# Patient Record
Sex: FEMALE | Race: WHITE | Employment: UNEMPLOYED | URBAN - METROPOLITAN AREA
[De-identification: names, ages, dates, MRNs, and addresses within clinical notes are randomized per-mention and may not be internally consistent; named-entity substitution may affect disease eponyms.]

---

## 2018-07-12 ENCOUNTER — HOSPITAL ENCOUNTER (OUTPATIENT)
Dept: SURGERY | Age: 48
Discharge: HOME | End: 2018-07-12

## 2018-07-12 ENCOUNTER — ANESTHESIA EVENT (OUTPATIENT)
Dept: SURGERY | Age: 48
End: 2018-07-12
Payer: COMMERCIAL

## 2018-07-12 PROBLEM — E66.01 OBESITY, MORBID (HCC): Status: ACTIVE | Noted: 2018-07-12

## 2018-07-12 NOTE — PERIOP NOTES
Patient verified name, , and surgery as listed in [facility]. Type 1B surgery, phone assessment complete. Orders not received. Labs per surgeon: none  Labs per anesthesia protocol: K+ (DOS)      Patient answered medical/surgical history questions at their best of ability. All prior to admission medications documented in [facility]. Patient instructed to take the following medications the day of surgery according to anesthesia guidelines with a small sip of water: allopurinol,norvasc,lopressor,zoloft, zofran if needed . Hold all vitamins 7 days prior to surgery and NSAIDS 5 days prior to surgery. Medications to be held none    Patient instructed on the following:  Arrive at A Entrance, time of arrival to be called the day before by 1700  NPO after midnight including gum, mints, and ice chips  Responsible adult must drive patient to the hospital, stay during surgery, and patient will  need supervision 24 hours after anesthesia  Use dial in shower the night before surgery and on the morning of surgery  Leave all valuables (money and jewelry) at home but bring insurance card and ID on DOS  Do not wear make-up, nail polish, lotions, cologne, perfumes, powders, or oil on skin. Patient teach back successful and patient demonstrates knowledge of instruction.

## 2018-07-13 ENCOUNTER — HOSPITAL ENCOUNTER (OUTPATIENT)
Age: 48
Setting detail: OBSERVATION
Discharge: HOME | End: 2018-07-13
Attending: OBSTETRICS & GYNECOLOGY | Admitting: OBSTETRICS & GYNECOLOGY
Payer: COMMERCIAL

## 2018-07-13 ENCOUNTER — ANESTHESIA (OUTPATIENT)
Dept: SURGERY | Age: 48
End: 2018-07-13
Payer: COMMERCIAL

## 2018-07-13 VITALS
OXYGEN SATURATION: 96 % | DIASTOLIC BLOOD PRESSURE: 72 MMHG | WEIGHT: 293 LBS | HEART RATE: 87 BPM | TEMPERATURE: 98.6 F | HEIGHT: 68 IN | SYSTOLIC BLOOD PRESSURE: 115 MMHG | BODY MASS INDEX: 44.41 KG/M2 | RESPIRATION RATE: 16 BRPM

## 2018-07-13 DIAGNOSIS — G89.18 POSTOPERATIVE PAIN: Primary | ICD-10-CM

## 2018-07-13 DIAGNOSIS — N92.1 MENORRHAGIA WITH IRREGULAR CYCLE: ICD-10-CM

## 2018-07-13 PROBLEM — D64.9 ANEMIA: Status: ACTIVE | Noted: 2018-07-13

## 2018-07-13 LAB
GLUCOSE BLD STRIP.AUTO-MCNC: 195 MG/DL (ref 65–100)
GLUCOSE BLD STRIP.AUTO-MCNC: 215 MG/DL (ref 65–100)
HCG UR QL: NEGATIVE
HCT VFR BLD AUTO: 23.2 % (ref 35.8–46.3)
HCT VFR BLD AUTO: 24.8 % (ref 35.8–46.3)
HGB BLD-MCNC: 7.8 G/DL (ref 11.7–15.4)
HGB BLD-MCNC: 8.3 G/DL (ref 11.7–15.4)
POTASSIUM BLD-SCNC: 5.1 MMOL/L (ref 3.5–5.1)
POTASSIUM SERPL-SCNC: 4.9 MMOL/L (ref 3.5–5.1)

## 2018-07-13 PROCEDURE — P9016 RBC LEUKOCYTES REDUCED: HCPCS | Performed by: ANESTHESIOLOGY

## 2018-07-13 PROCEDURE — 99218 HC RM OBSERVATION: CPT

## 2018-07-13 PROCEDURE — 77030008703 HC TU ET UNCUF COVD -A: Performed by: ANESTHESIOLOGY

## 2018-07-13 PROCEDURE — 85018 HEMOGLOBIN: CPT | Performed by: OBSTETRICS & GYNECOLOGY

## 2018-07-13 PROCEDURE — 77030020782 HC GWN BAIR PAWS FLX 3M -B: Performed by: ANESTHESIOLOGY

## 2018-07-13 PROCEDURE — 86923 COMPATIBILITY TEST ELECTRIC: CPT | Performed by: ANESTHESIOLOGY

## 2018-07-13 PROCEDURE — 74011250636 HC RX REV CODE- 250/636

## 2018-07-13 PROCEDURE — 77030008477 HC STYL SATN SLP COVD -A: Performed by: ANESTHESIOLOGY

## 2018-07-13 PROCEDURE — 77030012317 HC CATH URET INT COVD -A: Performed by: OBSTETRICS & GYNECOLOGY

## 2018-07-13 PROCEDURE — 86901 BLOOD TYPING SEROLOGIC RH(D): CPT | Performed by: ANESTHESIOLOGY

## 2018-07-13 PROCEDURE — 76060000033 HC ANESTHESIA 1 TO 1.5 HR: Performed by: OBSTETRICS & GYNECOLOGY

## 2018-07-13 PROCEDURE — 74011250636 HC RX REV CODE- 250/636: Performed by: ANESTHESIOLOGY

## 2018-07-13 PROCEDURE — 74011250636 HC RX REV CODE- 250/636: Performed by: OBSTETRICS & GYNECOLOGY

## 2018-07-13 PROCEDURE — 76010000138 HC OR TIME 0.5 TO 1 HR: Performed by: OBSTETRICS & GYNECOLOGY

## 2018-07-13 PROCEDURE — 36430 TRANSFUSION BLD/BLD COMPNT: CPT

## 2018-07-13 PROCEDURE — 84132 ASSAY OF SERUM POTASSIUM: CPT | Performed by: OBSTETRICS & GYNECOLOGY

## 2018-07-13 PROCEDURE — 81025 URINE PREGNANCY TEST: CPT

## 2018-07-13 PROCEDURE — 77030032490 HC SLV COMPR SCD KNE COVD -B: Performed by: OBSTETRICS & GYNECOLOGY

## 2018-07-13 PROCEDURE — 88305 TISSUE EXAM BY PATHOLOGIST: CPT | Performed by: OBSTETRICS & GYNECOLOGY

## 2018-07-13 PROCEDURE — 77030039270 HC TU BLD FLTR CARD -A

## 2018-07-13 PROCEDURE — 77030018836 HC SOL IRR NACL ICUM -A: Performed by: OBSTETRICS & GYNECOLOGY

## 2018-07-13 PROCEDURE — 74011000250 HC RX REV CODE- 250: Performed by: ANESTHESIOLOGY

## 2018-07-13 PROCEDURE — 74011000250 HC RX REV CODE- 250

## 2018-07-13 PROCEDURE — 84132 ASSAY OF SERUM POTASSIUM: CPT

## 2018-07-13 PROCEDURE — 76210000017 HC OR PH I REC 1.5 TO 2 HR: Performed by: OBSTETRICS & GYNECOLOGY

## 2018-07-13 PROCEDURE — 82962 GLUCOSE BLOOD TEST: CPT

## 2018-07-13 PROCEDURE — 77030020407 HC IV BLD WRMR ST 3M -A: Performed by: ANESTHESIOLOGY

## 2018-07-13 RX ORDER — ROCURONIUM BROMIDE 10 MG/ML
INJECTION, SOLUTION INTRAVENOUS AS NEEDED
Status: DISCONTINUED | OUTPATIENT
Start: 2018-07-13 | End: 2018-07-13 | Stop reason: HOSPADM

## 2018-07-13 RX ORDER — HYDROMORPHONE HYDROCHLORIDE 2 MG/ML
0.5 INJECTION, SOLUTION INTRAMUSCULAR; INTRAVENOUS; SUBCUTANEOUS
Status: DISCONTINUED | OUTPATIENT
Start: 2018-07-13 | End: 2018-07-13 | Stop reason: HOSPADM

## 2018-07-13 RX ORDER — OXYCODONE HYDROCHLORIDE 5 MG/1
5 TABLET ORAL
Status: DISCONTINUED | OUTPATIENT
Start: 2018-07-13 | End: 2018-07-13 | Stop reason: HOSPADM

## 2018-07-13 RX ORDER — SODIUM CHLORIDE 0.9 % (FLUSH) 0.9 %
5-10 SYRINGE (ML) INJECTION AS NEEDED
Status: DISCONTINUED | OUTPATIENT
Start: 2018-07-13 | End: 2018-07-14 | Stop reason: HOSPADM

## 2018-07-13 RX ORDER — SUCCINYLCHOLINE CHLORIDE 20 MG/ML
INJECTION INTRAMUSCULAR; INTRAVENOUS AS NEEDED
Status: DISCONTINUED | OUTPATIENT
Start: 2018-07-13 | End: 2018-07-13 | Stop reason: HOSPADM

## 2018-07-13 RX ORDER — DEXAMETHASONE SODIUM PHOSPHATE 4 MG/ML
INJECTION, SOLUTION INTRA-ARTICULAR; INTRALESIONAL; INTRAMUSCULAR; INTRAVENOUS; SOFT TISSUE AS NEEDED
Status: DISCONTINUED | OUTPATIENT
Start: 2018-07-13 | End: 2018-07-13 | Stop reason: HOSPADM

## 2018-07-13 RX ORDER — OXYCODONE AND ACETAMINOPHEN 5; 325 MG/1; MG/1
1 TABLET ORAL
Status: DISCONTINUED | OUTPATIENT
Start: 2018-07-13 | End: 2018-07-14 | Stop reason: HOSPADM

## 2018-07-13 RX ORDER — TRAMADOL HYDROCHLORIDE 50 MG/1
50 TABLET ORAL
Qty: 15 TAB | Refills: 0 | Status: SHIPPED | OUTPATIENT
Start: 2018-07-13 | End: 2018-07-20

## 2018-07-13 RX ORDER — LIDOCAINE HYDROCHLORIDE 20 MG/ML
INJECTION, SOLUTION EPIDURAL; INFILTRATION; INTRACAUDAL; PERINEURAL AS NEEDED
Status: DISCONTINUED | OUTPATIENT
Start: 2018-07-13 | End: 2018-07-13 | Stop reason: HOSPADM

## 2018-07-13 RX ORDER — PROPOFOL 10 MG/ML
INJECTION, EMULSION INTRAVENOUS AS NEEDED
Status: DISCONTINUED | OUTPATIENT
Start: 2018-07-13 | End: 2018-07-13 | Stop reason: HOSPADM

## 2018-07-13 RX ORDER — NALOXONE HYDROCHLORIDE 0.4 MG/ML
0.04 INJECTION, SOLUTION INTRAMUSCULAR; INTRAVENOUS; SUBCUTANEOUS
Status: DISCONTINUED | OUTPATIENT
Start: 2018-07-13 | End: 2018-07-13 | Stop reason: HOSPADM

## 2018-07-13 RX ORDER — KETOROLAC TROMETHAMINE 30 MG/ML
INJECTION, SOLUTION INTRAMUSCULAR; INTRAVENOUS AS NEEDED
Status: DISCONTINUED | OUTPATIENT
Start: 2018-07-13 | End: 2018-07-13 | Stop reason: HOSPADM

## 2018-07-13 RX ORDER — SODIUM CHLORIDE 9 MG/ML
250 INJECTION, SOLUTION INTRAVENOUS AS NEEDED
Status: DISCONTINUED | OUTPATIENT
Start: 2018-07-13 | End: 2018-07-13 | Stop reason: HOSPADM

## 2018-07-13 RX ORDER — LORAZEPAM 1 MG/1
1 TABLET ORAL
Status: DISCONTINUED | OUTPATIENT
Start: 2018-07-13 | End: 2018-07-14 | Stop reason: HOSPADM

## 2018-07-13 RX ORDER — MIDAZOLAM HYDROCHLORIDE 1 MG/ML
2 INJECTION, SOLUTION INTRAMUSCULAR; INTRAVENOUS ONCE
Status: DISCONTINUED | OUTPATIENT
Start: 2018-07-13 | End: 2018-07-13 | Stop reason: HOSPADM

## 2018-07-13 RX ORDER — SODIUM CHLORIDE, SODIUM LACTATE, POTASSIUM CHLORIDE, CALCIUM CHLORIDE 600; 310; 30; 20 MG/100ML; MG/100ML; MG/100ML; MG/100ML
100 INJECTION, SOLUTION INTRAVENOUS CONTINUOUS
Status: DISCONTINUED | OUTPATIENT
Start: 2018-07-13 | End: 2018-07-13 | Stop reason: HOSPADM

## 2018-07-13 RX ORDER — MIDAZOLAM HYDROCHLORIDE 1 MG/ML
2 INJECTION, SOLUTION INTRAMUSCULAR; INTRAVENOUS
Status: DISCONTINUED | OUTPATIENT
Start: 2018-07-13 | End: 2018-07-13 | Stop reason: HOSPADM

## 2018-07-13 RX ORDER — SODIUM CHLORIDE 0.9 % (FLUSH) 0.9 %
5-10 SYRINGE (ML) INJECTION EVERY 8 HOURS
Status: DISCONTINUED | OUTPATIENT
Start: 2018-07-13 | End: 2018-07-14 | Stop reason: HOSPADM

## 2018-07-13 RX ORDER — KETOROLAC TROMETHAMINE 30 MG/ML
30 INJECTION, SOLUTION INTRAMUSCULAR; INTRAVENOUS
Status: DISCONTINUED | OUTPATIENT
Start: 2018-07-13 | End: 2018-07-13 | Stop reason: HOSPADM

## 2018-07-13 RX ORDER — FENTANYL CITRATE 50 UG/ML
100 INJECTION, SOLUTION INTRAMUSCULAR; INTRAVENOUS ONCE
Status: DISCONTINUED | OUTPATIENT
Start: 2018-07-13 | End: 2018-07-13 | Stop reason: HOSPADM

## 2018-07-13 RX ORDER — LIDOCAINE HYDROCHLORIDE 10 MG/ML
0.1 INJECTION INFILTRATION; PERINEURAL AS NEEDED
Status: DISCONTINUED | OUTPATIENT
Start: 2018-07-13 | End: 2018-07-13 | Stop reason: HOSPADM

## 2018-07-13 RX ORDER — DIPHENHYDRAMINE HYDROCHLORIDE 50 MG/ML
12.5 INJECTION, SOLUTION INTRAMUSCULAR; INTRAVENOUS
Status: DISCONTINUED | OUTPATIENT
Start: 2018-07-13 | End: 2018-07-14 | Stop reason: HOSPADM

## 2018-07-13 RX ORDER — FENTANYL CITRATE 50 UG/ML
INJECTION, SOLUTION INTRAMUSCULAR; INTRAVENOUS AS NEEDED
Status: DISCONTINUED | OUTPATIENT
Start: 2018-07-13 | End: 2018-07-13 | Stop reason: HOSPADM

## 2018-07-13 RX ORDER — SODIUM CHLORIDE 9 MG/ML
INJECTION, SOLUTION INTRAVENOUS
Status: DISCONTINUED | OUTPATIENT
Start: 2018-07-13 | End: 2018-07-13 | Stop reason: HOSPADM

## 2018-07-13 RX ORDER — SODIUM CHLORIDE 9 MG/ML
250 INJECTION, SOLUTION INTRAVENOUS AS NEEDED
Status: DISCONTINUED | OUTPATIENT
Start: 2018-07-13 | End: 2018-07-14 | Stop reason: HOSPADM

## 2018-07-13 RX ORDER — ONDANSETRON 2 MG/ML
INJECTION INTRAMUSCULAR; INTRAVENOUS AS NEEDED
Status: DISCONTINUED | OUTPATIENT
Start: 2018-07-13 | End: 2018-07-13 | Stop reason: HOSPADM

## 2018-07-13 RX ADMIN — LIDOCAINE HYDROCHLORIDE 40 MG: 20 INJECTION, SOLUTION EPIDURAL; INFILTRATION; INTRACAUDAL; PERINEURAL at 13:27

## 2018-07-13 RX ADMIN — FENTANYL CITRATE 100 MCG: 50 INJECTION, SOLUTION INTRAMUSCULAR; INTRAVENOUS at 13:27

## 2018-07-13 RX ADMIN — FENTANYL CITRATE 50 MCG: 50 INJECTION, SOLUTION INTRAMUSCULAR; INTRAVENOUS at 13:58

## 2018-07-13 RX ADMIN — DEXAMETHASONE SODIUM PHOSPHATE 8 MG: 4 INJECTION, SOLUTION INTRA-ARTICULAR; INTRALESIONAL; INTRAMUSCULAR; INTRAVENOUS; SOFT TISSUE at 13:31

## 2018-07-13 RX ADMIN — ONDANSETRON 4 MG: 2 INJECTION INTRAMUSCULAR; INTRAVENOUS at 13:31

## 2018-07-13 RX ADMIN — PROPOFOL 200 MG: 10 INJECTION, EMULSION INTRAVENOUS at 13:27

## 2018-07-13 RX ADMIN — SODIUM CHLORIDE, SODIUM LACTATE, POTASSIUM CHLORIDE, AND CALCIUM CHLORIDE: 600; 310; 30; 20 INJECTION, SOLUTION INTRAVENOUS at 13:21

## 2018-07-13 RX ADMIN — SODIUM CHLORIDE, SODIUM LACTATE, POTASSIUM CHLORIDE, AND CALCIUM CHLORIDE 100 ML/HR: 600; 310; 30; 20 INJECTION, SOLUTION INTRAVENOUS at 12:29

## 2018-07-13 RX ADMIN — SODIUM CHLORIDE: 9 INJECTION, SOLUTION INTRAVENOUS at 13:34

## 2018-07-13 RX ADMIN — FENTANYL CITRATE 25 MCG: 50 INJECTION, SOLUTION INTRAMUSCULAR; INTRAVENOUS at 14:24

## 2018-07-13 RX ADMIN — KETOROLAC TROMETHAMINE 30 MG: 30 INJECTION, SOLUTION INTRAMUSCULAR; INTRAVENOUS at 14:09

## 2018-07-13 RX ADMIN — FENTANYL CITRATE 25 MCG: 50 INJECTION, SOLUTION INTRAMUSCULAR; INTRAVENOUS at 14:19

## 2018-07-13 RX ADMIN — LIDOCAINE HYDROCHLORIDE 0.1 ML: 10 INJECTION, SOLUTION INFILTRATION; PERINEURAL at 12:29

## 2018-07-13 RX ADMIN — ROCURONIUM BROMIDE 5 MG: 10 INJECTION, SOLUTION INTRAVENOUS at 13:27

## 2018-07-13 RX ADMIN — MEDROXYPROGESTERONE ACETATE 300 MG: 400 INJECTION, SUSPENSION INTRAMUSCULAR at 15:32

## 2018-07-13 RX ADMIN — SUCCINYLCHOLINE CHLORIDE 120 MG: 20 INJECTION INTRAMUSCULAR; INTRAVENOUS at 13:27

## 2018-07-13 RX ADMIN — Medication 10 ML: at 20:51

## 2018-07-13 NOTE — PROGRESS NOTES
Admitted to the unit via stretcher, no acute distress, ambulate to bathroom, without any complaints of weakness, SOB or dizziness, returned to bed, extremities aligned.

## 2018-07-13 NOTE — IP AVS SNAPSHOT
303 [facility], [place] 
 
 
 300 [facility] [address] 
[phone] Patient: Mundo Linares MRN: [id number] :1970 About your hospitalization You were admitted on:  2018 You last received care in the:  [address] [place] [address] You were discharged on:  2018 Why you were hospitalized Your primary diagnosis was:  Not on File Your diagnoses also included:  Anemia Follow-up Information Follow up With Details Comments Contact Info Duarte Padilla, [address] [facility] [address] 187 [facility] [address] 
[phone] Paul Dickinson MD In 2 weeks  1788 [facility] 1703 [facility] 187 [facility] [address] 
[phone] Your Scheduled Appointments 2018  9:00 AM EDT  
POST OP with Paul Dickinson MD  
[facility] ([address] [place] [address]) [facility] [address]  
[phone] Discharge Orders None A check kena indicates which time of day the medication should be taken. My Medications START taking these medications Instructions Each Dose to Equal  
 Morning Noon Evening Bedtime  
 traMADol 50 mg tablet Commonly known as:  ULTRAM  
   
Your last dose was: Your next dose is: Take 1 Tab by mouth every six (6) hours as needed for Pain. Max Daily Amount: 200 mg.  
 50 mg CONTINUE taking these medications Instructions Each Dose to Equal  
 Morning Noon Evening Bedtime  
 allopurinol 300 mg tablet Commonly known as:  Lázaro Walton Your last dose was: Your next dose is: Take 300 mg by mouth daily. Per anesthesia protocol:instructed to take am of surgery. Indications: prevention of acute gout attack 300 mg  
    
   
   
   
  
 amLODIPine 5 mg tablet Commonly known as:  Marilou Guillaume Your last dose was: Your next dose is: Take 5 mg by mouth. Per anesthesia protocol:instructed to take am of surgery. Indications: hypertension 5 mg  
    
   
   
   
  
 cyanocobalamin 1,000 mcg/mL injection Commonly known as:  VITAMIN B12 Your last dose was: Your next dose is:    
   
   
 1,000 mcg by IntraMUSCular route. 1000 mcg  
    
   
   
   
  
 dicyclomine 20 mg tablet Commonly known as:  BENTYL Your last dose was: Your next dose is: Take 20 mg by mouth every six (6) hours as needed for Other (IBS). Indications: IRRITABLE BOWEL SYNDROME  
 20 mg  
    
   
   
   
  
 gemfibrozil 600 mg tablet Commonly known as:  LOPID Your last dose was: Your next dose is: Take 600 mg by mouth. 600 mg  
    
   
   
   
  
 losartan-hydroCHLOROthiazide 100-25 mg per tablet Commonly known as:  HYZAAR Your last dose was: Your next dose is: Take 1 Tab by mouth. 1 Tab  
    
   
   
   
  
 metFORMIN 500 mg tablet Commonly known as:  GLUCOPHAGE Your last dose was: Your next dose is: Take 1,000 mg by mouth two (2) times daily (with meals). 1000 mg  
    
   
   
   
  
 metoprolol tartrate 100 mg IR tablet Commonly known as:  LOPRESSOR Your last dose was: Your next dose is: Take 100 mg by mouth daily. Per anesthesia protocol:instructed to take am of surgery. 100 mg  
    
   
   
   
  
 ondansetron hcl 4 mg tablet Commonly known as:  Lynch Lose Your last dose was: Your next dose is: Take 4 mg by mouth every eight (8) hours as needed for Nausea. 4 mg  
    
   
   
   
  
 sertraline 100 mg tablet Commonly known as:  ZOLOFT Your last dose was: Your next dose is: Take 100 mg by mouth daily. Per anesthesia protocol:instructed to take am of surgery.   
 100 mg  
    
   
   
   
  
  
  
 Where to Get Your Medications Information on where to get these meds will be given to you by the nurse or doctor. ! Ask your nurse or doctor about these medications  
  traMADol 50 mg tablet Opioid Education Prescription Opioids: What You Need to Know: 
 
 
After general anesthesia or intravenous sedation, for 24 hours or while taking prescription Narcotics: · Limit your activities · Do not drive and operate hazardous machinery · Do not make important personal or business decisions · Do  not drink alcoholic beverages · If you have not urinated within 8 hours after discharge, please contact your surgeon on call. Report the following to your surgeon: 
· Excessive pain, swelling, redness or odor of or around the surgical area · Temperature over 100.5 · Nausea and vomiting lasting longer than 4 hours or if unable to take medications · Any signs of decreased circulation or nerve impairment to extremity: change in color, persistent  numbness, tingling, coldness or increase pain · Any questions What to do at Home: 
Recommended activity: No lifting,or Strenuous exercise until Md approves, No sex until Md approves and No driving while on pain medication. If you experience any of the following symptoms: heavy bleeding, unusual or foul smelling vaginal discharge, fever,or uncontrolled pain, please follow up with Dr. Cari Mccarthy. *  Please give a list of your current medications to your Primary Care Provider. *  Please update this list whenever your medications are discontinued, doses are 
    changed, or new medications (including over-the-counter products) are added.  
 
*  Please carry medication information at all times in case of emergency situations. These are general instructions for a healthy lifestyle: No smoking/ No tobacco products/ Avoid exposure to second hand smoke Surgeon General's Warning:  Quitting smoking now greatly reduces serious risk to your health. Obesity, smoking, and sedentary lifestyle greatly increases your risk for illness A healthy diet, regular physical exercise & weight monitoring are important for maintaining a healthy lifestyle You may be retaining fluid if you have a history of heart failure or if you experience any of the following symptoms:  Weight gain of 3 pounds or more overnight or 5 pounds in a week, increased swelling in our hands or feet or shortness of breath while lying flat in bed. Please call your doctor as soon as you notice any of these symptoms; do not wait until your next office visit. Recognize signs and symptoms of STROKE: 
 
F-face looks uneven A-arms unable to move or move unevenly S-speech slurred or non-existent T-time-call 911 as soon as signs and symptoms begin-DO NOT go Back to bed or wait to see if you get better-TIME IS BRAIN. Warning Signs of HEART ATTACK Call 911 if you have these symptoms: 
? Chest discomfort. Most heart attacks involve discomfort in the center of the chest that lasts more than a few minutes, or that goes away and comes back. It can feel like uncomfortable pressure, squeezing, fullness, or pain. ? Discomfort in other areas of the upper body. Symptoms can include pain or discomfort in one or both arms, the back, neck, jaw, or stomach. ? Shortness of breath with or without chest discomfort. ? Other signs may include breaking out in a cold sweat, nausea, or lightheadedness. Don't wait more than five minutes to call [address]! Fast action can save your life. Calling 911 is almost always the fastest way to get lifesaving treatment.  Emergency Medical Services staff can begin treatment when they arrive  up to an hour sooner than if someone gets to the hospital by car. The discharge information has been reviewed with the patient. The patient verbalized understanding. Discharge medications reviewed with the patient and appropriate educational materials and side effects teaching were provided. ___________________________________________________________________________________________________________________________________ Introducing [facility] & [facility] SERVICES! [employer] introduces Buzzilla patient portal. Now you can access parts of your medical record, email your doctor's office, and request medication refills online. 1. In your internet browser, go to [email or web address] Mission[employer]/[address] [employer] 2. Click on the First Time User? Click Here link in the Sign In box. You will see the New Member Sign Up page. 3. Enter your Buzzilla Access Code exactly as it appears below. You will not need to use this code after youve completed the sign-up process. If you do not sign up before the expiration date, you must request a new code. · Buzzilla Access Code: [email or web address] Expires: 10/9/2018 12:50 PM 
 
4. Enter the last four digits of your Social Security Number (xxxx) and Date of Birth (mm/dd/yyyy) as indicated and click Submit. You will be taken to the next sign-up page. 5. Create a Rollerwallt ID. This will be your Buzzilla login ID and cannot be changed, so think of one that is secure and easy to remember. 6. Create a Rollerwallt password. You can change your password at any time. 7. Enter your Password Reset Question and Answer. This can be used at a later time if you forget your password. 8. Enter your e-mail address. You will receive e-mail notification when new information is available in [address] 9. Click Sign Up. You can now view and download portions of your medical record. 10. Click the Download Summary menu link to download a portable copy of your medical information. If you have questions, please visit the Frequently Asked Questions section of the MyChart website. Remember, "[employer]t is NOT to be used for urgent needs. For medical emergencies, dial 911. Now available from your iPhone and Android! Introducing [facility] As a [address] [place] [address] patient, I wanted to make you aware of our electronic visit tool called [facility]. [address] 24/7 allows you to connect within minutes with a medical provider 24 hours a day, seven days a week via a mobile device or tablet or logging into a secure website from your computer. You can access [facility] from anywhere in the [place]. A virtual visit might be right for you when you have a simple condition and feel like you just dont want to get out of bed, or cant get away from work for an appointment, when your regular [address] [place] [address] provider is not available (evenings, weekends or holidays), or when youre out of town and need minor care. Electronic visits cost only $49 and if the [facility] [address] 24/7 provider determines a prescription is needed to treat your condition, one can be electronically transmitted to a nearby pharmacy*. Please take a moment to enroll today if you have not already done so. The enrollment process is free and takes just a few minutes. To enroll, please download the [facility] 24/7 doug to your tablet or phone, or visit [email or web address] to enroll on your computer. And, as an [address] [place] [address] patient with a Gnammo account, the results of your visits will be scanned into your electronic medical record and your primary care provider will be able to view the scanned results. We urge you to continue to see your regular [address] [place] [address] provider for your ongoing medical care.   And while your primary care provider may not be the one available when you seek a [facility] virtual visit, the peace of mind you get from getting a real diagnosis real time can be priceless. For more information on [facility], view our Frequently Asked Questions (FAQs) at [email or web address]. Sincerely, 
 
Maile Eng MD 
Chief Medical Officer [address] *:  certain medications cannot be prescribed via [facility] Providers Seen During Your Hospitalization Provider Specialty Primary office phone Rasheed Dobbs MD Obstetrics & Gynecology [phone] Your Primary Care Physician (PCP) Primary Care Physician Office Phone Office Fax Kennedy Berry [phone] You are allergic to the following Allergen Reactions Ace Inhibitors Angioedema Arb-Angiotensin Receptor Antagonist Angioedema Keflex (Cephalexin) Nausea and Vomiting Recent Documentation Height Weight BMI Smoking Status 1.715 m (!) 160.3 kg 54.53 kg/m2 Never Smoker Emergency Contacts Name Discharge Info Relation Home Work Mobile Gilberto Collins  Spouse [3] [phone] Patient Belongings The following personal items are in your possession at time of discharge: 
  Dental Appliances: None  Visual Aid: None      Home Medications: None   Jewelry: Ring  Clothing: Footwear, Pants, Shirt, Undergarments    Other Valuables: Eyeglasses Discharge Instructions Attachments/References BLOOD TRANSFUSIONS: GENERAL INFO (ENGLISH) ANEMIA (ENGLISH) D AND C: POST-OP (ENGLISH) Patient Handouts Learning About Blood Transfusions What is a blood transfusion? Blood transfusion is a medical treatment to replace the blood or parts of blood that your body has lost. The blood goes through a tube from a bag to an intravenous (IV) catheter and into your vein.  
You may need a blood transfusion after losing blood from an injury, a major surgery, an illness that causes bleeding, or an illness that destroys blood cells. Transfusions are also used to give you the parts of blood-such as platelets, plasma, or substances that cause clotting-that your body needs to fight an illness or stop bleeding. How is a blood transfusion done? Before you receive a blood transfusion, your blood is tested to find out what your blood type is. Blood or blood parts that are a match with your blood type are ordered by your doctor. Blood is typed as A, B, AB, or O. It is also typed as Rh-positive or Rh-negative. Your blood is also screened to look for antibodies that might react with the blood that is given to you. The blood you are getting is checked and rechecked to make sure that it's the right type for you. A sample of your blood is mixed with a sample of the blood you will receive to check for problems. Before actually giving you the transfusion, a doctor and nurses will look at the label on the package of blood and compare it to your hospital ID bracelet and medical records. The transfusion begins only when all agree that this is the correct blood and that you are the correct person to receive it. To receive the transfusion, you will have an intravenous (IV) catheter inserted into a vein. A tube connects the catheter to the bag containing the blood, which is placed higher than your body. The blood then flows slowly into your vein. A doctor or nurse will check you several times during the transfusion to watch for a reaction or other problems. What are the possible risks? Blood transfusions have many benefits and are often life-saving. But they also have a few risks. Possible risks include: 
· Your body's reaction to receiving new blood. This may include: ¨ Fever. ¨ Allergic reactions. ¨ Breathing problems. · An infection from the blood. This risk is small because of the strict rules placed on handling and storing blood.  Getting a viral infection, such as HIV or hepatitis B or C, through blood transfusions has become very rare. The U.S. Food and Drug Administration (FDA) enforces strict guidelines on the collection, testing, storage, and use of blood. · Getting the wrong blood type by accident. Severe reactions, which can be life-threatening, are very rare. What can you expect after a blood transfusion? Here are some things you can do at home to help prevent infection at the transfusion site: 
· Wash the area daily with warm, soapy water, and pat it dry. Don't use hydrogen peroxide or alcohol, which can slow healing. You may cover the area with a gauze bandage if it weeps or rubs against clothing. Change the bandage every day. · Keep the area clean and dry. When should you call for help? Call 911 anytime you think you may need emergency care. For example, call if: 
· You have severe trouble breathing. Call your doctor now or seek immediate medical care if: 
· You have a fever. · You feel weaker or more tired than usual. 
· You have a yellow tint to your skin or the whites of your eyes. Watch closely for changes in your health, and be sure to contact your doctor if you have any problems. Follow-up care is a key part of your treatment and safety. Be sure to make and go to all appointments, and call your doctor if you are having problems. It's also a good idea to know your test results and keep a list of the medicines you take. Where can you learn more? Go to http://jemima-jacob.info/. Enter V535 in the search box to learn more about \"Learning About Blood Transfusions. \" Current as of: October 9, 2017 Content Version: 11.7 © 0200-2247 Healthwise, Incorporated. Care instructions adapted under license by "[employer] Mobile [employer]" (which disclaims liability or warranty for this information). If you have questions about a medical condition or this instruction, always ask your healthcare professional. [address] any warranty or liability for your use of this information. Anemia: Care Instructions Your Care Instructions Anemia is a low level of red blood cells, which carry oxygen throughout your body. Many things can cause anemia. Lack of iron is one of the most common causes. Your body needs iron to make hemoglobin, a substance in red blood cells that carries oxygen from the lungs to your body's cells. Without enough iron, the body produces fewer and smaller red blood cells. As a result, your body's cells do not get enough oxygen, and you feel tired and weak. And you may have trouble concentrating. Bleeding is the most common cause of a lack of iron. You may have heavy menstrual bleeding or bleeding caused by conditions such as ulcers, hemorrhoids, or cancer. Regular use of aspirin or other anti-inflammatory medicines (such as ibuprofen) also can cause bleeding in some people. A lack of iron in your diet also can cause anemia, especially at times when the body needs more iron, such as during pregnancy, infancy, and the teen years. Your doctor may have prescribed iron pills. It may take several months of treatment for your iron levels to return to normal. Your doctor also may suggest that you eat foods that are rich in iron, such as meat and beans. There are many other causes of anemia. It is not always due to a lack of iron. Finding the specific cause of your anemia will help your doctor find the right treatment for you. Follow-up care is a key part of your treatment and safety. Be sure to make and go to all appointments, and call your doctor if you are having problems. It's also a good idea to know your test results and keep a list of the medicines you take. How can you care for yourself at home? · Take your medicines exactly as prescribed. Call your doctor if you think you are having a problem with your medicine. · If your doctor recommends iron pills, take them as directed: ¨ Try to take the pills on an empty stomach about 1 hour before or 2 hours after meals. But you may need to take iron with food to avoid an upset stomach. ¨ Do not take antacids or drink milk or caffeine drinks (such as coffee, tea, or cola) at the same time or within 2 hours of the time that you take your iron. They can make it hard for your body to absorb the iron. ¨ Vitamin C (from food or supplements) helps your body absorb iron. Try taking iron pills with a glass of orange juice or some other food that is high in vitamin C, such as citrus fruits. ¨ Iron pills may cause stomach problems, such as heartburn, nausea, diarrhea, constipation, and cramps. Be sure to drink plenty of fluids, and include fruits, vegetables, and fiber in your diet each day. Iron pills often make your bowel movements dark or green. ¨ If you forget to take an iron pill, do not take a double dose of iron the next time you take a pill. ¨ Keep iron pills out of the reach of small children. An overdose of iron can be very dangerous. · Follow your doctor's advice about eating iron-rich foods. These include red meat, shellfish, poultry, eggs, beans, raisins, whole-grain bread, and leafy green vegetables. · Steam vegetables to help them keep their iron content. When should you call for help? Call 911 anytime you think you may need emergency care. For example, call if: 
  · You have symptoms of a heart attack. These may include: ¨ Chest pain or pressure, or a strange feeling in the chest. 
¨ Sweating. ¨ Shortness of breath. ¨ Nausea or vomiting. ¨ Pain, pressure, or a strange feeling in the back, neck, jaw, or upper belly or in one or both shoulders or arms. ¨ Lightheadedness or sudden weakness. ¨ A fast or irregular heartbeat. After you call 911, the  may tell you to chew 1 adult-strength or 2 to 4 low-dose aspirin. Wait for an ambulance. Do not try to drive yourself.  
  · You passed out (lost consciousness).  
 Call your doctor now or seek immediate medical care if:   · You have new or increased shortness of breath.  
  · You are dizzy or lightheaded, or you feel like you may faint.  
  · Your fatigue and weakness continue or get worse.  
  · You have any abnormal bleeding, such as: 
¨ Nosebleeds. ¨ Vaginal bleeding that is different (heavier, more frequent, at a different time of the month) than what you are used to. ¨ Bloody or black stools, or rectal bleeding. ¨ Bloody or pink urine.  
 Watch closely for changes in your health, and be sure to contact your doctor if: 
  · You do not get better as expected. Where can you learn more? Go to http://jemima-jacob.info/. Enter R301 in the search box to learn more about \"Anemia: Care Instructions. \" Current as of: October 9, 2017 Content Version: 11.7 © 9580-7083 [employer]. Care instructions adapted under license by [employer] (which disclaims liability or warranty for this information). If you have questions about a medical condition or this instruction, always ask your healthcare professional. [place] [address] any warranty or liability for your use of this information. Dilation and Curettage: What to Expect at [facility] Your Recovery Dilation and curettage (D&C) is a procedure to remove tissue from the inside of the uterus. The doctor used a curved tool, called a curette, to gently scrape tissue from your uterus. You are likely to have a backache, or cramps similar to menstrual cramps, and pass small clots of blood from your vagina for the first few days. You may continue to have light vaginal bleeding for several weeks after the procedure. You will probably be able to go back to most of your normal activities in 1 or 2 days. This care sheet gives you a general idea about how long it will take for you to recover. But each person recovers at a different pace. Follow the steps below to get better as quickly as possible. How can you care for yourself at home? Activity 
  · Rest when you feel tired. Getting enough sleep will help you recover.  
  · Avoid strenuous activities, such as bicycle riding, jogging, weight lifting, or aerobic exercise, until your doctor says it is okay.  
  · Most women are able to return to work the day after the procedure.  
  · You may have some light vaginal bleeding. Wear sanitary pads if needed. Do not douche or use tampons for 2 weeks or until your doctor says it is okay.  
  · Ask your doctor when it is okay for you to have sex.  
  · If you could become pregnant, talk about birth control with your doctor. Do not try to become pregnant until your doctor says it is okay. Diet 
  · You can eat your normal diet. If your stomach is upset, try bland, low-fat foods like plain rice, broiled chicken, toast, and yogurt.  
  · Drink plenty of fluids (unless your doctor tells you not to). Medicines 
  · Your doctor will tell you if and when you can restart your medicines. He or she will also give you instructions about taking any new medicines.  
  · If you take blood thinners, such as warfarin (Coumadin), clopidogrel (Plavix), or aspirin, be sure to talk to your doctor. He or she will tell you if and when to start taking those medicines again. Make sure that you understand exactly what your doctor wants you to do.  
  · Be safe with medicines. Take pain medicines exactly as directed. ¨ If the doctor gave you a prescription medicine for pain, take it as prescribed. ¨ If you are not taking a prescription pain medicine, ask your doctor if you can take an over-the-counter medicine.  
  · If you think your pain medicine is making you sick to your stomach: 
¨ Take your medicine after meals (unless your doctor has told you not to). ¨ Ask your doctor for a different pain medicine.  
  · If your doctor prescribed antibiotics, take them as directed.  Do not stop taking them just because you feel better. You need to take the full course of antibiotics. Follow-up care is a key part of your treatment and safety. Be sure to make and go to all appointments, and call your doctor if you are having problems. It's also a good idea to know your test results and keep a list of the medicines you take. When should you call for help? Call 911 anytime you think you may need emergency care. For example, call if: 
  · You passed out (lost consciousness).  
  · You have chest pain, are short of breath, or cough up blood.  
 Call your doctor now or seek immediate medical care if: 
  · You have bright red vaginal bleeding that soaks one or more pads in an hour, or you have large clots.  
  · You have vaginal discharge that increases in amount or smells bad.  
  · You are sick to your stomach or cannot drink fluids.  
  · You have pain that does not get better after you take pain medicine.  
  · You cannot pass stools or gas.  
  · You have symptoms of a blood clot in your leg (called a deep vein thrombosis), such as: 
¨ Pain in your calf, back of the knee, thigh, or groin. ¨ Redness and swelling in your leg.  
  · You have signs of infection, such as: 
¨ Increased pain, swelling, warmth, or redness. ¨ Red streaks leading from the area. ¨ Pus draining from the area. ¨ A fever.  
 Watch closely for changes in your health, and be sure to contact your doctor if you have any problems. Where can you learn more? Go to http://jemima-jacob.info/. Enter 585-384-1984 in the search box to learn more about \"Dilation and Curettage: What to Expect at Home. \" Current as of: November 21, 2017 Content Version: 11.7 © 1175-0187 [employer]. Care instructions adapted under license by [employer] (which disclaims liability or warranty for this information).  If you have questions about a medical condition or this instruction, always ask your healthcare professional. [address] any warranty or liability for your use of this information. Please provide this summary of care documentation to your next provider. Signatures-by signing, you are acknowledging that this After Visit Summary has been reviewed with you and you have received a copy. Patient Signature:  ____________________________________________________________ Date:  ____________________________________________________________  
  
[facility] Provider Signature:  ____________________________________________________________ Date:  ____________________________________________________________

## 2018-07-13 NOTE — PROGRESS NOTES
Resting quietly, awake with no c/o during report received from MANOHAR LUZ RN. 2nd unit of prbcs infusing without difficulty. No distress.

## 2018-07-13 NOTE — PROGRESS NOTES
TRANSFER - IN REPORT:    Verbal report received from Nataly Choudhary RN on [employer]  being received from PACU for routine progression of care      Report consisted of patients Situation, Background, Assessment and   Recommendations(SBAR). Information from the following report(s) SBAR was reviewed with the receiving nurse. Opportunity for questions and clarification was provided. Assessment completed upon patients arrival to unit and care assumed.

## 2018-07-13 NOTE — ANESTHESIA POSTPROCEDURE EVALUATION
Post-Anesthesia Evaluation and Assessment    Patient: Tana Rocha MRN: [id number]  SSN: [id number]    YOB: 1970  Age: 46 y.o. Sex: female       Cardiovascular Function/Vital Signs  Visit Vitals    /63    Pulse 88    Temp 36.9 °C (98.4 °F)    Resp 16    Ht 5' 7.5\" (1.715 m)    Wt (!) 160.3 kg (353 lb 6.4 oz)    SpO2 99%    BMI 54.53 kg/m2       Patient is status post general anesthesia for Procedure(s):  DILATATION AND CURETTAGE HYSTEROSCOPY. Nausea/Vomiting: None    Postoperative hydration reviewed and adequate. Pain:  Pain Scale 1: Numeric (0 - 10) (07/13/18 1423)  Pain Intensity 1: 0 (07/13/18 1423)   Managed    Neurological Status:   Neuro (WDL): Within Defined Limits (07/13/18 1423)  Neuro  Neurologic State: Alert (07/13/18 1423)  Orientation Level: Oriented X4 (07/13/18 1423)  LUE Motor Response: Purposeful (07/13/18 1423)  LLE Motor Response: Purposeful (07/13/18 1423)  RUE Motor Response: Purposeful (07/13/18 1423)  RLE Motor Response: Purposeful (07/13/18 1423)   At baseline    Mental Status and Level of Consciousness: Arousable    Pulmonary Status:   O2 Device: Nasal cannula (07/13/18 1423)   Adequate oxygenation and airway patent    Complications related to anesthesia: None    Post-anesthesia assessment completed. Plan to admit to floor for observation while receiving blood products per Dr. Krystyna Reza.     Signed By: Wen Tamayo MD     July 13, 2018

## 2018-07-13 NOTE — PERIOP NOTES
TRANSFER - OUT REPORT:    Verbal report given to Kian Le RN on aTna Rocha  being transferred to  for routine post - op       Report consisted of patients Situation, Background, Assessment and   Recommendations(SBAR). Information from the following report(s) OR Summary, Procedure Summary, Intake/Output and MAR was reviewed with the receiving nurse. Opportunity for questions and clarification was provided. Patient transported on room air.

## 2018-07-13 NOTE — ANESTHESIA PREPROCEDURE EVALUATION
Anesthetic History   No history of anesthetic complications            Review of Systems / Medical History  Patient summary reviewed and pertinent labs reviewed    Pulmonary  Within defined limits                 Neuro/Psych   Within defined limits           Cardiovascular    Hypertension: well controlled              Exercise tolerance: >4 METS     GI/Hepatic/Renal     GERD           Endo/Other    Diabetes: well controlled    Morbid obesity and anemia     Other Findings            Physical Exam    Airway  Mallampati: II  TM Distance: 4 - 6 cm  Neck ROM: normal range of motion   Mouth opening: Normal     Cardiovascular    Rhythm: regular  Rate: normal         Dental  No notable dental hx       Pulmonary  Breath sounds clear to auscultation               Abdominal  GI exam deferred       Other Findings            Anesthetic Plan    ASA: 3  Anesthesia type: general          Induction: Intravenous  Anesthetic plan and risks discussed with: Patient      Will check hgb and K

## 2018-07-13 NOTE — H&P
Subjective:     Patient is a 46 y.o.  female presents with abnormal uterine bleeding. gradually worsening course. Patient Active Problem List    Diagnosis Date Noted    Obesity, morbid (RUST 75.) 07/12/2018    Tongue swelling 12/25/2015    Angioedema 12/25/2015    DM2 (diabetes mellitus, type 2) (RUST 75.) 12/25/2015    HTN (hypertension) 12/25/2015    Hypertensive urgency 12/25/2015    Gout 12/25/2015    Allergic reaction caused by a drug 12/25/2015     Past Medical History:   Diagnosis Date    Diabetes (RUST 75.)     type 2. oral meds. Pt does not monitor bs. Last A1C (9/2017)=6.9    GERD (gastroesophageal reflux disease)     OTC prn    Gout     Hypertension     controlled w/med    IBS (irritable bowel syndrome)       History reviewed. No pertinent surgical history. [unfilled]  Allergies   Allergen Reactions    Ace Inhibitors Angioedema    Arb-Angiotensin Receptor Antagonist Angioedema    Keflex [Cephalexin] Nausea and Vomiting      Social History   Substance Use Topics    Smoking status: Never Smoker    Smokeless tobacco: Never Used    Alcohol use Yes      Comment: occ      Family History   Problem Relation Age of Onset    Cancer Father      Lung/Mets to Bone and Brain      Review of Systems  A comprehensive review of systems was negative except for that written in the HPI. Objective:     Patient Vitals for the past 8 hrs:   BP Temp Pulse Resp SpO2 Height Weight   07/13/18 1156 155/83 99 °F (37.2 °C) 85 18 98 % 5' 7.5\" (1.715 m) (!) 353 lb 6.4 oz (160.3 kg)     No intake or output data in the 24 hours ending 07/13/18 1311      General: Alert . Oriented x3   HEENT: Normocephalic PEERL   Heart: RRR   Lungs: Clear   Abdominal: Bowel sounds are normal, liver is not enlarged, spleen is not enlarged   Neurological: Alert and oriented X 3, normal strength and tone. Normal symmetric reflexes.  Normal coordination and gait   Extremities: extremities normal, atraumatic, no cyanosis or edema     Assessment: Menstrual bleeding problem    Plan:       Procedure(s):  DILATATION AND CURETTAGE HYSTEROSCOPY

## 2018-07-13 NOTE — PROGRESS NOTES
Continues to rest quietly, resp even, unlab, skin warm, dry. AP 92, regular, lungs sounds clear. Reports small amount of vaginal bleeding during malika care and voiding without difficulty. Assessment complete. No needs, no c/o, no distress.

## 2018-07-13 NOTE — OP NOTES
HYSTEROSCOPY WITH DILATATION AND CURETTAGE    DATE OF PROCEDURE: 7/13/2018     PREOPERATIVE DIAGNOSIS: Menorrhagia with irregular cycle [N92.1]  Other iron deficiency anemia [D50.8]  Thickened endometrium [R93.8]  Uterine leiomyoma, unspecified location [D25.9]     POSTOPERATWE DIAGNOSIS: Menorrhagia with irregular cycle [N92.1]  Other iron deficiency anemia [D50.8]  Thickened endometrium [R93.8]  Uterine leiomyoma, unspecified location [D25.9]    PROCEDURE: Hysteroscopy with dilatation & curettage. SURGEON: Nick Peres MD    ANESTHESIA: General.    DRAINS: Sterile in and out catheterization of the bladder. FINDINGS: thicke endometrial lining. Difficult procedure due to body habitus    PROCEDURE IN DETAIL: The patient was taken to the Operating Room. After adequate anesthesia was established she was properly positioned, scrubbed, prepped and draped. Time out was done to confirm the operating procedure, surgeon, patient and site. Once confirmed by the team, procedure was started. The weighted speculum was placed in the vault to expose the cervix, was grasped at 12 oclock with the single-tooth tenaculum and sounded to 9 cm. It was sequentially dilated prior to the hysteroscope being inserted with the above noted findings. A very gentle but homogenous curetting was done starting in the midline and working in a clockwise manner. Endometrial tissue was retrieved. The hysteroscope was reinserted again. With this complete and thorough visual review, we terminated the procedure. With the sponge, instrument and needle count correct, the patient was awakened and taken to the Recovery Room in satisfactory condition. She was given 2 units PRBC in recovery room. BLOOD LOSS ESTIMATED: less than 50 ml    SPECIMENS:Endometrial curettings. Pt discharged to home . Precautions given . Appt 2 weeks.  Rx for Tramadol  50 mg (15)

## 2018-07-14 LAB
ABO + RH BLD: NORMAL
BLD PROD TYP BPU: NORMAL
BLD PROD TYP BPU: NORMAL
BLOOD GROUP ANTIBODIES SERPL: NORMAL
BPU ID: NORMAL
BPU ID: NORMAL
CROSSMATCH RESULT,%XM: NORMAL
CROSSMATCH RESULT,%XM: NORMAL
SPECIMEN EXP DATE BLD: NORMAL
STATUS OF UNIT,%ST: NORMAL
STATUS OF UNIT,%ST: NORMAL
UNIT DIVISION, %UDIV: 0
UNIT DIVISION, %UDIV: 0

## 2018-07-14 NOTE — PROGRESS NOTES
Reported to Dr. Wilder Rome, pt's hgb, hct and potassium results. Plan is to proceed with discharge pt home with follow-up appt in 2 wks.

## 2018-07-14 NOTE — DISCHARGE INSTRUCTIONS
DISCHARGE SUMMARY from Nurse    PATIENT INSTRUCTIONS:    After general anesthesia or intravenous sedation, for 24 hours or while taking prescription Narcotics:  · Limit your activities  · Do not drive and operate hazardous machinery  · Do not make important personal or business decisions  · Do  not drink alcoholic beverages  · If you have not urinated within 8 hours after discharge, please contact your surgeon on call. Report the following to your surgeon:  · Excessive pain, swelling, redness or odor of or around the surgical area  · Temperature over 100.5  · Nausea and vomiting lasting longer than 4 hours or if unable to take medications  · Any signs of decreased circulation or nerve impairment to extremity: change in color, persistent  numbness, tingling, coldness or increase pain  · Any questions    What to do at Home:  Recommended activity: No lifting,or Strenuous exercise until Md approves, No sex until Md approves and No driving while on pain medication. If you experience any of the following symptoms: heavy bleeding, unusual or foul smelling vaginal discharge, fever,or uncontrolled pain, please follow up with Dr. Jerry Merino. *  Please give a list of your current medications to your Primary Care Provider. *  Please update this list whenever your medications are discontinued, doses are      changed, or new medications (including over-the-counter products) are added. *  Please carry medication information at all times in case of emergency situations. These are general instructions for a healthy lifestyle:    No smoking/ No tobacco products/ Avoid exposure to second hand smoke  Surgeon General's Warning:  Quitting smoking now greatly reduces serious risk to your health.     Obesity, smoking, and sedentary lifestyle greatly increases your risk for illness    A healthy diet, regular physical exercise & weight monitoring are important for maintaining a healthy lifestyle    You may be retaining fluid if you have a history of heart failure or if you experience any of the following symptoms:  Weight gain of 3 pounds or more overnight or 5 pounds in a week, increased swelling in our hands or feet or shortness of breath while lying flat in bed. Please call your doctor as soon as you notice any of these symptoms; do not wait until your next office visit. Recognize signs and symptoms of STROKE:    F-face looks uneven    A-arms unable to move or move unevenly    S-speech slurred or non-existent    T-time-call 911 as soon as signs and symptoms begin-DO NOT go       Back to bed or wait to see if you get better-TIME IS BRAIN. Warning Signs of HEART ATTACK     Call 911 if you have these symptoms:   Chest discomfort. Most heart attacks involve discomfort in the center of the chest that lasts more than a few minutes, or that goes away and comes back. It can feel like uncomfortable pressure, squeezing, fullness, or pain.  Discomfort in other areas of the upper body. Symptoms can include pain or discomfort in one or both arms, the back, neck, jaw, or stomach.  Shortness of breath with or without chest discomfort.  Other signs may include breaking out in a cold sweat, nausea, or lightheadedness. Don't wait more than five minutes to call 911 - MINUTES MATTER! Fast action can save your life. Calling 911 is almost always the fastest way to get lifesaving treatment. Emergency Medical Services staff can begin treatment when they arrive -- up to an hour sooner than if someone gets to the hospital by car. The discharge information has been reviewed with the patient. The patient verbalized understanding. Discharge medications reviewed with the patient and appropriate educational materials and side effects teaching were provided.   ___________________________________________________________________________________________________________________________________

## 2018-07-14 NOTE — PROGRESS NOTES
07/13/18 1944   Dual Skin Pressure Injury Assessment   Dual Skin Pressure Injury Assessment WDL   Second Care Provider (Based on Facility Policy) Chloe Manrique RN   Skin Integumentary   Skin Integumentary (WDL) WDL   Wound Prevention and Protection Methods   Wound Offloading (Prevention Methods) Bed, pressure reduction mattress

## 2018-07-14 NOTE — PROGRESS NOTES
Discharge instructions discussed with pt, including written discharged instructions given with prescription for pain med and reminder to see Dr. Kristen Palma in 2 wks, follow-up. Reports vaginal bleeding remains scant amount. Pt aware she should not take any tub baths, only showers until Dr. Kristen Palma approves. Pt voiced understanding of discharge instructions with no additional questions. No c/o. No distress. Family at side.

## 2018-07-14 NOTE — PROGRESS NOTES
Blood transfusion completed, tolerating well. Aware that additional blood work will be drawn in approx 1 hr. No c/o. No distress.

## 2018-07-16 NOTE — PHYSICIAN ADVISORY
Letter of Determination:  Outpatient states receiving Observation Services    This case was reviewed, and I concur with Outpatient status receiving Observation services for this patient after a scheduled outpatient dilatation and curettage hysteroscopy due to blood loss requiring transfusion of two units of packed red blood cells. This determination may change depending on further medical information, condition changes of the patient, or treatment requirements.       Faotumata Burton MD, VONNIE,   Physician Narinder Costello.

## 2021-12-15 ENCOUNTER — ANESTHESIA EVENT (OUTPATIENT)
Dept: SURGERY | Age: 51
DRG: 853 | End: 2021-12-15
Payer: COMMERCIAL

## 2021-12-15 ENCOUNTER — APPOINTMENT (OUTPATIENT)
Dept: GENERAL RADIOLOGY | Age: 51
DRG: 853 | End: 2021-12-15
Attending: SURGERY
Payer: COMMERCIAL

## 2021-12-15 ENCOUNTER — APPOINTMENT (OUTPATIENT)
Dept: GENERAL RADIOLOGY | Age: 51
DRG: 853 | End: 2021-12-15
Attending: INTERNAL MEDICINE
Payer: COMMERCIAL

## 2021-12-15 ENCOUNTER — ANESTHESIA (OUTPATIENT)
Dept: SURGERY | Age: 51
DRG: 853 | End: 2021-12-15
Payer: COMMERCIAL

## 2021-12-15 ENCOUNTER — APPOINTMENT (OUTPATIENT)
Dept: CT IMAGING | Age: 51
DRG: 853 | End: 2021-12-15
Attending: EMERGENCY MEDICINE
Payer: COMMERCIAL

## 2021-12-15 ENCOUNTER — HOSPITAL ENCOUNTER (INPATIENT)
Age: 51
LOS: 12 days | Discharge: NURSING, REHAB OR LONG-TERM CARE | DRG: 853 | End: 2021-12-27
Attending: EMERGENCY MEDICINE | Admitting: SURGERY
Payer: COMMERCIAL

## 2021-12-15 DIAGNOSIS — D64.9 ANEMIA, UNSPECIFIED TYPE: ICD-10-CM

## 2021-12-15 DIAGNOSIS — E87.20 METABOLIC ACIDOSIS: ICD-10-CM

## 2021-12-15 DIAGNOSIS — M72.6 NECROTIZING FASCIITIS (HCC): Primary | ICD-10-CM

## 2021-12-15 DIAGNOSIS — I48.91 ATRIAL FIBRILLATION WITH RVR (HCC): ICD-10-CM

## 2021-12-15 DIAGNOSIS — J96.00 ACUTE RESPIRATORY FAILURE, UNSPECIFIED WHETHER WITH HYPOXIA OR HYPERCAPNIA (HCC): ICD-10-CM

## 2021-12-15 DIAGNOSIS — N17.0 ACUTE KIDNEY INJURY (AKI) WITH ACUTE TUBULAR NECROSIS (ATN) (HCC): ICD-10-CM

## 2021-12-15 DIAGNOSIS — E66.01 OBESITY, MORBID (HCC): Chronic | ICD-10-CM

## 2021-12-15 DIAGNOSIS — N17.9 AKI (ACUTE KIDNEY INJURY) (HCC): ICD-10-CM

## 2021-12-15 DIAGNOSIS — J96.01 ACUTE HYPOXEMIC RESPIRATORY FAILURE (HCC): ICD-10-CM

## 2021-12-15 DIAGNOSIS — E87.5 HYPERKALEMIA: ICD-10-CM

## 2021-12-15 LAB
ALBUMIN SERPL-MCNC: 2.2 G/DL (ref 3.5–5)
ALBUMIN/GLOB SERPL: 0.4 {RATIO} (ref 1.2–3.5)
ALP SERPL-CCNC: 151 U/L (ref 50–136)
ALT SERPL-CCNC: 18 U/L (ref 12–65)
AMORPH CRY URNS QL MICRO: ABNORMAL
ANION GAP SERPL CALC-SCNC: 18 MMOL/L (ref 7–16)
ANION GAP SERPL CALC-SCNC: 28 MMOL/L (ref 7–16)
APPEARANCE UR: CLEAR
ARTERIAL PATENCY WRIST A: POSITIVE
AST SERPL-CCNC: 35 U/L (ref 15–37)
ATRIAL RATE: 80 BPM
BACTERIA URNS QL MICRO: ABNORMAL /HPF
BASE DEFICIT BLD-SCNC: 15.6 MMOL/L
BASE DEFICIT BLD-SCNC: 17.8 MMOL/L
BASOPHILS # BLD: 0 K/UL (ref 0–0.2)
BASOPHILS NFR BLD: 0 % (ref 0–2)
BDY SITE: ABNORMAL
BILIRUB SERPL-MCNC: 0.3 MG/DL (ref 0.2–1.1)
BILIRUB UR QL: NEGATIVE
BUN SERPL-MCNC: 117 MG/DL (ref 6–23)
BUN SERPL-MCNC: 126 MG/DL (ref 6–23)
CA-I BLD-MCNC: 0.89 MMOL/L (ref 1.12–1.32)
CALCIUM SERPL-MCNC: 7.3 MG/DL (ref 8.3–10.4)
CALCIUM SERPL-MCNC: 8 MG/DL (ref 8.3–10.4)
CALCULATED P AXIS, ECG09: 67 DEGREES
CALCULATED R AXIS, ECG10: 9 DEGREES
CALCULATED T AXIS, ECG11: 42 DEGREES
CHLORIDE SERPL-SCNC: 86 MMOL/L (ref 98–107)
CHLORIDE SERPL-SCNC: 97 MMOL/L (ref 98–107)
CO2 BLD-SCNC: 11 MMOL/L (ref 13–23)
CO2 SERPL-SCNC: 13 MMOL/L (ref 21–32)
CO2 SERPL-SCNC: 9 MMOL/L (ref 21–32)
COLOR UR: YELLOW
COVID-19 RAPID TEST, COVR: NOT DETECTED
CREAT SERPL-MCNC: 11.2 MG/DL (ref 0.6–1)
CREAT SERPL-MCNC: 9.59 MG/DL (ref 0.6–1)
DIAGNOSIS, 93000: NORMAL
DIFFERENTIAL METHOD BLD: ABNORMAL
EOSINOPHIL # BLD: 0 K/UL (ref 0–0.8)
EOSINOPHIL NFR BLD: 0 % (ref 0.5–7.8)
EPI CELLS #/AREA URNS HPF: ABNORMAL /HPF
ERYTHROCYTE [DISTWIDTH] IN BLOOD BY AUTOMATED COUNT: 14.3 % (ref 11.9–14.6)
ERYTHROCYTE [DISTWIDTH] IN BLOOD BY AUTOMATED COUNT: 16.1 % (ref 11.9–14.6)
EST. AVERAGE GLUCOSE BLD GHB EST-MCNC: 137 MG/DL
GAS FLOW.O2 O2 DELIVERY SYS: ABNORMAL L/MIN
GLOBULIN SER CALC-MCNC: 5.5 G/DL (ref 2.3–3.5)
GLUCOSE BLD STRIP.AUTO-MCNC: 167 MG/DL (ref 65–100)
GLUCOSE BLD STRIP.AUTO-MCNC: 205 MG/DL (ref 65–100)
GLUCOSE BLD STRIP.AUTO-MCNC: 206 MG/DL (ref 65–100)
GLUCOSE SERPL-MCNC: 179 MG/DL (ref 65–100)
GLUCOSE SERPL-MCNC: 198 MG/DL (ref 65–100)
GLUCOSE UR STRIP.AUTO-MCNC: 100 MG/DL
HBA1C MFR BLD: 6.4 % (ref 4.2–6.3)
HCO3 BLD-SCNC: 13.1 MMOL/L (ref 22–26)
HCO3 BLD-SCNC: 9.8 MMOL/L (ref 22–26)
HCT VFR BLD AUTO: 21.5 % (ref 35.8–46.3)
HCT VFR BLD AUTO: 27 % (ref 35.8–46.3)
HGB BLD-MCNC: 7.5 G/DL (ref 11.7–15.4)
HGB BLD-MCNC: 9 G/DL (ref 11.7–15.4)
HGB UR QL STRIP: ABNORMAL
HISTORY CHECKED?,CKHIST: NORMAL
HISTORY CHECKED?,CKHIST: NORMAL
IMM GRANULOCYTES # BLD AUTO: 1.4 K/UL (ref 0–0.5)
IMM GRANULOCYTES NFR BLD AUTO: 4 % (ref 0–5)
KETONES UR QL STRIP.AUTO: NEGATIVE MG/DL
LACTATE SERPL-SCNC: 1.1 MMOL/L (ref 0.4–2)
LACTATE SERPL-SCNC: 1.4 MMOL/L (ref 0.4–2)
LEUKOCYTE ESTERASE UR QL STRIP.AUTO: NEGATIVE
LYMPHOCYTES # BLD: 1.1 K/UL (ref 0.5–4.6)
LYMPHOCYTES NFR BLD: 3 % (ref 13–44)
MCH RBC QN AUTO: 29.3 PG (ref 26.1–32.9)
MCH RBC QN AUTO: 30.3 PG (ref 26.1–32.9)
MCHC RBC AUTO-ENTMCNC: 33.3 G/DL (ref 31.4–35)
MCHC RBC AUTO-ENTMCNC: 34.9 G/DL (ref 31.4–35)
MCV RBC AUTO: 84 FL (ref 79.6–97.8)
MCV RBC AUTO: 90.9 FL (ref 79.6–97.8)
MONOCYTES # BLD: 1.1 K/UL (ref 0.1–1.3)
MONOCYTES NFR BLD: 3 % (ref 4–12)
NEUTS SEG # BLD: 32 K/UL (ref 1.7–8.2)
NEUTS SEG NFR BLD: 90 % (ref 43–78)
NITRITE UR QL STRIP.AUTO: NEGATIVE
NRBC # BLD: 0 K/UL (ref 0–0.2)
NRBC # BLD: 0 K/UL (ref 0–0.2)
O2/TOTAL GAS SETTING VFR VENT: 60 %
OTHER OBSERVATIONS,UCOM: ABNORMAL
P-R INTERVAL, ECG05: 222 MS
PCO2 BLD: 29.7 MMHG (ref 35–45)
PCO2 BLD: 41.7 MMHG (ref 35–45)
PEEP RESPIRATORY: 8 CMH2O
PH BLD: 7.11 [PH] (ref 7.35–7.45)
PH BLD: 7.13 [PH] (ref 7.35–7.45)
PH UR STRIP: 5.5 [PH] (ref 5–9)
PIP ISTAT,IPIP: 24
PLATELET # BLD AUTO: 398 K/UL (ref 150–450)
PLATELET # BLD AUTO: 525 K/UL (ref 150–450)
PLATELET COMMENTS,PCOM: SLIGHT
PMV BLD AUTO: 11.3 FL (ref 9.4–12.3)
PMV BLD AUTO: 9.7 FL (ref 9.4–12.3)
PO2 BLD: 156 MMHG (ref 75–100)
PO2 BLD: 167 MMHG (ref 75–100)
POTASSIUM BLD-SCNC: 4.6 MMOL/L (ref 3.5–5.1)
POTASSIUM SERPL-SCNC: 4.7 MMOL/L (ref 3.5–5.1)
POTASSIUM SERPL-SCNC: 5.5 MMOL/L (ref 3.5–5.1)
PROCALCITONIN SERPL-MCNC: 3.55 NG/ML (ref 0–0.49)
PROT SERPL-MCNC: 7.7 G/DL (ref 6.3–8.2)
PROT UR STRIP-MCNC: 100 MG/DL
Q-T INTERVAL, ECG07: 463 MS
QRS DURATION, ECG06: 124 MS
QTC CALCULATION (BEZET), ECG08: 535 MS
RBC # BLD AUTO: 2.56 M/UL (ref 4.05–5.2)
RBC # BLD AUTO: 2.97 M/UL (ref 4.05–5.2)
RBC #/AREA URNS HPF: ABNORMAL /HPF
RBC MORPH BLD: ABNORMAL
SAO2 % BLD: 98.6 % (ref 95–98)
SAO2 % BLD: 99 %
SERVICE CMNT-IMP: ABNORMAL
SODIUM BLD-SCNC: 120 MMOL/L (ref 136–145)
SODIUM SERPL-SCNC: 123 MMOL/L (ref 136–145)
SODIUM SERPL-SCNC: 128 MMOL/L (ref 136–145)
SOURCE, COVRS: NORMAL
SP GR UR REFRACTOMETRY: 1.01 (ref 1–1.02)
SPECIMEN SITE: ABNORMAL
SPECIMEN TYPE: ABNORMAL
UROBILINOGEN UR QL STRIP.AUTO: 0.2 EU/DL (ref 0.2–1)
VENTILATION MODE VENT: ABNORMAL
VENTRICULAR RATE, ECG03: 80 BPM
VT SETTING VENT: 450 ML
WBC # BLD AUTO: 35.6 K/UL (ref 4.3–11.1)
WBC # BLD AUTO: 41.1 K/UL (ref 4.3–11.1)
WBC MORPH BLD: ABNORMAL
WBC URNS QL MICRO: ABNORMAL /HPF

## 2021-12-15 PROCEDURE — P9016 RBC LEUKOCYTES REDUCED: HCPCS

## 2021-12-15 PROCEDURE — C1751 CATH, INF, PER/CENT/MIDLINE: HCPCS | Performed by: ANESTHESIOLOGY

## 2021-12-15 PROCEDURE — 74011250636 HC RX REV CODE- 250/636: Performed by: SURGERY

## 2021-12-15 PROCEDURE — 77030040922 HC BLNKT HYPOTHRM STRY -A: Performed by: ANESTHESIOLOGY

## 2021-12-15 PROCEDURE — 77030031139 HC SUT VCRL2 J&J -A: Performed by: SURGERY

## 2021-12-15 PROCEDURE — 74011000258 HC RX REV CODE- 258: Performed by: INTERNAL MEDICINE

## 2021-12-15 PROCEDURE — 77030020407 HC IV BLD WRMR ST 3M -A: Performed by: ANESTHESIOLOGY

## 2021-12-15 PROCEDURE — 82962 GLUCOSE BLOOD TEST: CPT

## 2021-12-15 PROCEDURE — 86923 COMPATIBILITY TEST ELECTRIC: CPT

## 2021-12-15 PROCEDURE — 77030020751 HC FLTR TBNG TRNSFUS HAEM -A: Performed by: ANESTHESIOLOGY

## 2021-12-15 PROCEDURE — 11006 DBRDMT SKIN XTRNL GENT PER: CPT | Performed by: SURGERY

## 2021-12-15 PROCEDURE — 84295 ASSAY OF SERUM SODIUM: CPT

## 2021-12-15 PROCEDURE — 96375 TX/PRO/DX INJ NEW DRUG ADDON: CPT

## 2021-12-15 PROCEDURE — P9045 ALBUMIN (HUMAN), 5%, 250 ML: HCPCS | Performed by: ANESTHESIOLOGY

## 2021-12-15 PROCEDURE — 96360 HYDRATION IV INFUSION INIT: CPT

## 2021-12-15 PROCEDURE — 74011250636 HC RX REV CODE- 250/636

## 2021-12-15 PROCEDURE — 81001 URINALYSIS AUTO W/SCOPE: CPT

## 2021-12-15 PROCEDURE — 82803 BLOOD GASES ANY COMBINATION: CPT

## 2021-12-15 PROCEDURE — 87635 SARS-COV-2 COVID-19 AMP PRB: CPT

## 2021-12-15 PROCEDURE — 96365 THER/PROPH/DIAG IV INF INIT: CPT

## 2021-12-15 PROCEDURE — XW033N5 INTRODUCTION OF MEROPENEM-VABORBACTAM ANTI-INFECTIVE INTO PERIPHERAL VEIN, PERCUTANEOUS APPROACH, NEW TECHNOLOGY GROUP 5: ICD-10-PCS | Performed by: INTERNAL MEDICINE

## 2021-12-15 PROCEDURE — 99285 EMERGENCY DEPT VISIT HI MDM: CPT

## 2021-12-15 PROCEDURE — 65610000001 HC ROOM ICU GENERAL

## 2021-12-15 PROCEDURE — 74011250636 HC RX REV CODE- 250/636: Performed by: EMERGENCY MEDICINE

## 2021-12-15 PROCEDURE — 86901 BLOOD TYPING SEROLOGIC RH(D): CPT

## 2021-12-15 PROCEDURE — 77030019908 HC STETH ESOPH SIMS -A: Performed by: ANESTHESIOLOGY

## 2021-12-15 PROCEDURE — 99291 CRITICAL CARE FIRST HOUR: CPT | Performed by: INTERNAL MEDICINE

## 2021-12-15 PROCEDURE — 84145 PROCALCITONIN (PCT): CPT

## 2021-12-15 PROCEDURE — 2709999900 HC NON-CHARGEABLE SUPPLY: Performed by: SURGERY

## 2021-12-15 PROCEDURE — 87077 CULTURE AEROBIC IDENTIFY: CPT

## 2021-12-15 PROCEDURE — 87186 SC STD MICRODIL/AGAR DIL: CPT

## 2021-12-15 PROCEDURE — 77030021678 HC GLIDESCP STAT DISP VERT -B: Performed by: NURSE ANESTHETIST, CERTIFIED REGISTERED

## 2021-12-15 PROCEDURE — 74011250636 HC RX REV CODE- 250/636: Performed by: ANESTHESIOLOGY

## 2021-12-15 PROCEDURE — 77030019908 HC STETH ESOPH SIMS -A: Performed by: NURSE ANESTHETIST, CERTIFIED REGISTERED

## 2021-12-15 PROCEDURE — 83605 ASSAY OF LACTIC ACID: CPT

## 2021-12-15 PROCEDURE — 87070 CULTURE OTHR SPECIMN AEROBIC: CPT

## 2021-12-15 PROCEDURE — 77030018673: Performed by: SURGERY

## 2021-12-15 PROCEDURE — 85027 COMPLETE CBC AUTOMATED: CPT

## 2021-12-15 PROCEDURE — 74011000250 HC RX REV CODE- 250: Performed by: INTERNAL MEDICINE

## 2021-12-15 PROCEDURE — 80053 COMPREHEN METABOLIC PANEL: CPT

## 2021-12-15 PROCEDURE — 76060000036 HC ANESTHESIA 2.5 TO 3 HR: Performed by: SURGERY

## 2021-12-15 PROCEDURE — 76010000172 HC OR TIME 2.5 TO 3 HR INTENSV-TIER 1: Performed by: SURGERY

## 2021-12-15 PROCEDURE — 94002 VENT MGMT INPAT INIT DAY: CPT

## 2021-12-15 PROCEDURE — 77030002996 HC SUT SLK J&J -A: Performed by: SURGERY

## 2021-12-15 PROCEDURE — 74011636637 HC RX REV CODE- 636/637: Performed by: INTERNAL MEDICINE

## 2021-12-15 PROCEDURE — 77030013292 HC BOWL MX PRSM J&J -A: Performed by: ANESTHESIOLOGY

## 2021-12-15 PROCEDURE — 2709999900 HC NON-CHARGEABLE SUPPLY

## 2021-12-15 PROCEDURE — 99223 1ST HOSP IP/OBS HIGH 75: CPT | Performed by: SURGERY

## 2021-12-15 PROCEDURE — 96361 HYDRATE IV INFUSION ADD-ON: CPT

## 2021-12-15 PROCEDURE — 74011000258 HC RX REV CODE- 258

## 2021-12-15 PROCEDURE — 71045 X-RAY EXAM CHEST 1 VIEW: CPT

## 2021-12-15 PROCEDURE — 77030040922 HC BLNKT HYPOTHRM STRY -A: Performed by: NURSE ANESTHETIST, CERTIFIED REGISTERED

## 2021-12-15 PROCEDURE — 85025 COMPLETE CBC W/AUTO DIFF WBC: CPT

## 2021-12-15 PROCEDURE — 77030037088 HC TUBE ENDOTRACH ORAL NSL COVD-A: Performed by: NURSE ANESTHETIST, CERTIFIED REGISTERED

## 2021-12-15 PROCEDURE — 74011000250 HC RX REV CODE- 250: Performed by: ANESTHESIOLOGY

## 2021-12-15 PROCEDURE — 36600 WITHDRAWAL OF ARTERIAL BLOOD: CPT

## 2021-12-15 PROCEDURE — 74011000258 HC RX REV CODE- 258: Performed by: EMERGENCY MEDICINE

## 2021-12-15 PROCEDURE — 93005 ELECTROCARDIOGRAM TRACING: CPT | Performed by: EMERGENCY MEDICINE

## 2021-12-15 PROCEDURE — 74176 CT ABD & PELVIS W/O CONTRAST: CPT

## 2021-12-15 PROCEDURE — 83036 HEMOGLOBIN GLYCOSYLATED A1C: CPT

## 2021-12-15 PROCEDURE — 77030013794 HC KT TRNSDUC BLD EDWD -B: Performed by: NURSE ANESTHETIST, CERTIFIED REGISTERED

## 2021-12-15 PROCEDURE — 87075 CULTR BACTERIA EXCEPT BLOOD: CPT

## 2021-12-15 PROCEDURE — 77030005401 HC CATH RAD ARRO -A: Performed by: ANESTHESIOLOGY

## 2021-12-15 PROCEDURE — 0JBC0ZZ EXCISION OF PELVIC REGION SUBCUTANEOUS TISSUE AND FASCIA, OPEN APPROACH: ICD-10-PCS | Performed by: SURGERY

## 2021-12-15 PROCEDURE — 51702 INSERT TEMP BLADDER CATH: CPT

## 2021-12-15 PROCEDURE — 36415 COLL VENOUS BLD VENIPUNCTURE: CPT

## 2021-12-15 PROCEDURE — 88305 TISSUE EXAM BY PATHOLOGIST: CPT

## 2021-12-15 PROCEDURE — 74011250636 HC RX REV CODE- 250/636: Performed by: INTERNAL MEDICINE

## 2021-12-15 PROCEDURE — 87040 BLOOD CULTURE FOR BACTERIA: CPT

## 2021-12-15 RX ORDER — SODIUM CHLORIDE 9 MG/ML
250 INJECTION, SOLUTION INTRAVENOUS AS NEEDED
Status: DISCONTINUED | OUTPATIENT
Start: 2021-12-15 | End: 2021-12-27 | Stop reason: HOSPADM

## 2021-12-15 RX ORDER — SODIUM CHLORIDE 9 MG/ML
250 INJECTION, SOLUTION INTRAVENOUS AS NEEDED
Status: DISCONTINUED | OUTPATIENT
Start: 2021-12-15 | End: 2021-12-21

## 2021-12-15 RX ORDER — HYDROMORPHONE HYDROCHLORIDE 1 MG/ML
0.5 INJECTION, SOLUTION INTRAMUSCULAR; INTRAVENOUS; SUBCUTANEOUS
Status: DISCONTINUED | OUTPATIENT
Start: 2021-12-15 | End: 2021-12-27 | Stop reason: HOSPADM

## 2021-12-15 RX ORDER — MIDAZOLAM HYDROCHLORIDE 1 MG/ML
INJECTION, SOLUTION INTRAMUSCULAR; INTRAVENOUS AS NEEDED
Status: DISCONTINUED | OUTPATIENT
Start: 2021-12-15 | End: 2021-12-15 | Stop reason: HOSPADM

## 2021-12-15 RX ORDER — SODIUM CHLORIDE 9 MG/ML
INJECTION, SOLUTION INTRAVENOUS
Status: DISCONTINUED | OUTPATIENT
Start: 2021-12-15 | End: 2021-12-15 | Stop reason: HOSPADM

## 2021-12-15 RX ORDER — ONDANSETRON 2 MG/ML
4 INJECTION INTRAMUSCULAR; INTRAVENOUS
Status: DISCONTINUED | OUTPATIENT
Start: 2021-12-15 | End: 2021-12-27 | Stop reason: HOSPADM

## 2021-12-15 RX ORDER — SODIUM BICARBONATE 84 MG/ML
200 INJECTION, SOLUTION INTRAVENOUS ONCE
Status: COMPLETED | OUTPATIENT
Start: 2021-12-15 | End: 2021-12-15

## 2021-12-15 RX ORDER — ALBUMIN HUMAN 50 G/1000ML
SOLUTION INTRAVENOUS AS NEEDED
Status: DISCONTINUED | OUTPATIENT
Start: 2021-12-15 | End: 2021-12-15 | Stop reason: HOSPADM

## 2021-12-15 RX ORDER — CALCIUM CHLORIDE INJECTION 100 MG/ML
INJECTION, SOLUTION INTRAVENOUS AS NEEDED
Status: DISCONTINUED | OUTPATIENT
Start: 2021-12-15 | End: 2021-12-15 | Stop reason: HOSPADM

## 2021-12-15 RX ORDER — POTASSIUM CHLORIDE 7.45 MG/ML
10 INJECTION INTRAVENOUS AS NEEDED
Status: DISCONTINUED | OUTPATIENT
Start: 2021-12-15 | End: 2021-12-27 | Stop reason: HOSPADM

## 2021-12-15 RX ORDER — SODIUM CHLORIDE 0.9 % (FLUSH) 0.9 %
5-40 SYRINGE (ML) INJECTION AS NEEDED
Status: DISCONTINUED | OUTPATIENT
Start: 2021-12-15 | End: 2021-12-27 | Stop reason: HOSPADM

## 2021-12-15 RX ORDER — SODIUM CHLORIDE, SODIUM LACTATE, POTASSIUM CHLORIDE, CALCIUM CHLORIDE 600; 310; 30; 20 MG/100ML; MG/100ML; MG/100ML; MG/100ML
INJECTION, SOLUTION INTRAVENOUS
Status: DISCONTINUED | OUTPATIENT
Start: 2021-12-15 | End: 2021-12-15 | Stop reason: HOSPADM

## 2021-12-15 RX ORDER — ONDANSETRON 8 MG/1
4 TABLET, ORALLY DISINTEGRATING ORAL
Status: DISCONTINUED | OUTPATIENT
Start: 2021-12-15 | End: 2021-12-27 | Stop reason: HOSPADM

## 2021-12-15 RX ORDER — ETOMIDATE 2 MG/ML
INJECTION INTRAVENOUS AS NEEDED
Status: DISCONTINUED | OUTPATIENT
Start: 2021-12-15 | End: 2021-12-15 | Stop reason: HOSPADM

## 2021-12-15 RX ORDER — PROPOFOL 10 MG/ML
0-50 VIAL (ML) INTRAVENOUS
Status: DISCONTINUED | OUTPATIENT
Start: 2021-12-15 | End: 2021-12-17

## 2021-12-15 RX ORDER — SODIUM BICARBONATE 1 MEQ/ML
SYRINGE (ML) INTRAVENOUS AS NEEDED
Status: DISCONTINUED | OUTPATIENT
Start: 2021-12-15 | End: 2021-12-15 | Stop reason: HOSPADM

## 2021-12-15 RX ORDER — FENTANYL CITRATE-0.9 % NACL/PF 25 MCG/ML
0-200 PLASTIC BAG, INJECTION (ML) INJECTION
Status: DISCONTINUED | OUTPATIENT
Start: 2021-12-15 | End: 2021-12-17

## 2021-12-15 RX ORDER — SODIUM CHLORIDE 9 MG/ML
125 INJECTION, SOLUTION INTRAVENOUS CONTINUOUS
Status: DISCONTINUED | OUTPATIENT
Start: 2021-12-15 | End: 2021-12-16

## 2021-12-15 RX ORDER — ROCURONIUM BROMIDE 10 MG/ML
INJECTION, SOLUTION INTRAVENOUS AS NEEDED
Status: DISCONTINUED | OUTPATIENT
Start: 2021-12-15 | End: 2021-12-15 | Stop reason: HOSPADM

## 2021-12-15 RX ORDER — INSULIN LISPRO 100 [IU]/ML
INJECTION, SOLUTION INTRAVENOUS; SUBCUTANEOUS EVERY 6 HOURS
Status: DISCONTINUED | OUTPATIENT
Start: 2021-12-15 | End: 2021-12-17

## 2021-12-15 RX ORDER — LIDOCAINE HYDROCHLORIDE 20 MG/ML
INJECTION, SOLUTION EPIDURAL; INFILTRATION; INTRACAUDAL; PERINEURAL AS NEEDED
Status: DISCONTINUED | OUTPATIENT
Start: 2021-12-15 | End: 2021-12-15 | Stop reason: HOSPADM

## 2021-12-15 RX ORDER — PROPOFOL 10 MG/ML
INJECTION, EMULSION INTRAVENOUS
Status: COMPLETED
Start: 2021-12-15 | End: 2021-12-15

## 2021-12-15 RX ORDER — CLINDAMYCIN PHOSPHATE 900 MG/50ML
900 INJECTION INTRAVENOUS EVERY 8 HOURS
Status: DISCONTINUED | OUTPATIENT
Start: 2021-12-15 | End: 2021-12-18

## 2021-12-15 RX ORDER — FENTANYL CITRATE-0.9 % NACL/PF 25 MCG/ML
PLASTIC BAG, INJECTION (ML) INJECTION
Status: COMPLETED
Start: 2021-12-15 | End: 2021-12-15

## 2021-12-15 RX ORDER — MAGNESIUM SULFATE HEPTAHYDRATE 40 MG/ML
2 INJECTION, SOLUTION INTRAVENOUS AS NEEDED
Status: DISCONTINUED | OUTPATIENT
Start: 2021-12-15 | End: 2021-12-27 | Stop reason: HOSPADM

## 2021-12-15 RX ORDER — SODIUM CHLORIDE 0.9 % (FLUSH) 0.9 %
5-40 SYRINGE (ML) INJECTION EVERY 8 HOURS
Status: DISCONTINUED | OUTPATIENT
Start: 2021-12-15 | End: 2021-12-27 | Stop reason: HOSPADM

## 2021-12-15 RX ORDER — FENTANYL CITRATE 50 UG/ML
INJECTION, SOLUTION INTRAMUSCULAR; INTRAVENOUS AS NEEDED
Status: DISCONTINUED | OUTPATIENT
Start: 2021-12-15 | End: 2021-12-15 | Stop reason: HOSPADM

## 2021-12-15 RX ADMIN — PIPERACILLIN AND TAZOBACTAM 4.5 G: 4; .5 INJECTION, POWDER, LYOPHILIZED, FOR SOLUTION INTRAVENOUS; PARENTERAL at 12:37

## 2021-12-15 RX ADMIN — SODIUM CHLORIDE: 900 INJECTION, SOLUTION INTRAVENOUS at 17:00

## 2021-12-15 RX ADMIN — PROPOFOL 40 MCG/KG/MIN: 10 INJECTION, EMULSION INTRAVENOUS at 19:22

## 2021-12-15 RX ADMIN — SODIUM CHLORIDE, SODIUM LACTATE, POTASSIUM CHLORIDE, AND CALCIUM CHLORIDE: 600; 310; 30; 20 INJECTION, SOLUTION INTRAVENOUS at 15:59

## 2021-12-15 RX ADMIN — INSULIN LISPRO 4 UNITS: 100 INJECTION, SOLUTION INTRAVENOUS; SUBCUTANEOUS at 21:55

## 2021-12-15 RX ADMIN — FENTANYL CITRATE 100 MCG: 50 INJECTION INTRAMUSCULAR; INTRAVENOUS at 16:59

## 2021-12-15 RX ADMIN — ROCURONIUM BROMIDE 70 MG: 10 INJECTION, SOLUTION INTRAVENOUS at 16:06

## 2021-12-15 RX ADMIN — PHENYLEPHRINE HYDROCHLORIDE 25 MCG/MIN: 10 INJECTION INTRAVENOUS at 18:21

## 2021-12-15 RX ADMIN — FENTANYL CITRATE 100 MCG/HR: 50 INJECTION, SOLUTION INTRAMUSCULAR; INTRAVENOUS at 19:23

## 2021-12-15 RX ADMIN — CALCIUM CHLORIDE 0.5 G: 100 INJECTION INTRAVENOUS; INTRAVENTRICULAR at 17:30

## 2021-12-15 RX ADMIN — ETOMIDATE 10 MG: 2 INJECTION, SOLUTION INTRAVENOUS at 16:06

## 2021-12-15 RX ADMIN — ROCURONIUM BROMIDE 20 MG: 10 INJECTION, SOLUTION INTRAVENOUS at 18:23

## 2021-12-15 RX ADMIN — SODIUM CHLORIDE, SODIUM LACTATE, POTASSIUM CHLORIDE, AND CALCIUM CHLORIDE: 600; 310; 30; 20 INJECTION, SOLUTION INTRAVENOUS at 17:16

## 2021-12-15 RX ADMIN — SODIUM CHLORIDE, SODIUM LACTATE, POTASSIUM CHLORIDE, AND CALCIUM CHLORIDE 2000 ML: 600; 310; 30; 20 INJECTION, SOLUTION INTRAVENOUS at 19:45

## 2021-12-15 RX ADMIN — SODIUM BICARBONATE 200 MEQ: 84 INJECTION, SOLUTION INTRAVENOUS at 19:55

## 2021-12-15 RX ADMIN — SODIUM CHLORIDE 1000 ML: 900 INJECTION, SOLUTION INTRAVENOUS at 11:43

## 2021-12-15 RX ADMIN — VANCOMYCIN HYDROCHLORIDE 2500 MG: 10 INJECTION, POWDER, LYOPHILIZED, FOR SOLUTION INTRAVENOUS at 13:25

## 2021-12-15 RX ADMIN — ALBUMIN (HUMAN) 250 ML: 12.5 INJECTION, SOLUTION INTRAVENOUS at 17:47

## 2021-12-15 RX ADMIN — MIDAZOLAM 2 MG: 1 INJECTION INTRAMUSCULAR; INTRAVENOUS at 18:42

## 2021-12-15 RX ADMIN — SODIUM BICARBONATE: 84 INJECTION, SOLUTION INTRAVENOUS at 20:22

## 2021-12-15 RX ADMIN — MEROPENEM 500 MG: 500 INJECTION, POWDER, FOR SOLUTION INTRAVENOUS at 21:31

## 2021-12-15 RX ADMIN — CLINDAMYCIN PHOSPHATE 900 MG: 900 INJECTION, SOLUTION INTRAVENOUS at 21:31

## 2021-12-15 RX ADMIN — SODIUM CHLORIDE, SODIUM LACTATE, POTASSIUM CHLORIDE, AND CALCIUM CHLORIDE: 600; 310; 30; 20 INJECTION, SOLUTION INTRAVENOUS at 18:24

## 2021-12-15 RX ADMIN — Medication 40 ML: at 21:31

## 2021-12-15 RX ADMIN — LIDOCAINE HYDROCHLORIDE 80 MG: 20 INJECTION, SOLUTION EPIDURAL; INFILTRATION; INTRACAUDAL; PERINEURAL at 16:05

## 2021-12-15 RX ADMIN — INSULIN LISPRO 4 UNITS: 100 INJECTION, SOLUTION INTRAVENOUS; SUBCUTANEOUS at 23:35

## 2021-12-15 RX ADMIN — SODIUM CHLORIDE 1000 ML: 900 INJECTION, SOLUTION INTRAVENOUS at 11:42

## 2021-12-15 RX ADMIN — SODIUM CHLORIDE 125 ML/HR: 900 INJECTION, SOLUTION INTRAVENOUS at 21:36

## 2021-12-15 RX ADMIN — PHENYLEPHRINE HYDROCHLORIDE 30 MCG/MIN: 10 INJECTION INTRAVENOUS at 20:31

## 2021-12-15 RX ADMIN — ALBUMIN (HUMAN) 250 ML: 12.5 INJECTION, SOLUTION INTRAVENOUS at 17:33

## 2021-12-15 RX ADMIN — SODIUM CHLORIDE: 900 INJECTION, SOLUTION INTRAVENOUS at 17:30

## 2021-12-15 RX ADMIN — PROPOFOL 20 MCG/KG/MIN: 10 INJECTION, EMULSION INTRAVENOUS at 23:34

## 2021-12-15 RX ADMIN — SODIUM BICARBONATE 50 MEQ: 84 INJECTION, SOLUTION INTRAVENOUS at 17:10

## 2021-12-15 RX ADMIN — CALCIUM CHLORIDE 0.5 G: 100 INJECTION INTRAVENOUS; INTRAVENTRICULAR at 17:12

## 2021-12-15 RX ADMIN — SODIUM BICARBONATE 50 MEQ: 84 INJECTION, SOLUTION INTRAVENOUS at 17:12

## 2021-12-15 NOTE — H&P
[facility]    12/15/2021    Date of Admission:  12/15/2021      Subjective: This is a 12-year-old female morbidly obese complaining of abscess and drainage beginning at the pannus approximately 1 to 2 weeks ago. She was seen in urgent care and it began to drain. They called her and antibiotics that she could not tolerate so she discontinued on her own. She is a history of diabetes hypertension and morbid obesity. She returned to the urgent care and antibiotics again were prescribed but she became sick and discontinue these once again her own. She was supposed to see them once again today for reevaluation but became sick complaining of racing heartbeat palpitations fevers weakness and near syncope episode. She complains of pain at the pannus extending onto the lower abdomen. CT abdomen pelvis in the emergency department reveals abscess beginning at the labia and the pannus extending onto the abdomen and the left pelvic region. There is gas and fat stranding all consistent findings with necrotizing fasciitis. White count 35,000. She is acidotic with a bicarb of 9. She is tachycardic and hypotensive. Emergency department has given her Zosyn and vancomycin 2 L IV fluid bolus and surgery is consulted for decision for surgery for necrotizing fasciitis.   EXAMINATION: CT ABD PELV WO CONT 12/15/2021 2:16 PM      ACCESSION NUMBER:  [id number]     INDICATION:  labial abscess     COMPARISON: None available     TECHNIQUE: Contiguous axial computed tomographic images were obtained of the  abdomen and pelvis without intravenous or oral contrast.  Coronal  reconstructions were performed.     Radiation dose reduction techniques were used for this study:  Our CT scanners  use one or all of the following: Automated exposure control, adjustment of the  mA and/or kVp according to patient's size, iterative reconstruction.     FINDINGS:     LIMITED THORAX:The included portions of the heart and pericardium are  unremarkable. 0.2 cm left lower lobe nodule (axial image 17). 1.5 cm  subcutaneous skin nodule in the anterior left chest wall (axial image 8).     PERITONEUM/MESENTERY: No free fluid or free gas. Mild bilateral inguinal lymph  node enlargement.     GI TRACT: Sigmoid colonic diverticulosis without evidence of diverticulitis. The  appendix is normal. The terminal ileum is unremarkable. There is no evidence of  a small bowel obstruction. The stomach is unremarkable.     SPLEEN: Normal     ADRENALS: Normal     PANCREAS: Normal     LIVER: Normal     GALLBLADDER: Mildly distended. No surrounding free fluid or wall thickening.     KIDNEYS: Bilateral renal cortical thinning, left greater than right. No evidence  of hydroureteronephrosis or nephroureterolithiasis.     BLADDER/: Pardo catheter in place. Uterine calcifications likely represent  small fibroids.     VESSELS: Mild scattered atherosclerosis.     BONES/SOFT TISSUES: Lumbar spine spondylosis without suspicious lytic or blastic  bony lesions.     There is fat stranding and subcutaneous emphysema in the left labia and left  perineal soft tissues. This extends into the midline and left ventral pannus,  along the anterior/left pelvic wall, and of the left flank. There is extensive  gas throughout the soft tissues in this region, without definite evidence of a  well-formed fluid collection within the limits of noncontrast technique.     IMPRESSION     1. There is soft tissue stranding and extensive subcutaneous emphysema,  beginning in the left labia and left perineal soft tissues, extending anterior  and inferiorly within the ventral pannus, and extending superiorly and laterally  throughout the ventral pannus, ventral pelvic wall, and left flank fat. The  cephalad extent is through the level of the anterior superior iliac spine.  The  constellation of features is consistent with necrotizing fasciitis, without  definite evidence of a well-formed fluid collection within the limits of  noncontrast technique.     2. No evidence of intraperitoneal extension.     3. Left greater than right renal cortical thinning.     4. Mildly distended gallbladder. Absent gallbladder wall thickening and  surrounding free fluid, this is most likely a mildly patulous gallbladder.     Discussed with Dr. Paris Reyes by Dr. Carey Arredondo at 2:36 PM 12/15/2021.     VOICE DICTATED BY: Dr. Kamilah Jaime      Patient Active Problem List    Diagnosis Date Noted    Necrotizing fasciitis ([facility] [address] 12/15/2021    Anemia 2018    Obesity, morbid ([address] 2018    Tongue swelling 2015    Angioedema 2015    DM2 (diabetes mellitus, type 2) ([facility] [address] 2015    HTN (hypertension) 2015    Hypertensive urgency 2015    Gout 2015    Allergic reaction caused by a drug 2015     Past Medical History:   Diagnosis Date    Anxiety     Diabetes ([facility] [address]     type 2. oral meds. Pt does not monitor bs. Last A1C (2017)=6.9    GERD (gastroesophageal reflux disease)     OTC prn    Gout     Hypertension     controlled w/med    IBS (irritable bowel syndrome)       Past Surgical History:   Procedure Laterality Date    HX DILATION AND CURETTAGE  2018      Prior to Admission Medications   Prescriptions Last Dose Informant Patient Reported? Taking?   allopurinol (ZYLOPRIM) 300 mg tablet   Yes No   Sig: Take 300 mg by mouth daily. Per anesthesia protocol:instructed to take am of surgery. Indications: prevention of acute gout attack   amLODIPine (NORVASC) 5 mg tablet   Yes No   Sig: Take 5 mg by mouth daily. cyanocobalamin (VITAMIN B12) 1,000 mcg/mL injection   Yes No   Si,000 mcg by IntraMUSCular route. Patient not taking: Reported on 2021   cyanocobalamin (VITAMIN B12) 500 mcg tablet   Yes No   Sig: Take 500 mcg by mouth daily. dicyclomine (BENTYL) 10 mg capsule   Yes No   Sig: Take 10 mg by mouth every six (6) hours as needed for PRN Reason (Other) (IBS).  Indications: irritable colon   doxycycline (MONODOX) 100 mg capsule   No No   Sig: Take 1 Capsule by mouth two (2) times a day for 10 days. gemfibroziL (LOPID) 600 mg tablet   Yes No   Sig: Take 600 mg by mouth two (2) times a day. losartan 50 mg tab 100 mg, hydroCHLOROthiazide 25 mg tab 25 mg   Yes No   Sig: Take 1 Dose by mouth daily. metFORMIN (GLUCOPHAGE) 500 mg tablet   Yes No   Sig: Take 1,000 mg by mouth two (2) times daily (with meals). metoprolol tartrate (LOPRESSOR) 100 mg IR tablet   Yes No   Sig: Take 100 mg by mouth two (2) times a day. Per anesthesia protocol:instructed to take am of surgery. Patient not taking: Reported on 12/14/2021   ondansetron (ZOFRAN ODT) 4 mg disintegrating tablet   No No   Sig: Take 1 Tablet by mouth every eight (8) hours as needed for Nausea or Vomiting.   sertraline (ZOLOFT) 100 mg tablet   Yes No   Sig: Take 100 mg by mouth daily. Per anesthesia protocol:instructed to take am of surgery.       Facility-Administered Medications: None     Allergies   Allergen Reactions    Ace Inhibitors Angioedema    Arb-Angiotensin Receptor Antagonist Angioedema    Cayenne Pepper Hives     Swelling of the mouth    Bactrim [Sulfamethoprim] Other (comments)     Nausea, vomiting, making her feel like she is going to pass out      Keflex [Cephalexin] Nausea and Vomiting      Social History     Tobacco Use    Smoking status: Never Smoker    Smokeless tobacco: Never Used   Substance Use Topics    Alcohol use: Yes     Comment: occ      Social History     Social History Narrative    Denies physical or sexual abuse     Family History   Problem Relation Age of Onset    Heart Failure Mother         congestive    Heart Disease Mother     Hypertension Mother     Cancer Father         Lung/Mets to Bone and Brain and liver    Diabetes Father     Hypertension Father         Current Facility-Administered Medications   Medication Dose Route Frequency    vancomycin (VANCOCIN) 2500 mg in  mL infusion  2,500 mg IntraVENous ONCE     Current Outpatient Medications   Medication Sig    losartan 50 mg tab 100 mg, hydroCHLOROthiazide 25 mg tab 25 mg Take 1 Dose by mouth daily.  amLODIPine (NORVASC) 5 mg tablet Take 5 mg by mouth daily.  gemfibroziL (LOPID) 600 mg tablet Take 600 mg by mouth two (2) times a day.  cyanocobalamin (VITAMIN B12) 500 mcg tablet Take 500 mcg by mouth daily.  ondansetron (ZOFRAN ODT) 4 mg disintegrating tablet Take 1 Tablet by mouth every eight (8) hours as needed for Nausea or Vomiting.  doxycycline (MONODOX) 100 mg capsule Take 1 Capsule by mouth two (2) times a day for 10 days.  cyanocobalamin (VITAMIN B12) 1,000 mcg/mL injection 1,000 mcg by IntraMUSCular route. (Patient not taking: Reported on 12/14/2021)    metoprolol tartrate (LOPRESSOR) 100 mg IR tablet Take 100 mg by mouth two (2) times a day. Per anesthesia protocol:instructed to take am of surgery. (Patient not taking: Reported on 12/14/2021)    metFORMIN (GLUCOPHAGE) 500 mg tablet Take 1,000 mg by mouth two (2) times daily (with meals).  allopurinol (ZYLOPRIM) 300 mg tablet Take 300 mg by mouth daily. Per anesthesia protocol:instructed to take am of surgery. Indications: prevention of acute gout attack    sertraline (ZOLOFT) 100 mg tablet Take 100 mg by mouth daily. Per anesthesia protocol:instructed to take am of surgery.  dicyclomine (BENTYL) 10 mg capsule Take 10 mg by mouth every six (6) hours as needed for PRN Reason (Other) (IBS). Indications: irritable colon       Review of Systems  A comprehensive review of systems was negative except for that written in the HPI.     Objective:     Vitals:    12/15/21 1241 12/15/21 1300 12/15/21 1329 12/15/21 1330   BP: (!) 148/70 (!) 165/66  (!) 147/50   Pulse: 77 84 74 76   Resp: 24 20 21 22   Temp:       SpO2: 100%  98%    Weight:       Height:         PHYSICAL EXAM   Physical Examination: General appearance - alert, well appearing, and in no distress  Mental status - alert, oriented to person, place, and time  Eyes - pupils equal and reactive, extraocular eye movements intact  Nose - normal and patent, no erythema, discharge or polyps  Neck - supple, no significant adenopathy  Chest - clear to auscultation, no wheezes, rales or rhonchi, symmetric air entry  Heart - normal rate, regular rhythm, normal S1, S2, no murmurs, rubs, clicks or gallops  Abdomen - soft, nontender, nondistended, no masses or organomegaly  Neurological - alert, oriented, normal speech, no focal findings or movement disorder noted  Musculoskeletal - no joint tenderness, deformity or swelling  Extremities - peripheral pulses normal, no pedal edema, no clubbing or cyanosis  Skin - normal coloration and turgor, no rashes, no suspicious skin lesions noted      Recent Labs     12/15/21  1012   WBC 35.6*   HGB 7.5*   HCT 21.5*   *     Recent Labs     12/15/21  1012   *   K 5.5*   CL 86*   *   CO2 9*   *   CREA 11.20*     No results for input(s): PH, PCO2, PO2, HCO3 in the last 72 hours.     Assessment:     Hospital Problems  Date Reviewed: 12/25/2015          Codes Class Noted POA    Necrotizing fasciitis [facility]) ICD-10-CM: M72.6  ICD-9-CM: 728.86  12/15/2021 Unknown            Plan:   Emergent surgery for excisional debridement of necrotizing fasciitis  Admission to ICU  Plan for return to surgery tomorrow for second look  Consent for excisional debridement of necrotizing fasciitis  Vancomycin and Zosyn IV  Ancef 3 g IV preoperatively  Pardo catheter  Lithotomy positioning      Sandie Mena, DO

## 2021-12-15 NOTE — ED TRIAGE NOTES
Patient arrives in wheelchair to triage with mask in place. Reports going to urgent care last Friday for abscess to perineal area. Reports she was prescribed bactrim. Reports onset of nausea, vomiting after taking abx. Reports going to pcp yesterday for follow up to skin problem and was put on doxycycline. Reports some improvement in abscess. Reports fatigue and concern for dehydration.

## 2021-12-15 NOTE — PERIOP NOTES
Limited ability to perform assessment. Pt in ED [place] [facility].  at bedside and assisted with questions and consent. Patient stated she did not feel like she was completely \"with it\" in order to answer questions and sign consent.

## 2021-12-15 NOTE — ANESTHESIA PREPROCEDURE EVALUATION
Relevant Problems   CARDIOVASCULAR   (+) HTN (hypertension)   (+) Hypertensive urgency      ENDOCRINE   (+) DM2 (diabetes mellitus, type 2) (HCC)   (+) Obesity, morbid (HCC)      HEMATOLOGY   (+) Anemia       Anesthetic History   No history of anesthetic complications            Review of Systems / Medical History  Patient summary reviewed and pertinent labs reviewed    Pulmonary  Within defined limits                 Neuro/Psych   Within defined limits           Cardiovascular    Hypertension              Exercise tolerance: >4 METS     GI/Hepatic/Renal  Within defined limits              Endo/Other    Diabetes: well controlled, type 2    Morbid obesity and anemia     Other Findings   Comments: Necrotizing fasciitis            Physical Exam    Airway  Mallampati: II  TM Distance: 4 - 6 cm  Neck ROM: normal range of motion   Mouth opening: Normal     Cardiovascular    Rhythm: regular  Rate: normal         Dental         Pulmonary  Breath sounds clear to auscultation               Abdominal         Other Findings            Anesthetic Plan    ASA: 5, emergent  Anesthesia type: general    Monitoring Plan: Arterial line and CVP    Post procedure ventilation   Induction: Intravenous and RSI  Anesthetic plan and risks discussed with: Patient

## 2021-12-15 NOTE — BRIEF OP NOTE
Brief Postoperative Note    Patient: Salina Mayer  YOB: 1970  MRN: [id number]    Date of Procedure: 12/15/2021     Pre-Op Diagnosis: Necrotizing Fasciitis    Post-Op Diagnosis: Same as preoperative diagnosis. Procedure(s):  Sharp excisional debridement of dead devitalized infected necrotic tissue down to pubic bone and left iliac bone using #10 scalpel blade and sharp scissors 90 cm x 60 cm equaling 5400 cm² CPT 63052 AND CPT 11047 x 270    Surgeon(s):  Alfie Mena DO    Surgical Assistant: None    Anesthesia: General     Estimated Blood Loss (mL): 9007 cc    Complications: None    Specimens:   ID Type Source Tests Collected by Time Destination   1 : Necrotizing Fasciitis Fresh Abdomen  Carmen Yu DO 12/15/2021 1814 Pathology   1 : necrotizing facsiitis perineum Wound Perineum CULTURE, ANAEROBIC, CULTURE, WOUND W GRAM STAIN Carmen Yu DO 12/15/2021 1710 Microbiology        Implants: * No implants in log *    Drains: * No LDAs found *    Findings: Morbidly obese patient with necrotizing fasciitis extending from perineum and left labia deep to the pubic bone along the fascia to the abdominal wall above the iliac bone and lateral along the flank. Foul-smelling necrotic necrotizing fasciitis debrided using #10 scalpel blade and sharp scissors down to bone. Wound measured 90 cm x 60 cm for total surface area of 5400 cm². All devitalized necrotic tissue removed back to yellow appearing fat with bleeding. Wound packed with Betadine soaked Kerlix gauze.     Electronically Signed by Brandi Schultz DO on 12/15/2021 at 6:48 PM  [id number]

## 2021-12-15 NOTE — PERIOP NOTES
TRANSFER - OUT REPORT:    Verbal report given to ICU RN(name) on Alvino Patricia  being transferred to ICU(unit) for routine post - op       Report consisted of patients Situation, Background, Assessment and   Recommendations(SBAR). Information from the following report(s) SBAR and OR Summary was reviewed with the receiving nurse. Lines:   Peripheral IV 12/15/21 Right Forearm (Active)   Site Assessment Clean, dry, & intact 12/15/21 1138   Phlebitis Assessment 0 12/15/21 1138   Infiltration Assessment 0 12/15/21 1138   Dressing Status Clean, dry, & intact 12/15/21 1138       Peripheral IV 12/15/21 Right;Upper; Inner Arm (Active)   Site Assessment Clean, dry, & intact 12/15/21 1218   Phlebitis Assessment 0 12/15/21 1218   Infiltration Assessment 0 12/15/21 1218   Dressing Status Clean, dry, & intact 12/15/21 1218       Arterial Line 12/15/21 Right Radial artery (Active)        Opportunity for questions and clarification was provided.       Patient transported with:   Monitor  O2 @ 8 liters  Registered Nurse   CRNA

## 2021-12-15 NOTE — ED PROVIDER NOTES
Presents with complaint of nausea and vomiting and drainage from a wound. Patient states she was seen several days ago at urgent care for an abscess that was draining. She was started on Bactrim. She took 2 days worth and developed nausea and vomiting to stop taking it. She was seen by her primary care provider yesterday and was told she needed labs but got worse today and still vomiting so came here. She states the area is still somewhat painful but much better as it has been draining. She has had abscesses in her pannus/labial region in the past.  The \"boil\" on her labia started a week ago. The history is provided by the patient and the spouse. Abscess   This is a recurrent problem. The current episode started more than 1 week ago. The problem has been gradually worsening. There has been no fever. The rash is present on the groin. The pain is mild. The pain has been constant since onset. Associated symptoms include pain and weeping. She has tried oral antihistamines for the symptoms. The treatment provided no relief. Past Medical History:   Diagnosis Date    Anxiety     Diabetes ([facility] [address]     type 2. oral meds. Pt does not monitor bs. Last A1C (9/2017)=6.9    GERD (gastroesophageal reflux disease)     OTC prn    Gout     Hypertension     controlled w/med    IBS (irritable bowel syndrome)        Past Surgical History:   Procedure Laterality Date    HX DILATION AND CURETTAGE  07/2018         Family History:   Problem Relation Age of Onset    Heart Failure Mother         congestive    Heart Disease Mother     Hypertension Mother     Cancer Father         Lung/Mets to Bone and Brain and liver    Diabetes Father     Hypertension Father        Social History     Socioeconomic History    Marital status:      Spouse name: Not on file    Number of children: Not on file    Years of education: Not on file    Highest education level: Not on file   Occupational History    Not on file Tobacco Use    Smoking status: Never Smoker    Smokeless tobacco: Never Used   Vaping Use    Vaping Use: Never used   Substance and Sexual Activity    Alcohol use: Yes     Comment: occ    Drug use: No    Sexual activity: Yes     Partners: Male     Birth control/protection: None   Other Topics Concern     Service Not Asked    Blood Transfusions Not Asked    Caffeine Concern Not Asked    Occupational Exposure Not Asked    Hobby Hazards Not Asked    Sleep Concern Not Asked    Stress Concern Not Asked    Weight Concern Not Asked    Special Diet Not Asked    Back Care Not Asked    Exercise Not Asked    Bike Helmet Not Asked    Seat Belt Yes    Self-Exams Not Asked   Social History Narrative    Denies physical or sexual abuse     Social Determinants of Health     Financial Resource Strain:     Difficulty of Paying Living Expenses: Not on file   Food Insecurity:     Worried About Running Out of Food in the Last Year: Not on file    Ni of Food in the Last Year: Not on file   Transportation Needs:     Lack of Transportation (Medical): Not on file    Lack of Transportation (Non-Medical):  Not on file   Physical Activity:     Days of Exercise per Week: Not on file    Minutes of Exercise per Session: Not on file   Stress:     Feeling of Stress : Not on file   Social Connections:     Frequency of Communication with Friends and Family: Not on file    Frequency of Social Gatherings with Friends and Family: Not on file    Attends Temple Services: Not on file    Active Member of Clubs or Organizations: Not on file    Attends Club or Organization Meetings: Not on file    Marital Status: Not on file   Intimate Partner Violence:     Fear of Current or Ex-Partner: Not on file    Emotionally Abused: Not on file    Physically Abused: Not on file    Sexually Abused: Not on file   Housing Stability:     Unable to Pay for Housing in the Last Year: Not on file    Number of [facility] in the Last Year: Not on file    Unstable Housing in the Last Year: Not on file         ALLERGIES: Ace inhibitors, Arb-angiotensin receptor antagonist, Cayenne pepper, Bactrim [sulfamethoprim], and Keflex [cephalexin]    Review of Systems   Constitutional: Negative for chills and fever. Respiratory: Positive for shortness of breath. Cardiovascular: Negative for chest pain and leg swelling. Gastrointestinal: Positive for nausea and vomiting. Skin: Positive for wound. All other systems reviewed and are negative. Vitals:    12/15/21 1241 12/15/21 1300 12/15/21 1329 12/15/21 1330   BP: (!) 148/70 (!) 165/66  (!) 147/50   Pulse: 77 84 74 76   Resp: 24 20 21 22   Temp:       SpO2: 100%  98%    Weight:       Height:                Physical Exam  Vitals and nursing note reviewed. Constitutional:       General: She is in acute distress. Appearance: She is well-developed. She is obese. She is ill-appearing and toxic-appearing. She is not diaphoretic. HENT:      Head: Normocephalic and atraumatic. Eyes:      General:         Right eye: No discharge. Left eye: No discharge. Conjunctiva/sclera: Conjunctivae normal.   Cardiovascular:      Rate and Rhythm: Normal rate and regular rhythm. Pulmonary:      Effort: Pulmonary effort is normal. No respiratory distress. Breath sounds: Normal breath sounds. Abdominal:      General: There is no distension. Palpations: Abdomen is soft. Tenderness: There is no abdominal tenderness. Genitourinary:     Comments: Her pannus has a large open wound without any drainage and drainage from her left mons pubis/labial area there is no crepitus there is some redness and swelling but not overwhelming cellulitis. The drainage is foul-smelling. Musculoskeletal:         General: Normal range of motion. Cervical back: Normal range of motion and neck supple. Skin:     General: Skin is warm and dry.       Capillary Refill: Capillary refill takes less than 2 seconds. Neurological:      General: No focal deficit present. Mental Status: She is alert and oriented to person, place, and time. Cranial Nerves: No cranial nerve deficit. Psychiatric:         Mood and Affect: Mood normal.         Behavior: Behavior normal.          MDM  Number of Diagnoses or Management Options  Necrotizing fasciitis ([address]  Diagnosis management comments:   Patient with abnormal lab results called as critical's and patient was brought back to the room. After seeing her sepsis work-up was done and antibiotics and fluids were ordered. She was given 30 mils per KG of ideal body weight due to her abnormal BMI greater than 30. Based on smell of drainage and electrolyte derangements, I am concerned she has rising fasciitis so a CT scan was ordered. I discussed with her and her  the acute renal failure with the need for a Pardo and the need for admission after work-up was completed. 1439-d/w rads and called     Sepsis Reassessment note:     Gilberto Kahn meets criteria for Sepsis -    Patient is receiving IV Fluids and Antibiotics per sepsis protocol. I did the Sepsis Reassessment at 2:57 PM 12/15/21.     Mitchel Stapleton MD            Amount and/or Complexity of Data Reviewed  Clinical lab tests: ordered and reviewed  Tests in the radiology section of CPT®: ordered and reviewed  Decide to obtain previous medical records or to obtain history from someone other than the patient: yes (D/w family  )  Review and summarize past medical records: yes  Discuss the patient with other providers: yes  Independent visualization of images, tracings, or specimens: yes (============================================  ED EKG Interpretation  EKG was interpreted in the absence of a cardiologist.    EKG rhythm: normal sinus rhythm  Rate: 80  ST Segments: Normal ST segments - NO STEMI      Mitchel Stapleton MD; 12/15/2021 @2:57 PM================  )    Risk of Complications, Morbidity, and/or Mortality  Presenting problems: high  Diagnostic procedures: low  Management options: high    Patient Progress  Patient progress: stable         Critical Care  Performed by: Pretty Garcia MD  Authorized by: Pretty Garcia MD     Critical care provider statement:     Critical care time (minutes):  50    Critical care was necessary to treat or prevent imminent or life-threatening deterioration of the following conditions:  Sepsis and shock    Critical care was time spent personally by me on the following activities:  Ordering and performing treatments and interventions, ordering and review of laboratory studies, ordering and review of radiographic studies, re-evaluation of patient's condition, review of old charts, obtaining history from patient or surrogate, discussions with consultants, development of treatment plan with patient or surrogate, examination of patient and evaluation of patient's response to treatment    I assumed direction of critical care for this patient from another provider in my specialty: no

## 2021-12-16 ENCOUNTER — APPOINTMENT (OUTPATIENT)
Dept: GENERAL RADIOLOGY | Age: 51
DRG: 853 | End: 2021-12-16
Attending: INTERNAL MEDICINE
Payer: COMMERCIAL

## 2021-12-16 ENCOUNTER — APPOINTMENT (OUTPATIENT)
Dept: ULTRASOUND IMAGING | Age: 51
DRG: 853 | End: 2021-12-16
Attending: FAMILY MEDICINE
Payer: COMMERCIAL

## 2021-12-16 ENCOUNTER — ANESTHESIA (OUTPATIENT)
Dept: SURGERY | Age: 51
DRG: 853 | End: 2021-12-16
Payer: COMMERCIAL

## 2021-12-16 PROBLEM — E66.01 OBESITY, MORBID (HCC): Chronic | Status: ACTIVE | Noted: 2018-07-12

## 2021-12-16 PROBLEM — N17.9 AKI (ACUTE KIDNEY INJURY) (HCC): Status: ACTIVE | Noted: 2021-12-16

## 2021-12-16 PROBLEM — N17.0 SEPSIS WITH ACUTE RENAL FAILURE AND TUBULAR NECROSIS WITHOUT SEPTIC SHOCK (HCC): Status: ACTIVE | Noted: 2021-12-16

## 2021-12-16 PROBLEM — A41.9 SEPSIS WITH ACUTE RENAL FAILURE AND TUBULAR NECROSIS WITHOUT SEPTIC SHOCK (HCC): Status: ACTIVE | Noted: 2021-12-16

## 2021-12-16 PROBLEM — R65.20 SEPSIS WITH ACUTE RENAL FAILURE AND TUBULAR NECROSIS WITHOUT SEPTIC SHOCK (HCC): Status: ACTIVE | Noted: 2021-12-16

## 2021-12-16 PROBLEM — J96.00 ACUTE RESPIRATORY FAILURE (HCC): Status: ACTIVE | Noted: 2021-12-16

## 2021-12-16 LAB
ALBUMIN SERPL-MCNC: 1.7 G/DL (ref 3.5–5)
ALBUMIN/GLOB SERPL: 0.5 {RATIO} (ref 1.2–3.5)
ALP SERPL-CCNC: 81 U/L (ref 50–136)
ALT SERPL-CCNC: 11 U/L (ref 12–65)
ANION GAP SERPL CALC-SCNC: 17 MMOL/L (ref 7–16)
ARTERIAL PATENCY WRIST A: POSITIVE
AST SERPL-CCNC: 12 U/L (ref 15–37)
BASE DEFICIT BLD-SCNC: 16.1 MMOL/L
BASE DEFICIT BLD-SCNC: 4 MMOL/L
BASE DEFICIT BLD-SCNC: 4.6 MMOL/L
BASE DEFICIT BLD-SCNC: 5 MMOL/L
BASOPHILS # BLD: 0.1 K/UL (ref 0–0.2)
BASOPHILS NFR BLD: 0 % (ref 0–2)
BDY SITE: ABNORMAL
BILIRUB SERPL-MCNC: 0.2 MG/DL (ref 0.2–1.1)
BUN SERPL-MCNC: 106 MG/DL (ref 6–23)
CA-I BLD-MCNC: 0.83 MMOL/L (ref 1.12–1.32)
CALCIUM SERPL-MCNC: 6.8 MG/DL (ref 8.3–10.4)
CHLORIDE SERPL-SCNC: 95 MMOL/L (ref 98–107)
CO2 BLD-SCNC: 10 MMOL/L (ref 13–23)
CO2 BLD-SCNC: 22 MMOL/L (ref 13–23)
CO2 SERPL-SCNC: 20 MMOL/L (ref 21–32)
CREAT SERPL-MCNC: 9.19 MG/DL (ref 0.6–1)
DIFFERENTIAL METHOD BLD: ABNORMAL
EOSINOPHIL # BLD: 0 K/UL (ref 0–0.8)
EOSINOPHIL NFR BLD: 0 % (ref 0.5–7.8)
ERYTHROCYTE [DISTWIDTH] IN BLOOD BY AUTOMATED COUNT: 13.6 % (ref 11.9–14.6)
EST. AVERAGE GLUCOSE BLD GHB EST-MCNC: 143 MG/DL
GAS FLOW.O2 O2 DELIVERY SYS: ABNORMAL L/MIN
GLOBULIN SER CALC-MCNC: 3.1 G/DL (ref 2.3–3.5)
GLUCOSE BLD STRIP.AUTO-MCNC: 154 MG/DL (ref 65–100)
GLUCOSE BLD STRIP.AUTO-MCNC: 174 MG/DL (ref 65–100)
GLUCOSE BLD STRIP.AUTO-MCNC: 231 MG/DL (ref 65–100)
GLUCOSE SERPL-MCNC: 174 MG/DL (ref 65–100)
HBA1C MFR BLD: 6.6 % (ref 4.2–6.3)
HCG UR QL: NEGATIVE
HCO3 BLD-SCNC: 19.9 MMOL/L (ref 22–26)
HCO3 BLD-SCNC: 20.3 MMOL/L (ref 22–26)
HCO3 BLD-SCNC: 21.6 MMOL/L (ref 22–26)
HCO3 BLD-SCNC: 8.4 MMOL/L (ref 22–26)
HCT VFR BLD AUTO: 20.5 % (ref 35.8–46.3)
HCT VFR BLD AUTO: 20.9 % (ref 35.8–46.3)
HGB BLD-MCNC: 7.3 G/DL (ref 11.7–15.4)
HGB BLD-MCNC: 7.4 G/DL (ref 11.7–15.4)
IMM GRANULOCYTES # BLD AUTO: 1.6 K/UL (ref 0–0.5)
IMM GRANULOCYTES NFR BLD AUTO: 5 % (ref 0–5)
LYMPHOCYTES # BLD: 1.5 K/UL (ref 0.5–4.6)
LYMPHOCYTES NFR BLD: 4 % (ref 13–44)
MCH RBC QN AUTO: 30.2 PG (ref 26.1–32.9)
MCHC RBC AUTO-ENTMCNC: 35.6 G/DL (ref 31.4–35)
MCV RBC AUTO: 84.7 FL (ref 79.6–97.8)
MONOCYTES # BLD: 1.3 K/UL (ref 0.1–1.3)
MONOCYTES NFR BLD: 4 % (ref 4–12)
NEUTS SEG # BLD: 30.1 K/UL (ref 1.7–8.2)
NEUTS SEG NFR BLD: 87 % (ref 43–78)
NRBC # BLD: 0 K/UL (ref 0–0.2)
O2/TOTAL GAS SETTING VFR VENT: 21 %
O2/TOTAL GAS SETTING VFR VENT: 35 %
O2/TOTAL GAS SETTING VFR VENT: 40 %
PCO2 BLD: 16.2 MMHG (ref 35–45)
PCO2 BLD: 34.9 MMHG (ref 35–45)
PCO2 BLD: 36.1 MMHG (ref 35–45)
PCO2 BLD: 40.9 MMHG (ref 35–45)
PEEP RESPIRATORY: 8 CMH2O
PEEP RESPIRATORY: 8 CMH2O
PH BLD: 7.32 [PH] (ref 7.35–7.45)
PH BLD: 7.33 [PH] (ref 7.35–7.45)
PH BLD: 7.36 [PH] (ref 7.35–7.45)
PH BLD: 7.36 [PH] (ref 7.35–7.45)
PIP ISTAT,IPIP: 24
PLATELET # BLD AUTO: 332 K/UL (ref 150–450)
PMV BLD AUTO: 9.6 FL (ref 9.4–12.3)
PO2 BLD: 118 MMHG (ref 75–100)
PO2 BLD: 148 MMHG (ref 75–100)
PO2 BLD: 167 MMHG (ref 75–100)
PO2 BLD: 183 MMHG (ref 75–100)
POTASSIUM BLD-SCNC: 3.8 MMOL/L (ref 3.5–5.1)
POTASSIUM SERPL-SCNC: 3.8 MMOL/L (ref 3.5–5.1)
PROT SERPL-MCNC: 4.8 G/DL (ref 6.3–8.2)
RBC # BLD AUTO: 2.42 M/UL (ref 4.05–5.2)
SAO2 % BLD: 100 %
SAO2 % BLD: 98.5 % (ref 95–98)
SAO2 % BLD: 99.2 % (ref 95–98)
SAO2 % BLD: 99.5 % (ref 95–98)
SERVICE CMNT-IMP: ABNORMAL
SODIUM BLD-SCNC: 127 MMOL/L (ref 136–145)
SODIUM SERPL-SCNC: 132 MMOL/L (ref 136–145)
SPECIMEN SITE: ABNORMAL
SPECIMEN TYPE: ABNORMAL
TOTAL RESP. RATE, ITRR: 16
VANCOMYCIN SERPL-MCNC: 23.4 UG/ML
VENTILATION MODE VENT: ABNORMAL
VENTILATION MODE VENT: ABNORMAL
VT SETTING VENT: 450 ML
VT SETTING VENT: 450 ML
WBC # BLD AUTO: 34.6 K/UL (ref 4.3–11.1)

## 2021-12-16 PROCEDURE — 85014 HEMATOCRIT: CPT

## 2021-12-16 PROCEDURE — 76775 US EXAM ABDO BACK WALL LIM: CPT

## 2021-12-16 PROCEDURE — 81025 URINE PREGNANCY TEST: CPT

## 2021-12-16 PROCEDURE — 94003 VENT MGMT INPAT SUBQ DAY: CPT

## 2021-12-16 PROCEDURE — 77030031139 HC SUT VCRL2 J&J -A: Performed by: SURGERY

## 2021-12-16 PROCEDURE — 74011000250 HC RX REV CODE- 250: Performed by: FAMILY MEDICINE

## 2021-12-16 PROCEDURE — 74011250636 HC RX REV CODE- 250/636: Performed by: NURSE ANESTHETIST, CERTIFIED REGISTERED

## 2021-12-16 PROCEDURE — 82962 GLUCOSE BLOOD TEST: CPT

## 2021-12-16 PROCEDURE — 93005 ELECTROCARDIOGRAM TRACING: CPT | Performed by: INTERNAL MEDICINE

## 2021-12-16 PROCEDURE — 77030018836 HC SOL IRR NACL ICUM -A: Performed by: SURGERY

## 2021-12-16 PROCEDURE — 74011000258 HC RX REV CODE- 258: Performed by: INTERNAL MEDICINE

## 2021-12-16 PROCEDURE — 77030013794 HC KT TRNSDUC BLD EDWD -B

## 2021-12-16 PROCEDURE — 74011000250 HC RX REV CODE- 250: Performed by: NURSE ANESTHETIST, CERTIFIED REGISTERED

## 2021-12-16 PROCEDURE — 74011000250 HC RX REV CODE- 250: Performed by: NURSE PRACTITIONER

## 2021-12-16 PROCEDURE — 76010000149 HC OR TIME 1 TO 1.5 HR: Performed by: SURGERY

## 2021-12-16 PROCEDURE — 83036 HEMOGLOBIN GLYCOSYLATED A1C: CPT

## 2021-12-16 PROCEDURE — 74011000250 HC RX REV CODE- 250: Performed by: INTERNAL MEDICINE

## 2021-12-16 PROCEDURE — 0QB30ZZ EXCISION OF LEFT PELVIC BONE, OPEN APPROACH: ICD-10-PCS | Performed by: SURGERY

## 2021-12-16 PROCEDURE — 77030041174 HC STOOL COL SYS DIGNSHLD BARD -C

## 2021-12-16 PROCEDURE — 77030005401 HC CATH RAD ARRO -A: Performed by: ANESTHESIOLOGY

## 2021-12-16 PROCEDURE — 2709999900 HC NON-CHARGEABLE SUPPLY: Performed by: SURGERY

## 2021-12-16 PROCEDURE — 74011250636 HC RX REV CODE- 250/636: Performed by: INTERNAL MEDICINE

## 2021-12-16 PROCEDURE — 76060000033 HC ANESTHESIA 1 TO 1.5 HR: Performed by: SURGERY

## 2021-12-16 PROCEDURE — 80202 ASSAY OF VANCOMYCIN: CPT

## 2021-12-16 PROCEDURE — 85025 COMPLETE CBC W/AUTO DIFF WBC: CPT

## 2021-12-16 PROCEDURE — 77030013708 HC HNDPC SUC IRR PULS STRY –B: Performed by: SURGERY

## 2021-12-16 PROCEDURE — 82803 BLOOD GASES ANY COMBINATION: CPT

## 2021-12-16 PROCEDURE — C1898 LEAD, PMKR, OTHER THAN TRANS: HCPCS | Performed by: SURGERY

## 2021-12-16 PROCEDURE — 84295 ASSAY OF SERUM SODIUM: CPT

## 2021-12-16 PROCEDURE — 74011250636 HC RX REV CODE- 250/636: Performed by: FAMILY MEDICINE

## 2021-12-16 PROCEDURE — 77030018673: Performed by: SURGERY

## 2021-12-16 PROCEDURE — 74011250636 HC RX REV CODE- 250/636: Performed by: NURSE PRACTITIONER

## 2021-12-16 PROCEDURE — 36600 WITHDRAWAL OF ARTERIAL BLOOD: CPT

## 2021-12-16 PROCEDURE — 2709999900 HC NON-CHARGEABLE SUPPLY

## 2021-12-16 PROCEDURE — 74011636637 HC RX REV CODE- 636/637: Performed by: FAMILY MEDICINE

## 2021-12-16 PROCEDURE — 74011250637 HC RX REV CODE- 250/637: Performed by: FAMILY MEDICINE

## 2021-12-16 PROCEDURE — 77030020407 HC IV BLD WRMR ST 3M -A: Performed by: ANESTHESIOLOGY

## 2021-12-16 PROCEDURE — 80053 COMPREHEN METABOLIC PANEL: CPT

## 2021-12-16 PROCEDURE — 99233 SBSQ HOSP IP/OBS HIGH 50: CPT | Performed by: INTERNAL MEDICINE

## 2021-12-16 PROCEDURE — 77030019908 HC STETH ESOPH SIMS -A: Performed by: ANESTHESIOLOGY

## 2021-12-16 PROCEDURE — P9016 RBC LEUKOCYTES REDUCED: HCPCS

## 2021-12-16 PROCEDURE — 74011636637 HC RX REV CODE- 636/637: Performed by: INTERNAL MEDICINE

## 2021-12-16 PROCEDURE — 71045 X-RAY EXAM CHEST 1 VIEW: CPT

## 2021-12-16 PROCEDURE — 65610000001 HC ROOM ICU GENERAL

## 2021-12-16 PROCEDURE — 74011250636 HC RX REV CODE- 250/636: Performed by: SURGERY

## 2021-12-16 PROCEDURE — 74011000258 HC RX REV CODE- 258: Performed by: NURSE ANESTHETIST, CERTIFIED REGISTERED

## 2021-12-16 PROCEDURE — P9047 ALBUMIN (HUMAN), 25%, 50ML: HCPCS | Performed by: FAMILY MEDICINE

## 2021-12-16 PROCEDURE — 11006 DBRDMT SKIN XTRNL GENT PER: CPT | Performed by: SURGERY

## 2021-12-16 RX ORDER — NOREPINEPHRINE BITARTRATE/D5W 4MG/250ML
.5-3 PLASTIC BAG, INJECTION (ML) INTRAVENOUS
Status: DISCONTINUED | OUTPATIENT
Start: 2021-12-16 | End: 2021-12-17

## 2021-12-16 RX ORDER — ROCURONIUM BROMIDE 10 MG/ML
INJECTION, SOLUTION INTRAVENOUS AS NEEDED
Status: DISCONTINUED | OUTPATIENT
Start: 2021-12-16 | End: 2021-12-16 | Stop reason: HOSPADM

## 2021-12-16 RX ORDER — ALBUMIN HUMAN 250 G/1000ML
25 SOLUTION INTRAVENOUS EVERY 6 HOURS
Status: COMPLETED | OUTPATIENT
Start: 2021-12-16 | End: 2021-12-17

## 2021-12-16 RX ORDER — INSULIN GLARGINE 100 [IU]/ML
10 INJECTION, SOLUTION SUBCUTANEOUS DAILY
Status: DISCONTINUED | OUTPATIENT
Start: 2021-12-16 | End: 2021-12-17

## 2021-12-16 RX ORDER — HEPARIN SODIUM 5000 [USP'U]/ML
5000 INJECTION, SOLUTION INTRAVENOUS; SUBCUTANEOUS EVERY 8 HOURS
Status: DISCONTINUED | OUTPATIENT
Start: 2021-12-16 | End: 2021-12-27 | Stop reason: HOSPADM

## 2021-12-16 RX ORDER — CALCIUM CHLORIDE INJECTION 100 MG/ML
INJECTION, SOLUTION INTRAVENOUS AS NEEDED
Status: DISCONTINUED | OUTPATIENT
Start: 2021-12-16 | End: 2021-12-16 | Stop reason: HOSPADM

## 2021-12-16 RX ORDER — SODIUM CHLORIDE, SODIUM LACTATE, POTASSIUM CHLORIDE, CALCIUM CHLORIDE 600; 310; 30; 20 MG/100ML; MG/100ML; MG/100ML; MG/100ML
INJECTION, SOLUTION INTRAVENOUS
Status: DISCONTINUED | OUTPATIENT
Start: 2021-12-16 | End: 2021-12-16 | Stop reason: HOSPADM

## 2021-12-16 RX ORDER — FAMOTIDINE 20 MG/1
10 TABLET, FILM COATED ORAL EVERY 24 HOURS
Status: DISCONTINUED | OUTPATIENT
Start: 2021-12-16 | End: 2021-12-17

## 2021-12-16 RX ORDER — MIDODRINE HYDROCHLORIDE 5 MG/1
10 TABLET ORAL
Status: DISCONTINUED | OUTPATIENT
Start: 2021-12-16 | End: 2021-12-17

## 2021-12-16 RX ADMIN — INSULIN LISPRO 4 UNITS: 100 INJECTION, SOLUTION INTRAVENOUS; SUBCUTANEOUS at 18:05

## 2021-12-16 RX ADMIN — ROCURONIUM BROMIDE 30 MG: 10 INJECTION, SOLUTION INTRAVENOUS at 06:40

## 2021-12-16 RX ADMIN — Medication 10 ML: at 21:01

## 2021-12-16 RX ADMIN — INSULIN LISPRO 2 UNITS: 100 INJECTION, SOLUTION INTRAVENOUS; SUBCUTANEOUS at 05:49

## 2021-12-16 RX ADMIN — ONDANSETRON 4 MG: 2 INJECTION INTRAMUSCULAR; INTRAVENOUS at 21:08

## 2021-12-16 RX ADMIN — ALBUMIN (HUMAN) 25 G: 0.25 INJECTION, SOLUTION INTRAVENOUS at 17:19

## 2021-12-16 RX ADMIN — MEROPENEM 500 MG: 500 INJECTION, POWDER, FOR SOLUTION INTRAVENOUS at 20:52

## 2021-12-16 RX ADMIN — FAMOTIDINE 10 MG: 20 TABLET ORAL at 17:19

## 2021-12-16 RX ADMIN — NOREPINEPHRINE BITARTRATE 0.04 MCG/KG/MIN: 1 SOLUTION INTRAVENOUS at 06:52

## 2021-12-16 RX ADMIN — HEPARIN SODIUM 5000 UNITS: 5000 INJECTION INTRAVENOUS; SUBCUTANEOUS at 20:52

## 2021-12-16 RX ADMIN — PROPOFOL 25 MCG/KG/MIN: 10 INJECTION, EMULSION INTRAVENOUS at 05:57

## 2021-12-16 RX ADMIN — MEROPENEM 500 MG: 500 INJECTION, POWDER, FOR SOLUTION INTRAVENOUS at 09:21

## 2021-12-16 RX ADMIN — Medication 10 ML: at 05:49

## 2021-12-16 RX ADMIN — Medication 10 ML: at 14:04

## 2021-12-16 RX ADMIN — SODIUM BICARBONATE: 84 INJECTION, SOLUTION INTRAVENOUS at 10:15

## 2021-12-16 RX ADMIN — CLINDAMYCIN PHOSPHATE 900 MG: 900 INJECTION, SOLUTION INTRAVENOUS at 20:52

## 2021-12-16 RX ADMIN — ROCURONIUM BROMIDE 20 MG: 10 INJECTION, SOLUTION INTRAVENOUS at 07:10

## 2021-12-16 RX ADMIN — FENTANYL CITRATE 50 MCG/HR: 50 INJECTION, SOLUTION INTRAMUSCULAR; INTRAVENOUS at 08:09

## 2021-12-16 RX ADMIN — ALBUMIN (HUMAN) 25 G: 0.25 INJECTION, SOLUTION INTRAVENOUS at 23:16

## 2021-12-16 RX ADMIN — SODIUM BICARBONATE: 84 INJECTION, SOLUTION INTRAVENOUS at 17:10

## 2021-12-16 RX ADMIN — CLINDAMYCIN PHOSPHATE 900 MG: 900 INJECTION, SOLUTION INTRAVENOUS at 13:39

## 2021-12-16 RX ADMIN — PROPOFOL 30 MCG/KG/MIN: 10 INJECTION, EMULSION INTRAVENOUS at 09:57

## 2021-12-16 RX ADMIN — SODIUM CHLORIDE 5 MG/HR: 900 INJECTION, SOLUTION INTRAVENOUS at 17:44

## 2021-12-16 RX ADMIN — FAMOTIDINE 20 MG: 10 INJECTION, SOLUTION INTRAVENOUS at 13:39

## 2021-12-16 RX ADMIN — HEPARIN SODIUM 5000 UNITS: 5000 INJECTION INTRAVENOUS; SUBCUTANEOUS at 13:39

## 2021-12-16 RX ADMIN — INSULIN LISPRO 4 UNITS: 100 INJECTION, SOLUTION INTRAVENOUS; SUBCUTANEOUS at 11:47

## 2021-12-16 RX ADMIN — CALCIUM CHLORIDE 1 G: 100 INJECTION, SOLUTION INTRAVENOUS; INTRAVENTRICULAR at 07:02

## 2021-12-16 RX ADMIN — CLINDAMYCIN PHOSPHATE 900 MG: 900 INJECTION, SOLUTION INTRAVENOUS at 05:30

## 2021-12-16 RX ADMIN — SODIUM BICARBONATE: 84 INJECTION, SOLUTION INTRAVENOUS at 02:12

## 2021-12-16 RX ADMIN — INSULIN LISPRO 6 UNITS: 100 INJECTION, SOLUTION INTRAVENOUS; SUBCUTANEOUS at 23:22

## 2021-12-16 RX ADMIN — SODIUM CHLORIDE 125 ML/HR: 900 INJECTION, SOLUTION INTRAVENOUS at 05:33

## 2021-12-16 RX ADMIN — PHENYLEPHRINE HYDROCHLORIDE 130 MCG/MIN: 10 INJECTION INTRAVENOUS at 02:33

## 2021-12-16 RX ADMIN — SODIUM CHLORIDE, SODIUM LACTATE, POTASSIUM CHLORIDE, AND CALCIUM CHLORIDE: 600; 310; 30; 20 INJECTION, SOLUTION INTRAVENOUS at 06:37

## 2021-12-16 RX ADMIN — ALBUMIN (HUMAN) 25 G: 0.25 INJECTION, SOLUTION INTRAVENOUS at 13:40

## 2021-12-16 RX ADMIN — NOREPINEPHRINE BITARTRATE 4 MCG/MIN: 1 INJECTION, SOLUTION, CONCENTRATE INTRAVENOUS at 10:16

## 2021-12-16 RX ADMIN — INSULIN GLARGINE 10 UNITS: 100 INJECTION, SOLUTION SUBCUTANEOUS at 14:08

## 2021-12-16 NOTE — PROGRESS NOTES
Dr. Miguelito Kimble notified of patients recent onset A-fib RVR. Orders received for diltiazem gtt now.

## 2021-12-16 NOTE — ADDENDUM NOTE
Addendum  created 12/16/21 0906 by Natalie Ramirez CRNA    Flowsheet accepted, Intraprocedure Flowsheets edited

## 2021-12-16 NOTE — OP NOTES
[facility]  OPERATIVE REPORT    Name:  Yvonne Gan  MR#:  [id number]  :  1970  ACCOUNT #:  [de-identified]  DATE OF SERVICE:  12/15/2021    PREOPERATIVE DIAGNOSIS:  Necrotizing fasciitis. POSTOPERATIVE DIAGNOSIS:  Necrotizing fasciitis. PROCEDURE PERFORMED:  Sharp excisional debridement of dead devitalized infected necrotic tissue down to pubic bone and left iliac bone using a #10 scalpel blade and sharp scissors for a wound measuring 90 cm x 60 cm equaling 5400 sq cm. CPT code 98251 and CPT code 11047 x 270 for the subsequent 20 sq. cm.    SURGEON:  Alfie Rivas DO    ASSISTANT:  None. ANESTHESIA: General endotracheal.    COMPLICATIONS:  None. SPECIMENS REMOVED:  Dead devitalized necrotic necrotizing fasciitis tissue. IMPLANTS:   None. ESTIMATED BLOOD LOSS:  1200 mL. DISPOSITION:  Critical.    URINE OUTPUT:  500 mL. IV FLUIDS:  2 liters lactated Ringer's, 1 liter normal saline, 500 mL albumin, 4 units packed red blood cells, 1000 mL. DESCRIPTION OF PROCEDURE:  A 38-year-old female with necrotizing fasciitis, perineal infection extending up the abdomen to the umbilicus and anterior abdominal wall and left flank. She is prepared for excisional debridement necrotizing fasciitis. Consent was obtained by describing the procedure to the patient including potential complications to include infection, bleeding and massive wound care. Consent was obtained and placed on the final chart. She was administered vancomycin, Zosyn and Ancef IV preoperatively. She was taken to the office and placed in a supine position. General anesthesia was initiated without complications. Triple-lumen catheter was established as well as A line. She was transferred to lithotomy and then prepped and draped in usual sterile fashion. Time-out was taken to confirm the patient and proper procedure.   Following this, the patient had a very large mons pubis with hemorrhagic bulla consistent with necrotizing fasciitis. There was active draining from the left labia with two openings and foul-smelling gray drainage. We began by incising the left labia up onto the mons pubis entering abscess cavity with foul-smelling gray necrotic tissue. This was circumferentially excised and an extensive debridement down to the pubic bone excising all dead devitalized tissue. We extended this along the inguinal canal towards the iliac bone and once again incised the pannus of her abdominal skin resecting a large portion down to healthy tissue. We traced this gray necrotic tissue to above the iliac bone into the left flank excising an abundance of tissue. We continued dissecting with #10 scalpel blade and sharp scissors. The dissection lead us to the external oblique abdominal wall muscle. This was all excised using sharp scissors down to the inguinal canal where additional tissue was removed. We continued this dissection down to the perineum identifying additional abscess and foul-smelling necrotic necrotizing fasciitis. This was all excised and sent to pathology for analysis. After approximately two hour dissection, all dead necrotic tissue had been removed back to healthy bleeding yellow fat and abdominal fascia. At this point we copiously irrigated with saline until clear. We ensured hemostasis using combination of spot cauterization and 2-0 Vicryl stick ties. After inspecting the wound thoroughly we identified no additional areas that needed debridement. A wound was packed with a Betadine-soaked Kerlix sponges. A sterile towel was placed atop the wound and an Ioban dressing. The patient would be transferred to ICU in a critical condition. FINDINGS:  A 78-year-old female with morbid obesity and necrotizing fasciitis extending from the perineum, left labia deep to the pubic bone and along the fascia to the abdominal wall above the iliac bone into the left flank.   Foul-smelling necrotic necrotizing fasciitis debrided using #10 scalpel blade, sharp scissors down to the pubic bone and iliac bone. Wound measured 90 cm x 60 cm for total surface area of 5400 sq. cm. All devitalized necrotic tissue was removed back to the yellow-appearing fat with healthy bleeding. Wound was packed with Betadine-soaked Kerlix gauze. She would be observed overnight in intensive care unit and return to surgery at 06:30 in the morning for a second look.         [address], DO      [id number]  D:  12/15/2021 19:01  T:  12/16/2021 2:38  JOB #:  [id number]

## 2021-12-16 NOTE — ANESTHESIA PROCEDURE NOTES
Central Line Placement    Start time: 12/15/2021 4:19 PM  End time: 12/15/2021 4:37 PM  Performed by: Jayna Soliz MD  Authorized by: Jayna Soliz MD     Indications: vascular access  Preanesthetic Checklist: patient identified, risks and benefits discussed, anesthesia consent, site marked, patient being monitored and timeout performed    Timeout Time: 16:10 EST       Pre-procedure: All elements of maximal sterile barrier technique followed? Yes    2% Chlorhexidine for cutaneous antisepsis, Hand hygiene performed prior to catheter insertion and Ultrasound guidance    Sterile Ultrasound Technique followed?: Yes        Ultrasound Image Stored? Image stored    Procedure:   Prep:  Chlorhexidine    Orientation:  Right  Patient position:  Trendelenburg  Catheter type:  Quad lumen  Catheter size:  9 Fr  Catheter length:  20 cm  Number of attempts:  1  Successful placement: Yes      Assessment:   Post-procedure:  Catheter secured and sterile dressing applied  Assessment:  Blood return through all ports, free fluid flow and guidewire removal verified  Insertion:  Uncomplicated  Patient tolerance:  Patient tolerated the procedure well with no immediate complications  Potential access site(s) were examined with ultrasound and acceptable patent access site selected as noted above. Needle path and vein access visualized in real time using ultrasound, an image of wire in vessel recorded for permanent record.

## 2021-12-16 NOTE — BRIEF OP NOTE
Brief Postoperative Note    Patient: Heavenly Hadley  YOB: 1970  MRN: [id number]    Date of Procedure: 12/16/2021     Pre-Op Diagnosis: Necrotizing Fasciitis    Post-Op Diagnosis: Same as preoperative diagnosis. Procedure(s):  Sharp excisional debridement down to pubic bone and left iliac bone with #10 scalpel blade and sharp scissors 4 cm X 15 cm(flank),  6cm X 2cm (external obique)   [id number] (inguinal skin)   5wyQ4ib (perineum) CPT 77554 and CPT 50596  High pressure water jet debridement cpt 13841    Surgeon(s):  Treasure Mena DO    Surgical Assistant: None    Anesthesia: General     Estimated Blood Loss (mL): Minimal    Complications: None    Specimens: * No specimens in log *     Implants: * No implants in log *    Drains: * No LDAs found *    Findings: New necrosis lateral flank and inguinal region and external oblique muscle. New necrosis in perineum. Sharp excisional debridement with #10 and scisssors.     Electronically Signed by Isi Josue DO on 12/16/2021 at 7:46 AM    [id number]

## 2021-12-16 NOTE — ANESTHESIA PREPROCEDURE EVALUATION
Relevant Problems   CARDIOVASCULAR   (+) HTN (hypertension)   (+) Hypertensive urgency      ENDOCRINE   (+) DM2 (diabetes mellitus, type 2) (HCC)   (+) Obesity, morbid (HCC)      HEMATOLOGY   (+) Anemia       Anesthetic History   No history of anesthetic complications            Review of Systems / Medical History  Patient summary reviewed and pertinent labs reviewed    Pulmonary  Within defined limits                 Neuro/Psych   Within defined limits           Cardiovascular    Hypertension: well controlled              Exercise tolerance: <4 METS     GI/Hepatic/Renal         Renal disease: ARF       Endo/Other    Diabetes: well controlled, type 2    Morbid obesity and anemia     Other Findings   Comments: Necrotizing fasciitis          Physical Exam    Airway          Intubated   Cardiovascular    Rhythm: regular  Rate: normal         Dental         Pulmonary  Breath sounds clear to auscultation               Abdominal  GI exam deferred       Other Findings            Anesthetic Plan    ASA: 5  Anesthesia type: general        Post procedure ventilation   Induction: Inhalational  Anesthetic plan and risks discussed with: Patient and Spouse      Patient s/p extensive debridement for necrotizing fasciitis. Remains intubated/sedated. I spoke with her  and obtained verbal consent for the procedure tomorrow.   Patient will remain intubated s/p procedure

## 2021-12-16 NOTE — INTERVAL H&P NOTE
Update History & Physical    The Patient's History and Physical of December 15,   2021 was reviewed with the patient and I examined the patient. There was necrotizing fasciitis excisional debridement yesterday afternoon. The patient remains septic but stable. Planning return to surgery for second look and extended excisional debridement if needed. The surgical site was confirmed by the patient and me. Plan:  The risk, benefits, expected outcome, and alternative to the recommended procedure have been discussed with the patient. Patient understands and wants to proceed with the procedure.     Electronically signed by Yoanna Burnette DO on 12/16/2021 at 6:24 AM

## 2021-12-16 NOTE — OP NOTES
[facility]  OPERATIVE REPORT    Name:  Franko Ramos  MR#:  [id number]  :  1970  ACCOUNT #:  [de-identified]  DATE OF SERVICE:  2021    PREOPERATIVE DIAGNOSIS:  Necrotizing fasciitis. POSTOPERATIVE DIAGNOSIS:  Necrotizing fasciitis. PROCEDURE PERFORMED:  Sharp excisional debridement down to pubic bone and left iliac bone with #10 scalpel blade and sharp scissors 4 cm X 15 cm(flank),  6cm X 2cm (external obique)   0erU4vf (inguinal skin)   0taR2qb (perineum) CPT 55315 and CPT 21033  High pressure water jet debridement cpt 35527    SURGEON:  Jackelyn Rivas DO    ASSISTANT:  None. ANESTHESIA:  General endotracheal.    COMPLICATIONS:  None. SPECIMENS REMOVED:  Dead necrotic tissue. IMPLANTS:  None. ESTIMATED BLOOD LOSS:  Minimal.    CONDITION:  Stable. COUNTS:  Sponge count, needle count, instrument count all correct x3. DESCRIPTION OF THE PROCEDURE:  This is a 33-year-old female with necrotizing fasciitis, excisional debridement, massive amount of abdominal pannus and perineum yesterday. She was taken back this morning for a second look and further debridement and indicated procedures. Consent was obtained by describing the procedure to the patient and  including potential complications to include infection, bleeding and additional debridement. Consent was obtained and placed on the final chart. She was administered multiple antibiotics,  vancomycin, Zosyn, clindamycin IV. Preoperatively, she was taken to the operative suite in a supine position. General esthesia was initiated without complications. She was transferred to lithotomy and then prepped and draped in usual sterile fashion. Time-out was taken to confirm the patient and proper procedure. Following this, the wound was explored. There was additional necrotic tissue along the left flank and abdominal wall.   This was all excised using sharp dissection with #10 scalpel blade and sharp scissors. There was additional dead necrotic tissue along the inguinal region along the skin and subcutaneous fat down into the inguinal region near the abdominal external oblique muscle. This was all debrided with sharp dissection and controlled with hemostasis with Bovie cauterization and 2-0 Vicryl stick ties. We continued exploring down into the perineum and labia where there was additional necrosis, all excised with sharp scissors. At this point, we irrigated with saline until clear. There were no other areas of necrosis. This debridement was down to the pubic bone and iliac bone laterally and to the external oblique muscle. Pulsavac was then used to irrigate the area circumferentially using 3 liters of saline. The wound was then packed with Betadine-soaked Kerlix gauze and was covered with an [place]. She tolerated the procedure well, will be returned to ICU in critical condition. FINDINGS:  A 47-year-old female with necrotizing fasciitis. Second look procedure today revealed new necrosis along the lateral flank, the inguinal region and the external oblique muscle. There was also new necrosis in the peritoneum. Sharp excisional debridement with #10 scalpel blade and sharp scissors was performed and the wound was packed and will be observed in ICU.       [address], DO      [id number]  D:  12/16/2021 7:55  T:  12/16/2021 11:28  JOB #:  [id number]

## 2021-12-16 NOTE — PROGRESS NOTES
Ventilator check complete; patient has a #7. 0 ET tube secured at the 24 at the lip. Patient is sedated. Patient is not able to follow commands. Breath sounds are clear and diminished. Trachea is midline, Negative for subcutaneous air, and chest excursion is symmetric. Patient is also Negative for cyanosis and is Negative for pitting edema. All alarms are set and audible. Resuscitation bag is at the head of the bed. Ventilator Settings  Mode FIO2 Rate Tidal Volume Pressure PEEP I:E Ratio   VC+  40 %   18 450 ml     8 cm H20         Peak airway pressure: 24 cm H2O   Minute ventilation: 10.6 l/min     ABG: No results for input(s): PH, PCO2, PO2, HCO3 in the last 72 hours.       Maggy Rivers, RT

## 2021-12-16 NOTE — PROGRESS NOTES
Respiratory Mechanics completed and are as follows:  NIF -50  Patient extubated to a 3L NC. Patient is able to communicate and is negative for stridor. Breath sounds are diminished. No complications with extubation.     Denil Yuliya, RT

## 2021-12-16 NOTE — PROGRESS NOTES
Patient out from operating room and placed on the ventilator on documented settings. Patient is orally intubated with a # 7.0 ET Tube secured at the 24 cm kena at the lip. Breath sounds are clear. Trachea is midline. Negative for subcutaneous air, chest excursion is symmetric. Negative for pitting edema. Patient is also Negative for cyanosis. All alarms are set and audible. Resuscitation bag is at the head of the bed. Ventilator Settings  Mode FIO2 Rate Tidal Volume Pressure PEEP I:E Ratio   Assist control,VC+  60 %   18 450 ml     8 cm H20         Peak airway pressure: 24 cm H2O   Minute ventilation: 8.82 l/min     ABG: No results for input(s): PH, PCO2, PO2, HCO3 in the last 72 hours.       Pascual Gee, RT

## 2021-12-16 NOTE — CONSULTS
[facility] Consult Note    Subjective:     Latisha Wu is a 46 y.o.  female who is being seen for DEVENDRA. She has a hx of  Obesity, DM, HTN, gout, who had an abscess  at the pannus approximately 1 to 2 weeks ago. she had a couple of urgent care visits and abx , but stopped secondary to nausea/ vomitting and side effects. She also had diarrhea. Endorsed orthostatic symptoms. She was still tking her BP meds. Also on metformin. She had been taking some NSAID's for pain    Worsening symptoms and so to ED. CT abdomen/pelvis showed aabscess beginning at the labia and the pannus extending onto the abdomen and the left pelvic region, all consistent with necrotizing fasciitis. she was taken urgently to OR for debridement and again today. Has been cultured, abx, IVF and bicarb. She is on a little levophed. BP is ok and she is making urine. Initial creatinne was 11, last night was 9.59, and this AM 9.19.  potassium is 3.8. She reports having some kidney issues in the past thought to be secondary to DM. Baseline creatinine is not known. No fever, chills, sweats, epistaxis, vision changes, headache,, wheezing,  chest pain, palpitations. No nausea/vomitting, diarrhea or constipation. No dysuria, hematuria. No paresthesia, seizure history, no arthritis/ arthralgia or skin rashes. Some abdominal pain/ incisional pain. Past Medical History:   Diagnosis Date    Anxiety     Diabetes ([address]     type 2. oral meds. Pt does not monitor bs. Last A1C (9/2017)=6.9    GERD (gastroesophageal reflux disease)     OTC prn    Gout     Hypertension     controlled w/med    IBS (irritable bowel syndrome)       Past Surgical History:   Procedure Laterality Date    HX DILATION AND CURETTAGE  07/2018     Family History   Problem Relation Age of Onset    Heart Failure Mother         congestive    Heart Disease Mother     Hypertension Mother     Cancer Father         Lung/Mets to Bone and Brain and liver    Diabetes Father     Hypertension Father       Social History     Tobacco Use    Smoking status: Never Smoker    Smokeless tobacco: Never Used   Substance Use Topics    Alcohol use: Yes     Comment: occ       Current Facility-Administered Medications   Medication Dose Route Frequency Provider Last Rate Last Admin    NOREPINephrine (LEVOPHED) 4 mg in 5% dextrose 250 mL infusion  0.5-30 mcg/min IntraVENous TITRATE Jefry Nick MD   Stopped at 12/16/21 1537    insulin glargine (LANTUS) injection 10 Units  10 Units SubCUTAneous DAILY [facility], [facility]   10 Units at 12/16/21 1408    albumin human 25% (BUMINATE) solution 25 g  25 g IntraVENous Q6H Mayford Shone, DO   25 g at 12/16/21 1340    heparin (porcine) injection 5,000 Units  5,000 Units SubCUTAneous Q8H Illona Gum, NP   5,000 Units at 12/16/21 1339    midodrine (PROAMATINE) tablet 10 mg  10 mg Oral TID PRN Mayford Shone, DO        famotidine (PEPCID) tablet 10 mg  10 mg Oral Q24H Janet Lamas, DO        ceFAZolin (ANCEF) 3 g/30 mL in sterile water IV syringe  3 g IntraVENous ON CALL TO OR Yayo Menai Drivers, DO        0.9% sodium chloride infusion 250 mL  250 mL IntraVENous PRN Trina, Angela Drivers, DO        0.9% sodium chloride infusion 250 mL  250 mL IntraVENous PRN Trina, Angela Drivers, DO        sodium chloride (NS) flush 5-40 mL  5-40 mL IntraVENous Q8H Trina, Angela Drivers, DO   10 mL at 12/16/21 1404    sodium chloride (NS) flush 5-40 mL  5-40 mL IntraVENous PRN Trina, Angela Drivers, DO        ondansetron (ZOFRAN ODT) tablet 4 mg  4 mg Oral Q8H PRN Trina, Angela Drivers, DO        Or    ondansetron [facility]) injection 4 mg  4 mg IntraVENous Q6H PRN Trina, Angela Drivers, DO        potassium chloride 10 mEq in 100 ml IVPB  10 mEq IntraVENous PRN Trina, Angela Drivers, DO        magnesium sulfate 2 g/50 ml IVPB (premix or compounded)  2 g IntraVENous PRN Trina, Angela Drivers, DO        HYDROmorphone (DILAUDID) injection 0.5 mg  0.5 mg IntraVENous Q3H PRN Esme Mena DO        fentaNYL in normal saline (pf) 25 mcg/mL infusion  0-200 mcg/hr IntraVENous TITRATE Kenneth Palomo MD   Stopped at 12/16/21 1532    propofol (DIPRIVAN) 10 mg/mL infusion  0-50 mcg/kg/min IntraVENous TITRATE Kenneth Palomo MD   Stopped at 12/16/21 1532    sodium bicarbonate (8.4%) 150 mEq in dextrose 5% 1,000 mL infusion   IntraVENous CONTINUOUS Elester Pin,  mL/hr at 12/16/21 1512 Rate Change at 12/16/21 1512    insulin lispro (HUMALOG) injection   SubCUTAneous Q6H Kenneth Palomo MD   4 Units at 12/16/21 1147    meropenem (MERREM) 500 mg in 0.9% sodium chloride (MBP/ADV) 50 mL MBP  0.5 g IntraVENous Q12H Kenneth Palomo  mL/hr at 12/16/21 0921 500 mg at 12/16/21 2366    clindamycin (CLEOCIN) 900mg D5W 50mL IVPB (premix)  900 mg IntraVENous Q8H Lazaro MCKEON  mL/hr at 12/16/21 1339 900 mg at 12/16/21 1339    PHENYLephrine (CLIVE-SYNEPHRINE) 30 mg in 0.9% sodium chloride 250 mL infusion   mcg/min IntraVENous TITRATE Kenneth Palomo MD   Stopped at 12/16/21 1732    vancomycin intermittent dosing- pharmacy to dose   Other Rx Dosing/Monitoring Esme Mena DO            Allergies   Allergen Reactions    Ace Inhibitors Angioedema    Arb-Angiotensin Receptor Antagonist Angioedema    Cayenne Pepper Hives     Swelling of the mouth    Bactrim [Sulfamethoprim] Other (comments)     Nausea, vomiting, making her feel like she is going to pass out      Keflex [Cephalexin] Nausea and Vomiting        Review of Systems:  A comprehensive review of systems was negative except for that written in the History of Present Illness.      Objective:     Visit Vitals  BP (!) 128/59   Pulse 72   Temp 97.8 °F (36.6 °C)   Resp 13   Ht 5' 7.5\" (1.715 m)   Wt 149.3 kg (329 lb 2.4 oz)   SpO2 100%   BMI 50.79 kg/m²        Physical Exam:   General appearance: no distress, appears stated age  Head: atraumatic  Eyes: conjunctivae/corneas clear. Nose: no discharge  Throat: Lips, mucosa, and tongue normal.   Neck: supple, symmetrical, trachea midline and no JVD  Lungs: clear to auscultation bilaterally  Heart: tachy, S1, S2, no pericardial friction rub  Abdomen: obese, soft. surgical site dressing  Extremities: No edema    Skin:  No rashes or lesions          Data Review:   BMP:   Lab Results   Component Value Date/Time     (L) 12/16/2021 03:10 AM    K 3.8 12/16/2021 03:10 AM    CL 95 (L) 12/16/2021 03:10 AM    CO2 20 (L) 12/16/2021 03:10 AM    AGAP 17 (H) 12/16/2021 03:10 AM     (H) 12/16/2021 03:10 AM     (H) 12/16/2021 03:10 AM    CREA 9.19 (H) 12/16/2021 03:10 AM    GFRAA 6 (L) 12/16/2021 03:10 AM    GFRNA 5 (L) 12/16/2021 03:10 AM       CBC:    Lab Results   Component Value Date/Time    WBC 34.6 (H) 12/16/2021 03:10 AM    HGB 7.4 (L) 12/16/2021 11:23 AM    HCT 20.9 (L) 12/16/2021 11:23 AM     12/16/2021 03:10 AM            Assessment:     Principal Problem:    Necrotizing fasciitis ([address] (12/15/2021)    Active Problems:    DM2 (diabetes mellitus, type 2) ([address] (12/25/2015)      HTN (hypertension) (12/25/2015)      Obesity, morbid ([address] (7/12/2018)      Anemia (7/13/2018)      DEVENDRA (acute kidney injury) ([address] (12/16/2021)      Acute respiratory failure ([address] (12/16/2021)      Sepsis with acute renal failure and tubular necrosis without septic shock ([address] (12/16/2021)        Plan: DEVENDRA with unclear hx of CKD  Necrotizing fasciitis. Hypotension with sepsis    DEVENDRA clearly with some component of hypoperfusion.  - her creatinine is down and she is making urine after resuscitation. - imaging CT showed some cortical thinning associated with CKD. At this point, tx is still ongoing support. There are signs of some improvement  - discussed reasons to intervene with dialysis.   Hopefully will be able to avoid     Signed By: Kevin Teague MD     December 16, 2021

## 2021-12-16 NOTE — PROGRESS NOTES
TRANSFER - IN REPORT:    Verbal report received from [facility] & [facility], [facility] (name) on Osmin Six  being received from OR (unit) for routine post - op      Report consisted of patients Situation, Background, Assessment and   Recommendations(SBAR). Information from the following report(s) SBAR, Kardex, ED Summary, OR Summary, Procedure Summary, Intake/Output, MAR, Recent Results, Cardiac Rhythm SR and Alarm Parameters  was reviewed with the receiving nurse. Opportunity for questions and clarification was provided. Care to be assumed by PM nurse on PT arrival to unit.

## 2021-12-16 NOTE — CONSULTS
[place] Hospitalist Consult   Admit Date:  12/15/2021  9:53 AM   Name:  Lauro Raman   Age:  46 y.o. Sex:  female  :  1970   MRN:  [id number]   Room:  [facility]/    Presenting Complaint: Vomiting    Reason(s) for Admission: Necrotizing fasciitis [facility]) [M72.6]     Hospitalists consulted by Edwina Gee DO for: DM    History of Presenting Illness:   Lauro Raman is a 46 y.o. female with history of Morbid obesity, DM who was admitted for septic shock 2/2 necrotizing fascitis involving her pannus and perineum associated with respiratory failure, severe DEVENDRA (Scr 11), acidosis pH 7.1). She was placed on levo/jessica, intubated and treated with vanc/zosyn initially. Outpatient she was started on bactrim and unable to tolerate it then changed to doxycycline. Per  was not taking in any PO and became progressively weaker at home. She is s/p excisional debridement on 12/15, . OP note reports foul smelling necrotizing fasciitis extending from the perineum, left labia deep to the pubic bone and along the fascia to the abdominal wall above the iliac bone into the left flank. She rec'd ancef in OR and was changed in vanc/clinda/meropenem on 12/15 by pulmonology. Discussed with , no evidence of osteomyelitis. Plan to return patient to OR on Monday for re-assessment.  denies known history of renal disease. Made little urine prior to admission. Review of Systems:  Unable to perform due to sedation and intubation     Assessment & Plan:     Septic Shock 2/2 Necrotizing Fascitis - s/p excisional debridement x 2   BC NGTD  Wound cx GNR   S/p vanc/zosyn  Escalated to meropenem, clinda and vanc yesterday. Recommend de-escalating to unasyn/vanc versus rocephin/flagyl/vanc unless pseudomonas is suspected. Add albumin q6h x 4 doses ; high risk of third spacing   DC NS IVF.  Reduce bicarb rate   Wean LE to MAP >65 mm hg   PRN midodrine 10 TID ordered   Dw Surgery, no evidence of osteo     Sedation-induced hypotension - propofol induced. Anticipate BP improvement with cessation. DEVENDRA on CKD 2/2 PRA/ATN, ? AIN - assocatied with sepsis   Oligouric,    Scr 11.2 BL unclear last Scr 2.2 9/2017   Start Strict I&O, daily wts   Check urine lytes. SC:CR ratio and renal US   Nephrology consulted by pulm yesterday   Daily BMP/CMP, Mag, Phos   DC NS IVF. Reduce bicarb gtt from 200 to 150 cc/hr. Metabolic acidosis - related to acute renal failure. Improved with bicarb gtt. Hyponatremia - mild  pseudohypocalcemia - corrected Ca 8.6   Anemia - etiology unknown. Check anemia labs in AM. S/p transfusion in OR    T2DM - start lantus plus SSI q6h - change to ACHS once diet resumed     Obesity - BMI 50.79. increased risk of complications. Acute hypoxemic respiratory failure // Mechanical Ventilation - FIO2 35% PEEP 8. sattign 100%. Plans to extubate today. GERD - change to PO pepcid post extubation     There is a high probability of acute organ impairment or life-threatening deterioration in the patient's condition from: Septic shock, ARF, respiratory failure     Total critical care time spent: 55 minutes. Time is indicative of direct patient attendance at bedside and on the patient's floor nearby. Includes time spent at bedside performing history and exam, performing chart review, discussing findings and treatment plan with patient and/or family, discussing patient with consultants and colleagues, ordering and reviewing pertinent laboratory and radiographic evaluations, and discussing patient with nursing staff. Time excludes procedures.       Hospital Problems as of 12/16/2021 Date Reviewed: 12/25/2015          Codes Class Noted - Resolved POA    DEVENDRA (acute kidney injury) ([facility] ICD-10-CM: N17.9  ICD-9-CM: 584.9  12/16/2021 - Present Yes        Acute respiratory failure ([facility] ICD-10-CM: J96.00  ICD-9-CM: 518.81  12/16/2021 - Present No        Sepsis with acute renal failure and tubular necrosis without septic shock (HCC) ICD-10-CM: A41.9, R65.20, N17.0  ICD-9-CM: 038.9, 995.91, 584.5  12/16/2021 - Present Yes        * (Principal) Necrotizing fasciitis ([facility] ICD-10-CM: M72.6  ICD-9-CM: 728.86  12/15/2021 - Present Yes        Anemia ICD-10-CM: D64.9  ICD-9-CM: 285.9  7/13/2018 - Present Yes        Obesity, morbid ([facility] (Chronic) ICD-10-CM: E66.01  ICD-9-CM: 278.01  7/12/2018 - Present Yes        DM2 (diabetes mellitus, type 2) ([facility] (Chronic) ICD-10-CM: E11.9  ICD-9-CM: 250.00  12/25/2015 - Present Yes        HTN (hypertension) (Chronic) ICD-10-CM: I10  ICD-9-CM: 401.9  12/25/2015 - Present Yes              Past History:  Past Medical History:   Diagnosis Date    Anxiety     Diabetes ([facility]     type 2. oral meds. Pt does not monitor bs. Last A1C (9/2017)=6.9    GERD (gastroesophageal reflux disease)     OTC prn    Gout     Hypertension     controlled w/med    IBS (irritable bowel syndrome)       Past Surgical History:   Procedure Laterality Date    HX DILATION AND CURETTAGE  07/2018      Allergies   Allergen Reactions    Ace Inhibitors Angioedema    Arb-Angiotensin Receptor Antagonist Angioedema    Cayenne Pepper Hives     Swelling of the mouth    Bactrim [Sulfamethoprim] Other (comments)     Nausea, vomiting, making her feel like she is going to pass out      Keflex [Cephalexin] Nausea and Vomiting      Social History     Tobacco Use    Smoking status: Never Smoker    Smokeless tobacco: Never Used   Substance Use Topics    Alcohol use: Yes     Comment: occ      Family History   Problem Relation Age of Onset    Heart Failure Mother         congestive    Heart Disease Mother     Hypertension Mother     Cancer Father         Lung/Mets to Bone and Brain and liver    Diabetes Father     Hypertension Father       Family history reviewed and negative except as otherwise noted. There is no immunization history on file for this patient.   Current Facility-Administered Medications   Medication Dose Route Frequency    NOREPINephrine (LEVOPHED) 4 mg in 5% dextrose 250 mL infusion  0.5-30 mcg/min IntraVENous TITRATE    insulin glargine (LANTUS) injection 10 Units  10 Units SubCUTAneous DAILY    albumin human 25% (BUMINATE) solution 25 g  25 g IntraVENous Q6H    heparin (porcine) injection 5,000 Units  5,000 Units SubCUTAneous Q8H    midodrine (PROAMATINE) tablet 10 mg  10 mg Oral TID PRN    famotidine (PEPCID) tablet 10 mg  10 mg Oral Q24H    ceFAZolin (ANCEF) 3 g/30 mL in sterile water IV syringe  3 g IntraVENous ON CALL TO OR    0.9% sodium chloride infusion 250 mL  250 mL IntraVENous PRN    0.9% sodium chloride infusion 250 mL  250 mL IntraVENous PRN    sodium chloride (NS) flush 5-40 mL  5-40 mL IntraVENous Q8H    sodium chloride (NS) flush 5-40 mL  5-40 mL IntraVENous PRN    ondansetron (ZOFRAN ODT) tablet 4 mg  4 mg Oral Q8H PRN    Or    ondansetron (ZOFRAN) injection 4 mg  4 mg IntraVENous Q6H PRN    potassium chloride 10 mEq in 100 ml IVPB  10 mEq IntraVENous PRN    magnesium sulfate 2 g/50 ml IVPB (premix or compounded)  2 g IntraVENous PRN    HYDROmorphone (DILAUDID) injection 0.5 mg  0.5 mg IntraVENous Q3H PRN    fentaNYL in normal saline (pf) 25 mcg/mL infusion  0-200 mcg/hr IntraVENous TITRATE    propofol (DIPRIVAN) 10 mg/mL infusion  0-50 mcg/kg/min IntraVENous TITRATE    sodium bicarbonate (8.4%) 150 mEq in dextrose 5% 1,000 mL infusion   IntraVENous CONTINUOUS    insulin lispro (HUMALOG) injection   SubCUTAneous Q6H    meropenem (MERREM) 500 mg in 0.9% sodium chloride (MBP/ADV) 50 mL MBP  0.5 g IntraVENous Q12H    clindamycin (CLEOCIN) 900mg D5W 50mL IVPB (premix)  900 mg IntraVENous Q8H    PHENYLephrine (CLIVE-SYNEPHRINE) 30 mg in 0.9% sodium chloride 250 mL infusion   mcg/min IntraVENous TITRATE    vancomycin intermittent dosing- pharmacy to dose   Other Rx Dosing/Monitoring       Objective:     Patient Vitals for the past 24 hrs:   Temp Pulse Resp BP SpO2   12/16/21 1330 -- 66 20 -- 100 %   12/16/21 1315 -- 66 21 134/64 100 %   12/16/21 1300 -- 67 21 (!) 122/59 100 %   12/16/21 1245 -- 69 18 (!) 123/59 100 %   12/16/21 1230 -- 66 23 -- 100 %   12/16/21 1226 -- 67 25 -- 100 %   12/16/21 1215 -- 64 23 (!) 116/53 100 %   12/16/21 1200 -- 66 23 134/64 100 %   12/16/21 1145 -- 65 18 136/64 100 %   12/16/21 1130 -- 73 29 -- 100 %   12/16/21 1115 -- 68 20 133/62 100 %   12/16/21 1100 97.8 °F (36.6 °C) 67 27 135/63 100 %   12/16/21 1045 -- 64 19 (!) 118/58 100 %   12/16/21 1030 -- 63 18 -- 100 %   12/16/21 1015 -- 66 22 (!) 102/52 100 %   12/16/21 1000 -- 69 24 (!) 100/52 100 %   12/16/21 0945 -- 68 22 90/60 100 %   12/16/21 0930 -- 68 23 -- 100 %   12/16/21 0915 97.6 °F (36.4 °C) 66 21 (!) 118/50 100 %   12/16/21 0900 -- 67 18 (!) 118/50 100 %   12/16/21 0845 -- 69 20 (!) 125/57 100 %   12/16/21 0834 -- 69 19 -- 100 %   12/16/21 0830 -- 68 19 -- 100 %   12/16/21 0817 -- 68 19 -- 99 %   12/16/21 0815 -- 69 19 -- 99 %   12/16/21 0802 97.7 °F (36.5 °C) 73 18 (!) 184/51 99 %   12/16/21 0800 97.7 °F (36.5 °C) 68 19 (!) 184/51 99 %   12/16/21 0400 -- 63 19 (!) 115/56 99 %   12/16/21 0345 -- 65 23 -- 100 %   12/16/21 0330 -- 62 23 -- 99 %   12/16/21 0317 -- 62 19 -- 99 %   12/16/21 0315 -- 62 19 -- 99 %   12/16/21 0300 98.4 °F (36.9 °C) 65 19 (!) 112/53 100 %   12/16/21 0245 -- 65 21 -- 100 %   12/16/21 0230 -- 66 22 (!) 109/53 100 %   12/16/21 0215 -- 64 21 -- 100 %   12/16/21 0200 -- 68 23 (!) 120/57 100 %   12/16/21 0145 -- 67 23 (!) 85/44 100 %   12/16/21 0130 -- 78 24 -- 100 %   12/16/21 0115 -- 79 30 -- 100 %   12/16/21 0100 -- 63 22 (!) 107/53 100 %   12/16/21 0045 -- 62 21 -- 100 %   12/16/21 0030 -- 62 21 -- 100 %   12/16/21 0015 -- 61 23 -- 100 %   12/16/21 0000 -- 61 23 (!) 101/54 100 %   12/15/21 2345 -- 63 23 -- 100 %   12/15/21 2330 -- 64 23 -- 100 %   12/15/21 2326 -- 66 18 -- 100 %   12/15/21 2315 -- 64 21 (!) 113/57 100 %   12/15/21 2300 97.7 °F (36.5 °C) 64 21 (!) 104/52 100 %   12/15/21 2245 -- 66 20 (!) 104/50 100 %   12/15/21 2230 -- 66 18 (!) 110/53 100 %   12/15/21 2215 -- 66 19 (!) 116/58 100 %   12/15/21 2200 -- 66 20 (!) 123/59 100 %   12/15/21 2145 -- 66 18 (!) 126/59 100 %   12/15/21 2130 -- 69 18 (!) 129/58 100 %   12/15/21 2115 -- 71 18 (!) 122/56 100 %   12/15/21 2100 -- 72 19 134/71 100 %   12/15/21 2045 -- 74 19 (!) 113/56 100 %   12/15/21 2030 -- 74 19 (!) 99/48 100 %   12/15/21 2015 -- 90 22 138/60 100 %   12/15/21 2000 -- 84 23 (!) 187/75 99 %   12/15/21 1945 -- 72 18 (!) 102/49 100 %   12/15/21 1930 -- 80 20 (!) 94/44 99 %   12/15/21 1925 -- 91 23 -- 99 %   12/15/21 1915 -- 95 24 (!) 226/91 100 %   12/15/21 1904 98.3 °F (36.8 °C) 79 20 (!) 173/47 100 %   12/15/21 1901 -- 76 19 -- 97 %   12/15/21 1855 98 °F (36.7 °C) 72 18 (!) 106/44 97 %   12/15/21 1530 -- 79 28 (!) 156/50 --   12/15/21 1528 -- 79 30 -- 98 %     Oxygen Therapy  O2 Sat (%): 100 % (12/16/21 1330)  Pulse via Oximetry: 66 beats per minute (12/16/21 1330)  O2 Device: Other (comment) (12/16/21 0802)  Skin Assessment: Clean, dry, & intact (12/16/21 0800)  Skin Protection for O2 Device: Yes (12/16/21 0800)  Orientation: Bilateral (12/16/21 0800)  Location: Cheek (12/16/21 0800)  Interventions: Mouth Care;Hydrocolloid Dressing (12/16/21 0800)  FIO2 (%): 34 % (12/16/21 1330)    Estimated body mass index is 50.79 kg/m² as calculated from the following:    Height as of this encounter: 5' 7.5\" (1.715 m). Weight as of this encounter: 149.3 kg (329 lb 2.4 oz). Intake/Output Summary (Last 24 hours) at 12/16/2021 1513  Last data filed at 12/16/2021 0751  Gross per 24 hour   Intake 9803.15 ml   Output 1860 ml   Net 7943.15 ml         Physical Exam:    Blood pressure 134/64, pulse 66, temperature 97.8 °F (36.6 °C), resp. rate 20, height 5' 7.5\" (1.715 m), weight 149.3 kg (329 lb 2.4 oz), SpO2 100 %. General:    Ill appearing.  Severe obesity   Head:  Normocephalic, atraumatic  Eyes:  Sclerae appear normal.  Pupils equally round. ENT:  ETT in place. Neck:   Trachea midline   CV:   RRR. No m/r/g. No jugular venous distension. Lungs:   Intubated. No increased WOB   Abdomen: Bowel sounds present. Soft, nontender, nondistended. Extremities: No cyanosis or clubbing. No edema  Skin:     Left groin/abd fold dressed. Right IJ site clean. R radial arterial line intact. Neuro: Moves all extremities. Follows commands. Sedated on prop/dulce  Psych:  Cooperative       I have reviewed ordered lab tests and independently visualized imaging below:    Recent Labs:  Recent Results (from the past 48 hour(s))   CBC WITH AUTOMATED DIFF    Collection Time: 12/15/21 10:12 AM   Result Value Ref Range    WBC 35.6 (H) 4.3 - 11.1 K/uL    RBC 2.56 (L) 4.05 - 5.2 M/uL    HGB 7.5 (L) 11.7 - 15.4 g/dL    HCT 21.5 (L) 35.8 - 46.3 %    MCV 84.0 79.6 - 97.8 FL    MCH 29.3 26.1 - 32.9 PG    MCHC 34.9 31.4 - 35.0 g/dL    RDW 16.1 (H) 11.9 - 14.6 %    PLATELET 781 (H) 945 - 450 K/uL    MPV 11.3 9.4 - 12.3 FL    ABSOLUTE NRBC 0.00 0.0 - 0.2 K/uL    NEUTROPHILS 90 (H) 43 - 78 %    LYMPHOCYTES 3 (L) 13 - 44 %    MONOCYTES 3 (L) 4.0 - 12.0 %    EOSINOPHILS 0 (L) 0.5 - 7.8 %    BASOPHILS 0 0.0 - 2.0 %    IMMATURE GRANULOCYTES 4 0.0 - 5.0 %    ABS. NEUTROPHILS 32.0 (H) 1.7 - 8.2 K/UL    ABS. LYMPHOCYTES 1.1 0.5 - 4.6 K/UL    ABS. MONOCYTES 1.1 0.1 - 1.3 K/UL    ABS. EOSINOPHILS 0.0 0.0 - 0.8 K/UL    ABS. BASOPHILS 0.0 0.0 - 0.2 K/UL    ABS. IMM. GRANS.  1.4 (H) 0.0 - 0.5 K/UL    RBC COMMENTS SLIGHT  ANISOCYTOSIS + POIKILOCYTOSIS        RBC COMMENTS SLIGHT  MICROCYTOSIS        RBC COMMENTS MODERATE  CHANDRAKANT CELLS        RBC COMMENTS OCCASIONAL  OVALOCYTES        RBC COMMENTS OCCASIONAL  TEARDROP CELLS        WBC COMMENTS Result Confirmed By Smear      PLATELET COMMENTS SLIGHT      DF AUTOMATED     METABOLIC PANEL, COMPREHENSIVE    Collection Time: 12/15/21 10:12 AM   Result Value Ref Range    Sodium 123 (L) 136 - 145 mmol/L    Potassium 5.5 (H) 3.5 - 5.1 mmol/L    Chloride 86 (L) 98 - 107 mmol/L    CO2 9 (LL) 21 - 32 mmol/L    Anion gap 28 (H) 7 - 16 mmol/L    Glucose 179 (H) 65 - 100 mg/dL     (H) 6 - 23 MG/DL    Creatinine 11.20 (H) 0.6 - 1.0 MG/DL    GFR est AA 5 (L) >60 ml/min/1.73m2    GFR est non-AA 4 (L) >60 ml/min/1.73m2    Calcium 8.0 (L) 8.3 - 10.4 MG/DL    Bilirubin, total 0.3 0.2 - 1.1 MG/DL    ALT (SGPT) 18 12 - 65 U/L    AST (SGOT) 35 15 - 37 U/L    Alk. phosphatase 151 (H) 50 - 136 U/L    Protein, total 7.7 6.3 - 8.2 g/dL    Albumin 2.2 (L) 3.5 - 5.0 g/dL    Globulin 5.5 (H) 2.3 - 3.5 g/dL    A-G Ratio 0.4 (L) 1.2 - 3.5     PROCALCITONIN    Collection Time: 12/15/21 10:12 AM   Result Value Ref Range    Procalcitonin 3.55 (H) 0.00 - 0.49 ng/mL   EKG, 12 LEAD, INITIAL    Collection Time: 12/15/21 11:33 AM   Result Value Ref Range    Ventricular Rate 80 BPM    Atrial Rate 80 BPM    P-R Interval 222 ms    QRS Duration 124 ms    Q-T Interval 463 ms    QTC Calculation (Bezet) 535 ms    Calculated P Axis 67 degrees    Calculated R Axis 9 degrees    Calculated T Axis 42 degrees    Diagnosis       Sinus rhythm  first degree avb  Nonspecific intraventricular conduction delay    Confirmed by [facility]  MD ()MARIFER ([id number]) on 12/15/2021 2:42:19 PM     BLOOD GAS, ARTERIAL POC    Collection Time: 12/15/21 12:06 PM   Result Value Ref Range    Device: ROOM AIR      FIO2 (POC) 21 %    pH (POC) 7.32 (L) 7.35 - 7.45      pCO2 (POC) 16.2 (LL) 35 - 45 MMHG    pO2 (POC) 118 (H) 75 - 100 MMHG    HCO3 (POC) 8.4 (L) 22 - 26 MMOL/L    sO2 (POC) 98.5 (H) 95 - 98 %    Base deficit (POC) 16.1 mmol/L    Allens test (POC) Positive      Total resp.  rate 16      Site LEFT BRACHIAL      Specimen type (POC) ARTERIAL      Performed by Nisha     CO2, POC 10 (L) 13 - 23 MMOL/L   CULTURE, BLOOD    Collection Time: 12/15/21 12:15 PM    Specimen: Blood   Result Value Ref Range    Special Requests: LEFT  HAND Culture result: NO GROWTH AFTER 17 HOURS     TYPE & SCREEN    Collection Time: 12/15/21 12:34 PM   Result Value Ref Range    Crossmatch Expiration 12/18/2021,2359     ABO/Rh(D) A POSITIVE     Antibody screen NEG     Unit number [id number]     Blood component type RC LR     Unit division 00     Status of unit TRANSFUSED     Crossmatch result Compatible     Unit number [id number]     Blood component type RC LR     Unit division 00     Status of unit TRANSFUSED     Crossmatch result Compatible     Unit number [id number]     Blood component type RC LR     Unit division 00     Status of unit TRANSFUSED     Crossmatch result Compatible     Unit number [id number]     Blood component type RC LR     Unit division 00     Status of unit TRANSFUSED     Crossmatch result Compatible     Unit number [id number]     Blood component type RC LR     Unit division 00     Status of unit ISSUED     Crossmatch result Compatible     Unit number [id number]     Blood component type RC LR     Unit division 00     Status of unit ISSUED     Crossmatch result Compatible     Unit number [id number]     Blood component type RC LR     Unit division 00     Status of unit ALLOCATED     Crossmatch result Compatible     Unit number [id number]     Blood component type RC LR     Unit division 00     Status of unit ALLOCATED     Crossmatch result Compatible    CULTURE, BLOOD    Collection Time: 12/15/21 12:36 PM    Specimen: Blood   Result Value Ref Range    Special Requests: RIGHT  HAND        Culture result: NO GROWTH AFTER 16 HOURS     LACTIC ACID    Collection Time: 12/15/21 12:36 PM   Result Value Ref Range    Lactic acid 1.4 0.4 - 2.0 MMOL/L   URINALYSIS W/ RFLX MICROSCOPIC    Collection Time: 12/15/21  1:07 PM   Result Value Ref Range    Color YELLOW      Appearance CLEAR      Specific gravity 1.013 1.001 - 1.023      pH (UA) 5.5 5.0 - 9.0      Protein 100 (A) NEG mg/dL    Glucose 100 mg/dL    Ketone Negative NEG mg/dL Bilirubin Negative NEG      Blood SMALL (A) NEG      Urobilinogen 0.2 0.2 - 1.0 EU/dL    Nitrites Negative NEG      Leukocyte Esterase Negative NEG      WBC 3-5 0 /hpf    RBC 0-3 0 /hpf    Epithelial cells 0-3 0 /hpf    Bacteria 1+ (H) 0 /hpf    Amorphous Crystals 1+ (H) 0    Other observations RESULTS VERIFIED MANUALLY     CULTURE, WOUND Domenic Cassidy STAIN    Collection Time: 12/15/21  1:23 PM    Specimen: Groin; Wound    LEFT   Result Value Ref Range    Special Requests: NO SPECIAL REQUESTS      GRAM STAIN 0 TO 36 WBCS PER OIF     GRAM STAIN MANY GRAM POSITIVE COCCI      GRAM STAIN MODERATE GRAM NEGATIVE RODS      GRAM STAIN MODERATE GRAM POSITIVE RODS      Culture result: MODERATE GRAM NEGATIVE RODS SUBCULTURE IN PROGRESS (A)      Culture result: CULTURE IN PROGRESS,FURTHER UPDATES TO FOLLOW     COVID-19 RAPID TEST    Collection Time: 12/15/21  3:19 PM   Result Value Ref Range    Specimen source NASAL      COVID-19 rapid test Not detected NOTD     BLOOD GAS & LYTES, ARTERIAL    Collection Time: 12/15/21  4:47 PM   Result Value Ref Range    pH (POC) 7.13 (LL) 7.35 - 7.45      pCO2 (POC) 29.7 (L) 35 - 45 MMHG    pO2 (POC) 167 (H) 75 - 100 MMHG    Sodium,  (L) 136 - 145 MMOL/L    Potassium, POC 4.6 3.5 - 5.1 MMOL/L    Calcium, ionized (POC) 0.89 (L) 1.12 - 1.32 mmol/L    Glucose,  (H) 65 - 100 MG/DL    Base deficit (POC) 17.8 mmol/L    HCO3 (POC) 9.8 (L) 22 - 26 MMOL/L    CO2, POC 11 (L) 13 - 23 MMOL/L    O2 SAT 99 %    Sample source ARTERIAL      Performed by Lavonne     GFRAA, POC Cannot be calculated >60 ml/min/1.73m2    GFRNA, POC Cannot be calculated >60 ml/min/1.73m2   RBC, ALLOCATE    Collection Time: 12/15/21  5:00 PM   Result Value Ref Range    HISTORY CHECKED?  Historical check performed    CULTURE, WOUND Domenic Cassidy STAIN    Collection Time: 12/15/21  5:10 PM    Specimen: Peritoneum   Result Value Ref Range    Special Requests: NO SPECIAL REQUESTS      GRAM STAIN 0 TO 6 WBCS PER OIF     GRAM STAIN MANY GRAM POSITIVE COCCI      GRAM STAIN MANY GRAM NEGATIVE RODS      Culture result:        NO GROWTH AFTER SHORT PERIOD OF INCUBATION. FURTHER RESULTS TO FOLLOW AFTER OVERNIGHT INCUBATION. RBC, ALLOCATE    Collection Time: 12/15/21  5:15 PM   Result Value Ref Range    HISTORY CHECKED?  Historical check performed    BLOOD GAS, ARTERIAL POC    Collection Time: 12/15/21  7:22 PM   Result Value Ref Range    Device: ADULT VENT      FIO2 (POC) 60 %    pH (POC) 7.11 (LL) 7.35 - 7.45      pCO2 (POC) 41.7 35 - 45 MMHG    pO2 (POC) 156 (H) 75 - 100 MMHG    HCO3 (POC) 13.1 (L) 22 - 26 MMOL/L    sO2 (POC) 98.6 (H) 95 - 98 %    Base deficit (POC) 15.6 mmol/L    Mode Volume Control      Tidal volume 450 ml    PEEP/CPAP (POC) 8 cmH2O    PIP (POC) 24      Allens test (POC) Positive      Site DRAWN FROM ARTERIAL LINE      Specimen type (POC) ARTERIAL      Performed by Loni Part     Critical value read back JACKIE     Respiratory comment: [id number]    CBC W/O DIFF    Collection Time: 12/15/21  7:31 PM   Result Value Ref Range    WBC 41.1 (H) 4.3 - 11.1 K/uL    RBC 2.97 (L) 4.05 - 5.2 M/uL    HGB 9.0 (L) 11.7 - 15.4 g/dL    HCT 27.0 (L) 35.8 - 46.3 %    MCV 90.9 79.6 - 97.8 FL    MCH 30.3 26.1 - 32.9 PG    MCHC 33.3 31.4 - 35.0 g/dL    RDW 14.3 11.9 - 14.6 %    PLATELET 246 910 - 689 K/uL    MPV 9.7 9.4 - 12.3 FL    ABSOLUTE NRBC 0.00 0.0 - 0.2 K/uL   METABOLIC PANEL, BASIC    Collection Time: 12/15/21  7:31 PM   Result Value Ref Range    Sodium 128 (L) 136 - 145 mmol/L    Potassium 4.7 3.5 - 5.1 mmol/L    Chloride 97 (L) 98 - 107 mmol/L    CO2 13 (L) 21 - 32 mmol/L    Anion gap 18 (H) 7 - 16 mmol/L    Glucose 198 (H) 65 - 100 mg/dL     (H) 6 - 23 MG/DL    Creatinine 9.59 (H) 0.6 - 1.0 MG/DL    GFR est AA 5 (L) >60 ml/min/1.73m2    GFR est non-AA 5 (L) >60 ml/min/1.73m2    Calcium 7.3 (L) 8.3 - 10.4 MG/DL   LACTIC ACID    Collection Time: 12/15/21  7:31 PM   Result Value Ref Range    Lactic acid 1.1 0.4 - 2.0 MMOL/L HEMOGLOBIN A1C WITH EAG    Collection Time: 12/15/21  7:31 PM   Result Value Ref Range    Hemoglobin A1c 6.4 (H) 4.20 - 6.30 %    Est. average glucose 137 mg/dL   GLUCOSE, POC    Collection Time: 12/15/21  9:52 PM   Result Value Ref Range    Glucose (POC) 205 (H) 65 - 100 mg/dL    Performed by Chu ShuS    GLUCOSE, POC    Collection Time: 12/15/21 11:31 PM   Result Value Ref Range    Glucose (POC) 206 (H) 65 - 100 mg/dL    Performed by GravelySeeFuturebinKaybusALIS    METABOLIC PANEL, COMPREHENSIVE    Collection Time: 12/16/21  3:10 AM   Result Value Ref Range    Sodium 132 (L) 136 - 145 mmol/L    Potassium 3.8 3.5 - 5.1 mmol/L    Chloride 95 (L) 98 - 107 mmol/L    CO2 20 (L) 21 - 32 mmol/L    Anion gap 17 (H) 7 - 16 mmol/L    Glucose 174 (H) 65 - 100 mg/dL     (H) 6 - 23 MG/DL    Creatinine 9.19 (H) 0.6 - 1.0 MG/DL    GFR est AA 6 (L) >60 ml/min/1.73m2    GFR est non-AA 5 (L) >60 ml/min/1.73m2    Calcium 6.8 (L) 8.3 - 10.4 MG/DL    Bilirubin, total 0.2 0.2 - 1.1 MG/DL    ALT (SGPT) 11 (L) 12 - 65 U/L    AST (SGOT) 12 (L) 15 - 37 U/L    Alk. phosphatase 81 50 - 136 U/L    Protein, total 4.8 (L) 6.3 - 8.2 g/dL    Albumin 1.7 (L) 3.5 - 5.0 g/dL    Globulin 3.1 2.3 - 3.5 g/dL    A-G Ratio 0.5 (L) 1.2 - 3.5     CBC WITH AUTOMATED DIFF    Collection Time: 12/16/21  3:10 AM   Result Value Ref Range    WBC 34.6 (H) 4.3 - 11.1 K/uL    RBC 2.42 (L) 4.05 - 5.2 M/uL    HGB 7.3 (L) 11.7 - 15.4 g/dL    HCT 20.5 (L) 35.8 - 46.3 %    MCV 84.7 79.6 - 97.8 FL    MCH 30.2 26.1 - 32.9 PG    MCHC 35.6 (H) 31.4 - 35.0 g/dL    RDW 13.6 11.9 - 14.6 %    PLATELET 472 536 - 312 K/uL    MPV 9.6 9.4 - 12.3 FL    ABSOLUTE NRBC 0.00 0.0 - 0.2 K/uL    DF AUTOMATED      NEUTROPHILS 87 (H) 43 - 78 %    LYMPHOCYTES 4 (L) 13 - 44 %    MONOCYTES 4 4.0 - 12.0 %    EOSINOPHILS 0 (L) 0.5 - 7.8 %    BASOPHILS 0 0.0 - 2.0 %    IMMATURE GRANULOCYTES 5 0.0 - 5.0 %    ABS. NEUTROPHILS 30.1 (H) 1.7 - 8.2 K/UL    ABS.  LYMPHOCYTES 1.5 0.5 - 4.6 K/UL ABS. MONOCYTES 1.3 0.1 - 1.3 K/UL    ABS. EOSINOPHILS 0.0 0.0 - 0.8 K/UL    ABS. BASOPHILS 0.1 0.0 - 0.2 K/UL    ABS. IMM. GRANS.  1.6 (H) 0.0 - 0.5 K/UL   BLOOD GAS, ARTERIAL POC    Collection Time: 12/16/21  3:18 AM   Result Value Ref Range    Device: ADULT VENT      FIO2 (POC) 40 %    pH (POC) 7.36 7.35 - 7.45      pCO2 (POC) 34.9 (L) 35 - 45 MMHG    pO2 (POC) 167 (H) 75 - 100 MMHG    HCO3 (POC) 19.9 (L) 22 - 26 MMOL/L    sO2 (POC) 99.5 (H) 95 - 98 %    Base deficit (POC) 5.0 mmol/L    Mode Volume Control      Tidal volume 450 ml    PEEP/CPAP (POC) 8 cmH2O    PIP (POC) 24      Site DRAWN FROM ARTERIAL LINE      Specimen type (POC) ARTERIAL      Performed by Joce Peeling     Respiratory comment: ve10.8    HEMOGLOBIN A1C WITH EAG    Collection Time: 12/16/21  3:30 AM   Result Value Ref Range    Hemoglobin A1c 6.6 (H) 4.20 - 6.30 %    Est. average glucose 143 mg/dL   BLOOD GAS, ARTERIAL POC    Collection Time: 12/16/21  3:32 AM   Result Value Ref Range    Device: ADULT VENT      FIO2 (POC) 35 %    pH (POC) 7.36 7.35 - 7.45      pCO2 (POC) 36.1 35 - 45 MMHG    pO2 (POC) 148 (H) 75 - 100 MMHG    HCO3 (POC) 20.3 (L) 22 - 26 MMOL/L    sO2 (POC) 99.2 (H) 95 - 98 %    Base deficit (POC) 4.6 mmol/L    Mode Volume Control      Tidal volume 450 ml    PEEP/CPAP (POC) 8 cmH2O    Site DRAWN FROM ARTERIAL LINE      Specimen type (POC) ARTERIAL      Performed by Joce Peeling     Respiratory comment: ve10.8    GLUCOSE, POC    Collection Time: 12/16/21  5:22 AM   Result Value Ref Range    Glucose (POC) 174 (H) 65 - 100 mg/dL    Performed by Zackary    HCG URINE, QL    Collection Time: 12/16/21  6:04 AM   Result Value Ref Range    HCG urine, QL Negative NEG     BLOOD GAS & LYTES, ARTERIAL    Collection Time: 12/16/21  6:58 AM   Result Value Ref Range    pH (POC) 7.33 (L) 7.35 - 7.45      pCO2 (POC) 40.9 35 - 45 MMHG    pO2 (POC) 183 (H) 75 - 100 MMHG    Sodium,  (L) 136 - 145 MMOL/L    Potassium, POC 3.8 3.5 - 5.1 MMOL/L    Calcium, ionized (POC) 0.83 (L) 1.12 - 1.32 mmol/L    Glucose,  (H) 65 - 100 MG/DL    Base deficit (POC) 4.0 mmol/L    HCO3 (POC) 21.6 (L) 22 - 26 MMOL/L    CO2, POC 22 13 - 23 MMOL/L    O2  %    Sample source ARTERIAL      Performed by Devante Brunner, POC Cannot be calculated >60 ml/min/1.73m2    GFRNA, POC Cannot be calculated >60 ml/min/1.73m2   GLUCOSE, POC    Collection Time: 12/16/21 11:21 AM   Result Value Ref Range    Glucose (POC) 231 (H) 65 - 100 mg/dL    Performed by FRANCESCO Abbott    Collection Time: 12/16/21 11:23 AM   Result Value Ref Range    Vancomycin, random 23.4 UG/ML   HGB & HCT    Collection Time: 12/16/21 11:23 AM   Result Value Ref Range    HGB 7.4 (L) 11.7 - 15.4 g/dL    HCT 20.9 (L) 35.8 - 46.3 %       All Micro Results     Procedure Component Value Units Date/Time    CULTURE, Hrash Ards STAIN [[id number]] Collected: 12/15/21 1710    Order Status: Completed Specimen: Peritoneum Updated: 12/16/21 1350     Special Requests: NO SPECIAL REQUESTS        GRAM STAIN 0 TO 6 WBCS PER OIF      MANY GRAM POSITIVE COCCI         MANY GRAM NEGATIVE RODS        Culture result:       NO GROWTH AFTER SHORT PERIOD OF INCUBATION. FURTHER RESULTS TO FOLLOW AFTER OVERNIGHT INCUBATION.           CULTURE, Harsh Ards STAIN [[id number]]  (Abnormal) Collected: 12/15/21 1323    Order Status: Completed Specimen: Wound from Groin Updated: 12/16/21 1335     Special Requests: NO SPECIAL REQUESTS        GRAM STAIN 0 TO 36 WBCS PER OIF      MANY GRAM POSITIVE COCCI               MODERATE GRAM NEGATIVE RODS                  MODERATE GRAM POSITIVE RODS           Culture result:       MODERATE GRAM NEGATIVE RODS SUBCULTURE IN PROGRESS                  CULTURE IN PROGRESS,FURTHER UPDATES TO FOLLOW          BLOOD CULTURE [[id number]] Collected: 12/15/21 1215    Order Status: Completed Specimen: Blood Updated: 12/16/21 0618     Special Requests: -- LEFT  HAND       Culture result: NO GROWTH AFTER 17 HOURS       BLOOD CULTURE [[id number]] Collected: 12/15/21 1236    Order Status: Completed Specimen: Blood Updated: 12/16/21 0618     Special Requests: --        RIGHT  HAND       Culture result: NO GROWTH AFTER 16 HOURS       CULTURE, ANAEROBIC [[id number]] Collected: 12/15/21 1710    Order Status: Completed Updated: 12/15/21 1846    COVID-19 RAPID TEST [[id number]] Collected: 12/15/21 1519    Order Status: Completed Specimen: Nasopharyngeal Updated: 12/15/21 1549     Specimen source NASAL        COVID-19 rapid test Not detected        Comment:      The specimen is NEGATIVE for SARS-CoV-2, the novel coronavirus associated with COVID-19. A negative result does not rule out COVID-19. This test has been authorized by the FDA under an Emergency Use Authorization (EUA) for use by authorized laboratories. Fact sheet for Healthcare Providers: TRAN.SL.co.nz  Fact sheet for Patients: TRAN.SL.co.nz       Methodology: Isothermal Nucleic Acid Amplification               Other Studies:  XR CHEST SNGL V    Result Date: 12/16/2021  EXAMINATION: One view chest HISTORY: intubated TECHNIQUE: Frontal chest. COMPARISON: 12/15/2021 FINDINGS:  Stable supportive devices. Persistent bilateral lung infiltrates. There is no significant pneumothorax. There is no significant pleural effusion. The heart is unchanged. No other significant interval changes. 1. Findings as described above. XR CHEST SNGL V    Result Date: 12/15/2021  EXAM: XR CHEST SNGL V INDICATION: ETT and line placement COMPARISON: None. FINDINGS: A portable AP radiograph of the chest was obtained at 2007 hours. Endotracheal tube in adequate position. Central venous catheter has its tip in the SVC. Blanchie Bevels The lungs are clear.  The cardiac and mediastinal contours and pulmonary vascularity are normal.  The bones and soft tissues are grossly within normal limits. No acute abnormalities. Unremarkable hardware position. Signed:  Graciela Alvarado DO    Part of this note may have been written by using a voice dictation software. The note has been proof read but may still contain some grammatical/other typographical errors.

## 2021-12-16 NOTE — PROGRESS NOTES
Report received from Fide Peterson, [facility], [facility]. Care to be assumed upon PT arrival from OR.

## 2021-12-16 NOTE — CONSULTS
Consult    Patient: Josue Ward MRN: [id number]  SSN: [id number]    YOB: 1970  Age: 46 y.o. Sex: female      Subjective:      Josue Ward is a 46 y.o. female who is being seen for ventilator management    64-year-old lady with morbid obesity who presented today to general surgery with complaint of abscess and drainage at the pannus that started 1 to 2 weeks ago as per documentation. She was seen at urgent care and was started antibiotic which she could not tolerate and due to her fever palpitations she presented to the emergency room where CT abdomen showed findings suggestive of necrotizing fasciitis and she was taken by general surgery to the operation room for debridement and wound was kept open. Patient came to the ICU intubated sedated and I was called by the bedside nurse to assess and help with the patient care as per request of anesthesia/general surgery. Patient is severely acidotic with acute kidney injury pH of 7.1 with creatinine of 11 and as per report patient received around 3 L of fluids. Patient was ordered Zosyn and vancomycin and received bicarb and calcium. Her last potassium was 5.5 this morning. Patient is extremely ill on mechanical ventilation and obviously not able to give any history. Past Medical History:   Diagnosis Date    Anxiety     Diabetes ([address]     type 2. oral meds. Pt does not monitor bs. Last A1C (9/2017)=6.9    GERD (gastroesophageal reflux disease)     OTC prn    Gout     Hypertension     controlled w/med    IBS (irritable bowel syndrome)      Past Surgical History:   Procedure Laterality Date    HX DILATION AND CURETTAGE  07/2018      Family History   Problem Relation Age of Onset    Heart Failure Mother         congestive    Heart Disease Mother     Hypertension Mother     Cancer Father         Lung/Mets to Bone and Brain and liver    Diabetes Father     Hypertension Father      Social History     Tobacco Use    Smoking status: Never Smoker    Smokeless tobacco: Never Used   Substance Use Topics    Alcohol use: Yes     Comment: occ      Current Facility-Administered Medications   Medication Dose Route Frequency Provider Last Rate Last Admin    0.9% sodium chloride infusion 250 mL  250 mL IntraVENous PRN Trina, Irine Gale, DO        0.9% sodium chloride infusion 250 mL  250 mL IntraVENous PRN Trina, Irine Gale, DO        sodium chloride (NS) flush 5-40 mL  5-40 mL IntraVENous Q8H Trina, Irine Gale, DO        sodium chloride (NS) flush 5-40 mL  5-40 mL IntraVENous PRN Trina, Irine Gale, DO        ondansetron (ZOFRAN ODT) tablet 4 mg  4 mg Oral Q8H PRN Trina, Senaite Aarone, DO        Or    ondansetron [facility]) injection 4 mg  4 mg IntraVENous Q6H PRN Trina, Irine Gale, DO        0.9% sodium chloride infusion  125 mL/hr IntraVENous CONTINUOUS Trina, Senaite Gale, DO        potassium chloride 10 mEq in 100 ml IVPB  10 mEq IntraVENous PRN Trina, Irine Gale, DO        magnesium sulfate 2 g/50 ml IVPB (premix or compounded)  2 g IntraVENous PRN Trina, Irine Gale, DO        HYDROmorphone (DILAUDID) injection 0.5 mg  0.5 mg IntraVENous Q3H PRN Trina, Senaite Gale, DO        fentaNYL in normal saline (pf) 25 mcg/mL infusion  0-200 mcg/hr IntraVENous TITRATE Fabeinne Hartman MD 4 mL/hr at 12/15/21 1923 100 mcg/hr at 12/15/21 1923    propofol (DIPRIVAN) 10 mg/mL infusion  0-50 mcg/kg/min IntraVENous TITRATE Fabienne aHrtman MD 33 mL/hr at 12/15/21 1922 40 mcg/kg/min at 12/15/21 1922    lactated ringers bolus infusion 2,000 mL  2,000 mL IntraVENous ONCE Fabienne Hartman MD 4,000 mL/hr at 12/15/21 1945 2,000 mL at 12/15/21 1945    sodium bicarbonate (8.4%) 150 mEq in dextrose 5% 1,000 mL infusion   IntraVENous CONTINUOUS Isauro Ames MD        sodium bicarbonate (8.4%) injection 200 mEq  200 mEq IntraVENous ONCE Isauro Ames MD        lactated ringers bolus infusion 1,000 mL  1,000 mL IntraVENous Edmund Tam MD        Followed by   Jeremie Arora lactated ringers bolus infusion 1,000 mL  1,000 mL IntraVENous ONCE Verena Mccarthy MD        insulin lispro (HUMALOG) injection   SubCUTAneous Q6H Aljishi, Yuvonne Fothergill, MD        meropenem (MERREM) 1 g in 0.9% sodium chloride (MBP/ADV) 50 mL MBP  1 g IntraVENous Q12H Aljishi, Yuvonne Fothergill, MD        clindamycin (CLEOCIN) 900mg D5W 50mL IVPB (premix)  900 mg IntraVENous Q8H Aljishi, Yuvonne Fothergill, MD            Allergies   Allergen Reactions    Ace Inhibitors Angioedema    Arb-Angiotensin Receptor Antagonist Angioedema    Cayenne Pepper Hives     Swelling of the mouth    Bactrim [Sulfamethoprim] Other (comments)     Nausea, vomiting, making her feel like she is going to pass out      Keflex [Cephalexin] Nausea and Vomiting       Review of Systems:  Review of systems not obtained due to patient factors.     Objective:     Vitals:    12/15/21 1530 12/15/21 1855 12/15/21 1901 12/15/21 1925   BP: (!) 156/50 (!) 106/44     Pulse: 79 72 76 91   Resp: 28 18 19 23   Temp:  98 °F (36.7 °C)     SpO2:  97% 97% 99%   Weight:       Height:            Physical Exam:  GENERAL: Morbidly obese acutely ill on mechanical ventilation   THROAT & NECK: Orally intubated  LUNG: Clear equal air sounds bilateral no crackles no wheezing  HEART: Normal heart sound no sounds or murmurs  ABDOMEN: Morbidly obese soft no tenderness no guarding patient has covered open wound in her pannus and genital area  EXTREMITIES: No edema pulses palpable warm  NEUROLOGIC: Sedated not following commands    Recent Results (from the past 24 hour(s))   CBC WITH AUTOMATED DIFF    Collection Time: 12/15/21 10:12 AM   Result Value Ref Range    WBC 35.6 (H) 4.3 - 11.1 K/uL    RBC 2.56 (L) 4.05 - 5.2 M/uL    HGB 7.5 (L) 11.7 - 15.4 g/dL    HCT 21.5 (L) 35.8 - 46.3 %    MCV 84.0 79.6 - 97.8 FL    MCH 29.3 26.1 - 32.9 PG    MCHC 34.9 31.4 - 35.0 g/dL    RDW 16.1 (H) 11.9 - 14.6 %    PLATELET 942 (H) 121 - 450 K/uL    MPV 11.3 9.4 - 12.3 FL    ABSOLUTE NRBC 0.00 0.0 - 0.2 K/uL    NEUTROPHILS 90 (H) 43 - 78 %    LYMPHOCYTES 3 (L) 13 - 44 %    MONOCYTES 3 (L) 4.0 - 12.0 %    EOSINOPHILS 0 (L) 0.5 - 7.8 %    BASOPHILS 0 0.0 - 2.0 %    IMMATURE GRANULOCYTES 4 0.0 - 5.0 %    ABS. NEUTROPHILS 32.0 (H) 1.7 - 8.2 K/UL    ABS. LYMPHOCYTES 1.1 0.5 - 4.6 K/UL    ABS. MONOCYTES 1.1 0.1 - 1.3 K/UL    ABS. EOSINOPHILS 0.0 0.0 - 0.8 K/UL    ABS. BASOPHILS 0.0 0.0 - 0.2 K/UL    ABS. IMM. GRANS. 1.4 (H) 0.0 - 0.5 K/UL    RBC COMMENTS SLIGHT  ANISOCYTOSIS + POIKILOCYTOSIS        RBC COMMENTS SLIGHT  MICROCYTOSIS        RBC COMMENTS MODERATE  CHANDRAKANT CELLS        RBC COMMENTS OCCASIONAL  OVALOCYTES        RBC COMMENTS OCCASIONAL  TEARDROP CELLS        WBC COMMENTS Result Confirmed By Smear      PLATELET COMMENTS SLIGHT      DF AUTOMATED     METABOLIC PANEL, COMPREHENSIVE    Collection Time: 12/15/21 10:12 AM   Result Value Ref Range    Sodium 123 (L) 136 - 145 mmol/L    Potassium 5.5 (H) 3.5 - 5.1 mmol/L    Chloride 86 (L) 98 - 107 mmol/L    CO2 9 (LL) 21 - 32 mmol/L    Anion gap 28 (H) 7 - 16 mmol/L    Glucose 179 (H) 65 - 100 mg/dL     (H) 6 - 23 MG/DL    Creatinine 11.20 (H) 0.6 - 1.0 MG/DL    GFR est AA 5 (L) >60 ml/min/1.73m2    GFR est non-AA 4 (L) >60 ml/min/1.73m2    Calcium 8.0 (L) 8.3 - 10.4 MG/DL    Bilirubin, total 0.3 0.2 - 1.1 MG/DL    ALT (SGPT) 18 12 - 65 U/L    AST (SGOT) 35 15 - 37 U/L    Alk.  phosphatase 151 (H) 50 - 136 U/L    Protein, total 7.7 6.3 - 8.2 g/dL    Albumin 2.2 (L) 3.5 - 5.0 g/dL    Globulin 5.5 (H) 2.3 - 3.5 g/dL    A-G Ratio 0.4 (L) 1.2 - 3.5     PROCALCITONIN    Collection Time: 12/15/21 10:12 AM   Result Value Ref Range    Procalcitonin 3.55 (H) 0.00 - 0.49 ng/mL   EKG, 12 LEAD, INITIAL    Collection Time: 12/15/21 11:33 AM   Result Value Ref Range    Ventricular Rate 80 BPM    Atrial Rate 80 BPM    P-R Interval 222 ms    QRS Duration 124 ms    Q-T Interval 463 ms    QTC Calculation (Bezet) 535 ms    Calculated P Axis 67 degrees    Calculated R Axis 9 degrees    Calculated T Axis 42 degrees    Diagnosis       Sinus rhythm  first degree avb  Nonspecific intraventricular conduction delay    Confirmed by Trenton Cristobal MD (), MARIFER LUTZ ([id number]) on 12/15/2021 2:42:19 PM     TYPE & SCREEN    Collection Time: 12/15/21 12:34 PM   Result Value Ref Range    Crossmatch Expiration 12/18/2021,2359     ABO/Rh(D) A POSITIVE     Antibody screen NEG     Unit number [id number]     Blood component type RC LR     Unit division 00     Status of unit ISSUED     Crossmatch result Compatible     Unit number [id number]     Blood component type RC LR     Unit division 00     Status of unit ISSUED     Crossmatch result Compatible     Unit number [id number]     Blood component type RC LR     Unit division 00     Status of unit ISSUED     Crossmatch result Compatible     Unit number [id number]     Blood component type RC LR     Unit division 00     Status of unit ISSUED     Crossmatch result Compatible     Unit number [id number]     Blood component type RC LR     Unit division 00     Status of unit ALLOCATED     Crossmatch result Compatible     Unit number [id number]     Blood component type RC LR     Unit division 00     Status of unit ALLOCATED     Crossmatch result Compatible    LACTIC ACID    Collection Time: 12/15/21 12:36 PM   Result Value Ref Range    Lactic acid 1.4 0.4 - 2.0 MMOL/L   URINALYSIS W/ RFLX MICROSCOPIC    Collection Time: 12/15/21  1:07 PM   Result Value Ref Range    Color YELLOW      Appearance CLEAR      Specific gravity 1.013 1.001 - 1.023      pH (UA) 5.5 5.0 - 9.0      Protein 100 (A) NEG mg/dL    Glucose 100 mg/dL    Ketone Negative NEG mg/dL    Bilirubin Negative NEG      Blood SMALL (A) NEG      Urobilinogen 0.2 0.2 - 1.0 EU/dL    Nitrites Negative NEG      Leukocyte Esterase Negative NEG      WBC 3-5 0 /hpf    RBC 0-3 0 /hpf    Epithelial cells 0-3 0 /hpf    Bacteria 1+ (H) 0 /hpf    Amorphous Crystals 1+ (H) 0 Other observations RESULTS VERIFIED MANUALLY     COVID-19 RAPID TEST    Collection Time: 12/15/21  3:19 PM   Result Value Ref Range    Specimen source NASAL      COVID-19 rapid test Not detected NOTD     BLOOD GAS & LYTES, ARTERIAL    Collection Time: 12/15/21  4:47 PM   Result Value Ref Range    pH (POC) 7.13 (LL) 7.35 - 7.45      pCO2 (POC) 29.7 (L) 35 - 45 MMHG    pO2 (POC) 167 (H) 75 - 100 MMHG    Sodium,  (L) 136 - 145 MMOL/L    Potassium, POC 4.6 3.5 - 5.1 MMOL/L    Calcium, ionized (POC) 0.89 (L) 1.12 - 1.32 mmol/L    Glucose,  (H) 65 - 100 MG/DL    Base deficit (POC) 17.8 mmol/L    HCO3 (POC) 9.8 (L) 22 - 26 MMOL/L    CO2, POC 11 (L) 13 - 23 MMOL/L    O2 SAT 99 %    Sample source ARTERIAL      Performed by Lavonne     GFRAA, POC Cannot be calculated >60 ml/min/1.73m2    GFRNA, POC Cannot be calculated >60 ml/min/1.73m2   RBC, ALLOCATE    Collection Time: 12/15/21  5:00 PM   Result Value Ref Range    HISTORY CHECKED? Historical check performed    RBC, ALLOCATE    Collection Time: 12/15/21  5:15 PM   Result Value Ref Range    HISTORY CHECKED?  Historical check performed    BLOOD GAS, ARTERIAL POC    Collection Time: 12/15/21  7:22 PM   Result Value Ref Range    Device: ADULT VENT      FIO2 (POC) 60 %    pH (POC) 7.11 (LL) 7.35 - 7.45      pCO2 (POC) 41.7 35 - 45 MMHG    pO2 (POC) 156 (H) 75 - 100 MMHG    HCO3 (POC) 13.1 (L) 22 - 26 MMOL/L    sO2 (POC) 98.6 (H) 95 - 98 %    Base deficit (POC) 15.6 mmol/L    Mode Volume Control      Tidal volume 450 ml    PEEP/CPAP (POC) 8 cmH2O    PIP (POC) 24      Allens test (POC) Positive      Site DRAWN FROM ARTERIAL LINE      Specimen type (POC) ARTERIAL      Performed by Janell Jackson     Critical value read back JACKIE     Respiratory comment: [id number]      Assessment:     -Necrotizing fasciitis  -Acute hypoxemic respiratory failure require mechanical ventilation postoperatively  -Severe sepsis   -Severe metabolic acidosis  -Acute kidney injury  -Hyperkalemia  -Morbid obesity    Plan:     -Adjust mechanical ventilation use operative strategy keep pulse ox above 92%  -Sedate with propofol and fentanyl  -I will bolus with 4 ampoules of sodium bicarb pushes  -Start sodium bicarb drip at 200 mL an hour  -Bolused with 2 L LR  -Stat BMP CBC and lactic acid  -Follow repeated potassium level  -Start meropenem vancomycin and clindamycin  -Follow blood and wound culture result  -Flush Pardo catheter to assess urine output  -No emergent need for hemodialysis at the moment but we will follow closely  -Nephrology consult  -Patient is planned to go for another washout tomorrow by surgery  -Sliding scale insulin    I have spent 45 minutes of critical care time this time was spent at bedside or in the unit this time was exclusive of any billable procedures patient is needing intensive care due to complex medical problems requiring complex medical decision    Signed By: Sarah Miller MD     December 15, 2021

## 2021-12-16 NOTE — PROGRESS NOTES
[facility] INSUFFICIENCY NOTE 36[address] [facility] Pharmacokinetic Monitoring Service - Vancomycin    Alvino Patricia is a 46 y.o. female starting on vancomycin therapy for ssti. Pharmacy consulted by Trina for monitoring and adjustment. Target Concentration: Random level ? 15 mg/L  Additional Antimicrobials: merrum, clindamycin    Pertinent Laboratory Values: Wt Readings from Last 1 Encounters:   12/15/21 137.4 kg (303 lb)     Temp Readings from Last 1 Encounters:   12/15/21 98 °F (36.7 °C)     Recent Labs     12/15/21  1931 12/15/21  1236 12/15/21  1012   *  --  126*   CREA 9.59*  --  11.20*   WBC 41.1*  --  35.6*   PCT  --   --  3.55*   LAC 1.1 1.4  --        MRSA Nasal Swab: N/A. Non-respiratory infection. .    Plan:  Concentration-guided dosing due to renal impairment  Start vancomycin 2.5gm x1 given pre-op @ 13:25 12/15/21  Vancomycin concentration ordered for 12/16/21 @ 1400  Pharmacy will continue to monitor patient and adjust therapy as indicated    Thank you for the consult,  Mulugeta DIPAK Landeros

## 2021-12-16 NOTE — PROGRESS NOTES
Ventilator check complete; patient has a #7. 0 ET tube secured at the 24 at the lip. Patient is sedated. Patient is not able to follow commands. Breath sounds are diminished. Trachea is midline, Negative for subcutaneous air, and chest excursion is symmetric. Patient is also Negative for cyanosis and is Negative for pitting edema. All alarms are set and audible. Resuscitation bag is at the head of the bed.       Ventilator Settings  Mode FIO2 Rate Tidal Volume Pressure PEEP I:E Ratio   VC+  35 %    450 ml     8 cm H20  1:2.3      Peak airway pressure: 21 cm H2O   Minute ventilation: 8.63 l/min     ABG: PH 7.36, PCO2 36, PO2 148, HCO3 20.3      Areal Schuyler, RT

## 2021-12-16 NOTE — ANESTHESIA POSTPROCEDURE EVALUATION
Procedure(s):  INCISION AND DRAINAGE Perineum, flank. general    Anesthesia Post Evaluation      Multimodal analgesia: multimodal analgesia not used between 6 hours prior to anesthesia start to PACU discharge  Patient location during evaluation: ICU  Patient participation: complete - patient cannot participate  Level of consciousness: obtunded/minimal responses  Pain management: adequate  Airway patency: patent  Anesthetic complications: no  Cardiovascular status: acceptable  Respiratory status: acceptable  Hydration status: acceptable  Comments: Patient transferred from OR to ICU intubated/sedated. VSS during transport. Post anesthesia nausea and vomiting:  controlled  Final Post Anesthesia Temperature Assessment:  Normothermia (36.0-37.5 degrees C)      INITIAL Post-op Vital signs:   Vitals Value Taken Time   /58 12/15/21 2131   Temp 36.7 °C (98 °F) 12/15/21 1855   Pulse 66 12/15/21 2144   Resp 20 12/15/21 2144   SpO2 100 % 12/15/21 2144   Vitals shown include unvalidated device data.

## 2021-12-16 NOTE — PROGRESS NOTES
TRANSFER - IN REPORT:    Verbal report received from hospitals and Mio Joshi CRNA (name) on Nona Wheatlow  being received from OR (unit) for routine post - op      Report consisted of patients Situation, Background, Assessment and   Recommendations(SBAR). Information from the following report(s) SBAR, Kardex, ED Summary, OR Summary, Procedure Summary, Intake/Output, MAR, Cardiac Rhythm SR and Alarm Parameters  was reviewed with the receiving nurse. Opportunity for questions and clarification was provided. Assessment completed upon patients arrival to unit and care assumed.

## 2021-12-16 NOTE — PROGRESS NOTES
[facility] Critical Care Note[de-identified] 12/16/2021  Tim Robertson  Admission Date: 12/15/2021     Length of Stay: 1 days    Background: 46 y.o. y/o female with necrotizing fasciitis of the pannus. She was taken to the OR 12/15 and 12/16 for debridement (down to pubic and iliac bone). Intubated for surgery. Evidence of acute kidney injury on admission. Notable PMH:  has a past medical history of Anxiety, Diabetes ([address] GERD (gastroesophageal reflux disease), Gout, Hypertension, and IBS (irritable bowel syndrome). She has no past medical history of Aneurysm ([address] Arrhythmia, Arthritis, Asthma, Autoimmune disease ([address] CAD (coronary artery disease), Cancer ([address] Chronic kidney disease, Chronic obstructive pulmonary disease ([address] Chronic pain, Coagulation disorder ([address] Difficult intubation, Endocarditis, Heart failure ([address] Liver disease, Malignant hyperthermia due to anesthesia, Nausea & vomiting, Nicotine vapor product user, Non-nicotine vapor product user, Pseudocholinesterase deficiency, Psychiatric disorder, PUD (peptic ulcer disease), Rheumatic fever, Seizures ([address] Sleep apnea, Stroke ([address] Thromboembolus ([address] or Thyroid disease. 24 Hour events: back to OR this morning for debridement.  at bedside. She is alert enough to nod and answer questions. Low dose Levophed for blood pressure support. ROS: unable to obtain/negative except as listed elsewhere. Lines: (insertion date)   ETT: (12/15)  Pardo: (12/15)  OGT: (NA)  Central line: (12/15)  Arterial Line (12/16)    Drips: current dose (range)  Phenylephrine Dose (mcg/min): 0 mcg/min  Diprivan Dose (mcg/kg/min): 25 mcg/kg/min (Low BP)  Levophed Dose (mcg/kg/min): 0.0268 mcg/kg/min   Fentanyl at 100 mcg/hour  Bicarb at 200 ml/hour    Pertinent Exam:         Blood pressure 133/62, pulse 68, temperature 97.8 °F (36.6 °C), resp. rate 20, height 5' 7.5\" (1.715 m), weight 329 lb 2.4 oz (149.3 kg), SpO2 100 %. Intake/Output Summary (Last 24 hours) at 12/16/2021 1151  Last data filed at 12/16/2021 0751  Gross per 24 hour   Intake 04467.15 ml   Output 1860 ml   Net 41543.15 ml     Constitutional:  intubated and mechanically ventilated. EENMT:  Sclera clear, pupils equal, orally intubated  Respiratory: clear bilaterally. Respirations even and not labored  Cardiovascular:  RRR - sinus per telemetry  Gastrointestinal:  soft with tenderness; positive bowel sounds present. + abdominal wound  Musculoskeletal:  warm with no cyanosis, + lower extremity edema  Skin:  no jaundice or ecchymosis  Neurologic: awake and able to nod head and answer questions  Psychiatric: calm     CXR:       Recent Labs     12/16/21  1123 12/16/21  0310 12/15/21  1931 12/15/21  1236 12/15/21  1012 12/15/21  1012   WBC  --  34.6* 41.1*  --   --  35.6*   HGB 7.4* 7.3* 9.0*  --    < > 7.5*   HCT 20.9* 20.5* 27.0*  --    < > 21.5*   PLT  --  332 398  --   --  525*   PCT  --   --   --   --   --  3.55*   LAC  --   --  1.1 1.4  --   --     < > = values in this interval not displayed. Recent Labs     12/16/21  0310 12/15/21  1931 12/15/21  1012   * 128* 123*   K 3.8 4.7 5.5*   CL 95* 97* 86*   CO2 20* 13* 9*   * 198* 179*   * 117* 126*   CREA 9.19* 9.59* 11.20*   CA 6.8* 7.3* 8.0*   ALB 1.7*  --  2.2*   AST 12*  --  35   ALT 11*  --  18   AP 81  --  151*     Recent Labs     12/15/21  1931 12/15/21  1236   LAC 1.1 1.4     Recent Labs     12/16/21  1121 12/16/21  0658 12/16/21  0522   GLUCPOC 231* 154* 174*     ECHO: No results found for this or any previous visit. Results     Procedure Component Value Units Date/Time    Astrid GUPTA Abrahan STAIN [[id number]] Collected: 12/15/21 1710    Order Status: Completed Specimen: Peritoneum Updated: 12/16/21 0944     Special Requests: NO SPECIAL REQUESTS        GRAM STAIN PENDING     Culture result:       NO GROWTH AFTER SHORT PERIOD OF INCUBATION.  FURTHER RESULTS TO FOLLOW AFTER OVERNIGHT INCUBATION. Jameel Brunson [[id number]] Collected: 12/15/21 1710    Order Status: Completed Updated: 12/15/21 1846    COVID-19 RAPID TEST [[id number]] Collected: 12/15/21 1519    Order Status: Completed Specimen: Nasopharyngeal Updated: 12/15/21 1549     Specimen source NASAL        COVID-19 rapid test Not detected        Comment:      The specimen is NEGATIVE for SARS-CoV-2, the novel coronavirus associated with COVID-19. A negative result does not rule out COVID-19. This test has been authorized by the FDA under an Emergency Use Authorization (EUA) for use by authorized laboratories.         Fact sheet for Healthcare Providers: ConventionBrandBeaudate.co.nz  Fact sheet for Patients: X-IOdate.co.nz       Methodology: Isothermal Nucleic Acid Amplification         CULTURE, Moris Jones STAIN [[id number]]  (Abnormal) Collected: 12/15/21 1323    Order Status: Completed Specimen: Wound from Groin Updated: 12/16/21 0923     Special Requests: NO SPECIAL REQUESTS        GRAM STAIN PENDING     Culture result:       MODERATE GRAM NEGATIVE RODS SUBCULTURE IN PROGRESS                  CULTURE IN PROGRESS,FURTHER UPDATES TO FOLLOW          BLOOD CULTURE [[id number]] Collected: 12/15/21 1236    Order Status: Completed Specimen: Blood Updated: 12/16/21 0618     Special Requests: --        RIGHT  HAND       Culture result: NO GROWTH AFTER 16 HOURS       BLOOD CULTURE [[id number]] Collected: 12/15/21 1215    Order Status: Completed Specimen: Blood Updated: 12/16/21 0618     Special Requests: --        LEFT  HAND       Culture result: NO GROWTH AFTER 17 HOURS           Inpat Anti-Infectives (From admission, onward)     Start     Ordered Stop    12/16/21 0600  ceFAZolin (ANCEF) 3 g/30 mL in sterile water IV syringe  3 g,   IntraVENous,   ON CALL TO OR            12/15/21 2110 12/17/21 0600    12/15/21 2213  vancomycin intermittent dosing- pharmacy to dose  Other,   RX DOSING/MONITORING         12/15/21 2213 --    12/15/21 2100  meropenem (MERREM) 500 mg in 0.9% sodium chloride (MBP/ADV) 50 mL MBP  0.5 g,   IntraVENous,   EVERY 12 HOURS         12/15/21 1946 --    12/15/21 2100  clindamycin (CLEOCIN) 900mg D5W 50mL IVPB (premix)  900 mg,   IntraVENous,   EVERY 8 HOURS         12/15/21 1946 12/22/21 2059              Ventilator Settings:  Ideal body weight: 62.7 kg (138 lb 5.4 oz)   Mode FIO2 Rate Tidal Volume Pressure PEEP   VC+  34 %    450 ml     8 cm H20    Peak airway pressure: 21 cm H2O       Minute ventilation: 8.63 l/min  ABG:  Recent Labs     12/16/21  0658 12/16/21  0332 12/16/21  0318   PHI 7.33* 7.36 7.36   PCO2I 40.9 36.1 34.9*   PO2I 183* 148* 167*   HCO3I 21.6* 20.3* 19.9*     Assessment and Plan:  (Medical Decision Making)   Impression: 46 y.o. female with necrotizing fasciitis of the pannus. She has been debrided twice and was intubated for surgery (12/15). Wound culture pending (+ GNRs). NEURO:   Sedation: Propofok  Analgesia: Fentanyl  Paralytics: NA  CV:   Volume Status: + 9 liters. + DEVENDRA on admission - improving with IV fluids. Still on low dose levophed for hypotension. Urine output only 660 mls over past 24 hours. CVP 11  Septic shock: as above. Still on pressor, + acidosis. NSTEMI: NA  PULM:   Acute hypoxemic/hypercapneic respiratory failure:  Intubated for surgery and remains intubated. ABG as above.  reports gasping for air with sleep at home suggesting ROBERT but no outpatient tx. Now alert enough to nod and communicate. No more surgery planned until at least next week per  (spoke with Dr. Penny Matos after surgery today). Will change to PS and reduce sedation and proceed to extubation if able. RENAL:  DEVENDRA: improving. Creat 11 ->9. Urine output still low. IV fluids for hydration - acidosis better. Pressor for hypotension.  Daily labs/monitor output   Lactic acidosis: still acidotic - continue bicarb infusion for now  Electrolytes: Na and K+ improved with hydration  GI:   Nutrition: none at present. If she is not extubated soon then will place FT for TF.   HEME:   Anemia: monitoring. hgb currently 7.4  Thrombocytopenia: NA  Anticoagulation: add heparin - low dose  ID:   Wound cx with GNRs - currently on Vanc, Cleocin, Merrem while awaiting cx result - WBC down today  ENDO:   DM: oral agent at home. Using SSI and lantus for now  Skin: no decub, turns, preventive care  Prophy: add heparin and Pepcid     updated at bedside. Full Code    Ryder Taylor NP     The patient has been seen and examined by me personally, the chart,labs, and radiographic studies have been reviewed. Chest:CTA  Extremities:1+ edema    I agree with the above assessment and plan.   Continue supportive care with possible wound closure vs further debridement planned for Monday    Pablo Bernabe MD.

## 2021-12-16 NOTE — ANESTHESIA POSTPROCEDURE EVALUATION
Procedure(s):  INCISION AND DRAINAGE PERINEUM AND TRUNK.    general    Anesthesia Post Evaluation      Multimodal analgesia: multimodal analgesia not used between 6 hours prior to anesthesia start to PACU discharge  Patient location during evaluation: ICU  Patient participation: complete - patient participated  Level of consciousness: obtunded/minimal responses  Pain management: adequate  Airway patency: patent  Anesthetic complications: no  Cardiovascular status: acceptable  Respiratory status: acceptable and ETT  Hydration status: acceptable  Comments: Critically ill in ICU  Post anesthesia nausea and vomiting:  none  Final Post Anesthesia Temperature Assessment:  Normothermia (36.0-37.5 degrees C)      INITIAL Post-op Vital signs:   Vitals Value Taken Time   /51 12/16/21 0802   Temp 36.5 °C (97.7 °F) 12/16/21 0802   Pulse 71 12/16/21 0821   Resp 20 12/16/21 0821   SpO2 100 % 12/16/21 0821   Vitals shown include unvalidated device data. Pt saw MD on Wednesday, she is currently on tamoxifen.  Had discussion with MD about switching to exemestane d/t risk of uterine cancer on tamoxifen.  Pt was instructed to call office if she made a decision to switch.    Pt is worried about side effects with exemestane, but would like to switch and give it a try.  Script sent. Pt will call office if any issues.     1048 pt called back, she states she did some reading and wants to stay on tamoxifen.  Would like script for this resent and exemestane dc'd.  She is sorry for the change in mind, but is very worried about joint pain.

## 2021-12-16 NOTE — PERIOP NOTES
TRANSFER - OUT REPORT:    Verbal report given to [address] on Luzmaria Ames  being transferred to ICU for ordered procedure       Report consisted of patients Situation, Background, Assessment and   Recommendations(SBAR). Information from the following report(s) OR Summary was reviewed with the receiving nurse. Lines:   Quad Lumen 12/15/21 Anterior;Right Internal jugular (Active)   Central Line Being Utilized Yes 12/15/21 1901   Criteria for Appropriate Use Hemodynamically unstable, requiring monitoring lines, vasopressors, or volume resuscitation 12/15/21 1901   Site Assessment Clean, dry, & intact 12/16/21 0300   Infiltration Assessment 0 12/16/21 0300   Affected Extremity/Extremities Color distal to insertion site pink (or appropriate for race) 12/16/21 0300   Date of Last Dressing Change 12/16/21 12/16/21 0300   Dressing Status Clean, dry, & intact 12/16/21 0300   Dressing Type Disk with Chlorhexadine gluconate (CHG); Transparent 12/16/21 0300   Action Taken Dressing changed 12/16/21 0300   Proximal Hub Color/Line Status White;Patent 12/15/21 1901   Positive Blood Return (Medial Site) Yes 12/15/21 1901   Medial 1 Hub Color/Line Status Blue;Patent 12/15/21 1901   Positive Blood Return (Lateral Site) Yes 12/15/21 1901   Medial 2 Hub Color/Line Status Gray;Patent 12/15/21 1901   Positive Blood Return (Site #3) Yes 12/15/21 1901   Distal Hub Color/Line Status Brown;Patent 12/15/21 1901   Positive Blood Return (Site #4) Yes 12/15/21 1901       Peripheral IV 12/15/21 Right;Upper; Inner Arm (Active)   Site Assessment Clean, dry, & intact 12/16/21 0300   Phlebitis Assessment 0 12/16/21 0300   Infiltration Assessment 0 12/16/21 0300   Dressing Status Clean, dry, & intact 12/16/21 0300       Arterial Line 12/15/21 Right Radial artery (Active)   Site Assessment Clean, dry, & intact 12/16/21 0300   Dressing Status Clean, dry, & intact 12/16/21 0300   Dressing Type Transparent 12/15/21 1901   Line Status Intact and in Your vitamin B12 level is normal. place 12/15/21 1901   Treatment Zeroed or re-zeroed 12/15/21 1901   Affected Extremity/Extremities Color distal to insertion site pink (or appropriate for race); Pulses palpable 12/15/21 1901        Opportunity for questions and clarification was provided.       Patient transported with:   Monitor  O2 @ 15 liters  Registered Nurse

## 2021-12-16 NOTE — ANESTHESIA PROCEDURE NOTES
Arterial Line Placement    Start time: 12/16/2021 7:45 AM  End time: 12/16/2021 7:47 AM  Performed by: Frances Wong CRNA  Authorized by: Bijal Schaefer MD     Pre-Procedure  Indications:  Arterial pressure monitoring  Preanesthetic Checklist: patient identified, risks and benefits discussed, anesthesia consent, site marked, patient being monitored, timeout performed and patient being monitored    Timeout Time: 07:00 EST        Procedure:   Prep:  ChloraPrep  Orientation:  Left  Location:  Radial artery  Catheter size:  20 G  Number of attempts:  1    Assessment:   Post-procedure:  Sterile dressing applied  Patient Tolerance:  Patient tolerated the procedure well with no immediate complications  Comment:   Lucy slipped out during move over, replaced on left

## 2021-12-16 NOTE — PROGRESS NOTES
VANCO DAILY FOLLOW UP RENAL INSUFFICIENCY PATIENT   [address] [facility] Pharmacokinetic Monitoring Service - Vancomycin    Consulting Provider: Clement Gonzalez MD   Indication: SSTI  Target Concentration: Random level ? 15 mg/L  Day of Therapy: 2  Additional Antimicrobials: meropenem, clindamycin    Pertinent Laboratory Values: Wt Readings from Last 1 Encounters:   12/16/21 149.3 kg (329 lb 2.4 oz)     Temp Readings from Last 1 Encounters:   12/16/21 97.8 °F (36.6 °C)     Recent Labs     12/16/21  0310 12/15/21  1931 12/15/21  1236 12/15/21  1012   * 117*  --  126*   CREA 9.19* 9.59*  --  11.20*   WBC 34.6* 41.1*  --  35.6*   PCT  --   --   --  3.55*   LAC  --  1.1 1.4  --        Lab Results   Component Value Date/Time    Vancomycin, random 23.4 12/16/2021 11:23 AM       MRSA Nasal Swab: N/A. Non-respiratory infection. .    Assessment:  Date:  Dose/Freq Admin Times Level/Time:   12/15 2500 mg x 1 1315    12/16   Rd @ 8411 = 23.4   12/17   Rd @ (04)                 Plan:  Concentration-guided dosing due to renal impairment  Random level is supra-therapeutic   No dose needed at this time  Vancomycin concentration ordered for 12/17 @ 0400  Pharmacy will continue to monitor patient and adjust therapy as indicated    Thank you for the consult,  Tnaa Kong, PatriziaD

## 2021-12-16 NOTE — PROGRESS NOTES
12/15/21 1901   Dual Skin Pressure Injury Assessment   Dual Skin Pressure Injury Assessment WDL   Second Care Provider (Based on [address] [place] [address]) Cristin Parmar RN   Skin Integumentary   Skin Integumentary (WDL) X   Skin Color Appropriate for ethnicity   Skin Condition/Temp Warm;Dry   Skin Integrity Incision (comment)  (Large I&D incison to pannus and periarea)   Turgor Non-tenting   Hair Growth Sparce   Nails WDL   Varicosities Absent    Pressure  Injury Documentation No Pressure Injury Noted-Pressure Ulcer Prevention Initiated   Wound Prevention and Protection Methods   Orientation of Wound Prevention Posterior   Location of Wound Prevention Sacrum/Coccyx   Dressing Present  Yes   Dressing Status Intact   Wound Offloading (Prevention Methods) Bed, pressure redistribution/air   Patient arrived to floor from surgery. Patient with open surgical wound to the lower abdomen and periarea that is packed with kirlex, surgical towels, and sealed with ioban. Skin is other wise intact.  Allyven applied to sacrum

## 2021-12-17 ENCOUNTER — APPOINTMENT (OUTPATIENT)
Dept: GENERAL RADIOLOGY | Age: 51
DRG: 853 | End: 2021-12-17
Attending: NURSE PRACTITIONER
Payer: COMMERCIAL

## 2021-12-17 LAB
ALBUMIN SERPL-MCNC: 2.3 G/DL (ref 3.5–5)
ALBUMIN/GLOB SERPL: 0.9 {RATIO} (ref 1.2–3.5)
ALP SERPL-CCNC: 66 U/L (ref 50–136)
ALT SERPL-CCNC: 7 U/L (ref 12–65)
ANION GAP SERPL CALC-SCNC: 13 MMOL/L (ref 7–16)
AST SERPL-CCNC: 9 U/L (ref 15–37)
ATRIAL RATE: 138 BPM
BILIRUB SERPL-MCNC: 0.3 MG/DL (ref 0.2–1.1)
BUN SERPL-MCNC: 101 MG/DL (ref 6–23)
CALCIUM SERPL-MCNC: 6.7 MG/DL (ref 8.3–10.4)
CALCULATED R AXIS, ECG10: 6 DEGREES
CALCULATED T AXIS, ECG11: 72 DEGREES
CHLORIDE SERPL-SCNC: 91 MMOL/L (ref 98–107)
CO2 SERPL-SCNC: 27 MMOL/L (ref 21–32)
CREAT SERPL-MCNC: 9.03 MG/DL (ref 0.6–1)
DIAGNOSIS, 93000: NORMAL
ERYTHROCYTE [DISTWIDTH] IN BLOOD BY AUTOMATED COUNT: 14.5 % (ref 11.9–14.6)
FERRITIN SERPL-MCNC: 213 NG/ML (ref 8–388)
GLOBULIN SER CALC-MCNC: 2.5 G/DL (ref 2.3–3.5)
GLUCOSE BLD STRIP.AUTO-MCNC: 176 MG/DL (ref 65–100)
GLUCOSE BLD STRIP.AUTO-MCNC: 198 MG/DL (ref 65–100)
GLUCOSE BLD STRIP.AUTO-MCNC: 199 MG/DL (ref 65–100)
GLUCOSE BLD STRIP.AUTO-MCNC: 212 MG/DL (ref 65–100)
GLUCOSE BLD STRIP.AUTO-MCNC: 228 MG/DL (ref 65–100)
GLUCOSE BLD STRIP.AUTO-MCNC: 251 MG/DL (ref 65–100)
GLUCOSE SERPL-MCNC: 153 MG/DL (ref 65–100)
HCT VFR BLD AUTO: 16.5 % (ref 35.8–46.3)
HGB BLD-MCNC: 5.8 G/DL (ref 11.7–15.4)
HGB BLD-MCNC: 8.3 G/DL (ref 11.7–15.4)
HISTORY CHECKED?,CKHIST: NORMAL
IRON SATN MFR SERPL: 71 %
IRON SERPL-MCNC: 64 UG/DL (ref 35–150)
MAGNESIUM SERPL-MCNC: 1.8 MG/DL (ref 1.8–2.4)
MCH RBC QN AUTO: 29 PG (ref 26.1–32.9)
MCHC RBC AUTO-ENTMCNC: 35.2 G/DL (ref 31.4–35)
MCV RBC AUTO: 82.5 FL (ref 79.6–97.8)
NRBC # BLD: 0 K/UL (ref 0–0.2)
PHOSPHATE SERPL-MCNC: 9.4 MG/DL (ref 2.5–4.5)
PLATELET # BLD AUTO: 199 K/UL (ref 150–450)
PMV BLD AUTO: 9.8 FL (ref 9.4–12.3)
POTASSIUM SERPL-SCNC: 3.4 MMOL/L (ref 3.5–5.1)
PROT SERPL-MCNC: 4.8 G/DL (ref 6.3–8.2)
Q-T INTERVAL, ECG07: 398 MS
QRS DURATION, ECG06: 100 MS
QTC CALCULATION (BEZET), ECG08: 578 MS
RBC # BLD AUTO: 2 M/UL (ref 4.05–5.2)
SERVICE CMNT-IMP: ABNORMAL
SODIUM SERPL-SCNC: 131 MMOL/L (ref 136–145)
TIBC SERPL-MCNC: 90 UG/DL (ref 250–450)
VANCOMYCIN SERPL-MCNC: 23 UG/ML
VENTRICULAR RATE, ECG03: 127 BPM
VIT B12 SERPL-MCNC: 1119 PG/ML (ref 193–986)
WBC # BLD AUTO: 15.2 K/UL (ref 4.3–11.1)

## 2021-12-17 PROCEDURE — 82607 VITAMIN B-12: CPT

## 2021-12-17 PROCEDURE — 99232 SBSQ HOSP IP/OBS MODERATE 35: CPT | Performed by: INTERNAL MEDICINE

## 2021-12-17 PROCEDURE — 77010033678 HC OXYGEN DAILY

## 2021-12-17 PROCEDURE — 74011250636 HC RX REV CODE- 250/636: Performed by: NURSE PRACTITIONER

## 2021-12-17 PROCEDURE — 74011250636 HC RX REV CODE- 250/636: Performed by: SURGERY

## 2021-12-17 PROCEDURE — 94760 N-INVAS EAR/PLS OXIMETRY 1: CPT

## 2021-12-17 PROCEDURE — 65660000000 HC RM CCU STEPDOWN

## 2021-12-17 PROCEDURE — 82962 GLUCOSE BLOOD TEST: CPT

## 2021-12-17 PROCEDURE — 74011000258 HC RX REV CODE- 258: Performed by: INTERNAL MEDICINE

## 2021-12-17 PROCEDURE — 80053 COMPREHEN METABOLIC PANEL: CPT

## 2021-12-17 PROCEDURE — 36415 COLL VENOUS BLD VENIPUNCTURE: CPT

## 2021-12-17 PROCEDURE — 74011250636 HC RX REV CODE- 250/636: Performed by: FAMILY MEDICINE

## 2021-12-17 PROCEDURE — 74011250637 HC RX REV CODE- 250/637: Performed by: FAMILY MEDICINE

## 2021-12-17 PROCEDURE — 85018 HEMOGLOBIN: CPT

## 2021-12-17 PROCEDURE — 84100 ASSAY OF PHOSPHORUS: CPT

## 2021-12-17 PROCEDURE — 74011250636 HC RX REV CODE- 250/636: Performed by: INTERNAL MEDICINE

## 2021-12-17 PROCEDURE — 85027 COMPLETE CBC AUTOMATED: CPT

## 2021-12-17 PROCEDURE — 83540 ASSAY OF IRON: CPT

## 2021-12-17 PROCEDURE — 82728 ASSAY OF FERRITIN: CPT

## 2021-12-17 PROCEDURE — P9047 ALBUMIN (HUMAN), 25%, 50ML: HCPCS | Performed by: FAMILY MEDICINE

## 2021-12-17 PROCEDURE — 74011250637 HC RX REV CODE- 250/637: Performed by: NURSE PRACTITIONER

## 2021-12-17 PROCEDURE — 74011636637 HC RX REV CODE- 636/637: Performed by: FAMILY MEDICINE

## 2021-12-17 PROCEDURE — 36430 TRANSFUSION BLD/BLD COMPNT: CPT

## 2021-12-17 PROCEDURE — 2709999900 HC NON-CHARGEABLE SUPPLY

## 2021-12-17 PROCEDURE — 77030027138 HC INCENT SPIROMETER -A

## 2021-12-17 PROCEDURE — P9016 RBC LEUKOCYTES REDUCED: HCPCS

## 2021-12-17 PROCEDURE — 74011636637 HC RX REV CODE- 636/637: Performed by: INTERNAL MEDICINE

## 2021-12-17 PROCEDURE — 80202 ASSAY OF VANCOMYCIN: CPT

## 2021-12-17 PROCEDURE — 77030040718 HC DRSG HYDRGEL SKINTEGRITY MDII -A

## 2021-12-17 PROCEDURE — 83735 ASSAY OF MAGNESIUM: CPT

## 2021-12-17 PROCEDURE — 74011000258 HC RX REV CODE- 258: Performed by: SURGERY

## 2021-12-17 PROCEDURE — 74011000250 HC RX REV CODE- 250: Performed by: FAMILY MEDICINE

## 2021-12-17 PROCEDURE — 74011636637 HC RX REV CODE- 636/637: Performed by: NURSE PRACTITIONER

## 2021-12-17 RX ORDER — LANOLIN ALCOHOL/MO/W.PET/CERES
800 CREAM (GRAM) TOPICAL 2 TIMES DAILY
Status: DISCONTINUED | OUTPATIENT
Start: 2021-12-17 | End: 2021-12-22

## 2021-12-17 RX ORDER — INSULIN LISPRO 100 [IU]/ML
INJECTION, SOLUTION INTRAVENOUS; SUBCUTANEOUS
Status: DISCONTINUED | OUTPATIENT
Start: 2021-12-17 | End: 2021-12-27 | Stop reason: HOSPADM

## 2021-12-17 RX ORDER — FAMOTIDINE 20 MG/1
20 TABLET, FILM COATED ORAL 2 TIMES DAILY
Status: DISCONTINUED | OUTPATIENT
Start: 2021-12-17 | End: 2021-12-18

## 2021-12-17 RX ORDER — SODIUM CHLORIDE 9 MG/ML
250 INJECTION, SOLUTION INTRAVENOUS AS NEEDED
Status: DISCONTINUED | OUTPATIENT
Start: 2021-12-17 | End: 2021-12-17

## 2021-12-17 RX ORDER — INSULIN GLARGINE 100 [IU]/ML
14 INJECTION, SOLUTION SUBCUTANEOUS DAILY
Status: DISCONTINUED | OUTPATIENT
Start: 2021-12-18 | End: 2021-12-21

## 2021-12-17 RX ORDER — POTASSIUM CHLORIDE 750 MG/1
40 TABLET, EXTENDED RELEASE ORAL
Status: COMPLETED | OUTPATIENT
Start: 2021-12-17 | End: 2021-12-17

## 2021-12-17 RX ORDER — CALCIUM ACETATE 667 MG/1
1 TABLET ORAL
Status: DISCONTINUED | OUTPATIENT
Start: 2021-12-17 | End: 2021-12-27 | Stop reason: HOSPADM

## 2021-12-17 RX ADMIN — Medication 2 UNITS: at 21:16

## 2021-12-17 RX ADMIN — Medication 10 ML: at 06:21

## 2021-12-17 RX ADMIN — Medication 10 ML: at 21:17

## 2021-12-17 RX ADMIN — CLINDAMYCIN PHOSPHATE 900 MG: 900 INJECTION, SOLUTION INTRAVENOUS at 21:16

## 2021-12-17 RX ADMIN — CALCIUM ACETATE 667 MG: 667 TABLET ORAL at 11:44

## 2021-12-17 RX ADMIN — CALCIUM ACETATE 667 MG: 667 TABLET ORAL at 08:19

## 2021-12-17 RX ADMIN — MEROPENEM 500 MG: 500 INJECTION, POWDER, FOR SOLUTION INTRAVENOUS at 21:16

## 2021-12-17 RX ADMIN — Medication 800 MG: at 09:28

## 2021-12-17 RX ADMIN — Medication 800 MG: at 17:19

## 2021-12-17 RX ADMIN — ALBUMIN (HUMAN) 25 G: 0.25 INJECTION, SOLUTION INTRAVENOUS at 05:20

## 2021-12-17 RX ADMIN — SODIUM BICARBONATE: 84 INJECTION, SOLUTION INTRAVENOUS at 01:18

## 2021-12-17 RX ADMIN — INSULIN LISPRO 2 UNITS: 100 INJECTION, SOLUTION INTRAVENOUS; SUBCUTANEOUS at 05:20

## 2021-12-17 RX ADMIN — HYDROMORPHONE HYDROCHLORIDE 0.5 MG: 1 INJECTION, SOLUTION INTRAMUSCULAR; INTRAVENOUS; SUBCUTANEOUS at 12:25

## 2021-12-17 RX ADMIN — POTASSIUM CHLORIDE 40 MEQ: 750 TABLET, EXTENDED RELEASE ORAL at 08:19

## 2021-12-17 RX ADMIN — CLINDAMYCIN PHOSPHATE 900 MG: 900 INJECTION, SOLUTION INTRAVENOUS at 14:16

## 2021-12-17 RX ADMIN — HEPARIN SODIUM 5000 UNITS: 5000 INJECTION INTRAVENOUS; SUBCUTANEOUS at 14:15

## 2021-12-17 RX ADMIN — CALCIUM ACETATE 667 MG: 667 TABLET ORAL at 17:18

## 2021-12-17 RX ADMIN — HEPARIN SODIUM 5000 UNITS: 5000 INJECTION INTRAVENOUS; SUBCUTANEOUS at 21:16

## 2021-12-17 RX ADMIN — HYDROMORPHONE HYDROCHLORIDE 0.5 MG: 1 INJECTION, SOLUTION INTRAMUSCULAR; INTRAVENOUS; SUBCUTANEOUS at 15:00

## 2021-12-17 RX ADMIN — INSULIN GLARGINE 10 UNITS: 100 INJECTION, SOLUTION SUBCUTANEOUS at 08:20

## 2021-12-17 RX ADMIN — MEROPENEM 500 MG: 500 INJECTION, POWDER, FOR SOLUTION INTRAVENOUS at 08:19

## 2021-12-17 RX ADMIN — FAMOTIDINE 20 MG: 20 TABLET ORAL at 17:18

## 2021-12-17 RX ADMIN — HEPARIN SODIUM 5000 UNITS: 5000 INJECTION INTRAVENOUS; SUBCUTANEOUS at 04:35

## 2021-12-17 RX ADMIN — Medication 10 ML: at 14:17

## 2021-12-17 RX ADMIN — CLINDAMYCIN PHOSPHATE 900 MG: 900 INJECTION, SOLUTION INTRAVENOUS at 04:35

## 2021-12-17 RX ADMIN — ONDANSETRON 4 MG: 2 INJECTION INTRAMUSCULAR; INTRAVENOUS at 06:29

## 2021-12-17 RX ADMIN — Medication 4 UNITS: at 11:44

## 2021-12-17 NOTE — WOUND CARE
Lower abdomen and groin wound dressing change completed large portion of pannus and mons has been removed, areas of gray adipose, some edges with purple margins, may need further debridement at some point. Noted plastic surgery is consulted for assisting with closure. Wet to dry dressings ordered, will need to be done daily, nurse updated. Will assist nursing with changes when wound team is available. Wound is 17x36x 6cm. Will need 4-5 NS moist kerlex, 1-2 dry kerlex and 3-6 ABD's (depending on size). Will monitor.

## 2021-12-17 NOTE — PROGRESS NOTES
Hospitalist Progress Note   Admit Date:  12/15/2021  9:53 AM   Name:  Yesenia Mclaughlin   Age:  46 y.o. Sex:  female  :  1970   MRN:  [id number]   Room:      Presenting Complaint: Vomiting    Reason(s) for Admission: Necrotizing fasciitis [facility]) [M72.6]     Hospital Course & Interval History:   Yesenia Mclaughlin is a 46 y.o. female with history of Morbid obesity, DM who was admitted for septic shock 2/2 necrotizing fascitis involving her pannus and perineum associated with respiratory failure, severe DEVENDRA (Scr 11), acidosis pH 7.1). She was placed on levo/jessica, intubated and treated with vanc/zosyn initially. Outpatient she was started on bactrim and unable to tolerate it then changed to doxycycline. Per  was not taking in any PO and became progressively weaker at home.  denies known history of renal disease. Made little urine prior to admission. She is s/p excisional debridement on 12/15, . OP note reports foul smelling necrotizing fasciitis extending from the perineum, left labia deep to the pubic bone and along the fascia to the abdominal wall above the iliac bone into the left flank. She rec'd ancef in OR and was changed in vanc/clinda/meropenem on 12/15 by pulmonology. Discussed with , no evidence of osteomyelitis. Plan to return patient to OR on Monday for re-assessment. : extubated. Renal consult. New AFIB RVR. dilt gtt started   Subjective (21):  Seen this AM. Off pressors. Extubated. maintaing sats on NC.  at bedside. Patient is A&Ox4. Expresses some left sided abd numbess near surgical site and cough but otherwise denies SOB, fever, chills, abdominal pain. ON: Hemoglobin down 5.8 and 2 UpRBC ordered.    Assessment & Plan:     Septic Shock 2/2 Necrotizing Fascitis - s/p excisional debridement x 2   BC12/15  NGTD  Wound cx GNR, GPC    S/p vanc/zosyn  Escalated to meropenem, clinda and vanc 12/15   Recommend de-escalating to unasyn/vanc versus rocephin/flagyl/vanc unless pseudomonas is suspected. S/p levophed. Now HDS   Return to OR on Monday     ABLA - anemia of renal/chronic dz and post op blood loss. Iron stores and b12 wnl. Hgb 5.8 rec'ing 2 U pRBC. Repeat hemoglobin this afternoon. Lab Results   Component Value Date/Time    Hemoglobin (POC) 8.8 07/12/2018 11:39 AM    HGB 5.8 (LL) 12/17/2021 03:45 AM     DEVENDRA on CKD 2/2 PRA/ATN, ? AIN - assocatied with sepsis   Improving. UOP 800s   Scr 11.2 >> 9BL unclear last Scr 2.2 9/2017   Strict I&O, daily wts   Check urine lytes. WY:CR ratio   Daily BMP/CMP, Mag, Phos   nephro consult     Hyperphosphatemia - start phosphate binder   Hypokalemia - PO x 1   Metabolic acidosis - related to acute renal failure. S/p bicarb gtt   Hyponatremia - mild. CTM   pseudohypocalcemia - corrected Ca 8.6      T2DM -increase lantus 14s plus SSI      Obesity - increased risk of complications.      Acute hypoxemic respiratory failure // Mechanical Ventilation - extubated on 12/16. Wean supplemental o2 as tolerated.       GERD - pepcid           Dispo/Discharge Planning:      Transfer to floor     Diet:  ADULT DIET Regular; 3 carb choices (45 gm/meal);  Low Phosphorus (Less than 1000 mg)  DVT PPx: heparin   Code status: Full Code    Hospital Problems as of 12/17/2021 Date Reviewed: 12/25/2015          Codes Class Noted - Resolved POA    DEVENDRA (acute kidney injury) ([facility] ICD-10-CM: N17.9  ICD-9-CM: 584.9  12/16/2021 - Present Yes        Acute respiratory failure ([facility] ICD-10-CM: J96.00  ICD-9-CM: 518.81  12/16/2021 - Present No        Sepsis with acute renal failure and tubular necrosis without septic shock (HCC) ICD-10-CM: A41.9, R65.20, N17.0  ICD-9-CM: 038.9, 995.91, 584.5  12/16/2021 - Present Yes        * (Principal) Necrotizing fasciitis ([address] ICD-10-CM: M72.6  ICD-9-CM: 728.86  12/15/2021 - Present Yes        Anemia ICD-10-CM: D64.9  ICD-9-CM: 285.9  7/13/2018 - Present Yes        Obesity, morbid ([facility] (Chronic) ICD-10-CM: E66.01  ICD-9-CM: 278.01  7/12/2018 - Present Yes        DM2 (diabetes mellitus, type 2) ([facility] (Chronic) ICD-10-CM: E11.9  ICD-9-CM: 250.00  12/25/2015 - Present Yes        HTN (hypertension) (Chronic) ICD-10-CM: I10  ICD-9-CM: 401.9  12/25/2015 - Present Yes              Objective:     Patient Vitals for the past 24 hrs:   Temp Pulse Resp BP SpO2   12/17/21 1620 98.1 °F (36.7 °C) 68 20 128/64 99 %   12/17/21 1601 97.9 °F (36.6 °C) 70 20 136/73 94 %   12/17/21 1300 98.3 °F (36.8 °C) 74 20 (!) 151/62 97 %   12/17/21 1147 -- 70 23 (!) 149/58 98 %   12/17/21 1132 -- 67 25 139/65 98 %   12/17/21 1117 -- 67 15 135/64 99 %   12/17/21 1102 -- 68 (!) 33 (!) 126/59 100 %   12/17/21 1047 -- 70 24 (!) 128/58 100 %   12/17/21 1032 -- 69 24 (!) 103/54 98 %   12/17/21 1017 -- 71 18 (!) 125/59 97 %   12/17/21 1002 -- 71 17 (!) 116/54 97 %   12/17/21 0947 -- 69 15 (!) 122/58 95 %   12/17/21 0932 -- 70 28 121/60 97 %   12/17/21 0917 -- 70 18 (!) 123/58 100 %   12/17/21 0914 98.6 °F (37 °C) 70 13 (!) 123/58 98 %   12/17/21 0902 -- 74 (!) 34 (!) 123/58 100 %   12/17/21 0847 98.6 °F (37 °C) 71 22 (!) 120/59 99 %   12/17/21 0832 -- 75 18 128/60 100 %   12/17/21 0818 -- 71 19 (!) 119/57 95 %   12/17/21 0802 -- 70 16 (!) 109/55 98 %   12/17/21 0746 -- 72 19 (!) 111/56 97 %   12/17/21 0732 -- 69 17 (!) 115/54 96 %   12/17/21 0717 -- 70 17 (!) 114/55 95 %   12/17/21 0702 98.7 °F (37.1 °C) 73 19 (!) 114/54 99 %   12/17/21 0632 -- 72 29 (!) 111/56 100 %   12/17/21 0617 -- 71 14 (!) 109/52 100 %   12/17/21 0602 -- 70 20 (!) 111/53 98 %   12/17/21 0547 -- 71 16 (!) 104/53 98 %   12/17/21 0300 97.8 °F (36.6 °C) 72 15 (!) 99/55 98 %   12/16/21 2347 -- 76 20 (!) 110/53 99 %   12/16/21 2332 -- 77 16 (!) 111/59 99 %   12/16/21 2317 -- 81 17 (!) 111/59 99 %   12/16/21 2302 97.9 °F (36.6 °C) 77 19 (!) 103/55 99 %   12/16/21 2247 -- 78 19 (!) 100/56 99 %   12/16/21 2232 -- 80 28 (!) 104/56 100 %   12/16/21 2217 -- 77 25 (!) 102/57 98 % 12/16/21 2202 -- 79 18 (!) 100/54 98 %   12/16/21 2147 -- 79 15 (!) 102/55 98 %   12/16/21 2132 -- 79 26 (!) 107/52 98 %   12/16/21 2117 -- 80 13 (!) 112/55 98 %   12/16/21 2102 -- (!) 122 29 (!) 103/57 98 %   12/16/21 2047 -- (!) 121 22 (!) 94/50 98 %   12/16/21 2032 -- (!) 113 21 (!) 94/53 98 %   12/16/21 2017 -- (!) 123 21 (!) 73/49 97 %   12/16/21 2002 -- (!) 119 18 (!) 91/54 97 %   12/16/21 1947 -- (!) 129 27 (!) 86/52 96 %   12/16/21 1935 -- (!) 111 20 -- 96 %   12/16/21 1931 -- (!) 128 21 (!) 87/52 96 %   12/16/21 1917 -- (!) 128 13 (!) 92/55 98 %   12/16/21 1905 98.8 °F (37.1 °C) (!) 121 29 (!) 89/53 99 %   12/16/21 1847 -- (!) 119 14 (!) 94/54 98 %   12/16/21 1830 -- (!) 125 27 (!) 88/51 98 %   12/16/21 1815 -- (!) 125 18 (!) 104/52 98 %   12/16/21 1800 -- (!) 127 29 (!) 104/50 100 %   12/16/21 1745 -- (!) 132 (!) 40 (!) 98/54 99 %   12/16/21 1730 -- (!) 126 15 (!) 110/52 98 %   12/16/21 1715 -- (!) 124 21 -- 96 %     Oxygen Therapy  O2 Sat (%): 99 % (12/17/21 1620)  Pulse via Oximetry: 71 beats per minute (12/17/21 1147)  O2 Device: Nasal cannula (12/17/21 0702)  Skin Assessment: Clean, dry, & intact (12/17/21 6907)  Skin Protection for O2 Device: No (12/17/21 0300)  Orientation: Bilateral (12/16/21 0800)  Location: Cheek (12/16/21 0800)  Interventions: Mouth Care;Hydrocolloid Dressing (12/16/21 0800)  O2 Flow Rate (L/min): 2 l/min (12/17/21 1013)  FIO2 (%): 28 % (12/17/21 1013)    Estimated body mass index is 51.98 kg/m² as calculated from the following:    Height as of this encounter: 5' 7.5\" (1.715 m). Weight as of this encounter: 152.8 kg (336 lb 13.8 oz). Intake/Output Summary (Last 24 hours) at 12/17/2021 1705  Last data filed at 12/17/2021 1604  Gross per 24 hour   Intake 5876.59 ml   Output 1225 ml   Net 4651.59 ml         Physical Exam:   Physical Exam  Vitals and nursing note reviewed. Constitutional:       Appearance: She is obese. HENT:      Head: Normocephalic and atraumatic. Cardiovascular:      Rate and Rhythm: Normal rate. Pulmonary:      Effort: Pulmonary effort is normal. No respiratory distress. Abdominal:      General: Abdomen is flat. Musculoskeletal:      Cervical back: Neck supple. Right lower leg: Edema present. Left lower leg: Edema present. Skin:     Coloration: Skin is not pale. Neurological:      Mental Status: She is alert and oriented to person, place, and time. Mental status is at baseline. Psychiatric:         Mood and Affect: Mood normal.         Blood pressure 128/64, pulse 68, temperature 98.1 °F (36.7 °C), resp. rate 20, height 5' 7.5\" (1.715 m), weight 152.8 kg (336 lb 13.8 oz), SpO2 99 %. I have reviewed ordered lab tests and independently visualized imaging below:    Recent Labs:  Recent Results (from the past 50 hour(s))   CULTURE, WOUND W GRAM STAIN    Collection Time: 12/15/21  5:10 PM    Specimen: Peritoneum   Result Value Ref Range    Special Requests: NO SPECIAL REQUESTS      GRAM STAIN 0 TO 6 WBCS PER OIF     GRAM STAIN MANY GRAM POSITIVE COCCI      GRAM STAIN MANY GRAM NEGATIVE RODS      Culture result: MODERATE GRAM NEGATIVE RODS SUBCULTURE IN PROGRESS (A)     CULTURE, ANAEROBIC    Collection Time: 12/15/21  5:10 PM    Specimen: Peritoneum   Result Value Ref Range    Special Requests: NO SPECIAL REQUESTS      Culture result:        SUBCULTURE IS NECESSARY TO DETERMINE PRESENCE OR ABSENCE OF ANAEROBIC BACTERIA IN THIS CULTURE. FURTHER REPORT TO FOLLOW AFTER INCUBATION OF SUBCULTURE.   RBC, ALLOCATE    Collection Time: 12/15/21  5:15 PM   Result Value Ref Range    HISTORY CHECKED?  Historical check performed    BLOOD GAS, ARTERIAL POC    Collection Time: 12/15/21  7:22 PM   Result Value Ref Range    Device: ADULT VENT      FIO2 (POC) 60 %    pH (POC) 7.11 (LL) 7.35 - 7.45      pCO2 (POC) 41.7 35 - 45 MMHG    pO2 (POC) 156 (H) 75 - 100 MMHG    HCO3 (POC) 13.1 (L) 22 - 26 MMOL/L    sO2 (POC) 98.6 (H) 95 - 98 %    Base deficit (POC) 15.6 mmol/L    Mode Volume Control      Tidal volume 450 ml    PEEP/CPAP (POC) 8 cmH2O    PIP (POC) 24      Allens test (POC) Positive      Site DRAWN FROM ARTERIAL LINE      Specimen type (POC) ARTERIAL      Performed by Tobias Vann     Critical value read back JACKIE     Respiratory comment: ve9    CBC W/O DIFF    Collection Time: 12/15/21  7:31 PM   Result Value Ref Range    WBC 41.1 (H) 4.3 - 11.1 K/uL    RBC 2.97 (L) 4.05 - 5.2 M/uL    HGB 9.0 (L) 11.7 - 15.4 g/dL    HCT 27.0 (L) 35.8 - 46.3 %    MCV 90.9 79.6 - 97.8 FL    MCH 30.3 26.1 - 32.9 PG    MCHC 33.3 31.4 - 35.0 g/dL    RDW 14.3 11.9 - 14.6 %    PLATELET 302 342 - 793 K/uL    MPV 9.7 9.4 - 12.3 FL    ABSOLUTE NRBC 0.00 0.0 - 0.2 K/uL   METABOLIC PANEL, BASIC    Collection Time: 12/15/21  7:31 PM   Result Value Ref Range    Sodium 128 (L) 136 - 145 mmol/L    Potassium 4.7 3.5 - 5.1 mmol/L    Chloride 97 (L) 98 - 107 mmol/L    CO2 13 (L) 21 - 32 mmol/L    Anion gap 18 (H) 7 - 16 mmol/L    Glucose 198 (H) 65 - 100 mg/dL     (H) 6 - 23 MG/DL    Creatinine 9.59 (H) 0.6 - 1.0 MG/DL    GFR est AA 5 (L) >60 ml/min/1.73m2    GFR est non-AA 5 (L) >60 ml/min/1.73m2    Calcium 7.3 (L) 8.3 - 10.4 MG/DL   LACTIC ACID    Collection Time: 12/15/21  7:31 PM   Result Value Ref Range    Lactic acid 1.1 0.4 - 2.0 MMOL/L   HEMOGLOBIN A1C WITH EAG    Collection Time: 12/15/21  7:31 PM   Result Value Ref Range    Hemoglobin A1c 6.4 (H) 4.20 - 6.30 %    Est. average glucose 137 mg/dL   GLUCOSE, POC    Collection Time: 12/15/21  9:52 PM   Result Value Ref Range    Glucose (POC) 205 (H) 65 - 100 mg/dL    Performed by [employer]    GLUCOSE, POC    Collection Time: 12/15/21 11:31 PM   Result Value Ref Range    Glucose (POC) 206 (H) 65 - 100 mg/dL    Performed by [employer]    METABOLIC PANEL, COMPREHENSIVE    Collection Time: 12/16/21  3:10 AM   Result Value Ref Range    Sodium 132 (L) 136 - 145 mmol/L    Potassium 3.8 3.5 - 5.1 mmol/L    Chloride 95 (L) 98 - 107 mmol/L    CO2 20 (L) 21 - 32 mmol/L    Anion gap 17 (H) 7 - 16 mmol/L    Glucose 174 (H) 65 - 100 mg/dL     (H) 6 - 23 MG/DL    Creatinine 9.19 (H) 0.6 - 1.0 MG/DL    GFR est AA 6 (L) >60 ml/min/1.73m2    GFR est non-AA 5 (L) >60 ml/min/1.73m2    Calcium 6.8 (L) 8.3 - 10.4 MG/DL    Bilirubin, total 0.2 0.2 - 1.1 MG/DL    ALT (SGPT) 11 (L) 12 - 65 U/L    AST (SGOT) 12 (L) 15 - 37 U/L    Alk. phosphatase 81 50 - 136 U/L    Protein, total 4.8 (L) 6.3 - 8.2 g/dL    Albumin 1.7 (L) 3.5 - 5.0 g/dL    Globulin 3.1 2.3 - 3.5 g/dL    A-G Ratio 0.5 (L) 1.2 - 3.5     CBC WITH AUTOMATED DIFF    Collection Time: 12/16/21  3:10 AM   Result Value Ref Range    WBC 34.6 (H) 4.3 - 11.1 K/uL    RBC 2.42 (L) 4.05 - 5.2 M/uL    HGB 7.3 (L) 11.7 - 15.4 g/dL    HCT 20.5 (L) 35.8 - 46.3 %    MCV 84.7 79.6 - 97.8 FL    MCH 30.2 26.1 - 32.9 PG    MCHC 35.6 (H) 31.4 - 35.0 g/dL    RDW 13.6 11.9 - 14.6 %    PLATELET 979 190 - 194 K/uL    MPV 9.6 9.4 - 12.3 FL    ABSOLUTE NRBC 0.00 0.0 - 0.2 K/uL    DF AUTOMATED      NEUTROPHILS 87 (H) 43 - 78 %    LYMPHOCYTES 4 (L) 13 - 44 %    MONOCYTES 4 4.0 - 12.0 %    EOSINOPHILS 0 (L) 0.5 - 7.8 %    BASOPHILS 0 0.0 - 2.0 %    IMMATURE GRANULOCYTES 5 0.0 - 5.0 %    ABS. NEUTROPHILS 30.1 (H) 1.7 - 8.2 K/UL    ABS. LYMPHOCYTES 1.5 0.5 - 4.6 K/UL    ABS. MONOCYTES 1.3 0.1 - 1.3 K/UL    ABS. EOSINOPHILS 0.0 0.0 - 0.8 K/UL    ABS. BASOPHILS 0.1 0.0 - 0.2 K/UL    ABS. IMM. GRANS.  1.6 (H) 0.0 - 0.5 K/UL   BLOOD GAS, ARTERIAL POC    Collection Time: 12/16/21  3:18 AM   Result Value Ref Range    Device: ADULT VENT      FIO2 (POC) 40 %    pH (POC) 7.36 7.35 - 7.45      pCO2 (POC) 34.9 (L) 35 - 45 MMHG    pO2 (POC) 167 (H) 75 - 100 MMHG    HCO3 (POC) 19.9 (L) 22 - 26 MMOL/L    sO2 (POC) 99.5 (H) 95 - 98 %    Base deficit (POC) 5.0 mmol/L    Mode Volume Control      Tidal volume 450 ml    PEEP/CPAP (POC) 8 cmH2O    PIP (POC) 24      Site DRAWN FROM ARTERIAL LINE      Specimen type (POC) ARTERIAL Performed by [employer]     Respiratory comment: ve10.8    HEMOGLOBIN A1C WITH EAG    Collection Time: 12/16/21  3:30 AM   Result Value Ref Range    Hemoglobin A1c 6.6 (H) 4.20 - 6.30 %    Est. average glucose 143 mg/dL   BLOOD GAS, ARTERIAL POC    Collection Time: 12/16/21  3:32 AM   Result Value Ref Range    Device: ADULT VENT      FIO2 (POC) 35 %    pH (POC) 7.36 7.35 - 7.45      pCO2 (POC) 36.1 35 - 45 MMHG    pO2 (POC) 148 (H) 75 - 100 MMHG    HCO3 (POC) 20.3 (L) 22 - 26 MMOL/L    sO2 (POC) 99.2 (H) 95 - 98 %    Base deficit (POC) 4.6 mmol/L    Mode Volume Control      Tidal volume 450 ml    PEEP/CPAP (POC) 8 cmH2O    Site DRAWN FROM ARTERIAL LINE      Specimen type (POC) ARTERIAL      Performed by [employer]     Respiratory comment: ve10.8    GLUCOSE, POC    Collection Time: 12/16/21  5:22 AM   Result Value Ref Range    Glucose (POC) 174 (H) 65 - 100 mg/dL    Performed by Zackary    HCG URINE, QL    Collection Time: 12/16/21  6:04 AM   Result Value Ref Range    HCG urine, QL Negative NEG     BLOOD GAS & LYTES, ARTERIAL    Collection Time: 12/16/21  6:58 AM   Result Value Ref Range    pH (POC) 7.33 (L) 7.35 - 7.45      pCO2 (POC) 40.9 35 - 45 MMHG    pO2 (POC) 183 (H) 75 - 100 MMHG    Sodium,  (L) 136 - 145 MMOL/L    Potassium, POC 3.8 3.5 - 5.1 MMOL/L    Calcium, ionized (POC) 0.83 (L) 1.12 - 1.32 mmol/L    Glucose,  (H) 65 - 100 MG/DL    Base deficit (POC) 4.0 mmol/L    HCO3 (POC) 21.6 (L) 22 - 26 MMOL/L    CO2, POC 22 13 - 23 MMOL/L    O2  %    Sample source ARTERIAL      Performed by Gabriele     GFRAA, POC Cannot be calculated >60 ml/min/1.73m2    GFRNA, POC Cannot be calculated >60 ml/min/1.73m2   GLUCOSE, POC    Collection Time: 12/16/21 11:21 AM   Result Value Ref Range    Glucose (POC) 231 (H) 65 - 100 mg/dL    Performed by FRANCESCO Cruz    Collection Time: 12/16/21 11:23 AM   Result Value Ref Range    Vancomycin, random 23.4 UG/ML   HGB & HCT    Collection Time: 12/16/21 11:23 AM   Result Value Ref Range    HGB 7.4 (L) 11.7 - 15.4 g/dL    HCT 20.9 (L) 35.8 - 46.3 %   EKG, 12 LEAD, INITIAL    Collection Time: 12/16/21  4:40 PM   Result Value Ref Range    Ventricular Rate 127 BPM    Atrial Rate 138 BPM    QRS Duration 100 ms    Q-T Interval 398 ms    QTC Calculation (Bezet) 578 ms    Calculated R Axis 6 degrees    Calculated T Axis 72 degrees    Diagnosis       Atrial fibrillation with rapid ventricular response  Low voltage QRS  Incomplete right bundle branch block  Abnormal ECG  When compared with ECG of 16-DEC-2021 16:39,  Previous ECG has undetermined rhythm, needs review  Confirmed by VICK LAM (), Galina Carrillo ([id number]) on 12/17/2021 7:46:04 AM     GLUCOSE, POC    Collection Time: 12/16/21  5:56 PM   Result Value Ref Range    Glucose (POC) 228 (H) 65 - 100 mg/dL    Performed by Karlie    GLUCOSE, POC    Collection Time: 12/16/21 11:20 PM   Result Value Ref Range    Glucose (POC) 251 (H) 65 - 100 mg/dL    Performed by Filippo    CBC W/O DIFF    Collection Time: 12/17/21  3:45 AM   Result Value Ref Range    WBC 15.2 (H) 4.3 - 11.1 K/uL    RBC 2.00 (L) 4.05 - 5.2 M/uL    HGB 5.8 (LL) 11.7 - 15.4 g/dL    HCT 16.5 (L) 35.8 - 46.3 %    MCV 82.5 79.6 - 97.8 FL    MCH 29.0 26.1 - 32.9 PG    MCHC 35.2 (H) 31.4 - 35.0 g/dL    RDW 14.5 11.9 - 14.6 %    PLATELET 120 098 - 228 K/uL    MPV 9.8 9.4 - 12.3 FL    ABSOLUTE NRBC 0.00 0.0 - 0.2 K/uL   METABOLIC PANEL, COMPREHENSIVE    Collection Time: 12/17/21  3:45 AM   Result Value Ref Range    Sodium 131 (L) 136 - 145 mmol/L    Potassium 3.4 (L) 3.5 - 5.1 mmol/L    Chloride 91 (L) 98 - 107 mmol/L    CO2 27 21 - 32 mmol/L    Anion gap 13 7 - 16 mmol/L    Glucose 153 (H) 65 - 100 mg/dL     (H) 6 - 23 MG/DL    Creatinine 9.03 (H) 0.6 - 1.0 MG/DL    GFR est AA 6 (L) >60 ml/min/1.73m2    GFR est non-AA 5 (L) >60 ml/min/1.73m2    Calcium 6.7 (L) 8.3 - 10.4 MG/DL    Bilirubin, total 0.3 0.2 - 1.1 MG/DL    ALT (SGPT) 7 (L) 12 - 65 U/L    AST (SGOT) 9 (L) 15 - 37 U/L    Alk. phosphatase 66 50 - 136 U/L    Protein, total 4.8 (L) 6.3 - 8.2 g/dL    Albumin 2.3 (L) 3.5 - 5.0 g/dL    Globulin 2.5 2.3 - 3.5 g/dL    A-G Ratio 0.9 (L) 1.2 - 3.5     PHOSPHORUS    Collection Time: 12/17/21  3:45 AM   Result Value Ref Range    Phosphorus 9.4 (H) 2.5 - 4.5 MG/DL   MAGNESIUM    Collection Time: 12/17/21  3:45 AM   Result Value Ref Range    Magnesium 1.8 1.8 - 2.4 mg/dL   FERRITIN    Collection Time: 12/17/21  3:45 AM   Result Value Ref Range    Ferritin 213 8 - 388 NG/ML   TRANSFERRIN SATURATION    Collection Time: 12/17/21  3:45 AM   Result Value Ref Range    Iron 64 35 - 150 ug/dL    TIBC 90 (L) 250 - 450 ug/dL    Transferrin Saturation 71 >20 %   VITAMIN B12    Collection Time: 12/17/21  3:45 AM   Result Value Ref Range    Vitamin B12 1,119 (H) 193 - 986 pg/mL   VANCOMYCIN, RANDOM    Collection Time: 12/17/21  3:45 AM   Result Value Ref Range    Vancomycin, random 23.0 UG/ML   GLUCOSE, POC    Collection Time: 12/17/21  5:19 AM   Result Value Ref Range    Glucose (POC) 199 (H) 65 - 100 mg/dL    Performed by Emerson    RBC, ALLOCATE    Collection Time: 12/17/21  7:45 AM   Result Value Ref Range    HISTORY CHECKED?  Historical check performed    GLUCOSE, POC    Collection Time: 12/17/21 11:21 AM   Result Value Ref Range    Glucose (POC) 212 (H) 65 - 100 mg/dL    Performed by [facility]        All Micro Results     Procedure Component Value Units Date/Time    CULTURE, Marielle Dixon STAIN [[id number]]  (Abnormal) Collected: 12/15/21 1710    Order Status: Completed Specimen: Peritoneum Updated: 12/17/21 0904     Special Requests: NO SPECIAL REQUESTS        GRAM STAIN 0 TO 6 WBCS PER OIF      MANY GRAM POSITIVE COCCI         MANY GRAM NEGATIVE RODS        Culture result:       MODERATE GRAM NEGATIVE RODS SUBCULTURE IN PROGRESS          CULTURE, Marielle Dixon STAIN [[id number]]  (Abnormal) Collected: 12/15/21 1323    Order Status: Completed Specimen: Wound from Groin Updated: 12/17/21 0845     Special Requests: NO SPECIAL REQUESTS        GRAM STAIN 0 TO 36 WBCS PER OIF      MANY GRAM POSITIVE COCCI               MODERATE GRAM NEGATIVE RODS                  MODERATE GRAM POSITIVE RODS           Culture result:       MODERATE GRAM NEGATIVE RODS IDENTIFICATION AND SUSCEPTIBILITY TO FOLLOW                  CULTURE IN [address] UPDATES TO FOLLOW          CULTURE, ANAEROBIC [[id number]] Collected: 12/15/21 1710    Order Status: Completed Specimen: Peritoneum Updated: 12/17/21 0834     Special Requests: NO SPECIAL REQUESTS        Culture result:       SUBCULTURE IS NECESSARY TO DETERMINE PRESENCE OR ABSENCE OF ANAEROBIC BACTERIA IN THIS CULTURE. FURTHER REPORT TO FOLLOW AFTER INCUBATION OF SUBCULTURE.          BLOOD CULTURE [[id number]] Collected: 12/15/21 1215    Order Status: Completed Specimen: Blood Updated: 12/17/21 0732     Special Requests: --        LEFT  HAND       Culture result: NO GROWTH 2 DAYS       BLOOD CULTURE [[id number]] Collected: 12/15/21 1236    Order Status: Completed Specimen: Blood Updated: 12/17/21 0732     Special Requests: --        RIGHT  HAND       Culture result: NO GROWTH 2 DAYS       COVID-19 RAPID TEST [[id number]] Collected: 12/15/21 1519    Order Status: Completed Specimen: Nasopharyngeal Updated: 12/15/21 1549     Specimen source NASAL        COVID-19 rapid test Not detected        Comment:      The specimen is NEGATIVE for SARS-CoV-2, the novel coronavirus associated with COVID-19. A negative result does not rule out COVID-19. This test has been authorized by the FDA under an Emergency Use Authorization (EUA) for use by authorized laboratories.         Fact sheet for Healthcare Providers: ConventionUpdate.co.nz  Fact sheet for Patients: ConventionUpdate.co.nz       Methodology: Isothermal Nucleic Acid Amplification               Other Studies:  No results found. Current Meds:  Current Facility-Administered Medications   Medication Dose Route Frequency    calcium acetate (phosphate binder) (PHOSLO) tablet 667 mg  1 Tablet Oral TID WITH MEALS    magnesium oxide (MAG-OX) tablet 800 mg  800 mg Oral BID    insulin lispro (HUMALOG) injection   SubCUTAneous AC&HS    famotidine (PEPCID) tablet 20 mg  20 mg Oral BID    [START ON 12/18/2021] insulin glargine (LANTUS) injection 14 Units  14 Units SubCUTAneous DAILY    heparin (porcine) injection 5,000 Units  5,000 Units SubCUTAneous Q8H    0.9% sodium chloride infusion 250 mL  250 mL IntraVENous PRN    0.9% sodium chloride infusion 250 mL  250 mL IntraVENous PRN    sodium chloride (NS) flush 5-40 mL  5-40 mL IntraVENous Q8H    sodium chloride (NS) flush 5-40 mL  5-40 mL IntraVENous PRN    ondansetron (ZOFRAN ODT) tablet 4 mg  4 mg Oral Q8H PRN    Or    ondansetron (ZOFRAN) injection 4 mg  4 mg IntraVENous Q6H PRN    potassium chloride 10 mEq in 100 ml IVPB  10 mEq IntraVENous PRN    magnesium sulfate 2 g/50 ml IVPB (premix or compounded)  2 g IntraVENous PRN    HYDROmorphone (DILAUDID) injection 0.5 mg  0.5 mg IntraVENous Q3H PRN    meropenem (MERREM) 500 mg in 0.9% sodium chloride (MBP/ADV) 50 mL MBP  0.5 g IntraVENous Q12H    clindamycin (CLEOCIN) 900mg D5W 50mL IVPB (premix)  900 mg IntraVENous Q8H    vancomycin intermittent dosing- pharmacy to dose   Other Rx Dosing/Monitoring       Signed:  Christel Morrison DO    Part of this note may have been written by using a voice dictation software. The note has been proof read but may still contain some grammatical/other typographical errors.

## 2021-12-17 NOTE — PROGRESS NOTES
A follow up visit was made to the patient. Emotional support, spiritual presence and   prayer were provided for the patient and her , Dinora Hooper. She was possibly going to be transferred to one of the floors.     Carlos Weber, [address] [facility], [facility]

## 2021-12-17 NOTE — PROGRESS NOTES
Epic experiencing downtown from 36 to 0. Please see paper charting for vital signs and progress notes.

## 2021-12-17 NOTE — PROGRESS NOTES
[facility] Critical Care Note[de-identified] 12/17/2021  Nilda Pineda  Admission Date: 12/15/2021     Length of Stay: 2 days    Background: 46 y.o. y/o female with necrotizing fasciitis of the pannus. She was taken to the OR 12/15 and 12/16 for debridement (down to pubic and iliac bone). Intubated for surgery. Evidence of acute kidney injury on admission. Extubated 12/16. Surgery to reassess wound next week. Notable PMH:  has a past medical history of Anxiety, Diabetes ([address] GERD (gastroesophageal reflux disease), Gout, Hypertension, and IBS (irritable bowel syndrome). She has no past medical history of Aneurysm ([address] Arrhythmia, Arthritis, Asthma, Autoimmune disease ([address] CAD (coronary artery disease), Cancer ([address] Chronic kidney disease, Chronic obstructive pulmonary disease ([address] Chronic pain, Coagulation disorder ([address] Difficult intubation, Endocarditis, Heart failure ([address] Liver disease, Malignant hyperthermia due to anesthesia, Nausea & vomiting, Nicotine vapor product user, Non-nicotine vapor product user, Pseudocholinesterase deficiency, Psychiatric disorder, PUD (peptic ulcer disease), Rheumatic fever, Seizures ([address] Sleep apnea, Stroke ([address] Thromboembolus ([address] or Thyroid disease. 24 Hour events: Tolerated extubation yesterday. Drinking some water. Denies pain at present. Off levophed and urine output better. Had brief episode of a fib last night - on cardizem for short period of time. Back in SR.     ROS:   Constitutional: negative for fever, chills, sweats  Cardiovascular: negative for chest pain, palpitations, syncope, edema.    Gastrointestinal:  negative for dysphagia, reflux, vomiting, diarrhea, abdominal pain, or melena  Neurologic:  negative for focal weakness, numbness, headache    Lines: (insertion date)   ETT: (12/15, removed 12/16)  Pardo: (12/15)  OGT: (NA)  Central line: (12/15)    Drips: current dose (range)  Bicarb at 200 ml/hour    Pertinent Exam: Blood pressure (!) 120/59, pulse 71, temperature 98.6 °F (37 °C), resp. rate 22, height 5' 7.5\" (1.715 m), weight 336 lb 13.8 oz (152.8 kg), SpO2 99 %. Intake/Output Summary (Last 24 hours) at 12/17/2021 0906  Last data filed at 12/17/2021 6868  Gross per 24 hour   Intake 5190.59 ml   Output 850 ml   Net 4340.59 ml     Constitutional:  awake and alert. Interactive, appropriate. Denies pain  EENMT:  Sclera clear, pupils equal, oral mucosa moist  Respiratory: clear bilaterally. Respirations even and not labored. Breath sounds overall decreased. Wearing 2 liter cannula  Cardiovascular:  RRR - sinus per telemetry  Gastrointestinal:  soft with tenderness; positive bowel sounds present. + abdominal wound  Musculoskeletal:  warm with no cyanosis, + edema  Skin:  no jaundice or ecchymosis  Neurologic: awake and alert  Psychiatric: calm     CXR:  None today    Recent Labs     12/17/21 0345 12/16/21  1123 12/16/21  0310 12/15/21  1931 12/15/21  1931 12/15/21  1236 12/15/21  1012 12/15/21  1012   WBC 15.2*  --  34.6*  --  41.1*  --    < > 35.6*   HGB 5.8* 7.4* 7.3*   < > 9.0*  --    < > 7.5*   HCT 16.5* 20.9* 20.5*   < > 27.0*  --    < > 21.5*     --  332  --  398  --    < > 525*   PCT  --   --   --   --   --   --   --  3.55*   LAC  --   --   --   --  1.1 1.4  --   --     < > = values in this interval not displayed. Recent Labs     12/17/21  0345 12/16/21  0310 12/15/21  1931 12/15/21  1012 12/15/21  1012   * 132* 128*   < > 123*   K 3.4* 3.8 4.7   < > 5.5*   CL 91* 95* 97*   < > 86*   CO2 27 20* 13*   < > 9*   * 174* 198*   < > 179*   * 106* 117*   < > 126*   CREA 9.03* 9.19* 9.59*   < > 11.20*   MG 1.8  --   --   --   --    CA 6.7* 6.8* 7.3*   < > 8.0*   PHOS 9.4*  --   --   --   --    ALB 2.3* 1.7*  --   --  2.2*   AST 9* 12*  --   --  35   ALT 7* 11*  --   --  18   AP 66 81  --   --  151*    < > = values in this interval not displayed.      Recent Labs     12/15/21  1931 12/15/21  1234 LAC 1.1 1.4     Recent Labs     12/16/21  1121 12/16/21  0658 12/16/21  0522   GLUCPOC 231* 154* 174*     ECHO: No results found for this or any previous visit. Results     Procedure Component Value Units Date/Time    CULTURE, Ivar Blue Eye STAIN [[id number]]  (Abnormal) Collected: 12/15/21 1710    Order Status: Completed Specimen: Peritoneum Updated: 12/17/21 0904     Special Requests: NO SPECIAL REQUESTS        GRAM STAIN 0 TO 6 WBCS PER OIF      MANY GRAM POSITIVE COCCI         MANY GRAM NEGATIVE RODS        Culture result:       MODERATE GRAM NEGATIVE RODS SUBCULTURE IN PROGRESS          CULTURE, ANAEROBIC [[id number]] Collected: 12/15/21 1710    Order Status: Completed Specimen: Peritoneum Updated: 12/17/21 0834     Special Requests: NO SPECIAL REQUESTS        Culture result:       SUBCULTURE IS NECESSARY TO DETERMINE PRESENCE OR ABSENCE OF ANAEROBIC BACTERIA IN THIS CULTURE. FURTHER REPORT TO FOLLOW AFTER INCUBATION OF SUBCULTURE. COVID-19 RAPID TEST [[id number]] Collected: 12/15/21 1519    Order Status: Completed Specimen: Nasopharyngeal Updated: 12/15/21 1549     Specimen source NASAL        COVID-19 rapid test Not detected        Comment:      The specimen is NEGATIVE for SARS-CoV-2, the novel coronavirus associated with COVID-19. A negative result does not rule out COVID-19. This test has been authorized by the FDA under an Emergency Use Authorization (EUA) for use by authorized laboratories.         Fact sheet for Healthcare Providers: ConventionUpdate.co.nz  Fact sheet for Patients: ConventionUpdate.co.nz       Methodology: Isothermal Nucleic Acid Amplification         CULTURE, Ivar Blue Eye STAIN [[id number]]  (Abnormal) Collected: 12/15/21 1323    Order Status: Completed Specimen: Wound from Groin Updated: 12/17/21 0845     Special Requests: NO SPECIAL REQUESTS        GRAM STAIN 0 TO 36 WBCS PER OIF      MANY GRAM POSITIVE COCCI MODERATE GRAM NEGATIVE RODS                  MODERATE GRAM POSITIVE RODS           Culture result:       MODERATE GRAM NEGATIVE RODS IDENTIFICATION AND SUSCEPTIBILITY TO FOLLOW                  CULTURE IN [address] UPDATES TO FOLLOW          BLOOD CULTURE [[id number]] Collected: 12/15/21 1236    Order Status: Completed Specimen: Blood Updated: 12/17/21 0732     Special Requests: --        RIGHT  HAND       Culture result: NO GROWTH 2 DAYS       BLOOD CULTURE [[id number]] Collected: 12/15/21 1215    Order Status: Completed Specimen: Blood Updated: 12/17/21 0732     Special Requests: --        LEFT  HAND       Culture result: NO GROWTH 2 DAYS           Inpat Anti-Infectives (From admission, onward)     Start     Ordered Stop    12/16/21 0600  ceFAZolin (ANCEF) 3 g/30 mL in sterile water IV syringe  3 g,   IntraVENous,   ON CALL TO OR            12/15/21 2110 12/17/21 0600    12/15/21 2213  vancomycin intermittent dosing- pharmacy to dose  Other,   RX DOSING/MONITORING         12/15/21 2213 --    12/15/21 2100  meropenem (MERREM) 500 mg in 0.9% sodium chloride (MBP/ADV) 50 mL MBP  0.5 g,   IntraVENous,   EVERY 12 HOURS         12/15/21 1946 --    12/15/21 2100  clindamycin (CLEOCIN) 900mg D5W 50mL IVPB (premix)  900 mg,   IntraVENous,   EVERY 8 HOURS         12/15/21 1946 12/22/21 2059              ABG:  Recent Labs     12/16/21  0658 12/16/21  0332 12/16/21  0318   PHI 7.33* 7.36 7.36   PCO2I 40.9 36.1 34.9*   PO2I 183* 148* 167*   HCO3I 21.6* 20.3* 19.9*     Assessment and Plan:  (Medical Decision Making)   Impression: 46 y.o. female with necrotizing fasciitis of the pannus. She has been debrided twice and was intubated for surgery (12/15). Wound culture pending (+ GNRs). Extubated 12/16. Renal function slightly improved. NEURO:   Sedation: NA  Analgesia: has not required any pain medication.    Paralytics: NA  CV:   Volume Status: + 5.1 liters over past 24 hours and + 14 liters overall. + DEVENDRA on admission - some improvement in labs and urine output. Nephrology following. Septic shock: as above. Off pressor. Wound culture with GNRs. WBC down. NSTEMI: NA  PULM:   Acute hypoxemic/hypercapneic respiratory failure:  Resolved. Extubated yesterday. Will add IS to help with deep breathing/limited activity. Suspected ROBERT: -  reports gasping with sleep. No previous workup for ROBERT but can recommend as outpatient   RENAL:  DEVENDRA: improving. Creat 11 ->9. Urine output better (850 mls over past 24 hours). Will stop bicarb infusion today and advance po diet. Off pressor. Leave hammond for now and monitor output and labs. Lactic acidosis: as above  Electrolytes: Na still low but improved. Supplementing potassium today  GI:   Nutrition: taking water by mouth. Will advance diet  HEME:   Anemia: hgb down to 5.8 today - post op anemia. Will transfuse 2 units and recheck hgb in am. On low dose heparin but no active bleeding noted  Thrombocytopenia: NA  Anticoagulation: low dose heparin  ID:   Wound cx with GNRs - currently on Vanc, Cleocin, Merrem while awaiting cx result - WBC down today. Continue all abx as are for now. Leave central line in place for IV access. Adjust abx after wound culture results reviewed. ENDO:   DM: oral agent at home. Using SSI and lantus for now. BS low to upper 100s  Skin: no decub, turns, preventive care  Prophy:  heparin and Pepcid    Will transfer to the floor. Surgery recommended bedrest for now. Wound care to perform dressing change. Leave on telemetry for now with recent a fib. Full Code    Yu Johnson, NP   I have spoken with and examined the patient. I agree with the above assessment and plan as documented. Gen: pleasant  Lungs:  CTA anteriorly  Heart:  irreg  Abd:NT  Ext:  Trace edema    Will consult hospitalist to assist with care now that on floor for a fib, anemia, abx. We will arrange outpatient follow up for ROBERT and help with ongoing hypoxemia.     Brian Rivera, MD

## 2021-12-17 NOTE — PROGRESS NOTES
Bedside and verbal shift change report received from [address] (offgoing nurse). Report included the following information SBAR, Kardex, Intake/Output, MAR, Recent Results, Cardiac Rhythm a fib, Alarm Parameters  and Quality Measures.      Dual skin assessment completed at bedside: wound to abd - see LDA (list pertinent skin assessment findings)    Dual verification of gtts completed (name of gtts verified): Cardizem @7.5, bicard @150ml/hr

## 2021-12-17 NOTE — PROGRESS NOTES
Initial visit was made, prayer, emotional support and a spiritual presence were provided.  card was left with the patients , Patricia Wallace. She prefers Kasey and her husbqand prefers Christen Mensah.        Rosaline Walker, [address], [facility]

## 2021-12-17 NOTE — PROGRESS NOTES
TRANSFER - OUT REPORT:    Verbal report given to Andrew Burks  on [employer]  being transferred to [facility] [phone]  for routine progression of care       Report consisted of patients Situation, Background, Assessment and   Recommendations(SBAR). Information from the following report(s) SBAR, ED Summary, OR Summary, Intake/Output, Recent Results, Cardiac Rhythm Normal Sinus , Alarm Parameters  and Quality Measures was reviewed with the receiving nurse. Lines:   Quad Lumen 12/15/21 Anterior;Right Internal jugular (Active)   Central Line Being Utilized Yes 12/17/21 0702   Criteria for Appropriate Use Hemodynamically unstable, requiring monitoring lines, vasopressors, or volume resuscitation 12/17/21 0702   Site Assessment Clean, dry, & intact 12/17/21 0702   Infiltration Assessment 0 12/17/21 0702   Affected Extremity/Extremities Color distal to insertion site pink (or appropriate for race); Range of motion performed;Pulses palpable 12/17/21 0702   Date of Last Dressing Change 12/16/21 12/17/21 0702   Dressing Status Clean, dry, & intact 12/17/21 0702   Dressing Type Disk with Chlorhexadine gluconate (CHG); Transparent 12/17/21 1556   Action Taken Zeroed/Rezeroed 12/16/21 0800   Proximal Hub Color/Line Status White;Patent; Flushed; Infusing 12/17/21 0702   Positive Blood Return (Medial Site) Yes 12/17/21 0702   Medial 1 Hub Color/Line Status Gray;Patent; Flushed; Infusing 12/17/21 0702   Positive Blood Return (Lateral Site) Yes 12/17/21 0702   Medial 2 Hub Color/Line Status Blue;Patent; Flushed; Infusing 12/17/21 0702   Positive Blood Return (Site #3) Yes 12/17/21 0702   Distal Hub Color/Line Status Brown;Patent; Flushed 12/17/21 0702   Positive Blood Return (Site #4) Yes 12/17/21 0702   Alcohol Cap Used No 12/17/21 0300        Opportunity for questions and clarification was provided.       Patient transported with:   Monitor  O2 @ 2 liters  Registered Nurse  [employer] [facility]

## 2021-12-17 NOTE — PROGRESS NOTES
VANCO DAILY FOLLOW UP RENAL INSUFFICIENCY PATIENT   [facility] Pharmacokinetic Monitoring Service - Vancomycin    Consulting Provider: Kit Mane MD   Indication: SSTI  Target Concentration: Random level ? 15 mg/L  Day of Therapy: 3  Additional Antimicrobials: meropenem, clindamycin    Pertinent Laboratory Values: Wt Readings from Last 1 Encounters:   12/17/21 152.8 kg (336 lb 13.8 oz)     Temp Readings from Last 1 Encounters:   12/17/21 98.7 °F (37.1 °C)     Recent Labs     12/17/21  0345 12/16/21  0310 12/15/21  1931 12/15/21  1236 12/15/21  1012 12/15/21  1012   * 106* 117*  --    < > 126*   CREA 9.03* 9.19* 9.59*  --    < > 11.20*   WBC 15.2* 34.6* 41.1*  --    < > 35.6*   PCT  --   --   --   --   --  3.55*   LAC  --   --  1.1 1.4  --   --     < > = values in this interval not displayed. Lab Results   Component Value Date/Time    Vancomycin, random 23.0 12/17/2021 03:45 AM       MRSA Nasal Swab: N/A. Non-respiratory infection. .    Assessment:  Date:  Dose/Freq Admin Times Level/Time:   12/15 2500 mg x 1 1315    12/16   Rd @ 9558 = 23.4   12/17   Rd @ 0345 = 23.0                 Plan:  Concentration-guided dosing due to renal impairment  Random level is supra-therapeutic   No dose needed at this time  Vancomycin concentration will be ordered when clinically indicated  Pharmacy will continue to monitor patient and adjust therapy as indicated    Thank you for the consult,  Deandre August, PatriziaD

## 2021-12-17 NOTE — PROGRESS NOTES
[facility] Progress Note      Admission Date: 12/15/2021   Subjective:      Seen in icu.  Taking meds  Making urine  Off vasopressor    Objective:     Physical Exam:      Visit Vitals  BP (!) 114/55   Pulse 70   Temp 98.7 °F (37.1 °C)   Resp 17   Ht 5' 7.5\" (1.715 m)   Wt 152.8 kg (336 lb 13.8 oz)   SpO2 95%   BMI 51.98 kg/m²      Gen: comfortable , NAD  HEENT: moist membranes  CV: S1, S2  Lungs: Clear bilaterally  Extem: +1 edema      Current Facility-Administered Medications   Medication Dose Route Frequency    0.9% sodium chloride infusion 250 mL  250 mL IntraVENous PRN    calcium acetate (phosphate binder) (PHOSLO) tablet 667 mg  1 Tablet Oral TID WITH MEALS    magnesium oxide (MAG-OX) tablet 800 mg  800 mg Oral BID    NOREPINephrine (LEVOPHED) 4 mg in 5% dextrose 250 mL infusion  0.5-30 mcg/min IntraVENous TITRATE    insulin glargine (LANTUS) injection 10 Units  10 Units SubCUTAneous DAILY    heparin (porcine) injection 5,000 Units  5,000 Units SubCUTAneous Q8H    midodrine (PROAMATINE) tablet 10 mg  10 mg Oral TID PRN    famotidine (PEPCID) tablet 10 mg  10 mg Oral Q24H    dilTIAZem (CARDIZEM) 100 mg in 0.9% sodium chloride (MBP/ADV) 100 mL infusion  0-15 mg/hr IntraVENous TITRATE    0.9% sodium chloride infusion 250 mL  250 mL IntraVENous PRN    0.9% sodium chloride infusion 250 mL  250 mL IntraVENous PRN    sodium chloride (NS) flush 5-40 mL  5-40 mL IntraVENous Q8H    sodium chloride (NS) flush 5-40 mL  5-40 mL IntraVENous PRN    ondansetron (ZOFRAN ODT) tablet 4 mg  4 mg Oral Q8H PRN    Or    ondansetron (ZOFRAN) injection 4 mg  4 mg IntraVENous Q6H PRN    potassium chloride 10 mEq in 100 ml IVPB  10 mEq IntraVENous PRN    magnesium sulfate 2 g/50 ml IVPB (premix or compounded)  2 g IntraVENous PRN    HYDROmorphone (DILAUDID) injection 0.5 mg  0.5 mg IntraVENous Q3H PRN    fentaNYL in normal saline (pf) 25 mcg/mL infusion  0-200 mcg/hr IntraVENous TITRATE    propofol (DIPRIVAN) 10 mg/mL infusion  0-50 mcg/kg/min IntraVENous TITRATE    insulin lispro (HUMALOG) injection   SubCUTAneous Q6H    meropenem (MERREM) 500 mg in 0.9% sodium chloride (MBP/ADV) 50 mL MBP  0.5 g IntraVENous Q12H    clindamycin (CLEOCIN) 900mg D5W 50mL IVPB (premix)  900 mg IntraVENous Q8H    PHENYLephrine (CLIVE-SYNEPHRINE) 30 mg in 0.9% sodium chloride 250 mL infusion   mcg/min IntraVENous TITRATE    vancomycin intermittent dosing- pharmacy to dose   Other Rx Dosing/Monitoring            Data Review:     LABS:   Recent Results (from the past 12 hour(s))   CBC W/O DIFF    Collection Time: 12/17/21  3:45 AM   Result Value Ref Range    WBC 15.2 (H) 4.3 - 11.1 K/uL    RBC 2.00 (L) 4.05 - 5.2 M/uL    HGB 5.8 (LL) 11.7 - 15.4 g/dL    HCT 16.5 (L) 35.8 - 46.3 %    MCV 82.5 79.6 - 97.8 FL    MCH 29.0 26.1 - 32.9 PG    MCHC 35.2 (H) 31.4 - 35.0 g/dL    RDW 14.5 11.9 - 14.6 %    PLATELET 528 989 - 485 K/uL    MPV 9.8 9.4 - 12.3 FL    ABSOLUTE NRBC 0.00 0.0 - 0.2 K/uL   METABOLIC PANEL, COMPREHENSIVE    Collection Time: 12/17/21  3:45 AM   Result Value Ref Range    Sodium 131 (L) 136 - 145 mmol/L    Potassium 3.4 (L) 3.5 - 5.1 mmol/L    Chloride 91 (L) 98 - 107 mmol/L    CO2 27 21 - 32 mmol/L    Anion gap 13 7 - 16 mmol/L    Glucose 153 (H) 65 - 100 mg/dL     (H) 6 - 23 MG/DL    Creatinine 9.03 (H) 0.6 - 1.0 MG/DL    GFR est AA 6 (L) >60 ml/min/1.73m2    GFR est non-AA 5 (L) >60 ml/min/1.73m2    Calcium 6.7 (L) 8.3 - 10.4 MG/DL    Bilirubin, total 0.3 0.2 - 1.1 MG/DL    ALT (SGPT) 7 (L) 12 - 65 U/L    AST (SGOT) 9 (L) 15 - 37 U/L    Alk.  phosphatase 66 50 - 136 U/L    Protein, total 4.8 (L) 6.3 - 8.2 g/dL    Albumin 2.3 (L) 3.5 - 5.0 g/dL    Globulin 2.5 2.3 - 3.5 g/dL    A-G Ratio 0.9 (L) 1.2 - 3.5     PHOSPHORUS    Collection Time: 12/17/21  3:45 AM   Result Value Ref Range    Phosphorus 9.4 (H) 2.5 - 4.5 MG/DL   MAGNESIUM    Collection Time: 12/17/21  3:45 AM   Result Value Ref Range    Magnesium 1.8 1.8 - 2.4 mg/dL   FERRITIN    Collection Time: 12/17/21  3:45 AM   Result Value Ref Range    Ferritin 213 8 - 388 NG/ML   TRANSFERRIN SATURATION    Collection Time: 12/17/21  3:45 AM   Result Value Ref Range    Iron 64 35 - 150 ug/dL    TIBC 90 (L) 250 - 450 ug/dL    Transferrin Saturation 71 >20 %   VITAMIN B12    Collection Time: 12/17/21  3:45 AM   Result Value Ref Range    Vitamin B12 1,119 (H) 193 - 986 pg/mL   VANCOMYCIN, RANDOM    Collection Time: 12/17/21  3:45 AM   Result Value Ref Range    Vancomycin, random 23.0 UG/ML   RBC, ALLOCATE    Collection Time: 12/17/21  7:45 AM   Result Value Ref Range    HISTORY CHECKED? Historical check performed          Plan:     Principal Problem:    Necrotizing fasciitis ([facility] (12/15/2021)    Active Problems:    DM2 (diabetes mellitus, type 2) ([facility] (12/25/2015)      HTN (hypertension) (12/25/2015)      Obesity, morbid ([facility] (7/12/2018)      Anemia (7/13/2018)      DEVENDRA (acute kidney injury) ([facility] (12/16/2021)      Acute respiratory failure ([facility] (12/16/2021)      Sepsis with acute renal failure and tubular necrosis without septic shock ([facility] (12/16/2021)       DEVENDRA with unclear hx of CKD, secondary to DM  Necrotizing fasciitis. Hypotension with sepsis- resolved.     DEVENDRA clearly with some component of hypoperfusion. And possible ATN. - imaging CT showed some cortical thinning associated with CKD.     Overall slow improvement in metabolic parameters  Stopping bicarb infusion.

## 2021-12-18 LAB
ALBUMIN SERPL-MCNC: 2.4 G/DL (ref 3.5–5)
ALBUMIN/GLOB SERPL: 0.7 {RATIO} (ref 1.2–3.5)
ALP SERPL-CCNC: 71 U/L (ref 50–136)
ALT SERPL-CCNC: 11 U/L (ref 12–65)
ANION GAP SERPL CALC-SCNC: 15 MMOL/L (ref 7–16)
AST SERPL-CCNC: 8 U/L (ref 15–37)
BACTERIA SPEC CULT: ABNORMAL
BACTERIA SPEC CULT: ABNORMAL
BILIRUB SERPL-MCNC: 0.2 MG/DL (ref 0.2–1.1)
BUN SERPL-MCNC: 101 MG/DL (ref 6–23)
CALCIUM SERPL-MCNC: 7 MG/DL (ref 8.3–10.4)
CHLORIDE SERPL-SCNC: 90 MMOL/L (ref 98–107)
CO2 SERPL-SCNC: 27 MMOL/L (ref 21–32)
CREAT SERPL-MCNC: 9.12 MG/DL (ref 0.6–1)
ERYTHROCYTE [DISTWIDTH] IN BLOOD BY AUTOMATED COUNT: 14.2 % (ref 11.9–14.6)
GLOBULIN SER CALC-MCNC: 3.4 G/DL (ref 2.3–3.5)
GLUCOSE BLD STRIP.AUTO-MCNC: 170 MG/DL (ref 65–100)
GLUCOSE BLD STRIP.AUTO-MCNC: 176 MG/DL (ref 65–100)
GLUCOSE SERPL-MCNC: 146 MG/DL (ref 65–100)
GRAM STN SPEC: ABNORMAL
HCT VFR BLD AUTO: 24.4 % (ref 35.8–46.3)
HGB BLD-MCNC: 8.5 G/DL (ref 11.7–15.4)
MAGNESIUM SERPL-MCNC: 2.1 MG/DL (ref 1.8–2.4)
MCH RBC QN AUTO: 29.3 PG (ref 26.1–32.9)
MCHC RBC AUTO-ENTMCNC: 34.8 G/DL (ref 31.4–35)
MCV RBC AUTO: 84.1 FL (ref 79.6–97.8)
NRBC # BLD: 0 K/UL (ref 0–0.2)
PHOSPHATE SERPL-MCNC: 9.8 MG/DL (ref 2.5–4.5)
PLATELET # BLD AUTO: 192 K/UL (ref 150–450)
PMV BLD AUTO: 9.5 FL (ref 9.4–12.3)
POTASSIUM SERPL-SCNC: 3.9 MMOL/L (ref 3.5–5.1)
PROT SERPL-MCNC: 5.8 G/DL (ref 6.3–8.2)
RBC # BLD AUTO: 2.9 M/UL (ref 4.05–5.2)
SERVICE CMNT-IMP: ABNORMAL
SODIUM SERPL-SCNC: 132 MMOL/L (ref 136–145)
WBC # BLD AUTO: 14.7 K/UL (ref 4.3–11.1)

## 2021-12-18 PROCEDURE — 74011636637 HC RX REV CODE- 636/637: Performed by: NURSE PRACTITIONER

## 2021-12-18 PROCEDURE — 99232 SBSQ HOSP IP/OBS MODERATE 35: CPT | Performed by: INTERNAL MEDICINE

## 2021-12-18 PROCEDURE — 84100 ASSAY OF PHOSPHORUS: CPT

## 2021-12-18 PROCEDURE — 74011250637 HC RX REV CODE- 250/637: Performed by: NURSE PRACTITIONER

## 2021-12-18 PROCEDURE — 74011000250 HC RX REV CODE- 250: Performed by: FAMILY MEDICINE

## 2021-12-18 PROCEDURE — 74011636637 HC RX REV CODE- 636/637: Performed by: FAMILY MEDICINE

## 2021-12-18 PROCEDURE — 74011000258 HC RX REV CODE- 258: Performed by: SURGERY

## 2021-12-18 PROCEDURE — 74011000258 HC RX REV CODE- 258: Performed by: INTERNAL MEDICINE

## 2021-12-18 PROCEDURE — 74011250636 HC RX REV CODE- 250/636: Performed by: INTERNAL MEDICINE

## 2021-12-18 PROCEDURE — 80053 COMPREHEN METABOLIC PANEL: CPT

## 2021-12-18 PROCEDURE — 85027 COMPLETE CBC AUTOMATED: CPT

## 2021-12-18 PROCEDURE — 74011250636 HC RX REV CODE- 250/636: Performed by: NURSE PRACTITIONER

## 2021-12-18 PROCEDURE — 83735 ASSAY OF MAGNESIUM: CPT

## 2021-12-18 PROCEDURE — 2709999900 HC NON-CHARGEABLE SUPPLY

## 2021-12-18 PROCEDURE — 74011250636 HC RX REV CODE- 250/636: Performed by: SURGERY

## 2021-12-18 PROCEDURE — 65660000000 HC RM CCU STEPDOWN

## 2021-12-18 PROCEDURE — 82962 GLUCOSE BLOOD TEST: CPT

## 2021-12-18 RX ORDER — METRONIDAZOLE 500 MG/1
500 TABLET ORAL EVERY 12 HOURS
Status: DISCONTINUED | OUTPATIENT
Start: 2021-12-18 | End: 2021-12-20

## 2021-12-18 RX ORDER — OXYCODONE AND ACETAMINOPHEN 5; 325 MG/1; MG/1
1 TABLET ORAL
Status: DISCONTINUED | OUTPATIENT
Start: 2021-12-18 | End: 2021-12-27 | Stop reason: HOSPADM

## 2021-12-18 RX ORDER — METOPROLOL TARTRATE 5 MG/5ML
5 INJECTION INTRAVENOUS ONCE
Status: COMPLETED | OUTPATIENT
Start: 2021-12-18 | End: 2021-12-18

## 2021-12-18 RX ORDER — FAMOTIDINE 20 MG/1
20 TABLET, FILM COATED ORAL DAILY
Status: DISCONTINUED | OUTPATIENT
Start: 2021-12-19 | End: 2021-12-27 | Stop reason: HOSPADM

## 2021-12-18 RX ADMIN — Medication 800 MG: at 16:53

## 2021-12-18 RX ADMIN — FAMOTIDINE 20 MG: 20 TABLET ORAL at 09:50

## 2021-12-18 RX ADMIN — CALCIUM ACETATE 667 MG: 667 TABLET ORAL at 09:50

## 2021-12-18 RX ADMIN — SODIUM CHLORIDE 1000 ML: 900 INJECTION, SOLUTION INTRAVENOUS at 14:14

## 2021-12-18 RX ADMIN — Medication 2 UNITS: at 08:28

## 2021-12-18 RX ADMIN — Medication 10 ML: at 14:26

## 2021-12-18 RX ADMIN — MEROPENEM 500 MG: 500 INJECTION, POWDER, FOR SOLUTION INTRAVENOUS at 09:50

## 2021-12-18 RX ADMIN — HYDROMORPHONE HYDROCHLORIDE 0.5 MG: 1 INJECTION, SOLUTION INTRAMUSCULAR; INTRAVENOUS; SUBCUTANEOUS at 12:50

## 2021-12-18 RX ADMIN — HEPARIN SODIUM 5000 UNITS: 5000 INJECTION INTRAVENOUS; SUBCUTANEOUS at 12:48

## 2021-12-18 RX ADMIN — HYDROMORPHONE HYDROCHLORIDE 0.5 MG: 1 INJECTION, SOLUTION INTRAMUSCULAR; INTRAVENOUS; SUBCUTANEOUS at 10:00

## 2021-12-18 RX ADMIN — HEPARIN SODIUM 5000 UNITS: 5000 INJECTION INTRAVENOUS; SUBCUTANEOUS at 21:04

## 2021-12-18 RX ADMIN — CALCIUM ACETATE 667 MG: 667 TABLET ORAL at 16:53

## 2021-12-18 RX ADMIN — METRONIDAZOLE 500 MG: 500 TABLET ORAL at 15:27

## 2021-12-18 RX ADMIN — Medication 2 UNITS: at 21:04

## 2021-12-18 RX ADMIN — Medication 800 MG: at 09:50

## 2021-12-18 RX ADMIN — INSULIN GLARGINE 14 UNITS: 100 INJECTION, SOLUTION SUBCUTANEOUS at 10:16

## 2021-12-18 RX ADMIN — HYDROMORPHONE HYDROCHLORIDE 0.5 MG: 1 INJECTION, SOLUTION INTRAMUSCULAR; INTRAVENOUS; SUBCUTANEOUS at 15:46

## 2021-12-18 RX ADMIN — CLINDAMYCIN PHOSPHATE 900 MG: 900 INJECTION, SOLUTION INTRAVENOUS at 04:37

## 2021-12-18 RX ADMIN — OXYCODONE AND ACETAMINOPHEN 1 TABLET: 5; 325 TABLET ORAL at 13:54

## 2021-12-18 RX ADMIN — Medication 2 UNITS: at 16:53

## 2021-12-18 RX ADMIN — HEPARIN SODIUM 5000 UNITS: 5000 INJECTION INTRAVENOUS; SUBCUTANEOUS at 04:37

## 2021-12-18 RX ADMIN — METOPROLOL TARTRATE 5 MG: 1 INJECTION, SOLUTION INTRAVENOUS at 05:39

## 2021-12-18 RX ADMIN — Medication 10 ML: at 04:38

## 2021-12-18 RX ADMIN — Medication 10 ML: at 21:05

## 2021-12-18 RX ADMIN — CEFTRIAXONE 2 G: 2 INJECTION, POWDER, FOR SOLUTION INTRAMUSCULAR; INTRAVENOUS at 15:27

## 2021-12-18 RX ADMIN — CALCIUM ACETATE 667 MG: 667 TABLET ORAL at 12:57

## 2021-12-18 RX ADMIN — Medication 2 UNITS: at 12:48

## 2021-12-18 NOTE — PROGRESS NOTES
VANCO DAILY FOLLOW UP RENAL INSUFFICIENCY PATIENT   [address] [facility] Pharmacokinetic Monitoring Service - Vancomycin    Consulting Provider: Kenneth Palomo MD   Indication: SSTI  Target Concentration: Random level ? 15 mg/L  Day of Therapy: 4  Additional Antimicrobials: ceftriaxone    Pertinent Laboratory Values: Wt Readings from Last 1 Encounters:   12/18/21 146.6 kg (323 lb 3.1 oz)     Temp Readings from Last 1 Encounters:   12/18/21 97.7 °F (36.5 °C)     Recent Labs     12/18/21  0504 12/17/21  0345 12/16/21  0310 12/15/21  1931 12/15/21  1931   * 101* 106*   < > 117*   CREA 9.12* 9.03* 9.19*   < > 9.59*   WBC 14.7* 15.2* 34.6*   < > 41.1*   LAC  --   --   --   --  1.1    < > = values in this interval not displayed. Lab Results   Component Value Date/Time    Vancomycin, random 23.0 12/17/2021 03:45 AM     MRSA Nasal Swab: N/A. Non-respiratory infection. .    Assessment:  Date:  Dose/Freq Admin Times Level/Time:   12/15 2500 mg x 1 1315    12/16   Rd @ 6025 = 23.4   12/17   Rd @ 7993 = 23.0   12/18 12/19   Rd @ (04)     Plan:  Concentration-guided dosing due to renal impairment  Random level is supra-therapeutic   No dose needed at this time  Vancomycin concentration will be ordered when clinically indicated  Pharmacy will continue to monitor patient and adjust therapy as indicated    Thank you for the consult,  DIPAK Valles, PatriziaD

## 2021-12-18 NOTE — PROGRESS NOTES
Hospitalist Progress Note   Admit Date:  12/15/2021  9:53 AM   Name:  Audra Hawkins   Age:  46 y.o. Sex:  female  :  1970   MRN:  [id number]   Room:      Presenting Complaint: Vomiting    Reason(s) for Admission: Necrotizing fasciitis [facility]) [M72.6]     Hospital Course & Interval History:   Audra Hawkins is a 46 y.o. female with history of Morbid obesity, DM who was admitted for septic shock 2/2 necrotizing fascitis involving her pannus and perineum associated with respiratory failure, severe DEVENDRA (Scr 11), acidosis pH 7.1). She was placed on levo/jessica, intubated and treated with vanc/zosyn initially. Outpatient she was started on bactrim and unable to tolerate it then changed to doxycycline. Per  was not taking in any PO and became progressively weaker at home.  denies known history of renal disease. Made little urine prior to admission. She is s/p excisional debridement on 12/15, . OP note reports foul smelling necrotizing fasciitis extending from the perineum, left labia deep to the pubic bone and along the fascia to the abdominal wall above the iliac bone into the left flank. She rec'd ancef in OR and was changed in vanc/clinda/meropenem on 12/15 by pulmonology. Discussed with , no evidence of osteomyelitis. Plan to return patient to OR on Monday for re-assessment. : extubated. Renal consult. New AFIB RVR. dilt gtt started   Subjective (21):  Seen this AM. Off pressors. Extubated. maintaing sats on NC.  at bedside. Patient is A&Ox4. Expresses some left sided abd numbess near surgical site and cough but otherwise denies SOB, fever, chills, abdominal pain. ON: Hemoglobin down 5.8 and 2 UpRBC ordered.    Assessment & Plan:     Septic Shock 2/2 Necrotizing Fascitis - s/p excisional debridement x 2   BC12/15  NGTD  Wound cx GNR, GPC    S/p vanc/zosyn  Escalated to meropenem, clinda and vanc 12/15   Recommend de-escalating to unasyn/vanc versus rocephin/flagyl/vanc unless pseudomonas is suspected. S/p levophed. Now HDS   Return to OR on Monday 12/18-resolving. Increasingly tachycardic. We will bolus the patient 1 L of normal saline and monitor. Given severity of DEVENDRA will monitor volume status closely. Continue supportive care. ABLA - anemia of renal/chronic dz and post op blood loss. Iron stores and b12 wnl. Hgb 5.8 rec'ing 2 U pRBC. Repeat hemoglobin this afternoon. 12/18-likely secondary to intraoperative blood losses. Patient's hemoglobin hematocrit remained stable at this time. DEVENDRA on CKD 2/2 PRA/ATN, ? AIN - assocatied with sepsis   Improving. UOP 800s   Scr 11.2 >> 9BL unclear last Scr 2.2 9/2017   Strict I&O, daily wts   Check urine lytes. AR:CR ratio   Daily BMP/CMP, Mag, Phos   nephro consult   12/18-nephrology consulted. Creatinine now 9.12 essentially unchanged. May require dialysis if patient does not start to recover renal function        Hyperphosphatemia - start phosphate binder   Hypokalemia - PO x 1   Metabolic acidosis - related to acute renal failure. S/p bicarb gtt   Hyponatremia - mild. CTM   pseudohypocalcemia - corrected Ca 8.6   12/18-continue phosphate binder and monitor    T2DM -increase lantus 14s plus SSI   12/18-to goal    Obesity - increased risk of complications.      Acute hypoxemic respiratory failure // Mechanical Ventilation - extubated on 12/16. Wean supplemental o2 as tolerated.               GERD - pepcid           Dispo/Discharge Planning:      Transfer to floor     Diet:  ADULT DIET Regular; 3 carb choices (45 gm/meal);  Low Phosphorus (Less than 1000 mg)  DVT PPx: heparin   Code status: Full Code    Hospital Problems as of 12/18/2021 Date Reviewed: 12/25/2015          Codes Class Noted - Resolved POA    DEVENDRA (acute kidney injury) ([facility] [address] ICD-10-CM: N17.9  ICD-9-CM: 584.9  12/16/2021 - Present Yes        Acute respiratory failure ([facility] [address] ICD-10-CM: J96.00  ICD-9-CM: 518.81  12/16/2021 - Present No Sepsis with acute renal failure and tubular necrosis without septic shock (HCC) ICD-10-CM: A41.9, R65.20, N17.0  ICD-9-CM: 038.9, 995.91, 584.5  12/16/2021 - Present Yes        * (Principal) Necrotizing fasciitis ([facility] ICD-10-CM: M72.6  ICD-9-CM: 728.86  12/15/2021 - Present Yes        Anemia ICD-10-CM: D64.9  ICD-9-CM: 285.9  7/13/2018 - Present Yes        Obesity, morbid (HCC) (Chronic) ICD-10-CM: E66.01  ICD-9-CM: 278.01  7/12/2018 - Present Yes        DM2 (diabetes mellitus, type 2) ([facility] (Chronic) ICD-10-CM: E11.9  ICD-9-CM: 250.00  12/25/2015 - Present Yes        HTN (hypertension) (Chronic) ICD-10-CM: I10  ICD-9-CM: 401.9  12/25/2015 - Present Yes              Objective:     Patient Vitals for the past 24 hrs:   Temp Pulse Resp BP SpO2   12/18/21 1246 97.7 °F (36.5 °C) (!) 117 20 (!) 149/84 100 %   12/18/21 0802 97.7 °F (36.5 °C) (!) 115 24 133/82 98 %   12/18/21 0800 -- (!) 117 -- -- --   12/18/21 0429 97.9 °F (36.6 °C) (!) 113 18 (!) 151/76 98 %   12/18/21 0400 -- 71 -- -- --   12/18/21 0000 -- 72 -- -- --   12/17/21 2307 98.1 °F (36.7 °C) 71 18 131/63 100 %   12/17/21 2028 -- -- -- -- 97 %   12/17/21 2000 -- 71 -- -- --   12/17/21 1949 97.9 °F (36.6 °C) 71 18 121/60 97 %   12/17/21 1832 98 °F (36.7 °C) 64 18 114/73 96 %   12/17/21 1800 98.1 °F (36.7 °C) 72 18 119/76 98 %   12/17/21 1706 98.2 °F (36.8 °C) 69 16 115/72 96 %   12/17/21 1620 98.1 °F (36.7 °C) 68 20 128/64 99 %   12/17/21 1601 97.9 °F (36.6 °C) 70 20 136/73 94 %     Oxygen Therapy  O2 Sat (%): 100 % (12/18/21 1246)  Pulse via Oximetry: 68 beats per minute (12/17/21 2028)  O2 Device: Nasal cannula (12/17/21 2028)  Skin Assessment: Clean, dry, & intact (12/17/21 6257)  Skin Protection for O2 Device: No (12/17/21 0300)  Orientation: Bilateral (12/16/21 0800)  Location: Cheek (12/16/21 0800)  Interventions: Mouth Care;Hydrocolloid Dressing (12/16/21 0800)  O2 Flow Rate (L/min): 1 l/min (12/17/21 2028)  FIO2 (%): 28 % (12/17/21 1013)    Estimated body mass index is 49.87 kg/m² as calculated from the following:    Height as of this encounter: 5' 7.5\" (1.715 m). Weight as of this encounter: 146.6 kg (323 lb 3.1 oz). Intake/Output Summary (Last 24 hours) at 12/18/2021 1416  Last data filed at 12/18/2021 0513  Gross per 24 hour   Intake 620.4 ml   Output 2000 ml   Net -1379.6 ml         Physical Exam:   Physical Exam  Vitals and nursing note reviewed. Constitutional:       Appearance: She is obese. HENT:      Head: Normocephalic and atraumatic. Cardiovascular:      Rate and Rhythm: Normal rate. Pulmonary:      Effort: Pulmonary effort is normal. No respiratory distress. Abdominal:      General: Abdomen is flat. Musculoskeletal:      Cervical back: Neck supple. Right lower leg: Edema present. Left lower leg: Edema present. Skin:     Coloration: Skin is not pale. Neurological:      Mental Status: She is alert and oriented to person, place, and time. Mental status is at baseline. Psychiatric:         Mood and Affect: Mood normal.         Blood pressure (!) 149/84, pulse (!) 117, temperature 97.7 °F (36.5 °C), resp. rate 20, height 5' 7.5\" (1.715 m), weight 146.6 kg (323 lb 3.1 oz), SpO2 100 %.     I have reviewed ordered lab tests and independently visualized imaging below:    Recent Labs:  Recent Results (from the past 48 hour(s))   EKG, 12 LEAD, INITIAL    Collection Time: 12/16/21  4:40 PM   Result Value Ref Range    Ventricular Rate 127 BPM    Atrial Rate 138 BPM    QRS Duration 100 ms    Q-T Interval 398 ms    QTC Calculation (Bezet) 578 ms    Calculated R Axis 6 degrees    Calculated T Axis 72 degrees    Diagnosis       Atrial fibrillation with rapid ventricular response  Low voltage QRS  Incomplete right bundle branch block  Abnormal ECG  When compared with ECG of 16-DEC-2021 16:39,  Previous ECG has undetermined rhythm, needs review  Confirmed by Regine Arvizu MD (), Kendra Guzmán ([id number]) on 12/17/2021 7:46:04 AM GLUCOSE, POC    Collection Time: 12/16/21  5:56 PM   Result Value Ref Range    Glucose (POC) 228 (H) 65 - 100 mg/dL    Performed by Karlie    GLUCOSE, POC    Collection Time: 12/16/21 11:20 PM   Result Value Ref Range    Glucose (POC) 251 (H) 65 - 100 mg/dL    Performed by Filippo    CBC W/O DIFF    Collection Time: 12/17/21  3:45 AM   Result Value Ref Range    WBC 15.2 (H) 4.3 - 11.1 K/uL    RBC 2.00 (L) 4.05 - 5.2 M/uL    HGB 5.8 (LL) 11.7 - 15.4 g/dL    HCT 16.5 (L) 35.8 - 46.3 %    MCV 82.5 79.6 - 97.8 FL    MCH 29.0 26.1 - 32.9 PG    MCHC 35.2 (H) 31.4 - 35.0 g/dL    RDW 14.5 11.9 - 14.6 %    PLATELET 959 461 - 911 K/uL    MPV 9.8 9.4 - 12.3 FL    ABSOLUTE NRBC 0.00 0.0 - 0.2 K/uL   METABOLIC PANEL, COMPREHENSIVE    Collection Time: 12/17/21  3:45 AM   Result Value Ref Range    Sodium 131 (L) 136 - 145 mmol/L    Potassium 3.4 (L) 3.5 - 5.1 mmol/L    Chloride 91 (L) 98 - 107 mmol/L    CO2 27 21 - 32 mmol/L    Anion gap 13 7 - 16 mmol/L    Glucose 153 (H) 65 - 100 mg/dL     (H) 6 - 23 MG/DL    Creatinine 9.03 (H) 0.6 - 1.0 MG/DL    GFR est AA 6 (L) >60 ml/min/1.73m2    GFR est non-AA 5 (L) >60 ml/min/1.73m2    Calcium 6.7 (L) 8.3 - 10.4 MG/DL    Bilirubin, total 0.3 0.2 - 1.1 MG/DL    ALT (SGPT) 7 (L) 12 - 65 U/L    AST (SGOT) 9 (L) 15 - 37 U/L    Alk.  phosphatase 66 50 - 136 U/L    Protein, total 4.8 (L) 6.3 - 8.2 g/dL    Albumin 2.3 (L) 3.5 - 5.0 g/dL    Globulin 2.5 2.3 - 3.5 g/dL    A-G Ratio 0.9 (L) 1.2 - 3.5     PHOSPHORUS    Collection Time: 12/17/21  3:45 AM   Result Value Ref Range    Phosphorus 9.4 (H) 2.5 - 4.5 MG/DL   MAGNESIUM    Collection Time: 12/17/21  3:45 AM   Result Value Ref Range    Magnesium 1.8 1.8 - 2.4 mg/dL   FERRITIN    Collection Time: 12/17/21  3:45 AM   Result Value Ref Range    Ferritin 213 8 - 388 NG/ML   TRANSFERRIN SATURATION    Collection Time: 12/17/21  3:45 AM   Result Value Ref Range    Iron 64 35 - 150 ug/dL    TIBC 90 (L) 250 - 450 ug/dL Transferrin Saturation 71 >20 %   VITAMIN B12    Collection Time: 12/17/21  3:45 AM   Result Value Ref Range    Vitamin B12 1,119 (H) 193 - 986 pg/mL   VANCOMYCIN, RANDOM    Collection Time: 12/17/21  3:45 AM   Result Value Ref Range    Vancomycin, random 23.0 UG/ML   GLUCOSE, POC    Collection Time: 12/17/21  5:19 AM   Result Value Ref Range    Glucose (POC) 199 (H) 65 - 100 mg/dL    Performed by Emerson    RBC, ALLOCATE    Collection Time: 12/17/21  7:45 AM   Result Value Ref Range    HISTORY CHECKED?  Historical check performed    GLUCOSE, POC    Collection Time: 12/17/21 11:21 AM   Result Value Ref Range    Glucose (POC) 212 (H) 65 - 100 mg/dL    Performed by Fawn Laboy, POC    Collection Time: 12/17/21  5:06 PM   Result Value Ref Range    Glucose (POC) 176 (H) 65 - 100 mg/dL    Performed by [facility]    HEMOGLOBIN    Collection Time: 12/17/21  7:39 PM   Result Value Ref Range    HGB 8.3 (L) 11.7 - 15.4 g/dL   GLUCOSE, POC    Collection Time: 12/17/21  9:11 PM   Result Value Ref Range    Glucose (POC) 198 (H) 65 - 100 mg/dL    Performed by Layla    CBC W/O DIFF    Collection Time: 12/18/21  5:04 AM   Result Value Ref Range    WBC 14.7 (H) 4.3 - 11.1 K/uL    RBC 2.90 (L) 4.05 - 5.2 M/uL    HGB 8.5 (L) 11.7 - 15.4 g/dL    HCT 24.4 (L) 35.8 - 46.3 %    MCV 84.1 79.6 - 97.8 FL    MCH 29.3 26.1 - 32.9 PG    MCHC 34.8 31.4 - 35.0 g/dL    RDW 14.2 11.9 - 14.6 %    PLATELET 836 032 - 920 K/uL    MPV 9.5 9.4 - 12.3 FL    ABSOLUTE NRBC 0.00 0.0 - 0.2 K/uL   METABOLIC PANEL, COMPREHENSIVE    Collection Time: 12/18/21  5:04 AM   Result Value Ref Range    Sodium 132 (L) 136 - 145 mmol/L    Potassium 3.9 3.5 - 5.1 mmol/L    Chloride 90 (L) 98 - 107 mmol/L    CO2 27 21 - 32 mmol/L    Anion gap 15 7 - 16 mmol/L    Glucose 146 (H) 65 - 100 mg/dL     (H) 6 - 23 MG/DL    Creatinine 9.12 (H) 0.6 - 1.0 MG/DL    GFR est AA 6 (L) >60 ml/min/1.73m2    GFR est non-AA 5 (L) >60 ml/min/1.73m2    Calcium 7.0 (L) 8.3 - 10.4 MG/DL    Bilirubin, total 0.2 0.2 - 1.1 MG/DL    ALT (SGPT) 11 (L) 12 - 65 U/L    AST (SGOT) 8 (L) 15 - 37 U/L    Alk. phosphatase 71 50 - 136 U/L    Protein, total 5.8 (L) 6.3 - 8.2 g/dL    Albumin 2.4 (L) 3.5 - 5.0 g/dL    Globulin 3.4 2.3 - 3.5 g/dL    A-G Ratio 0.7 (L) 1.2 - 3.5     PHOSPHORUS    Collection Time: 12/18/21  5:04 AM   Result Value Ref Range    Phosphorus 9.8 (H) 2.5 - 4.5 MG/DL   MAGNESIUM    Collection Time: 12/18/21  5:04 AM   Result Value Ref Range    Magnesium 2.1 1.8 - 2.4 mg/dL   GLUCOSE, POC    Collection Time: 12/18/21  7:58 AM   Result Value Ref Range    Glucose (POC) 176 (H) 65 - 100 mg/dL    Performed by Otilio    GLUCOSE, POC    Collection Time: 12/18/21 10:56 AM   Result Value Ref Range    Glucose (POC) 170 (H) 65 - 100 mg/dL    Performed by Rebecca Long        All Micro Results     Procedure Component Value Units Date/Time    CULTURE, Anges Ally STAIN [[id number]]  (Abnormal)  (Susceptibility) Collected: 12/15/21 1710    Order Status: Completed Specimen: Peritoneum Updated: 12/18/21 0936     Special Requests: NO SPECIAL REQUESTS        GRAM STAIN 0 TO 6 WBCS PER OIF      MANY GRAM POSITIVE COCCI         MANY GRAM NEGATIVE RODS        Culture result:       MODERATE PROTEUS MIRABILIS                  MODERATE STAPHYLOCOCCUS SPECIES, COAGULASE NEGATIVE THIS ORGANISM WILL BE HELD FOR 7 DAYS.  IF FURTHER TESTING IS REQUIRED PLEASE NOTIFY MICROBIOLOGY          CULTURE, Agnes Ally STAIN [[id number]]  (Abnormal)  (Susceptibility) Collected: 12/15/21 1323    Order Status: Completed Specimen: Wound from Groin Updated: 12/18/21 0929     Special Requests: NO SPECIAL REQUESTS        GRAM STAIN 0 TO 36 WBCS PER OIF      MANY GRAM POSITIVE COCCI               MODERATE GRAM NEGATIVE RODS                  MODERATE GRAM POSITIVE RODS           Culture result:       MODERATE PROTEUS MIRABILIS                  MODERATE PRESUMPTIVE ENTEROCOCCUS SPECIES IDENTIFICATION AND SUSCEPTIBILITY TO FOLLOW                  LIGHT NORMAL SKIN MASSIMO ISOLATED          BLOOD CULTURE [[id number]] Collected: 12/15/21 1236    Order Status: Completed Specimen: Blood Updated: 12/18/21 0645     Special Requests: --        RIGHT  HAND       Culture result: NO GROWTH 3 DAYS       BLOOD CULTURE [[id number]] Collected: 12/15/21 1215    Order Status: Completed Specimen: Blood Updated: 12/18/21 0645     Special Requests: --        LEFT  HAND       Culture result: NO GROWTH 3 DAYS       CULTURE, ANAEROBIC [[id number]] Collected: 12/15/21 1710    Order Status: Completed Specimen: Peritoneum Updated: 12/17/21 0834     Special Requests: NO SPECIAL REQUESTS        Culture result:       SUBCULTURE IS NECESSARY TO DETERMINE PRESENCE OR ABSENCE OF ANAEROBIC BACTERIA IN THIS CULTURE. FURTHER REPORT TO FOLLOW AFTER INCUBATION OF SUBCULTURE. COVID-19 RAPID TEST [[id number]] Collected: 12/15/21 1519    Order Status: Completed Specimen: Nasopharyngeal Updated: 12/15/21 1549     Specimen source NASAL        COVID-19 rapid test Not detected        Comment:      The specimen is NEGATIVE for SARS-CoV-2, the novel coronavirus associated with COVID-19. A negative result does not rule out COVID-19. This test has been authorized by the FDA under an Emergency Use Authorization (EUA) for use by authorized laboratories. Fact sheet for Healthcare Providers: ConventionUpdate.co.nz  Fact sheet for Patients: ConventionUpdate.co.nz       Methodology: Isothermal Nucleic Acid Amplification               Other Studies:  No results found.     Current Meds:  Current Facility-Administered Medications   Medication Dose Route Frequency    oxyCODONE-acetaminophen (PERCOCET) 5-325 mg per tablet 1 Tablet  1 Tablet Oral Q4H PRN    cefTRIAXone (ROCEPHIN) 2 g in 0.9% sodium chloride (MBP/ADV) 50 mL MBP  2 g IntraVENous Q24H    calcium acetate (phosphate binder) (PHOSLO) tablet 667 mg  1 Tablet Oral TID WITH MEALS    magnesium oxide (MAG-OX) tablet 800 mg  800 mg Oral BID    insulin lispro (HUMALOG) injection   SubCUTAneous AC&HS    famotidine (PEPCID) tablet 20 mg  20 mg Oral BID    insulin glargine (LANTUS) injection 14 Units  14 Units SubCUTAneous DAILY    heparin (porcine) injection 5,000 Units  5,000 Units SubCUTAneous Q8H    0.9% sodium chloride infusion 250 mL  250 mL IntraVENous PRN    0.9% sodium chloride infusion 250 mL  250 mL IntraVENous PRN    sodium chloride (NS) flush 5-40 mL  5-40 mL IntraVENous Q8H    sodium chloride (NS) flush 5-40 mL  5-40 mL IntraVENous PRN    ondansetron (ZOFRAN ODT) tablet 4 mg  4 mg Oral Q8H PRN    Or    ondansetron (ZOFRAN) injection 4 mg  4 mg IntraVENous Q6H PRN    potassium chloride 10 mEq in 100 ml IVPB  10 mEq IntraVENous PRN    magnesium sulfate 2 g/50 ml IVPB (premix or compounded)  2 g IntraVENous PRN    HYDROmorphone (DILAUDID) injection 0.5 mg  0.5 mg IntraVENous Q3H PRN       Signed:  Julieta Garza DO    Part of this note may have been written by using a voice dictation software. The note has been proof read but may still contain some grammatical/other typographical errors.

## 2021-12-18 NOTE — PROGRESS NOTES
Problem: Pressure Injury - Risk of  Goal: *Prevention of pressure injury  Description: Document Juventino Scale and appropriate interventions in the flowsheet. Outcome: Progressing Towards Goal  Note: Pressure Injury Interventions:  Sensory Interventions: Assess changes in LOC,Keep linens dry and wrinkle-free,Maintain/enhance activity level,Minimize linen layers,Monitor skin under medical devices,Turn and reposition approx. every two hours (pillows and wedges if needed)    Moisture Interventions: Absorbent underpads    Activity Interventions: Increase time out of bed,Pressure redistribution bed/mattress(bed type),PT/OT evaluation    Mobility Interventions: HOB 30 degrees or less,Pressure redistribution bed/mattress (bed type),PT/OT evaluation    Nutrition Interventions: Document food/fluid/supplement intake    Friction and Shear Interventions: Apply protective barrier, creams and emollients,Minimize layers                Problem: Patient Education: Go to Patient Education Activity  Goal: Patient/Family Education  Outcome: Progressing Towards Goal     Problem: Patient Education: Go to Patient Education Activity  Goal: Patient/Family Education  Outcome: Progressing Towards Goal     Problem: Falls - Risk of  Goal: *Absence of Falls  Description: Document Maya Fall Risk and appropriate interventions in the flowsheet.   Outcome: Progressing Towards Goal  Note: Fall Risk Interventions:       Mentation Interventions: Adequate sleep, hydration, pain control,Bed/chair exit alarm    Medication Interventions: Patient to call before getting OOB,Teach patient to arise slowly,Bed/chair exit alarm    Elimination Interventions: Call light in reach,Patient to call for help with toileting needs,Bed/chair exit alarm    History of Falls Interventions: Bed/chair exit alarm,Door open when patient unattended,Room close to nurse's station         Problem: Patient Education: Go to Patient Education Activity  Goal: Patient/Family Education  Outcome: Progressing Towards Goal     Problem: Diabetes Self-Management  Goal: *Disease process and treatment process  Description: Define diabetes and identify own type of diabetes; list 3 options for treating diabetes. Outcome: Progressing Towards Goal  Goal: *Incorporating nutritional management into lifestyle  Description: Describe effect of type, amount and timing of food on blood glucose; list 3 methods for planning meals. Outcome: Progressing Towards Goal  Goal: *Incorporating physical activity into lifestyle  Description: State effect of exercise on blood glucose levels. Outcome: Progressing Towards Goal  Goal: *Developing strategies to promote health/change behavior  Description: Define the ABC's of diabetes; identify appropriate screenings, schedule and personal plan for screenings. Outcome: Progressing Towards Goal  Goal: *Using medications safely  Description: State effect of diabetes medications on diabetes; name diabetes medication taking, action and side effects. Outcome: Progressing Towards Goal  Goal: *Monitoring blood glucose, interpreting and using results  Description: Identify recommended blood glucose targets  and personal targets. Outcome: Progressing Towards Goal  Goal: *Prevention, detection, treatment of acute complications  Description: List symptoms of hyper- and hypoglycemia; describe how to treat low blood sugar and actions for lowering  high blood glucose level. Outcome: Progressing Towards Goal  Goal: *Prevention, detection and treatment of chronic complications  Description: Define the natural course of diabetes and describe the relationship of blood glucose levels to long term complications of diabetes.   Outcome: Progressing Towards Goal  Goal: *Developing strategies to address psychosocial issues  Description: Describe feelings about living with diabetes; identify support needed and support network  Outcome: Progressing Towards Goal  Goal: *Insulin pump training  Outcome: Progressing Towards Goal  Goal: *Sick day guidelines  Outcome: Progressing Towards Goal  Goal: *Patient Specific Goal (EDIT GOAL, INSERT TEXT)  Outcome: Progressing Towards Goal     Problem: Patient Education: Go to Patient Education Activity  Goal: Patient/Family Education  Outcome: Progressing Towards Goal

## 2021-12-18 NOTE — PROGRESS NOTES
[address]  1454 [facility] [address]50, 1632 [facility], [address]      PLAN: Transferred to floor 12/17/21  Consult Dr. Otila Cheadle plastic surgery - for wound management and closure. Diabetic diet   Wound care nurse for dressing changes - pack daily with betadine soaked kerlix and cover with abd pad  Medical management per hospitalist  Continue meropenem, vancomycin and clindamycin  Out of bed to chair if dressings will remain in place. ASSESSMENT:  Admit Date: 12/15/2021   2 Day Post-Op  Procedure(s):  INCISION AND DRAINAGE PERINEUM AND TRUNK    Principal Problem:    Necrotizing fasciitis ([address] (12/15/2021)    Active Problems:    DM2 (diabetes mellitus, type 2) ([address] (12/25/2015)      HTN (hypertension) (12/25/2015)      Obesity, morbid ([address] (7/12/2018)      Anemia (7/13/2018)      DEVENDRA (acute kidney injury) ([address] (12/16/2021)      Acute respiratory failure ([address] (12/16/2021)      Sepsis with acute renal failure and tubular necrosis without septic shock ([address] (12/16/2021)         SUBJECTIVE: Alert and awake in bed. No complaints. Vital signs stable afebrile. Denies pain. Tolerating diet. Dressing change yesterday by Wound care nurse. Wound nurse not available on weekend. Dressing change at bedside with help of RN. Wound bed clean with no necrotic tissue      OBJECTIVE:  Constitutional: Alert oriented cooperative patient in no acute distress; appears stated age   Visit Vitals  /82 (BP 1 Location: Left upper arm, BP Patient Position: At rest)   Pulse (!) 115   Temp 97.7 °F (36.5 °C)   Resp 24   Ht 5' 7.5\" (1.715 m)   Wt 323 lb 3.1 oz (146.6 kg)   SpO2 98%   BMI 49.87 kg/m²     Eyes: Sclera are clear. ENMT: no external lesions gross hearing normal; no obvious neck masses, no ear or lip lesions  CV: RRR. Normal perfusion  Resp: No JVD. Breathing is  non-labored; no audible wheezing. GI: Soft no tenderness to palpation.   No additional skin changes to suggest ongoing fasciitis. Musculoskeletal: unremarkable with normal function. No embolic signs or cyanosis.    Neuro:  Oriented; moves all 4; no focal deficits  Psychiatric: normal affect and mood, no memory impairment      Patient Vitals for the past 24 hrs:   BP Temp Pulse Resp SpO2 Weight   12/18/21 0802 133/82 97.7 °F (36.5 °C) (!) 115 24 98 % --   12/18/21 0800 -- -- (!) 117 -- -- --   12/18/21 0512 -- -- -- -- -- 323 lb 3.1 oz (146.6 kg)   12/18/21 0429 (!) 151/76 97.9 °F (36.6 °C) (!) 113 18 98 % --   12/18/21 0400 -- -- 71 -- -- --   12/18/21 0000 -- -- 72 -- -- --   12/17/21 2307 131/63 98.1 °F (36.7 °C) 71 18 100 % --   12/17/21 2028 -- -- -- -- 97 % --   12/17/21 2000 -- -- 71 -- -- --   12/17/21 1949 121/60 97.9 °F (36.6 °C) 71 18 97 % --   12/17/21 1832 114/73 98 °F (36.7 °C) 64 18 96 % --   12/17/21 1800 119/76 98.1 °F (36.7 °C) 72 18 98 % --   12/17/21 1706 115/72 98.2 °F (36.8 °C) 69 16 96 % --   12/17/21 1620 128/64 98.1 °F (36.7 °C) 68 20 99 % --   12/17/21 1601 136/73 97.9 °F (36.6 °C) 70 20 94 % --   12/17/21 1300 (!) 151/62 98.3 °F (36.8 °C) 74 20 97 % --   12/17/21 1147 (!) 149/58 -- 70 23 98 % --   12/17/21 1132 139/65 -- 67 25 98 % --   12/17/21 1117 135/64 -- 67 15 99 % --     Labs:    Recent Labs     12/18/21  0504   WBC 14.7*   HGB 8.5*      *   K 3.9   CL 90*   CO2 27   *   CREA 9.12*   *   TBILI 0.2   ALT 11*   AP 71         Fidencio Lennox, NP

## 2021-12-18 NOTE — CONSULTS
Infectious Disease Consult    Today's Date: 12/18/2021   Admit Date: 12/15/2021    Impression:   · Necrotizing fasciitis, s/p debridement 12/15, 12/16, cultures with proteus, CoNS, pre-op cx with presumed enterococcus  · Morbid obesity with BMI >49  · DM not on medications, A1c 6.6  · DEVENDRA, Nephrology is following    Plan:   · Recommend vancomycin with pharm dosing intermittently, ceftriaxone and flagyl. · Surgery plans to re-evaluate, possible closure Monday. Anti-infectives:   · Clindamycin (12/15 -  · Meropenem (12/15 -  · Vancomycin (12/15, 12/18 -  · Ceftriaxone (12/18 -  · Flagyl (12/18 -  · Zosyn (12/15)    Subjective:   Date of Consultation:  December 18, 2021  Referring Physician: Ioana Cutler, Hospitalist    Patient is a 46 y.o. female with an underlying medical history that includes DM II with A1c 6.6, stopped taking metformin due to diarrhea over a year ago, hypertension, gout and morbid obesity. She was seen by a new PCP 12/14/21 to establish care, and at that visit reported that she had had an abscess in her groin that ruptured about a week prior, treated with Bactrim at Urgent Care. Her PCP noted two abscesses, with concern for tunneling between the two areas. She was referred to Surgery, but presented to ED with palpitations, fevers, and tachycardia. She had DEVENDRA with creatinine 11.2 on admission, Nephrology has been consulted. She had CT in the ED that showed abscess from the labia and pannus extending into the L pelvis, with gas and stranding concerning for necrotizing fasciitis. She had leukocytosis to 35k, and was taken to the OR 12/15 and 12/16 for I&D with findings of necrotic necrotizing fasciitis, debridement of dead tissue down to the pubic bone and L iliac bone, and operative cultures positive with proteus and CoNS. Pre-op cultures also positive with presumptive enterococcus. She has been on antibiotics as above and ID has been asked for further recommendations. She has been afebrile.  Her WBCs has been trending down, now 14.7k. Patient Active Problem List   Diagnosis Code    DM2 (diabetes mellitus, type 2) ([facility] E11.9    HTN (hypertension) I10    Gout M10.9    Obesity, morbid ([facility] E66.01    Anemia D64.9    [facility] fasc[facility] ([facility] M72.6    DEVENDRA (acute kidney injury) ([facility] N17.9    Acute respiratory failure ([facility] J96.00    Sepsis with acute renal failure and tubular necrosis without septic shock (HCC) A41.9, R65.20, N17.0     Past Medical History:   Diagnosis Date    Anxiety     Diabetes ([facility]     type 2. oral meds. Pt does not monitor bs. Last A1C (9/2017)=6.9    GERD (gastroesophageal reflux disease)     OTC prn    Gout     Hypertension     controlled w/med    IBS (irritable bowel syndrome)       Family History   Problem Relation Age of Onset    Heart Failure Mother         congestive    Heart Disease Mother     Hypertension Mother     Cancer Father         Lung/Mets to Bone and Brain and liver    Diabetes Father     Hypertension Father       Social History     Tobacco Use    Smoking status: Never Smoker    Smokeless tobacco: Never Used   Substance Use Topics    Alcohol use: Yes     Comment: occ     Past Surgical History:   Procedure Laterality Date    HX DILATION AND CURETTAGE  07/2018      Prior to Admission medications    Medication Sig Start Date End Date Taking? Authorizing Provider   losartan 50 mg tab 100 mg, hydroCHLOROthiazide 25 mg tab 25 mg Take 1 Dose by mouth daily. Yes Provider, Historical   amLODIPine (NORVASC) 5 mg tablet Take 5 mg by mouth daily. Yes Provider, Historical   gemfibroziL (LOPID) 600 mg tablet Take 600 mg by mouth two (2) times a day. Yes Provider, Historical   cyanocobalamin (VITAMIN B12) 500 mcg tablet Take 500 mcg by mouth daily.    Yes Provider, Historical   ondansetron (ZOFRAN ODT) 4 mg disintegrating tablet Take 1 Tablet by mouth every eight (8) hours as needed for Nausea or Vomiting. 12/14/21  Yes Jerica Sandoval NP doxycycline (MONODOX) 100 mg capsule Take 1 Capsule by mouth two (2) times a day for 10 days. 21 Yes Violeta Del Valle NP   metoprolol tartrate (LOPRESSOR) 100 mg IR tablet Take 100 mg by mouth two (2) times a day. Per anesthesia protocol:instructed to take am of surgery. 17  Yes Provider, Historical   allopurinol (ZYLOPRIM) 300 mg tablet Take 300 mg by mouth daily. Per anesthesia protocol:instructed to take am of surgery. Indications: prevention of acute gout attack   Yes Other, MD Wilfredo   sertraline (ZOLOFT) 100 mg tablet Take 100 mg by mouth daily. Per anesthesia protocol:instructed to take am of surgery. Yes Other, MD Wilfredo   dicyclomine (BENTYL) 10 mg capsule Take 10 mg by mouth every six (6) hours as needed for PRN Reason (Other) (IBS). Indications: irritable colon   Yes Provider, Historical   cyanocobalamin (VITAMIN B12) 1,000 mcg/mL injection 1,000 mcg by IntraMUSCular route. Patient not taking: Reported on 2021 10/4/16   Provider, Historical   metFORMIN (GLUCOPHAGE) 500 mg tablet Take 1,000 mg by mouth two (2) times daily (with meals). Patient not taking: Reported on 2021    Other, MD Wilfredo       Allergies   Allergen Reactions    Ace Inhibitors Angioedema    Arb-Angiotensin Receptor Antagonist Angioedema    Cayenne Pepper Hives     Swelling of the mouth    Bactrim [Sulfamethoprim] Other (comments)     Nausea, vomiting, making her feel like she is going to pass out      Keflex [Cephalexin] Nausea and Vomiting        Review of Systems:  Pertinent items are noted in the History of Present Illness.     Objective:     Visit Vitals  BP (!) 149/84 (BP 1 Location: Left upper arm, BP Patient Position: At rest)   Pulse (!) 117   Temp 97.7 °F (36.5 °C)   Resp 20   Ht 5' 7.5\" (1.715 m)   Wt 146.6 kg (323 lb 3.1 oz)   SpO2 100%   BMI 49.87 kg/m²     Temp (24hrs), Av °F (36.7 °C), Min:97.7 °F (36.5 °C), Max:98.2 °F (36.8 °C)       Lines:  Peripheral IV:       Physical Exam:    General:  Alert, cooperative, morbid obesity, appears stated age, pleasant   Eyes:  Sclera anicteric, EOMI   Mouth/Throat: Mucous membranes normal, oral pharynx clear   Neck: Supple   Lungs:   Clear to auscultation bilaterally, no increased work of breathing on room air   CV:  Regular rate and rhythm, no audible murmur, click, rub or gallop   Abdomen:   Soft, non-tender, hypoactive bowel sounds   Extremities: No cyanosis or edema   Skin: No erythema beyond edges of dressing - new dressing and painful; no rash   Musculoskeletal: No swelling or deformity   Lines/Devices:  Intact, no erythema, drainage or tenderness   Psych: Alert and oriented, appropriate        Data Review:     CBC:  Recent Labs     12/18/21  0504 12/17/21  1939 12/17/21 0345 12/16/21  1123 12/16/21  1123 12/16/21  0310 12/16/21  0310   WBC 14.7*  --  15.2*  --   --   --  34.6*   GRANS  --   --   --   --   --   --  87*   MONOS  --   --   --   --   --   --  4   EOS  --   --   --   --   --   --  0*   ANEU  --   --   --   --   --   --  30.1*   ABL  --   --   --   --   --   --  1.5   HGB 8.5* 8.3* 5.8*   < > 7.4*   < > 7.3*   HCT 24.4*  --  16.5*  --  20.9*   < > 20.5*     --  199  --   --   --  332    < > = values in this interval not displayed.        BMP:  Recent Labs     12/18/21  0504 12/17/21 0345 12/16/21 0310   CREA 9.12* 9.03* 9.19*   * 101* 106*   * 131* 132*   K 3.9 3.4* 3.8   CL 90* 91* 95*   CO2 27 27 20*   AGAP 15 13 17*   * 153* 174*       LFTS:  Recent Labs     12/18/21  0504 12/17/21 0345 12/16/21 0310   TBILI 0.2 0.3 0.2   ALT 11* 7* 11*   AP 71 66 81   TP 5.8* 4.8* 4.8*   ALB 2.4* 2.3* 1.7*       Microbiology:     All Micro Results     Procedure Component Value Units Date/Time    CULTURE, Alexandra Downer STAIN [[id number]]  (Abnormal)  (Susceptibility) Collected: 12/15/21 4190    Order Status: Completed Specimen: Peritoneum Updated: 12/18/21 0936     Special Requests: NO SPECIAL REQUESTS        Bianka Smith STAIN 0 TO 6 WBCS PER OIF      MANY GRAM POSITIVE COCCI         MANY GRAM NEGATIVE RODS        Culture result:       MODERATE PROTEUS MIRABILIS                  MODERATE STAPHYLOCOCCUS SPECIES, COAGULASE NEGATIVE THIS ORGANISM WILL BE HELD FOR 7 DAYS. IF FURTHER TESTING IS REQUIRED PLEASE NOTIFY MICROBIOLOGY          CULTURE, Saintclair Milling STAIN [[id number]]  (Abnormal)  (Susceptibility) Collected: 12/15/21 1323    Order Status: Completed Specimen: Wound from Groin Updated: 12/18/21 0929     Special Requests: NO SPECIAL REQUESTS        GRAM STAIN 0 TO 36 WBCS PER OIF      MANY GRAM POSITIVE COCCI               MODERATE GRAM NEGATIVE RODS                  MODERATE GRAM POSITIVE RODS           Culture result:       MODERATE PROTEUS MIRABILIS                  MODERATE PRESUMPTIVE ENTEROCOCCUS SPECIES IDENTIFICATION AND SUSCEPTIBILITY TO FOLLOW                  LIGHT NORMAL SKIN MASSIMO ISOLATED          BLOOD CULTURE [[id number]] Collected: 12/15/21 1236    Order Status: Completed Specimen: Blood Updated: 12/18/21 0645     Special Requests: --        RIGHT  HAND       Culture result: NO GROWTH 3 DAYS       BLOOD CULTURE [[id number]] Collected: 12/15/21 1215    Order Status: Completed Specimen: Blood Updated: 12/18/21 0645     Special Requests: --        LEFT  HAND       Culture result: NO GROWTH 3 DAYS       CULTURE, ANAEROBIC [[id number]] Collected: 12/15/21 1710    Order Status: Completed Specimen: Peritoneum Updated: 12/17/21 0834     Special Requests: NO SPECIAL REQUESTS        Culture result:       SUBCULTURE IS NECESSARY TO DETERMINE PRESENCE OR ABSENCE OF ANAEROBIC BACTERIA IN THIS CULTURE. FURTHER REPORT TO FOLLOW AFTER INCUBATION OF SUBCULTURE.           COVID-19 RAPID TEST [[id number]] Collected: 12/15/21 1519    Order Status: Completed Specimen: Nasopharyngeal Updated: 12/15/21 1549     Specimen source NASAL        COVID-19 rapid test Not detected        Comment:      The specimen is NEGATIVE for SARS-CoV-2, the novel coronavirus associated with COVID-19. A negative result does not rule out COVID-19. This test has been authorized by the FDA under an Emergency Use Authorization (EUA) for use by authorized laboratories. Fact sheet for Healthcare Providers: ConventionUpdate.co.nz  Fact sheet for Patients: ConventionUpdate.co.nz       Methodology: Isothermal Nucleic Acid Amplification               Imagin21 CXR persistent bilateral lung infiltrates  12/15/21 CT abd pelv wo cont  IMPRESSION  1. There is soft tissue stranding and extensive subcutaneous emphysema, beginning in the left labia and left perineal soft tissues, extending anterior and inferiorly within the ventral pannus, and extending superiorly and laterally throughout the ventral pannus, ventral pelvic wall, and left flank fat. The cephalad extent is through the level of the anterior superior iliac spine. The constellation of features is consistent with necrotizing fasciitis, without definite evidence of a well-formed fluid collection within the limits of noncontrast technique. 2. No evidence of intraperitoneal extension.     3. Left greater than right renal cortical thinning.     4. Mildly distended gallbladder. Absent gallbladder wall thickening and  surrounding free fluid, this is most likely a mildly patulous gallbladder.       Signed By: Elba Fischer NP     2021

## 2021-12-18 NOTE — PROGRESS NOTES
[facility] Progress Note      Admission Date: 12/15/2021   Subjective: In room,  present.  Feels ok     Objective:     Physical Exam:      Visit Vitals  /82 (BP 1 Location: Left upper arm, BP Patient Position: At rest)   Pulse (!) 115   Temp 97.7 °F (36.5 °C)   Resp 24   Ht 5' 7.5\" (1.715 m)   Wt 146.6 kg (323 lb 3.1 oz)   SpO2 98%   BMI 49.87 kg/m²      Gen: comfortable , NAD  HEENT: moist membranes  CV: S1, S2  Lungs: Clear bilaterally  Extem: +1 edema      Current Facility-Administered Medications   Medication Dose Route Frequency    calcium acetate (phosphate binder) (PHOSLO) tablet 667 mg  1 Tablet Oral TID WITH MEALS    magnesium oxide (MAG-OX) tablet 800 mg  800 mg Oral BID    insulin lispro (HUMALOG) injection   SubCUTAneous AC&HS    famotidine (PEPCID) tablet 20 mg  20 mg Oral BID    insulin glargine (LANTUS) injection 14 Units  14 Units SubCUTAneous DAILY    heparin (porcine) injection 5,000 Units  5,000 Units SubCUTAneous Q8H    0.9% sodium chloride infusion 250 mL  250 mL IntraVENous PRN    0.9% sodium chloride infusion 250 mL  250 mL IntraVENous PRN    sodium chloride (NS) flush 5-40 mL  5-40 mL IntraVENous Q8H    sodium chloride (NS) flush 5-40 mL  5-40 mL IntraVENous PRN    ondansetron (ZOFRAN ODT) tablet 4 mg  4 mg Oral Q8H PRN    Or    ondansetron (ZOFRAN) injection 4 mg  4 mg IntraVENous Q6H PRN    potassium chloride 10 mEq in 100 ml IVPB  10 mEq IntraVENous PRN    magnesium sulfate 2 g/50 ml IVPB (premix or compounded)  2 g IntraVENous PRN    HYDROmorphone (DILAUDID) injection 0.5 mg  0.5 mg IntraVENous Q3H PRN    meropenem (MERREM) 500 mg in 0.9% sodium chloride (MBP/ADV) 50 mL MBP  0.5 g IntraVENous Q12H    clindamycin (CLEOCIN) 900mg D5W 50mL IVPB (premix)  900 mg IntraVENous Q8H            Data Review:     LABS:   Recent Results (from the past 12 hour(s))   CBC W/O DIFF    Collection Time: 12/18/21  5:04 AM   Result Value Ref Range    WBC 14.7 (H) 4.3 - 11.1 K/uL    RBC 2.90 (L) 4.05 - 5.2 M/uL    HGB 8.5 (L) 11.7 - 15.4 g/dL    HCT 24.4 (L) 35.8 - 46.3 %    MCV 84.1 79.6 - 97.8 FL    MCH 29.3 26.1 - 32.9 PG    MCHC 34.8 31.4 - 35.0 g/dL    RDW 14.2 11.9 - 14.6 %    PLATELET 077 799 - 417 K/uL    MPV 9.5 9.4 - 12.3 FL    ABSOLUTE NRBC 0.00 0.0 - 0.2 K/uL   METABOLIC PANEL, COMPREHENSIVE    Collection Time: 12/18/21  5:04 AM   Result Value Ref Range    Sodium 132 (L) 136 - 145 mmol/L    Potassium 3.9 3.5 - 5.1 mmol/L    Chloride 90 (L) 98 - 107 mmol/L    CO2 27 21 - 32 mmol/L    Anion gap 15 7 - 16 mmol/L    Glucose 146 (H) 65 - 100 mg/dL     (H) 6 - 23 MG/DL    Creatinine 9.12 (H) 0.6 - 1.0 MG/DL    GFR est AA 6 (L) >60 ml/min/1.73m2    GFR est non-AA 5 (L) >60 ml/min/1.73m2    Calcium 7.0 (L) 8.3 - 10.4 MG/DL    Bilirubin, total 0.2 0.2 - 1.1 MG/DL    ALT (SGPT) 11 (L) 12 - 65 U/L    AST (SGOT) 8 (L) 15 - 37 U/L    Alk.  phosphatase 71 50 - 136 U/L    Protein, total 5.8 (L) 6.3 - 8.2 g/dL    Albumin 2.4 (L) 3.5 - 5.0 g/dL    Globulin 3.4 2.3 - 3.5 g/dL    A-G Ratio 0.7 (L) 1.2 - 3.5     PHOSPHORUS    Collection Time: 12/18/21  5:04 AM   Result Value Ref Range    Phosphorus 9.8 (H) 2.5 - 4.5 MG/DL   MAGNESIUM    Collection Time: 12/18/21  5:04 AM   Result Value Ref Range    Magnesium 2.1 1.8 - 2.4 mg/dL   GLUCOSE, POC    Collection Time: 12/18/21  7:58 AM   Result Value Ref Range    Glucose (POC) 176 (H) 65 - 100 mg/dL    Performed by Otilio          Plan:     Principal Problem:    Necrotizing fasciitis ([address] (12/15/2021)    Active Problems:    DM2 (diabetes mellitus, type 2) ([address] (12/25/2015)      HTN (hypertension) (12/25/2015)      Obesity, morbid ([address] (7/12/2018)      Anemia (7/13/2018)      DEVENDRA (acute kidney injury) ([address] (12/16/2021)      Acute respiratory failure ([address] (12/16/2021)      Sepsis with acute renal failure and tubular necrosis without septic shock ([address] (12/16/2021)       DEVENDRA with unclear hx of CKD, secondary to DM  Necrotizing fasciitis. Hypotension with sepsis- resolved.     DEVENDRA clearly with some component of hypoperfusion. And possible ATN. - imaging CT showed some cortical thinning associated with CKD. Initially was improving but creatinine has plateaued. Nonoliguric and feels ok. Volumes status is stable. Hopefully a pause in recovery of ATN.  And will improve more soon, to avoid dialysis

## 2021-12-18 NOTE — PROGRESS NOTES
Formerly [facility], later [facility] Critical Care Note[de-identified] 12/18/2021  Bella Juarez  Admission Date: 12/15/2021     Length of Stay: 3 days    Background: 46 y.o. y/o female with necrotizing fasciitis of the pannus. She was taken to the OR 12/15 and 12/16 for debridement (down to pubic and iliac bone). Intubated for surgery. Evidence of acute kidney injury on admission. Extubated 12/16. Surgery to reassess wound next week. Notable PMH:  has a past medical history of Anxiety, Diabetes ([address] GERD (gastroesophageal reflux disease), Gout, Hypertension, and IBS (irritable bowel syndrome). She has no past medical history of Aneurysm ([address] Arrhythmia, Arthritis, Asthma, Autoimmune disease ([address] CAD (coronary artery disease), Cancer ([address] Chronic kidney disease, Chronic obstructive pulmonary disease ([address] Chronic pain, Coagulation disorder ([address] Difficult intubation, Endocarditis, Heart failure ([address] Liver disease, Malignant hyperthermia due to anesthesia, Nausea & vomiting, Nicotine vapor product user, Non-nicotine vapor product user, Pseudocholinesterase deficiency, Psychiatric disorder, PUD (peptic ulcer disease), Rheumatic fever, Seizures ([address] Sleep apnea, Stroke ([address] Thromboembolus ([address] or Thyroid disease. 24 Hour events:  Transferred out of ICU no major overnight events     ROS:   Constitutional: negative for fever, chills, sweats  Cardiovascular: negative for chest pain, palpitations, syncope, edema. Gastrointestinal:  negative for dysphagia, reflux, vomiting, diarrhea, abdominal pain, or melena  Neurologic:  negative for focal weakness, numbness, headache    Lines: (insertion date)   ETT: (12/15, removed 12/16)  Pardo: (12/15)  OGT: (NA)  Central line: (12/15)    Drips: current dose (range)  Bicarb at 200 ml/hour    Pertinent Exam:         Blood pressure 133/82, pulse (!) 115, temperature 97.7 °F (36.5 °C), resp. rate 24, height 5' 7.5\" (1.715 m), weight 323 lb 3.1 oz (146.6 kg), SpO2 98 %. Intake/Output Summary (Last 24 hours) at 12/18/2021 1132  Last data filed at 12/18/2021 0513  Gross per 24 hour   Intake 996.4 ml   Output 2000 ml   Net -1003.6 ml     Constitutional:  awake and alert. Interactive, appropriate. Denies pain  EENMT:  Sclera clear, pupils equal, oral mucosa moist  Respiratory: clear bilaterally. Respirations even and not labored. Breath sounds overall decreased. Wearing 2 liter cannula  Cardiovascular:  RRR - sinus per telemetry  Gastrointestinal:  soft with tenderness; positive bowel sounds present. + abdominal wound  Musculoskeletal:  warm with no cyanosis, + edema  Skin:  no jaundice or ecchymosis  Neurologic: awake and alert  Psychiatric: calm     CXR:  None today    Recent Labs     12/18/21  0504 12/17/21 1939 12/17/21 0345 12/16/21  1123 12/16/21  1123 12/16/21  0310 12/16/21  0310 12/15/21  1931 12/15/21  1931 12/15/21  1236   WBC 14.7*  --  15.2*  --   --   --  34.6*   < > 41.1*  --    HGB 8.5* 8.3* 5.8*   < > 7.4*   < > 7.3*   < > 9.0*  --    HCT 24.4*  --  16.5*  --  20.9*   < > 20.5*   < > 27.0*  --      --  199  --   --   --  332   < > 398  --    LAC  --   --   --   --   --   --   --   --  1.1 1.4    < > = values in this interval not displayed. Recent Labs     12/18/21  0504 12/17/21 0345 12/16/21  0310   * 131* 132*   K 3.9 3.4* 3.8   CL 90* 91* 95*   CO2 27 27 20*   * 153* 174*   * 101* 106*   CREA 9.12* 9.03* 9.19*   MG 2.1 1.8  --    CA 7.0* 6.7* 6.8*   PHOS 9.8* 9.4*  --    ALB 2.4* 2.3* 1.7*   AST 8* 9* 12*   ALT 11* 7* 11*   AP 71 66 81     Recent Labs     12/15/21  1931 12/15/21  1236   LAC 1.1 1.4     Recent Labs     12/18/21  1056 12/18/21  0758 12/17/21  2111   GLUCPOC 170* 176* 198*     ECHO: No results found for this or any previous visit.      Results     Procedure Component Value Units Date/Time    CULTURE, Fabiola Shipman STAIN [[id number]]  (Abnormal)  (Susceptibility) Collected: 12/15/21 1710    Order Status: Completed Specimen: Peritoneum Updated: 12/18/21 0936     Special Requests: NO SPECIAL REQUESTS        GRAM STAIN 0 TO 6 WBCS PER OIF      MANY GRAM POSITIVE COCCI         MANY GRAM NEGATIVE RODS        Culture result:       MODERATE PROTEUS MIRABILIS                  MODERATE STAPHYLOCOCCUS SPECIES, COAGULASE NEGATIVE THIS ORGANISM WILL BE HELD FOR 7 DAYS. IF FURTHER TESTING IS REQUIRED PLEASE NOTIFY MICROBIOLOGY          Susceptibility      Proteus mirabilis     REBEKAH     Aztreonam ($$$$) Susceptible     Cefazolin ($) Susceptible     Cefepime ($$) Susceptible     Ceftriaxone ($) Susceptible     Ciprofloxacin ($) Susceptible     Gentamicin ($) Susceptible     Levofloxacin ($) Susceptible     Piperacillin/Tazobac ($) Susceptible     Tobramycin ($) Susceptible     Trimeth-Sulfamethoxa Susceptible                  Linear View                   CULTURE, ANAEROBIC [[id number]] Collected: 12/15/21 1710    Order Status: Completed Specimen: Peritoneum Updated: 12/17/21 0834     Special Requests: NO SPECIAL REQUESTS        Culture result:       SUBCULTURE IS NECESSARY TO DETERMINE PRESENCE OR ABSENCE OF ANAEROBIC BACTERIA IN THIS CULTURE. FURTHER REPORT TO FOLLOW AFTER INCUBATION OF SUBCULTURE. COVID-19 RAPID TEST [[id number]] Collected: 12/15/21 1519    Order Status: Completed Specimen: Nasopharyngeal Updated: 12/15/21 1549     Specimen source NASAL        COVID-19 rapid test Not detected        Comment:      The specimen is NEGATIVE for SARS-CoV-2, the novel coronavirus associated with COVID-19. A negative result does not rule out COVID-19. This test has been authorized by the FDA under an Emergency Use Authorization (EUA) for use by authorized laboratories.         Fact sheet for Healthcare Providers: ConventionUpdate.co.nz  Fact sheet for Patients: ConventionUpdate.co.nz       Methodology: Isothermal Nucleic Acid Amplification         CULTURE, WOUND Raford Payer STAIN [[id number]] (Abnormal)  (Susceptibility) Collected: 12/15/21 1323    Order Status: Completed Specimen: Wound from Groin Updated: 12/18/21 0911     Special Requests: NO SPECIAL REQUESTS        GRAM STAIN 0 TO 36 WBCS PER OIF      MANY GRAM POSITIVE COCCI               MODERATE GRAM NEGATIVE RODS                  MODERATE GRAM POSITIVE RODS           Culture result:       MODERATE PROTEUS MIRABILIS                  MODERATE PRESUMPTIVE ENTEROCOCCUS SPECIES IDENTIFICATION AND SUSCEPTIBILITY TO FOLLOW                  LIGHT NORMAL SKIN MASSIMO ISOLATED          Susceptibility      Proteus mirabilis     REBEKAH (Preliminary)     Aztreonam ($$$$) Susceptible     Cefazolin ($) Susceptible     Cefepime ($$) Susceptible     Ceftriaxone ($) Susceptible     Ciprofloxacin ($) Susceptible     Gentamicin ($) Susceptible     Levofloxacin ($) Susceptible     Piperacillin/Tazobac ($) Susceptible     Tobramycin ($) Susceptible     Trimeth-Sulfamethoxa Susceptible                  Linear View                   BLOOD CULTURE [[id number]] Collected: 12/15/21 1236    Order Status: Completed Specimen: Blood Updated: 12/18/21 0645     Special Requests: --        RIGHT  HAND       Culture result: NO GROWTH 3 DAYS       BLOOD CULTURE [[id number]] Collected: 12/15/21 1215    Order Status: Completed Specimen: Blood Updated: 12/18/21 0645     Special Requests: --        LEFT  HAND       Culture result: NO GROWTH 3 DAYS           Inpat Anti-Infectives (From admission, onward)     Start     Ordered Stop    12/16/21 0600  ceFAZolin (ANCEF) 3 g/30 mL in sterile water IV syringe  3 g,   IntraVENous,   ON CALL TO OR            12/15/21 2110 12/17/21 0600    12/15/21 2213  vancomycin intermittent dosing- pharmacy to dose  Other,   RX DOSING/MONITORING         12/15/21 2213 --    12/15/21 2100  meropenem (MERREM) 500 mg in 0.9% sodium chloride (MBP/ADV) 50 mL MBP  0.5 g,   IntraVENous,   EVERY 12 HOURS         12/15/21 1946 --    12/15/21 2100  clindamycin (CLEOCIN) 900mg D5W 50mL IVPB (premix)  900 mg,   IntraVENous,   EVERY 8 HOURS         12/15/21 1946 12/22/21 2059              ABG:  Recent Labs     12/16/21  0658 12/16/21  0332 12/16/21  0318   PHI 7.33* 7.36 7.36   PCO2I 40.9 36.1 34.9*   PO2I 183* 148* 167*   HCO3I 21.6* 20.3* 19.9*     Assessment and Plan:  (Medical Decision Making)   Impression: 46 y.o. female with necrotizing fasciitis of the pannus. She has been debrided twice and was intubated for surgery (12/15). Wound culture pending (+ GNRs). Extubated 12/16. Renal function slightly improved. NEURO:   Sedation: NA  Analgesia: has not required any pain medication. Paralytics: NA  CV:   Volume Status: + 5.1 liters over past 24 hours and + 14 liters overall. + DEVENDRA on admission - some improvement in labs and urine output. Nephrology following. Septic shock: as above. Off pressor. Wound culture with GNRs. WBC down. NSTEMI: NA  PULM:   Acute hypoxemic/hypercapneic respiratory failure:  Resolved. Extubated 12/16/21. IS to help with deep breathing/limited activity. Suspected ROBERT: -  reports gasping with sleep. No previous workup for ROBERT but can recommend as outpatient   RENAL:  DEVENDRA: improving. Creat 11 ->9. Urine output better (850 mls over past 24 hours). Will stop bicarb infusion today and advance po diet. Off pressor. Leave hammond for now and monitor output and labs. Lactic acidosis: as above  Electrolytes: Na still low but improved. Supplementing potassium today  GI:   Nutrition: taking water by mouth. Will advance diet  HEME:   Anemia: hgb down to 5.8 today - post op anemia. Will transfuse 2 units and recheck hgb in am. On low dose heparin but no active bleeding noted  Thrombocytopenia: NA  Anticoagulation: low dose heparin  ID:   Wound cx with GNRs - currently on Vanc, Cleocin, Merrem while awaiting cx result - WBC down today. Continue all abx as are for now. Leave central line in place for IV access.  Adjust abx after wound culture results reviewed. ENDO:   DM: oral agent at home. Using SSI and lantus for now. BS low to upper 100s  Skin: no decub, turns, preventive care  Prophy:  heparin and Pepcid    Will see as needed. Can arrange for sleep work up as op once recovered from her current surgical issues.          Full Sofía Carter MD

## 2021-12-19 LAB
ABO + RH BLD: NORMAL
ALBUMIN SERPL-MCNC: 2.3 G/DL (ref 3.5–5)
ALBUMIN/GLOB SERPL: 0.6 {RATIO} (ref 1.2–3.5)
ALP SERPL-CCNC: 80 U/L (ref 50–136)
ALT SERPL-CCNC: 11 U/L (ref 12–65)
ANION GAP SERPL CALC-SCNC: 17 MMOL/L (ref 7–16)
AST SERPL-CCNC: 9 U/L (ref 15–37)
ATRIAL RATE: 264 BPM
BACTERIA SPEC CULT: ABNORMAL
BILIRUB SERPL-MCNC: 0.3 MG/DL (ref 0.2–1.1)
BLD PROD TYP BPU: NORMAL
BLOOD GROUP ANTIBODIES SERPL: NORMAL
BPU ID: NORMAL
BUN SERPL-MCNC: 101 MG/DL (ref 6–23)
CALCIUM SERPL-MCNC: 7.6 MG/DL (ref 8.3–10.4)
CALCULATED R AXIS, ECG10: 10 DEGREES
CALCULATED T AXIS, ECG11: 102 DEGREES
CHLORIDE SERPL-SCNC: 91 MMOL/L (ref 98–107)
CO2 SERPL-SCNC: 25 MMOL/L (ref 21–32)
CREAT SERPL-MCNC: 8.98 MG/DL (ref 0.6–1)
CREAT UR-MCNC: 22.9 MG/DL
CROSSMATCH RESULT,%XM: NORMAL
DIAGNOSIS, 93000: NORMAL
ERYTHROCYTE [DISTWIDTH] IN BLOOD BY AUTOMATED COUNT: 14.5 % (ref 11.9–14.6)
GLOBULIN SER CALC-MCNC: 3.8 G/DL (ref 2.3–3.5)
GLUCOSE BLD STRIP.AUTO-MCNC: 145 MG/DL (ref 65–100)
GLUCOSE BLD STRIP.AUTO-MCNC: 156 MG/DL (ref 65–100)
GLUCOSE BLD STRIP.AUTO-MCNC: 170 MG/DL (ref 65–100)
GLUCOSE BLD STRIP.AUTO-MCNC: 185 MG/DL (ref 65–100)
GLUCOSE SERPL-MCNC: 128 MG/DL (ref 65–100)
GRAM STN SPEC: ABNORMAL
HCT VFR BLD AUTO: 28.6 % (ref 35.8–46.3)
HGB BLD-MCNC: 9.8 G/DL (ref 11.7–15.4)
LACTATE SERPL-SCNC: 0.9 MMOL/L (ref 0.4–2)
MAGNESIUM SERPL-MCNC: 2.3 MG/DL (ref 1.8–2.4)
MCH RBC QN AUTO: 30.3 PG (ref 26.1–32.9)
MCHC RBC AUTO-ENTMCNC: 34.3 G/DL (ref 31.4–35)
MCV RBC AUTO: 88.5 FL (ref 79.6–97.8)
NRBC # BLD: 0 K/UL (ref 0–0.2)
PHOSPHATE SERPL-MCNC: 10.1 MG/DL (ref 2.5–4.5)
PLATELET # BLD AUTO: 234 K/UL (ref 150–450)
PMV BLD AUTO: 9.6 FL (ref 9.4–12.3)
POTASSIUM SERPL-SCNC: 4.4 MMOL/L (ref 3.5–5.1)
PROT SERPL-MCNC: 6.1 G/DL (ref 6.3–8.2)
PROT UR-MCNC: 81 MG/DL
PROT/CREAT UR-RTO: 3.5
Q-T INTERVAL, ECG07: 392 MS
QRS DURATION, ECG06: 88 MS
QTC CALCULATION (BEZET), ECG08: 556 MS
RBC # BLD AUTO: 3.23 M/UL (ref 4.05–5.2)
SERVICE CMNT-IMP: ABNORMAL
SODIUM SERPL-SCNC: 133 MMOL/L (ref 136–145)
SODIUM UR-SCNC: 53 MMOL/L
SPECIMEN EXP DATE BLD: NORMAL
STATUS OF UNIT,%ST: NORMAL
UNIT DIVISION, %UDIV: 0
VANCOMYCIN SERPL-MCNC: 17.6 UG/ML
VENTRICULAR RATE, ECG03: 121 BPM
WBC # BLD AUTO: 18.2 K/UL (ref 4.3–11.1)

## 2021-12-19 PROCEDURE — 74011250637 HC RX REV CODE- 250/637: Performed by: NURSE PRACTITIONER

## 2021-12-19 PROCEDURE — 74011636637 HC RX REV CODE- 636/637: Performed by: FAMILY MEDICINE

## 2021-12-19 PROCEDURE — 80053 COMPREHEN METABOLIC PANEL: CPT

## 2021-12-19 PROCEDURE — 83605 ASSAY OF LACTIC ACID: CPT

## 2021-12-19 PROCEDURE — 36415 COLL VENOUS BLD VENIPUNCTURE: CPT

## 2021-12-19 PROCEDURE — 80202 ASSAY OF VANCOMYCIN: CPT

## 2021-12-19 PROCEDURE — 74011250637 HC RX REV CODE- 250/637: Performed by: INTERNAL MEDICINE

## 2021-12-19 PROCEDURE — 84156 ASSAY OF PROTEIN URINE: CPT

## 2021-12-19 PROCEDURE — 74011636637 HC RX REV CODE- 636/637: Performed by: NURSE PRACTITIONER

## 2021-12-19 PROCEDURE — 84100 ASSAY OF PHOSPHORUS: CPT

## 2021-12-19 PROCEDURE — 65660000000 HC RM CCU STEPDOWN

## 2021-12-19 PROCEDURE — 74011250636 HC RX REV CODE- 250/636: Performed by: SURGERY

## 2021-12-19 PROCEDURE — 74011250636 HC RX REV CODE- 250/636: Performed by: INTERNAL MEDICINE

## 2021-12-19 PROCEDURE — 74011000258 HC RX REV CODE- 258: Performed by: INTERNAL MEDICINE

## 2021-12-19 PROCEDURE — 74011250636 HC RX REV CODE- 250/636: Performed by: NURSE PRACTITIONER

## 2021-12-19 PROCEDURE — 83735 ASSAY OF MAGNESIUM: CPT

## 2021-12-19 PROCEDURE — 84300 ASSAY OF URINE SODIUM: CPT

## 2021-12-19 PROCEDURE — 85027 COMPLETE CBC AUTOMATED: CPT

## 2021-12-19 PROCEDURE — 93005 ELECTROCARDIOGRAM TRACING: CPT | Performed by: INTERNAL MEDICINE

## 2021-12-19 RX ORDER — FAMOTIDINE 20 MG/1
20 TABLET, FILM COATED ORAL 2 TIMES DAILY
Status: DISCONTINUED | OUTPATIENT
Start: 2021-12-19 | End: 2021-12-19 | Stop reason: DRUGHIGH

## 2021-12-19 RX ORDER — METOPROLOL TARTRATE 50 MG/1
100 TABLET ORAL 2 TIMES DAILY
Status: DISCONTINUED | OUTPATIENT
Start: 2021-12-19 | End: 2021-12-27 | Stop reason: HOSPADM

## 2021-12-19 RX ORDER — AMLODIPINE BESYLATE 5 MG/1
5 TABLET ORAL DAILY
Status: DISCONTINUED | OUTPATIENT
Start: 2021-12-20 | End: 2021-12-27 | Stop reason: HOSPADM

## 2021-12-19 RX ORDER — SERTRALINE HYDROCHLORIDE 50 MG/1
100 TABLET, FILM COATED ORAL DAILY
Status: DISCONTINUED | OUTPATIENT
Start: 2021-12-20 | End: 2021-12-27 | Stop reason: HOSPADM

## 2021-12-19 RX ADMIN — HEPARIN SODIUM 5000 UNITS: 5000 INJECTION INTRAVENOUS; SUBCUTANEOUS at 05:52

## 2021-12-19 RX ADMIN — METOPROLOL TARTRATE 100 MG: 50 TABLET, FILM COATED ORAL at 13:35

## 2021-12-19 RX ADMIN — Medication 2 UNITS: at 13:32

## 2021-12-19 RX ADMIN — Medication 2 UNITS: at 21:23

## 2021-12-19 RX ADMIN — CALCIUM ACETATE 667 MG: 667 TABLET ORAL at 16:41

## 2021-12-19 RX ADMIN — Medication 10 ML: at 05:52

## 2021-12-19 RX ADMIN — HEPARIN SODIUM 5000 UNITS: 5000 INJECTION INTRAVENOUS; SUBCUTANEOUS at 13:35

## 2021-12-19 RX ADMIN — CEFTRIAXONE 2 G: 2 INJECTION, POWDER, FOR SOLUTION INTRAMUSCULAR; INTRAVENOUS at 16:41

## 2021-12-19 RX ADMIN — Medication 10 ML: at 21:20

## 2021-12-19 RX ADMIN — Medication 2 UNITS: at 18:13

## 2021-12-19 RX ADMIN — Medication 800 MG: at 08:46

## 2021-12-19 RX ADMIN — HYDROMORPHONE HYDROCHLORIDE 0.5 MG: 1 INJECTION, SOLUTION INTRAMUSCULAR; INTRAVENOUS; SUBCUTANEOUS at 17:23

## 2021-12-19 RX ADMIN — SODIUM CHLORIDE 1000 ML: 900 INJECTION, SOLUTION INTRAVENOUS at 09:20

## 2021-12-19 RX ADMIN — HYDROMORPHONE HYDROCHLORIDE 0.5 MG: 1 INJECTION, SOLUTION INTRAMUSCULAR; INTRAVENOUS; SUBCUTANEOUS at 10:26

## 2021-12-19 RX ADMIN — Medication 800 MG: at 17:24

## 2021-12-19 RX ADMIN — HEPARIN SODIUM 5000 UNITS: 5000 INJECTION INTRAVENOUS; SUBCUTANEOUS at 21:18

## 2021-12-19 RX ADMIN — Medication 10 ML: at 13:35

## 2021-12-19 RX ADMIN — INSULIN GLARGINE 14 UNITS: 100 INJECTION, SOLUTION SUBCUTANEOUS at 09:00

## 2021-12-19 RX ADMIN — METRONIDAZOLE 500 MG: 500 TABLET ORAL at 16:41

## 2021-12-19 RX ADMIN — CALCIUM ACETATE 667 MG: 667 TABLET ORAL at 08:46

## 2021-12-19 RX ADMIN — FAMOTIDINE 20 MG: 20 TABLET ORAL at 08:46

## 2021-12-19 RX ADMIN — HYDROMORPHONE HYDROCHLORIDE 0.5 MG: 1 INJECTION, SOLUTION INTRAMUSCULAR; INTRAVENOUS; SUBCUTANEOUS at 02:27

## 2021-12-19 RX ADMIN — METRONIDAZOLE 500 MG: 500 TABLET ORAL at 02:37

## 2021-12-19 RX ADMIN — METOPROLOL TARTRATE 100 MG: 50 TABLET, FILM COATED ORAL at 17:24

## 2021-12-19 RX ADMIN — CALCIUM ACETATE 667 MG: 667 TABLET ORAL at 13:35

## 2021-12-19 NOTE — PROGRESS NOTES
VANCO DAILY FOLLOW UP RENAL INSUFFICIENCY PATIENT   [address] Pharmacokinetic Monitoring Service - Vancomycin    Consulting Provider: Chavo Palacios MD   Indication: SSTI  Target Concentration: Random level ? 15 mg/L  Day of Therapy: 5  Additional Antimicrobials: ceftriaxone, metronidazole    Pertinent Laboratory Values: Wt Readings from Last 1 Encounters:   12/19/21 145.2 kg (320 lb 1.7 oz)     Temp Readings from Last 1 Encounters:   12/19/21 97.9 °F (36.6 °C)     Recent Labs     12/19/21  0551 12/18/21  0504 12/17/21  0345   * 101* 101*   CREA 8.98* 9.12* 9.03*   WBC 18.2* 14.7* 15.2*     Lab Results   Component Value Date/Time    Vancomycin, random 17.6 12/19/2021 05:51 AM     MRSA Nasal Swab: N/A. Non-respiratory infection. .    Assessment:  Date:  Dose/Freq Admin Times Level/Time:   12/15 2500 mg x 1 1315    12/16   Rd @ 8874 = 23.4   12/17   Rd @ 8937 = 23.0   12/18 12/19   Rd @ 2126 = 17.6   12/20                    Plan:  Concentration-guided dosing due to renal impairment  Random level is supra-therapeutic, estimate level will be therapeutic tomorrow morning   No dose needed at this time  Vancomycin concentration will be ordered when clinically indicated  Pharmacy will continue to monitor patient and adjust therapy as indicated    Thank you for the consult,  DIPAK Ballesteros, PharmD

## 2021-12-19 NOTE — PROGRESS NOTES
[address]  [address] [facility], [address] [place] [address], [address]      PLAN:     Transferred to floor 12/17/21  Consult Dr. Darwin East plastic surgery - for wound management and closure. Diabetic diet   Wound care nurse for dressing changes - pack daily with betadine soaked kerlix and cover with abd pad  Medical management per hospitalist - appreciate assistance  telemetry  Continue Flagyl, vancomycin and Rocephin  Out of bed to chair if dressings will remain in place. ASSESSMENT:  Admit Date: 12/15/2021   3 Day Post-Op  Procedure(s):  INCISION AND DRAINAGE PERINEUM AND TRUNK    Principal Problem:    Necrotizing fasciitis ([address] (12/15/2021)    Active Problems:    DM2 (diabetes mellitus, type 2) ([address] (12/25/2015)      HTN (hypertension) (12/25/2015)      Obesity, morbid ([address] (7/12/2018)      Anemia (7/13/2018)      DEVENDRA (acute kidney injury) ([address] (12/16/2021)      Acute respiratory failure ([address] (12/16/2021)      Sepsis with acute renal failure and tubular necrosis without septic shock ([address] (12/16/2021)       SUBJECTIVE: Alert and awake in bed. C/o burning sensation; feels like it may be from the betadine used on wound packing. Tolerating diet. Wound evaluated by Dr. Bill Mitchell who instructed RN to use NS for wound packing today rather than betadine. OBJECTIVE:  Constitutional: Alert oriented cooperative patient in no acute distress; appears stated age   Visit Vitals  /80 (BP 1 Location: Left arm, BP Patient Position: At rest)   Pulse (!) 129   Temp 97.9 °F (36.6 °C)   Resp 20   Ht 5' 7.5\" (1.715 m)   Wt 320 lb 1.7 oz (145.2 kg)   SpO2 96%   BMI 49.40 kg/m²     Eyes: Sclera are clear. ENMT: no external lesions gross hearing normal; no obvious neck masses, no ear or lip lesions  CV: Irreg/ Tachy. Normal perfusion  Resp: No JVD. Breathing is  non-labored; no audible wheezing. GI: Soft, appropriately  ttp. Wound bed clean. No necrotic tissue.  No additional skin changes to suggest ongoing fasciitis. Musculoskeletal: unremarkable with normal function. No embolic signs or cyanosis.    Neuro:  Oriented; moves all 4; no focal deficits  Psychiatric: normal affect and mood, no memory impairment      Patient Vitals for the past 24 hrs:   BP Temp Pulse Resp SpO2 Weight   12/19/21 0708 134/80 97.9 °F (36.6 °C) (!) 129 20 96 % --   12/19/21 0544 -- -- -- -- -- 320 lb 1.7 oz (145.2 kg)   12/19/21 0300 131/88 98 °F (36.7 °C) (!) 110 20 98 % --   12/18/21 2317 (!) 135/92 98.2 °F (36.8 °C) (!) 113 20 98 % --   12/18/21 1951 (!) 149/82 98.7 °F (37.1 °C) (!) 122 18 96 % --   12/18/21 1600 -- -- (!) 118 -- -- --   12/18/21 1522 131/85 98.5 °F (36.9 °C) (!) 121 20 96 % --   12/18/21 1521 132/84 97.4 °F (36.3 °C) 81 24 94 % --   12/18/21 1246 (!) 149/84 97.7 °F (36.5 °C) (!) 117 20 100 % --     Labs:    Recent Labs     12/19/21  0551   WBC 18.2*   HGB 9.8*      *   K 4.4   CL 91*   CO2 25   *   CREA 8.98*   *   TBILI 0.3   ALT 11*   AP 80         Katherine Browning NP

## 2021-12-19 NOTE — PROGRESS NOTES
[facility] Progress Note      Admission Date: 12/15/2021   Subjective: In room,  present.  Feels ok   Some anxiety    Objective:     Physical Exam:      Visit Vitals  /84 (BP 1 Location: Left arm, BP Patient Position: At rest)   Pulse 91   Temp 98.3 °F (36.8 °C)   Resp 18   Ht 5' 7.5\" (1.715 m)   Wt 145.2 kg (320 lb 1.7 oz)   SpO2 94%   BMI 49.40 kg/m²      Gen: comfortable , NAD  HEENT: moist membranes  CV: S1, S2  Lungs: Clear bilaterally  Extem: +1 edema      Current Facility-Administered Medications   Medication Dose Route Frequency    [START ON 12/20/2021] amLODIPine (NORVASC) tablet 5 mg  5 mg Oral DAILY    [START ON 12/20/2021] sertraline (ZOLOFT) tablet 100 mg  100 mg Oral DAILY    metoprolol tartrate (LOPRESSOR) tablet 100 mg  100 mg Oral BID    oxyCODONE-acetaminophen (PERCOCET) 5-325 mg per tablet 1 Tablet  1 Tablet Oral Q4H PRN    cefTRIAXone (ROCEPHIN) 2 g in 0.9% sodium chloride (MBP/ADV) 50 mL MBP  2 g IntraVENous Q24H    Vancomycin Intermittent Dosing Placeholder   Other Rx Dosing/Monitoring    famotidine (PEPCID) tablet 20 mg  20 mg Oral DAILY    metroNIDAZOLE (FLAGYL) tablet 500 mg  500 mg Oral Q12H    calcium acetate (phosphate binder) (PHOSLO) tablet 667 mg  1 Tablet Oral TID WITH MEALS    magnesium oxide (MAG-OX) tablet 800 mg  800 mg Oral BID    insulin lispro (HUMALOG) injection   SubCUTAneous AC&HS    insulin glargine (LANTUS) injection 14 Units  14 Units SubCUTAneous DAILY    heparin (porcine) injection 5,000 Units  5,000 Units SubCUTAneous Q8H    0.9% sodium chloride infusion 250 mL  250 mL IntraVENous PRN    0.9% sodium chloride infusion 250 mL  250 mL IntraVENous PRN    sodium chloride (NS) flush 5-40 mL  5-40 mL IntraVENous Q8H    sodium chloride (NS) flush 5-40 mL  5-40 mL IntraVENous PRN    ondansetron (ZOFRAN ODT) tablet 4 mg  4 mg Oral Q8H PRN    Or    ondansetron (ZOFRAN) injection 4 mg  4 mg IntraVENous Q6H PRN    potassium chloride 10 mEq in 100 ml IVPB  10 mEq IntraVENous PRN    magnesium sulfate 2 g/50 ml IVPB (premix or compounded)  2 g IntraVENous PRN    HYDROmorphone (DILAUDID) injection 0.5 mg  0.5 mg IntraVENous Q3H PRN            Data Review:     LABS:   Recent Results (from the past 12 hour(s))   CBC W/O DIFF    Collection Time: 12/19/21  5:51 AM   Result Value Ref Range    WBC 18.2 (H) 4.3 - 11.1 K/uL    RBC 3.23 (L) 4.05 - 5.2 M/uL    HGB 9.8 (L) 11.7 - 15.4 g/dL    HCT 28.6 (L) 35.8 - 46.3 %    MCV 88.5 79.6 - 97.8 FL    MCH 30.3 26.1 - 32.9 PG    MCHC 34.3 31.4 - 35.0 g/dL    RDW 14.5 11.9 - 14.6 %    PLATELET 971 669 - 594 K/uL    MPV 9.6 9.4 - 12.3 FL    ABSOLUTE NRBC 0.00 0.0 - 0.2 K/uL   METABOLIC PANEL, COMPREHENSIVE    Collection Time: 12/19/21  5:51 AM   Result Value Ref Range    Sodium 133 (L) 136 - 145 mmol/L    Potassium 4.4 3.5 - 5.1 mmol/L    Chloride 91 (L) 98 - 107 mmol/L    CO2 25 21 - 32 mmol/L    Anion gap 17 (H) 7 - 16 mmol/L    Glucose 128 (H) 65 - 100 mg/dL     (H) 6 - 23 MG/DL    Creatinine 8.98 (H) 0.6 - 1.0 MG/DL    GFR est AA 6 (L) >60 ml/min/1.73m2    GFR est non-AA 5 (L) >60 ml/min/1.73m2    Calcium 7.6 (L) 8.3 - 10.4 MG/DL    Bilirubin, total 0.3 0.2 - 1.1 MG/DL    ALT (SGPT) 11 (L) 12 - 65 U/L    AST (SGOT) 9 (L) 15 - 37 U/L    Alk.  phosphatase 80 50 - 136 U/L    Protein, total 6.1 (L) 6.3 - 8.2 g/dL    Albumin 2.3 (L) 3.5 - 5.0 g/dL    Globulin 3.8 (H) 2.3 - 3.5 g/dL    A-G Ratio 0.6 (L) 1.2 - 3.5     PHOSPHORUS    Collection Time: 12/19/21  5:51 AM   Result Value Ref Range    Phosphorus 10.1 (H) 2.5 - 4.5 MG/DL   MAGNESIUM    Collection Time: 12/19/21  5:51 AM   Result Value Ref Range    Magnesium 2.3 1.8 - 2.4 mg/dL   VANCOMYCIN, RANDOM    Collection Time: 12/19/21  5:51 AM   Result Value Ref Range    Vancomycin, random 17.6 UG/ML   PROTEIN/CREATININE RATIO, URINE    Collection Time: 12/19/21  5:51 AM   Result Value Ref Range    Protein, urine random 81 (H) <11.9 mg/dL    Creatinine, urine 22.90 mg/dL    Protein/Creat. urine Ratio 3.5     SODIUM, UR, RANDOM    Collection Time: 12/19/21  5:51 AM   Result Value Ref Range    Sodium,urine random 53 MMOL/L   GLUCOSE, POC    Collection Time: 12/19/21  7:52 AM   Result Value Ref Range    Glucose (POC) 145 (H) 65 - 100 mg/dL    Performed by [facility]    EKG, 12 LEAD, INITIAL    Collection Time: 12/19/21  9:00 AM   Result Value Ref Range    Ventricular Rate 121 BPM    Atrial Rate 264 BPM    QRS Duration 88 ms    Q-T Interval 392 ms    QTC Calculation (Bezet) 556 ms    Calculated R Axis 10 degrees    Calculated T Axis 102 degrees    Diagnosis       Atrial flutter with variable A-V block  Low voltage QRS  Septal infarct , age undetermined  T wave abnormality, consider inferior ischemia  Abnormal ECG  When compared with ECG of 16-DEC-2021 16:40,  Atrial flutter has replaced Atrial fibrillation  Incomplete right bundle branch block is no longer Present  Septal infarct is now Present  Confirmed by Yuliya Driscoll ([id number]) on 12/19/2021 10:39:28 AM     GLUCOSE, POC    Collection Time: 12/19/21 11:17 AM   Result Value Ref Range    Glucose (POC) 156 (H) 65 - 100 mg/dL    Performed by [facility]ogan[facility]PCT    LACTIC ACID    Collection Time: 12/19/21 11:21 AM   Result Value Ref Range    Lactic acid 0.9 0.4 - 2.0 MMOL/L         Plan:     Principal Problem:    Necrotizing fasciitis ([facility] (12/15/2021)    Active Problems:    DM2 (diabetes mellitus, type 2) ([facility] (12/25/2015)      HTN (hypertension) (12/25/2015)      Obesity, morbid ([address] (7/12/2018)      Anemia (7/13/2018)      DEVENDRA (acute kidney injury) ([address] (12/16/2021)      Acute respiratory failure ([address] (12/16/2021)      Sepsis with acute renal failure and tubular necrosis without septic shock ([facility] (12/16/2021)       DEVENDRA with unclear hx of CKD, secondary to DM ( no recent lab with which to compare)  Necrotizing fasciitis. - next surgery Monday with possible closure  Hypotension with sepsis- resolved.     DEVENDRA clearly with some component of hypoperfusion. And possible ATN. - imaging CT showed some cortical thinning associated with CKD. She has had very slow trend in recovery direction.   still with 2900ml of urine/ day  No need for dialysis and hope she will continue improving

## 2021-12-19 NOTE — PROGRESS NOTES
Hourly rounding completed on this shift. All needs met. Dressing to ABD/perineum partially changed from getting stool on dressing and re-enforced on this shift. Pt pre-medicated with PRN pain medication for dressing. Labs drawn and sent to lab. Urine labs order 12/16/2021 and cancelled by lab as no sample received. Urine sample collected and sent. Pt is currently resting in bed. Will give report to oncoming nurse.

## 2021-12-19 NOTE — PROGRESS NOTES
Hospitalist Progress Note   Admit Date:  12/15/2021  9:53 AM   Name:  Althea Salas   Age:  46 y.o. Sex:  female  :  1970   MRN:  [id number]   Room:      Presenting Complaint: Vomiting    Reason(s) for Admission: Necrotizing fasciitis [facility]) [M72.6]     Hospital Course & Interval History:   Althea Salas is a 46 y.o. female with history of Morbid obesity, DM who was admitted for septic shock 2/2 necrotizing fascitis involving her pannus and perineum associated with respiratory failure, severe DEVENDRA (Scr 11), acidosis pH 7.1). She was placed on levo/jessica, intubated and treated with vanc/zosyn initially. Outpatient she was started on bactrim and unable to tolerate it then changed to doxycycline. Per  was not taking in any PO and became progressively weaker at home.  denies known history of renal disease. Made little urine prior to admission. She is s/p excisional debridement on 12/15, . OP note reports foul smelling necrotizing fasciitis extending from the perineum, left labia deep to the pubic bone and along the fascia to the abdominal wall above the iliac bone into the left flank. She rec'd ancef in OR and was changed in vanc/clinda/meropenem on 12/15 by pulmonology. Discussed with , no evidence of osteomyelitis. Plan to return patient to OR on Monday for re-assessment. : extubated. Renal consult. New AFIB RVR. dilt gtt started   Subjective (21):  Patient seen and examined. Patient denies fever chills nausea vomiting chest pain shortness of breath. Assessment & Plan:     Septic Shock 2/2 Necrotizing Fascitis - s/p excisional debridement x 2   BC12/15  NGTD  Wound cx GNR, GPC    S/p vanc/zosyn  Escalated to meropenem, clinda and vanc 12/15   Recommend de-escalating to unasyn/vanc versus rocephin/flagyl/vanc unless pseudomonas is suspected. S/p levophed. Now HDS   Return to OR on -resolving. Increasingly tachycardic.   We will bolus the patient 1 L of normal saline and monitor. Given severity of DEVENDRA will monitor volume status closely. Continue supportive care. 12/19-ceftriaxone vancomycin and Flagyl until cultures finalize      ABLA - anemia of renal/chronic dz and post op blood loss. Iron stores and b12 wnl. Hgb 5.8 rec'ing 2 U pRBC. Repeat hemoglobin this afternoon. 12/18-likely secondary to intraoperative blood losses. Patient's hemoglobin hematocrit remained stable at this time. 12/19-stable      DEVENDRA on CKD 2/2 PRA/ATN, ? AIN - assocatied with sepsis   Improving. UOP 800s   Scr 11.2 >> 9BL unclear last Scr 2.2 9/2017   Strict I&O, daily wts   Check urine lytes. WI:CR ratio   Daily BMP/CMP, Mag, Phos   nephro consult   12/18-nephrology consulted. Creatinine now 9.12 essentially unchanged. May require dialysis if patient does not start to recover renal function  12/19-patient status post bolus of 1 L yesterday and again received 1 L bolus today secondary to development of underlying tachycardia. Will watch volume status closely as patient's renal function is slow to return. Suspect underlying ATN      Atrial fibrillation with rapid ventricular response-patient initiated on 100 mg of metoprolol twice daily. [id number] score of 2 making patient high risk. Hold anticoagulation and defer anticoagulation recommendations to general surgery given severity of patient's wound and possible need for further surgical intervention. Hyperphosphatemia - start phosphate binder   Hypokalemia - PO x 1   Metabolic acidosis - related to acute renal failure. S/p bicarb gtt   Hyponatremia - mild. CTM   pseudohypocalcemia - corrected Ca 8.6   12/18-continue phosphate binder and monitor    T2DM -increase lantus 14s plus SSI   12/18-to goal    Obesity - increased risk of complications.        Acute hypoxemic respiratory failure // Mechanical Ventilation - extubated on 12/16. Wean supplemental o2 as tolerated.     12/19-resolved       GERD - pepcid        Dispo/Discharge Planning:      Transfer to floor     Diet:  ADULT DIET Regular; 3 carb choices (45 gm/meal);  Low Phosphorus (Less than 1000 mg)  DVT PPx: heparin   Code status: Full Code    Hospital Problems as of 12/19/2021 Date Reviewed: 12/25/2015          Codes Class Noted - Resolved POA    DEVENDRA (acute kidney injury) ([place] [address].) ICD-10-CM: N17.9  ICD-9-CM: 584.9  12/16/2021 - Present Yes        Acute respiratory failure ([place] [address].) ICD-10-CM: J96.00  ICD-9-CM: 518.81  12/16/2021 - Present No        Sepsis with acute renal failure and tubular necrosis without septic shock (HCC) ICD-10-CM: A41.9, R65.20, N17.0  ICD-9-CM: 038.9, 995.91, 584.5  12/16/2021 - Present Yes        * (Principal) Necrotizing fasciitis ([place] [address].) ICD-10-CM: M72.6  ICD-9-CM: 728.86  12/15/2021 - Present Yes        Anemia ICD-10-CM: D64.9  ICD-9-CM: 285.9  7/13/2018 - Present Yes        Obesity, morbid (HCC) (Chronic) ICD-10-CM: E66.01  ICD-9-CM: 278.01  7/12/2018 - Present Yes        DM2 (diabetes mellitus, type 2) ([place] [address].) (Chronic) ICD-10-CM: E11.9  ICD-9-CM: 250.00  12/25/2015 - Present Yes        HTN (hypertension) (Chronic) ICD-10-CM: I10  ICD-9-CM: 401.9  12/25/2015 - Present Yes              Objective:     Patient Vitals for the past 24 hrs:   Temp Pulse Resp BP SpO2   12/19/21 1504 98.7 °F (37.1 °C) 94 18 128/80 96 %   12/19/21 1200 -- 91 -- -- --   12/19/21 1118 98.3 °F (36.8 °C) (!) 109 18 131/84 94 %   12/19/21 0800 -- (!) 119 -- -- --   12/19/21 0708 97.9 °F (36.6 °C) (!) 129 20 134/80 96 %   12/19/21 0300 98 °F (36.7 °C) (!) 110 20 131/88 98 %   12/18/21 2317 98.2 °F (36.8 °C) (!) 113 20 (!) 135/92 98 %   12/18/21 1951 98.7 °F (37.1 °C) (!) 122 18 (!) 149/82 96 %   12/18/21 1600 -- (!) 118 -- -- --     Oxygen Therapy  O2 Sat (%): 96 % (12/19/21 1504)  Pulse via Oximetry: 68 beats per minute (12/17/21 2028)  O2 Device: Nasal cannula (12/17/21 2028)  Skin Assessment: Clean, dry, & intact (12/17/21 2454)  Skin Protection for O2 Device: No (12/17/21 0300)  Orientation: Bilateral (12/16/21 0800)  Location: Cheek (12/16/21 0800)  Interventions: Mouth Care;Hydrocolloid Dressing (12/16/21 0800)  O2 Flow Rate (L/min): 1 l/min (12/17/21 2028)  FIO2 (%): 28 % (12/17/21 1013)    Estimated body mass index is 49.4 kg/m² as calculated from the following:    Height as of this encounter: 5' 7.5\" (1.715 m). Weight as of this encounter: 145.2 kg (320 lb 1.7 oz). Intake/Output Summary (Last 24 hours) at 12/19/2021 1554  Last data filed at 12/19/2021 1508  Gross per 24 hour   Intake --   Output 2900 ml   Net -2900 ml         Physical Exam:   Physical Exam  Vitals and nursing note reviewed. Constitutional:       Appearance: She is obese. HENT:      Head: Normocephalic and atraumatic. Cardiovascular:      Rate and Rhythm: Normal rate. Pulmonary:      Effort: Pulmonary effort is normal. No respiratory distress. Abdominal:      General: Abdomen is flat. Musculoskeletal:      Cervical back: Neck supple. Right lower leg: Edema present. Left lower leg: Edema present. Skin:     Coloration: Skin is not pale. Neurological:      Mental Status: She is alert and oriented to person, place, and time. Mental status is at baseline. Psychiatric:         Mood and Affect: Mood normal.         Blood pressure 128/80, pulse 94, temperature 98.7 °F (37.1 °C), resp. rate 18, height 5' 7.5\" (1.715 m), weight 145.2 kg (320 lb 1.7 oz), SpO2 96 %.     I have reviewed ordered lab tests and independently visualized imaging below:    Recent Labs:  Recent Results (from the past 48 hour(s))   GLUCOSE, POC    Collection Time: 12/17/21  5:06 PM   Result Value Ref Range    Glucose (POC) 176 (H) 65 - 100 mg/dL    Performed by Rosette    HEMOGLOBIN    Collection Time: 12/17/21  7:39 PM   Result Value Ref Range    HGB 8.3 (L) 11.7 - 15.4 g/dL   GLUCOSE, POC    Collection Time: 12/17/21  9:11 PM   Result Value Ref Range    Glucose (POC) 198 (H) 65 - 100 mg/dL Performed by WishRoney    CBC W/O DIFF    Collection Time: 12/18/21  5:04 AM   Result Value Ref Range    WBC 14.7 (H) 4.3 - 11.1 K/uL    RBC 2.90 (L) 4.05 - 5.2 M/uL    HGB 8.5 (L) 11.7 - 15.4 g/dL    HCT 24.4 (L) 35.8 - 46.3 %    MCV 84.1 79.6 - 97.8 FL    MCH 29.3 26.1 - 32.9 PG    MCHC 34.8 31.4 - 35.0 g/dL    RDW 14.2 11.9 - 14.6 %    PLATELET 051 682 - 516 K/uL    MPV 9.5 9.4 - 12.3 FL    ABSOLUTE NRBC 0.00 0.0 - 0.2 K/uL   METABOLIC PANEL, COMPREHENSIVE    Collection Time: 12/18/21  5:04 AM   Result Value Ref Range    Sodium 132 (L) 136 - 145 mmol/L    Potassium 3.9 3.5 - 5.1 mmol/L    Chloride 90 (L) 98 - 107 mmol/L    CO2 27 21 - 32 mmol/L    Anion gap 15 7 - 16 mmol/L    Glucose 146 (H) 65 - 100 mg/dL     (H) 6 - 23 MG/DL    Creatinine 9.12 (H) 0.6 - 1.0 MG/DL    GFR est AA 6 (L) >60 ml/min/1.73m2    GFR est non-AA 5 (L) >60 ml/min/1.73m2    Calcium 7.0 (L) 8.3 - 10.4 MG/DL    Bilirubin, total 0.2 0.2 - 1.1 MG/DL    ALT (SGPT) 11 (L) 12 - 65 U/L    AST (SGOT) 8 (L) 15 - 37 U/L    Alk.  phosphatase 71 50 - 136 U/L    Protein, total 5.8 (L) 6.3 - 8.2 g/dL    Albumin 2.4 (L) 3.5 - 5.0 g/dL    Globulin 3.4 2.3 - 3.5 g/dL    A-G Ratio 0.7 (L) 1.2 - 3.5     PHOSPHORUS    Collection Time: 12/18/21  5:04 AM   Result Value Ref Range    Phosphorus 9.8 (H) 2.5 - 4.5 MG/DL   MAGNESIUM    Collection Time: 12/18/21  5:04 AM   Result Value Ref Range    Magnesium 2.1 1.8 - 2.4 mg/dL   GLUCOSE, POC    Collection Time: 12/18/21  7:58 AM   Result Value Ref Range    Glucose (POC) 176 (H) 65 - 100 mg/dL    Performed by Otilio    GLUCOSE, POC    Collection Time: 12/18/21 10:56 AM   Result Value Ref Range    Glucose (POC) 170 (H) 65 - 100 mg/dL    Performed by 1 Balbina Parr, POC    Collection Time: 12/18/21  3:48 PM   Result Value Ref Range    Glucose (POC) 185 (H) 65 - 100 mg/dL    Performed by Dina    GLUCOSE, POC    Collection Time: 12/18/21  8:31 PM   Result Value Ref Range    Glucose (POC) 170 (H) 65 - 100 mg/dL    Performed by Otto    CBC W/O DIFF    Collection Time: 12/19/21  5:51 AM   Result Value Ref Range    WBC 18.2 (H) 4.3 - 11.1 K/uL    RBC 3.23 (L) 4.05 - 5.2 M/uL    HGB 9.8 (L) 11.7 - 15.4 g/dL    HCT 28.6 (L) 35.8 - 46.3 %    MCV 88.5 79.6 - 97.8 FL    MCH 30.3 26.1 - 32.9 PG    MCHC 34.3 31.4 - 35.0 g/dL    RDW 14.5 11.9 - 14.6 %    PLATELET 944 747 - 666 K/uL    MPV 9.6 9.4 - 12.3 FL    ABSOLUTE NRBC 0.00 0.0 - 0.2 K/uL   METABOLIC PANEL, COMPREHENSIVE    Collection Time: 12/19/21  5:51 AM   Result Value Ref Range    Sodium 133 (L) 136 - 145 mmol/L    Potassium 4.4 3.5 - 5.1 mmol/L    Chloride 91 (L) 98 - 107 mmol/L    CO2 25 21 - 32 mmol/L    Anion gap 17 (H) 7 - 16 mmol/L    Glucose 128 (H) 65 - 100 mg/dL     (H) 6 - 23 MG/DL    Creatinine 8.98 (H) 0.6 - 1.0 MG/DL    GFR est AA 6 (L) >60 ml/min/1.73m2    GFR est non-AA 5 (L) >60 ml/min/1.73m2    Calcium 7.6 (L) 8.3 - 10.4 MG/DL    Bilirubin, total 0.3 0.2 - 1.1 MG/DL    ALT (SGPT) 11 (L) 12 - 65 U/L    AST (SGOT) 9 (L) 15 - 37 U/L    Alk. phosphatase 80 50 - 136 U/L    Protein, total 6.1 (L) 6.3 - 8.2 g/dL    Albumin 2.3 (L) 3.5 - 5.0 g/dL    Globulin 3.8 (H) 2.3 - 3.5 g/dL    A-G Ratio 0.6 (L) 1.2 - 3.5     PHOSPHORUS    Collection Time: 12/19/21  5:51 AM   Result Value Ref Range    Phosphorus 10.1 (H) 2.5 - 4.5 MG/DL   MAGNESIUM    Collection Time: 12/19/21  5:51 AM   Result Value Ref Range    Magnesium 2.3 1.8 - 2.4 mg/dL   VANCOMYCIN, RANDOM    Collection Time: 12/19/21  5:51 AM   Result Value Ref Range    Vancomycin, random 17.6 UG/ML   PROTEIN/CREATININE RATIO, URINE    Collection Time: 12/19/21  5:51 AM   Result Value Ref Range    Protein, urine random 81 (H) <11.9 mg/dL    Creatinine, urine 22.90 mg/dL    Protein/Creat.  urine Ratio 3.5     SODIUM, UR, RANDOM    Collection Time: 12/19/21  5:51 AM   Result Value Ref Range    Sodium,urine random 53 MMOL/L   GLUCOSE, POC    Collection Time: 12/19/21  7:52 AM   Result Value Ref Range    Glucose (POC) 145 (H) 65 - 100 mg/dL    Performed by BootheDoganTunitraPCT    EKG, 12 LEAD, INITIAL    Collection Time: 12/19/21  9:00 AM   Result Value Ref Range    Ventricular Rate 121 BPM    Atrial Rate 264 BPM    QRS Duration 88 ms    Q-T Interval 392 ms    QTC Calculation (Bezet) 556 ms    Calculated R Axis 10 degrees    Calculated T Axis 102 degrees    Diagnosis       Atrial flutter with variable A-V block  Low voltage QRS  Septal infarct , age undetermined  T wave abnormality, consider inferior ischemia  Abnormal ECG  When compared with ECG of 16-DEC-2021 16:40,  Atrial flutter has replaced Atrial fibrillation  Incomplete right bundle branch block is no longer Present  Septal infarct is now Present  Confirmed by Ross Watts ([id number]) on 12/19/2021 10:39:28 AM     GLUCOSE, POC    Collection Time: 12/19/21 11:17 AM   Result Value Ref Range    Glucose (POC) 156 (H) 65 - 100 mg/dL    Performed by BootheDoganTunitraPCT    LACTIC ACID    Collection Time: 12/19/21 11:21 AM   Result Value Ref Range    Lactic acid 0.9 0.4 - 2.0 MMOL/L       All Micro Results     Procedure Component Value Units Date/Time    BLOOD CULTURE [[id number]] Collected: 12/15/21 1236    Order Status: Completed Specimen: Blood Updated: 12/19/21 0831     Special Requests: --        RIGHT  HAND       Culture result: NO GROWTH 4 DAYS       BLOOD CULTURE [[id number]] Collected: 12/15/21 1215    Order Status: Completed Specimen: Blood Updated: 12/19/21 0831     Special Requests: --        LEFT  HAND       Culture result: NO GROWTH 4 DAYS       CULTURE, WOUND Margurite Bill STAIN [[id number]]  (Abnormal)  (Susceptibility) Collected: 12/15/21 1323    Order Status: Completed Specimen: Wound from Groin Updated: 12/19/21 0732     Special Requests: NO SPECIAL REQUESTS        GRAM STAIN 0 TO 36 WBCS PER OIF      MANY GRAM POSITIVE COCCI               MODERATE GRAM NEGATIVE RODS                  MODERATE GRAM POSITIVE RODS           Culture result:       MODERATE PROTEUS MIRABILIS                  MODERATE ENTEROCOCCUS FAECALIS GROUP D                  LIGHT MIXED SKIN MASSIMO ISOLATED          CULTURE, Vika Bigger STAIN [[id number]]  (Abnormal)  (Susceptibility) Collected: 12/15/21 1710    Order Status: Completed Specimen: Peritoneum Updated: 12/18/21 0936     Special Requests: NO SPECIAL REQUESTS        GRAM STAIN 0 TO 6 WBCS PER OIF      MANY GRAM POSITIVE COCCI         MANY GRAM NEGATIVE RODS        Culture result:       MODERATE PROTEUS MIRABILIS                  MODERATE STAPHYLOCOCCUS SPECIES, COAGULASE NEGATIVE THIS ORGANISM WILL BE HELD FOR 7 DAYS. IF FURTHER TESTING IS REQUIRED PLEASE NOTIFY MICROBIOLOGY          CULTURE, ANAEROBIC [[id number]] Collected: 12/15/21 1710    Order Status: Completed Specimen: Peritoneum Updated: 12/17/21 0834     Special Requests: NO SPECIAL REQUESTS        Culture result:       SUBCULTURE IS NECESSARY TO DETERMINE PRESENCE OR ABSENCE OF ANAEROBIC BACTERIA IN THIS CULTURE. FURTHER REPORT TO FOLLOW AFTER INCUBATION OF SUBCULTURE. COVID-19 RAPID TEST [[id number]] Collected: 12/15/21 1519    Order Status: Completed Specimen: Nasopharyngeal Updated: 12/15/21 9159     Specimen source NASAL        COVID-19 rapid test Not detected        Comment:      The specimen is NEGATIVE for SARS-CoV-2, the novel coronavirus associated with COVID-19. A negative result does not rule out COVID-19. This test has been authorized by the FDA under an Emergency Use Authorization (EUA) for use by authorized laboratories. Fact sheet for Healthcare Providers: ConventionUpdate.co.nz  Fact sheet for Patients: ConventionUpdate.co.nz       Methodology: Isothermal Nucleic Acid Amplification               Other Studies:  No results found.     Current Meds:  Current Facility-Administered Medications   Medication Dose Route Frequency    [START ON 12/20/2021] amLODIPine (NORVASC) tablet 5 mg  5 mg Oral DAILY    [START ON 12/20/2021] sertraline (ZOLOFT) tablet 100 mg  100 mg Oral DAILY    metoprolol tartrate (LOPRESSOR) tablet 100 mg  100 mg Oral BID    oxyCODONE-acetaminophen (PERCOCET) 5-325 mg per tablet 1 Tablet  1 Tablet Oral Q4H PRN    cefTRIAXone (ROCEPHIN) 2 g in 0.9% sodium chloride (MBP/ADV) 50 mL MBP  2 g IntraVENous Q24H    Vancomycin Intermittent Dosing Placeholder   Other Rx Dosing/Monitoring    famotidine (PEPCID) tablet 20 mg  20 mg Oral DAILY    metroNIDAZOLE (FLAGYL) tablet 500 mg  500 mg Oral Q12H    calcium acetate (phosphate binder) (PHOSLO) tablet 667 mg  1 Tablet Oral TID WITH MEALS    magnesium oxide (MAG-OX) tablet 800 mg  800 mg Oral BID    insulin lispro (HUMALOG) injection   SubCUTAneous AC&HS    insulin glargine (LANTUS) injection 14 Units  14 Units SubCUTAneous DAILY    heparin (porcine) injection 5,000 Units  5,000 Units SubCUTAneous Q8H    0.9% sodium chloride infusion 250 mL  250 mL IntraVENous PRN    0.9% sodium chloride infusion 250 mL  250 mL IntraVENous PRN    sodium chloride (NS) flush 5-40 mL  5-40 mL IntraVENous Q8H    sodium chloride (NS) flush 5-40 mL  5-40 mL IntraVENous PRN    ondansetron (ZOFRAN ODT) tablet 4 mg  4 mg Oral Q8H PRN    Or    ondansetron (ZOFRAN) injection 4 mg  4 mg IntraVENous Q6H PRN    potassium chloride 10 mEq in 100 ml IVPB  10 mEq IntraVENous PRN    magnesium sulfate 2 g/50 ml IVPB (premix or compounded)  2 g IntraVENous PRN    HYDROmorphone (DILAUDID) injection 0.5 mg  0.5 mg IntraVENous Q3H PRN       Signed:  Keila Berry DO    Part of this note may have been written by using a voice dictation software. The note has been proof read but may still contain some grammatical/other typographical errors.

## 2021-12-19 NOTE — PROGRESS NOTES
Critical Care Outreach Nurse Progress Report:    Subjective: In to assess pt secondary to f/u transfer from ICU  MEWS Score: 3 (12/19/21 0708)    Vitals:    12/19/21 0800 12/19/21 1118 12/19/21 1200 12/19/21 1504   BP:  131/84  128/80   Pulse: (!) 119 (!) 109 91 94   Resp:  18  18   Temp:  98.3 °F (36.8 °C)  98.7 °F (37.1 °C)   SpO2:  94%  96%   Weight:       Height:            Objective: Primary RN at bedside doing abd/perineal drsg change with assistance by multiple staff. Pt alert and tolerating fairly. Pain Intensity 1: 0 (12/19/21 0708)  Pain Location 1: Abdomen  Pain Intervention(s) 1: Medication (see MAR)  Patient Stated Pain Goal: 0    Assessment: Pt alert and oriented. Daily dressing changes. Afib RVR earlier today with rates up to 120s per remote telemetry. BP 120s/80s. Currently Afib on remote telemetry, HR 100s. Plan: MD ordered metoprolol 100 mg BID starting today. First dose at 1335, 2nd dose given at 1724. Concern for pt becoming hypotensive with such a high dose given close together. Notified [facility], Primary RN. [facility] checking BP more frequently first part of the night shift to monitor closely for changes. Will continue to follow pt per outreach program at this time.

## 2021-12-19 NOTE — PROGRESS NOTES
[facility] CRITICAL CARE OUTREACH NURSE PROGRESS REPORT      SUBJECTIVE: Called to assess patient secondary to ICU transfer. MEWS Score: 3 (12/18/21 1951)  Vitals:    12/18/21 1522 12/18/21 1600 12/18/21 1951 12/18/21 2317   BP: 131/85  (!) 149/82 (!) 135/92   Pulse: (!) 121 (!) 118 (!) 122 (!) 113   Resp: 20 18 20   Temp: 98.5 °F (36.9 °C)  98.7 °F (37.1 °C) 98.2 °F (36.8 °C)   SpO2: 96%  96% 98%   Weight:       Height:          EKG: normal EKG, normal sinus rhythm, unchanged from previous tracings. LAB DATA:    Recent Labs     12/18/21  0504 12/17/21 0345 12/16/21 0310   * 131* 132*   K 3.9 3.4* 3.8   CL 90* 91* 95*   CO2 27 27 20*   AGAP 15 13 17*   * 153* 174*   * 101* 106*   CREA 9.12* 9.03* 9.19*   GFRAA 6* 6* 6*   GFRNA 5* 5* 5*   CA 7.0* 6.7* 6.8*   MG 2.1 1.8  --    PHOS 9.8* 9.4*  --    ALB 2.4* 2.3* 1.7*   TP 5.8* 4.8* 4.8*   GLOB 3.4 2.5 3.1   AGRAT 0.7* 0.9* 0.5*   ALT 11* 7* 11*        Recent Labs     12/18/21  0504 12/17/21  1939 12/17/21 0345 12/16/21  1123 12/16/21  1123 12/16/21  0310 12/16/21  0310   WBC 14.7*  --  15.2*  --   --   --  34.6*   HGB 8.5* 8.3* 5.8*   < > 7.4*   < > 7.3*   HCT 24.4*  --  16.5*  --  20.9*   < > 20.5*     --  199  --   --   --  332    < > = values in this interval not displayed. OBJECTIVE: On arrival to room, I found patient to be resting in bed,  at bedside. Pain Assessment  Pain Intensity 1: 0 (12/18/21 1951)  Pain Location 1: Abdomen  Pain Intervention(s) 1: Medication (see MAR)  Patient Stated Pain Goal: 0                                 ASSESSMENT:  Pt is resting. Respirations are even and unlabored, denies any pain or SOB. Wound to labia and abd, dressing changed today per order. No concerns at this time, primary RN to call with any concerns. PLAN:  Will continue to follow per outreach protocol.

## 2021-12-20 LAB
ALBUMIN SERPL-MCNC: 1.4 G/DL (ref 3.5–5)
ALBUMIN/GLOB SERPL: 0.5 {RATIO} (ref 1.2–3.5)
ALP SERPL-CCNC: 57 U/L (ref 50–136)
ALT SERPL-CCNC: 6 U/L (ref 12–65)
ANION GAP SERPL CALC-SCNC: 12 MMOL/L (ref 7–16)
AST SERPL-CCNC: 5 U/L (ref 15–37)
BACTERIA SPEC CULT: NORMAL
BACTERIA SPEC CULT: NORMAL
BASOPHILS # BLD: 0.2 K/UL (ref 0–0.2)
BASOPHILS NFR BLD: 1 % (ref 0–2)
BILIRUB SERPL-MCNC: 0.2 MG/DL (ref 0.2–1.1)
BUN SERPL-MCNC: 80 MG/DL (ref 6–23)
CALCIUM SERPL-MCNC: 6 MG/DL (ref 8.3–10.4)
CHLORIDE SERPL-SCNC: 114 MMOL/L (ref 98–107)
CO2 SERPL-SCNC: 18 MMOL/L (ref 21–32)
CREAT SERPL-MCNC: 6.3 MG/DL (ref 0.6–1)
DIFFERENTIAL METHOD BLD: ABNORMAL
EOSINOPHIL # BLD: 0 K/UL (ref 0–0.8)
EOSINOPHIL NFR BLD: 0 % (ref 0.5–7.8)
ERYTHROCYTE [DISTWIDTH] IN BLOOD BY AUTOMATED COUNT: 14.4 % (ref 11.9–14.6)
GLOBULIN SER CALC-MCNC: 3 G/DL (ref 2.3–3.5)
GLUCOSE BLD STRIP.AUTO-MCNC: 132 MG/DL (ref 65–100)
GLUCOSE BLD STRIP.AUTO-MCNC: 161 MG/DL (ref 65–100)
GLUCOSE BLD STRIP.AUTO-MCNC: 163 MG/DL (ref 65–100)
GLUCOSE BLD STRIP.AUTO-MCNC: 196 MG/DL (ref 65–100)
GLUCOSE BLD STRIP.AUTO-MCNC: 225 MG/DL (ref 65–100)
GLUCOSE BLD STRIP.AUTO-MCNC: 266 MG/DL (ref 65–100)
GLUCOSE SERPL-MCNC: 89 MG/DL (ref 65–100)
HCT VFR BLD AUTO: 30.7 % (ref 35.8–46.3)
HGB BLD-MCNC: 10.2 G/DL (ref 11.7–15.4)
IMM GRANULOCYTES # BLD AUTO: 3 K/UL (ref 0–0.5)
IMM GRANULOCYTES NFR BLD AUTO: 14 % (ref 0–5)
LYMPHOCYTES # BLD: 1.5 K/UL (ref 0.5–4.6)
LYMPHOCYTES NFR BLD: 7 % (ref 13–44)
MAGNESIUM SERPL-MCNC: 1.9 MG/DL (ref 1.8–2.4)
MCH RBC QN AUTO: 29.8 PG (ref 26.1–32.9)
MCHC RBC AUTO-ENTMCNC: 33.2 G/DL (ref 31.4–35)
MCV RBC AUTO: 89.8 FL (ref 79.6–97.8)
MONOCYTES # BLD: 1.1 K/UL (ref 0.1–1.3)
MONOCYTES NFR BLD: 5 % (ref 4–12)
NEUTS SEG # BLD: 15.9 K/UL (ref 1.7–8.2)
NEUTS SEG NFR BLD: 73 % (ref 43–78)
NRBC # BLD: 0 K/UL (ref 0–0.2)
PHOSPHATE SERPL-MCNC: 8.3 MG/DL (ref 2.5–4.5)
PLATELET # BLD AUTO: 272 K/UL (ref 150–450)
PLATELET COMMENTS,PCOM: ADEQUATE
PMV BLD AUTO: 9.8 FL (ref 9.4–12.3)
POTASSIUM SERPL-SCNC: 3.2 MMOL/L (ref 3.5–5.1)
PROT SERPL-MCNC: 4.4 G/DL (ref 6.3–8.2)
RBC # BLD AUTO: 3.42 M/UL (ref 4.05–5.2)
RBC MORPH BLD: ABNORMAL
SERVICE CMNT-IMP: ABNORMAL
SERVICE CMNT-IMP: NORMAL
SERVICE CMNT-IMP: NORMAL
SODIUM SERPL-SCNC: 144 MMOL/L (ref 136–145)
WBC # BLD AUTO: 21.7 K/UL (ref 4.3–11.1)
WBC MORPH BLD: ABNORMAL

## 2021-12-20 PROCEDURE — 65660000000 HC RM CCU STEPDOWN

## 2021-12-20 PROCEDURE — 74011250636 HC RX REV CODE- 250/636: Performed by: INTERNAL MEDICINE

## 2021-12-20 PROCEDURE — 74011250637 HC RX REV CODE- 250/637: Performed by: NURSE PRACTITIONER

## 2021-12-20 PROCEDURE — 74011250637 HC RX REV CODE- 250/637: Performed by: INTERNAL MEDICINE

## 2021-12-20 PROCEDURE — 74011250636 HC RX REV CODE- 250/636: Performed by: SURGERY

## 2021-12-20 PROCEDURE — 82962 GLUCOSE BLOOD TEST: CPT

## 2021-12-20 PROCEDURE — 77030018836 HC SOL IRR NACL ICUM -A

## 2021-12-20 PROCEDURE — 2709999900 HC NON-CHARGEABLE SUPPLY

## 2021-12-20 PROCEDURE — 83735 ASSAY OF MAGNESIUM: CPT

## 2021-12-20 PROCEDURE — 74011250636 HC RX REV CODE- 250/636: Performed by: NURSE PRACTITIONER

## 2021-12-20 PROCEDURE — 74011636637 HC RX REV CODE- 636/637: Performed by: NURSE PRACTITIONER

## 2021-12-20 PROCEDURE — 80053 COMPREHEN METABOLIC PANEL: CPT

## 2021-12-20 PROCEDURE — 74011636637 HC RX REV CODE- 636/637: Performed by: FAMILY MEDICINE

## 2021-12-20 PROCEDURE — 84100 ASSAY OF PHOSPHORUS: CPT

## 2021-12-20 PROCEDURE — 85025 COMPLETE CBC W/AUTO DIFF WBC: CPT

## 2021-12-20 PROCEDURE — 74011000258 HC RX REV CODE- 258: Performed by: INTERNAL MEDICINE

## 2021-12-20 RX ADMIN — METOPROLOL TARTRATE 100 MG: 50 TABLET, FILM COATED ORAL at 17:08

## 2021-12-20 RX ADMIN — Medication 800 MG: at 09:08

## 2021-12-20 RX ADMIN — METOPROLOL TARTRATE 100 MG: 50 TABLET, FILM COATED ORAL at 09:08

## 2021-12-20 RX ADMIN — Medication 800 MG: at 17:08

## 2021-12-20 RX ADMIN — Medication 10 ML: at 05:04

## 2021-12-20 RX ADMIN — Medication 2 UNITS: at 12:10

## 2021-12-20 RX ADMIN — HYDROMORPHONE HYDROCHLORIDE 0.5 MG: 1 INJECTION, SOLUTION INTRAMUSCULAR; INTRAVENOUS; SUBCUTANEOUS at 17:08

## 2021-12-20 RX ADMIN — AMPICILLIN SODIUM AND SULBACTAM SODIUM 3 G: 2; 1 INJECTION, POWDER, FOR SOLUTION INTRAMUSCULAR; INTRAVENOUS at 12:10

## 2021-12-20 RX ADMIN — CALCIUM ACETATE 667 MG: 667 TABLET ORAL at 12:10

## 2021-12-20 RX ADMIN — HEPARIN SODIUM 5000 UNITS: 5000 INJECTION INTRAVENOUS; SUBCUTANEOUS at 12:10

## 2021-12-20 RX ADMIN — CALCIUM ACETATE 667 MG: 667 TABLET ORAL at 09:08

## 2021-12-20 RX ADMIN — OXYCODONE AND ACETAMINOPHEN 1 TABLET: 5; 325 TABLET ORAL at 09:07

## 2021-12-20 RX ADMIN — Medication 10 ML: at 14:03

## 2021-12-20 RX ADMIN — HEPARIN SODIUM 5000 UNITS: 5000 INJECTION INTRAVENOUS; SUBCUTANEOUS at 21:02

## 2021-12-20 RX ADMIN — AMPICILLIN SODIUM AND SULBACTAM SODIUM 3 G: 2; 1 INJECTION, POWDER, FOR SOLUTION INTRAMUSCULAR; INTRAVENOUS at 21:02

## 2021-12-20 RX ADMIN — INSULIN GLARGINE 14 UNITS: 100 INJECTION, SOLUTION SUBCUTANEOUS at 09:18

## 2021-12-20 RX ADMIN — METRONIDAZOLE 500 MG: 500 TABLET ORAL at 03:47

## 2021-12-20 RX ADMIN — Medication 10 ML: at 21:01

## 2021-12-20 RX ADMIN — HEPARIN SODIUM 5000 UNITS: 5000 INJECTION INTRAVENOUS; SUBCUTANEOUS at 05:04

## 2021-12-20 RX ADMIN — FAMOTIDINE 20 MG: 20 TABLET ORAL at 09:08

## 2021-12-20 RX ADMIN — CALCIUM ACETATE 667 MG: 667 TABLET ORAL at 17:08

## 2021-12-20 RX ADMIN — Medication 6 UNITS: at 17:09

## 2021-12-20 RX ADMIN — Medication 4 UNITS: at 21:01

## 2021-12-20 RX ADMIN — SERTRALINE 100 MG: 50 TABLET, FILM COATED ORAL at 09:08

## 2021-12-20 RX ADMIN — AMLODIPINE BESYLATE 5 MG: 5 TABLET ORAL at 09:08

## 2021-12-20 NOTE — PROGRESS NOTES
[facility] Progress Note      Admission Date: 12/15/2021   Subjective:   Pt seen and examined in room, spouse at the bedside, reports better overall, LE pain controlled, good uop via hammond.     ROS:  General: no fever/chills  CV: no CP  Lung: no SOB, no cough  GI: no N/V/D  : no dysuria  Ext: no edema       Objective:     Physical Exam:      Visit Vitals  /86 (BP 1 Location: Left upper arm, BP Patient Position: At rest)   Pulse (!) 127   Temp 97.6 °F (36.4 °C)   Resp 18   Ht 5' 7.5\" (1.715 m)   Wt 143.9 kg (317 lb 3.9 oz)   SpO2 95%   BMI 48.95 kg/m²      Gen: alert and oriented, NAD  HEENT: moist membranes  CV: regular rate, S1, S2, no Rub   Lungs: Clear bilaterally  Abd: soft, non tender, + BS  Extem: +1 edema      Current Facility-Administered Medications   Medication Dose Route Frequency    amLODIPine (NORVASC) tablet 5 mg  5 mg Oral DAILY    sertraline (ZOLOFT) tablet 100 mg  100 mg Oral DAILY    metoprolol tartrate (LOPRESSOR) tablet 100 mg  100 mg Oral BID    oxyCODONE-acetaminophen (PERCOCET) 5-325 mg per tablet 1 Tablet  1 Tablet Oral Q4H PRN    cefTRIAXone (ROCEPHIN) 2 g in 0.9% sodium chloride (MBP/ADV) 50 mL MBP  2 g IntraVENous Q24H    Vancomycin Intermittent Dosing Placeholder   Other Rx Dosing/Monitoring    famotidine (PEPCID) tablet 20 mg  20 mg Oral DAILY    metroNIDAZOLE (FLAGYL) tablet 500 mg  500 mg Oral Q12H    calcium acetate (phosphate binder) (PHOSLO) tablet 667 mg  1 Tablet Oral TID WITH MEALS    magnesium oxide (MAG-OX) tablet 800 mg  800 mg Oral BID    insulin lispro (HUMALOG) injection   SubCUTAneous AC&HS    insulin glargine (LANTUS) injection 14 Units  14 Units SubCUTAneous DAILY    heparin (porcine) injection 5,000 Units  5,000 Units SubCUTAneous Q8H    0.9% sodium chloride infusion 250 mL  250 mL IntraVENous PRN    0.9% sodium chloride infusion 250 mL  250 mL IntraVENous PRN    sodium chloride (NS) flush 5-40 mL  5-40 mL IntraVENous Q8H    sodium chloride (NS) flush 5-40 mL  5-40 mL IntraVENous PRN    ondansetron (ZOFRAN ODT) tablet 4 mg  4 mg Oral Q8H PRN    Or    ondansetron (ZOFRAN) injection 4 mg  4 mg IntraVENous Q6H PRN    potassium chloride 10 mEq in 100 ml IVPB  10 mEq IntraVENous PRN    magnesium sulfate 2 g/50 ml IVPB (premix or compounded)  2 g IntraVENous PRN    HYDROmorphone (DILAUDID) injection 0.5 mg  0.5 mg IntraVENous Q3H PRN            Data Review:     LABS:   Recent Results (from the past 12 hour(s))   METABOLIC PANEL, COMPREHENSIVE    Collection Time: 12/20/21  7:00 AM   Result Value Ref Range    Sodium 144 136 - 145 mmol/L    Potassium 3.2 (L) 3.5 - 5.1 mmol/L    Chloride 114 (H) 98 - 107 mmol/L    CO2 18 (L) 21 - 32 mmol/L    Anion gap 12 7 - 16 mmol/L    Glucose 89 65 - 100 mg/dL    BUN 80 (H) 6 - 23 MG/DL    Creatinine 6.30 (H) 0.6 - 1.0 MG/DL    GFR est AA 9 (L) >60 ml/min/1.73m2    GFR est non-AA 7 (L) >60 ml/min/1.73m2    Calcium 6.0 (L) 8.3 - 10.4 MG/DL    Bilirubin, total 0.2 0.2 - 1.1 MG/DL    ALT (SGPT) 6 (L) 12 - 65 U/L    AST (SGOT) 5 (L) 15 - 37 U/L    Alk.  phosphatase 57 50 - 136 U/L    Protein, total 4.4 (L) 6.3 - 8.2 g/dL    Albumin 1.4 (L) 3.5 - 5.0 g/dL    Globulin 3.0 2.3 - 3.5 g/dL    A-G Ratio 0.5 (L) 1.2 - 3.5     PHOSPHORUS    Collection Time: 12/20/21  7:00 AM   Result Value Ref Range    Phosphorus 8.3 (H) 2.5 - 4.5 MG/DL   MAGNESIUM    Collection Time: 12/20/21  7:00 AM   Result Value Ref Range    Magnesium 1.9 1.8 - 2.4 mg/dL   GLUCOSE, POC    Collection Time: 12/20/21  7:25 AM   Result Value Ref Range    Glucose (POC) 132 (H) 65 - 100 mg/dL    Performed by Keenan          Plan:     Principal Problem:    Necrotizing fasciitis ([facility] (12/15/2021)    Active Problems:    DM2 (diabetes mellitus, type 2) ([facility] (12/25/2015)      HTN (hypertension) (12/25/2015)      Obesity, morbid ([facility] (7/12/2018)      Anemia (7/13/2018)      DEVENDRA (acute kidney injury) ([facility] (12/16/2021)      Acute respiratory failure ([facility] (12/16/2021)      Sepsis with acute renal failure and tubular necrosis without septic shock ([facility] (12/16/2021)       DEVENDRA with unclear hx of CKD, secondary to DM ( no recent lab with which to compare) possible ATN  - imaging CT showed some cortical thinning associated with CKD. Mild hypokalemia- replete as needed  Good uop, 2850 cc recorded last 24 hours. No overt uremic symptoms, Cr continues to trend down 6.30 today       Necrotizing fasciitis. - possible closure today  Hypotension with sepsis- resolved.

## 2021-12-20 NOTE — PROGRESS NOTES
Physician Progress Note      PATIENTRachael Stanton  CSN #:                  [id number]  :                       1970  ADMIT DATE:       12/15/2021 9:53 AM  DISCH DATE:  RESPONDING  PROVIDER #:        JOCELYNN DIETRICH DO          QUERY TEXT:    Pt admitted with Necrotizing fasciitis . Pt noted to have Sepsis documented on . If possible, please document in progress notes and discharge summary the present on admission status of Sepsis:    The medical record reflects the following:  Risk Factors:   Necrotizing fasciitis  Clinical Indicators: WBC 35.6, Procalcitonin 3.55, RR 28, Hypotension,  Necrotizing fasciitis    Treatment:  Clindamycin IV, Merrem IV, Vancomycin IV, Zosyn IV            Thank you.   Ale Roche RN CDI, CCS  My office phone [phone]  Options provided:  -- Yes, Sepsis was present at the time of the order to admit to the hospital  -- No,Sepsis was not present on admission and developed during the inpatient stay  -- Other - I will add my own diagnosis  -- Disagree - Not applicable / Not valid  -- Disagree - Clinically unable to determine / Unknown  -- Refer to Clinical Documentation Reviewer    PROVIDER RESPONSE TEXT:    Yes,Sepsis was present at the time of the order to admit to the hospital.    Query created by: Nithya Carter on 2021 2:36 PM      Electronically signed by:  Bolivar Carrel DO 2021 10:36 AM

## 2021-12-20 NOTE — PROGRESS NOTES
CM spoke with patient and spouse at bedside for discharge planning. Demographics verified and updated. Patient lives with spouse. She is independent with ADLs, ambulates without assistive devices and drives. She works from home. PCP: Israel Ortega NP  Rx: Audria Brunner, TR    Discharge plan: Patient will return home with spouse at discharge. CM will follow for discharge needs. Care Management Interventions  PCP Verified by CM:  Yes  Mode of Transport at Discharge: Self  Discharge Durable Medical Equipment: No  Physical Therapy Consult: No  Occupational Therapy Consult: No  Support Systems: Spouse/Significant Other  Confirm Follow Up Transport: Family  Discharge Location  Discharge Placement: Home with family assistance

## 2021-12-20 NOTE — PROGRESS NOTES
[address]  [address] [facility], [address] [place] [address], [address]      PLAN:Await Plastic surgery for wound closure  Daily dressing changes  Appreciate Nephrology, Infectious Disease, and Hospitalist        ASSESSMENT:  Admit Date: 12/15/2021   4 Days Post-Op  Procedure(s):  INCISION AND DRAINAGE PERINEUM AND TRUNK    Principal Problem:    Necrotizing fasciitis ([address] (12/15/2021)    Active Problems:    DM2 (diabetes mellitus, type 2) ([address] (12/25/2015)      HTN (hypertension) (12/25/2015)      Obesity, morbid ([address] (7/12/2018)      Anemia (7/13/2018)      DEVENDRA (acute kidney injury) ([address] (12/16/2021)      Acute respiratory failure ([address] (12/16/2021)      Sepsis with acute renal failure and tubular necrosis without septic shock ([address] (12/16/2021)         SUBJECTIVE:Improving. Pain under control. Awaiting dressing changes today. VSS afebrile. Awaiting dressing change for today. OBJECTIVE:  Constitutional: Alert oriented cooperative patient in no acute distress; appears stated age   Visit Vitals  BP (!) 146/97 (BP 1 Location: Left upper arm, BP Patient Position: At rest)   Pulse 93   Temp 98.1 °F (36.7 °C)   Resp 17   Ht 5' 7.5\" (1.715 m)   Wt 317 lb 3.9 oz (143.9 kg)   SpO2 95%   BMI 48.95 kg/m²     Eyes:Sclera are clear. ENMT: no external lesions gross hearing normal; no obvious neck masses, no ear or lip lesions  CV: RRR. Normal perfusion  Resp: No JVD. Breathing is  non-labored; no audible wheezing. GI: Dressing intact. No new necrosis. Musculoskeletal: unremarkable with normal function. No embolic signs or cyanosis.    Neuro:  Oriented; moves all 4; no focal deficits  Psychiatric: normal affect and mood, no memory impairment      Patient Vitals for the past 24 hrs:   BP Temp Pulse Resp SpO2 Weight   12/20/21 1110 (!) 146/97 98.1 °F (36.7 °C) 93 17 95 % --   12/20/21 0728 131/86 97.6 °F (36.4 °C) (!) 127 18 95 % --   12/20/21 0507 (!) 158/89 98.1 °F (36.7 °C) 98 19 96 % 317 lb 3.9 oz (143.9 kg)   12/20/21 0400 -- -- 97 -- -- 317 lb 3.9 oz (143.9 kg)   12/20/21 0016 (!) 126/90 98.4 °F (36.9 °C) 62 18 96 % --   12/20/21 0000 -- -- 87 -- -- --   12/19/21 2208 (!) 154/78 98.1 °F (36.7 °C) 94 19 95 % --   12/19/21 2016 136/78 97.5 °F (36.4 °C) 83 18 96 % --   12/19/21 2000 -- -- 82 -- -- --   12/19/21 1504 128/80 98.7 °F (37.1 °C) 94 18 96 % --     Labs:    Recent Labs     12/20/21  1141 12/20/21  0700 12/19/21  0551   WBC 21.7*  --    < >   HGB 10.2*  --    < >     --    < >   NA  --  144  --    K  --  3.2*  --    CL  --  114*  --    CO2  --  18*  --    BUN  --  80*  --    CREA  --  6.30*  --    GLU  --  89  --    TBILI  --  0.2  --    ALT  --  6*  --    AP  --  57  --     < > = values in this interval not displayed.          Axel Mena, DO

## 2021-12-20 NOTE — PROGRESS NOTES
ABD dressing change done. Packed and dressed per wound care instructions with assistance from danielle and another RN. Pt premedicated with Dilaudid. Pt tolerated well. Central line dressing changed as well.

## 2021-12-20 NOTE — PROGRESS NOTES
Infectious Disease Consult    Today's Date: 2021   Admit Date: 12/15/2021    Impression:   · Necrotizing fasciitis, s/p debridement 12/15, , cultures with proteus, CoNS, and enterococcus  · Morbid obesity with BMI >49  · DM not on medications, A1c 6.6  · DEVENDRA, Nephrology is following; improving daily    Plan:   · Change antibiotics to unasyn alone and we will adjust for improving renal function. · This is a large wound which is going to be difficult to close. Plan to return to the OR again soon for repeat assessment, perhaps today. Anti-infectives:   · Clindamycin (12/15 -  · Meropenem (12/15 -  · Vancomycin (12/15,  -  · Ceftriaxone ( -  · Flagyl ( -  · Zosyn (12/15)    Subjective:   Pain is well controlled except when the wound is being packed. Nausea has resolved. No perceived side effects to antibiotics. Allergies   Allergen Reactions    Ace Inhibitors Angioedema    Arb-Angiotensin Receptor Antagonist Angioedema    Cayenne Pepper Hives     Swelling of the mouth    Bactrim [Sulfamethoprim] Other (comments)     Nausea, vomiting, making her feel like she is going to pass out      Keflex [Cephalexin] Nausea and Vomiting        Review of Systems:  Pertinent items are noted in the History of Present Illness.     Objective:     Visit Vitals  /86 (BP 1 Location: Left upper arm, BP Patient Position: At rest)   Pulse (!) 127   Temp 97.6 °F (36.4 °C)   Resp 18   Ht 5' 7.5\" (1.715 m)   Wt 143.9 kg (317 lb 3.9 oz)   SpO2 95%   BMI 48.95 kg/m²     Temp (24hrs), Av.1 °F (36.7 °C), Min:97.5 °F (36.4 °C), Max:98.7 °F (37.1 °C)       Lines:  Peripheral IV:       Physical Exam:    General:  Alert, cooperative, morbid obesity, appears stated age, pleasant   Eyes:  Sclera anicteric, EOMI   Mouth/Throat: Mucous membranes normal, oral pharynx clear   Neck: Supple   Lungs:   Clear to auscultation bilaterally, no increased work of breathing on room air   CV:  Regular rate and rhythm, no audible murmur, click, rub or gallop   Abdomen:   Soft, non-tender, hypoactive bowel sounds   Extremities: No cyanosis or edema   Skin: No erythema beyond edges of dressing. The wound was not thoroughly examined but appears clean.    Musculoskeletal: No swelling or deformity   Lines/Devices:  Intact, no erythema, drainage or tenderness   Psych: Alert and oriented, appropriate        Data Review:     CBC:  Recent Labs     12/19/21  0551 12/18/21  0504 12/17/21  1939   WBC 18.2* 14.7*  --    HGB 9.8* 8.5* 8.3*   HCT 28.6* 24.4*  --     192  --        BMP:  Recent Labs     12/20/21  0700 12/19/21  0551 12/18/21  0504   CREA 6.30* 8.98* 9.12*   BUN 80* 101* 101*    133* 132*   K 3.2* 4.4 3.9   * 91* 90*   CO2 18* 25 27   AGAP 12 17* 15   GLU 89 128* 146*       LFTS:  Recent Labs     12/20/21  0700 12/19/21  0551 12/18/21  0504   TBILI 0.2 0.3 0.2   ALT 6* 11* 11*   AP 57 80 71   TP 4.4* 6.1* 5.8*   ALB 1.4* 2.3* 2.4*       Microbiology:     All Micro Results     Procedure Component Value Units Date/Time    BLOOD CULTURE [[id number]] Collected: 12/15/21 1215    Order Status: Completed Specimen: Blood Updated: 12/20/21 0653     Special Requests: --        LEFT  HAND       Culture result: NO GROWTH 5 DAYS       BLOOD CULTURE [[id number]] Collected: 12/15/21 1236    Order Status: Completed Specimen: Blood Updated: 12/20/21 0653     Special Requests: --        RIGHT  HAND       Culture result: NO GROWTH 5 DAYS       CULTURE, WOUND Margurite Bill STAIN [[id number]]  (Abnormal)  (Susceptibility) Collected: 12/15/21 1323    Order Status: Completed Specimen: Wound from Groin Updated: 12/19/21 0732     Special Requests: NO SPECIAL REQUESTS        GRAM STAIN 0 TO 36 WBCS PER OIF      MANY GRAM POSITIVE COCCI               MODERATE GRAM NEGATIVE RODS                  MODERATE GRAM POSITIVE RODS           Culture result:       MODERATE PROTEUS MIRABILIS                  MODERATE ENTEROCOCCUS FAECALIS GROUP D LIGHT MIXED SKIN MASSIMO ISOLATED          CULTURE, Juan Kelch STAIN [[id number]]  (Abnormal)  (Susceptibility) Collected: 12/15/21 1710    Order Status: Completed Specimen: Peritoneum Updated: 21 0936     Special Requests: NO SPECIAL REQUESTS        GRAM STAIN 0 TO 6 WBCS PER OIF      MANY GRAM POSITIVE COCCI         MANY GRAM NEGATIVE RODS        Culture result:       MODERATE PROTEUS MIRABILIS                  MODERATE STAPHYLOCOCCUS SPECIES, COAGULASE NEGATIVE THIS ORGANISM WILL BE HELD FOR 7 DAYS. IF FURTHER TESTING IS REQUIRED PLEASE NOTIFY MICROBIOLOGY          CULTURE, ANAEROBIC [[id number]] Collected: 12/15/21 1710    Order Status: Completed Specimen: Peritoneum Updated: 21 0834     Special Requests: NO SPECIAL REQUESTS        Culture result:       SUBCULTURE IS NECESSARY TO DETERMINE PRESENCE OR ABSENCE OF ANAEROBIC BACTERIA IN THIS CULTURE. FURTHER REPORT TO FOLLOW AFTER INCUBATION OF SUBCULTURE. COVID-19 RAPID TEST [[id number]] Collected: 12/15/21 1519    Order Status: Completed Specimen: Nasopharyngeal Updated: 12/15/21 1549     Specimen source NASAL        COVID-19 rapid test Not detected        Comment:      The specimen is NEGATIVE for SARS-CoV-2, the novel coronavirus associated with COVID-19. A negative result does not rule out COVID-19. This test has been authorized by the FDA under an Emergency Use Authorization (EUA) for use by authorized laboratories. Fact sheet for Healthcare Providers: ConventionUpdate.co.nz  Fact sheet for Patients: ConventionUpdate.co.nz       Methodology: Isothermal Nucleic Acid Amplification               Imagin21 CXR persistent bilateral lung infiltrates  12/15/21 CT abd pelv wo cont  IMPRESSION  1.  There is soft tissue stranding and extensive subcutaneous emphysema, beginning in the left labia and left perineal soft tissues, extending anterior and inferiorly within the ventral pannus, and extending superiorly and laterally throughout the ventral pannus, ventral pelvic wall, and left flank fat. The cephalad extent is through the level of the anterior superior iliac spine. The constellation of features is consistent with necrotizing fasciitis, without definite evidence of a well-formed fluid collection within the limits of noncontrast technique. 2. No evidence of intraperitoneal extension.     3. Left greater than right renal cortical thinning.     4. Mildly distended gallbladder. Absent gallbladder wall thickening and  surrounding free fluid, this is most likely a mildly patulous gallbladder.       Signed By: Gavin Elkins MD     December 20, 2021

## 2021-12-21 LAB
ALBUMIN SERPL-MCNC: 1.5 G/DL (ref 3.5–5)
ALBUMIN/GLOB SERPL: 0.5 {RATIO} (ref 1.2–3.5)
ALP SERPL-CCNC: 54 U/L (ref 50–136)
ALT SERPL-CCNC: 7 U/L (ref 12–65)
ANION GAP SERPL CALC-SCNC: 11 MMOL/L (ref 7–16)
ANION GAP SERPL CALC-SCNC: 11 MMOL/L (ref 7–16)
AST SERPL-CCNC: 9 U/L (ref 15–37)
BILIRUB SERPL-MCNC: 0.2 MG/DL (ref 0.2–1.1)
BUN SERPL-MCNC: 104 MG/DL (ref 6–23)
BUN SERPL-MCNC: 85 MG/DL (ref 6–23)
CALCIUM SERPL-MCNC: 6.2 MG/DL (ref 8.3–10.4)
CALCIUM SERPL-MCNC: 7.9 MG/DL (ref 8.3–10.4)
CHLORIDE SERPL-SCNC: 110 MMOL/L (ref 98–107)
CHLORIDE SERPL-SCNC: 96 MMOL/L (ref 98–107)
CO2 SERPL-SCNC: 21 MMOL/L (ref 21–32)
CO2 SERPL-SCNC: 25 MMOL/L (ref 21–32)
CREAT SERPL-MCNC: 6.8 MG/DL (ref 0.6–1)
CREAT SERPL-MCNC: 8.84 MG/DL (ref 0.6–1)
ERYTHROCYTE [DISTWIDTH] IN BLOOD BY AUTOMATED COUNT: 14.5 % (ref 11.9–14.6)
GLOBULIN SER CALC-MCNC: 3.2 G/DL (ref 2.3–3.5)
GLUCOSE BLD STRIP.AUTO-MCNC: 145 MG/DL (ref 65–100)
GLUCOSE BLD STRIP.AUTO-MCNC: 196 MG/DL (ref 65–100)
GLUCOSE BLD STRIP.AUTO-MCNC: 215 MG/DL (ref 65–100)
GLUCOSE BLD STRIP.AUTO-MCNC: 216 MG/DL (ref 65–100)
GLUCOSE SERPL-MCNC: 114 MG/DL (ref 65–100)
GLUCOSE SERPL-MCNC: 212 MG/DL (ref 65–100)
HCT VFR BLD AUTO: 30.1 % (ref 35.8–46.3)
HGB BLD-MCNC: 10 G/DL (ref 11.7–15.4)
MAGNESIUM SERPL-MCNC: 2.3 MG/DL (ref 1.8–2.4)
MAGNESIUM SERPL-MCNC: 3 MG/DL (ref 1.8–2.4)
MCH RBC QN AUTO: 29.9 PG (ref 26.1–32.9)
MCHC RBC AUTO-ENTMCNC: 33.2 G/DL (ref 31.4–35)
MCV RBC AUTO: 90.1 FL (ref 79.6–97.8)
NRBC # BLD: 0 K/UL (ref 0–0.2)
PHOSPHATE SERPL-MCNC: 7.9 MG/DL (ref 2.5–4.5)
PLATELET # BLD AUTO: 280 K/UL (ref 150–450)
PMV BLD AUTO: 9.8 FL (ref 9.4–12.3)
POTASSIUM SERPL-SCNC: 3.5 MMOL/L (ref 3.5–5.1)
POTASSIUM SERPL-SCNC: 4.3 MMOL/L (ref 3.5–5.1)
PROT SERPL-MCNC: 4.7 G/DL (ref 6.3–8.2)
RBC # BLD AUTO: 3.34 M/UL (ref 4.05–5.2)
SERVICE CMNT-IMP: ABNORMAL
SODIUM SERPL-SCNC: 132 MMOL/L (ref 136–145)
SODIUM SERPL-SCNC: 142 MMOL/L (ref 136–145)
WBC # BLD AUTO: 18.3 K/UL (ref 4.3–11.1)

## 2021-12-21 PROCEDURE — 74011250636 HC RX REV CODE- 250/636: Performed by: NURSE PRACTITIONER

## 2021-12-21 PROCEDURE — 80053 COMPREHEN METABOLIC PANEL: CPT

## 2021-12-21 PROCEDURE — 74011250637 HC RX REV CODE- 250/637: Performed by: NURSE PRACTITIONER

## 2021-12-21 PROCEDURE — 74011250636 HC RX REV CODE- 250/636: Performed by: SURGERY

## 2021-12-21 PROCEDURE — 85027 COMPLETE CBC AUTOMATED: CPT

## 2021-12-21 PROCEDURE — 74011000258 HC RX REV CODE- 258: Performed by: INTERNAL MEDICINE

## 2021-12-21 PROCEDURE — 74011636637 HC RX REV CODE- 636/637: Performed by: INTERNAL MEDICINE

## 2021-12-21 PROCEDURE — 93005 ELECTROCARDIOGRAM TRACING: CPT | Performed by: STUDENT IN AN ORGANIZED HEALTH CARE EDUCATION/TRAINING PROGRAM

## 2021-12-21 PROCEDURE — 74011250636 HC RX REV CODE- 250/636: Performed by: INTERNAL MEDICINE

## 2021-12-21 PROCEDURE — 74011250637 HC RX REV CODE- 250/637: Performed by: INTERNAL MEDICINE

## 2021-12-21 PROCEDURE — 74011636637 HC RX REV CODE- 636/637: Performed by: NURSE PRACTITIONER

## 2021-12-21 PROCEDURE — 83735 ASSAY OF MAGNESIUM: CPT

## 2021-12-21 PROCEDURE — 84100 ASSAY OF PHOSPHORUS: CPT

## 2021-12-21 PROCEDURE — 2709999900 HC NON-CHARGEABLE SUPPLY

## 2021-12-21 PROCEDURE — 82962 GLUCOSE BLOOD TEST: CPT

## 2021-12-21 PROCEDURE — 77030018836 HC SOL IRR NACL ICUM -A

## 2021-12-21 PROCEDURE — 65660000000 HC RM CCU STEPDOWN

## 2021-12-21 RX ORDER — INSULIN GLARGINE 100 [IU]/ML
24 INJECTION, SOLUTION SUBCUTANEOUS DAILY
Status: DISCONTINUED | OUTPATIENT
Start: 2021-12-21 | End: 2021-12-23

## 2021-12-21 RX ADMIN — HEPARIN SODIUM 5000 UNITS: 5000 INJECTION INTRAVENOUS; SUBCUTANEOUS at 12:52

## 2021-12-21 RX ADMIN — Medication 4 UNITS: at 21:51

## 2021-12-21 RX ADMIN — CALCIUM ACETATE 667 MG: 667 TABLET ORAL at 17:09

## 2021-12-21 RX ADMIN — HYDROMORPHONE HYDROCHLORIDE 0.5 MG: 1 INJECTION, SOLUTION INTRAMUSCULAR; INTRAVENOUS; SUBCUTANEOUS at 12:52

## 2021-12-21 RX ADMIN — Medication 10 ML: at 05:01

## 2021-12-21 RX ADMIN — Medication 2 UNITS: at 11:31

## 2021-12-21 RX ADMIN — INSULIN GLARGINE 24 UNITS: 100 INJECTION, SOLUTION SUBCUTANEOUS at 09:33

## 2021-12-21 RX ADMIN — HEPARIN SODIUM 5000 UNITS: 5000 INJECTION INTRAVENOUS; SUBCUTANEOUS at 05:01

## 2021-12-21 RX ADMIN — Medication 10 ML: at 14:21

## 2021-12-21 RX ADMIN — SERTRALINE 100 MG: 50 TABLET, FILM COATED ORAL at 09:34

## 2021-12-21 RX ADMIN — AMLODIPINE BESYLATE 5 MG: 5 TABLET ORAL at 09:33

## 2021-12-21 RX ADMIN — HEPARIN SODIUM 5000 UNITS: 5000 INJECTION INTRAVENOUS; SUBCUTANEOUS at 21:50

## 2021-12-21 RX ADMIN — Medication 800 MG: at 17:09

## 2021-12-21 RX ADMIN — Medication 800 MG: at 09:34

## 2021-12-21 RX ADMIN — FAMOTIDINE 20 MG: 20 TABLET ORAL at 09:33

## 2021-12-21 RX ADMIN — CALCIUM ACETATE 667 MG: 667 TABLET ORAL at 09:33

## 2021-12-21 RX ADMIN — OXYCODONE AND ACETAMINOPHEN 1 TABLET: 5; 325 TABLET ORAL at 17:09

## 2021-12-21 RX ADMIN — OXYCODONE AND ACETAMINOPHEN 1 TABLET: 5; 325 TABLET ORAL at 09:34

## 2021-12-21 RX ADMIN — AMPICILLIN SODIUM AND SULBACTAM SODIUM 3 G: 2; 1 INJECTION, POWDER, FOR SOLUTION INTRAMUSCULAR; INTRAVENOUS at 09:34

## 2021-12-21 RX ADMIN — METOPROLOL TARTRATE 100 MG: 50 TABLET, FILM COATED ORAL at 17:09

## 2021-12-21 RX ADMIN — AMPICILLIN SODIUM AND SULBACTAM SODIUM 3 G: 2; 1 INJECTION, POWDER, FOR SOLUTION INTRAMUSCULAR; INTRAVENOUS at 22:18

## 2021-12-21 RX ADMIN — CALCIUM ACETATE 667 MG: 667 TABLET ORAL at 11:31

## 2021-12-21 RX ADMIN — Medication 4 UNITS: at 17:22

## 2021-12-21 RX ADMIN — Medication 10 ML: at 21:52

## 2021-12-21 RX ADMIN — METOPROLOL TARTRATE 100 MG: 50 TABLET, FILM COATED ORAL at 09:34

## 2021-12-21 NOTE — PROGRESS NOTES
Infectious Disease Consult    Today's Date: 2021   Admit Date: 12/15/2021    Impression:   · Necrotizing fasciitis of abdomen/pannus, s/p debridement down to iliac and pubic bone12/15, , cultures with proteus, CoNS, and enterococcus  · Morbid obesity with BMI >49  · DM not on medications, A1c 6.6  · DEVENDRA, Nephrology is following; improving daily    Plan:   · Continue renal dose Unasyn  · Plan for plastic surgery eval    Anti-infectives:   · unasyn -  · Clindamycin (12/15 -  · Meropenem (12/15 -  · Vancomycin (12/15,  -  · Ceftriaxone ( -  · Flagyl ( -  · Zosyn (12/15)    Subjective:   Seen with , RN at bedside. Awaiting wound dressing change. No fever, chills, sweats. Allergies   Allergen Reactions    Ace Inhibitors Angioedema    Arb-Angiotensin Receptor Antagonist Angioedema    Cayenne Pepper Hives     Swelling of the mouth    Bactrim [Sulfamethoprim] Other (comments)     Nausea, vomiting, making her feel like she is going to pass out      Keflex [Cephalexin] Nausea and Vomiting        Review of Systems:  Pertinent items are noted in the History of Present Illness.     Objective:     Visit Vitals  BP (!) 154/80   Pulse 74   Temp 97.7 °F (36.5 °C)   Resp 14   Ht 5' 7.5\" (1.715 m)   Wt 143.7 kg (316 lb 12.8 oz)   SpO2 97%   BMI 48.89 kg/m²     Temp (24hrs), Av °F (36.7 °C), Min:97.7 °F (36.5 °C), Max:98.3 °F (36.8 °C)     Patient examined 2021, exam remains unchanged except as noted below:    General:  Alert, cooperative, no acute distress, obese   Head:  Normocephalic, atraumatic    Eyes:  Anicteric, no drainage/not injected   Throat: Mucus membranes moist OP clear   Neck: Supple, symmetrical, trachea midline, no JVD   Lungs:   Clear throughout lung fields, no increased work of breathing   Heart:  Regular rate and rhythm, without murmur   Abdomen:   Soft, non-tender, no guarding, no distention, bowel sounds active, lower abdominal wound covered Extremities: Extremities without edema, pulses palpable   Skin: Skin without rash     Lines/Devices: R neck IJ           Data Review:     CBC:  Recent Labs     12/20/21  1141 12/19/21  0551   WBC 21.7* 18.2*   GRANS 73  --    MONOS 5  --    EOS 0*  --    ANEU 15.9*  --    ABL 1.5  --    HGB 10.2* 9.8*   HCT 30.7* 28.6*    234       BMP:  Recent Labs     12/21/21  0450 12/20/21  0700 12/19/21  0551   CREA 6.80* 6.30* 8.98*   BUN 85* 80* 101*    144 133*   K 3.5 3.2* 4.4   * 114* 91*   CO2 21 18* 25   AGAP 11 12 17*   * 89 128*       LFTS:  Recent Labs     12/21/21  0450 12/20/21  0700 12/19/21  0551   TBILI 0.2 0.2 0.3   ALT 7* 6* 11*   AP 54 57 80   TP 4.7* 4.4* 6.1*   ALB 1.5* 1.4* 2.3*       Microbiology:     All Micro Results     Procedure Component Value Units Date/Time    BLOOD CULTURE [[id number]] Collected: 12/15/21 1215    Order Status: Completed Specimen: Blood Updated: 12/20/21 0653     Special Requests: --        LEFT  HAND       Culture result: NO GROWTH 5 DAYS       BLOOD CULTURE [[id number]] Collected: 12/15/21 1236    Order Status: Completed Specimen: Blood Updated: 12/20/21 0653     Special Requests: --        RIGHT  HAND       Culture result: NO GROWTH 5 DAYS       CULTURE, WOUND Jolena Austerlitz STAIN [[id number]]  (Abnormal)  (Susceptibility) Collected: 12/15/21 1323    Order Status: Completed Specimen: Wound from Groin Updated: 12/19/21 0732     Special Requests: NO SPECIAL REQUESTS        GRAM STAIN 0 TO 36 WBCS PER OIF      MANY GRAM POSITIVE COCCI               MODERATE GRAM NEGATIVE RODS                  MODERATE GRAM POSITIVE RODS           Culture result:       MODERATE PROTEUS MIRABILIS                  MODERATE ENTEROCOCCUS FAECALIS GROUP D                  LIGHT MIXED SKIN MASSIMO ISOLATED          CULTURE, Biagio Holstein STAIN [[id number]]  (Abnormal)  (Susceptibility) Collected: 12/15/21 1710    Order Status: Completed Specimen: Peritoneum Updated: 12/18/21 0936     Special Requests: NO SPECIAL REQUESTS        GRAM STAIN 0 TO 6 WBCS PER OIF      MANY GRAM POSITIVE COCCI         MANY GRAM NEGATIVE RODS        Culture result:       MODERATE PROTEUS MIRABILIS                  MODERATE STAPHYLOCOCCUS SPECIES, COAGULASE NEGATIVE THIS ORGANISM WILL BE HELD FOR 7 DAYS. IF FURTHER TESTING IS REQUIRED PLEASE NOTIFY MICROBIOLOGY          CULTURE, ANAEROBIC [[id number]] Collected: 12/15/21 1710    Order Status: Completed Specimen: Peritoneum Updated: 21 0834     Special Requests: NO SPECIAL REQUESTS        Culture result:       SUBCULTURE IS NECESSARY TO DETERMINE PRESENCE OR ABSENCE OF ANAEROBIC BACTERIA IN THIS CULTURE. FURTHER REPORT TO FOLLOW AFTER INCUBATION OF SUBCULTURE. COVID-19 RAPID TEST [[id number]] Collected: 12/15/21 1519    Order Status: Completed Specimen: Nasopharyngeal Updated: 12/15/21 1549     Specimen source NASAL        COVID-19 rapid test Not detected        Comment:      The specimen is NEGATIVE for SARS-CoV-2, the novel coronavirus associated with COVID-19. A negative result does not rule out COVID-19. This test has been authorized by the FDA under an Emergency Use Authorization (EUA) for use by authorized laboratories. Fact sheet for Healthcare Providers: ConventionUpdate.co.nz  Fact sheet for Patients: ConventionUpdate.co.nz       Methodology: Isothermal Nucleic Acid Amplification               Imagin21 CXR persistent bilateral lung infiltrates  12/15/21 CT abd pelv wo cont  IMPRESSION  1. There is soft tissue stranding and extensive subcutaneous emphysema, beginning in the left labia and left perineal soft tissues, extending anterior and inferiorly within the ventral pannus, and extending superiorly and laterally throughout the ventral pannus, ventral pelvic wall, and left flank fat. The cephalad extent is through the level of the anterior superior iliac spine.  The constellation of features is consistent with necrotizing fasciitis, without definite evidence of a well-formed fluid collection within the limits of noncontrast technique. 2. No evidence of intraperitoneal extension.     3. Left greater than right renal cortical thinning.     4. Mildly distended gallbladder. Absent gallbladder wall thickening and  surrounding free fluid, this is most likely a mildly patulous gallbladder.       Signed By: Maida Lu NP     December 21, 2021

## 2021-12-21 NOTE — PROGRESS NOTES
[facility] Nephrology Progress Note      Admission Date: 12/15/2021   Subjective:   Pt seen and examined in room, spouse at the bedside, perineal wound pain controlled on percocet, good uop via hammond.     ROS:  General: no fever/chills, appetite ok   CV: no CP  Lung: no SOB, no cough  GI: no N/V/D  : no dysuria  Ext: no edema       Objective:     Physical Exam:      Visit Vitals  BP (!) 154/80   Pulse 74   Temp 97.7 °F (36.5 °C)   Resp 14   Ht 5' 7.5\" (1.715 m)   Wt 143.7 kg (316 lb 12.8 oz)   SpO2 97%   BMI 48.89 kg/m²      Gen: alert and oriented, NAD  HEENT: moist membranes  CV: regular rate, S1, S2, no Rub   Lungs: Clear bilaterally  Abd: soft, non tender, + BS  Extem: trace peripheral edema      Current Facility-Administered Medications   Medication Dose Route Frequency    insulin glargine (LANTUS) injection 24 Units  24 Units SubCUTAneous DAILY    ampicillin-sulbactam (UNASYN) 3 g in 0.9% sodium chloride (MBP/ADV) 100 mL MBP  3 g IntraVENous Q12H    amLODIPine (NORVASC) tablet 5 mg  5 mg Oral DAILY    sertraline (ZOLOFT) tablet 100 mg  100 mg Oral DAILY    metoprolol tartrate (LOPRESSOR) tablet 100 mg  100 mg Oral BID    oxyCODONE-acetaminophen (PERCOCET) 5-325 mg per tablet 1 Tablet  1 Tablet Oral Q4H PRN    famotidine (PEPCID) tablet 20 mg  20 mg Oral DAILY    calcium acetate (phosphate binder) (PHOSLO) tablet 667 mg  1 Tablet Oral TID WITH MEALS    magnesium oxide (MAG-OX) tablet 800 mg  800 mg Oral BID    insulin lispro (HUMALOG) injection   SubCUTAneous AC&HS    heparin (porcine) injection 5,000 Units  5,000 Units SubCUTAneous Q8H    0.9% sodium chloride infusion 250 mL  250 mL IntraVENous PRN    0.9% sodium chloride infusion 250 mL  250 mL IntraVENous PRN    sodium chloride (NS) flush 5-40 mL  5-40 mL IntraVENous Q8H    sodium chloride (NS) flush 5-40 mL  5-40 mL IntraVENous PRN    ondansetron (ZOFRAN ODT) tablet 4 mg  4 mg Oral Q8H PRN    Or    ondansetron (ZOFRAN) injection 4 mg  4 mg IntraVENous Q6H PRN    potassium chloride 10 mEq in 100 ml IVPB  10 mEq IntraVENous PRN    magnesium sulfate 2 g/50 ml IVPB (premix or compounded)  2 g IntraVENous PRN    HYDROmorphone (DILAUDID) injection 0.5 mg  0.5 mg IntraVENous Q3H PRN            Data Review:     LABS:   Recent Results (from the past 12 hour(s))   METABOLIC PANEL, COMPREHENSIVE    Collection Time: 12/21/21  4:50 AM   Result Value Ref Range    Sodium 142 136 - 145 mmol/L    Potassium 3.5 3.5 - 5.1 mmol/L    Chloride 110 (H) 98 - 107 mmol/L    CO2 21 21 - 32 mmol/L    Anion gap 11 7 - 16 mmol/L    Glucose 114 (H) 65 - 100 mg/dL    BUN 85 (H) 6 - 23 MG/DL    Creatinine 6.80 (H) 0.6 - 1.0 MG/DL    GFR est AA 8 (L) >60 ml/min/1.73m2    GFR est non-AA 7 (L) >60 ml/min/1.73m2    Calcium 6.2 (L) 8.3 - 10.4 MG/DL    Bilirubin, total 0.2 0.2 - 1.1 MG/DL    ALT (SGPT) 7 (L) 12 - 65 U/L    AST (SGOT) 9 (L) 15 - 37 U/L    Alk.  phosphatase 54 50 - 136 U/L    Protein, total 4.7 (L) 6.3 - 8.2 g/dL    Albumin 1.5 (L) 3.5 - 5.0 g/dL    Globulin 3.2 2.3 - 3.5 g/dL    A-G Ratio 0.5 (L) 1.2 - 3.5     PHOSPHORUS    Collection Time: 12/21/21  4:50 AM   Result Value Ref Range    Phosphorus 7.9 (H) 2.5 - 4.5 MG/DL   MAGNESIUM    Collection Time: 12/21/21  4:50 AM   Result Value Ref Range    Magnesium 2.3 1.8 - 2.4 mg/dL   GLUCOSE, POC    Collection Time: 12/21/21  7:13 AM   Result Value Ref Range    Glucose (POC) 145 (H) 65 - 100 mg/dL    Performed by [facility]          Plan:     Principal Problem:    Necrotizing fasciitis ([address] (12/15/2021)    Active Problems:    DM2 (diabetes mellitus, type 2) ([facility] (12/25/2015)      HTN (hypertension) (12/25/2015)      Obesity, morbid ([facility] (7/12/2018)      Anemia (7/13/2018)      DEVENDRA (acute kidney injury) ([facility] (12/16/2021)      Acute respiratory failure ([facility] (12/16/2021)      Sepsis with acute renal failure and tubular necrosis without septic shock ([facility] (12/16/2021)       DEVENDRA with unclear hx of CKD, secondary to DM ( no recent lab with which to compare) possible ATN  - imaging CT showed some cortical thinning associated with CKD. Mild hypokalemia- replete as needed  Cr up today 6.80, good uop, 2200 cc recorded last 24 hours. No overt uremic symptoms  vanc level was elevated up to 23.4 on 12/16 off vanc and on unasyn   No indication for acute RRT, trending renal indices       Necrotizing fasciitis. - s/p I&D perineum and trunk, gen surgery following, plastic surgery for wound closure   Hypotension with sepsis- resolved.

## 2021-12-21 NOTE — PROGRESS NOTES
Hospitalist Progress Note   Admit Date:  12/15/2021  9:53 AM   Name:  Alejandro Bhakta   Age:  46 y.o. Sex:  female  :  1970   MRN:  [id number]   Room:      Presenting Complaint: Vomiting    Reason(s) for Admission: Necrotizing fasciitis [facility]) [M72.6]     Hospital Course & Interval History:   Alejandro Bhakta is a 46 y.o. female with history of Morbid obesity, DM who was admitted for septic shock 2/2 necrotizing fascitis involving her pannus and perineum associated with respiratory failure, severe DEVENDRA (Scr 11), acidosis pH 7.1). She was placed on levo/jessica, intubated and treated with vanc/zosyn initially. Outpatient she was started on bactrim and unable to tolerate it then changed to doxycycline. Per  was not taking in any PO and became progressively weaker at home.  denies known history of renal disease. Made little urine prior to admission. She is s/p excisional debridement on 12/15, . OP note reports foul smelling necrotizing fasciitis extending from the perineum, left labia deep to the pubic bone and along the fascia to the abdominal wall above the iliac bone into the left flank. She rec'd ancef in OR and was changed in vanc/clinda/meropenem on 12/15 by pulmonology. Discussed with , no evidence of osteomyelitis. Plan to return patient to OR on Monday for re-assessment. : extubated. Renal consult. New AFIB RVR. dilt gtt started   Subjective (21): Patient seen and examined denies fever chills nausea vomiting chest pain or shortness of breath. Patient complains of surgical site abdominal pain. Assessment & Plan:     Septic Shock 2/2 Necrotizing Fascitis - s/p excisional debridement x 2   BC12/15  NGTD  Wound cx GNR, GPC    S/p vanc/zosyn  Escalated to meropenem, clinda and vanc 12/15   Recommend de-escalating to unasyn/vanc versus rocephin/flagyl/vanc unless pseudomonas is suspected. S/p levophed.  Now HDS   Return to OR on Monday 12/18-resolving. Increasingly tachycardic. We will bolus the patient 1 L of normal saline and monitor. Given severity of DEVENDRA will monitor volume status closely. Continue supportive care. 12/19-ceftriaxone vancomycin and Flagyl until cultures finalize  12/20-improving. Will transition to Unasyn and monitor. 12/21-continues to improve. Anticipate need for plastic surgery to address underlying wound to the gravity size and patient's body habitus. Transition to Unasyn based on infectious disease recommendations. ABLA - anemia of renal/chronic dz and post op blood loss. Iron stores and b12 wnl. Hgb 5.8 rec'ing 2 U pRBC. Repeat hemoglobin this afternoon. 12/18-likely secondary to intraoperative blood losses. Patient's hemoglobin hematocrit remained stable at this time. 12/19-stable      DEVENDRA on CKD 2/2 PRA/ATN, ? AIN - assocatied with sepsis   Improving. UOP 800s   Scr 11.2 >> 9BL unclear last Scr 2.2 9/2017   Strict I&O, daily wts   Check urine lytes. RI:CR ratio   Daily BMP/CMP, Mag, Phos   nephro consult   12/18-nephrology consulted. Creatinine now 9.12 essentially unchanged. May require dialysis if patient does not start to recover renal function  12/19-patient status post bolus of 1 L yesterday and again received 1 L bolus today secondary to development of underlying tachycardia. Will watch volume status closely as patient's renal function is slow to return. Suspect underlying ATN  12/20-renal function improving and responded to 2 L of fluid boluses over the last 48 hours. Nephrology consulted and monitoring. 12/21-significantly worse today with creatinine now up to 8.84. Nephrology consulted and monitoring. Suspect underlying ATN. No indication for dialysis at this time. Atrial fibrillation with rapid ventricular response-patient initiated on 100 mg of metoprolol twice daily. [id number] score of 2 making patient high risk.   Hold anticoagulation and defer anticoagulation recommendations to general surgery given severity of patient's wound and possible need for further surgical intervention. 12/20-currently rate controlled on metoprolol 100 mg p.o. twice daily and will speak with general surgery regarding anticoagulation given underlying wound and need to return to the OR for closure of patient's wound. 12/21-currently rate controlled on metoprolol 100 mg p.o. twice daily. Not anticoagulated due to abdominal wound and further anticipated surgical intervention. Chadsvasc of 2 and undoubtedly will need anticoagulation once clinically appropriate      Hyperphosphatemia - start phosphate binder   Hypokalemia - PO x 1   Metabolic acidosis - related to acute renal failure. S/p bicarb gtt   Hyponatremia - mild. CTM   pseudohypocalcemia - corrected Ca 8.6   12/18-continue phosphate binder and monitor  12/20-continue phosphate binder. T2DM -increase lantus 14s plus SSI   12/18-to goal  12/20-to goal continue to monitor  4/21-poorly controlled we will increase patient's Lantus to 24 units based on 24-hour requirement of short acting insulin and monitor. Obesity - increased risk of complications.        Acute hypoxemic respiratory failure // Mechanical Ventilation - extubated on 12/16. Wean supplemental o2 as tolerated. 12/19-resolved       GERD - pepcid           Dispo/Discharge Planning:      Transfer to floor     Diet:  ADULT DIET Regular; 3 carb choices (45 gm/meal);  Low Phosphorus (Less than 1000 mg)  DVT PPx: heparin   Code status: Full Code    Hospital Problems as of 12/21/2021 Date Reviewed: 12/25/2015          Codes Class Noted - Resolved POA    DEVENDRA (acute kidney injury) ([facility] ICD-10-CM: N17.9  ICD-9-CM: 584.9  12/16/2021 - Present Yes        Acute respiratory failure ([facility] ICD-10-CM: J96.00  ICD-9-CM: 518.81  12/16/2021 - Present No        Sepsis with acute renal failure and tubular necrosis without septic shock (HCC) ICD-10-CM: A41.9, R65.20, N17.0  ICD-9-CM: 038.9, 995.91, 584.5  12/16/2021 - Present Yes        * (Principal) Necrotizing fasciitis ([facility] ICD-10-CM: M72.6  ICD-9-CM: 728.86  12/15/2021 - Present Yes        Anemia ICD-10-CM: D64.9  ICD-9-CM: 285.9  7/13/2018 - Present Yes        Obesity, morbid ([facility] (Chronic) ICD-10-CM: E66.01  ICD-9-CM: 278.01  7/12/2018 - Present Yes        DM2 (diabetes mellitus, type 2) ([facility] (Chronic) ICD-10-CM: E11.9  ICD-9-CM: 250.00  12/25/2015 - Present Yes        HTN (hypertension) (Chronic) ICD-10-CM: I10  ICD-9-CM: 401.9  12/25/2015 - Present Yes              Objective:     Patient Vitals for the past 24 hrs:   Temp Pulse Resp BP SpO2   12/21/21 1047 97.3 °F (36.3 °C) 92 16 (!) 141/96 99 %   12/21/21 0727 97.7 °F (36.5 °C) 74 14 (!) 154/80 97 %   12/21/21 0415 97.9 °F (36.6 °C) 83 16 (!) 146/84 97 %   12/21/21 0000 -- 84 -- -- --   12/20/21 2307 98.3 °F (36.8 °C) 76 17 (!) 147/86 --   12/20/21 2026 98.3 °F (36.8 °C) 76 17 (!) 143/85 --   12/20/21 2000 -- 94 -- -- --   12/20/21 1547 97.7 °F (36.5 °C) (!) 127 17 (!) 140/85 96 %     Oxygen Therapy  O2 Sat (%): 99 % (12/21/21 1047)  Pulse via Oximetry: 68 beats per minute (12/17/21 2028)  O2 Device: Nasal cannula (12/17/21 2028)  Skin Assessment: Clean, dry, & intact (12/17/21 5559)  Skin Protection for O2 Device: No (12/17/21 0300)  Orientation: Bilateral (12/16/21 0800)  Location: Cheek (12/16/21 0800)  Interventions: Mouth Care;Hydrocolloid Dressing (12/16/21 0800)  O2 Flow Rate (L/min): 1 l/min (12/17/21 2028)  FIO2 (%): 28 % (12/17/21 1013)    Estimated body mass index is 48.89 kg/m² as calculated from the following:    Height as of this encounter: 5' 7.5\" (1.715 m). Weight as of this encounter: 143.7 kg (316 lb 12.8 oz). Intake/Output Summary (Last 24 hours) at 12/21/2021 1257  Last data filed at 12/21/2021 0418  Gross per 24 hour   Intake --   Output 2200 ml   Net -2200 ml         Physical Exam:   Physical Exam  Vitals and nursing note reviewed.    Constitutional: Appearance: She is obese. HENT:      Head: Normocephalic and atraumatic. Cardiovascular:      Rate and Rhythm: Normal rate. Pulmonary:      Effort: Pulmonary effort is normal. No respiratory distress. Abdominal:      General: Abdomen is flat. Musculoskeletal:      Cervical back: Neck supple. Right lower leg: Edema present. Left lower leg: Edema present. Skin:     Coloration: Skin is not pale. Neurological:      Mental Status: She is alert and oriented to person, place, and time. Mental status is at baseline. Psychiatric:         Mood and Affect: Mood normal.         Blood pressure (!) 141/96, pulse 92, temperature 97.3 °F (36.3 °C), resp. rate 16, height 5' 7.5\" (1.715 m), weight 143.7 kg (316 lb 12.8 oz), SpO2 99 %. I have reviewed ordered lab tests and independently visualized imaging below:    Recent Labs:  Recent Results (from the past 48 hour(s))   GLUCOSE, POC    Collection Time: 12/19/21  4:06 PM   Result Value Ref Range    Glucose (POC) 163 (H) 65 - 100 mg/dL    Performed by [facility]    GLUCOSE, POC    Collection Time: 12/19/21  9:18 PM   Result Value Ref Range    Glucose (POC) 161 (H) 65 - 100 mg/dL    Performed by Fern Houston    METABOLIC PANEL, COMPREHENSIVE    Collection Time: 12/20/21  7:00 AM   Result Value Ref Range    Sodium 144 136 - 145 mmol/L    Potassium 3.2 (L) 3.5 - 5.1 mmol/L    Chloride 114 (H) 98 - 107 mmol/L    CO2 18 (L) 21 - 32 mmol/L    Anion gap 12 7 - 16 mmol/L    Glucose 89 65 - 100 mg/dL    BUN 80 (H) 6 - 23 MG/DL    Creatinine 6.30 (H) 0.6 - 1.0 MG/DL    GFR est AA 9 (L) >60 ml/min/1.73m2    GFR est non-AA 7 (L) >60 ml/min/1.73m2    Calcium 6.0 (L) 8.3 - 10.4 MG/DL    Bilirubin, total 0.2 0.2 - 1.1 MG/DL    ALT (SGPT) 6 (L) 12 - 65 U/L    AST (SGOT) 5 (L) 15 - 37 U/L    Alk.  phosphatase 57 50 - 136 U/L    Protein, total 4.4 (L) 6.3 - 8.2 g/dL    Albumin 1.4 (L) 3.5 - 5.0 g/dL    Globulin 3.0 2.3 - 3.5 g/dL    A-G Ratio 0.5 (L) 1.2 - 3.5 PHOSPHORUS    Collection Time: 12/20/21  7:00 AM   Result Value Ref Range    Phosphorus 8.3 (H) 2.5 - 4.5 MG/DL   MAGNESIUM    Collection Time: 12/20/21  7:00 AM   Result Value Ref Range    Magnesium 1.9 1.8 - 2.4 mg/dL   GLUCOSE, POC    Collection Time: 12/20/21  7:25 AM   Result Value Ref Range    Glucose (POC) 132 (H) 65 - 100 mg/dL    Performed by [address] [place] [address], POC    Collection Time: 12/20/21 11:09 AM   Result Value Ref Range    Glucose (POC) 196 (H) 65 - 100 mg/dL    Performed by Keenan    CBC WITH AUTOMATED DIFF    Collection Time: 12/20/21 11:41 AM   Result Value Ref Range    WBC 21.7 (H) 4.3 - 11.1 K/uL    RBC 3.42 (L) 4.05 - 5.2 M/uL    HGB 10.2 (L) 11.7 - 15.4 g/dL    HCT 30.7 (L) 35.8 - 46.3 %    MCV 89.8 79.6 - 97.8 FL    MCH 29.8 26.1 - 32.9 PG    MCHC 33.2 31.4 - 35.0 g/dL    RDW 14.4 11.9 - 14.6 %    PLATELET 122 782 - 060 K/uL    MPV 9.8 9.4 - 12.3 FL    ABSOLUTE NRBC 0.00 0.0 - 0.2 K/uL    NEUTROPHILS 73 43 - 78 %    LYMPHOCYTES 7 (L) 13 - 44 %    MONOCYTES 5 4.0 - 12.0 %    EOSINOPHILS 0 (L) 0.5 - 7.8 %    BASOPHILS 1 0.0 - 2.0 %    IMMATURE GRANULOCYTES 14 (H) 0.0 - 5.0 %    ABS. NEUTROPHILS 15.9 (H) 1.7 - 8.2 K/UL    ABS. LYMPHOCYTES 1.5 0.5 - 4.6 K/UL    ABS. MONOCYTES 1.1 0.1 - 1.3 K/UL    ABS. EOSINOPHILS 0.0 0.0 - 0.8 K/UL    ABS. BASOPHILS 0.2 0.0 - 0.2 K/UL    ABS. IMM. GRANS.  3.0 (H) 0.0 - 0.5 K/UL    RBC COMMENTS SLIGHT  ANISOCYTOSIS + POIKILOCYTOSIS        WBC COMMENTS Result Confirmed By Smear      PLATELET COMMENTS ADEQUATE      DF AUTOMATED     GLUCOSE, POC    Collection Time: 12/20/21  3:44 PM   Result Value Ref Range    Glucose (POC) 266 (H) 65 - 100 mg/dL    Performed by [address] [place] [address], POC    Collection Time: 12/20/21  8:27 PM   Result Value Ref Range    Glucose (POC) 225 (H) 65 - 100 mg/dL    Performed by PRACHI Morales , [facility]    Collection Time: 12/21/21  4:50 AM   Result Value Ref Range    Sodium 142 136 - 145 mmol/L Potassium 3.5 3.5 - 5.1 mmol/L    Chloride 110 (H) 98 - 107 mmol/L    CO2 21 21 - 32 mmol/L    Anion gap 11 7 - 16 mmol/L    Glucose 114 (H) 65 - 100 mg/dL    BUN 85 (H) 6 - 23 MG/DL    Creatinine 6.80 (H) 0.6 - 1.0 MG/DL    GFR est AA 8 (L) >60 ml/min/1.73m2    GFR est non-AA 7 (L) >60 ml/min/1.73m2    Calcium 6.2 (L) 8.3 - 10.4 MG/DL    Bilirubin, total 0.2 0.2 - 1.1 MG/DL    ALT (SGPT) 7 (L) 12 - 65 U/L    AST (SGOT) 9 (L) 15 - 37 U/L    Alk.  phosphatase 54 50 - 136 U/L    Protein, total 4.7 (L) 6.3 - 8.2 g/dL    Albumin 1.5 (L) 3.5 - 5.0 g/dL    Globulin 3.2 2.3 - 3.5 g/dL    A-G Ratio 0.5 (L) 1.2 - 3.5     PHOSPHORUS    Collection Time: 12/21/21  4:50 AM   Result Value Ref Range    Phosphorus 7.9 (H) 2.5 - 4.5 MG/DL   MAGNESIUM    Collection Time: 12/21/21  4:50 AM   Result Value Ref Range    Magnesium 2.3 1.8 - 2.4 mg/dL   GLUCOSE, POC    Collection Time: 12/21/21  7:13 AM   Result Value Ref Range    Glucose (POC) 145 (H) 65 - 100 mg/dL    Performed by [facility]    GLUCOSE, POC    Collection Time: 12/21/21 10:49 AM   Result Value Ref Range    Glucose (POC) 196 (H) 65 - 100 mg/dL    Performed by [facility]    CBC W/O DIFF    Collection Time: 12/21/21 11:10 AM   Result Value Ref Range    WBC 18.3 (H) 4.3 - 11.1 K/uL    RBC 3.34 (L) 4.05 - 5.2 M/uL    HGB 10.0 (L) 11.7 - 15.4 g/dL    HCT 30.1 (L) 35.8 - 46.3 %    MCV 90.1 79.6 - 97.8 FL    MCH 29.9 26.1 - 32.9 PG    MCHC 33.2 31.4 - 35.0 g/dL    RDW 14.5 11.9 - 14.6 %    PLATELET 084 775 - 975 K/uL    MPV 9.8 9.4 - 12.3 FL    ABSOLUTE NRBC 0.00 0.0 - 0.2 K/uL   METABOLIC PANEL, BASIC    Collection Time: 12/21/21 11:10 AM   Result Value Ref Range    Sodium 132 (L) 136 - 145 mmol/L    Potassium 4.3 3.5 - 5.1 mmol/L    Chloride 96 (L) 98 - 107 mmol/L    CO2 25 21 - 32 mmol/L    Anion gap 11 7 - 16 mmol/L    Glucose 212 (H) 65 - 100 mg/dL     (H) 6 - 23 MG/DL    Creatinine 8.84 (H) 0.6 - 1.0 MG/DL    GFR est AA 6 (L) >60 ml/min/1.73m2    GFR est non-AA 5 (L) >60 ml/min/1.73m2    Calcium 7.9 (L) 8.3 - 10.4 MG/DL       All Micro Results     Procedure Component Value Units Date/Time    BLOOD CULTURE [[id number]] Collected: 12/15/21 1215    Order Status: Completed Specimen: Blood Updated: 12/20/21 0653     Special Requests: --        LEFT  HAND       Culture result: NO GROWTH 5 DAYS       BLOOD CULTURE [[id number]] Collected: 12/15/21 1236    Order Status: Completed Specimen: Blood Updated: 12/20/21 0653     Special Requests: --        RIGHT  HAND       Culture result: NO GROWTH 5 DAYS       CULTURE, WOUND Selvin Payne STAIN [[id number]]  (Abnormal)  (Susceptibility) Collected: 12/15/21 1323    Order Status: Completed Specimen: Wound from Groin Updated: 12/19/21 0732     Special Requests: NO SPECIAL REQUESTS        GRAM STAIN 0 TO 36 WBCS PER OIF      MANY GRAM POSITIVE COCCI               MODERATE GRAM NEGATIVE RODS                  MODERATE GRAM POSITIVE RODS           Culture result:       MODERATE PROTEUS MIRABILIS                  MODERATE ENTEROCOCCUS FAECALIS GROUP D                  LIGHT MIXED SKIN MASSIMO ISOLATED          CULTURE, Windy Carrillo STAIN [[id number]]  (Abnormal)  (Susceptibility) Collected: 12/15/21 1710    Order Status: Completed Specimen: Peritoneum Updated: 12/18/21 0936     Special Requests: NO SPECIAL REQUESTS        GRAM STAIN 0 TO 6 WBCS PER OIF      MANY GRAM POSITIVE COCCI         MANY GRAM NEGATIVE RODS        Culture result:       MODERATE PROTEUS MIRABILIS                  MODERATE STAPHYLOCOCCUS SPECIES, COAGULASE NEGATIVE THIS ORGANISM WILL BE HELD FOR 7 DAYS. IF FURTHER TESTING IS REQUIRED PLEASE NOTIFY MICROBIOLOGY          CULTURE, ANAEROBIC [[id number]] Collected: 12/15/21 1710    Order Status: Completed Specimen: Peritoneum Updated: 12/17/21 0834     Special Requests: NO SPECIAL REQUESTS        Culture result:       SUBCULTURE IS NECESSARY TO DETERMINE PRESENCE OR ABSENCE OF ANAEROBIC BACTERIA IN THIS CULTURE.   FURTHER REPORT TO FOLLOW AFTER INCUBATION OF SUBCULTURE. COVID-19 RAPID TEST [[id number]] Collected: 12/15/21 1519    Order Status: Completed Specimen: Nasopharyngeal Updated: 12/15/21 1549     Specimen source NASAL        COVID-19 rapid test Not detected        Comment:      The specimen is NEGATIVE for SARS-CoV-2, the novel coronavirus associated with COVID-19. A negative result does not rule out COVID-19. This test has been authorized by the FDA under an Emergency Use Authorization (EUA) for use by authorized laboratories. Fact sheet for Healthcare Providers: ConventionUpdate.co.nz  Fact sheet for Patients: Convention[employer]ant[employer] LLCdate.co.nz       Methodology: Isothermal Nucleic Acid Amplification               Other Studies:  No results found.     Current Meds:  Current Facility-Administered Medications   Medication Dose Route Frequency    insulin glargine (LANTUS) injection 24 Units  24 Units SubCUTAneous DAILY    ampicillin-sulbactam (UNASYN) 3 g in 0.9% sodium chloride (MBP/ADV) 100 mL MBP  3 g IntraVENous Q12H    amLODIPine (NORVASC) tablet 5 mg  5 mg Oral DAILY    sertraline (ZOLOFT) tablet 100 mg  100 mg Oral DAILY    metoprolol tartrate (LOPRESSOR) tablet 100 mg  100 mg Oral BID    oxyCODONE-acetaminophen (PERCOCET) 5-325 mg per tablet 1 Tablet  1 Tablet Oral Q4H PRN    famotidine (PEPCID) tablet 20 mg  20 mg Oral DAILY    calcium acetate (phosphate binder) (PHOSLO) tablet 667 mg  1 Tablet Oral TID WITH MEALS    magnesium oxide (MAG-OX) tablet 800 mg  800 mg Oral BID    insulin lispro (HUMALOG) injection   SubCUTAneous AC&HS    heparin (porcine) injection 5,000 Units  5,000 Units SubCUTAneous Q8H    0.9% sodium chloride infusion 250 mL  250 mL IntraVENous PRN    sodium chloride (NS) flush 5-40 mL  5-40 mL IntraVENous Q8H    sodium chloride (NS) flush 5-40 mL  5-40 mL IntraVENous PRN    ondansetron (ZOFRAN ODT) tablet 4 mg  4 mg Oral Q8H PRN    Or    ondansetron [facility]) injection 4 mg  4 mg IntraVENous Q6H PRN    potassium chloride 10 mEq in 100 ml IVPB  10 mEq IntraVENous PRN    magnesium sulfate 2 g/50 ml IVPB (premix or compounded)  2 g IntraVENous PRN    HYDROmorphone (DILAUDID) injection 0.5 mg  0.5 mg IntraVENous Q3H PRN       Signed:  Stuart De Dios DO    Part of this note may have been written by using a voice dictation software. The note has been proof read but may still contain some grammatical/other typographical errors.

## 2021-12-21 NOTE — PROGRESS NOTES
CM consult for [facility] referral received. CM spoke with patient and spouse at bedside; they are agreeable. Referral faxed to [facility]; spoke with liaison Sallie Loyola. If [facility] is able to offer a bed, insurance will require pre-cert. CM will follow.

## 2021-12-21 NOTE — PROGRESS NOTES
[address]  [address] [facility], [address] [place] [address], [address]      PLAN:Plastics consult pending. Ready for wound closure this week  Continue wound care      ASSESSMENT:  Admit Date: 12/15/2021   5 Days Post-Op  Procedure(s):  INCISION AND DRAINAGE PERINEUM AND TRUNK    Principal Problem:    Necrotizing fasciitis ([address] (12/15/2021)    Active Problems:    DM2 (diabetes mellitus, type 2) ([address] (12/25/2015)      HTN (hypertension) (12/25/2015)      Obesity, morbid ([address] (7/12/2018)      Anemia (7/13/2018)      DEVENDRA (acute kidney injury) ([address] (12/16/2021)      Acute respiratory failure ([address] (12/16/2021)      Sepsis with acute renal failure and tubular necrosis without septic shock ([address] (12/16/2021)         [address] well. VSS afebrile. OBJECTIVE:  Constitutional: Alert oriented cooperative patient in no acute distress; appears stated age   Visit Vitals  BP (!) 154/80   Pulse 74   Temp 97.7 °F (36.5 °C)   Resp 14   Ht 5' 7.5\" (1.715 m)   Wt 316 lb 12.8 oz (143.7 kg)   SpO2 97%   BMI 48.89 kg/m²     Eyes:Sclera are clear. ENMT: no external lesions gross hearing normal; no obvious neck masses, no ear or lip lesions  CV: RRR. Normal perfusion  Resp: No JVD. Breathing is  non-labored; no audible wheezing. GI: soft. Dressing intact. Musculoskeletal: unremarkable with normal function. No embolic signs or cyanosis.    Neuro:  Oriented; moves all 4; no focal deficits  Psychiatric: normal affect and mood, no memory impairment      Patient Vitals for the past 24 hrs:   BP Temp Pulse Resp SpO2 Weight   12/21/21 0727 (!) 154/80 97.7 °F (36.5 °C) 74 14 97 % --   12/21/21 0415 (!) 146/84 97.9 °F (36.6 °C) 83 16 97 % 316 lb 12.8 oz (143.7 kg)   12/21/21 0000 -- -- 84 -- -- --   12/20/21 2307 (!) 147/86 98.3 °F (36.8 °C) 76 17 -- --   12/20/21 2026 (!) 143/85 98.3 °F (36.8 °C) 76 17 -- --   12/20/21 2000 -- -- 94 -- -- --   12/20/21 1547 (!) 140/85 97.7 °F (36.5 °C) (!) 127 17 96 % --   12/20/21 1110 (!) 146/97 98.1 °F (36.7 °C) 93 17 95 % --     Labs:    Recent Labs     12/21/21  0450 12/20/21  1141 12/20/21  0700   WBC  --  21.7*  --    HGB  --  10.2*  --    PLT  --  272  --      --    < >   K 3.5  --    < >   *  --    < >   CO2 21  --    < >   BUN 85*  --    < >   CREA 6.80*  --    < >   *  --    < >   TBILI 0.2  --    < >   ALT 7*  --    < >   AP 54  --    < >    < > = values in this interval not displayed.          Chaparro Mena, DO

## 2021-12-21 NOTE — WOUND CARE
Left abdomen and groin wound with exposed adipose that is dark/ gray Left side of wound darkest with new tunnel at 4 O'clock, Over all improved from last assessment by this WOCN Moist to dry dressing applied. Noted possible closure attempt this week. Will monitor and assist nursing with dressing change as able.

## 2021-12-22 ENCOUNTER — APPOINTMENT (OUTPATIENT)
Dept: NON INVASIVE DIAGNOSTICS | Age: 51
DRG: 853 | End: 2021-12-22
Attending: NURSE PRACTITIONER
Payer: COMMERCIAL

## 2021-12-22 PROBLEM — R65.21 SEPTIC SHOCK (HCC): Status: ACTIVE | Noted: 2021-12-16

## 2021-12-22 PROBLEM — I48.91 ATRIAL FIBRILLATION WITH RVR (HCC): Status: ACTIVE | Noted: 2021-12-22

## 2021-12-22 LAB
ALBUMIN SERPL-MCNC: 2.1 G/DL (ref 3.5–5)
ALBUMIN/GLOB SERPL: 0.5 {RATIO} (ref 1.2–3.5)
ALP SERPL-CCNC: 67 U/L (ref 50–136)
ALT SERPL-CCNC: 10 U/L (ref 12–65)
ANION GAP SERPL CALC-SCNC: 12 MMOL/L (ref 7–16)
AST SERPL-CCNC: 8 U/L (ref 15–37)
ATRIAL RATE: 256 BPM
BACTERIA SPEC CULT: NORMAL
BILIRUB SERPL-MCNC: 0.2 MG/DL (ref 0.2–1.1)
BUN SERPL-MCNC: 107 MG/DL (ref 6–23)
CALCIUM SERPL-MCNC: 8.4 MG/DL (ref 8.3–10.4)
CALCULATED R AXIS, ECG10: -5 DEGREES
CALCULATED T AXIS, ECG11: 81 DEGREES
CHLORIDE SERPL-SCNC: 98 MMOL/L (ref 98–107)
CO2 SERPL-SCNC: 25 MMOL/L (ref 21–32)
CREAT SERPL-MCNC: 8.79 MG/DL (ref 0.6–1)
DIAGNOSIS, 93000: NORMAL
GLOBULIN SER CALC-MCNC: 4.3 G/DL (ref 2.3–3.5)
GLUCOSE BLD STRIP.AUTO-MCNC: 169 MG/DL (ref 65–100)
GLUCOSE BLD STRIP.AUTO-MCNC: 170 MG/DL (ref 65–100)
GLUCOSE BLD STRIP.AUTO-MCNC: 173 MG/DL (ref 65–100)
GLUCOSE BLD STRIP.AUTO-MCNC: 217 MG/DL (ref 65–100)
GLUCOSE BLD STRIP.AUTO-MCNC: 260 MG/DL (ref 65–100)
GLUCOSE SERPL-MCNC: 154 MG/DL (ref 65–100)
MAGNESIUM SERPL-MCNC: 3.2 MG/DL (ref 1.8–2.4)
PHOSPHATE SERPL-MCNC: 9.3 MG/DL (ref 2.5–4.5)
POTASSIUM SERPL-SCNC: 4.7 MMOL/L (ref 3.5–5.1)
PROT SERPL-MCNC: 6.4 G/DL (ref 6.3–8.2)
Q-T INTERVAL, ECG07: 392 MS
QRS DURATION, ECG06: 86 MS
QTC CALCULATION (BEZET), ECG08: 495 MS
SERVICE CMNT-IMP: ABNORMAL
SERVICE CMNT-IMP: NORMAL
SODIUM SERPL-SCNC: 135 MMOL/L (ref 136–145)
TROPONIN-HIGH SENSITIVITY: 24 PG/ML (ref 0–14)
VENTRICULAR RATE, ECG03: 96 BPM

## 2021-12-22 PROCEDURE — 74011250636 HC RX REV CODE- 250/636: Performed by: INTERNAL MEDICINE

## 2021-12-22 PROCEDURE — 99223 1ST HOSP IP/OBS HIGH 75: CPT | Performed by: INTERNAL MEDICINE

## 2021-12-22 PROCEDURE — 80053 COMPREHEN METABOLIC PANEL: CPT

## 2021-12-22 PROCEDURE — 74011250637 HC RX REV CODE- 250/637: Performed by: NURSE PRACTITIONER

## 2021-12-22 PROCEDURE — 84484 ASSAY OF TROPONIN QUANT: CPT

## 2021-12-22 PROCEDURE — 83735 ASSAY OF MAGNESIUM: CPT

## 2021-12-22 PROCEDURE — 74011250637 HC RX REV CODE- 250/637: Performed by: INTERNAL MEDICINE

## 2021-12-22 PROCEDURE — 82962 GLUCOSE BLOOD TEST: CPT

## 2021-12-22 PROCEDURE — 74011000258 HC RX REV CODE- 258: Performed by: INTERNAL MEDICINE

## 2021-12-22 PROCEDURE — 65660000000 HC RM CCU STEPDOWN

## 2021-12-22 PROCEDURE — 74011000250 HC RX REV CODE- 250: Performed by: STUDENT IN AN ORGANIZED HEALTH CARE EDUCATION/TRAINING PROGRAM

## 2021-12-22 PROCEDURE — 84100 ASSAY OF PHOSPHORUS: CPT

## 2021-12-22 PROCEDURE — 74011636637 HC RX REV CODE- 636/637: Performed by: NURSE PRACTITIONER

## 2021-12-22 PROCEDURE — 2709999900 HC NON-CHARGEABLE SUPPLY

## 2021-12-22 PROCEDURE — 74011250636 HC RX REV CODE- 250/636: Performed by: NURSE PRACTITIONER

## 2021-12-22 PROCEDURE — 74011250636 HC RX REV CODE- 250/636: Performed by: SURGERY

## 2021-12-22 PROCEDURE — 74011636637 HC RX REV CODE- 636/637: Performed by: INTERNAL MEDICINE

## 2021-12-22 RX ORDER — METOPROLOL TARTRATE 5 MG/5ML
5 INJECTION INTRAVENOUS ONCE
Status: COMPLETED | OUTPATIENT
Start: 2021-12-22 | End: 2021-12-22

## 2021-12-22 RX ADMIN — Medication 4 UNITS: at 12:17

## 2021-12-22 RX ADMIN — CALCIUM ACETATE 667 MG: 667 TABLET ORAL at 17:06

## 2021-12-22 RX ADMIN — HYDROMORPHONE HYDROCHLORIDE 0.5 MG: 1 INJECTION, SOLUTION INTRAMUSCULAR; INTRAVENOUS; SUBCUTANEOUS at 14:20

## 2021-12-22 RX ADMIN — METOPROLOL TARTRATE 100 MG: 50 TABLET, FILM COATED ORAL at 09:33

## 2021-12-22 RX ADMIN — INSULIN GLARGINE 24 UNITS: 100 INJECTION, SOLUTION SUBCUTANEOUS at 09:35

## 2021-12-22 RX ADMIN — CALCIUM ACETATE 667 MG: 667 TABLET ORAL at 09:33

## 2021-12-22 RX ADMIN — AMPICILLIN SODIUM AND SULBACTAM SODIUM 3 G: 2; 1 INJECTION, POWDER, FOR SOLUTION INTRAMUSCULAR; INTRAVENOUS at 22:15

## 2021-12-22 RX ADMIN — HEPARIN SODIUM 5000 UNITS: 5000 INJECTION INTRAVENOUS; SUBCUTANEOUS at 12:17

## 2021-12-22 RX ADMIN — Medication 2 UNITS: at 17:06

## 2021-12-22 RX ADMIN — Medication 5 ML: at 22:15

## 2021-12-22 RX ADMIN — SERTRALINE 100 MG: 50 TABLET, FILM COATED ORAL at 09:33

## 2021-12-22 RX ADMIN — METOPROLOL TARTRATE 5 MG: 1 INJECTION, SOLUTION INTRAVENOUS at 04:55

## 2021-12-22 RX ADMIN — OXYCODONE AND ACETAMINOPHEN 1 TABLET: 5; 325 TABLET ORAL at 09:45

## 2021-12-22 RX ADMIN — METOPROLOL TARTRATE 100 MG: 50 TABLET, FILM COATED ORAL at 17:06

## 2021-12-22 RX ADMIN — Medication 5 ML: at 14:00

## 2021-12-22 RX ADMIN — Medication 6 UNITS: at 22:23

## 2021-12-22 RX ADMIN — FAMOTIDINE 20 MG: 20 TABLET ORAL at 09:33

## 2021-12-22 RX ADMIN — Medication 10 ML: at 04:07

## 2021-12-22 RX ADMIN — AMLODIPINE BESYLATE 5 MG: 5 TABLET ORAL at 09:33

## 2021-12-22 RX ADMIN — HEPARIN SODIUM 5000 UNITS: 5000 INJECTION INTRAVENOUS; SUBCUTANEOUS at 04:55

## 2021-12-22 RX ADMIN — CALCIUM ACETATE 667 MG: 667 TABLET ORAL at 12:17

## 2021-12-22 RX ADMIN — AMPICILLIN SODIUM AND SULBACTAM SODIUM 3 G: 2; 1 INJECTION, POWDER, FOR SOLUTION INTRAMUSCULAR; INTRAVENOUS at 09:34

## 2021-12-22 RX ADMIN — Medication 2 UNITS: at 09:34

## 2021-12-22 NOTE — PROGRESS NOTES
Echo was attempted. Patient was awaiting someone to help clean her after restroom. OK per cardiology to do echo in morning.

## 2021-12-22 NOTE — H&P
[address] Cardiology Initial Cardiac Evaluation                Date of  Admission: 12/15/2021  9:53 AM     PCP: Cindi Diaz NP  Requested by: Dr Rd Boyd  Primary Cardiologist: None  APC Attending: Dr Miguel Freeman    Reason for Evaluation: Infarct Abnormal EKG      Josefina Barnett is a 46 y.o. female with hx of Morbidly obese, GERD, diabetes type 2, anxiety, gout, hypertension, irritable bowel syndrome, morbid obesity and abdominal abscess. Patient was recently seen at urgent care for abdominal abscess and was sent home with antibiotics. Patient discontinued the Xarelto because she felt sick. She came to emergency department complaining of racing heart palpitations, fevers, weakness, near syncopal episode. CT of the abdomen showed abscess at the labia and the pannus extending to the abdomen to the pelvic region findings were consistent with necrotizing fasciitis. Patient was also noted to have elevated lactic acid and bicarb of 9 with a white blood cell count of 35,000. Patient was critically ill and required mechanical ventilation with patient taken to surgery for debridement of necrotizing fasciitis down to the pubic bone and left iliac bone without evidence of osteomyelitis. She underwent a high-pressure water jet debridement, with further bone debridement on 12/26/2021 as well. Initial labs also showed the patient had acute kidney injury and required low-dose Levophed for blood pressure support. The patient's baseline creatinine 2005 was noted to be 1.8 on admission was 11.20 is currently 8.79 today. Nephrology is on board with diagnosis of ATN but current baseline creatinine is unclear. Patient was noted to have anemia post procedure down to 5.8 and required transfusion of a total of 7 units of blood. Patient's blood pressure has since stabilized and has plans for repeat operative debridements on 12/23/2021 and plastic surgery evaluation as well.   Twelve-lead performed at [facility] inferior infarct in the patient's been noted to be in atrial flutter or atrial fibrillation [place] this admission. Patient has converted to normal sinus a few times but has been rate controlled most of this admission. We will override read of the twelve-lead EKG shows no infarct and is a computer-generated error. Currently the patient has no complaints of chest pain or shortness of breath. Recent Cardiac Synopsis      Past Medical History:   Diagnosis Date    Anxiety     Diabetes ([facility] [address] [facility]     type 2. oral meds. Pt does not monitor bs. Last A1C (9/2017)=6.9    GERD (gastroesophageal reflux disease)     OTC prn    Gout     Hypertension     controlled w/med    IBS (irritable bowel syndrome)       Past Surgical History:   Procedure Laterality Date    HX DILATION AND CURETTAGE  07/2018     Allergies   Allergen Reactions    Ace Inhibitors Angioedema    Arb-Angiotensin Receptor Antagonist Angioedema    Cayenne Pepper Hives     Swelling of the mouth    Bactrim [Sulfamethoprim] Other (comments)     Nausea, vomiting, making her feel like she is going to pass out      Keflex [Cephalexin] Nausea and Vomiting      Family History   Problem Relation Age of Onset    Heart Failure Mother         congestive    Heart Disease Mother     Hypertension Mother     Cancer Father         Lung/Mets to Bone and Brain and liver    Diabetes Father     Hypertension Father       Social History     Socioeconomic History    Marital status:      Spouse name: Not on file    Number of children: Not on file    Years of education: Not on file    Highest education level: Not on file   Occupational History    Not on file   Tobacco Use    Smoking status: Never Smoker    Smokeless tobacco: Never Used   Vaping Use    Vaping Use: Never used   Substance and Sexual Activity    Alcohol use: Yes     Comment: occ    Drug use: No    Sexual activity: Yes     Partners: Male     Birth control/protection: None   Other Topics Concern     Service Not Asked    Blood Transfusions Not Asked    Caffeine Concern Not Asked    Occupational Exposure Not Asked    Hobby Hazards Not Asked    Sleep Concern Not Asked    Stress Concern Not Asked    Weight Concern Not Asked    Special Diet Not Asked    Back Care Not Asked    Exercise Not Asked    Bike Helmet Not Asked   [address] Yes    Self-Exams Not Asked   Social History Narrative    Denies physical or sexual abuse     Social Determinants of Health     Financial Resource Strain:     Difficulty of Paying Living Expenses: Not on file   Food Insecurity:     Worried About Running Out of Food in the Last Year: Not on file    Ni of Food in the Last Year: Not on file   Transportation Needs:     Lack of Transportation (Medical): Not on file    Lack of Transportation (Non-Medical): Not on file   Physical Activity:     Days of Exercise per Week: Not on file    Minutes of Exercise per Session: Not on file   Stress:     Feeling of Stress : Not on file   Social Connections:     Frequency of Communication with Friends and Family: Not on file    Frequency of Social Gatherings with Friends and Family: Not on file    Attends Orthodox Services: Not on file    Active Member of [address] [place] [address] or Organizations: Not on file    Attends Club or Organization Meetings: Not on file    Marital Status: Not on file   Intimate Partner Violence:     Fear of Current or Ex-Partner: Not on file    Emotionally Abused: Not on file    Physically Abused: Not on file    Sexually Abused: Not on file   Housing Stability:     Unable to Pay for Housing in the Last Year: Not on file    Number of [place] in the Last Year: Not on file    Unstable Housing in the Last Year: Not on file       Prior to Admission Medications   Prescriptions Last Dose Informant Patient Reported? Taking?   allopurinol (ZYLOPRIM) 300 mg tablet 12/9/2021 at Unknown time  Yes Yes   Sig: Take 300 mg by mouth daily.  Per anesthesia protocol:instructed to take am of surgery. Indications: prevention of acute gout attack   amLODIPine (NORVASC) 5 mg tablet 2021 at Unknown time  Yes Yes   Sig: Take 5 mg by mouth daily. cyanocobalamin (VITAMIN B12) 1,000 mcg/mL injection Not Taking at Unknown time  Yes No   Si,000 mcg by IntraMUSCular route. Patient not taking: Reported on 2021   cyanocobalamin (VITAMIN B12) 500 mcg tablet 2021 at Unknown time  Yes Yes   Sig: Take 500 mcg by mouth daily. dicyclomine (BENTYL) 10 mg capsule 2021 at Unknown time  Yes Yes   Sig: Take 10 mg by mouth every six (6) hours as needed for PRN Reason (Other) (IBS). Indications: irritable colon   doxycycline (MONODOX) 100 mg capsule 12/15/2021 at Unknown time  No Yes   Sig: Take 1 Capsule by mouth two (2) times a day for 10 days. gemfibroziL (LOPID) 600 mg tablet 2021 at Unknown time  Yes Yes   Sig: Take 600 mg by mouth two (2) times a day. losartan 50 mg tab 100 mg, hydroCHLOROthiazide 25 mg tab 25 mg 2021 at Unknown time  Yes Yes   Sig: Take 1 Dose by mouth daily. metFORMIN (GLUCOPHAGE) 500 mg tablet Not Taking at Unknown time  Yes No   Sig: Take 1,000 mg by mouth two (2) times daily (with meals). Patient not taking: Reported on 2021   metoprolol tartrate (LOPRESSOR) 100 mg IR tablet 12/15/2021 at Unknown time  Yes Yes   Sig: Take 100 mg by mouth two (2) times a day. Per anesthesia protocol:instructed to take am of surgery. ondansetron (ZOFRAN ODT) 4 mg disintegrating tablet 12/15/2021 at Unknown time  No Yes   Sig: Take 1 Tablet by mouth every eight (8) hours as needed for Nausea or Vomiting.   sertraline (ZOLOFT) 100 mg tablet 2021 at Unknown time  Yes Yes   Sig: Take 100 mg by mouth daily. Per anesthesia protocol:instructed to take am of surgery.       Facility-Administered Medications: None       Current Facility-Administered Medications   Medication Dose Route Frequency    insulin glargine (LANTUS) injection 24 Units  24 Units SubCUTAneous DAILY    ampicillin-sulbactam (UNASYN) 3 g in 0.9% sodium chloride (MBP/ADV) 100 mL MBP  3 g IntraVENous Q12H    amLODIPine (NORVASC) tablet 5 mg  5 mg Oral DAILY    sertraline (ZOLOFT) tablet 100 mg  100 mg Oral DAILY    metoprolol tartrate (LOPRESSOR) tablet 100 mg  100 mg Oral BID    oxyCODONE-acetaminophen (PERCOCET) 5-325 mg per tablet 1 Tablet  1 Tablet Oral Q4H PRN    famotidine (PEPCID) tablet 20 mg  20 mg Oral DAILY    calcium acetate (phosphate binder) (PHOSLO) tablet 667 mg  1 Tablet Oral TID WITH MEALS    insulin lispro (HUMALOG) injection   SubCUTAneous AC&HS    heparin (porcine) injection 5,000 Units  5,000 Units SubCUTAneous Q8H    0.9% sodium chloride infusion 250 mL  250 mL IntraVENous PRN    sodium chloride (NS) flush 5-40 mL  5-40 mL IntraVENous Q8H    sodium chloride (NS) flush 5-40 mL  5-40 mL IntraVENous PRN    ondansetron (ZOFRAN ODT) tablet 4 mg  4 mg Oral Q8H PRN    Or    ondansetron (ZOFRAN) injection 4 mg  4 mg IntraVENous Q6H PRN    potassium chloride 10 mEq in 100 ml IVPB  10 mEq IntraVENous PRN    magnesium sulfate 2 g/50 ml IVPB (premix or compounded)  2 g IntraVENous PRN    HYDROmorphone (DILAUDID) injection 0.5 mg  0.5 mg IntraVENous Q3H PRN       Review of Systems   Constitutional: Positive for fever. Negative for weight gain and weight loss. HENT: Negative. Eyes: Negative. Cardiovascular: Positive for irregular heartbeat, near-syncope and palpitations. Negative for chest pain (currently pain free), claudication, cyanosis, dyspnea on exertion, leg swelling, orthopnea, paroxysmal nocturnal dyspnea and syncope. Respiratory: Negative for cough, shortness of breath and wheezing. Endocrine: Negative. Skin: Negative. Musculoskeletal: Negative. Gastrointestinal: Negative for nausea and vomiting. Genitourinary: Negative. Neurological: Negative for dizziness. Psychiatric/Behavioral: Negative. Allergic/Immunologic: Negative. Physical Exam  Vitals:    12/22/21 0708 12/22/21 0800 12/22/21 1146 12/22/21 1151   BP: 124/80  (!) 140/74    Pulse: (!) 128 (!) 103 75 72   Resp: 20 19    Temp: 98 °F (36.7 °C)  98.2 °F (36.8 °C)    SpO2: 99%  99%    Weight:       Height:           Last 3 Recorded Weights in this Encounter    12/20/21 0507 12/21/21 0415 12/22/21 0410   Weight: 143.9 kg (317 lb 3.9 oz) 143.7 kg (316 lb 12.8 oz) 142.6 kg (314 lb 6 oz)         Intake/Output Summary (Last 24 hours) at 12/22/2021 1430  Last data filed at 12/22/2021 0800  Gross per 24 hour   Intake 120 ml   Output 1900 ml   Net -1780 ml       Net IO Since Admission: 4,515. 14 mL [12/22/21 1430]      Physical Exam:  General: Well Developed, Well Nourished, No Acute Distress  HEENT: pupils equal and round, no abnormalities noted  Neck: supple, no JVD, no carotid bruits  Heart: S1S2 with RRR without murmurs or gallops  Lungs: Clear throughout auscultation bilaterally without adventitious sounds  Abd: soft, nontender, nondistended, with good bowel sounds  Ext: warm, no edema, calves supple/nontender, pulses 2+ bilaterally  Skin: warm and dry  Psychiatric: Normal mood and affect  Neurologic: Alert and oriented X 3      Recent Results (from the past 24 hour(s))   GLUCOSE, POC    Collection Time: 12/21/21  4:02 PM   Result Value Ref Range    Glucose (POC) 216 (H) 65 - 100 mg/dL    Performed by WisherCarolynPCT    GLUCOSE, POC    Collection Time: 12/21/21  8:58 PM   Result Value Ref Range    Glucose (POC) 215 (H) 65 - 100 mg/dL    Performed by MaroganTunitraPCT    EKG, 12 LEAD, SUBSEQUENT    Collection Time: 12/21/21  9:38 PM   Result Value Ref Range    Ventricular Rate 96 BPM    Atrial Rate 256 BPM    QRS Duration 86 ms    Q-T Interval 392 ms    QTC Calculation (Bezet) 495 ms    Calculated R Axis -5 degrees    Calculated T Axis 81 degrees    Diagnosis       Atrial flutter with variable A-V block  Low voltage QRS  Cannot rule out Anteroseptal infarct (cited on or before 19-DEC-2021)      Abnormal ECG  When compared with ECG of 19-DEC-2021 09:00,  Serial changes of evolving Anteroseptal infarct Present  Confirmed by Delmar Esqueda MD (), TINO BERG ([id number]) on 12/22/2021 0:58:26 AM     METABOLIC PANEL, COMPREHENSIVE    Collection Time: 12/22/21  4:24 AM   Result Value Ref Range    Sodium 135 (L) 136 - 145 mmol/L    Potassium 4.7 3.5 - 5.1 mmol/L    Chloride 98 98 - 107 mmol/L    CO2 25 21 - 32 mmol/L    Anion gap 12 7 - 16 mmol/L    Glucose 154 (H) 65 - 100 mg/dL     (H) 6 - 23 MG/DL    Creatinine 8.79 (H) 0.6 - 1.0 MG/DL    GFR est AA 6 (L) >60 ml/min/1.73m2    GFR est non-AA 5 (L) >60 ml/min/1.73m2    Calcium 8.4 8.3 - 10.4 MG/DL    Bilirubin, total 0.2 0.2 - 1.1 MG/DL    ALT (SGPT) 10 (L) 12 - 65 U/L    AST (SGOT) 8 (L) 15 - 37 U/L    Alk. phosphatase 67 50 - 136 U/L    Protein, total 6.4 6.3 - 8.2 g/dL    Albumin 2.1 (L) 3.5 - 5.0 g/dL    Globulin 4.3 (H) 2.3 - 3.5 g/dL    A-G Ratio 0.5 (L) 1.2 - 3.5     PHOSPHORUS    Collection Time: 12/22/21  4:24 AM   Result Value Ref Range    Phosphorus 9.3 (H) 2.5 - 4.5 MG/DL   MAGNESIUM    Collection Time: 12/22/21  4:24 AM   Result Value Ref Range    Magnesium 3.2 (H) 1.8 - 2.4 mg/dL   TROPONIN-HIGH SENSITIVITY    Collection Time: 12/22/21  4:24 AM   Result Value Ref Range    Troponin-High Sensitivity 24.0 (H) 0 - 14 pg/mL   GLUCOSE, POC    Collection Time: 12/22/21  4:33 AM   Result Value Ref Range    Glucose (POC) 169 (H) 65 - 100 mg/dL    Performed by Gregorio    GLUCOSE, POC    Collection Time: 12/22/21  7:12 AM   Result Value Ref Range    Glucose (POC) 173 (H) 65 - 100 mg/dL    Performed by Fili)    GLUCOSE, POC    Collection Time: 12/22/21 11:50 AM   Result Value Ref Range    Glucose (POC) 217 (H) 65 - 100 mg/dL    Performed by Fili)         XR CHEST SNGL V    Result Date: 12/16/2021  1. Findings as described above.      XR CHEST SNGL V    Result Date: 12/15/2021  No acute abnormalities. Unremarkable hardware position. CT ABD PELV WO CONT    Result Date: 12/15/2021  1. There is soft tissue stranding and extensive subcutaneous emphysema, beginning in the left labia and left perineal soft tissues, extending anterior and inferiorly within the ventral pannus, and extending superiorly and laterally throughout the ventral pannus, ventral pelvic wall, and left flank fat. The cephalad extent is through the level of the anterior superior iliac spine. The constellation of features is consistent with necrotizing fasciitis, without definite evidence of a well-formed fluid collection within the limits of noncontrast technique. 2. No evidence of intraperitoneal extension. 3. Left greater than right renal cortical thinning. 4. Mildly distended gallbladder. Absent gallbladder wall thickening and surrounding free fluid, this is most likely a mildly patulous gallbladder. Discussed with Dr. Charis Fonseca by Dr. Rupinder Barriga at 2:36 PM 12/15/2021. VOICE DICTATED BY: Dr. Vinicius Infante    Result Date: 12/22/2021  Limited study due to the patient's large body habitus. Increased right renal echogenicity raise the possibility of chronic medical renal disease. Echo Results  (Last 48 hours)    None            Initial Recommendations:      Cardiac Problems:    A flutter: New problem. Patient's had elevated Chads score and needs to be on oral anticoagulation. Due to the recent surgery will need to be cleared by surgery team for starting oral anticoagulation. Appreciate surgery's help with this. Patient will be increased risk for stroke until this can be completed. With active infection clearing will attempt a rate control strategy at this time with patient currently rate controlled at 70 beats a minute with intermittent tachycardia. Continue Lopressor 100 mg twice daily.   If needed can add 5 mg IV push Lopressor for sustained heart rate greater than 130 as a rescue agent. If patient does not self convert once infection has cleared will need to be considered for possible JAGDEEP cardioversion. Continue to monitor on telemetry. Further recommendations pending clinical course and attending recommendations. Will check echocardiogram    Dental abscess: Per primary team    Morbid obesity: Per primary team encourage healthy lifestyle choices    Abnormal EKG: This appears to be atrial flutter with 1 EKG showing by computer read acute inferior infarct. This is a false reading computer-generated with review by both Dr. Dianna Chatterjee and I showing no acute infarct on and a twelve-lead EKGs taken this hospital admission. Can continue to monitor      Thank you very much for requesting a Cardiology Evaluation. We appreciate the opportunity to participate in this patient's care. We will follow along with above stated plan. This is initial management plan but please see attendings consult note for full plan of care.     Loly Llanes NP AGACNP-BC

## 2021-12-22 NOTE — PROGRESS NOTES
Infectious Disease Progress Note    Today's Date: 2021   Admit Date: 12/15/2021    Impression:   · Necrotizing fasciitis of abdomen/pannus, s/p debridement down to iliac and pubic bone12/15, , cultures with proteus, CoNS, and enterococcus  · Morbid obesity with BMI >49  · DM not on medications, A1c 6.6  · DEVENDRA, Nephrology is following    Plan:   · Continue renal dose Unasyn, anticipate prolonged course given depth of wounds ?oral: review with ID MD  · Plan for plastic surgery eval, repeat operative debridement planned for tomorrow    Anti-infectives:   · unasyn -  · Clindamycin (12/15 -  · Meropenem (12/15 -  · Vancomycin (12/15,  -  · Ceftriaxone ( -  · Flagyl ( -  · Zosyn (12/15)    Subjective:   Seen with , she is feeling well. No acute issues to report. Allergies   Allergen Reactions    Ace Inhibitors Angioedema    Arb-Angiotensin Receptor Antagonist Angioedema    Cayenne Pepper Hives     Swelling of the mouth    Bactrim [Sulfamethoprim] Other (comments)     Nausea, vomiting, making her feel like she is going to pass out      Keflex [Cephalexin] Nausea and Vomiting        Review of Systems:  Pertinent items are noted in the History of Present Illness.     Objective:     Visit Vitals  /80   Pulse (!) 103   Temp 98 °F (36.7 °C)   Resp 20   Ht 5' 7.5\" (1.715 m)   Wt 142.6 kg (314 lb 6 oz)   SpO2 99%   BMI 48.51 kg/m²     Temp (24hrs), Av.9 °F (36.6 °C), Min:97.3 °F (36.3 °C), Max:98.3 °F (36.8 °C)     Patient examined 2021, exam remains unchanged except as noted below:    General:  Alert, cooperative, no acute distress, obese   Head:  Normocephalic, atraumatic    Eyes:  Anicteric, no drainage/not injected   Throat: Mucus membranes moist OP clear   Neck: Supple, symmetrical, trachea midline, no JVD   Lungs:   Clear throughout lung fields, no increased work of breathing   Heart:  Regular rate and rhythm, without murmur   Abdomen:   Soft, non-tender, no guarding, no distention, bowel sounds active, lower abdominal wound covered   Extremities: Extremities without edema, pulses palpable   Skin: Skin without rash     Lines/Devices: R neck IJ           Data Review:     CBC:  Recent Labs     12/21/21  1110 12/20/21  1141   WBC 18.3* 21.7*   GRANS  --  73   MONOS  --  5   EOS  --  0*   ANEU  --  15.9*   ABL  --  1.5   HGB 10.0* 10.2*   HCT 30.1* 30.7*    272       BMP:  Recent Labs     12/22/21  0424 12/21/21  1110 12/21/21  0450   CREA 8.79* 8.84* 6.80*   * 104* 85*   * 132* 142   K 4.7 4.3 3.5   CL 98 96* 110*   CO2 25 25 21   AGAP 12 11 11   * 212* 114*       LFTS:  Recent Labs     12/22/21  0424 12/21/21  0450 12/20/21  0700   TBILI 0.2 0.2 0.2   ALT 10* 7* 6*   AP 67 54 57   TP 6.4 4.7* 4.4*   ALB 2.1* 1.5* 1.4*       Microbiology:     All Micro Results     Procedure Component Value Units Date/Time    BLOOD CULTURE [[id number]] Collected: 12/15/21 1215    Order Status: Completed Specimen: Blood Updated: 12/20/21 0653     Special Requests: --        LEFT  HAND       Culture result: NO GROWTH 5 DAYS       BLOOD CULTURE [[id number]] Collected: 12/15/21 1236    Order Status: Completed Specimen: Blood Updated: 12/20/21 0653     Special Requests: --        RIGHT  HAND       Culture result: NO GROWTH 5 DAYS       CULTURE, WOUND Domenic Cassidy STAIN [[id number]]  (Abnormal)  (Susceptibility) Collected: 12/15/21 1323    Order Status: Completed Specimen: Wound from Groin Updated: 12/19/21 0732     Special Requests: NO SPECIAL REQUESTS        GRAM STAIN 0 TO 36 WBCS PER OIF      MANY GRAM POSITIVE COCCI               MODERATE GRAM NEGATIVE RODS                  MODERATE GRAM POSITIVE RODS           Culture result:       MODERATE PROTEUS MIRABILIS                  MODERATE ENTEROCOCCUS FAECALIS GROUP D                  LIGHT MIXED SKIN MASSIMO ISOLATED          CULTURE, [place] Leonel STAIN [[id number]]  (Abnormal)  (Susceptibility) Collected: 12/15/21 1710    Order Status: Completed Specimen: Peritoneum Updated: 21 0936     Special Requests: NO SPECIAL REQUESTS        GRAM STAIN 0 TO 6 WBCS PER OIF      MANY GRAM POSITIVE COCCI         MANY GRAM NEGATIVE RODS        Culture result:       MODERATE PROTEUS MIRABILIS                  MODERATE STAPHYLOCOCCUS SPECIES, COAGULASE NEGATIVE THIS ORGANISM WILL BE HELD FOR 7 DAYS. IF FURTHER TESTING IS REQUIRED PLEASE NOTIFY MICROBIOLOGY          CULTURE, ANAEROBIC [[id number]] Collected: 12/15/21 1710    Order Status: Completed Specimen: Peritoneum Updated: 21 0834     Special Requests: NO SPECIAL REQUESTS        Culture result:       SUBCULTURE IS NECESSARY TO DETERMINE PRESENCE OR ABSENCE OF ANAEROBIC BACTERIA IN THIS CULTURE. FURTHER REPORT TO FOLLOW AFTER INCUBATION OF SUBCULTURE. COVID-19 RAPID TEST [[id number]] Collected: 12/15/21 1519    Order Status: Completed Specimen: Nasopharyngeal Updated: 12/15/21 1549     Specimen source NASAL        COVID-19 rapid test Not detected        Comment:      The specimen is NEGATIVE for SARS-CoV-2, the novel coronavirus associated with COVID-19. A negative result does not rule out COVID-19. This test has been authorized by the FDA under an Emergency Use Authorization (EUA) for use by authorized laboratories. Fact sheet for Healthcare Providers: ConventionUpdate.co.nz  Fact sheet for Patients: ConventionUpdate.co.nz       Methodology: Isothermal Nucleic Acid Amplification               Imagin21 CXR persistent bilateral lung infiltrates  12/15/21 CT abd pelv wo cont  IMPRESSION  1. There is soft tissue stranding and extensive subcutaneous emphysema, beginning in the left labia and left perineal soft tissues, extending anterior and inferiorly within the ventral pannus, and extending superiorly and laterally throughout the ventral pannus, ventral pelvic wall, and left flank fat.  The cephalad extent is through the level of the anterior superior iliac spine. The constellation of features is consistent with necrotizing fasciitis, without definite evidence of a well-formed fluid collection within the limits of noncontrast technique. 2. No evidence of intraperitoneal extension.     3. Left greater than right renal cortical thinning.     4. Mildly distended gallbladder. Absent gallbladder wall thickening and  surrounding free fluid, this is most likely a mildly patulous gallbladder.       Signed By: Sarah Sharma, NP     December 22, 2021

## 2021-12-22 NOTE — PROGRESS NOTES
Sis Garcia  Admission Date: 12/15/2021         [facility] Progress Note: 12/22/2021    Follow-up for: DEVENDRA on CKD      Subjective:   Pt seen and examined in room. Pt feeling well this morning, no complaints.  Continues have good UOP.    ROS:  Gen - no fever, no chills, appetite unchanged  CV - no chest pain, no palpitation  Lung - no shortness of breath, no cough  Abd - no tenderness, no nausea/vomiting, no diarrhea  Ext - + edema    Current Facility-Administered Medications   Medication Dose Route Frequency    insulin glargine (LANTUS) injection 24 Units  24 Units SubCUTAneous DAILY    ampicillin-sulbactam (UNASYN) 3 g in 0.9% sodium chloride (MBP/ADV) 100 mL MBP  3 g IntraVENous Q12H    amLODIPine (NORVASC) tablet 5 mg  5 mg Oral DAILY    sertraline (ZOLOFT) tablet 100 mg  100 mg Oral DAILY    metoprolol tartrate (LOPRESSOR) tablet 100 mg  100 mg Oral BID    oxyCODONE-acetaminophen (PERCOCET) 5-325 mg per tablet 1 Tablet  1 Tablet Oral Q4H PRN    famotidine (PEPCID) tablet 20 mg  20 mg Oral DAILY    calcium acetate (phosphate binder) (PHOSLO) tablet 667 mg  1 Tablet Oral TID WITH MEALS    insulin lispro (HUMALOG) injection   SubCUTAneous AC&HS    heparin (porcine) injection 5,000 Units  5,000 Units SubCUTAneous Q8H    0.9% sodium chloride infusion 250 mL  250 mL IntraVENous PRN    sodium chloride (NS) flush 5-40 mL  5-40 mL IntraVENous Q8H    sodium chloride (NS) flush 5-40 mL  5-40 mL IntraVENous PRN    ondansetron (ZOFRAN ODT) tablet 4 mg  4 mg Oral Q8H PRN    Or    ondansetron (ZOFRAN) injection 4 mg  4 mg IntraVENous Q6H PRN    potassium chloride 10 mEq in 100 ml IVPB  10 mEq IntraVENous PRN    magnesium sulfate 2 g/50 ml IVPB (premix or compounded)  2 g IntraVENous PRN    HYDROmorphone (DILAUDID) injection 0.5 mg  0.5 mg IntraVENous Q3H PRN         Objective:     Vitals:    12/22/21 0407 12/22/21 0410 12/22/21 0708 12/22/21 0800   BP: (!) 150/101 (!) 140/113 124/80    Pulse: (!) 130  (!) 128 (!) 103   Resp: 16  20    Temp: 97.9 °F (36.6 °C)  98 °F (36.7 °C)    SpO2: 99%  99%    Weight:  142.6 kg (314 lb 6 oz)     Height:         Intake and Output:   12/20 1901 - 12/22 0700  In: 1240 [P.O.:1240]  Out: 3100 [Urine:3100]  No intake/output data recorded. Physical Exam:   Constitutional: AO x 3. in no acute distress  HEENT:  Sclera clear, pupils equal, oral mucosa moist  Lungs: clear to auscultation bilaterally  Cardiovascular:  RRR, no rub  Abd/GI: soft and non-tender; with positive bowel sounds. Ext: warm without cyanosis. There is 1+ upper and lower leg edema. LAB  Recent Labs     12/21/21  1110 12/20/21  1141   WBC 18.3* 21.7*   HGB 10.0* 10.2*   HCT 30.1* 30.7*    272     Recent Labs     12/22/21  0424 12/21/21  1110 12/21/21  0450 12/20/21  0700 12/20/21  0700   * 132* 142   < > 144   K 4.7 4.3 3.5   < > 3.2*   CL 98 96* 110*   < > 114*   CO2 25 25 21   < > 18*   * 212* 114*   < > 89   * 104* 85*   < > 80*   CREA 8.79* 8.84* 6.80*   < > 6.30*   MG 3.2* 3.0* 2.3   < > 1.9   PHOS 9.3*  --  7.9*  --  8.3*   ALB 2.1*  --  1.5*  --  1.4*    < > = values in this interval not displayed. No results for input(s): PH, PCO2, PO2, HCO3 in the last 72 hours.       Assessment:  (Medical Decision Making)     Hospital Problems  Date Reviewed: 12/25/2015          Codes Class Noted POA    DEVENDRA (acute kidney injury) ([facility] ICD-10-CM: N17.9  ICD-9-CM: 584.9  12/16/2021 Yes        Acute respiratory failure ([facility] ICD-10-CM: J96.00  ICD-9-CM: 518.81  12/16/2021 No        Sepsis with acute renal failure and tubular necrosis without septic shock (HCC) ICD-10-CM: A41.9, R65.20, N17.0  ICD-9-CM: 038.9, 995.91, 584.5  12/16/2021 Yes        * (Principal) Necrotizing fasciitis ([facility] ICD-10-CM: M72.6  ICD-9-CM: 728.86  12/15/2021 Yes        Anemia ICD-10-CM: D64.9  ICD-9-CM: 285.9  7/13/2018 Yes        Obesity, morbid (HCC) (Chronic) ICD-10-CM: E66.01  ICD-9-CM: 278.01  7/12/2018 Yes        DM2 (diabetes mellitus, type 2) (HCC) (Chronic) ICD-10-CM: E11.9  ICD-9-CM: 250.00  12/25/2015 Yes        HTN (hypertension) (Chronic) ICD-10-CM: I10  ICD-9-CM: 401.9  12/25/2015 Yes              Plan:  (Medical Decision Making)   1. DEVENDRA with unclear hx of CKD, secondary to DM ( no recent lab with which to compare) possible ATN  -imaging CT showed some cortical thinning associated with CKD. -vanc level was elevated up to 23.4 on 12/16 off vanc and on unasyn   -Cr worse today, up to 8.79, BUN - 107 however pt remains with good UOP and no uremic symptoms to date  -No indication for acute RRT at this time, however spoke with pt and  on the potential need for RRT if her labs continue to trend up over the next few days  -trending renal indices, check urine eosinphils  -Pt's CrCl today - 11, recommend decreasing Unasyn to q24 for a more renally appropriate dose     2. Hypermagnesemia  -stopped mag supplement    3. Hyperphosphatemia  -on binders     4. Necrotizing fasciitis. - s/p I&D perineum and trunk, gen surgery following, plastic surgery for wound closure       Gayle Ocasio, LUISANA  [facility]

## 2021-12-22 NOTE — PROGRESS NOTES
Hospitalist Progress Note   Admit Date:  12/15/2021  9:53 AM   Name:  Aimee Felix   Age:  46 y.o. Sex:  female  :  1970   MRN:  [id number]   Room:      Presenting Complaint: Vomiting    Reason(s) for Admission: Necrotizing fasciitis [facility]) [M72.6]     Hospital Course & Interval History:   Roland Leija a 46 y. o. female with history of Morbid obesity, DM who was admitted for septic shock 2/2 necrotizing fascitis involving her pannus and perineum associated with respiratory failure, severe DEVENDRA (Scr 11), acidosis pH 7.1. She was placed on levo/jessica, intubated and treated with vanc/zosyn initially. Outpatient she was started on bactrim and unable to tolerate it then changed to doxycycline. Per  was not taking in any PO and became progressively weaker at home.  denies known history of renal disease. Made little urine prior to admission. She is s/p excisional debridement on 12/15, . OP note reports foul smelling necrotizing fasciitis extending from the perineum, left labia deep to the pubic bone and along the fascia to the abdominal wall above the iliac bone into the left flank. She rec'd ancef in OR and was changed to vanc/clinda/meropenem on 12/15 by pulmonology. Discussed with , no evidence of osteomyelitis. Plan to return patient to OR on Monday for re-assessment. : extubated. Renal consult. New AFIB RVR with Diltiazem gtt initiated, now on PO Metoprolol. Cardiology consulted per family request. ID is following. Hospitalist were consulted for DM management. General Surgery remains primary service. Subjective (21): Patient alert, oriented, spouse at bedside involved in plan of care. Denies chest pain and SOB. Endorses wound changes going as planned and tolerating well. No complaints.     Assessment & Plan:     Principal Problem:  Septic Shock ([address]  Necrotizing fasciitis ([facility]  -s/p excisional debridement 12/15 and   -Required Levo/David for BP support, now weaned off and on medical floor, hemodynamically stable  -BCx 12/15 negative  -Wound Cx with GNR/GPC  -ID following for antibiotic recommendations  -Continue IV Unasyn, renally dosed  -Wound care dressing changes daily  -Plastic Surgery Dr. Aamir Raman recommends surgery in 1 week rather than tomorrow    Active Problems:  DM2 (diabetes mellitus, type 2) ([facility]   -A1C  -Continue Lantus at 24 units daily and SSI, glucose control acceptable    HTN (hypertension)   -Continue BB and Norvasc    Obesity, morbid (HCC)   -Increases all-cause mortality, needs diet and lifestyle modifications    Acute on Chronic Anemia   ABLA  -Multifactorial with CKD and post-operative blood loss  -s/p 2 U PRBC for Hgb 5.8  -Iron stores and B12 wnl  -H&H currently stable, monitor am labs    DEVENDRA on CKD   -Unclear hx of possible CKD related to DM, CT with cortical thinning associated with CKD  -Nephrology following, appreciate assistance  -Non-oliguric  -Etiology possibly ATN, component of septic shock  -No current indication for RRT, Cr with slight improvement today, renally dose Unasyn, will monitor closely, if labs continue to trend up will need to initiate HD    Acute respiratory failure (HCC) requiring mechanical ventilation, resolved  -Extubated 12/16, stable on RA    Atrial Fibrillation with RVR  -Required diltiazem gtt, now off drip and on Metoprolol 100 mg BID  -Currently rate-controlled  -Cardiology consulted per family request  -Not anticoagulated due to abdominal wound and further anticipated surgical intervention  -Will likely need anticoagulation when clinically appropriate    Hyperphosphatemia  -Continue phosphate binder, nephrology following    Dispo/Discharge Planning:  Possible d/c to [facility] although surgery planned in 1 week so likely not until after    Diet:  ADULT DIET Regular; 3 carb choices (45 gm/meal);  Low Phosphorus (Less than 1000 mg)  DIET NPO  DVT PPx: SCD's  Code status: Full Code    Hospital Problems as of 12/22/2021 Date Reviewed: 12/25/2015          Codes Class Noted - Resolved POA    Atrial fibrillation with RVR [facility]) ICD-10-CM: I48.91  ICD-9-CM: 427.31  12/22/2021 - Present Unknown        DEVENDRA (acute kidney injury) ([place] [address] ICD-10-CM: N17.9  ICD-9-CM: 584.9  12/16/2021 - Present Yes        Acute respiratory failure ([facility] ICD-10-CM: J96.00  ICD-9-CM: 518.81  12/16/2021 - Present No        Septic shock (HCC) ICD-10-CM: A41.9, R65.21  ICD-9-CM: 038.9, 785.52, 995.92  12/16/2021 - Present Unknown        * (Principal) Necrotizing fasciitis ([place] [address] ICD-10-CM: M72.6  ICD-9-CM: 728.86  12/15/2021 - Present Yes        Anemia ICD-10-CM: D64.9  ICD-9-CM: 285.9  7/13/2018 - Present Yes        Obesity, morbid ([place] [address] (Chronic) ICD-10-CM: E66.01  ICD-9-CM: 278.01  7/12/2018 - Present Yes        DM2 (diabetes mellitus, type 2) ([place] [address] (Chronic) ICD-10-CM: E11.9  ICD-9-CM: 250.00  12/25/2015 - Present Yes        HTN (hypertension) (Chronic) ICD-10-CM: I10  ICD-9-CM: 401.9  12/25/2015 - Present Yes              Objective:     Patient Vitals for the past 24 hrs:   Temp Pulse Resp BP SpO2   12/22/21 1151 -- 72 -- -- --   12/22/21 1146 98.2 °F (36.8 °C) 75 19 (!) 140/74 99 %   12/22/21 0800 -- (!) 103 -- -- --   12/22/21 0708 98 °F (36.7 °C) (!) 128 20 124/80 99 %   12/22/21 0410 -- -- -- (!) 140/113 --   12/22/21 0407 97.9 °F (36.6 °C) (!) 130 16 (!) 150/101 99 %   12/22/21 0400 -- 87 -- -- --   12/22/21 0000 -- 88 -- -- --   12/21/21 2229 98.3 °F (36.8 °C) 98 16 (!) 147/77 97 %   12/21/21 2103 -- 70 -- -- --   12/21/21 2022 98.2 °F (36.8 °C) (!) 40 17 (!) 148/78 97 %   12/21/21 1604 97.8 °F (36.6 °C) 97 18 (!) 152/91 97 %     Oxygen Therapy  O2 Sat (%): 99 % (12/22/21 1146)  Pulse via Oximetry: 68 beats per minute (12/17/21 2028)  O2 Device: Nasal cannula (12/17/21 2028)  Skin Assessment: Clean, dry, & intact (12/17/21 9092)  Skin Protection for O2 Device: No (12/17/21 0974)  Orientation: Bilateral (12/16/21 0800)  Location: Cheek (12/16/21 0800)  Interventions: Mouth Care;Hydrocolloid Dressing (12/16/21 0800)  O2 Flow Rate (L/min): 1 l/min (12/17/21 2028)  FIO2 (%): 28 % (12/17/21 1013)    Estimated body mass index is 48.51 kg/m² as calculated from the following:    Height as of this encounter: 5' 7.5\" (1.715 m). Weight as of this encounter: 142.6 kg (314 lb 6 oz). Intake/Output Summary (Last 24 hours) at 12/22/2021 1602  Last data filed at 12/22/2021 0800  Gross per 24 hour   Intake 120 ml   Output 850 ml   Net -730 ml         Physical Exam:   Blood pressure (!) 140/74, pulse 72, temperature 98.2 °F (36.8 °C), resp. rate 19, height 5' 7.5\" (1.715 m), weight 142.6 kg (314 lb 6 oz), SpO2 99 %. General:    Well nourished. No overt distress. Alert. Conversational.  Head:  Normocephalic, atraumatic  Eyes:  Sclerae appear normal.  Pupils equally round. ENT:  Nares appear normal, no drainage. Moist oral mucosa  Neck:  No restricted ROM. Trachea midline   CV:   RRR. No m/r/g. No jugular venous distension. Lungs:   CTAB. No wheezing, rhonchi, or rales. Respirations even, unlabored on RA. Abdomen:   Dressing intact. Extremities: No cyanosis or clubbing. Trace peripheral edema bilaterally  Skin:     No rashes and normal coloration. Warm and dry. Neuro:  CN II-XII grossly intact. Sensation intact. A&Ox3. Moves all 4 extremities. Psych:  Normal mood and affect.       I have reviewed ordered lab tests and independently visualized imaging below:    Recent Labs:  Recent Results (from the past 48 hour(s))   GLUCOSE, POC    Collection Time: 12/20/21  8:27 PM   Result Value Ref Range    Glucose (POC) 225 (H) 65 - 100 mg/dL    Performed by [facility]    METABOLIC PANEL, COMPREHENSIVE    Collection Time: 12/21/21  4:50 AM   Result Value Ref Range    Sodium 142 136 - 145 mmol/L    Potassium 3.5 3.5 - 5.1 mmol/L    Chloride 110 (H) 98 - 107 mmol/L    CO2 21 21 - 32 mmol/L    Anion gap 11 7 - 16 mmol/L    Glucose 114 (H) 65 - 100 mg/dL    BUN 85 (H) 6 - 23 MG/DL    Creatinine 6.80 (H) 0.6 - 1.0 MG/DL    GFR est AA 8 (L) >60 ml/min/1.73m2    GFR est non-AA 7 (L) >60 ml/min/1.73m2    Calcium 6.2 (L) 8.3 - 10.4 MG/DL    Bilirubin, total 0.2 0.2 - 1.1 MG/DL    ALT (SGPT) 7 (L) 12 - 65 U/L    AST (SGOT) 9 (L) 15 - 37 U/L    Alk.  phosphatase 54 50 - 136 U/L    Protein, total 4.7 (L) 6.3 - 8.2 g/dL    Albumin 1.5 (L) 3.5 - 5.0 g/dL    Globulin 3.2 2.3 - 3.5 g/dL    A-G Ratio 0.5 (L) 1.2 - 3.5     PHOSPHORUS    Collection Time: 12/21/21  4:50 AM   Result Value Ref Range    Phosphorus 7.9 (H) 2.5 - 4.5 MG/DL   MAGNESIUM    Collection Time: 12/21/21  4:50 AM   Result Value Ref Range    Magnesium 2.3 1.8 - 2.4 mg/dL   GLUCOSE, POC    Collection Time: 12/21/21  7:13 AM   Result Value Ref Range    Glucose (POC) 145 (H) 65 - 100 mg/dL    Performed by [facility]    GLUCOSE, POC    Collection Time: 12/21/21 10:49 AM   Result Value Ref Range    Glucose (POC) 196 (H) 65 - 100 mg/dL    Performed by [facility]    CBC W/O DIFF    Collection Time: 12/21/21 11:10 AM   Result Value Ref Range    WBC 18.3 (H) 4.3 - 11.1 K/uL    RBC 3.34 (L) 4.05 - 5.2 M/uL    HGB 10.0 (L) 11.7 - 15.4 g/dL    HCT 30.1 (L) 35.8 - 46.3 %    MCV 90.1 79.6 - 97.8 FL    MCH 29.9 26.1 - 32.9 PG    MCHC 33.2 31.4 - 35.0 g/dL    RDW 14.5 11.9 - 14.6 %    PLATELET 714 452 - 495 K/uL    MPV 9.8 9.4 - 12.3 FL    ABSOLUTE NRBC 0.00 0.0 - 0.2 K/uL   METABOLIC PANEL, BASIC    Collection Time: 12/21/21 11:10 AM   Result Value Ref Range    Sodium 132 (L) 136 - 145 mmol/L    Potassium 4.3 3.5 - 5.1 mmol/L    Chloride 96 (L) 98 - 107 mmol/L    CO2 25 21 - 32 mmol/L    Anion gap 11 7 - 16 mmol/L    Glucose 212 (H) 65 - 100 mg/dL     (H) 6 - 23 MG/DL    Creatinine 8.84 (H) 0.6 - 1.0 MG/DL    GFR est AA 6 (L) >60 ml/min/1.73m2    GFR est non-AA 5 (L) >60 ml/min/1.73m2    Calcium 7.9 (L) 8.3 - 10.4 MG/DL   MAGNESIUM    Collection Time: 12/21/21 11:10 AM   Result Value Ref Range    Magnesium 3.0 (H) 1.8 - 2.4 mg/dL   GLUCOSE, POC    Collection Time: 12/21/21  4:02 PM   Result Value Ref Range    Glucose (POC) 216 (H) 65 - 100 mg/dL    Performed by [facility]    GLUCOSE, POC    Collection Time: 12/21/21  8:58 PM   Result Value Ref Range    Glucose (POC) 215 (H) 65 - 100 mg/dL    Performed by [facility]    EKG, 12 LEAD, SUBSEQUENT    Collection Time: 12/21/21  9:38 PM   Result Value Ref Range    Ventricular Rate 96 BPM    Atrial Rate 256 BPM    QRS Duration 86 ms    Q-T Interval 392 ms    QTC Calculation (Bezet) 495 ms    Calculated R Axis -5 degrees    Calculated T Axis 81 degrees    Diagnosis       Atrial flutter with variable A-V block  Low voltage QRS  Cannot rule out Anteroseptal infarct (cited on or before 19-DEC-2021)      Abnormal ECG  When compared with ECG of 19-DEC-2021 09:00,  Serial changes of evolving Anteroseptal infarct Present  Confirmed by Inga Garrison MD (), TINO BERG ([id number]) on 12/22/2021 8:04:44 AM     METABOLIC PANEL, COMPREHENSIVE    Collection Time: 12/22/21  4:24 AM   Result Value Ref Range    Sodium 135 (L) 136 - 145 mmol/L    Potassium 4.7 3.5 - 5.1 mmol/L    Chloride 98 98 - 107 mmol/L    CO2 25 21 - 32 mmol/L    Anion gap 12 7 - 16 mmol/L    Glucose 154 (H) 65 - 100 mg/dL     (H) 6 - 23 MG/DL    Creatinine 8.79 (H) 0.6 - 1.0 MG/DL    GFR est AA 6 (L) >60 ml/min/1.73m2    GFR est non-AA 5 (L) >60 ml/min/1.73m2    Calcium 8.4 8.3 - 10.4 MG/DL    Bilirubin, total 0.2 0.2 - 1.1 MG/DL    ALT (SGPT) 10 (L) 12 - 65 U/L    AST (SGOT) 8 (L) 15 - 37 U/L    Alk.  phosphatase 67 50 - 136 U/L    Protein, total 6.4 6.3 - 8.2 g/dL    Albumin 2.1 (L) 3.5 - 5.0 g/dL    Globulin 4.3 (H) 2.3 - 3.5 g/dL    A-G Ratio 0.5 (L) 1.2 - 3.5     PHOSPHORUS    Collection Time: 12/22/21  4:24 AM   Result Value Ref Range    Phosphorus 9.3 (H) 2.5 - 4.5 MG/DL   MAGNESIUM    Collection Time: 12/22/21  4:24 AM   Result Value Ref Range    Magnesium 3.2 (H) 1.8 - 2.4 mg/dL TROPONIN-HIGH SENSITIVITY    Collection Time: 12/22/21  4:24 AM   Result Value Ref Range    Troponin-High Sensitivity 24.0 (H) 0 - 14 pg/mL   GLUCOSE, POC    Collection Time: 12/22/21  4:33 AM   Result Value Ref Range    Glucose (POC) 169 (H) 65 - 100 mg/dL    Performed by Jovanny Guardado    GLUCOSE, POC    Collection Time: 12/22/21  7:12 AM   Result Value Ref Range    Glucose (POC) 173 (H) 65 - 100 mg/dL    Performed by Jailene Medhat)    GLUCOSE, POC    Collection Time: 12/22/21 11:50 AM   Result Value Ref Range    Glucose (POC) 217 (H) 65 - 100 mg/dL    Performed by Jailene Medhat)        All Micro Results     Procedure Component Value Units Date/Time    CULTURE, ANAEROBIC [[id number]] Collected: 12/15/21 1710    Order Status: Completed Specimen: Peritoneum Updated: 12/22/21 0926     Special Requests: NO SPECIAL REQUESTS        Culture result:       MULTIPLE ANAEROBES ISOLATED, SUSCEPTIBILITY AND IDENTIFICATION AVAILABLE UPON REQUEST. ORGANISM WILL BE HELD FOR 5 DAYS.           BLOOD CULTURE [[id number]] Collected: 12/15/21 1215    Order Status: Completed Specimen: Blood Updated: 12/20/21 0653     Special Requests: --        LEFT  HAND       Culture result: NO GROWTH 5 DAYS       BLOOD CULTURE [[id number]] Collected: 12/15/21 1236    Order Status: Completed Specimen: Blood Updated: 12/20/21 0653     Special Requests: --        RIGHT  HAND       Culture result: NO GROWTH 5 DAYS       CULTURE, WOUND Mouthcard Josh STAIN [[id number]]  (Abnormal)  (Susceptibility) Collected: 12/15/21 1323    Order Status: Completed Specimen: Wound from Groin Updated: 12/19/21 0732     Special Requests: NO SPECIAL REQUESTS        GRAM STAIN 0 TO 36 WBCS PER OIF      MANY GRAM POSITIVE COCCI               MODERATE GRAM NEGATIVE RODS                  MODERATE GRAM POSITIVE RODS           Culture result:       MODERATE PROTEUS MIRABILIS                  MODERATE ENTEROCOCCUS FAECALIS GROUP D                  LIGHT MIXED SKIN MASSIMO ISOLATED CULTURE, Nava Mallet STAIN [[id number]]  (Abnormal)  (Susceptibility) Collected: 12/15/21 1710    Order Status: Completed Specimen: Peritoneum Updated: 12/18/21 0977     Special Requests: NO SPECIAL REQUESTS        GRAM STAIN 0 TO 6 WBCS PER OIF      MANY GRAM POSITIVE COCCI         MANY GRAM NEGATIVE RODS        Culture result:       MODERATE PROTEUS MIRABILIS                  MODERATE STAPHYLOCOCCUS SPECIES, COAGULASE NEGATIVE THIS ORGANISM WILL BE HELD FOR 7 DAYS. IF FURTHER TESTING IS REQUIRED PLEASE NOTIFY MICROBIOLOGY          COVID-19 RAPID TEST [[id number]] Collected: 12/15/21 1519    Order Status: Completed Specimen: Nasopharyngeal Updated: 12/15/21 1549     Specimen source NASAL        COVID-19 rapid test Not detected        Comment:      The specimen is NEGATIVE for SARS-CoV-2, the novel coronavirus associated with COVID-19. A negative result does not rule out COVID-19. This test has been authorized by the FDA under an Emergency Use Authorization (EUA) for use by authorized laboratories. Fact sheet for Healthcare Providers: [employer].co.nz  Fact sheet for Patients: [employer]co.nz       Methodology: Isothermal Nucleic Acid Amplification               Other Studies:  No results found.     Current Meds:  Current Facility-Administered Medications   Medication Dose Route Frequency    insulin glargine (LANTUS) injection 24 Units  24 Units SubCUTAneous DAILY    ampicillin-sulbactam (UNASYN) 3 g in 0.9% sodium chloride (MBP/ADV) 100 mL MBP  3 g IntraVENous Q12H    amLODIPine (NORVASC) tablet 5 mg  5 mg Oral DAILY    sertraline (ZOLOFT) tablet 100 mg  100 mg Oral DAILY    metoprolol tartrate (LOPRESSOR) tablet 100 mg  100 mg Oral BID    oxyCODONE-acetaminophen (PERCOCET) 5-325 mg per tablet 1 Tablet  1 Tablet Oral Q4H PRN    famotidine (PEPCID) tablet 20 mg  20 mg Oral DAILY    calcium acetate (phosphate binder) (PHOSLO) tablet 667 mg  1 Tablet Oral TID WITH MEALS    insulin lispro (HUMALOG) injection   SubCUTAneous AC&HS    [Held by provider] heparin (porcine) injection 5,000 Units  5,000 Units SubCUTAneous Q8H    0.9% sodium chloride infusion 250 mL  250 mL IntraVENous PRN    sodium chloride (NS) flush 5-40 mL  5-40 mL IntraVENous Q8H    sodium chloride (NS) flush 5-40 mL  5-40 mL IntraVENous PRN    ondansetron (ZOFRAN ODT) tablet 4 mg  4 mg Oral Q8H PRN    Or    ondansetron (ZOFRAN) injection 4 mg  4 mg IntraVENous Q6H PRN    potassium chloride 10 mEq in 100 ml IVPB  10 mEq IntraVENous PRN    magnesium sulfate 2 g/50 ml IVPB (premix or compounded)  2 g IntraVENous PRN    HYDROmorphone (DILAUDID) injection 0.5 mg  0.5 mg IntraVENous Q3H PRN     Labs, vital signs, diagnostic testing reviewed. Case discussed with patient, care team, Dr. Will Escobar.     Signed:  Divya Burgess NP

## 2021-12-22 NOTE — PROGRESS NOTES
Spoke with Dr. Venessa Alford about pt most recent EKG, pt with nonsymptomatic abnormal cardiac rhythm. Orders for IV Lopressor. Will follow up with repeat EKG after administration. MD to assess pt at bedside.

## 2021-12-22 NOTE — PROGRESS NOTES
[address]  [address] [facility], [address] [place] [address], [address]      PLAN: Consult cardiology for atrial fibrillation atrial flutter and anteroseptal infarct on EKG  Continue wound care daily dressing changes  Await case management decisions about [facility]  Dr. Cheyanne Ding plastic surgery recommends surgery in 1 week  Appreciate hospitalist and nephrology      ASSESSMENT:  Admit Date: 12/15/2021   6 Days Post-Op  Procedure(s):  INCISION AND DRAINAGE PERINEUM AND TRUNK    Principal Problem:    Necrotizing fasciitis ([address] (12/15/2021)    Active Problems:    DM2 (diabetes mellitus, type 2) ([address] (12/25/2015)      HTN (hypertension) (12/25/2015)      Obesity, morbid ([address] (7/12/2018)      Anemia (7/13/2018)      DEVENRDA (acute kidney injury) ([address] (12/16/2021)      Acute respiratory failure ([address] (12/16/2021)      Sepsis with acute renal failure and tubular necrosis without septic shock ([address] (12/16/2021)         SUBJECTIVE: Minimal complaints. Complains of soreness at wound. Daily dressing changes successful. Vital signs stable afebrile. The patient and  have questions about EKG changes suggesting an infarct. Cardiology will be consulted. She denies any chest pain or shortness of breath but does have uncontrolled diabetes.  working with [facility] for transfer to [facility] but thinks it may not be approved until after [place]. OBJECTIVE:  Constitutional: Alert oriented cooperative patient in no acute distress; appears stated age   Visit Vitals  BP (!) 140/74   Pulse 72   Temp 98.2 °F (36.8 °C)   Resp 19   Ht 5' 7.5\" (1.715 m)   Wt 314 lb 6 oz (142.6 kg)   SpO2 99%   BMI 48.51 kg/m²     Eyes:Sclera are clear. ENMT: no external lesions gross hearing normal; no obvious neck masses, no ear or lip lesions  CV: RRR. Normal perfusion  Resp: No JVD. Breathing is  non-labored; no audible wheezing. GI: Soft dressing intact.   No new necrosis  Musculoskeletal: unremarkable with normal function. No embolic signs or cyanosis. Neuro:  Oriented; moves all 4; no focal deficits  Psychiatric: normal affect and mood, no memory impairment      Patient Vitals for the past 24 hrs:   BP Temp Pulse Resp SpO2 Weight   12/22/21 1151 -- -- 72 -- -- --   12/22/21 1146 (!) 140/74 98.2 °F (36.8 °C) 75 19 99 % --   12/22/21 0800 -- -- (!) 103 -- -- --   12/22/21 0708 124/80 98 °F (36.7 °C) (!) 128 20 99 % --   12/22/21 0410 (!) 140/113 -- -- -- -- 314 lb 6 oz (142.6 kg)   12/22/21 0407 (!) 150/101 97.9 °F (36.6 °C) (!) 130 16 99 % --   12/22/21 0400 -- -- 87 -- -- --   12/22/21 0000 -- -- 88 -- -- --   12/21/21 2229 (!) 147/77 98.3 °F (36.8 °C) 98 16 97 % --   12/21/21 2103 -- -- 70 -- -- --   12/21/21 2022 (!) 148/78 98.2 °F (36.8 °C) (!) 40 17 97 % --   12/21/21 1604 (!) 152/91 97.8 °F (36.6 °C) 97 18 97 % --     Labs:    Recent Labs     12/22/21  0424 12/21/21  1110 12/21/21  0450   WBC  --  18.3*  --    HGB  --  10.0*  --    PLT  --  280  --    * 132*   < >   K 4.7 4.3   < >   CL 98 96*   < >   CO2 25 25   < >   * 104*   < >   CREA 8.79* 8.84*   < >   * 212*   < >   TBILI 0.2  --    < >   ALT 10*  --    < >   AP 67  --    < >    < > = values in this interval not displayed.          Violeta Mena, DO

## 2021-12-22 NOTE — WOUND CARE
1420 Left abdomen and groin wound dressing change completed with assistance of Narinder Price RN Adipose dark in left portion of wound, tunnels are deeper, packed further, concern this area will need some excisional debridement. Dressings are to be done daily and wound team will assist when available.

## 2021-12-23 ENCOUNTER — APPOINTMENT (OUTPATIENT)
Dept: NON INVASIVE DIAGNOSTICS | Age: 51
DRG: 853 | End: 2021-12-23
Attending: NURSE PRACTITIONER
Payer: COMMERCIAL

## 2021-12-23 ENCOUNTER — APPOINTMENT (OUTPATIENT)
Dept: INTERVENTIONAL RADIOLOGY/VASCULAR | Age: 51
DRG: 853 | End: 2021-12-23
Payer: COMMERCIAL

## 2021-12-23 LAB
ALBUMIN SERPL-MCNC: 2.1 G/DL (ref 3.5–5)
ALBUMIN/GLOB SERPL: 0.5 {RATIO} (ref 1.2–3.5)
ALP SERPL-CCNC: 63 U/L (ref 50–136)
ALT SERPL-CCNC: 8 U/L (ref 12–65)
ANION GAP SERPL CALC-SCNC: 11 MMOL/L (ref 7–16)
AST SERPL-CCNC: 10 U/L (ref 15–37)
BASOPHILS # BLD: 0.1 K/UL (ref 0–0.2)
BASOPHILS NFR BLD: 1 % (ref 0–2)
BILIRUB SERPL-MCNC: 0.2 MG/DL (ref 0.2–1.1)
BUN SERPL-MCNC: 101 MG/DL (ref 6–23)
CALCIUM SERPL-MCNC: 8.6 MG/DL (ref 8.3–10.4)
CHLORIDE SERPL-SCNC: 100 MMOL/L (ref 98–107)
CO2 SERPL-SCNC: 25 MMOL/L (ref 21–32)
CREAT SERPL-MCNC: 8.96 MG/DL (ref 0.6–1)
DIFFERENTIAL METHOD BLD: ABNORMAL
ECHO AO ASC DIAM: 3.1 CM
ECHO AO ASCENDING AORTA INDEX: 1.27 CM/M2
ECHO AV AREA PEAK VELOCITY: 2.4 CM2
ECHO AV AREA VTI: 2.5 CM2
ECHO AV AREA/BSA PEAK VELOCITY: 1 CM2/M2
ECHO AV AREA/BSA VTI: 1 CM2/M2
ECHO AV CUSP MM: 1.7 CM
ECHO AV MEAN GRADIENT: 4 MMHG
ECHO AV MEAN VELOCITY: 0.9 M/S
ECHO AV PEAK GRADIENT: 10 MMHG
ECHO AV PEAK VELOCITY: 1.6 M/S
ECHO AV VELOCITY RATIO: 0.69
ECHO AV VTI: 30.6 CM
ECHO EST RA PRESSURE: 8 MMHG
ECHO LA AREA 2C: 23.5 CM2
ECHO LA AREA 4C: 20 CM2
ECHO LA DIAMETER INDEX: 1.55 CM/M2
ECHO LA DIAMETER: 3.8 CM
ECHO LA MAJOR AXIS: 5.2 CM
ECHO LA MINOR AXIS: 6 CM
ECHO LA VOL BP: 70 ML (ref 22–52)
ECHO LA VOL/BSA BIPLANE: 29 ML/M2 (ref 16–34)
ECHO LV E' LATERAL VELOCITY: 11 CM/S
ECHO LV E' SEPTAL VELOCITY: 11 CM/S
ECHO LV EDV A4C: 79 ML
ECHO LV EDV INDEX A4C: 32 ML/M2
ECHO LV EJECTION FRACTION A4C: 48 %
ECHO LV ESV A4C: 41 ML
ECHO LV ESV INDEX A4C: 17 ML/M2
ECHO LV FRACTIONAL SHORTENING: 30 % (ref 28–44)
ECHO LV INTERNAL DIMENSION DIASTOLE INDEX: 1.92 CM/M2
ECHO LV INTERNAL DIMENSION DIASTOLIC: 4.7 CM (ref 3.9–5.3)
ECHO LV INTERNAL DIMENSION SYSTOLIC INDEX: 1.35 CM/M2
ECHO LV INTERNAL DIMENSION SYSTOLIC: 3.3 CM
ECHO LV IVSD: 1 CM (ref 0.6–0.9)
ECHO LV MASS 2D: 175.8 G (ref 67–162)
ECHO LV MASS INDEX 2D: 71.8 G/M2 (ref 43–95)
ECHO LV POSTERIOR WALL DIASTOLIC: 1.1 CM (ref 0.6–0.9)
ECHO LV RELATIVE WALL THICKNESS RATIO: 0.47
ECHO LVOT AREA: 3.5 CM2
ECHO LVOT AV VTI INDEX: 0.75
ECHO LVOT DIAM: 2.1 CM
ECHO LVOT MEAN GRADIENT: 2 MMHG
ECHO LVOT PEAK GRADIENT: 5 MMHG
ECHO LVOT PEAK VELOCITY: 1.1 M/S
ECHO LVOT STROKE VOLUME INDEX: 32.6 ML/M2
ECHO LVOT SV: 80 ML
ECHO LVOT VTI: 23.1 CM
ECHO MV A VELOCITY: 0.45 M/S
ECHO MV AREA VTI: 3.4 CM2
ECHO MV E DECELERATION TIME (DT): 246 MS
ECHO MV E VELOCITY: 1.22 M/S
ECHO MV E/A RATIO: 2.71
ECHO MV E/E' LATERAL: 11.09
ECHO MV E/E' RATIO (AVERAGED): 11.09
ECHO MV E/E' SEPTAL: 11.09
ECHO MV LVOT VTI INDEX: 1.03
ECHO MV MAX VELOCITY: 1.3 M/S
ECHO MV MEAN GRADIENT: 2 MMHG
ECHO MV MEAN VELOCITY: 0.6 M/S
ECHO MV PEAK GRADIENT: 7 MMHG
ECHO MV VTI: 23.8 CM
ECHO RIGHT VENTRICULAR SYSTOLIC PRESSURE (RVSP): 27 MMHG
ECHO RV BASAL DIMENSION: 3.5 CM
ECHO RV INTERNAL DIMENSION: 2.3 CM
ECHO RV TAPSE: 2 CM (ref 1.5–2)
ECHO TV PEAK GRADIENT: 14 MMHG
ECHO TV REGURGITANT MAX VELOCITY: 2.19 M/S
ECHO TV REGURGITANT PEAK GRADIENT: 19 MMHG
EOSINOPHIL # BLD: 0.3 K/UL (ref 0–0.8)
EOSINOPHIL NFR BLD: 2 % (ref 0.5–7.8)
ERYTHROCYTE [DISTWIDTH] IN BLOOD BY AUTOMATED COUNT: 14.5 % (ref 11.9–14.6)
GLOBULIN SER CALC-MCNC: 4.2 G/DL (ref 2.3–3.5)
GLUCOSE BLD STRIP.AUTO-MCNC: 136 MG/DL (ref 65–100)
GLUCOSE BLD STRIP.AUTO-MCNC: 146 MG/DL (ref 65–100)
GLUCOSE BLD STRIP.AUTO-MCNC: 182 MG/DL (ref 65–100)
GLUCOSE BLD STRIP.AUTO-MCNC: 302 MG/DL (ref 65–100)
GLUCOSE SERPL-MCNC: 138 MG/DL (ref 65–100)
HBV SURFACE AB SERPL IA-ACNC: 33.18 MIU/ML
HBV SURFACE AG SER QL: NONREACTIVE
HCT VFR BLD AUTO: 29.1 % (ref 35.8–46.3)
HGB BLD-MCNC: 9.2 G/DL (ref 11.7–15.4)
IMM GRANULOCYTES # BLD AUTO: 1.5 K/UL (ref 0–0.5)
IMM GRANULOCYTES NFR BLD AUTO: 11 % (ref 0–5)
LYMPHOCYTES # BLD: 1.9 K/UL (ref 0.5–4.6)
LYMPHOCYTES NFR BLD: 14 % (ref 13–44)
MAGNESIUM SERPL-MCNC: 3.3 MG/DL (ref 1.8–2.4)
MCH RBC QN AUTO: 28.8 PG (ref 26.1–32.9)
MCHC RBC AUTO-ENTMCNC: 31.6 G/DL (ref 31.4–35)
MCV RBC AUTO: 91.2 FL (ref 79.6–97.8)
MONOCYTES # BLD: 0.9 K/UL (ref 0.1–1.3)
MONOCYTES NFR BLD: 7 % (ref 4–12)
NEUTS SEG # BLD: 8.6 K/UL (ref 1.7–8.2)
NEUTS SEG NFR BLD: 65 % (ref 43–78)
NRBC # BLD: 0 K/UL (ref 0–0.2)
PHOSPHATE SERPL-MCNC: 9.5 MG/DL (ref 2.5–4.5)
PLATELET # BLD AUTO: 329 K/UL (ref 150–450)
PLATELET COMMENTS,PCOM: ADEQUATE
PMV BLD AUTO: 9.9 FL (ref 9.4–12.3)
POTASSIUM SERPL-SCNC: 5.3 MMOL/L (ref 3.5–5.1)
PROT SERPL-MCNC: 6.3 G/DL (ref 6.3–8.2)
RBC # BLD AUTO: 3.19 M/UL (ref 4.05–5.2)
RBC MORPH BLD: ABNORMAL
SERVICE CMNT-IMP: ABNORMAL
SODIUM SERPL-SCNC: 136 MMOL/L (ref 136–145)
WBC # BLD AUTO: 13.3 K/UL (ref 4.3–11.1)
WBC MORPH BLD: ABNORMAL

## 2021-12-23 PROCEDURE — 74011636637 HC RX REV CODE- 636/637: Performed by: INTERNAL MEDICINE

## 2021-12-23 PROCEDURE — 99232 SBSQ HOSP IP/OBS MODERATE 35: CPT | Performed by: INTERNAL MEDICINE

## 2021-12-23 PROCEDURE — 74011636637 HC RX REV CODE- 636/637: Performed by: NURSE PRACTITIONER

## 2021-12-23 PROCEDURE — 77030002916 HC SUT ETHLN J&J -A

## 2021-12-23 PROCEDURE — 02H633Z INSERTION OF INFUSION DEVICE INTO RIGHT ATRIUM, PERCUTANEOUS APPROACH: ICD-10-PCS | Performed by: RADIOLOGY

## 2021-12-23 PROCEDURE — 74011636637 HC RX REV CODE- 636/637: Performed by: STUDENT IN AN ORGANIZED HEALTH CARE EDUCATION/TRAINING PROGRAM

## 2021-12-23 PROCEDURE — 74011000250 HC RX REV CODE- 250: Performed by: RADIOLOGY

## 2021-12-23 PROCEDURE — 74011250637 HC RX REV CODE- 250/637: Performed by: NURSE PRACTITIONER

## 2021-12-23 PROCEDURE — 83735 ASSAY OF MAGNESIUM: CPT

## 2021-12-23 PROCEDURE — 86706 HEP B SURFACE ANTIBODY: CPT

## 2021-12-23 PROCEDURE — 87340 HEPATITIS B SURFACE AG IA: CPT

## 2021-12-23 PROCEDURE — 85025 COMPLETE CBC W/AUTO DIFF WBC: CPT

## 2021-12-23 PROCEDURE — C1894 INTRO/SHEATH, NON-LASER: HCPCS

## 2021-12-23 PROCEDURE — C1752 CATH,HEMODIALYSIS,SHORT-TERM: HCPCS

## 2021-12-23 PROCEDURE — 74011250637 HC RX REV CODE- 250/637: Performed by: RADIOLOGY

## 2021-12-23 PROCEDURE — 65660000000 HC RM CCU STEPDOWN

## 2021-12-23 PROCEDURE — 82962 GLUCOSE BLOOD TEST: CPT

## 2021-12-23 PROCEDURE — 2709999900 HC NON-CHARGEABLE SUPPLY

## 2021-12-23 PROCEDURE — 74011000258 HC RX REV CODE- 258: Performed by: INTERNAL MEDICINE

## 2021-12-23 PROCEDURE — 77030021532 HC CATH ANGI DX IMPRS MRTM -B

## 2021-12-23 PROCEDURE — 77030018719 HC DRSG PTCH ANTIMIC J&J -A

## 2021-12-23 PROCEDURE — 76937 US GUIDE VASCULAR ACCESS: CPT

## 2021-12-23 PROCEDURE — 74011000250 HC RX REV CODE- 250: Performed by: SURGERY

## 2021-12-23 PROCEDURE — 84100 ASSAY OF PHOSPHORUS: CPT

## 2021-12-23 PROCEDURE — 80053 COMPREHEN METABOLIC PANEL: CPT

## 2021-12-23 PROCEDURE — 74011250636 HC RX REV CODE- 250/636: Performed by: SURGERY

## 2021-12-23 PROCEDURE — C8929 TTE W OR WO FOL WCON,DOPPLER: HCPCS

## 2021-12-23 PROCEDURE — 86704 HEP B CORE ANTIBODY TOTAL: CPT

## 2021-12-23 PROCEDURE — C1769 GUIDE WIRE: HCPCS

## 2021-12-23 PROCEDURE — 74011250636 HC RX REV CODE- 250/636: Performed by: INTERNAL MEDICINE

## 2021-12-23 PROCEDURE — 74011250637 HC RX REV CODE- 250/637: Performed by: INTERNAL MEDICINE

## 2021-12-23 PROCEDURE — 74011250636 HC RX REV CODE- 250/636: Performed by: RADIOLOGY

## 2021-12-23 PROCEDURE — 36591 DRAW BLOOD OFF VENOUS DEVICE: CPT

## 2021-12-23 RX ORDER — INSULIN LISPRO 100 [IU]/ML
3 INJECTION, SOLUTION INTRAVENOUS; SUBCUTANEOUS
Status: DISCONTINUED | OUTPATIENT
Start: 2021-12-23 | End: 2021-12-27 | Stop reason: HOSPADM

## 2021-12-23 RX ORDER — INSULIN GLARGINE 100 [IU]/ML
28 INJECTION, SOLUTION SUBCUTANEOUS DAILY
Status: DISCONTINUED | OUTPATIENT
Start: 2021-12-24 | End: 2021-12-24

## 2021-12-23 RX ORDER — OXYCODONE HYDROCHLORIDE 5 MG/1
10 TABLET ORAL ONCE
Status: COMPLETED | OUTPATIENT
Start: 2021-12-23 | End: 2021-12-23

## 2021-12-23 RX ORDER — HEPARIN SODIUM 1000 [USP'U]/ML
10000 INJECTION, SOLUTION INTRAVENOUS; SUBCUTANEOUS ONCE
Status: COMPLETED | OUTPATIENT
Start: 2021-12-23 | End: 2021-12-23

## 2021-12-23 RX ORDER — DIAZEPAM 5 MG/1
10 TABLET ORAL ONCE
Status: COMPLETED | OUTPATIENT
Start: 2021-12-23 | End: 2021-12-23

## 2021-12-23 RX ORDER — LIDOCAINE HYDROCHLORIDE 20 MG/ML
2-20 INJECTION, SOLUTION INFILTRATION; PERINEURAL ONCE
Status: COMPLETED | OUTPATIENT
Start: 2021-12-23 | End: 2021-12-23

## 2021-12-23 RX ADMIN — Medication 2 UNITS: at 16:30

## 2021-12-23 RX ADMIN — OXYCODONE HYDROCHLORIDE 10 MG: 5 TABLET ORAL at 13:51

## 2021-12-23 RX ADMIN — CALCIUM ACETATE 667 MG: 667 TABLET ORAL at 09:42

## 2021-12-23 RX ADMIN — FAMOTIDINE 20 MG: 20 TABLET ORAL at 09:42

## 2021-12-23 RX ADMIN — DIAZEPAM 10 MG: 5 TABLET ORAL at 13:51

## 2021-12-23 RX ADMIN — HEPARIN SODIUM 3000 UNITS: 1000 INJECTION, SOLUTION INTRAVENOUS; SUBCUTANEOUS at 14:19

## 2021-12-23 RX ADMIN — METOPROLOL TARTRATE 100 MG: 50 TABLET, FILM COATED ORAL at 18:20

## 2021-12-23 RX ADMIN — AMLODIPINE BESYLATE 5 MG: 5 TABLET ORAL at 09:42

## 2021-12-23 RX ADMIN — CALCIUM ACETATE 667 MG: 667 TABLET ORAL at 18:20

## 2021-12-23 RX ADMIN — Medication 5 ML: at 06:00

## 2021-12-23 RX ADMIN — HYDROMORPHONE HYDROCHLORIDE 0.5 MG: 1 INJECTION, SOLUTION INTRAMUSCULAR; INTRAVENOUS; SUBCUTANEOUS at 11:24

## 2021-12-23 RX ADMIN — PERFLUTREN 1 ML: 6.52 INJECTION, SUSPENSION INTRAVENOUS at 11:25

## 2021-12-23 RX ADMIN — Medication 8 UNITS: at 21:39

## 2021-12-23 RX ADMIN — SERTRALINE 100 MG: 50 TABLET, FILM COATED ORAL at 09:42

## 2021-12-23 RX ADMIN — OXYCODONE AND ACETAMINOPHEN 1 TABLET: 5; 325 TABLET ORAL at 21:38

## 2021-12-23 RX ADMIN — AMPICILLIN SODIUM AND SULBACTAM SODIUM 3 G: 2; 1 INJECTION, POWDER, FOR SOLUTION INTRAMUSCULAR; INTRAVENOUS at 09:42

## 2021-12-23 RX ADMIN — METOPROLOL TARTRATE 100 MG: 50 TABLET, FILM COATED ORAL at 09:42

## 2021-12-23 RX ADMIN — Medication 3 UNITS: at 18:20

## 2021-12-23 RX ADMIN — INSULIN GLARGINE 24 UNITS: 100 INJECTION, SOLUTION SUBCUTANEOUS at 09:51

## 2021-12-23 RX ADMIN — LIDOCAINE HYDROCHLORIDE 200 MG: 20 INJECTION, SOLUTION INFILTRATION; PERINEURAL at 14:17

## 2021-12-23 NOTE — PROGRESS NOTES
Infectious Disease Specialist Progress Note    Today's date: 12/23/2021  Admit date: 12/15/2021      Pt NOT seen today, chart reviewed. Pt discussed with ID attending.       Impression:   · Necrotizing fasciitis of abdomen/pannus, s/p debridement down to iliac and pubic bone12/15, 12/16, cultures with proteus, CoNS, and enterococcus  · Morbid obesity with BMI >49  · DM not on medications, A1c 6.6  · DEVENDRA, Nephrology is following  · Leukocytosis, improved     Plan:   · Continue renal dose Unasyn, anticipate prolonged course given depth of wounds   · Plan to treat for 2 weeks, EOT 12/29/21: this can be changed to PO Augmentin at any point  · ID will sign off, please call with questions or concerns        Vitals:   Current Range (last 24 hours)   Temperature: 97.7 °F (36.5 °C) [unfilled]   Heart Rate: (!) 124 [unfilled]   Resp Rate: 19 [unfilled]   Blood Pressure: (!) 145/97 [unfilled]   Oxygen Sats: 98 % [unfilled]              Labs:   Reviewed     Microbiology:   Reviewed     Recent Imaging:   Reviewed       Hailey Dennis NP  12/23/21

## 2021-12-23 NOTE — PROGRESS NOTES
Patient NPO since midnight for procedure. No complaints or distress noted during shift. Call light in reach. Waiting to report off to day shift nurse.

## 2021-12-23 NOTE — WOUND CARE
Wet to dry dressing changed to left abdomen with assistance of nurse 6 kerlex and 4 large ABD's used. Will monitor.

## 2021-12-23 NOTE — PROGRESS NOTES
Hospitalist Progress Note   Admit Date:  12/15/2021  9:53 AM   Name:  Ruth Farley   Age:  46 y.o. Sex:  female  :  1970   MRN:  [id number]   Room:      Presenting Complaint: Vomiting    Reason(s) for Admission: Necrotizing fasciitis [facility]) [M72.6]     Hospital Course & Interval History:   Geoff Medina a 46 y. o. female with history of Morbid obesity, DM who was admitted for septic shock 2/2 necrotizing fascitis involving her pannus and perineum associated with respiratory failure, severe DEVENDRA (Scr 11), acidosis pH 7.1. She was placed on levo/jessica, intubated and treated with vanc/zosyn initially. Outpatient she was started on bactrim and unable to tolerate it then changed to doxycycline. Per  was not taking in any PO and became progressively weaker at home.  denies known history of renal disease. Made little urine prior to admission. She is s/p excisional debridement on 12/15, . OP note reports foul smelling necrotizing fasciitis extending from the perineum, left labia deep to the pubic bone and along the fascia to the abdominal wall above the iliac bone into the left flank. She rec'd ancef in OR and was changed to vanc/clinda/meropenem on 12/15 by pulmonology. Discussed with , no evidence of osteomyelitis. : extubated. New AFIB RVR with Diltiazem gtt initiated, now on PO Metoprolol. Cardiology following. ID is following. Nephrology following for DEVENDRA on CKD now with HD initiation planned . Plan to return to OR approx.  for additional debridement. Hospitalist were consulted for DM management. General Surgery remains primary service. Subjective (21): Patient alert, oriented, spouse and children at bedside. Denies chest pain and SOB. Endorses wound changes going as planned and tolerating well. No complaints.     Assessment & Plan:     Principal Problem:  Septic Shock ([address]  Necrotizing fasciitis ([address]  -s/p excisional debridement 12/15 and 12/16  -Required Levo/David for BP support, now weaned off and on medical floor, hemodynamically stable  -BCx 12/15 negative  -Wound Cx with GNR/GPC  -ID following   -Continue IV Unasyn, renally dosed  -Wound care dressing changes daily  -Plastic Surgery Dr. Homero Bang recommends surgery in 1 week, ~12/29    Active Problems:  DM2 (diabetes mellitus, type 2) ([facility]   -A1C 6.6  -12/23 Plan to initiate prandial Humalog 3 units with meals with diet resumption, currently NPO for line placement; increase Lantus to 28 units daily, continue SSI    HTN (hypertension)   -Continue BB and Norvasc    Obesity, morbid ([facility]   -Increases all-cause mortality, needs diet and lifestyle modifications    Acute on Chronic Anemia   ABLA  -Multifactorial with CKD and post-operative blood loss  -s/p 2 U PRBC for Hgb 5.8  -Iron stores and B12 wnl  -H&H currently stable, monitor am labs    DEVENDRA on CKD, worsening  -Unclear hx of possible CKD related to DM, CT with cortical thinning associated with CKD  -Non-oliguric  -Etiology possibly ATN, component of septic shock  -12/23 renal function worsening, line placement today with HD initiation tomorrow, nephrology following    Acute respiratory failure ([facility] requiring mechanical ventilation, resolved  -Extubated 12/16, stable on RA    Atrial Fibrillation with RVR  -Required diltiazem gtt, now off drip and on Metoprolol 100 mg BID  -Currently rate-controlled  -Cardiology consulted per family request, Echo pending  -Not anticoagulated due to abdominal wound and further anticipated surgical intervention  -Will likely need anticoagulation when clinically appropriate    Hyperphosphatemia  -Continue phosphate binder, nephrology following, HD initiation 12/24    Hyperkalemia  -K 5.3, line placed today with HD initiation tomorrow    Dispo/Discharge Planning:  Possible d/c to [facility] although surgery planned in 1 week so likely not until after    Diet:  ADULT DIET Regular; 4 carb choices (60 gm/meal)  DVT PPx: SCD's  Code status: Full Code    Hospital Problems as of 12/23/2021 Date Reviewed: 12/25/2015          Codes Class Noted - Resolved POA    Atrial fibrillation with RVR ([place] [address] ICD-10-CM: I48.91  ICD-9-CM: 427.31  12/22/2021 - Present Unknown        DEVENDRA (acute kidney injury) ([place] [address] ICD-10-CM: N17.9  ICD-9-CM: 584.9  12/16/2021 - Present Yes        Acute respiratory failure ([place] [address] ICD-10-CM: J96.00  ICD-9-CM: 518.81  12/16/2021 - Present No        Septic shock ([facility]) ICD-10-CM: A41.9, R65.21  ICD-9-CM: 038.9, 785.52, 995.92  12/16/2021 - Present Unknown        * (Principal) Necrotizing fasciitis ([place] [address] ICD-10-CM: M72.6  ICD-9-CM: 728.86  12/15/2021 - Present Yes        Anemia ICD-10-CM: D64.9  ICD-9-CM: 285.9  7/13/2018 - Present Yes        Obesity, morbid ([place] [address] (Chronic) ICD-10-CM: E66.01  ICD-9-CM: 278.01  7/12/2018 - Present Yes        DM2 (diabetes mellitus, type 2) ([place] [address] (Chronic) ICD-10-CM: E11.9  ICD-9-CM: 250.00  12/25/2015 - Present Yes        HTN (hypertension) (Chronic) ICD-10-CM: I10  ICD-9-CM: 401.9  12/25/2015 - Present Yes              Objective:     Patient Vitals for the past 24 hrs:   Temp Pulse Resp BP SpO2   12/23/21 1308 98 °F (36.7 °C) 90 18 (!) 151/91 98 %   12/23/21 1130 -- -- -- (!) 140/74 --   12/23/21 1044 97.8 °F (36.6 °C) 77 19 (!) 152/94 96 %   12/23/21 0721 97.7 °F (36.5 °C) (!) 124 19 (!) 145/97 98 %   12/23/21 0439 97.5 °F (36.4 °C) 68 18 139/80 98 %   12/23/21 0010 97.4 °F (36.3 °C) 65 20 (!) 142/81 95 %   12/22/21 2356 -- 79 -- -- --   12/22/21 2000 -- 79 -- -- --   12/22/21 1951 98.2 °F (36.8 °C) 79 18 134/77 98 %   12/22/21 1614 97.8 °F (36.6 °C) 84 18 136/78 99 %     Oxygen Therapy  O2 Sat (%): 98 % (12/23/21 1308)  Pulse via Oximetry: 90 beats per minute (12/23/21 1308)  O2 Device: None (Room air) (12/23/21 1308)  Skin Assessment: Clean, dry, & intact (12/17/21 5707)  Skin Protection for O2 Device: No (12/17/21 0300)  Orientation: Bilateral (12/16/21 0800)  Location: Cheek (12/16/21 0800)  Interventions: Mouth Care;Hydrocolloid Dressing (12/16/21 0800)  O2 Flow Rate (L/min): 1 l/min (12/17/21 2028)  FIO2 (%): 28 % (12/17/21 1013)    Estimated body mass index is 49.18 kg/m² as calculated from the following:    Height as of this encounter: 5' 7\" (1.702 m). Weight as of this encounter: 142.4 kg (314 lb). Intake/Output Summary (Last 24 hours) at 12/23/2021 1404  Last data filed at 12/23/2021 6633  Gross per 24 hour   Intake 240 ml   Output 1500 ml   Net -1260 ml         Physical Exam:   Blood pressure (!) 151/91, pulse 90, temperature 98 °F (36.7 °C), resp. rate 18, height 5' 7\" (1.702 m), weight 142.4 kg (314 lb), SpO2 98 %. General:    Well nourished. No overt distress. Alert. Conversational.  Head:  Normocephalic, atraumatic  Eyes:  Sclerae appear normal.  Pupils equally round. Glasses intact. ENT:  Nares appear normal, no drainage. Moist oral mucosa  Neck:  No restricted ROM. Trachea midline   CV:   RRR. No m/r/g. No jugular venous distension. Lungs:   CTAB. No wheezing, rhonchi, or rales. Respirations even, unlabored on RA. Abdomen:   Dressing intact. Extremities: No cyanosis or clubbing. Trace peripheral edema bilaterally  Skin:     No rashes and normal coloration. Warm and dry. Neuro:  CN II-XII grossly intact. Sensation intact. A&Ox3. Moves all 4 extremities. Psych:  Normal mood and affect.       I have reviewed ordered lab tests and independently visualized imaging below:    Recent Labs:  Recent Results (from the past 48 hour(s))   GLUCOSE, POC    Collection Time: 12/21/21  4:02 PM   Result Value Ref Range    Glucose (POC) 216 (H) 65 - 100 mg/dL    Performed by WishPhilarolynPCT    GLUCOSE, POC    Collection Time: 12/21/21  8:58 PM   Result Value Ref Range    Glucose (POC) 215 (H) 65 - 100 mg/dL    Performed by ClifunitraPCT    EKG, 12 LEAD, SUBSEQUENT    Collection Time: 12/21/21  9:38 PM   Result Value Ref Range    Ventricular Rate 96 BPM    Atrial Rate 256 BPM    QRS Duration 86 ms    Q-T Interval 392 ms    QTC Calculation (Bezet) 495 ms    Calculated R Axis -5 degrees    Calculated T Axis 81 degrees    Diagnosis       Atrial flutter with variable A-V block  Low voltage QRS  Cannot rule out Anteroseptal infarct (cited on or before 19-DEC-2021)      Abnormal ECG  When compared with ECG of 19-DEC-2021 09:00,  Serial changes of evolving Anteroseptal infarct Present  Confirmed by Jonathan Brownlee MD (), William Dunaway ([id number]) on 12/22/2021 5:18:64 AM     METABOLIC PANEL, COMPREHENSIVE    Collection Time: 12/22/21  4:24 AM   Result Value Ref Range    Sodium 135 (L) 136 - 145 mmol/L    Potassium 4.7 3.5 - 5.1 mmol/L    Chloride 98 98 - 107 mmol/L    CO2 25 21 - 32 mmol/L    Anion gap 12 7 - 16 mmol/L    Glucose 154 (H) 65 - 100 mg/dL     (H) 6 - 23 MG/DL    Creatinine 8.79 (H) 0.6 - 1.0 MG/DL    GFR est AA 6 (L) >60 ml/min/1.73m2    GFR est non-AA 5 (L) >60 ml/min/1.73m2    Calcium 8.4 8.3 - 10.4 MG/DL    Bilirubin, total 0.2 0.2 - 1.1 MG/DL    ALT (SGPT) 10 (L) 12 - 65 U/L    AST (SGOT) 8 (L) 15 - 37 U/L    Alk.  phosphatase 67 50 - 136 U/L    Protein, total 6.4 6.3 - 8.2 g/dL    Albumin 2.1 (L) 3.5 - 5.0 g/dL    Globulin 4.3 (H) 2.3 - 3.5 g/dL    A-G Ratio 0.5 (L) 1.2 - 3.5     PHOSPHORUS    Collection Time: 12/22/21  4:24 AM   Result Value Ref Range    Phosphorus 9.3 (H) 2.5 - 4.5 MG/DL   MAGNESIUM    Collection Time: 12/22/21  4:24 AM   Result Value Ref Range    Magnesium 3.2 (H) 1.8 - 2.4 mg/dL   TROPONIN-HIGH SENSITIVITY    Collection Time: 12/22/21  4:24 AM   Result Value Ref Range    Troponin-High Sensitivity 24.0 (H) 0 - 14 pg/mL   GLUCOSE, POC    Collection Time: 12/22/21  4:33 AM   Result Value Ref Range    Glucose (POC) 169 (H) 65 - 100 mg/dL    Performed by Charlene Lafleur    GLUCOSE, POC    Collection Time: 12/22/21  7:12 AM   Result Value Ref Range    Glucose (POC) 173 (H) 65 - 100 mg/dL    Performed by Flii)    GLUCOSE, POC Collection Time: 12/22/21 11:50 AM   Result Value Ref Range    Glucose (POC) 217 (H) 65 - 100 mg/dL    Performed by Fili)    GLUCOSE, POC    Collection Time: 12/22/21  4:06 PM   Result Value Ref Range    Glucose (POC) 170 (H) 65 - 100 mg/dL    Performed by Fili)    GLUCOSE, POC    Collection Time: 12/22/21 10:17 PM   Result Value Ref Range    Glucose (POC) 260 (H) 65 - 100 mg/dL    Performed by Dina    GLUCOSE, POC    Collection Time: 12/23/21  7:17 AM   Result Value Ref Range    Glucose (POC) 146 (H) 65 - 100 mg/dL    Performed by Esthela Ruiz    PHOSPHORUS    Collection Time: 12/23/21  7:48 AM   Result Value Ref Range    Phosphorus 9.5 (H) 2.5 - 4.5 MG/DL   MAGNESIUM    Collection Time: 12/23/21  7:48 AM   Result Value Ref Range    Magnesium 3.3 (H) 1.8 - 2.4 mg/dL   METABOLIC PANEL, COMPREHENSIVE    Collection Time: 12/23/21  7:48 AM   Result Value Ref Range    Sodium 136 136 - 145 mmol/L    Potassium 5.3 (H) 3.5 - 5.1 mmol/L    Chloride 100 98 - 107 mmol/L    CO2 25 21 - 32 mmol/L    Anion gap 11 7 - 16 mmol/L    Glucose 138 (H) 65 - 100 mg/dL     (H) 6 - 23 MG/DL    Creatinine 8.96 (H) 0.6 - 1.0 MG/DL    GFR est AA 6 (L) >60 ml/min/1.73m2    GFR est non-AA 5 (L) >60 ml/min/1.73m2    Calcium 8.6 8.3 - 10.4 MG/DL    Bilirubin, total 0.2 0.2 - 1.1 MG/DL    ALT (SGPT) 8 (L) 12 - 65 U/L    AST (SGOT) 10 (L) 15 - 37 U/L    Alk.  phosphatase 63 50 - 136 U/L    Protein, total 6.3 6.3 - 8.2 g/dL    Albumin 2.1 (L) 3.5 - 5.0 g/dL    Globulin 4.2 (H) 2.3 - 3.5 g/dL    A-G Ratio 0.5 (L) 1.2 - 3.5     CBC WITH AUTOMATED DIFF    Collection Time: 12/23/21  7:48 AM   Result Value Ref Range    WBC 13.3 (H) 4.3 - 11.1 K/uL    RBC 3.19 (L) 4.05 - 5.2 M/uL    HGB 9.2 (L) 11.7 - 15.4 g/dL    HCT 29.1 (L) 35.8 - 46.3 %    MCV 91.2 79.6 - 97.8 FL    MCH 28.8 26.1 - 32.9 PG    MCHC 31.6 31.4 - 35.0 g/dL    RDW 14.5 11.9 - 14.6 %    PLATELET 138 580 - 812 K/uL    MPV 9.9 9.4 - 12.3 FL    ABSOLUTE NRBC 0.00 0.0 - 0.2 K/uL    NEUTROPHILS 65 43 - 78 %    LYMPHOCYTES 14 13 - 44 %    MONOCYTES 7 4.0 - 12.0 %    EOSINOPHILS 2 0.5 - 7.8 %    BASOPHILS 1 0.0 - 2.0 %    IMMATURE GRANULOCYTES 11 (H) 0.0 - 5.0 %    ABS. NEUTROPHILS 8.6 (H) 1.7 - 8.2 K/UL    ABS. LYMPHOCYTES 1.9 0.5 - 4.6 K/UL    ABS. MONOCYTES 0.9 0.1 - 1.3 K/UL    ABS. EOSINOPHILS 0.3 0.0 - 0.8 K/UL    ABS. BASOPHILS 0.1 0.0 - 0.2 K/UL    ABS. IMM. GRANS.  1.5 (H) 0.0 - 0.5 K/UL    RBC COMMENTS SLIGHT  ANISOCYTOSIS        WBC COMMENTS Result Confirmed By Smear      PLATELET COMMENTS ADEQUATE      DF AUTOMATED     GLUCOSE, POC    Collection Time: 12/23/21 10:42 AM   Result Value Ref Range    Glucose (POC) 136 (H) 65 - 100 mg/dL    Performed by Kurtis Ruiz)    ECHO ADULT COMPLETE    Collection Time: 12/23/21 11:30 AM   Result Value Ref Range    LA Minor Axis 6.0 cm    LA Major Axis 5.2 cm    LA Area 2C 23.5 cm2    LA Area 4C 20.0 cm2    LA Volume BP 70 (A) 22 - 52 mL    LA Diameter 3.8 cm    LV EDV A4C 79 mL    LV ESV A4C 41 mL    IVSd 1.0 (A) 0.6 - 0.9 cm    LVIDd 4.7 3.9 - 5.3 cm    LVIDs 3.3 cm    LVOT Diameter 2.1 cm    LVOT Mean Gradient 2 mmHg    LVOT VTI 23.1 cm    LVOT Peak Velocity 1.1 m/s    LVOT Peak Gradient 5 mmHg    LVPWd 1.1 (A) 0.6 - 0.9 cm    LV E' Lateral Velocity 11 cm/s    LV E' Septal Velocity 11 cm/s    LV Ejection Fraction A4C 48 %    LVOT Area 3.5 cm2    LVOT SV 80.0 ml    AV Cusp Mmode 1.7 cm    AV Mean Velocity 0.9 m/s    AV Mean Gradient 4 mmHg    AV VTI 30.6 cm    AV Peak Velocity 1.6 m/s    AV Peak Gradient 10 mmHg    AV Area by VTI 2.5 cm2    AV Area by Peak Velocity 2.4 cm2    Ascending Aorta 3.1 cm    MV E Wave Deceleration Time 246.0 ms    MV A Velocity 0.45 m/s    MV E Velocity 1.22 m/s    MV Mean Velocity 0.6 m/s    MV Mean Gradient 2 mmHg    MV VTI 23.8 cm    MV Max Velocity 1.3 m/s    MV Peak Gradient 7 mmHg    MV Area by VTI 3.4 cm2    Est. RA Pressure 8 mmHg    RVIDd 2.3 cm    RV Basal Dimension 3.5 cm    TAPSE 2.0 1.5 - 2.0 cm    TR Max Velocity 2.19 m/s    TR Peak Gradient 19 mmHg    TV PG 14 mmHg    Fractional Shortening 2D 30 28 - 44 %    LV ESV Index A4C 17 mL/m2    LV EDV Index A4C 32 mL/m2    LVIDd Index 1.92 cm/m2    LVIDs Index 1.35 cm/m2    LV RWT Ratio 0.47     LV Mass 2D 175.8 (A) 67 - 162 g    LV Mass 2D Index 71.8 43 - 95 g/m2    MV E/A 2.71     E/E' Ratio (Averaged) 11.09     E/E' Lateral 11.09     E/E' Septal 11.09     LA Volume Index BP 29 16 - 34 ml/m2    LVOT Stroke Volume Index 32.6 mL/m2    LA Size Index 1.55 cm/m2    Ascending Aorta Index 1.27 cm/m2    AV Velocity Ratio 0.69     LVOT:AV VTI Index 0.75     JOSE RAFAEL/BSA VTI 1.0 cm2/m2    JOSE RAFAEL/BSA Peak Velocity 1.0 cm2/m2    MV:LVOT VTI Index 1.03     RVSP 27 mmHg       All Micro Results     Procedure Component Value Units Date/Time    CULTURE, ANAEROBIC [[id number]] Collected: 12/15/21 1710    Order Status: Completed Specimen: Peritoneum Updated: 12/22/21 0926     Special Requests: NO SPECIAL REQUESTS        Culture result:       MULTIPLE ANAEROBES ISOLATED, SUSCEPTIBILITY AND IDENTIFICATION AVAILABLE UPON REQUEST. ORGANISM WILL BE HELD FOR 5 DAYS.           BLOOD CULTURE [[id number]] Collected: 12/15/21 1215    Order Status: Completed Specimen: Blood Updated: 12/20/21 0653     Special Requests: --        LEFT  HAND       Culture result: NO GROWTH 5 DAYS       BLOOD CULTURE [[id number]] Collected: 12/15/21 1236    Order Status: Completed Specimen: Blood Updated: 12/20/21 0653     Special Requests: --        RIGHT  HAND       Culture result: NO GROWTH 5 DAYS       CULTURE, WOUND Roosvelt Horace STAIN [[id number]]  (Abnormal)  (Susceptibility) Collected: 12/15/21 1323    Order Status: Completed Specimen: Wound from Groin Updated: 12/19/21 0732     Special Requests: NO SPECIAL REQUESTS        GRAM STAIN 0 TO 36 WBCS PER OIF      MANY GRAM POSITIVE COCCI               MODERATE GRAM NEGATIVE RODS                  MODERATE GRAM POSITIVE RODS Culture result:       MODERATE PROTEUS MIRABILIS                  MODERATE ENTEROCOCCUS FAECALIS GROUP D                  LIGHT MIXED SKIN MASSIMO ISOLATED          CULTURE, Juan Kelch STAIN [[id number]]  (Abnormal)  (Susceptibility) Collected: 12/15/21 1710    Order Status: Completed Specimen: Peritoneum Updated: 12/18/21 0986     Special Requests: NO SPECIAL REQUESTS        GRAM STAIN 0 TO 6 WBCS PER OIF      MANY GRAM POSITIVE COCCI         MANY GRAM NEGATIVE RODS        Culture result:       MODERATE PROTEUS MIRABILIS                  MODERATE STAPHYLOCOCCUS SPECIES, COAGULASE NEGATIVE THIS ORGANISM WILL BE HELD FOR 7 DAYS. IF FURTHER TESTING IS REQUIRED PLEASE NOTIFY MICROBIOLOGY          COVID-19 RAPID TEST [[id number]] Collected: 12/15/21 1519    Order Status: Completed Specimen: Nasopharyngeal Updated: 12/15/21 1549     Specimen source NASAL        COVID-19 rapid test Not detected        Comment:      The specimen is NEGATIVE for SARS-CoV-2, the novel coronavirus associated with COVID-19. A negative result does not rule out COVID-19. This test has been authorized by the FDA under an Emergency Use Authorization (EUA) for use by authorized laboratories. Fact sheet for Healthcare Providers: ConventionUpdate.co.nz  Fact sheet for Patients: ConventionUpdate.co.nz       Methodology: Isothermal Nucleic Acid Amplification               Other Studies:  ECHO ADULT COMPLETE    Result Date: 12/23/2021    Left Ventricle: Left ventricle size is normal. Mildly increased wall thickness. Normal wall motion. Normal left ventricular systolic function with a visually estimated EF of 55 - 60%. Indeterminate diastolic function.   Aortic Valve: Mildly sclerosed cusps.   Mitral Valve: Mild transvalvular regurgitation.   Tricuspid Valve: Mild transvalvular regurgitation. Normal RVSP.   Technical qualifiers: Echo study was technically difficult due to patient's body habitus.   Contrast used: Definity. Current Meds:  Current Facility-Administered Medications   Medication Dose Route Frequency    [START ON 12/24/2021] ampicillin-sulbactam (UNASYN) 3 g in 0.9% sodium chloride (MBP/ADV) 100 mL MBP  3 g IntraVENous Q24H    lidocaine (XYLOCAINE) 20 mg/mL (2 %) injection  mg  2-20 mL SubCUTAneous ONCE    heparin (porcine) 1,000 unit/mL injection 10,000 Units  10,000 Units Hemodialysis ONCE    iopamidoL (ISOVUE 300) 61 % contrast injection 2-100 mL  2-100 mL IntraCATHeter RAD ONCE    insulin glargine (LANTUS) injection 24 Units  24 Units SubCUTAneous DAILY    amLODIPine (NORVASC) tablet 5 mg  5 mg Oral DAILY    sertraline (ZOLOFT) tablet 100 mg  100 mg Oral DAILY    metoprolol tartrate (LOPRESSOR) tablet 100 mg  100 mg Oral BID    oxyCODONE-acetaminophen (PERCOCET) 5-325 mg per tablet 1 Tablet  1 Tablet Oral Q4H PRN    famotidine (PEPCID) tablet 20 mg  20 mg Oral DAILY    calcium acetate (phosphate binder) (PHOSLO) tablet 667 mg  1 Tablet Oral TID WITH MEALS    insulin lispro (HUMALOG) injection   SubCUTAneous AC&HS    [Held by provider] heparin (porcine) injection 5,000 Units  5,000 Units SubCUTAneous Q8H    0.9% sodium chloride infusion 250 mL  250 mL IntraVENous PRN    sodium chloride (NS) flush 5-40 mL  5-40 mL IntraVENous Q8H    sodium chloride (NS) flush 5-40 mL  5-40 mL IntraVENous PRN    ondansetron (ZOFRAN ODT) tablet 4 mg  4 mg Oral Q8H PRN    Or    ondansetron (ZOFRAN) injection 4 mg  4 mg IntraVENous Q6H PRN    potassium chloride 10 mEq in 100 ml IVPB  10 mEq IntraVENous PRN    magnesium sulfate 2 g/50 ml IVPB (premix or compounded)  2 g IntraVENous PRN    HYDROmorphone (DILAUDID) injection 0.5 mg  0.5 mg IntraVENous Q3H PRN     Labs, vital signs, diagnostic testing reviewed. Case discussed with patient, care team, Dr. Oz Mane.     Signed:  Shania Mendoza NP

## 2021-12-23 NOTE — PROCEDURES
Department of Interventional Radiology  [phone]        Interventional Radiology Brief Procedure Note    Patient: Ruth Farley MRN: [id number]  SSN: [id number]    YOB: 1970  Age: 46 y.o. Sex: female      Date of Procedure: 12/23/2021    Pre-Procedure Diagnosis: ARF. Post-Procedure Diagnosis: SAME    Procedure(s): Temporary Central Venous Catheter    Brief Description of Procedure: LIJV access    Performed By: Wilmar Mathis MD     Assistants: None    Anesthesia:Lidocaine    Estimated Blood Loss: Less than 10ml    Specimens:  None    Implants:  Temporary Hemodialysis Catheter    Findings: Tip in RA    Complications: None    Recommendations: REady to use.       Follow Up: PRN    Signed By: Wilmar Mathis MD     December 23, 2021

## 2021-12-23 NOTE — PROGRESS NOTES
completed follow up visit.  was present and supportive. Patient expressed a positive affect and positive outlook on her recovery.  provided pastoral presence, prayer and empathetic listening.   Signed by  Chastity Asencio M.Div.

## 2021-12-23 NOTE — PROGRESS NOTES
[facility] CARDIOLOGY PROGRESS NOTE           12/23/2021 10:45 AM    Admit Date: 12/15/2021         Subjective: Echo has not been done yet. Getting HD catheter today. ROS:  Cardiovascular:  As noted above    Objective:      Vitals:    12/23/21 0338 12/23/21 0439 12/23/21 0721 12/23/21 1044   BP:  139/80 (!) 145/97 (!) 152/94   Pulse:  68 (!) 124 77   Resp:  18 19 19   Temp:  97.5 °F (36.4 °C) 97.7 °F (36.5 °C) 97.8 °F (36.6 °C)   SpO2:  98% 98% 96%   Weight: 314 lb 13.1 oz (142.8 kg)      Height:           On telemetry:      Physical Exam:  General: Well Developed, Well Nourished, No Acute Distress, Alert & Oriented x 3, Appropriate mood  Neck: supple, no JVD  Heart: S1S2 with RRR without murmurs or gallops  Lungs: Clear throughout auscultation bilaterally without adventitious sounds  Abd: soft, nontender, nondistended, with good bowel sounds  Ext: no edema bilaterally  Skin: warm and dry      Data Review:   Recent Labs     12/23/21  0748 12/22/21  0424 12/21/21  1110 12/21/21  1110    135*   < > 132*   K 5.3* 4.7   < > 4.3   MG 3.3* 3.2*   < > 3.0*   * 107*   < > 104*   CREA 8.96* 8.79*   < > 8.84*   * 154*   < > 212*   WBC 13.3*  --   --  18.3*   HGB 9.2*  --   --  10.0*   HCT 29.1*  --   --  30.1*     --   --  280    < > = values in this interval not displayed. No results for input(s): Amauri Zuniga in the last 72 hours.         Assessment/Plan:     Principal Problem:    Necrotizing fasciitis ([facility] (12/15/2021)    Active Problems:    DM2 (diabetes mellitus, type 2) ([facility] (12/25/2015)      HTN (hypertension) (12/25/2015)      Obesity, morbid ([address] (7/12/2018)      Anemia (7/13/2018)      DEVENDRA (acute kidney injury) ([address] (12/16/2021)      Acute respiratory failure ([address] (12/16/2021)      Septic shock ([address] (12/16/2021)      Atrial fibrillation with RVR ([address] (12/22/2021)    A/P  1) Flutter - rate controlled not on oac yet, echo pending  2) CKD - HD catheter today  3) Sepsis - resolving with abx      Martha Lennon MD  12/23/2021 10:45 AM

## 2021-12-23 NOTE — PROGRESS NOTES
Tim Robertson  Admission Date: 12/15/2021         [facility]phrology Progress Note: 12/23/2021    Follow-up for: DEVENDRA on CKD      Subjective:   Pt seen and examined in room.  Continues to have adequate UOP.    ROS:  Gen - no fever, no chills, appetite unchanged  CV - no chest pain, no palpitation  Lung - no shortness of breath, no cough  Abd - no tenderness, no nausea/vomiting, no diarrhea  Ext - + edema    Current Facility-Administered Medications   Medication Dose Route Frequency    insulin glargine (LANTUS) injection 24 Units  24 Units SubCUTAneous DAILY    ampicillin-sulbactam (UNASYN) 3 g in 0.9% sodium chloride (MBP/ADV) 100 mL MBP  3 g IntraVENous Q12H    amLODIPine (NORVASC) tablet 5 mg  5 mg Oral DAILY    sertraline (ZOLOFT) tablet 100 mg  100 mg Oral DAILY    metoprolol tartrate (LOPRESSOR) tablet 100 mg  100 mg Oral BID    oxyCODONE-acetaminophen (PERCOCET) 5-325 mg per tablet 1 Tablet  1 Tablet Oral Q4H PRN    famotidine (PEPCID) tablet 20 mg  20 mg Oral DAILY    calcium acetate (phosphate binder) (PHOSLO) tablet 667 mg  1 Tablet Oral TID WITH MEALS    insulin lispro (HUMALOG) injection   SubCUTAneous AC&HS    [Held by provider] heparin (porcine) injection 5,000 Units  5,000 Units SubCUTAneous Q8H    0.9% sodium chloride infusion 250 mL  250 mL IntraVENous PRN    sodium chloride (NS) flush 5-40 mL  5-40 mL IntraVENous Q8H    sodium chloride (NS) flush 5-40 mL  5-40 mL IntraVENous PRN    ondansetron (ZOFRAN ODT) tablet 4 mg  4 mg Oral Q8H PRN    Or    ondansetron (ZOFRAN) injection 4 mg  4 mg IntraVENous Q6H PRN    potassium chloride 10 mEq in 100 ml IVPB  10 mEq IntraVENous PRN    magnesium sulfate 2 g/50 ml IVPB (premix or compounded)  2 g IntraVENous PRN    HYDROmorphone (DILAUDID) injection 0.5 mg  0.5 mg IntraVENous Q3H PRN         Objective:     Vitals:    12/23/21 0010 12/23/21 0338 12/23/21 0439 12/23/21 0721   BP: (!) 142/81  139/80 (!) 145/97   Pulse: 65 68 (!) 124   Resp: 20 18 19   Temp: 97.4 °F (36.3 °C)  97.5 °F (36.4 °C) 97.7 °F (36.5 °C)   SpO2: 95%  98% 98%   Weight:  142.8 kg (314 lb 13.1 oz)     Height:         Intake and Output:   12/21 1901 - 12/23 0700  In: 600 [P.O.:600]  Out: 2350 [Urine:2350]  No intake/output data recorded. Physical Exam:   Constitutional: AO x 3. in no acute distress  HEENT:  Sclera clear, pupils equal, oral mucosa moist  Lungs: clear to auscultation bilaterally  Cardiovascular:  RRR, no rub  Abd/GI: soft and non-tender; with positive bowel sounds. Ext: warm without cyanosis. There is trace upper and lower leg edema. LAB  Recent Labs     12/23/21  0748 12/21/21  1110 12/20/21  1141   WBC 13.3* 18.3* 21.7*   HGB 9.2* 10.0* 10.2*   HCT 29.1* 30.1* 30.7*    280 272     Recent Labs     12/23/21  0748 12/22/21  0424 12/21/21  1110 12/21/21  0450 12/21/21  0450    135* 132*   < > 142   K 5.3* 4.7 4.3   < > 3.5    98 96*   < > 110*   CO2 25 25 25   < > 21   * 154* 212*   < > 114*   * 107* 104*   < > 85*   CREA 8.96* 8.79* 8.84*   < > 6.80*   MG 3.3* 3.2* 3.0*   < > 2.3   PHOS 9.5* 9.3*  --   --  7.9*   ALB 2.1* 2.1*  --   --  1.5*    < > = values in this interval not displayed. No results for input(s): PH, PCO2, PO2, HCO3 in the last 72 hours.       Assessment:  (Medical Decision Making)     Hospital Problems  Date Reviewed: 12/25/2015          Codes Class Noted POA    At[facility] ICD-10-CM: I48.91  ICD-9-CM: 427.31  12/22/2021 Unknown        DEVENDRA (acute kidney injury) ([facility] ICD-10-CM: N17.9  ICD-9-CM: 584.9  12/16/2021 Yes        Acute respiratory failure ([facility] ICD-10-CM: J96.00  ICD-9-CM: 518.81  12/16/2021 No        Septic shock (HCC) ICD-10-CM: A41.9, R65.21  ICD-9-CM: 038.9, 785.52, 995.92  12/16/2021 Unknown        * (Principal) Necrotizing fasciitis ([facility] ICD-10-CM: M72.6  ICD-9-CM: 728.86  12/15/2021 Yes        Anemia ICD-10-CM: D64.9  ICD-9-CM: 285.9  7/13/2018 Yes        Obesity, morbid ([facility] (Chronic) ICD-10-CM: E66.01  ICD-9-CM: 278.01  7/12/2018 Yes        DM2 (diabetes mellitus, type 2) (HCC) (Chronic) ICD-10-CM: E11.9  ICD-9-CM: 250.00  12/25/2015 Yes        HTN (hypertension) (Chronic) ICD-10-CM: I10  ICD-9-CM: 401.9  12/25/2015 Yes              Plan:  (Medical Decision Making)   1. DEVENDRA with unclear hx of CKD, secondary to DM ( no recent lab with which to compare) possible ATN  -imaging CT showed some cortical thinning associated with CKD. -vanc level was elevated up to 23.4 on 12/16 off vanc and on unasyn   -urine eosinophils pending  -Pt's CrCl today - 11, recommend decreasing Unasyn to q24 for a more renally appropriate dose   -Pt's Cr up to 8.96 now with mild hyperkalemia. Spoke with  and patient at length. They agree with initiating RRT at this time. Risk of catheter placement including bleeding and infection explained. Extensively explained hemodialysis, how it works, and what the overall treatment will look like. Will plan to have temp line placed in IR today and initiate HD tomorrow. 2. Hypermagnesemia  -stopped mag supplement 12/22    3. Hyperphosphatemia  -on binders    4. Hyperkalemia    5. Necrotizing fasciitis. - s/p I&D perineum and trunk, gen surgery following, plastic surgery for wound closure       Surendra Corado NP  [facility]

## 2021-12-24 LAB
ALBUMIN SERPL-MCNC: 2.1 G/DL (ref 3.5–5)
ALBUMIN/GLOB SERPL: 0.5 {RATIO} (ref 1.2–3.5)
ALP SERPL-CCNC: 64 U/L (ref 50–136)
ALT SERPL-CCNC: 9 U/L (ref 12–65)
ANION GAP SERPL CALC-SCNC: 9 MMOL/L (ref 7–16)
AST SERPL-CCNC: 17 U/L (ref 15–37)
BILIRUB SERPL-MCNC: 0.3 MG/DL (ref 0.2–1.1)
BUN SERPL-MCNC: 98 MG/DL (ref 6–23)
CALCIUM SERPL-MCNC: 8.4 MG/DL (ref 8.3–10.4)
CHLORIDE SERPL-SCNC: 103 MMOL/L (ref 98–107)
CO2 SERPL-SCNC: 24 MMOL/L (ref 21–32)
CREAT SERPL-MCNC: 8.82 MG/DL (ref 0.6–1)
GLOBULIN SER CALC-MCNC: 4.2 G/DL (ref 2.3–3.5)
GLUCOSE BLD STRIP.AUTO-MCNC: 157 MG/DL (ref 65–100)
GLUCOSE BLD STRIP.AUTO-MCNC: 205 MG/DL (ref 65–100)
GLUCOSE BLD STRIP.AUTO-MCNC: 215 MG/DL (ref 65–100)
GLUCOSE SERPL-MCNC: 97 MG/DL (ref 65–100)
HBV CORE AB SERPL QL IA: NEGATIVE
POTASSIUM SERPL-SCNC: 5.2 MMOL/L (ref 3.5–5.1)
PROT SERPL-MCNC: 6.3 G/DL (ref 6.3–8.2)
SERVICE CMNT-IMP: ABNORMAL
SODIUM SERPL-SCNC: 136 MMOL/L (ref 136–145)

## 2021-12-24 PROCEDURE — 74011636637 HC RX REV CODE- 636/637: Performed by: NURSE PRACTITIONER

## 2021-12-24 PROCEDURE — 74011000258 HC RX REV CODE- 258: Performed by: STUDENT IN AN ORGANIZED HEALTH CARE EDUCATION/TRAINING PROGRAM

## 2021-12-24 PROCEDURE — 99231 SBSQ HOSP IP/OBS SF/LOW 25: CPT | Performed by: INTERNAL MEDICINE

## 2021-12-24 PROCEDURE — 2709999900 HC NON-CHARGEABLE SUPPLY

## 2021-12-24 PROCEDURE — 74011250637 HC RX REV CODE- 250/637: Performed by: NURSE PRACTITIONER

## 2021-12-24 PROCEDURE — 80053 COMPREHEN METABOLIC PANEL: CPT

## 2021-12-24 PROCEDURE — 82962 GLUCOSE BLOOD TEST: CPT

## 2021-12-24 PROCEDURE — 74011250636 HC RX REV CODE- 250/636: Performed by: SURGERY

## 2021-12-24 PROCEDURE — 90935 HEMODIALYSIS ONE EVALUATION: CPT

## 2021-12-24 PROCEDURE — 74011250637 HC RX REV CODE- 250/637: Performed by: INTERNAL MEDICINE

## 2021-12-24 PROCEDURE — 65660000000 HC RM CCU STEPDOWN

## 2021-12-24 PROCEDURE — 74011636637 HC RX REV CODE- 636/637: Performed by: STUDENT IN AN ORGANIZED HEALTH CARE EDUCATION/TRAINING PROGRAM

## 2021-12-24 PROCEDURE — 74011250636 HC RX REV CODE- 250/636: Performed by: STUDENT IN AN ORGANIZED HEALTH CARE EDUCATION/TRAINING PROGRAM

## 2021-12-24 RX ORDER — INSULIN GLARGINE 100 [IU]/ML
25 INJECTION, SOLUTION SUBCUTANEOUS DAILY
Status: DISCONTINUED | OUTPATIENT
Start: 2021-12-25 | End: 2021-12-27 | Stop reason: HOSPADM

## 2021-12-24 RX ADMIN — INSULIN GLARGINE 28 UNITS: 100 INJECTION, SOLUTION SUBCUTANEOUS at 10:15

## 2021-12-24 RX ADMIN — SERTRALINE 100 MG: 50 TABLET, FILM COATED ORAL at 09:46

## 2021-12-24 RX ADMIN — Medication 3 UNITS: at 12:16

## 2021-12-24 RX ADMIN — CALCIUM ACETATE 667 MG: 667 TABLET ORAL at 12:15

## 2021-12-24 RX ADMIN — CALCIUM ACETATE 667 MG: 667 TABLET ORAL at 16:21

## 2021-12-24 RX ADMIN — HYDROMORPHONE HYDROCHLORIDE 0.5 MG: 1 INJECTION, SOLUTION INTRAMUSCULAR; INTRAVENOUS; SUBCUTANEOUS at 16:21

## 2021-12-24 RX ADMIN — METOPROLOL TARTRATE 100 MG: 50 TABLET, FILM COATED ORAL at 09:46

## 2021-12-24 RX ADMIN — Medication 3 UNITS: at 16:20

## 2021-12-24 RX ADMIN — AMPICILLIN SODIUM AND SULBACTAM SODIUM 3 G: 2; 1 INJECTION, POWDER, FOR SOLUTION INTRAMUSCULAR; INTRAVENOUS at 09:45

## 2021-12-24 RX ADMIN — METOPROLOL TARTRATE 100 MG: 50 TABLET, FILM COATED ORAL at 17:38

## 2021-12-24 RX ADMIN — Medication 10 ML: at 13:26

## 2021-12-24 RX ADMIN — FAMOTIDINE 20 MG: 20 TABLET ORAL at 09:46

## 2021-12-24 RX ADMIN — OXYCODONE AND ACETAMINOPHEN 1 TABLET: 5; 325 TABLET ORAL at 21:33

## 2021-12-24 RX ADMIN — Medication 10 ML: at 21:34

## 2021-12-24 RX ADMIN — Medication 2 UNITS: at 12:17

## 2021-12-24 RX ADMIN — Medication 4 UNITS: at 16:20

## 2021-12-24 RX ADMIN — Medication 4 UNITS: at 21:34

## 2021-12-24 RX ADMIN — AMLODIPINE BESYLATE 5 MG: 5 TABLET ORAL at 09:46

## 2021-12-24 RX ADMIN — OXYCODONE AND ACETAMINOPHEN 1 TABLET: 5; 325 TABLET ORAL at 12:15

## 2021-12-24 NOTE — PROGRESS NOTES
Hospitalist Progress Note   Admit Date:  12/15/2021  9:53 AM   Name:  Salina Mayer   Age:  46 y.o. Sex:  female  :  1970   MRN:  [id number]   Room:      Presenting Complaint: Vomiting    Reason(s) for Admission: Necrotizing fasciitis [facility]) [M72.6]     Hospital Course & Interval History:   Manisha Dang a 46 y. o. female with history of Morbid obesity, DM who was admitted for septic shock 2/2 necrotizing fascitis involving her pannus and perineum associated with respiratory failure, severe DEVENDRA (Scr 11), acidosis pH 7.1. She was placed on levo/jessica, intubated and treated with vanc/zosyn initially. Outpatient she was started on bactrim and unable to tolerate it then changed to doxycycline. Per  was not taking in any PO and became progressively weaker at home.  denies known history of renal disease. Made little urine prior to admission. She is s/p excisional debridement on 12/15, . OP note reports foul smelling necrotizing fasciitis extending from the perineum, left labia deep to the pubic bone and along the fascia to the abdominal wall above the iliac bone into the left flank. She rec'd ancef in OR and was changed to vanc/clinda/meropenem on 12/15 by pulmonology. Discussed with , no evidence of osteomyelitis. Extubated . New AFIB RVR with Diltiazem gtt initiated, now on PO Metoprolol. Cardiology following - will need [address] at d/c once wound is closed. ID is following. Nephrology following for DEVENDRA on CKD now with HD initiated . Plan to return to OR approx.  for additional debridement. Hospitalist are following for DM management. General Surgery remains primary service. Subjective (21):  Patient alert, oriented, spouse at bedside. Denies chest pain and SOB. HD went well today, she feels like she tolerated well. No complaints.     Assessment & Plan:     Principal Problem:  Septic Shock ([address]  Necrotizing fasciitis ([address]  -s/p excisional debridement 12/15 and 12/16  -Required Levo/David for BP support, now weaned off and on medical floor, hemodynamically stable  -BCx 12/15 negative  -Wound Cx with GNR/GPC  -ID following, continue renally dosed Unasyn  -Wound care dressing changes daily  -Plastic Surgery Dr. Maria Del Rosario Vazquez recommends surgery in 1 week, ~12/29    Active Problems:  DM2 (diabetes mellitus, type 2) (HCC)   -A1C 6.6  -Decrease Lantus to 25 units daily, continue Humalog prandial and SSI    HTN (hypertension)   -Continue BB and Norvasc    Obesity, morbid ([facility]   -Increases all-cause mortality, needs diet and lifestyle modifications    Acute on Chronic Anemia   ABLA  -Multifactorial with CKD and post-operative blood loss  -s/p 2 U PRBC for Hgb 5.8  -Iron stores and B12 wnl  -H&H currently stable, monitor am labs    DEVENDRA on CKD, now on HD  -Unclear hx of possible CKD related to DM, CT with cortical thinning associated with CKD  -Non-oliguric  -Etiology possibly ATN, component of septic shock  -HD initiated 12/24, nephrology following    Acute respiratory failure ([facility] requiring mechanical ventilation, resolved  -Extubated 12/16, stable on RA    Atrial Fibrillation with RVR  -Required diltiazem gtt, now off drip and on Metoprolol 100 mg BID  -Cardiology followed but since signed off, rate control acceptable, please notify cards to come onboard closer to discharge, will likely need [facility] at d/c if wound is closed  -Not anticoagulated due to abdominal wound and further anticipated surgical intervention    Hyperphosphatemia  -Continue phosphate binder, nephrology following now on HD    Hyperkalemia  -K 5.2, management per nephrology with HD    Dispo/Discharge Planning:  Possible d/c to [facility] AT Critical access hospital although surgery planned in 1 week so likely not until after    Diet:  ADULT DIET Regular; 4 carb choices (60 gm/meal)  DVT PPx: SCD's  Code status: Full Code    Hospital Problems as of 12/24/2021 Date Reviewed: 12/25/2015          Codes Class Noted - Resolved POA Atrial fibrillation with RVR (HCC) ICD-10-CM: I48.91  ICD-9-CM: 427.31  12/22/2021 - Present Unknown        DEVENDRA (acute kidney injury) ([facility] ICD-10-CM: N17.9  ICD-9-CM: 584.9  12/16/2021 - Present Yes        Acute respiratory failure (HCC) ICD-10-CM: J96.00  ICD-9-CM: 518.81  12/16/2021 - Present No        Septic shock (HCC) ICD-10-CM: A41.9, R65.21  ICD-9-CM: 038.9, 785.52, 995.92  12/16/2021 - Present Unknown        * (Principal) Necrotizing fasciitis ([facility] ICD-10-CM: M72.6  ICD-9-CM: 728.86  12/15/2021 - Present Yes        Anemia ICD-10-CM: D64.9  ICD-9-CM: 285.9  7/13/2018 - Present Yes        Obesity, morbid (HCC) (Chronic) ICD-10-CM: E66.01  ICD-9-CM: 278.01  7/12/2018 - Present Yes        DM2 (diabetes mellitus, type 2) ([facility] (Chronic) ICD-10-CM: E11.9  ICD-9-CM: 250.00  12/25/2015 - Present Yes        HTN (hypertension) (Chronic) ICD-10-CM: I10  ICD-9-CM: 401.9  12/25/2015 - Present Yes              Objective:     Patient Vitals for the past 24 hrs:   Temp Pulse Resp BP SpO2   12/24/21 1306 -- 89 -- -- --   12/24/21 1219 97.9 °F (36.6 °C) (!) 130 20 (!) 162/98 97 %   12/24/21 1200 -- 89 -- -- --   12/24/21 0912 -- 98 -- 128/72 --   12/24/21 0900 -- 97 -- 97/67 --   12/24/21 0830 -- 93 -- 113/68 --   12/24/21 0800 -- 92 -- 119/71 --   12/24/21 0735 -- 87 -- 114/75 --   12/24/21 0700 -- 78 -- 132/77 --   12/24/21 0457 97.8 °F (36.6 °C) 91 18 (!) 142/96 96 %   12/24/21 0000 -- 84 -- -- --   12/23/21 2331 98.1 °F (36.7 °C) 92 18 94/64 97 %   12/23/21 2008 97.4 °F (36.3 °C) (!) 129 18 116/87 100 %   12/23/21 1453 99.1 °F (37.3 °C) (!) 120 18 (!) 146/87 100 %     Oxygen Therapy  O2 Sat (%): 97 % (12/24/21 1219)  Pulse via Oximetry: 90 beats per minute (12/23/21 1308)  O2 Device: None (Room air) (12/23/21 2100)  Skin Assessment: Clean, dry, & intact (12/17/21 0229)  Skin Protection for O2 Device: No (12/17/21 0300)  Orientation: Bilateral (12/16/21 0800)  Location: Cheek (12/16/21 0800)  Interventions: Mouth Care;Hydrocolloid Dressing (12/16/21 0800)  O2 Flow Rate (L/min): 1 l/min (12/17/21 2028)  FIO2 (%): 28 % (12/17/21 1013)    Estimated body mass index is 48.79 kg/m² as calculated from the following:    Height as of this encounter: 5' 7\" (1.702 m). Weight as of this encounter: 141.3 kg (311 lb 8.2 oz). Intake/Output Summary (Last 24 hours) at 12/24/2021 1348  Last data filed at 12/24/2021 0912  Gross per 24 hour   Intake 480 ml   Output 1600 ml   Net -1120 ml         Physical Exam:   Blood pressure (!) 162/98, pulse 89, temperature 97.9 °F (36.6 °C), resp. rate 20, height 5' 7\" (1.702 m), weight 141.3 kg (311 lb 8.2 oz), SpO2 97 %. General:    Well nourished. No overt distress. Alert. Conversational.  Head:  Normocephalic, atraumatic  Eyes:  Sclerae appear normal.  Pupils equally round. Glasses intact. ENT:  Nares appear normal, no drainage. Moist oral mucosa  Neck:  No restricted ROM. Trachea midline   CV:   RRR. No m/r/g. No jugular venous distension. Lungs:   CTAB. No wheezing, rhonchi, or rales. Respirations even, unlabored on RA. Abdomen:   Dressing intact. Extremities: No cyanosis or clubbing. Trace peripheral edema bilaterally  Skin:     No rashes and normal coloration. Warm and dry. Neuro:  CN II-XII grossly intact. Sensation intact. A&Ox3. Moves all 4 extremities. Psych:  Normal mood and affect.       I have reviewed ordered lab tests and independently visualized imaging below:    Recent Labs:  Recent Results (from the past 48 hour(s))   GLUCOSE, POC    Collection Time: 12/22/21  4:06 PM   Result Value Ref Range    Glucose (POC) 170 (H) 65 - 100 mg/dL    Performed by Garber WashingtonSylvia    GLUCOSE, POC    Collection Time: 12/22/21 10:17 PM   Result Value Ref Range    Glucose (POC) 260 (H) 65 - 100 mg/dL    Performed by Dina    GLUCOSE, POC    Collection Time: 12/23/21  7:17 AM   Result Value Ref Range    Glucose (POC) 146 (H) 65 - 100 mg/dL    Performed by Fili)    PHOSPHORUS    Collection Time: 12/23/21  7:48 AM   Result Value Ref Range    Phosphorus 9.5 (H) 2.5 - 4.5 MG/DL   MAGNESIUM    Collection Time: 12/23/21  7:48 AM   Result Value Ref Range    Magnesium 3.3 (H) 1.8 - 2.4 mg/dL   METABOLIC PANEL, COMPREHENSIVE    Collection Time: 12/23/21  7:48 AM   Result Value Ref Range    Sodium 136 136 - 145 mmol/L    Potassium 5.3 (H) 3.5 - 5.1 mmol/L    Chloride 100 98 - 107 mmol/L    CO2 25 21 - 32 mmol/L    Anion gap 11 7 - 16 mmol/L    Glucose 138 (H) 65 - 100 mg/dL     (H) 6 - 23 MG/DL    Creatinine 8.96 (H) 0.6 - 1.0 MG/DL    GFR est AA 6 (L) >60 ml/min/1.73m2    GFR est non-AA 5 (L) >60 ml/min/1.73m2    Calcium 8.6 8.3 - 10.4 MG/DL    Bilirubin, total 0.2 0.2 - 1.1 MG/DL    ALT (SGPT) 8 (L) 12 - 65 U/L    AST (SGOT) 10 (L) 15 - 37 U/L    Alk. phosphatase 63 50 - 136 U/L    Protein, total 6.3 6.3 - 8.2 g/dL    Albumin 2.1 (L) 3.5 - 5.0 g/dL    Globulin 4.2 (H) 2.3 - 3.5 g/dL    A-G Ratio 0.5 (L) 1.2 - 3.5     CBC WITH AUTOMATED DIFF    Collection Time: 12/23/21  7:48 AM   Result Value Ref Range    WBC 13.3 (H) 4.3 - 11.1 K/uL    RBC 3.19 (L) 4.05 - 5.2 M/uL    HGB 9.2 (L) 11.7 - 15.4 g/dL    HCT 29.1 (L) 35.8 - 46.3 %    MCV 91.2 79.6 - 97.8 FL    MCH 28.8 26.1 - 32.9 PG    MCHC 31.6 31.4 - 35.0 g/dL    RDW 14.5 11.9 - 14.6 %    PLATELET 619 285 - 088 K/uL    MPV 9.9 9.4 - 12.3 FL    ABSOLUTE NRBC 0.00 0.0 - 0.2 K/uL    NEUTROPHILS 65 43 - 78 %    LYMPHOCYTES 14 13 - 44 %    MONOCYTES 7 4.0 - 12.0 %    EOSINOPHILS 2 0.5 - 7.8 %    BASOPHILS 1 0.0 - 2.0 %    IMMATURE GRANULOCYTES 11 (H) 0.0 - 5.0 %    ABS. NEUTROPHILS 8.6 (H) 1.7 - 8.2 K/UL    ABS. LYMPHOCYTES 1.9 0.5 - 4.6 K/UL    ABS. MONOCYTES 0.9 0.1 - 1.3 K/UL    ABS. EOSINOPHILS 0.3 0.0 - 0.8 K/UL    ABS. BASOPHILS 0.1 0.0 - 0.2 K/UL    ABS. IMM. GRANS.  1.5 (H) 0.0 - 0.5 K/UL    RBC COMMENTS SLIGHT  ANISOCYTOSIS        WBC COMMENTS Result Confirmed By Smear      PLATELET COMMENTS ADEQUATE DF AUTOMATED     GLUCOSE, POC    Collection Time: 12/23/21 10:42 AM   Result Value Ref Range    Glucose (POC) 136 (H) 65 - 100 mg/dL    Performed by Kendra Saenz)    ECHO ADULT COMPLETE    Collection Time: 12/23/21 11:30 AM   Result Value Ref Range    LA Minor Axis 6.0 cm    LA Major Axis 5.2 cm    LA Area 2C 23.5 cm2    LA Area 4C 20.0 cm2    LA Volume BP 70 (A) 22 - 52 mL    LA Diameter 3.8 cm    LV EDV A4C 79 mL    LV ESV A4C 41 mL    IVSd 1.0 (A) 0.6 - 0.9 cm    LVIDd 4.7 3.9 - 5.3 cm    LVIDs 3.3 cm    LVOT Diameter 2.1 cm    LVOT Mean Gradient 2 mmHg    LVOT VTI 23.1 cm    LVOT Peak Velocity 1.1 m/s    LVOT Peak Gradient 5 mmHg    LVPWd 1.1 (A) 0.6 - 0.9 cm    LV E' Lateral Velocity 11 cm/s    LV E' Septal Velocity 11 cm/s    LV Ejection Fraction A4C 48 %    LVOT Area 3.5 cm2    LVOT SV 80.0 ml    AV Cusp Mmode 1.7 cm    AV Mean Velocity 0.9 m/s    AV Mean Gradient 4 mmHg    AV VTI 30.6 cm    AV Peak Velocity 1.6 m/s    AV Peak Gradient 10 mmHg    AV Area by VTI 2.5 cm2    AV Area by Peak Velocity 2.4 cm2    Ascending Aorta 3.1 cm    MV E Wave Deceleration Time 246.0 ms    MV A Velocity 0.45 m/s    MV E Velocity 1.22 m/s    MV Mean Velocity 0.6 m/s    MV Mean Gradient 2 mmHg    MV VTI 23.8 cm    MV Max Velocity 1.3 m/s    MV Peak Gradient 7 mmHg    MV Area by VTI 3.4 cm2    Est. RA Pressure 8 mmHg    RVIDd 2.3 cm    RV Basal Dimension 3.5 cm    TAPSE 2.0 1.5 - 2.0 cm    TR Max Velocity 2.19 m/s    TR Peak Gradient 19 mmHg    TV PG 14 mmHg    Fractional Shortening 2D 30 28 - 44 %    LV ESV Index A4C 17 mL/m2    LV EDV Index A4C 32 mL/m2    LVIDd Index 1.92 cm/m2    LVIDs Index 1.35 cm/m2    LV RWT Ratio 0.47     LV Mass 2D 175.8 (A) 67 - 162 g    LV Mass 2D Index 71.8 43 - 95 g/m2    MV E/A 2.71     E/E' Ratio (Averaged) 11.09     E/E' Lateral 11.09     E/E' Septal 11.09     LA Volume Index BP 29 16 - 34 ml/m2    LVOT Stroke Volume Index 32.6 mL/m2    LA Size Index 1.55 cm/m2    Ascending Aorta Index 1.27 cm/m2    AV Velocity Ratio 0.69     LVOT:AV VTI Index 0.75     JOSE RAFAEL/BSA VTI 1.0 cm2/m2    JOSE RAFAEL/BSA Peak Velocity 1.0 cm2/m2    MV:LVOT VTI Index 1.03     RVSP 27 mmHg   HEP B SURFACE AB    Collection Time: 12/23/21 12:20 PM   Result Value Ref Range    Hepatitis B surface Ab 33.18 mIU/mL   HEP B SURFACE AG    Collection Time: 12/23/21 12:20 PM   Result Value Ref Range    Hep B Surface Ag NONREACTIVE NR     HEPATITIS B CORE AB, TOTAL    Collection Time: 12/23/21 12:20 PM   Result Value Ref Range    Hep B Core Ab, total Negative Negative     GLUCOSE, POC    Collection Time: 12/23/21  4:37 PM   Result Value Ref Range    Glucose (POC) 182 (H) 65 - 100 mg/dL    Performed by Cashpath FinancialApril    GLUCOSE, POC    Collection Time: 12/23/21  8:58 PM   Result Value Ref Range    Glucose (POC) 302 (H) 65 - 100 mg/dL    Performed by ChelseyT    METABOLIC PANEL, COMPREHENSIVE    Collection Time: 12/24/21  7:00 AM   Result Value Ref Range    Sodium 136 136 - 145 mmol/L    Potassium 5.2 (H) 3.5 - 5.1 mmol/L    Chloride 103 98 - 107 mmol/L    CO2 24 21 - 32 mmol/L    Anion gap 9 7 - 16 mmol/L    Glucose 97 65 - 100 mg/dL    BUN 98 (H) 6 - 23 MG/DL    Creatinine 8.82 (H) 0.6 - 1.0 MG/DL    GFR est AA 6 (L) >60 ml/min/1.73m2    GFR est non-AA 5 (L) >60 ml/min/1.73m2    Calcium 8.4 8.3 - 10.4 MG/DL    Bilirubin, total 0.3 0.2 - 1.1 MG/DL    ALT (SGPT) 9 (L) 12 - 65 U/L    AST (SGOT) 17 15 - 37 U/L    Alk.  phosphatase 64 50 - 136 U/L    Protein, total 6.3 6.3 - 8.2 g/dL    Albumin 2.1 (L) 3.5 - 5.0 g/dL    Globulin 4.2 (H) 2.3 - 3.5 g/dL    A-G Ratio 0.5 (L) 1.2 - 3.5     GLUCOSE, POC    Collection Time: 12/24/21 11:22 AM   Result Value Ref Range    Glucose (POC) 157 (H) 65 - 100 mg/dL    Performed by Brett Duarte        All Micro Results     Procedure Component Value Units Date/Time    CULTURE, ANAEROBIC [[id number]] Collected: 12/15/21 1570    Order Status: Completed Specimen: Peritoneum Updated: 12/22/21 0976     Special Requests: NO SPECIAL REQUESTS        Culture result:       MULTIPLE ANAEROBES ISOLATED, SUSCEPTIBILITY AND IDENTIFICATION AVAILABLE UPON REQUEST. ORGANISM WILL BE HELD FOR 5 DAYS. BLOOD CULTURE [[id number]] Collected: 12/15/21 1215    Order Status: Completed Specimen: Blood Updated: 12/20/21 0653     Special Requests: --        LEFT  HAND       Culture result: NO GROWTH 5 DAYS       BLOOD CULTURE [[id number]] Collected: 12/15/21 1236    Order Status: Completed Specimen: Blood Updated: 12/20/21 0653     Special Requests: --        RIGHT  HAND       Culture result: NO GROWTH 5 DAYS       CULTURE, WOUND Jas Franklin STAIN [[id number]]  (Abnormal)  (Susceptibility) Collected: 12/15/21 1323    Order Status: Completed Specimen: Wound from Groin Updated: 12/19/21 0732     Special Requests: NO SPECIAL REQUESTS        GRAM STAIN 0 TO 36 WBCS PER OIF      MANY GRAM POSITIVE COCCI               MODERATE GRAM NEGATIVE RODS                  MODERATE GRAM POSITIVE RODS           Culture result:       MODERATE PROTEUS MIRABILIS                  MODERATE ENTEROCOCCUS FAECALIS GROUP D                  LIGHT MIXED SKIN MASSIMO ISOLATED          CULTURE, Dulcy Radha STAIN [[id number]]  (Abnormal)  (Susceptibility) Collected: 12/15/21 1710    Order Status: Completed Specimen: Peritoneum Updated: 12/18/21 0936     Special Requests: NO SPECIAL REQUESTS        GRAM STAIN 0 TO 6 WBCS PER OIF      MANY GRAM POSITIVE COCCI         MANY GRAM NEGATIVE RODS        Culture result:       MODERATE PROTEUS MIRABILIS                  MODERATE STAPHYLOCOCCUS SPECIES, COAGULASE NEGATIVE THIS ORGANISM WILL BE HELD FOR 7 DAYS.  IF FURTHER TESTING IS REQUIRED PLEASE NOTIFY MICROBIOLOGY          COVID-19 RAPID TEST [[id number]] Collected: 12/15/21 1519    Order Status: Completed Specimen: Nasopharyngeal Updated: 12/15/21 1549     Specimen source NASAL        COVID-19 rapid test Not detected        Comment:      The specimen is NEGATIVE for SARS-CoV-2, the novel coronavirus associated with COVID-19. A negative result does not rule out COVID-19. This test has been authorized by the FDA under an Emergency Use Authorization (EUA) for use by authorized laboratories. Fact sheet for Healthcare Providers: ConventionUpdate.co.nz  Fact sheet for Patients: ConventionUpdate.co.nz       Methodology: Isothermal Nucleic Acid Amplification               Other Studies:  IR INSERT NON TUNL CVC OVER 5 YRS    Result Date: 12/23/2021  PROCEDURE:  Non-tunneled Hemodialysis catheter placement Procedural Personnel Attending physician(s):  Juan Diego Sexton MD Pre-procedure diagnosis:  Pleasant 53-year-old woman with acute renal failure while being treated for necrotizing fasciitis antisepsis. There is an indwelling temporary RIGHT internal jugular vein central venous catheter. Post-procedure diagnosis:  Same Indication:  Performance of hemodialysis Complications:  No immediate complications. Insertion of left-sided non-tunneled triple-lumen Temporary Hemodialysis Catheter, with tip in the expected location of the right atrium. Plan:  The catheter may be used immediately. The patient has been returned to her hospital room in stable condition. _______________________________________________________________ PROCEDURE SUMMARY: - Venous access with ultrasound guidance - Non-tunneled central venous catheter insertion with fluoroscopic guidance PROCEDURE DETAILS: Pre-procedure Consent:  Informed written and oral consent for the procedure was obtained after explanation of risks (including, but not limitted to:  hemorrhage, pneumothorax, infection) benefits and alternatives. The patient's questions were answered to their satisfaction. They stated understanding and requested that we proceed. Final Verification:  A time-out identifying the patient and procedure was performed prior to the procedure.   Preparation (MIPS):  Maximal sterile barrier technique (including: Sterile ultrasound probe cover, sterile ultrasound gel, cap, mask, sterile gown, sterile gloves, sterile sheet, hand hygiene, and cutaneous antisepsis) was used. Medical reason for site preparation exception (MIPS): Not applicable Anesthesia/sedation Level of anesthesia/sedation:  No sedation Anesthesia/sedation administered by : Not applicable Total intra-service sedation time (minutes):  0 Access Local anesthesia was administered. The vessel was sonographically evaluated and determined to be patent. Real time ultrasound was used to visualize needle entry into the vessel and a permanent image was stored. Vein accessed:  Right internal jugular vein Access technique:  Micropuncture set with 21 gauge needle. Venography Indication for venography:  Not applicable. Vein catheterized:  Not applicable. Findings:  Not applicable. Catheter placement Using fluoroscopy and a Kumpe catheter, multiple unsuccessful attempts were made to intubate the inferior vena cava. Eventually, the wire was positioned into the RIGHT atrium. The access site was dilated and the catheter was placed into the vein over a wire under fluoroscopic guidance. The catheter tip location was fluoroscopically verified and a permanent image was stored. A sterile dressing was applied. Catheter placed:  Scancell Trio-CT catheter. Catheter size Sita Halo):  13.5. Catheter length (cm):  20. Catheter flush:  Heparin (1000 units/mL) Catheter securement technique:  Non-absorbable suture. Contrast Contrast agent:  None Contrast volume (mL):  0 Radiation Dose Reference Air Kerma (Illa Basilio):  121 mGy Dose Area Product/Kerma Area Product (DAP/FRANCISCO/PKA):  3810.45 cGy-cm2 Fluoroscopy Exposure Time:  3.3 minutes Fluorographic Images:  1 Additional Details Specimens removed:  None Estimated blood loss (mL):  Less than 10 Standardized report: [email or web address] Attestation Signer name: TALI Jha attest that I personally performed the entire procedure. I reviewed the stored images and agree with the report as written. Current Meds:  Current Facility-Administered Medications   Medication Dose Route Frequency    ampicillin-sulbactam (UNASYN) 3 g in 0.9% sodium chloride (MBP/ADV) 100 mL MBP  3 g IntraVENous Q24H    insulin lispro (HUMALOG) injection 3 Units  3 Units SubCUTAneous TIDAC    insulin glargine (LANTUS) injection 28 Units  28 Units SubCUTAneous DAILY    amLODIPine (NORVASC) tablet 5 mg  5 mg Oral DAILY    sertraline (ZOLOFT) tablet 100 mg  100 mg Oral DAILY    metoprolol tartrate (LOPRESSOR) tablet 100 mg  100 mg Oral BID    oxyCODONE-acetaminophen (PERCOCET) 5-325 mg per tablet 1 Tablet  1 Tablet Oral Q4H PRN    famotidine (PEPCID) tablet 20 mg  20 mg Oral DAILY    calcium acetate (phosphate binder) (PHOSLO) tablet 667 mg  1 Tablet Oral TID WITH MEALS    insulin lispro (HUMALOG) injection   SubCUTAneous AC&HS    [Held by provider] heparin (porcine) injection 5,000 Units  5,000 Units SubCUTAneous Q8H    0.9% sodium chloride infusion 250 mL  250 mL IntraVENous PRN    sodium chloride (NS) flush 5-40 mL  5-40 mL IntraVENous Q8H    sodium chloride (NS) flush 5-40 mL  5-40 mL IntraVENous PRN    ondansetron (ZOFRAN ODT) tablet 4 mg  4 mg Oral Q8H PRN    Or    ondansetron (ZOFRAN) injection 4 mg  4 mg IntraVENous Q6H PRN    potassium chloride 10 mEq in 100 ml IVPB  10 mEq IntraVENous PRN    magnesium sulfate 2 g/50 ml IVPB (premix or compounded)  2 g IntraVENous PRN    HYDROmorphone (DILAUDID) injection 0.5 mg  0.5 mg IntraVENous Q3H PRN     Labs, vital signs, diagnostic testing reviewed. Case discussed with patient, care team, Dr. Jerrod Montgomery.     Signed:  Yumiko Jo NP

## 2021-12-24 NOTE — PROGRESS NOTES
Problem: Pressure Injury - Risk of  Goal: *Prevention of pressure injury  Description: Document Juventino Scale and appropriate interventions in the flowsheet. Outcome: Progressing Towards Goal  Note: Pressure Injury Interventions:  Sensory Interventions: Assess changes in LOC,Keep linens dry and wrinkle-free,Maintain/enhance activity level,Minimize linen layers,Monitor skin under medical devices,Turn and reposition approx. every two hours (pillows and wedges if needed)    Moisture Interventions: Apply protective barrier, creams and emollients    Activity Interventions: PT/OT evaluation    Mobility Interventions: Pressure redistribution bed/mattress (bed type)    Nutrition Interventions: Document food/fluid/supplement intake    Friction and Shear Interventions: Apply protective barrier, creams and emollients                Problem: Patient Education: Go to Patient Education Activity  Goal: Patient/Family Education  Outcome: Progressing Towards Goal     Problem: Patient Education: Go to Patient Education Activity  Goal: Patient/Family Education  Outcome: Progressing Towards Goal     Problem: Falls - Risk of  Goal: *Absence of Falls  Description: Document Maya Fall Risk and appropriate interventions in the flowsheet.   Outcome: Progressing Towards Goal  Note: Fall Risk Interventions:  Mobility Interventions: Patient to call before getting OOB    Mentation Interventions: Adequate sleep, hydration, pain control,Evaluate medications/consider consulting pharmacy,More frequent rounding,Reorient patient,Toileting rounds    Medication Interventions: Patient to call before getting OOB    Elimination Interventions: Call light in reach    History of Falls Interventions: Bed/chair exit alarm         Problem: Patient Education: Go to Patient Education Activity  Goal: Patient/Family Education  Outcome: Progressing Towards Goal     Problem: Diabetes Self-Management  Goal: *Disease process and treatment process  Description: Define diabetes and identify own type of diabetes; list 3 options for treating diabetes. Outcome: Progressing Towards Goal  Goal: *Incorporating nutritional management into lifestyle  Description: Describe effect of type, amount and timing of food on blood glucose; list 3 methods for planning meals. Outcome: Progressing Towards Goal  Goal: *Incorporating physical activity into lifestyle  Description: State effect of exercise on blood glucose levels. Outcome: Progressing Towards Goal  Goal: *Developing strategies to promote health/change behavior  Description: Define the ABC's of diabetes; identify appropriate screenings, schedule and personal plan for screenings. Outcome: Progressing Towards Goal  Goal: *Using medications safely  Description: State effect of diabetes medications on diabetes; name diabetes medication taking, action and side effects. Outcome: Progressing Towards Goal  Goal: *Monitoring blood glucose, interpreting and using results  Description: Identify recommended blood glucose targets  and personal targets. Outcome: Progressing Towards Goal  Goal: *Prevention, detection, treatment of acute complications  Description: List symptoms of hyper- and hypoglycemia; describe how to treat low blood sugar and actions for lowering  high blood glucose level. Outcome: Progressing Towards Goal  Goal: *Prevention, detection and treatment of chronic complications  Description: Define the natural course of diabetes and describe the relationship of blood glucose levels to long term complications of diabetes.   Outcome: Progressing Towards Goal  Goal: *Developing strategies to address psychosocial issues  Description: Describe feelings about living with diabetes; identify support needed and support network  Outcome: Progressing Towards Goal  Goal: *Insulin pump training  Outcome: Progressing Towards Goal  Goal: *Sick day guidelines  Outcome: Progressing Towards Goal  Goal: *Patient Specific Goal (EDIT GOAL, INSERT TEXT)  Outcome: Progressing Towards Goal     Problem: Patient Education: Go to Patient Education Activity  Goal: Patient/Family Education  Outcome: Progressing Towards Goal

## 2021-12-24 NOTE — PROGRESS NOTES
Comprehensive Nutrition Assessment    Type and Reason for Visit: Initial,RD nutrition re-screen/LOS  LOS Day 9    Nutrition Recommendations/Plan:    Continue with current diet   Start Mauro with all meals     Nutrition Assessment:   Nutrition History: Patient reports she eats very well at home, at least 3 meals daily if not more. She also reports eating not as healthy of food at home. Nutrition Background: PMH: Dm2, HTN, afib, devendra, gout. Patient presented with worsening abscess that had started draining but patient stated having pain so came in. Determined to have necrotizing fascitis and had debridement surgery 12/16. Plan for plastic surgery to follow up for wound closing 12/29. Patient had worsening DEVENDRA so HD start 12/24. Daily Update:  Spoke with patient who reports that she is eating fine during this admission but it is nothing like she eats at home. She reports eating much more at home and less healthy options. She reports the first few days of admission had nausea and vomiting but that has since subsided. She denies any loss of appetite. Discussed the importance of protein needs for wound healing, patient willing to try mauro.      Nutrition Related Findings:   None observed Wound Type: Open wounds    Current Nutrition Therapies:  ADULT DIET Regular; 4 carb choices (60 gm/meal)    Current Intake:   Average Meal Intake: % Average Supplement Intake: None ordered      Anthropometric Measures:  Height: 5' 7\" (170.2 cm)  Current Body Wt: 141.3 kg (311 lb 8.2 oz), Weight source: Bed scale  BMI: 48.8, Obese class 3 (BMI 40.0 or greater)  Admission Body Weight: 329 lb 2.4 oz (bed 12/16)  Ideal Body Weight (lbs) (Calculated): 135 lbs (61 kg), 230.7 %  Usual Body Wt:  , Percent weight change:            Edema: Generalized: Non-pitting (12/24/2021 10:00 AM)     Estimated Daily Nutrient Needs:  Energy (kcal/day): 4611-1606 (Kcal/kg (30-35), Weight Used: Ideal (61 kg))  Protein (g/day): 73-91 g (1.2-1.5 g/kg) Weight Used: (Ideal (wounds))  Fluid (ml/day):   (1 ml/kcal)    Nutrition Diagnosis:   · Inadequate protein intake related to increased demand for energy/nutrients as evidenced by wounds    Nutrition Interventions:   Food and/or Nutrient Delivery: Continue current diet,Start oral nutrition supplement     Coordination of Nutrition Care: Continue to monitor while inpatient    Goals: Active Goal: Continued intake >75% of nutritional needs by follow up    Nutrition Monitoring and Evaluation:      Food/Nutrient Intake Outcomes: Food and nutrient intake,Supplement intake  Physical Signs/Symptoms Outcomes: Skin    Discharge Planning:     Too soon to determine    Electronically signed by Elio Goldmann MS, RD, LD on 12/24/2021 at 2:38 PM.

## 2021-12-24 NOTE — PROGRESS NOTES
[id number]  Admission Date: 12/15/2021         [facility] Progress Note: 12/24/2021    Follow-up for: DEVENDRA on CKD      Subjective:   Temp HD catheter placed yesterday. Seen and examined on HD #1 today. Doing well.      ROS:  Gen - no fever, no chills, appetite unchanged  CV - no chest pain, no palpitation  Lung - no shortness of breath, no cough  Abd - no tenderness, no nausea/vomiting, no diarrhea  Ext - + edema    Current Facility-Administered Medications   Medication Dose Route Frequency    ampicillin-sulbactam (UNASYN) 3 g in 0.9% sodium chloride (MBP/ADV) 100 mL MBP  3 g IntraVENous Q24H    insulin lispro (HUMALOG) injection 3 Units  3 Units SubCUTAneous TIDAC    insulin glargine (LANTUS) injection 28 Units  28 Units SubCUTAneous DAILY    amLODIPine (NORVASC) tablet 5 mg  5 mg Oral DAILY    sertraline (ZOLOFT) tablet 100 mg  100 mg Oral DAILY    metoprolol tartrate (LOPRESSOR) tablet 100 mg  100 mg Oral BID    oxyCODONE-acetaminophen (PERCOCET) 5-325 mg per tablet 1 Tablet  1 Tablet Oral Q4H PRN    famotidine (PEPCID) tablet 20 mg  20 mg Oral DAILY    calcium acetate (phosphate binder) (PHOSLO) tablet 667 mg  1 Tablet Oral TID WITH MEALS    insulin lispro (HUMALOG) injection   SubCUTAneous AC&HS    [Held by provider] heparin (porcine) injection 5,000 Units  5,000 Units SubCUTAneous Q8H    0.9% sodium chloride infusion 250 mL  250 mL IntraVENous PRN    sodium chloride (NS) flush 5-40 mL  5-40 mL IntraVENous Q8H    sodium chloride (NS) flush 5-40 mL  5-40 mL IntraVENous PRN    ondansetron (ZOFRAN ODT) tablet 4 mg  4 mg Oral Q8H PRN    Or    ondansetron (ZOFRAN) injection 4 mg  4 mg IntraVENous Q6H PRN    potassium chloride 10 mEq in 100 ml IVPB  10 mEq IntraVENous PRN    magnesium sulfate 2 g/50 ml IVPB (premix or compounded)  2 g IntraVENous PRN    HYDROmorphone (DILAUDID) injection 0.5 mg  0.5 mg IntraVENous Q3H PRN         Objective:     Vitals:    12/24/21 0700 12/24/21 0735 12/24/21 0800 12/24/21 0830   BP: 132/77 114/75 119/71 113/68   Pulse: 78 87 98 93   Resp:       Temp:       SpO2:       Weight:       Height:         Intake and Output:   12/22 1901 - 12/24 0700  In: 480 [P.O.:480]  Out: 2100 [Urine:2100]  No intake/output data recorded. Physical Exam:   Constitutional: AO x 3. in no acute distress  HEENT:  Sclera clear, pupils equal, oral mucosa moist  Lungs: clear to auscultation bilaterally  Cardiovascular:  RRR, no rub  Abd/GI: soft and non-tender; with positive bowel sounds. Ext: warm without cyanosis. There is trace upper and lower leg edema. J HD catheter      LAB  Recent Labs     12/23/21  0748 12/21/21  1110   WBC 13.3* 18.3*   HGB 9.2* 10.0*   HCT 29.1* 30.1*    280     Recent Labs     12/24/21  0700 12/23/21  0748 12/22/21  0424 12/21/21  1110 12/21/21  1110    136 135*   < > 132*   K 5.2* 5.3* 4.7   < > 4.3    100 98   < > 96*   CO2 24 25 25   < > 25   GLU 97 138* 154*   < > 212*   BUN 98* 101* 107*   < > 104*   CREA 8.82* 8.96* 8.79*   < > 8.84*   MG  --  3.3* 3.2*  --  3.0*   PHOS  --  9.5* 9.3*  --   --    ALB 2.1* 2.1* 2.1*  --   --     < > = values in this interval not displayed. No results for input(s): PH, PCO2, PO2, HCO3 in the last 72 hours.       Assessment:  (Medical Decision Making)     Hospital Problems  Date Reviewed: 12/25/2015          Codes Class Noted POA    Atrial fibrillation with RVR ([facility] ICD-10-CM: I48.91  ICD-9-CM: 427.31  12/22/2021 Unknown        DEVENDRA (acute kidney injury) ([facility] ICD-10-CM: N17.9  ICD-9-CM: 584.9  12/16/2021 Yes        Acute respiratory failure ([facility] ICD-10-CM: J96.00  ICD-9-CM: 518.81  12/16/2021 No        Septic shock (HCC) ICD-10-CM: A41.9, R65.21  ICD-9-CM: 038.9, 785.52, 995.92  12/16/2021 Unknown        * (Principal) Necrotizing fasciitis ([facility] ICD-10-CM: M72.6  ICD-9-CM: 728.86  12/15/2021 Yes        Anemia ICD-10-CM: D64.9  ICD-9-CM: 285.9  7/13/2018 Yes        Obesity, morbid ([address] (Chronic) ICD-10-CM: E66.01  ICD-9-CM: 278.01  7/12/2018 Yes        DM2 (diabetes mellitus, type 2) (HCC) (Chronic) ICD-10-CM: E11.9  ICD-9-CM: 250.00  12/25/2015 Yes        HTN (hypertension) (Chronic) ICD-10-CM: I10  ICD-9-CM: 401.9  12/25/2015 Yes              Plan:  (Medical Decision Making)   1. DEVENDRA with unclear hx of CKD, secondary to DM ( no recent lab with which to compare). Suspect non-oliguric ATN now.  -imaging CT showed some cortical thinning associated with CKD. -vanc level was elevated up to 23.4 on 12/16 off vanc and on unasyn   -Temp HD catheter placement 12/23  -HD initiation 12/24  -Next HD likely 12/26 unless she has renal recovery    2. Hypermagnesemia  -stopped mag supplement 12/22    3. Hyperphosphatemia  -on binders    4. Hyperkalemia    5. Necrotizing fasciitis. - s/p I&D perineum and trunk, gen surgery following, plastic surgery for wound closure       Roberto Jj MD  [facility]

## 2021-12-24 NOTE — PROGRESS NOTES
Pt is stable with bed in lowest position, wheels locked, and call light within reach. Hourly rounds completed. VSS. IV is patent and dressing is clean, dry, and intact. Dressing changed this shift, pt tolerated it well. Dressing is clean, dry, and intact. Pain managed per MAR. Report to be given to night shift nurse.

## 2021-12-24 NOTE — PROGRESS NOTES
[facility] CARDIOLOGY PROGRESS NOTE           12/24/2021 10:40 AM    Admit Date: 12/15/2021         Subjective: Remains in rate controlled AFlutter. Off [facility] with open abdomen wound. ROS:  Cardiovascular:  As noted above    Objective:      Vitals:    12/24/21 0800 12/24/21 0830 12/24/21 0900 12/24/21 0912   BP: 119/71 113/68 97/67 128/72   Pulse: 92 93 97 98   Resp:       Temp:       SpO2:       Weight:       Height:           On telemetry:atrial flutter      Physical Exam:  General: Well Developed, Well Nourished, No Acute Distress, Alert & Oriented x 3, Appropriate mood  Neck: supple, no JVD  Heart: irreg irreg  Lungs: Clear throughout auscultation bilaterally without adventitious sounds  Abd: soft, nontender, nondistended, with good bowel sounds  Ext: no edema bilaterally  Skin: warm and dry      Data Review:   Recent Labs     12/24/21  0700 12/23/21  0748 12/22/21  0424 12/22/21  0424 12/21/21  1110 12/21/21  1110    136   < > 135*   < > 132*   K 5.2* 5.3*   < > 4.7   < > 4.3   MG  --  3.3*  --  3.2*   < > 3.0*   BUN 98* 101*   < > 107*   < > 104*   CREA 8.82* 8.96*   < > 8.79*   < > 8.84*   GLU 97 138*   < > 154*   < > 212*   WBC  --  13.3*  --   --   --  18.3*   HGB  --  9.2*  --   --   --  10.0*   HCT  --  29.1*  --   --   --  30.1*   PLT  --  329  --   --   --  280    < > = values in this interval not displayed. No results for input(s): Yajaira Nation in the last 72 hours.         Assessment/Plan:     Principal Problem:    Necrotizing fasciitis ([facility] (12/15/2021)    Active Problems:    DM2 (diabetes mellitus, type 2) ([facility] (12/25/2015)      HTN (hypertension) (12/25/2015)      Obesity, morbid ([address] (7/12/2018)      Anemia (7/13/2018)      DEVENDRA (acute kidney injury) ([address] (12/16/2021)      Acute respiratory failure ([address] (12/16/2021)      Septic shock ([address] (12/16/2021)      Atrial fibrillation with RVR ([address] (12/22/2021)    A/P  1) Flutter - rate controlled echo reviewed, no [address] with open abdominal wound  2) CKD - HD today  3) Sepsis - resolving with abx    Cardiology will sign off cardiac issue are stable will need [address] prior to discharge if wound is closed.   Please call back once patient is closer to discharge.         Bernadine Garcia MD  12/24/2021 10:40 AM

## 2021-12-24 NOTE — ADVANCED PRACTICE NURSE
The General Surgery team has asked us to assume care as primary service of this patient. Dr. Armando Arenas (plastic surgery) will manage the wound going forward.     Diego LUNAP-BC

## 2021-12-24 NOTE — DIALYSIS
TRANSFER IN - DIALYSIS    Received patient in dialysis unit  from [facility] (unit) for ordered procedure. Consent verified for renal replacement therapy. Patient alert and vital signs stable. /77  P 78. Hemodialysis initiated using Left CVC. Aspirated and flushed both ports without difficulty. Dressing clean, dry and intact. Machine settings per MD order. Heparin 0 unit bolus and 0 units/hr. Will monitor during treatment.

## 2021-12-24 NOTE — PROGRESS NOTES
[address]  [address] [facility], [address] [place] [address], [address]      PLAN:   Consult following for atrial fibrillation atrial flutter and anteroseptal infarct on EKG  Continue wound care daily dressing changes  Await case management decisions about [facility]  Dr. Sigifredo De La Rosa plastic surgery recommends surgery in 1 week - will take over wound care  Appreciate hospitalist and nephrology      ASSESSMENT:  Admit Date: 12/15/2021   7 Days Post-Op  Procedure(s):  INCISION AND DRAINAGE PERINEUM AND TRUNK    Principal Problem:    Necrotizing fasciitis ([address] (12/15/2021)    Active Problems:    DM2 (diabetes mellitus, type 2) ([address] (12/25/2015)      HTN (hypertension) (12/25/2015)      Obesity, morbid ([address] (7/12/2018)      Anemia (7/13/2018)      DEVENDRA (acute kidney injury) ([address] (12/16/2021)      Acute respiratory failure ([address] (12/16/2021)      Septic shock ([address] (12/16/2021)      Atrial fibrillation with RVR ([address] (12/22/2021)         SUBJECTIVE: Minimal complaints. Complains of soreness at wound. Daily dressing changes successful. Vital signs stable afebrile.  working with [facility] for transfer to [facility] but thinks it may not be approved until after [place]. OBJECTIVE:  Constitutional: Alert oriented cooperative patient in no acute distress; appears stated age   Visit Vitals  BP (!) 162/98   Pulse 89   Temp 97.9 °F (36.6 °C)   Resp 20   Ht 5' 7\" (1.702 m)   Wt 311 lb 8.2 oz (141.3 kg)   SpO2 97%   BMI 48.79 kg/m²     Eyes:Sclera are clear. ENMT: no external lesions gross hearing normal; no obvious neck masses, no ear or lip lesions  CV: RRR. Normal perfusion  Resp: No JVD. Breathing is  non-labored; no audible wheezing. GI: Soft dressing intact. No new necrosis  Musculoskeletal: unremarkable with normal function. No embolic signs or cyanosis.    Neuro:  Oriented; moves all 4; no focal deficits  Psychiatric: normal affect and mood, no memory impairment      Patient Vitals for the past 24 hrs:   BP Temp Pulse Resp SpO2 Weight   12/24/21 1306 -- -- 89 -- -- --   12/24/21 1219 (!) 162/98 97.9 °F (36.6 °C) (!) 130 20 97 % --   12/24/21 1200 -- -- 89 -- -- --   12/24/21 0912 128/72 -- 98 -- -- --   12/24/21 0900 97/67 -- 97 -- -- --   12/24/21 0830 113/68 -- 93 -- -- --   12/24/21 0800 119/71 -- 92 -- -- --   12/24/21 0735 114/75 -- 87 -- -- --   12/24/21 0700 132/77 -- 78 -- -- --   12/24/21 0457 (!) 142/96 97.8 °F (36.6 °C) 91 18 96 % 311 lb 8.2 oz (141.3 kg)   12/24/21 0000 -- -- 84 -- -- --   12/23/21 2331 94/64 98.1 °F (36.7 °C) 92 18 97 % --   12/23/21 2008 116/87 97.4 °F (36.3 °C) (!) 129 18 100 % --   12/23/21 1453 (!) 146/87 99.1 °F (37.3 °C) (!) 120 18 100 % --     Labs:    Recent Labs     12/24/21  0700 12/23/21  0748 12/23/21  0748   WBC  --   --  13.3*   HGB  --   --  9.2*   PLT  --   --  329      < > 136   K 5.2*   < > 5.3*      < > 100   CO2 24   < > 25   BUN 98*   < > 101*   CREA 8.82*   < > 8.96*   GLU 97   < > 138*   TBILI 0.3   < > 0.2   ALT 9*   < > 8*   AP 64   < > 63    < > = values in this interval not displayed.          Arvil Blood, NP

## 2021-12-24 NOTE — DIALYSIS
TRANSFER IN - DIALYSIS    Received patient in dialysis unit  from [facility] (unit) for ordered procedure. Consent verified for renal replacement therapy. Patient alert and vital signs stable. BP*** P***  Room air. Hemodialysis initiated using Left IJ catheter. Aspirated and flushed both ports without difficulty. Dressing clean, dry and intact. Machine settings per MD order. Will monitor during treatment.

## 2021-12-24 NOTE — DIALYSIS
TRANSFER OUT- DIALYSIS    Hemodialysis treatment completed without complications. Patient alert and VS stable  /72  P 98       0 Kgs removed. Flushed both ports with 10 mL of NS.  CVC dressing clean, dry, and intact, tego caps intact, bilateral lumens wrapped with 4x4 gauze. Meds given-None. No units of RBCs given during dialysis. Patient to 612 after dialysis.

## 2021-12-25 LAB
ALBUMIN SERPL-MCNC: 2.2 G/DL (ref 3.5–5)
ALBUMIN/GLOB SERPL: 0.6 {RATIO} (ref 1.2–3.5)
ALP SERPL-CCNC: 71 U/L (ref 50–136)
ALT SERPL-CCNC: 15 U/L (ref 12–65)
ANION GAP SERPL CALC-SCNC: 9 MMOL/L (ref 7–16)
AST SERPL-CCNC: 19 U/L (ref 15–37)
BASOPHILS # BLD: 0.1 K/UL (ref 0–0.2)
BASOPHILS NFR BLD: 1 % (ref 0–2)
BILIRUB SERPL-MCNC: 0.3 MG/DL (ref 0.2–1.1)
BUN SERPL-MCNC: 72 MG/DL (ref 6–23)
CALCIUM SERPL-MCNC: 8 MG/DL (ref 8.3–10.4)
CHLORIDE SERPL-SCNC: 104 MMOL/L (ref 98–107)
CO2 SERPL-SCNC: 24 MMOL/L (ref 21–32)
CREAT SERPL-MCNC: 7.27 MG/DL (ref 0.6–1)
DIFFERENTIAL METHOD BLD: ABNORMAL
EOSINOPHIL # BLD: 0.2 K/UL (ref 0–0.8)
EOSINOPHIL NFR BLD: 2 % (ref 0.5–7.8)
ERYTHROCYTE [DISTWIDTH] IN BLOOD BY AUTOMATED COUNT: 14.5 % (ref 11.9–14.6)
GLOBULIN SER CALC-MCNC: 3.4 G/DL (ref 2.3–3.5)
GLUCOSE BLD STRIP.AUTO-MCNC: 168 MG/DL (ref 65–100)
GLUCOSE BLD STRIP.AUTO-MCNC: 177 MG/DL (ref 65–100)
GLUCOSE BLD STRIP.AUTO-MCNC: 183 MG/DL (ref 65–100)
GLUCOSE BLD STRIP.AUTO-MCNC: 226 MG/DL (ref 65–100)
GLUCOSE SERPL-MCNC: 158 MG/DL (ref 65–100)
HCT VFR BLD AUTO: 27.5 % (ref 35.8–46.3)
HGB BLD-MCNC: 8.7 G/DL (ref 11.7–15.4)
IMM GRANULOCYTES # BLD AUTO: 0.5 K/UL (ref 0–0.5)
IMM GRANULOCYTES NFR BLD AUTO: 5 % (ref 0–5)
LYMPHOCYTES # BLD: 1.6 K/UL (ref 0.5–4.6)
LYMPHOCYTES NFR BLD: 15 % (ref 13–44)
MCH RBC QN AUTO: 30 PG (ref 26.1–32.9)
MCHC RBC AUTO-ENTMCNC: 31.6 G/DL (ref 31.4–35)
MCV RBC AUTO: 94.8 FL (ref 79.6–97.8)
MONOCYTES # BLD: 0.9 K/UL (ref 0.1–1.3)
MONOCYTES NFR BLD: 8 % (ref 4–12)
NEUTS SEG # BLD: 7.3 K/UL (ref 1.7–8.2)
NEUTS SEG NFR BLD: 69 % (ref 43–78)
NRBC # BLD: 0 K/UL (ref 0–0.2)
PLATELET # BLD AUTO: 301 K/UL (ref 150–450)
PMV BLD AUTO: 9.8 FL (ref 9.4–12.3)
POTASSIUM SERPL-SCNC: 5 MMOL/L (ref 3.5–5.1)
PROT SERPL-MCNC: 5.6 G/DL (ref 6.3–8.2)
RBC # BLD AUTO: 2.9 M/UL (ref 4.05–5.2)
SERVICE CMNT-IMP: ABNORMAL
SODIUM SERPL-SCNC: 137 MMOL/L (ref 136–145)
WBC # BLD AUTO: 10.6 K/UL (ref 4.3–11.1)

## 2021-12-25 PROCEDURE — 85025 COMPLETE CBC W/AUTO DIFF WBC: CPT

## 2021-12-25 PROCEDURE — 74011250636 HC RX REV CODE- 250/636: Performed by: SURGERY

## 2021-12-25 PROCEDURE — 74011250636 HC RX REV CODE- 250/636: Performed by: STUDENT IN AN ORGANIZED HEALTH CARE EDUCATION/TRAINING PROGRAM

## 2021-12-25 PROCEDURE — 74011636637 HC RX REV CODE- 636/637: Performed by: STUDENT IN AN ORGANIZED HEALTH CARE EDUCATION/TRAINING PROGRAM

## 2021-12-25 PROCEDURE — 74011636637 HC RX REV CODE- 636/637: Performed by: NURSE PRACTITIONER

## 2021-12-25 PROCEDURE — 80053 COMPREHEN METABOLIC PANEL: CPT

## 2021-12-25 PROCEDURE — 74011250637 HC RX REV CODE- 250/637: Performed by: NURSE PRACTITIONER

## 2021-12-25 PROCEDURE — 2709999900 HC NON-CHARGEABLE SUPPLY

## 2021-12-25 PROCEDURE — 77030018836 HC SOL IRR NACL ICUM -A

## 2021-12-25 PROCEDURE — 74011250637 HC RX REV CODE- 250/637: Performed by: INTERNAL MEDICINE

## 2021-12-25 PROCEDURE — 65660000000 HC RM CCU STEPDOWN

## 2021-12-25 PROCEDURE — 82962 GLUCOSE BLOOD TEST: CPT

## 2021-12-25 PROCEDURE — 74011000258 HC RX REV CODE- 258: Performed by: STUDENT IN AN ORGANIZED HEALTH CARE EDUCATION/TRAINING PROGRAM

## 2021-12-25 RX ADMIN — Medication 10 ML: at 15:31

## 2021-12-25 RX ADMIN — AMLODIPINE BESYLATE 5 MG: 5 TABLET ORAL at 09:10

## 2021-12-25 RX ADMIN — Medication 10 ML: at 05:06

## 2021-12-25 RX ADMIN — Medication 3 UNITS: at 12:04

## 2021-12-25 RX ADMIN — METOPROLOL TARTRATE 100 MG: 50 TABLET, FILM COATED ORAL at 18:48

## 2021-12-25 RX ADMIN — METOPROLOL TARTRATE 100 MG: 50 TABLET, FILM COATED ORAL at 09:09

## 2021-12-25 RX ADMIN — Medication 10 ML: at 21:24

## 2021-12-25 RX ADMIN — Medication 2 UNITS: at 18:54

## 2021-12-25 RX ADMIN — HYDROMORPHONE HYDROCHLORIDE 0.5 MG: 1 INJECTION, SOLUTION INTRAMUSCULAR; INTRAVENOUS; SUBCUTANEOUS at 15:47

## 2021-12-25 RX ADMIN — Medication 4 UNITS: at 21:25

## 2021-12-25 RX ADMIN — AMPICILLIN SODIUM AND SULBACTAM SODIUM 3 G: 2; 1 INJECTION, POWDER, FOR SOLUTION INTRAMUSCULAR; INTRAVENOUS at 09:09

## 2021-12-25 RX ADMIN — CALCIUM ACETATE 667 MG: 667 TABLET ORAL at 18:48

## 2021-12-25 RX ADMIN — Medication 2 UNITS: at 09:15

## 2021-12-25 RX ADMIN — INSULIN GLARGINE 25 UNITS: 100 INJECTION, SOLUTION SUBCUTANEOUS at 09:16

## 2021-12-25 RX ADMIN — Medication 3 UNITS: at 09:14

## 2021-12-25 RX ADMIN — OXYCODONE AND ACETAMINOPHEN 1 TABLET: 5; 325 TABLET ORAL at 21:24

## 2021-12-25 RX ADMIN — CALCIUM ACETATE 667 MG: 667 TABLET ORAL at 12:02

## 2021-12-25 RX ADMIN — Medication 3 UNITS: at 18:53

## 2021-12-25 RX ADMIN — FAMOTIDINE 20 MG: 20 TABLET ORAL at 09:10

## 2021-12-25 RX ADMIN — SERTRALINE 100 MG: 50 TABLET, FILM COATED ORAL at 09:09

## 2021-12-25 RX ADMIN — Medication 2 UNITS: at 12:03

## 2021-12-25 RX ADMIN — CALCIUM ACETATE 667 MG: 667 TABLET ORAL at 09:08

## 2021-12-25 NOTE — PROGRESS NOTES
[id number]  Admission Date: 12/15/2021         [facility] Progress Note: 12/25/2021    Follow-up for: DEVENDRA on CKD      Subjective:   First HD yesterday tolerated well. No acute events overnight.      She feels well    ROS:  Gen - no fever, no chills, appetite unchanged  CV - no chest pain, no palpitation  Lung - no shortness of breath, no cough  Abd - no tenderness, no nausea/vomiting, no diarrhea  Ext - + edema    Current Facility-Administered Medications   Medication Dose Route Frequency    insulin glargine (LANTUS) injection 25 Units  25 Units SubCUTAneous DAILY    ampicillin-sulbactam (UNASYN) 3 g in 0.9% sodium chloride (MBP/ADV) 100 mL MBP  3 g IntraVENous Q24H    insulin lispro (HUMALOG) injection 3 Units  3 Units SubCUTAneous TIDAC    amLODIPine (NORVASC) tablet 5 mg  5 mg Oral DAILY    sertraline (ZOLOFT) tablet 100 mg  100 mg Oral DAILY    metoprolol tartrate (LOPRESSOR) tablet 100 mg  100 mg Oral BID    oxyCODONE-acetaminophen (PERCOCET) 5-325 mg per tablet 1 Tablet  1 Tablet Oral Q4H PRN    famotidine (PEPCID) tablet 20 mg  20 mg Oral DAILY    calcium acetate (phosphate binder) (PHOSLO) tablet 667 mg  1 Tablet Oral TID WITH MEALS    insulin lispro (HUMALOG) injection   SubCUTAneous AC&HS    [Held by provider] heparin (porcine) injection 5,000 Units  5,000 Units SubCUTAneous Q8H    0.9% sodium chloride infusion 250 mL  250 mL IntraVENous PRN    sodium chloride (NS) flush 5-40 mL  5-40 mL IntraVENous Q8H    sodium chloride (NS) flush 5-40 mL  5-40 mL IntraVENous PRN    ondansetron (ZOFRAN ODT) tablet 4 mg  4 mg Oral Q8H PRN    Or    ondansetron (ZOFRAN) injection 4 mg  4 mg IntraVENous Q6H PRN    potassium chloride 10 mEq in 100 ml IVPB  10 mEq IntraVENous PRN    magnesium sulfate 2 g/50 ml IVPB (premix or compounded)  2 g IntraVENous PRN    HYDROmorphone (DILAUDID) injection 0.5 mg  0.5 mg IntraVENous Q3H PRN         Objective:     Vitals:    12/25/21 6481 12/25/21 0439 12/25/21 0606 12/25/21 0715   BP: 137/82 138/80  103/65   Pulse: 99 93  100   Resp: 20 20 18   Temp: 98 °F (36.7 °C) 98.2 °F (36.8 °C)  97.7 °F (36.5 °C)   SpO2: 97% 97%  97%   Weight:   138.8 kg (306 lb)    Height:         Intake and Output:   12/23 1901 - 12/25 0700  In: 810 [P.O.:810]  Out: Anat 97 [Urine:2975]  12/25 0701 - 12/25 1900  In: 180 [P.O.:180]  Out: -     Physical Exam:   Constitutional: AO x 3. in no acute distress  HEENT:  Sclera clear, pupils equal, oral mucosa moist  Lungs: clear to auscultation bilaterally  Cardiovascular:  RRR, no rub  Abd/GI: soft and non-tender; with positive bowel sounds. Ext: warm without cyanosis. There is trace upper and lower leg edema. LIJ HD catheter      LAB  Recent Labs     12/25/21  0430 12/23/21  0748   WBC 10.6 13.3*   HGB 8.7* 9.2*   HCT 27.5* 29.1*    329     Recent Labs     12/25/21  0430 12/24/21  0700 12/23/21  0748    136 136   K 5.0 5.2* 5.3*    103 100   CO2 24 24 25   * 97 138*   BUN 72* 98* 101*   CREA 7.27* 8.82* 8.96*   MG  --   --  3.3*   PHOS  --   --  9.5*   ALB 2.2* 2.1* 2.1*     No results for input(s): PH, PCO2, PO2, HCO3 in the last 72 hours.       Assessment:  (Medical Decision Making)     Hospital Problems  Date Reviewed: 12/25/2015          Codes Class Noted POA    Atrial fibrillation with RVR ([facility] ICD-10-CM: I48.91  ICD-9-CM: 427.31  12/22/2021 Unknown        DEVENDRA (acute kidney injury) ([facility] ICD-10-CM: N17.9  ICD-9-CM: 584.9  12/16/2021 Yes        Acute respiratory failure ([facility] ICD-10-CM: J96.00  ICD-9-CM: 518.81  12/16/2021 No        Septic shock (HCC) ICD-10-CM: A41.9, R65.21  ICD-9-CM: 038.9, 785.52, 995.92  12/16/2021 Unknown        * (Principal) Necrotizing fasciitis ([facility] ICD-10-CM: M72.6  ICD-9-CM: 728.86  12/15/2021 Yes        Anemia ICD-10-CM: D64.9  ICD-9-CM: 285.9  7/13/2018 Yes        Obesity, morbid (HCC) (Chronic) ICD-10-CM: E66.01  ICD-9-CM: 278.01  7/12/2018 Yes        DM2 (diabetes mellitus, type 2) (HCC) (Chronic) ICD-10-CM: E11.9  ICD-9-CM: 250.00  12/25/2015 Yes        HTN (hypertension) (Chronic) ICD-10-CM: I10  ICD-9-CM: 401.9  12/25/2015 Yes              Plan:  (Medical Decision Making)   1. DEVENDRA with unclear hx of CKD, secondary to DM ( no recent lab with which to compare). Suspect non-oliguric ATN now.  -imaging CT showed some cortical thinning associated with CKD. -vanc level was elevated up to 23.4 on 12/16 off vanc and on unasyn   -Temp HD catheter placement 12/23  -HD initiation 12/24   -Next HD tomorrow. Orders entered. 2. Hypermagnesemia  -stopped mag supplement 12/22    3. Hyperphosphatemia  -on binders. Recheck tomorrow. Should improve with ongoing HD. 4. Hyperkalemia    5. Necrotizing fasciitis. - s/p I&D perineum and trunk, gen surgery following, plastic surgery for wound closure       Jason Cabrera MD  [facility]

## 2021-12-25 NOTE — PROGRESS NOTES
Hospitalist Progress Note   Admit Date:  12/15/2021  9:53 AM   Name:  Domo Ignacio   Age:  46 y.o. Sex:  female  :  1970   MRN:  [id number]   Room:      Presenting Complaint: Vomiting    Reason(s) for Admission: Necrotizing fasciitis [facility]) [M72.6]     Hospital Course & Interval History:   46 y. o. female with history of Morbid obesity, DM who was admitted for septic shock 2/2 necrotizing fascitis involving her pannus and perineum associated with respiratory failure, severe DEEVNDRA (Scr 11), acidosis pH 7.1. She was placed on levo/david, intubated and treated with vanc/zosyn initially. Outpatient she was started on bactrim and unable to tolerate it then changed to doxycycline. Per  was not taking in any PO and became progressively weaker at home.  denies known history of renal disease. Made little urine prior to admission. She is s/p excisional debridement on 12/15, . OP note reports foul smelling necrotizing fasciitis extending from the perineum, left labia deep to the pubic bone and along the fascia to the abdominal wall above the iliac bone into the left flank. She rec'd ancef in OR and was changed to vanc/clinda/meropenem on 12/15 by pulmonology. Discussed with , no evidence of osteomyelitis. Extubated . New AFIB RVR with Diltiazem gtt initiated, now on PO Metoprolol. Cardiology following - will need [address] at d/c once wound is closed. ID is following. Nephrology following for DEVENDRA on CKD now with HD initiated . Plan to return to OR approx.  for additional debridement. Subjective (21):  Patient alert, oriented, spouse at bedside. Abdominal dressing dry/intact.     Assessment & Plan:     Principal Problem:  Septic Shock ([address]  Necrotizing fasciitis ([address]  -s/p excisional debridement 12/15 and   -Required Levo/David for BP support, now weaned off and on medical floor, hemodynamically stable  -BCx 12/15 negative  -Wound Cx with GNR/GPC  -ID following, continue renally dosed Unasyn  -Wound care dressing changes daily  -Plastic Surgery Dr. Otila Cheadle recommends surgery in 1 week, ~12/29     Active Problems:  DM2 (diabetes mellitus, type 2) ([facility]   -A1C 6.6  -Decrease Lantus to 25 units daily, continue Humalog prandial and SSI     HTN (hypertension)   -Continue BB and Norvasc     Obesity, morbid (HCC)   -Increases all-cause mortality, needs diet and lifestyle modifications     Acute on Chronic Anemia   ABLA  -Multifactorial with CKD and post-operative blood loss  -s/p 2 U PRBC for Hgb 5.8  -Iron stores and B12 wnl  -H&H currently stable, monitor am labs     DEVENDRA on CKD, now on HD  -Unclear hx of possible CKD related to DM, CT with cortical thinning associated with CKD  -Non-oliguric  -Etiology possibly ATN, component of septic shock  -HD initiated 12/24, nephrology following     Acute respiratory failure ([facility] requiring mechanical ventilation, resolved  -Extubated 12/16, stable on RA     Atrial Fibrillation with RVR  -Required diltiazem gtt, now off drip and on Metoprolol 100 mg BID  -Cardiology followed but since signed off, rate control acceptable,   please notify cards to come onboard closer to discharge, will likely need [address] at d/c if wound is closed  -Not anticoagulated due to abdominal wound and further anticipated surgical intervention     Hyperphosphatemia  -Continue phosphate binder, nephrology following now on HD     Hyperkalemia  -K 5.0 this am, resolved.     Dispo/Discharge Planning:  Possible d/c to [facility] AT [facility] although surgery planned in 1 week so likely not until after     Diet:  ADULT DIET Regular; 4 carb choices (60 gm/meal)  DVT PPx: SCD's  Code status: Full Code    Hospital Problems as of 12/25/2021 Date Reviewed: 12/25/2015          Codes Class Noted - Resolved POA    Atrial fibrillation with RVR ([facility] ICD-10-CM: I48.91  ICD-9-CM: 427.31  12/22/2021 - Present Unknown        DEVENDRA (acute kidney injury) ([facility] ICD-10-CM: N17.9  ICD-9-CM: 584.9 12/16/2021 - Present Yes        Acute respiratory failure ([facility] ICD-10-CM: J96.00  ICD-9-CM: 518.81  12/16/2021 - Present No        Septic shock (HCC) ICD-10-CM: A41.9, R65.21  ICD-9-CM: 038.9, 785.52, 995.92  12/16/2021 - Present Unknown        * (Principal) Necrotizing fasciitis ([place] [id number] ICD-10-CM: M72.6  ICD-9-CM: 728.86  12/15/2021 - Present Yes        Anemia ICD-10-CM: D64.9  ICD-9-CM: 285.9  7/13/2018 - Present Yes        Obesity, morbid ([place] [id number] (Chronic) ICD-10-CM: E66.01  ICD-9-CM: 278.01  7/12/2018 - Present Yes        DM2 (diabetes mellitus, type 2) ([place] [id number] (Chronic) ICD-10-CM: E11.9  ICD-9-CM: 250.00  12/25/2015 - Present Yes        HTN (hypertension) (Chronic) ICD-10-CM: I10  ICD-9-CM: 401.9  12/25/2015 - Present Yes              Objective:     Patient Vitals for the past 24 hrs:   Temp Pulse Resp BP SpO2   12/25/21 0715 97.7 °F (36.5 °C) 100 18 103/65 97 %   12/25/21 0439 98.2 °F (36.8 °C) 93 20 138/80 97 %   12/25/21 0035 98 °F (36.7 °C) 99 20 137/82 97 %   12/24/21 2014 97.9 °F (36.6 °C) 92 19 126/83 97 %   12/24/21 1600 -- 86 -- -- --   12/24/21 1550 98 °F (36.7 °C) (!) 128 19 (!) 131/102 94 %   12/24/21 1306 -- 89 -- -- --   12/24/21 1219 97.9 °F (36.6 °C) (!) 130 20 (!) 162/98 97 %   12/24/21 1200 -- 89 -- -- --     Oxygen Therapy  O2 Sat (%): 97 % (12/25/21 0715)  Pulse via Oximetry: 90 beats per minute (12/23/21 1308)  O2 Device: None (Room air) (12/25/21 0715)  Skin Assessment: Clean, dry, & intact (12/17/21 9344)  Skin Protection for O2 Device: No (12/17/21 0300)  Orientation: Bilateral (12/16/21 0800)  Location: Cheek (12/16/21 0800)  Interventions: Mouth Care;Hydrocolloid Dressing (12/16/21 0800)  O2 Flow Rate (L/min): 1 l/min (12/17/21 2028)  FIO2 (%): 28 % (12/17/21 1013)    Estimated body mass index is 47.93 kg/m² as calculated from the following:    Height as of this encounter: 5' 7\" (1.702 m). Weight as of this encounter: 138.8 kg (306 lb).     Intake/Output Summary (Last 24 hours) at 12/25/2021 1026  Last data filed at 12/25/2021 0850  Gross per 24 hour   Intake 300 ml   Output 1375 ml   Net -1075 ml         Physical Exam:   Blood pressure 103/65, pulse 100, temperature 97.7 °F (36.5 °C), resp. rate 18, height 5' 7\" (1.702 m), weight 138.8 kg (306 lb), SpO2 97 %. General:    Well nourished. No overt distress, BMI 47.93  Head:  Normocephalic, atraumatic  Eyes:  Sclerae appear normal.  Pupils equally round. ENT:  Nares appear normal, no drainage. Moist oral mucosa  Neck:  No restricted ROM. Trachea midline   CV:   RRR. No m/r/g. No jugular venous distension. Triple lumen left upper chest.  Lungs:   CTAB. No wheezing, rhonchi, or rales. Respirations even, unlabored  Abdomen: Bowel sounds present. Soft, nontender, nondistended. Abdominal dressing dry/intact. Extremities: No cyanosis or clubbing. No edema  Skin:     No rashes and normal coloration. Warm and dry. Neuro:  CN II-XII grossly intact. Sensation intact. A&Ox3  Psych:  Normal mood and affect.       I have reviewed ordered lab tests and independently visualized imaging below:    Recent Labs:  Recent Results (from the past 48 hour(s))   GLUCOSE, POC    Collection Time: 12/23/21 10:42 AM   Result Value Ref Range    Glucose (POC) 136 (H) 65 - 100 mg/dL    Performed by Fili)    ECHO ADULT COMPLETE    Collection Time: 12/23/21 11:30 AM   Result Value Ref Range    LA Minor Axis 6.0 cm    LA Major Axis 5.2 cm    LA Area 2C 23.5 cm2    LA Area 4C 20.0 cm2    LA Volume BP 70 (A) 22 - 52 mL    LA Diameter 3.8 cm    LV EDV A4C 79 mL    LV ESV A4C 41 mL    IVSd 1.0 (A) 0.6 - 0.9 cm    LVIDd 4.7 3.9 - 5.3 cm    LVIDs 3.3 cm    LVOT Diameter 2.1 cm    LVOT Mean Gradient 2 mmHg    LVOT VTI 23.1 cm    LVOT Peak Velocity 1.1 m/s    LVOT Peak Gradient 5 mmHg    LVPWd 1.1 (A) 0.6 - 0.9 cm    LV E' Lateral Velocity 11 cm/s    LV E' Septal Velocity 11 cm/s    LV Ejection Fraction A4C 48 %    LVOT Area 3.5 cm2    LVOT SV 80.0 ml AV Cusp Mmode 1.7 cm    AV Mean Velocity 0.9 m/s    AV Mean Gradient 4 mmHg    AV VTI 30.6 cm    AV Peak Velocity 1.6 m/s    AV Peak Gradient 10 mmHg    AV Area by VTI 2.5 cm2    AV Area by Peak Velocity 2.4 cm2    Ascending Aorta 3.1 cm    MV E Wave Deceleration Time 246.0 ms    MV A Velocity 0.45 m/s    MV E Velocity 1.22 m/s    MV Mean Velocity 0.6 m/s    MV Mean Gradient 2 mmHg    MV VTI 23.8 cm    MV Max Velocity 1.3 m/s    MV Peak Gradient 7 mmHg    MV Area by VTI 3.4 cm2    Est. RA Pressure 8 mmHg    RVIDd 2.3 cm    RV Basal Dimension 3.5 cm    TAPSE 2.0 1.5 - 2.0 cm    TR Max Velocity 2.19 m/s    TR Peak Gradient 19 mmHg    TV PG 14 mmHg    Fractional Shortening 2D 30 28 - 44 %    LV ESV Index A4C 17 mL/m2    LV EDV Index A4C 32 mL/m2    LVIDd Index 1.92 cm/m2    LVIDs Index 1.35 cm/m2    LV RWT Ratio 0.47     LV Mass 2D 175.8 (A) 67 - 162 g    LV Mass 2D Index 71.8 43 - 95 g/m2    MV E/A 2.71     E/E' Ratio (Averaged) 11.09     E/E' Lateral 11.09     E/E' Septal 11.09     LA Volume Index BP 29 16 - 34 ml/m2    LVOT Stroke Volume Index 32.6 mL/m2    LA Size Index 1.55 cm/m2    Ascending Aorta Index 1.27 cm/m2    AV Velocity Ratio 0.69     LVOT:AV VTI Index 0.75     JOSE RAFAEL/BSA VTI 1.0 cm2/m2    JOSE RAFAEL/BSA Peak Velocity 1.0 cm2/m2    MV:LVOT VTI Index 1.03     RVSP 27 mmHg   HEP B SURFACE AB    Collection Time: 12/23/21 12:20 PM   Result Value Ref Range    Hepatitis B surface Ab 33.18 mIU/mL   HEP B SURFACE AG    Collection Time: 12/23/21 12:20 PM   Result Value Ref Range    Hep B Surface Ag NONREACTIVE NR     HEPATITIS B CORE AB, TOTAL    Collection Time: 12/23/21 12:20 PM   Result Value Ref Range    Hep B Core Ab, total Negative Negative     GLUCOSE, POC    Collection Time: 12/23/21  4:37 PM   Result Value Ref Range    Glucose (POC) 182 (H) 65 - 100 mg/dL    Performed by HollyApril    GLUCOSE, POC    Collection Time: 12/23/21  8:58 PM   Result Value Ref Range    Glucose (POC) 302 (H) 65 - 100 mg/dL Performed by ChelseyMATEO    METABOLIC PANEL, COMPREHENSIVE    Collection Time: 12/24/21  7:00 AM   Result Value Ref Range    Sodium 136 136 - 145 mmol/L    Potassium 5.2 (H) 3.5 - 5.1 mmol/L    Chloride 103 98 - 107 mmol/L    CO2 24 21 - 32 mmol/L    Anion gap 9 7 - 16 mmol/L    Glucose 97 65 - 100 mg/dL    BUN 98 (H) 6 - 23 MG/DL    Creatinine 8.82 (H) 0.6 - 1.0 MG/DL    GFR est AA 6 (L) >60 ml/min/1.73m2    GFR est non-AA 5 (L) >60 ml/min/1.73m2    Calcium 8.4 8.3 - 10.4 MG/DL    Bilirubin, total 0.3 0.2 - 1.1 MG/DL    ALT (SGPT) 9 (L) 12 - 65 U/L    AST (SGOT) 17 15 - 37 U/L    Alk. phosphatase 64 50 - 136 U/L    Protein, total 6.3 6.3 - 8.2 g/dL    Albumin 2.1 (L) 3.5 - 5.0 g/dL    Globulin 4.2 (H) 2.3 - 3.5 g/dL    A-G Ratio 0.5 (L) 1.2 - 3.5     GLUCOSE, POC    Collection Time: 12/24/21 11:22 AM   Result Value Ref Range    Glucose (POC) 157 (H) 65 - 100 mg/dL    Performed by Kaden    GLUCOSE, POC    Collection Time: 12/24/21  4:05 PM   Result Value Ref Range    Glucose (POC) 205 (H) 65 - 100 mg/dL    Performed by Gonsalez (Castillo)    GLUCOSE, POC    Collection Time: 12/24/21  8:48 PM   Result Value Ref Range    Glucose (POC) 215 (H) 65 - 100 mg/dL    Performed by Zuleima    METABOLIC PANEL, COMPREHENSIVE    Collection Time: 12/25/21  4:30 AM   Result Value Ref Range    Sodium 137 136 - 145 mmol/L    Potassium 5.0 3.5 - 5.1 mmol/L    Chloride 104 98 - 107 mmol/L    CO2 24 21 - 32 mmol/L    Anion gap 9 7 - 16 mmol/L    Glucose 158 (H) 65 - 100 mg/dL    BUN 72 (H) 6 - 23 MG/DL    Creatinine 7.27 (H) 0.6 - 1.0 MG/DL    GFR est AA 8 (L) >60 ml/min/1.73m2    GFR est non-AA 6 (L) >60 ml/min/1.73m2    Calcium 8.0 (L) 8.3 - 10.4 MG/DL    Bilirubin, total 0.3 0.2 - 1.1 MG/DL    ALT (SGPT) 15 12 - 65 U/L    AST (SGOT) 19 15 - 37 U/L    Alk.  phosphatase 71 50 - 136 U/L    Protein, total 5.6 (L) 6.3 - 8.2 g/dL    Albumin 2.2 (L) 3.5 - 5.0 g/dL    Globulin 3.4 2.3 - 3.5 g/dL    A-G Ratio 0.6 (L) 1.2 - 3.5     CBC WITH AUTOMATED DIFF    Collection Time: 12/25/21  4:30 AM   Result Value Ref Range    WBC 10.6 4.3 - 11.1 K/uL    RBC 2.90 (L) 4.05 - 5.2 M/uL    HGB 8.7 (L) 11.7 - 15.4 g/dL    HCT 27.5 (L) 35.8 - 46.3 %    MCV 94.8 79.6 - 97.8 FL    MCH 30.0 26.1 - 32.9 PG    MCHC 31.6 31.4 - 35.0 g/dL    RDW 14.5 11.9 - 14.6 %    PLATELET 889 353 - 094 K/uL    MPV 9.8 9.4 - 12.3 FL    ABSOLUTE NRBC 0.00 0.0 - 0.2 K/uL    DF AUTOMATED      NEUTROPHILS 69 43 - 78 %    LYMPHOCYTES 15 13 - 44 %    MONOCYTES 8 4.0 - 12.0 %    EOSINOPHILS 2 0.5 - 7.8 %    BASOPHILS 1 0.0 - 2.0 %    IMMATURE GRANULOCYTES 5 0.0 - 5.0 %    ABS. NEUTROPHILS 7.3 1.7 - 8.2 K/UL    ABS. LYMPHOCYTES 1.6 0.5 - 4.6 K/UL    ABS. MONOCYTES 0.9 0.1 - 1.3 K/UL    ABS. EOSINOPHILS 0.2 0.0 - 0.8 K/UL    ABS. BASOPHILS 0.1 0.0 - 0.2 K/UL    ABS. IMM. GRANS. 0.5 0.0 - 0.5 K/UL   GLUCOSE, POC    Collection Time: 12/25/21  6:53 AM   Result Value Ref Range    Glucose (POC) 168 (H) 65 - 100 mg/dL    Performed by Erasto Guan        All Micro Results     Procedure Component Value Units Date/Time    CULTURE, ANAEROBIC [[id number]] Collected: 12/15/21 1710    Order Status: Completed Specimen: Peritoneum Updated: 12/22/21 3501     Special Requests: NO SPECIAL REQUESTS        Culture result:       MULTIPLE ANAEROBES ISOLATED, SUSCEPTIBILITY AND IDENTIFICATION AVAILABLE UPON REQUEST. ORGANISM WILL BE HELD FOR 5 DAYS.           BLOOD CULTURE [[id number]] Collected: 12/15/21 1215    Order Status: Completed Specimen: Blood Updated: 12/20/21 2038     Special Requests: --        LEFT  HAND       Culture result: NO GROWTH 5 DAYS       BLOOD CULTURE [[id number]] Collected: 12/15/21 1236    Order Status: Completed Specimen: Blood Updated: 12/20/21 0653     Special Requests: --        RIGHT  HAND       Culture result: NO GROWTH 5 DAYS       CULTURE, WOUND Elinor Cordial STAIN [[id number]]  (Abnormal)  (Susceptibility) Collected: 12/15/21 1323    Order Status: Completed Specimen: Wound from Groin Updated: 12/19/21 0732     Special Requests: NO SPECIAL REQUESTS        GRAM STAIN 0 TO 36 WBCS PER OIF      MANY GRAM POSITIVE COCCI               MODERATE GRAM NEGATIVE RODS                  MODERATE GRAM POSITIVE RODS           Culture result:       MODERATE PROTEUS MIRABILIS                  MODERATE ENTEROCOCCUS FAECALIS GROUP D                  LIGHT MIXED SKIN MASSIMO ISOLATED          CULTURE, Alexandra Downer STAIN [[id number]]  (Abnormal)  (Susceptibility) Collected: 12/15/21 1710    Order Status: Completed Specimen: Peritoneum Updated: 12/18/21 0936     Special Requests: NO SPECIAL REQUESTS        GRAM STAIN 0 TO 6 WBCS PER OIF      MANY GRAM POSITIVE COCCI         MANY GRAM NEGATIVE RODS        Culture result:       MODERATE PROTEUS MIRABILIS                  MODERATE STAPHYLOCOCCUS SPECIES, COAGULASE NEGATIVE THIS ORGANISM WILL BE HELD FOR 7 DAYS. IF FURTHER TESTING IS REQUIRED PLEASE NOTIFY MICROBIOLOGY          COVID-19 RAPID TEST [[id number]] Collected: 12/15/21 1519    Order Status: Completed Specimen: Nasopharyngeal Updated: 12/15/21 1549     Specimen source NASAL        COVID-19 rapid test Not detected        Comment:      The specimen is NEGATIVE for SARS-CoV-2, the novel coronavirus associated with COVID-19. A negative result does not rule out COVID-19. This test has been authorized by the FDA under an Emergency Use Authorization (EUA) for use by authorized laboratories. Fact sheet for Healthcare Providers: ConventionUpdate.co.nz  Fact sheet for Patients: ConventionUpdate.co.nz       Methodology: Isothermal Nucleic Acid Amplification               Other Studies:  No results found.     Current Meds:  Current Facility-Administered Medications   Medication Dose Route Frequency    insulin glargine (LANTUS) injection 25 Units  25 Units SubCUTAneous DAILY    ampicillin-sulbactam (UNASYN) 3 g in 0.9% sodium chloride (MBP/ADV) 100 mL MBP  3 g IntraVENous Q24H    insulin lispro (HUMALOG) injection 3 Units  3 Units SubCUTAneous TIDAC    amLODIPine (NORVASC) tablet 5 mg  5 mg Oral DAILY    sertraline (ZOLOFT) tablet 100 mg  100 mg Oral DAILY    metoprolol tartrate (LOPRESSOR) tablet 100 mg  100 mg Oral BID    oxyCODONE-acetaminophen (PERCOCET) 5-325 mg per tablet 1 Tablet  1 Tablet Oral Q4H PRN    famotidine (PEPCID) tablet 20 mg  20 mg Oral DAILY    calcium acetate (phosphate binder) (PHOSLO) tablet 667 mg  1 Tablet Oral TID WITH MEALS    insulin lispro (HUMALOG) injection   SubCUTAneous AC&HS    [Held by provider] heparin (porcine) injection 5,000 Units  5,000 Units SubCUTAneous Q8H    0.9% sodium chloride infusion 250 mL  250 mL IntraVENous PRN    sodium chloride (NS) flush 5-40 mL  5-40 mL IntraVENous Q8H    sodium chloride (NS) flush 5-40 mL  5-40 mL IntraVENous PRN    ondansetron (ZOFRAN ODT) tablet 4 mg  4 mg Oral Q8H PRN    Or    ondansetron (ZOFRAN) injection 4 mg  4 mg IntraVENous Q6H PRN    potassium chloride 10 mEq in 100 ml IVPB  10 mEq IntraVENous PRN    magnesium sulfate 2 g/50 ml IVPB (premix or compounded)  2 g IntraVENous PRN    HYDROmorphone (DILAUDID) injection 0.5 mg  0.5 mg IntraVENous Q3H PRN       Signed:  Eliot Lundberg NP

## 2021-12-25 NOTE — PROGRESS NOTES
Problem: Pressure Injury - Risk of  Goal: *Prevention of pressure injury  Description: Document Juventino Scale and appropriate interventions in the flowsheet. Outcome: Progressing Towards Goal  Note: Pressure Injury Interventions:  Sensory Interventions: Assess changes in LOC,Keep linens dry and wrinkle-free,Maintain/enhance activity level,Minimize linen layers,Monitor skin under medical devices,Turn and reposition approx. every two hours (pillows and wedges if needed)    Moisture Interventions: Absorbent underpads,Apply protective barrier, creams and emollients,Check for incontinence Q2 hours and as needed,Contain wound drainage,Limit adult briefs,Maintain skin hydration (lotion/cream),Minimize layers,Moisture barrier    Activity Interventions: Assess need for specialty bed,Increase time out of bed,Pressure redistribution bed/mattress(bed type)    Mobility Interventions: Assess need for specialty bed,Float heels,HOB 30 degrees or less,Pressure redistribution bed/mattress (bed type)    Nutrition Interventions: Document food/fluid/supplement intake    Friction and Shear Interventions: Apply protective barrier, creams and emollients,Foam dressings/transparent film/skin sealants,HOB 30 degrees or less,Lift sheet,Lift team/patient mobility team,Minimize layers                Problem: Patient Education: Go to Patient Education Activity  Goal: Patient/Family Education  Outcome: Progressing Towards Goal     Problem: Patient Education: Go to Patient Education Activity  Goal: Patient/Family Education  Outcome: Resolved/Met     Problem: Falls - Risk of  Goal: *Absence of Falls  Description: Document Maya Fall Risk and appropriate interventions in the flowsheet.   Outcome: Progressing Towards Goal  Note: Fall Risk Interventions:  Mobility Interventions: Bed/chair exit alarm,Communicate number of staff needed for ambulation/transfer,Patient to call before getting OOB    Mentation Interventions: Adequate sleep, hydration, pain control,Evaluate medications/consider consulting pharmacy,More frequent rounding,Reorient patient,Toileting rounds    Medication Interventions: Evaluate medications/consider consulting pharmacy,Patient to call before getting OOB,Teach patient to arise slowly,Bed/chair exit alarm    Elimination Interventions: Bed/chair exit alarm,Call light in reach,Patient to call for help with toileting needs,Stay With Me (per policy),Toilet paper/wipes in reach,Toileting schedule/hourly rounds    History of Falls Interventions: Bed/chair exit alarm,Consult care management for discharge planning,Door open when patient unattended,Evaluate medications/consider consulting pharmacy         Problem: Patient Education: Go to Patient Education Activity  Goal: Patient/Family Education  Outcome: Progressing Towards Goal     Problem: Diabetes Self-Management  Goal: *Disease process and treatment process  Description: Define diabetes and identify own type of diabetes; list 3 options for treating diabetes. Outcome: Progressing Towards Goal  Goal: *Incorporating nutritional management into lifestyle  Description: Describe effect of type, amount and timing of food on blood glucose; list 3 methods for planning meals. Outcome: Progressing Towards Goal  Goal: *Incorporating physical activity into lifestyle  Description: State effect of exercise on blood glucose levels. Outcome: Progressing Towards Goal  Goal: *Developing strategies to promote health/change behavior  Description: Define the ABC's of diabetes; identify appropriate screenings, schedule and personal plan for screenings. Outcome: Progressing Towards Goal  Goal: *Using medications safely  Description: State effect of diabetes medications on diabetes; name diabetes medication taking, action and side effects.   Outcome: Progressing Towards Goal  Goal: *Monitoring blood glucose, interpreting and using results  Description: Identify recommended blood glucose targets  and personal targets. Outcome: Progressing Towards Goal  Goal: *Prevention, detection, treatment of acute complications  Description: List symptoms of hyper- and hypoglycemia; describe how to treat low blood sugar and actions for lowering  high blood glucose level. Outcome: Progressing Towards Goal  Goal: *Prevention, detection and treatment of chronic complications  Description: Define the natural course of diabetes and describe the relationship of blood glucose levels to long term complications of diabetes.   Outcome: Progressing Towards Goal  Goal: *Developing strategies to address psychosocial issues  Description: Describe feelings about living with diabetes; identify support needed and support network  Outcome: Progressing Towards Goal  Goal: *Insulin pump training  Outcome: Progressing Towards Goal  Goal: *Sick day guidelines  Outcome: Progressing Towards Goal  Goal: *Patient Specific Goal (EDIT GOAL, INSERT TEXT)  Outcome: Progressing Towards Goal     Problem: Patient Education: Go to Patient Education Activity  Goal: Patient/Family Education  Outcome: Progressing Towards Goal

## 2021-12-26 LAB
ALBUMIN SERPL-MCNC: 2.1 G/DL (ref 3.5–5)
ALBUMIN/GLOB SERPL: 0.5 {RATIO} (ref 1.2–3.5)
ALP SERPL-CCNC: 64 U/L (ref 50–136)
ALT SERPL-CCNC: 12 U/L (ref 12–65)
ANION GAP SERPL CALC-SCNC: 11 MMOL/L (ref 7–16)
AST SERPL-CCNC: 12 U/L (ref 15–37)
BASOPHILS # BLD: 0.1 K/UL (ref 0–0.2)
BASOPHILS NFR BLD: 1 % (ref 0–2)
BILIRUB SERPL-MCNC: 0.3 MG/DL (ref 0.2–1.1)
BUN SERPL-MCNC: 76 MG/DL (ref 6–23)
CALCIUM SERPL-MCNC: 8.4 MG/DL (ref 8.3–10.4)
CHLORIDE SERPL-SCNC: 104 MMOL/L (ref 98–107)
CO2 SERPL-SCNC: 24 MMOL/L (ref 21–32)
CREAT SERPL-MCNC: 7.75 MG/DL (ref 0.6–1)
DIFFERENTIAL METHOD BLD: ABNORMAL
EOSINOPHIL # BLD: 0.2 K/UL (ref 0–0.8)
EOSINOPHIL NFR BLD: 2 % (ref 0.5–7.8)
ERYTHROCYTE [DISTWIDTH] IN BLOOD BY AUTOMATED COUNT: 14.6 % (ref 11.9–14.6)
GLOBULIN SER CALC-MCNC: 4.1 G/DL (ref 2.3–3.5)
GLUCOSE BLD STRIP.AUTO-MCNC: 126 MG/DL (ref 65–100)
GLUCOSE BLD STRIP.AUTO-MCNC: 175 MG/DL (ref 65–100)
GLUCOSE BLD STRIP.AUTO-MCNC: 186 MG/DL (ref 65–100)
GLUCOSE BLD STRIP.AUTO-MCNC: 213 MG/DL (ref 65–100)
GLUCOSE SERPL-MCNC: 149 MG/DL (ref 65–100)
HCT VFR BLD AUTO: 26.8 % (ref 35.8–46.3)
HGB BLD-MCNC: 8.2 G/DL (ref 11.7–15.4)
IMM GRANULOCYTES # BLD AUTO: 0.4 K/UL (ref 0–0.5)
IMM GRANULOCYTES NFR BLD AUTO: 3 % (ref 0–5)
LYMPHOCYTES # BLD: 1.8 K/UL (ref 0.5–4.6)
LYMPHOCYTES NFR BLD: 15 % (ref 13–44)
MCH RBC QN AUTO: 29.7 PG (ref 26.1–32.9)
MCHC RBC AUTO-ENTMCNC: 30.6 G/DL (ref 31.4–35)
MCV RBC AUTO: 97.1 FL (ref 79.6–97.8)
MONOCYTES # BLD: 0.9 K/UL (ref 0.1–1.3)
MONOCYTES NFR BLD: 8 % (ref 4–12)
NEUTS SEG # BLD: 8.5 K/UL (ref 1.7–8.2)
NEUTS SEG NFR BLD: 72 % (ref 43–78)
NRBC # BLD: 0 K/UL (ref 0–0.2)
PHOSPHATE SERPL-MCNC: 8.1 MG/DL (ref 2.5–4.5)
PLATELET # BLD AUTO: 296 K/UL (ref 150–450)
PMV BLD AUTO: 10.1 FL (ref 9.4–12.3)
POTASSIUM SERPL-SCNC: 4.9 MMOL/L (ref 3.5–5.1)
PROT SERPL-MCNC: 6.2 G/DL (ref 6.3–8.2)
RBC # BLD AUTO: 2.76 M/UL (ref 4.05–5.2)
SERVICE CMNT-IMP: ABNORMAL
SODIUM SERPL-SCNC: 139 MMOL/L (ref 136–145)
WBC # BLD AUTO: 11.8 K/UL (ref 4.3–11.1)

## 2021-12-26 PROCEDURE — 82962 GLUCOSE BLOOD TEST: CPT

## 2021-12-26 PROCEDURE — 84100 ASSAY OF PHOSPHORUS: CPT

## 2021-12-26 PROCEDURE — 74011636637 HC RX REV CODE- 636/637: Performed by: STUDENT IN AN ORGANIZED HEALTH CARE EDUCATION/TRAINING PROGRAM

## 2021-12-26 PROCEDURE — 74011250637 HC RX REV CODE- 250/637: Performed by: NURSE PRACTITIONER

## 2021-12-26 PROCEDURE — 77030018836 HC SOL IRR NACL ICUM -A

## 2021-12-26 PROCEDURE — 85025 COMPLETE CBC W/AUTO DIFF WBC: CPT

## 2021-12-26 PROCEDURE — 74011250636 HC RX REV CODE- 250/636: Performed by: STUDENT IN AN ORGANIZED HEALTH CARE EDUCATION/TRAINING PROGRAM

## 2021-12-26 PROCEDURE — 2709999900 HC NON-CHARGEABLE SUPPLY

## 2021-12-26 PROCEDURE — 80053 COMPREHEN METABOLIC PANEL: CPT

## 2021-12-26 PROCEDURE — 74011000258 HC RX REV CODE- 258: Performed by: STUDENT IN AN ORGANIZED HEALTH CARE EDUCATION/TRAINING PROGRAM

## 2021-12-26 PROCEDURE — 74011636637 HC RX REV CODE- 636/637: Performed by: NURSE PRACTITIONER

## 2021-12-26 PROCEDURE — 74011250636 HC RX REV CODE- 250/636: Performed by: SURGERY

## 2021-12-26 PROCEDURE — 65660000000 HC RM CCU STEPDOWN

## 2021-12-26 PROCEDURE — 74011250637 HC RX REV CODE- 250/637: Performed by: INTERNAL MEDICINE

## 2021-12-26 PROCEDURE — 90935 HEMODIALYSIS ONE EVALUATION: CPT

## 2021-12-26 RX ADMIN — FAMOTIDINE 20 MG: 20 TABLET ORAL at 13:11

## 2021-12-26 RX ADMIN — CALCIUM ACETATE 667 MG: 667 TABLET ORAL at 13:11

## 2021-12-26 RX ADMIN — ONDANSETRON 4 MG: 2 INJECTION INTRAMUSCULAR; INTRAVENOUS at 09:33

## 2021-12-26 RX ADMIN — INSULIN GLARGINE 25 UNITS: 100 INJECTION, SOLUTION SUBCUTANEOUS at 13:27

## 2021-12-26 RX ADMIN — Medication 10 ML: at 05:31

## 2021-12-26 RX ADMIN — Medication 10 ML: at 18:09

## 2021-12-26 RX ADMIN — HYDROMORPHONE HYDROCHLORIDE 0.5 MG: 1 INJECTION, SOLUTION INTRAMUSCULAR; INTRAVENOUS; SUBCUTANEOUS at 18:34

## 2021-12-26 RX ADMIN — CALCIUM ACETATE 667 MG: 667 TABLET ORAL at 18:08

## 2021-12-26 RX ADMIN — Medication 3 UNITS: at 18:09

## 2021-12-26 RX ADMIN — METOPROLOL TARTRATE 100 MG: 50 TABLET, FILM COATED ORAL at 13:28

## 2021-12-26 RX ADMIN — SERTRALINE 100 MG: 50 TABLET, FILM COATED ORAL at 13:11

## 2021-12-26 RX ADMIN — Medication 10 ML: at 21:45

## 2021-12-26 RX ADMIN — Medication 4 UNITS: at 21:45

## 2021-12-26 RX ADMIN — AMLODIPINE BESYLATE 5 MG: 5 TABLET ORAL at 13:11

## 2021-12-26 RX ADMIN — METOPROLOL TARTRATE 100 MG: 50 TABLET, FILM COATED ORAL at 18:08

## 2021-12-26 RX ADMIN — Medication 2 UNITS: at 18:08

## 2021-12-26 RX ADMIN — AMPICILLIN SODIUM AND SULBACTAM SODIUM 3 G: 2; 1 INJECTION, POWDER, FOR SOLUTION INTRAMUSCULAR; INTRAVENOUS at 13:11

## 2021-12-26 NOTE — PROGRESS NOTES
Problem: Pressure Injury - Risk of  Goal: *Prevention of pressure injury  Description: Document Juventino Scale and appropriate interventions in the flowsheet. Outcome: Progressing Towards Goal  Note: Pressure Injury Interventions:  Sensory Interventions: Assess changes in LOC,Keep linens dry and wrinkle-free,Maintain/enhance activity level,Minimize linen layers,Monitor skin under medical devices,Turn and reposition approx. every two hours (pillows and wedges if needed)    Moisture Interventions: Absorbent underpads,Apply protective barrier, creams and emollients,Check for incontinence Q2 hours and as needed    Activity Interventions: Assess need for specialty bed,PT/OT evaluation,Increase time out of bed    Mobility Interventions: Assess need for specialty bed,Pressure redistribution bed/mattress (bed type),PT/OT evaluation,HOB 30 degrees or less    Nutrition Interventions: Document food/fluid/supplement intake    Friction and Shear Interventions: HOB 30 degrees or less,Apply protective barrier, creams and emollients                Problem: Patient Education: Go to Patient Education Activity  Goal: Patient/Family Education  Outcome: Progressing Towards Goal     Problem: Falls - Risk of  Goal: *Absence of Falls  Description: Document Maya Fall Risk and appropriate interventions in the flowsheet.   Outcome: Progressing Towards Goal  Note: Fall Risk Interventions:  Mobility Interventions: Bed/chair exit alarm    Mentation Interventions: Family/sitter at bedside    Medication Interventions: Patient to call before getting OOB,Teach patient to arise slowly    Elimination Interventions: Bed/chair exit alarm,Call light in reach,Patient to call for help with toileting needs,Toileting schedule/hourly rounds    History of Falls Interventions: Bed/chair exit alarm         Problem: Patient Education: Go to Patient Education Activity  Goal: Patient/Family Education  Outcome: Progressing Towards Goal     Problem: Diabetes Self-Management  Goal: *Disease process and treatment process  Description: Define diabetes and identify own type of diabetes; list 3 options for treating diabetes. Outcome: Progressing Towards Goal  Goal: *Incorporating nutritional management into lifestyle  Description: Describe effect of type, amount and timing of food on blood glucose; list 3 methods for planning meals. Outcome: Progressing Towards Goal  Goal: *Incorporating physical activity into lifestyle  Description: State effect of exercise on blood glucose levels. Outcome: Progressing Towards Goal  Goal: *Developing strategies to promote health/change behavior  Description: Define the ABC's of diabetes; identify appropriate screenings, schedule and personal plan for screenings. Outcome: Progressing Towards Goal  Goal: *Using medications safely  Description: State effect of diabetes medications on diabetes; name diabetes medication taking, action and side effects. Outcome: Progressing Towards Goal  Goal: *Monitoring blood glucose, interpreting and using results  Description: Identify recommended blood glucose targets  and personal targets. Outcome: Progressing Towards Goal  Goal: *Prevention, detection, treatment of acute complications  Description: List symptoms of hyper- and hypoglycemia; describe how to treat low blood sugar and actions for lowering  high blood glucose level. Outcome: Progressing Towards Goal  Goal: *Prevention, detection and treatment of chronic complications  Description: Define the natural course of diabetes and describe the relationship of blood glucose levels to long term complications of diabetes.   Outcome: Progressing Towards Goal  Goal: *Developing strategies to address psychosocial issues  Description: Describe feelings about living with diabetes; identify support needed and support network  Outcome: Progressing Towards Goal  Goal: *Insulin pump training  Outcome: Progressing Towards Goal  Goal: *Sick day guidelines  Outcome: Progressing Towards Goal  Goal: *Patient Specific Goal (EDIT GOAL, INSERT TEXT)  Outcome: Progressing Towards Goal     Problem: Patient Education: Go to Patient Education Activity  Goal: Patient/Family Education  Outcome: Progressing Towards Goal

## 2021-12-26 NOTE — PROGRESS NOTES
TRANSFER - OUT REPORT:    Verbal report given to Maria R RN on [address]  being transferred to dialysis for routine progression of care       Report consisted of patients Situation, Background, Assessment and   Recommendations(SBAR). Information from the following report(s) SBAR was reviewed with the receiving nurse.       Patient transported with:  transport

## 2021-12-26 NOTE — PROGRESS NOTES
Pt resting, spouse in recliner, pt denies any needs at this time. Bed in low and locked position, call light and personal items within reach. Will continue to monitor and give bedside report to oncoming nurse.

## 2021-12-26 NOTE — PROGRESS NOTES
TRANSFER OUT- DIALYSIS    Hemodialysis treatment completed without complications. Patient alert and VS stable  BP 86/67  P 100       0 Kgs removed. Flushed both ports with 10 mL of NS.  CVC dressing clean, dry, and intact, tego caps intact, bilateral lumens wrapped with 4x4 gauze. Meds given-see Mar. 0 units of RBCs given during dialysis. Patient to 612 after dialysis.       12/26/21 1016   During Hemodialysis    Pulse (Heart Rate) 100   BP 90/66   MAP (Calculated) 74   Transducer Checks Dry   Fluid Removed (mL) 600   NET Fluid Removed (mL) 0 ml   Post-Dialysis   Rinseback Volume (ml) 300 ml   Duration of Treatment (hours) 2.5 hours   Patient Response to Treatment Well   Patient Disposition Return to room   Condition of Dialyzer Filter Fair   Hemodialysis End Time 1026   $$ Dialysis Charges   $$ Method Hemodialysis

## 2021-12-26 NOTE — PROGRESS NOTES
[address]  Admission Date: 12/15/2021         [address] Nephrology Progress Note: 12/26/2021    Follow-up for: DEVENDRA on CKD      Subjective:   Seen on HD #2 today. Doing well. No acute events overnight.  .     She feels well    ROS:  Gen - no fever, no chills, appetite unchanged  CV - no chest pain, no palpitation  Lung - no shortness of breath, no cough  Abd - no tenderness, no nausea/vomiting, no diarrhea  Ext - + edema    Current Facility-Administered Medications   Medication Dose Route Frequency    insulin glargine (LANTUS) injection 25 Units  25 Units SubCUTAneous DAILY    ampicillin-sulbactam (UNASYN) 3 g in 0.9% sodium chloride (MBP/ADV) 100 mL MBP  3 g IntraVENous Q24H    insulin lispro (HUMALOG) injection 3 Units  3 Units SubCUTAneous TIDAC    amLODIPine (NORVASC) tablet 5 mg  5 mg Oral DAILY    sertraline (ZOLOFT) tablet 100 mg  100 mg Oral DAILY    metoprolol tartrate (LOPRESSOR) tablet 100 mg  100 mg Oral BID    oxyCODONE-acetaminophen (PERCOCET) 5-325 mg per tablet 1 Tablet  1 Tablet Oral Q4H PRN    famotidine (PEPCID) tablet 20 mg  20 mg Oral DAILY    calcium acetate (phosphate binder) (PHOSLO) tablet 667 mg  1 Tablet Oral TID WITH MEALS    insulin lispro (HUMALOG) injection   SubCUTAneous AC&HS    [Held by provider] heparin (porcine) injection 5,000 Units  5,000 Units SubCUTAneous Q8H    0.9% sodium chloride infusion 250 mL  250 mL IntraVENous PRN    sodium chloride (NS) flush 5-40 mL  5-40 mL IntraVENous Q8H    sodium chloride (NS) flush 5-40 mL  5-40 mL IntraVENous PRN    ondansetron (ZOFRAN ODT) tablet 4 mg  4 mg Oral Q8H PRN    Or    ondansetron (ZOFRAN) injection 4 mg  4 mg IntraVENous Q6H PRN    potassium chloride 10 mEq in 100 ml IVPB  10 mEq IntraVENous PRN    magnesium sulfate 2 g/50 ml IVPB (premix or compounded)  2 g IntraVENous PRN    HYDROmorphone (DILAUDID) injection 0.5 mg  0.5 mg IntraVENous Q3H PRN         Objective:     Vitals:    12/25/21 451 31 382 12/26/21 0356 12/26/21 0725 12/26/21 0751   BP: (!) 149/73 (!) 153/79 119/62 139/81   Pulse: 79 74 99 99   Resp: 18 18 18 18   Temp: 98.5 °F (36.9 °C) 98.3 °F (36.8 °C) 98.4 °F (36.9 °C) 98.4 °F (36.9 °C)   SpO2: 96%  97% 97%   Weight:       Height:         Intake and Output:   12/24 1901 - 12/26 0700  In: 360 [P.O.:360]  Out: 1875 [Urine:1875]  No intake/output data recorded. Physical Exam:   Constitutional: AO x 3. in no acute distress  HEENT:  Sclera clear, pupils equal, oral mucosa moist  Lungs: clear to auscultation bilaterally  Cardiovascular:  RRR, no rub  Abd/GI: soft and non-tender; with positive bowel sounds. Ext: warm without cyanosis. There is trace upper and lower leg edema. LIJ HD catheter      LAB  Recent Labs     12/26/21  0625 12/25/21  0430   WBC 11.8* 10.6   HGB 8.2* 8.7*   HCT 26.8* 27.5*    301     Recent Labs     12/26/21  0625 12/25/21  0430 12/24/21  0700    137 136   K 4.9 5.0 5.2*    104 103   CO2 24 24 24   * 158* 97   BUN 76* 72* 98*   CREA 7.75* 7.27* 8.82*   PHOS 8.1*  --   --    ALB 2.1* 2.2* 2.1*     No results for input(s): PH, PCO2, PO2, HCO3 in the last 72 hours.       Assessment:  (Medical Decision Making)     Hospital Problems  Date Reviewed: 12/25/2015          Codes Class Noted POA    Atrial fibrillation with RVR ([facility] ICD-10-CM: I48.91  ICD-9-CM: 427.31  12/22/2021 Unknown        DEVENDRA (acute kidney injury) ([facility] ICD-10-CM: N17.9  ICD-9-CM: 584.9  12/16/2021 Yes        Acute respiratory failure ([facility] ICD-10-CM: J96.00  ICD-9-CM: 518.81  12/16/2021 No        Septic shock (H[facility]) ICD-10-CM: A41.9, R65.21  ICD-9-CM: 038.9, 785.52, 995.92  12/16/2021 Unknown        * (Principal) Necrotizing fasciitis ([place] [address].) ICD-10-CM: M72.6  ICD-9-CM: 728.86  12/15/2021 Yes        Anemia ICD-10-CM: D64.9  ICD-9-CM: 285.9  7/13/2018 Yes        Obesity, morbid (HCC) (Chronic) ICD-10-CM: E66.01  ICD-9-CM: 278.01  7/12/2018 Yes        DM2 (diabetes mellitus, type 2) ([place] [address].) (Chronic) ICD-10-CM: E11.9  ICD-9-CM: 250.00  12/25/2015 Yes        HTN (hypertension) (Chronic) ICD-10-CM: I10  ICD-9-CM: 401.9  12/25/2015 Yes              Plan:  (Medical Decision Making)   1. DEVENDRA with unclear hx of CKD, secondary to DM ( no recent lab with which to compare). Suspect non-oliguric ATN now.  -imaging CT showed some cortical thinning associated with CKD. -vanc level was elevated up to 23.4 on 12/16 off vanc and on unasyn   -Temp HD catheter placement 12/23  -HD initiation 12/24   -HD today for UF and clearance  -Next HD tomorrow    2. Hypermagnesemia  -stopped mag supplement 12/22    3. Hyperphosphatemia  -on binders. Recheck tomorrow. Should improve with ongoing HD. 4. Hyperkalemia - resolved    5. Necrotizing fasciitis. - s/p I&D perineum and trunk, gen surgery following, plastic surgery for wound closure       Ankur Mendez MD  [facility] Nephrology

## 2021-12-26 NOTE — PROGRESS NOTES
Hospitalist Progress Note   Admit Date:  12/15/2021  9:53 AM   Name:  Aimee Felix   Age:  46 y.o. Sex:  female  :  1970   MRN:  [id number]   Room:      Presenting Complaint: Vomiting    Reason(s) for Admission: Necrotizing fasciitis [facility]) [M72.6]     Hospital Course & Interval History:   46 y. o. female with history of Morbid obesity, DM who was admitted for septic shock 2/2 necrotizing fascitis involving her pannus and perineum associated with respiratory failure, severe DEVENDRA (Scr 11), acidosis pH 7.1. She was placed on levo/jessica, intubated and treated with vanc/zosyn initially. Outpatient she was started on bactrim and unable to tolerate it then changed to doxycycline. Per  was not taking in any PO and became progressively weaker at home.  denies known history of renal disease. Made little urine prior to admission. She is s/p excisional debridement on 12/15, . OP note reports foul smelling necrotizing fasciitis extending from the perineum, left labia deep to the pubic bone and along the fascia to the abdominal wall above the iliac bone into the left flank. She rec'd ancef in OR and was changed to vanc/clinda/meropenem on 12/15 by pulmonology. Discussed with , no evidence of osteomyelitis. Extubated . New AFIB RVR with Diltiazem gtt initiated, now on PO Metoprolol. Cardiology following - will need [facility] at d/c once wound is closed. ID is following. Nephrology following for DEVENDRA on CKD now with HD initiated . Plan to return to OR approx.  for additional debridement. Subjective (21):  \"I only have pain if I move around a lot. I feel ok. \"  Pleasant MO 51y.o. WF at HD, without acute complaints. Review of Systems:  10 systems reviewed and negative except as noted in HPI.       Assessment & Plan:     Principal Problem:  Septic Shock ([address]  Necrotizing fasciitis ([address]  - s/p excisional debridement 12/15 and   - off pressors  - wound cultures from 12/15/2021 revealed proteus, coag neg staph, enterococcus; cont unasyn  - wound care dressing changes daily  - plastic surgery Dr. Ngozi Mccormack recommends surgery in 1 week, ~12/29     Active Problems:  DEVENDRA on CKD, now on HD  - continued HD day #2 (newly started this admission)  - appreciate nephrology ongoing assistance  - still hopeful for renal recovery; repeat HD in AM    Atrial Fibrillation with RVR  - off diltiazem gtt, maintained on Metoprolol 100 mg BID  - cards recommended [address] once abd wound closed (signed off); requested to be notified once pt closer to discharge     Acute on Chronic Anemia   ABLA  - required 2units PRBC for Hgb 5.8  - Iron stores and B12 wnl  - H&H currently stable, 8.2 / 26.8 respectively this AM     DM2 (diabetes mellitus, type 2) ([address]   - acceptable range on lantus 25u daily  - cont sliding scale coverage as needed    HTN (hypertension)   - acceptable control with metoprolol / norvasc    Acute respiratory failure ([address] requiring mechanical ventilation, resolved  - Extubated 12/16, remains stable on RA    Obesity, morbid ([address]   - Increases all-cause mortality, needs diet and lifestyle modifications  - wound benefit from wt loss! !      Hyperphosphatemia / hyperkalemia  - defer ongoing mgmt per nephrology  - K+ 4.9 this AM  - repeat HD in AM     Dispo/Discharge Planning:  d/c to [facility] once medically stable     Diet:  ADULT DIET Regular; 4 carb choices (60 gm/meal)  DVT PPx: SCDs  Code status: Full Code    Hospital Problems as of 12/26/2021 Date Reviewed: 12/25/2015          Codes Class Noted - Resolved POA    Atrial fibrillation with RVR ([address] ICD-10-CM: I48.91  ICD-9-CM: 427.31  12/22/2021 - Present Unknown        DEVENDRA (acute kidney injury) ([address] ICD-10-CM: N17.9  ICD-9-CM: 584.9  12/16/2021 - Present Yes        Acute respiratory failure ([address] ICD-10-CM: J96.00  ICD-9-CM: 518.81  12/16/2021 - Present No        Septic shock (HCC) ICD-10-CM: A41.9, R65.21  ICD-9-CM: 038.9, 785.52, 995.92  12/16/2021 - Present Unknown        * (Principal) Necrotizing fasciitis ([facility] ICD-10-CM: M72.6  ICD-9-CM: 728.86  12/15/2021 - Present Yes        Anemia ICD-10-CM: D64.9  ICD-9-CM: 285.9  7/13/2018 - Present Yes        Obesity, morbid ([facility] (Chronic) ICD-10-CM: E66.01  ICD-9-CM: 278.01  7/12/2018 - Present Yes        DM2 (diabetes mellitus, type 2) ([facility] (Chronic) ICD-10-CM: E11.9  ICD-9-CM: 250.00  12/25/2015 - Present Yes        HTN (hypertension) (Chronic) ICD-10-CM: I10  ICD-9-CM: 401.9  12/25/2015 - Present Yes              Objective:     Patient Vitals for the past 24 hrs:   Temp Pulse Resp BP SpO2   12/26/21 1234 98.9 °F (37.2 °C) 99 18 (!) 126/99 98 %   12/26/21 1016 -- 100 -- 90/66 --   12/26/21 1000 -- 100 -- (!) 86/66 --   12/26/21 0930 -- 98 -- (!) 141/84 --   12/26/21 0900 -- 80 18 129/82 --   12/26/21 0830 -- 76 18 131/72 --   12/26/21 0751 98.4 °F (36.9 °C) 99 18 139/81 97 %   12/26/21 0725 98.4 °F (36.9 °C) 99 18 119/62 97 %   12/26/21 0356 98.3 °F (36.8 °C) 74 18 (!) 153/79 --   12/25/21 2344 98.5 °F (36.9 °C) 79 18 (!) 149/73 96 %   12/25/21 1924 97.7 °F (36.5 °C) (!) 57 18 (!) 141/104 93 %   12/25/21 1508 99.3 °F (37.4 °C) (!) 57 18 126/73 97 %     Oxygen Therapy  O2 Sat (%): 98 % (12/26/21 1234)  Pulse via Oximetry: 90 beats per minute (12/25/21 1924)  O2 Device: None (Room air) (12/25/21 1958)  Skin Assessment: Clean, dry, & intact (12/25/21 1924)  Skin Protection for O2 Device: No (12/17/21 0300)  Orientation: Bilateral (12/16/21 0800)  Location: Cheek (12/16/21 0800)  Interventions: Mouth Care;Hydrocolloid Dressing (12/16/21 0800)  O2 Flow Rate (L/min): 1 l/min (12/17/21 2028)  FIO2 (%): 28 % (12/17/21 1013)    Estimated body mass index is 47.93 kg/m² as calculated from the following:    Height as of this encounter: 5' 7\" (1.702 m). Weight as of this encounter: 138.8 kg (306 lb).     Intake/Output Summary (Last 24 hours) at 12/26/2021 1253  Last data filed at 12/26/2021 1016  Gross per 24 hour   Intake 180 ml   Output 1250 ml   Net -1070 ml         Physical Exam:   Blood pressure (!) 126/99, pulse 99, temperature 98.9 °F (37.2 °C), resp. rate 18, height 5' 7\" (1.702 m), weight 138.8 kg (306 lb), SpO2 98 %. General:    Morbidly obese, BMI 47.93. No overt distress   Head:  Normocephalic, atraumatic  Eyes:  Sclerae appear normal.  Pupils equally round. ENT:  Nares appear normal, no drainage. Moist oral mucosa  Neck:  No restricted ROM. Trachea midline   CV:   RRR. No m/r/g. No jugular venous distension. Triple lumen left upper chest.  Lungs:   CTAB. No wheezing, rhonchi, or rales. Respirations even, unlabored  Abdomen: Bowel sounds present. Soft, obese, nondistended. Abdominal dressing dry/intact. Tender to palpation  Extremities: No cyanosis or clubbing. No edema; RLE cooler to touch compared to LLE; 2+ pedal pulses b/l  Skin:     No rashes and normal coloration. Warm and dry. Neuro:  CN II-XII grossly intact. Sensation intact. A&Ox3  Psych:  Normal mood and affect.       I have reviewed ordered lab tests and independently visualized imaging below:    Recent Labs:  Recent Results (from the past 48 hour(s))   GLUCOSE, POC    Collection Time: 12/24/21  4:05 PM   Result Value Ref Range    Glucose (POC) 205 (H) 65 - 100 mg/dL    Performed by Iris Nagy    GLUCOSE, POC    Collection Time: 12/24/21  8:48 PM   Result Value Ref Range    Glucose (POC) 215 (H) 65 - 100 mg/dL    Performed by [facility]    METABOLIC PANEL, COMPREHENSIVE    Collection Time: 12/25/21  4:30 AM   Result Value Ref Range    Sodium 137 136 - 145 mmol/L    Potassium 5.0 3.5 - 5.1 mmol/L    Chloride 104 98 - 107 mmol/L    CO2 24 21 - 32 mmol/L    Anion gap 9 7 - 16 mmol/L    Glucose 158 (H) 65 - 100 mg/dL    BUN 72 (H) 6 - 23 MG/DL    Creatinine 7.27 (H) 0.6 - 1.0 MG/DL    GFR est AA 8 (L) >60 ml/min/1.73m2    GFR est non-AA 6 (L) >60 ml/min/1.73m2    Calcium 8.0 (L) 8.3 - 10.4 MG/DL    Bilirubin, total 0.3 0.2 - 1.1 MG/DL    ALT (SGPT) 15 12 - 65 U/L    AST (SGOT) 19 15 - 37 U/L    Alk. phosphatase 71 50 - 136 U/L    Protein, total 5.6 (L) 6.3 - 8.2 g/dL    Albumin 2.2 (L) 3.5 - 5.0 g/dL    Globulin 3.4 2.3 - 3.5 g/dL    A-G Ratio 0.6 (L) 1.2 - 3.5     CBC WITH AUTOMATED DIFF    Collection Time: 12/25/21  4:30 AM   Result Value Ref Range    WBC 10.6 4.3 - 11.1 K/uL    RBC 2.90 (L) 4.05 - 5.2 M/uL    HGB 8.7 (L) 11.7 - 15.4 g/dL    HCT 27.5 (L) 35.8 - 46.3 %    MCV 94.8 79.6 - 97.8 FL    MCH 30.0 26.1 - 32.9 PG    MCHC 31.6 31.4 - 35.0 g/dL    RDW 14.5 11.9 - 14.6 %    PLATELET 863 081 - 171 K/uL    MPV 9.8 9.4 - 12.3 FL    ABSOLUTE NRBC 0.00 0.0 - 0.2 K/uL    DF AUTOMATED      NEUTROPHILS 69 43 - 78 %    LYMPHOCYTES 15 13 - 44 %    MONOCYTES 8 4.0 - 12.0 %    EOSINOPHILS 2 0.5 - 7.8 %    BASOPHILS 1 0.0 - 2.0 %    IMMATURE GRANULOCYTES 5 0.0 - 5.0 %    ABS. NEUTROPHILS 7.3 1.7 - 8.2 K/UL    ABS. LYMPHOCYTES 1.6 0.5 - 4.6 K/UL    ABS. MONOCYTES 0.9 0.1 - 1.3 K/UL    ABS. EOSINOPHILS 0.2 0.0 - 0.8 K/UL    ABS. BASOPHILS 0.1 0.0 - 0.2 K/UL    ABS. IMM. GRANS.  0.5 0.0 - 0.5 K/UL   GLUCOSE, POC    Collection Time: 12/25/21  6:53 AM   Result Value Ref Range    Glucose (POC) 168 (H) 65 - 100 mg/dL    Performed by [employer] , POC    Collection Time: 12/25/21 10:57 AM   Result Value Ref Range    Glucose (POC) 177 (H) 65 - 100 mg/dL    Performed by [address], POC    Collection Time: 12/25/21  3:12 PM   Result Value Ref Range    Glucose (POC) 183 (H) 65 - 100 mg/dL    Performed by Maria Esther Goldberg    GLUCOSE, POC    Collection Time: 12/25/21  9:12 PM   Result Value Ref Range    Glucose (POC) 226 (H) 65 - 100 mg/dL    Performed by Ritu    METABOLIC PANEL, COMPREHENSIVE    Collection Time: 12/26/21  6:25 AM   Result Value Ref Range    Sodium 139 136 - 145 mmol/L    Potassium 4.9 3.5 - 5.1 mmol/L    Chloride 104 98 - 107 mmol/L    CO2 24 21 - 32 mmol/L    Anion gap 11 7 - 16 mmol/L    Glucose 149 (H) 65 - 100 mg/dL    BUN 76 (H) 6 - 23 MG/DL    Creatinine 7.75 (H) 0.6 - 1.0 MG/DL    GFR est AA 7 (L) >60 ml/min/1.73m2    GFR est non-AA 6 (L) >60 ml/min/1.73m2    Calcium 8.4 8.3 - 10.4 MG/DL    Bilirubin, total 0.3 0.2 - 1.1 MG/DL    ALT (SGPT) 12 12 - 65 U/L    AST (SGOT) 12 (L) 15 - 37 U/L    Alk. phosphatase 64 50 - 136 U/L    Protein, total 6.2 (L) 6.3 - 8.2 g/dL    Albumin 2.1 (L) 3.5 - 5.0 g/dL    Globulin 4.1 (H) 2.3 - 3.5 g/dL    A-G Ratio 0.5 (L) 1.2 - 3.5     CBC WITH AUTOMATED DIFF    Collection Time: 12/26/21  6:25 AM   Result Value Ref Range    WBC 11.8 (H) 4.3 - 11.1 K/uL    RBC 2.76 (L) 4.05 - 5.2 M/uL    HGB 8.2 (L) 11.7 - 15.4 g/dL    HCT 26.8 (L) 35.8 - 46.3 %    MCV 97.1 79.6 - 97.8 FL    MCH 29.7 26.1 - 32.9 PG    MCHC 30.6 (L) 31.4 - 35.0 g/dL    RDW 14.6 11.9 - 14.6 %    PLATELET 649 738 - 948 K/uL    MPV 10.1 9.4 - 12.3 FL    ABSOLUTE NRBC 0.00 0.0 - 0.2 K/uL    DF AUTOMATED      NEUTROPHILS 72 43 - 78 %    LYMPHOCYTES 15 13 - 44 %    MONOCYTES 8 4.0 - 12.0 %    EOSINOPHILS 2 0.5 - 7.8 %    BASOPHILS 1 0.0 - 2.0 %    IMMATURE GRANULOCYTES 3 0.0 - 5.0 %    ABS. NEUTROPHILS 8.5 (H) 1.7 - 8.2 K/UL    ABS. LYMPHOCYTES 1.8 0.5 - 4.6 K/UL    ABS. MONOCYTES 0.9 0.1 - 1.3 K/UL    ABS. EOSINOPHILS 0.2 0.0 - 0.8 K/UL    ABS. BASOPHILS 0.1 0.0 - 0.2 K/UL    ABS. IMM. GRANS.  0.4 0.0 - 0.5 K/UL   PHOSPHORUS    Collection Time: 12/26/21  6:25 AM   Result Value Ref Range    Phosphorus 8.1 (H) 2.5 - 4.5 MG/DL   GLUCOSE, POC    Collection Time: 12/26/21  7:30 AM   Result Value Ref Range    Glucose (POC) 175 (H) 65 - 100 mg/dL    Performed by [employer]    GLUCOSE, POC    Collection Time: 12/26/21 12:33 PM   Result Value Ref Range    Glucose (POC) 126 (H) 65 - 100 mg/dL    Performed by [employer]        All Micro Results     Procedure Component Value Units Date/Time    CULTURE, ANAEROBIC [[id number]] Collected: 12/15/21 7842    Order Status: Completed Specimen: Peritoneum Updated: 12/22/21 0926     Special Requests: NO SPECIAL REQUESTS        Culture result:       MULTIPLE ANAEROBES ISOLATED, SUSCEPTIBILITY AND IDENTIFICATION AVAILABLE UPON REQUEST. ORGANISM WILL BE HELD FOR 5 DAYS. BLOOD CULTURE [[id number]] Collected: 12/15/21 1215    Order Status: Completed Specimen: Blood Updated: 12/20/21 0653     Special Requests: --        LEFT  HAND       Culture result: NO GROWTH 5 DAYS       BLOOD CULTURE [[id number]] Collected: 12/15/21 1236    Order Status: Completed Specimen: Blood Updated: 12/20/21 0653     Special Requests: --        RIGHT  HAND       Culture result: NO GROWTH 5 DAYS       CULTURE, WOUND Myrle Loach STAIN [[id number]]  (Abnormal)  (Susceptibility) Collected: 12/15/21 1323    Order Status: Completed Specimen: Wound from Groin Updated: 12/19/21 0732     Special Requests: NO SPECIAL REQUESTS        GRAM STAIN 0 TO 36 WBCS PER OIF      MANY GRAM POSITIVE COCCI               MODERATE GRAM NEGATIVE RODS                  MODERATE GRAM POSITIVE RODS           Culture result:       MODERATE PROTEUS MIRABILIS                  MODERATE ENTEROCOCCUS FAECALIS GROUP D                  LIGHT MIXED SKIN MASSIMO ISOLATED          CULTURE, Amarilis Specking STAIN [[id number]]  (Abnormal)  (Susceptibility) Collected: 12/15/21 1710    Order Status: Completed Specimen: Peritoneum Updated: 12/18/21 0936     Special Requests: NO SPECIAL REQUESTS        GRAM STAIN 0 TO 6 WBCS PER OIF      MANY GRAM POSITIVE COCCI         MANY GRAM NEGATIVE RODS        Culture result:       MODERATE PROTEUS MIRABILIS                  MODERATE STAPHYLOCOCCUS SPECIES, COAGULASE NEGATIVE THIS ORGANISM WILL BE HELD FOR 7 DAYS.  IF FURTHER TESTING IS REQUIRED PLEASE NOTIFY MICROBIOLOGY          COVID-19 RAPID TEST [[id number]] Collected: 12/15/21 1519    Order Status: Completed Specimen: Nasopharyngeal Updated: 12/15/21 1549     Specimen source NASAL        COVID-19 rapid test Not detected Comment:      The specimen is NEGATIVE for SARS-CoV-2, the novel coronavirus associated with COVID-19. A negative result does not rule out COVID-19. This test has been authorized by the FDA under an Emergency Use Authorization (EUA) for use by authorized laboratories. Fact sheet for Healthcare Providers: ConventionUpdate.co.nz  Fact sheet for Patients: ConventionUpdate.co.nz       Methodology: Isothermal Nucleic Acid Amplification               Other Studies:  No results found.     Current Meds:  Current Facility-Administered Medications   Medication Dose Route Frequency    insulin glargine (LANTUS) injection 25 Units  25 Units SubCUTAneous DAILY    ampicillin-sulbactam (UNASYN) 3 g in 0.9% sodium chloride (MBP/ADV) 100 mL MBP  3 g IntraVENous Q24H    insulin lispro (HUMALOG) injection 3 Units  3 Units SubCUTAneous TIDAC    amLODIPine (NORVASC) tablet 5 mg  5 mg Oral DAILY    sertraline (ZOLOFT) tablet 100 mg  100 mg Oral DAILY    metoprolol tartrate (LOPRESSOR) tablet 100 mg  100 mg Oral BID    oxyCODONE-acetaminophen (PERCOCET) 5-325 mg per tablet 1 Tablet  1 Tablet Oral Q4H PRN    famotidine (PEPCID) tablet 20 mg  20 mg Oral DAILY    calcium acetate (phosphate binder) (PHOSLO) tablet 667 mg  1 Tablet Oral TID WITH MEALS    insulin lispro (HUMALOG) injection   SubCUTAneous AC&HS    [Held by provider] heparin (porcine) injection 5,000 Units  5,000 Units SubCUTAneous Q8H    0.9% sodium chloride infusion 250 mL  250 mL IntraVENous PRN    sodium chloride (NS) flush 5-40 mL  5-40 mL IntraVENous Q8H    sodium chloride (NS) flush 5-40 mL  5-40 mL IntraVENous PRN    ondansetron (ZOFRAN ODT) tablet 4 mg  4 mg Oral Q8H PRN    Or    ondansetron (ZOFRAN) injection 4 mg  4 mg IntraVENous Q6H PRN    potassium chloride 10 mEq in 100 ml IVPB  10 mEq IntraVENous PRN    magnesium sulfate 2 g/50 ml IVPB (premix or compounded)  2 g IntraVENous PRN    HYDROmorphone (DILAUDID) injection 0.5 mg  0.5 mg IntraVENous Q3H PRN       Signed:  MARIA EUGENIA Sofia

## 2021-12-26 NOTE — PROGRESS NOTES
Bedside shift change report given to Dudley Waldrop by Gen GOODWIN. eport included the following information Kardex. Pt had quiet shift no issues noted.

## 2021-12-26 NOTE — PROGRESS NOTES
TRANSFER IN - DIALYSIS    Received patient in dialysis unit  from 612 (unit) for ordered procedure. Consent verified for renal replacement therapy. Patient alert and vital signs stable. Hemodialysis initiated using left IJ. Aspirated and flushed both ports without difficulty. Dressing clean, dry and intact. Machine settings per MD order. Heparin 0 unit bolus and 0 units/hr. Will monitor during treatment.      12/26/21 0751   Patient Information   Acute or Chronic Care Acute (comment)   Treatment Number 2   Informed Consent Verified Yes   Dialysis Weight   Goal/Amount of Fluid to Remove (mL) 600 mL   Dialyzer/Set Up Inspection   Dialyzer/Set Up Inspection Revaclear   Alarms Verified Yes   Test Pass Yes   pH 7   Machine Conductivity 13.7   Meter Conductivity 13.8   Reverse Osmosis Safety Checks   Reverse Osmosis Machine Log Completed Yes   Total Chlorine Test Negative   Machine Initiation   Machine Number t7   Hemodialysis Start Time 0751   Unused Lines Clamped Yes   Machine Temperature 96.8 °F (36 °C)   Dialysis Initiation   All Connections Secured Yes   NS Bag  Yes   Saline Line Double Clamped Yes   Prime Given   Air Foam Detector Engaged Yes   Dialysate NA (mEq/L) 140   Dialysate K (mEq/L) 2   Dialysate CA (mEq/L) 2.5   Dialysate HCO3 (mEq/L) 35   Citrasate No   During Hemodialysis    Temp 98.4 °F (36.9 °C)   Pulse (Heart Rate) 99   Resp Rate 18   O2 Sat (%) 97 %   /81   MAP (Calculated) 100   Transducer Checks Dry   Saline Given (mL) 300 mL   Heparin Bolus (units) 0 units   Continuous Heparin Infusion (Units/hr) 0 Units/hr   Blood Flow Rate (ml/min) 300 ml/min   Dialysate Flow Rate (ml/hr) 500 ml/hr   Arterial Access Pressure (mmHg) 130   Venous Return Pressure (mmHg) 70   Transmembrane Pressure (mmHg) 40 mmHg   Ultrafiltration Rate (ml/hr) 240 ml/hr   Fluid Removed (mL) 0

## 2021-12-27 ENCOUNTER — HOSPITAL ENCOUNTER (OUTPATIENT)
Age: 51
Discharge: STILL ADMITTED OR ADMINISTRATIVE | End: 2022-01-21
Attending: INTERNAL MEDICINE | Admitting: INTERNAL MEDICINE

## 2021-12-27 ENCOUNTER — APPOINTMENT (OUTPATIENT)
Dept: GENERAL RADIOLOGY | Age: 51
End: 2021-12-27
Attending: INTERNAL MEDICINE

## 2021-12-27 VITALS
RESPIRATION RATE: 16 BRPM | OXYGEN SATURATION: 96 % | BODY MASS INDEX: 45.99 KG/M2 | HEIGHT: 67 IN | SYSTOLIC BLOOD PRESSURE: 120 MMHG | TEMPERATURE: 98.7 F | WEIGHT: 293 LBS | HEART RATE: 67 BPM | DIASTOLIC BLOOD PRESSURE: 64 MMHG

## 2021-12-27 DIAGNOSIS — N18.6 ESRD (END STAGE RENAL DISEASE) (HCC): ICD-10-CM

## 2021-12-27 LAB
ALBUMIN SERPL-MCNC: 2.2 G/DL (ref 3.5–5)
ALBUMIN/GLOB SERPL: 0.5 {RATIO} (ref 1.2–3.5)
ALP SERPL-CCNC: 62 U/L (ref 50–136)
ALT SERPL-CCNC: 11 U/L (ref 12–65)
ANION GAP SERPL CALC-SCNC: 6 MMOL/L (ref 7–16)
AST SERPL-CCNC: 10 U/L (ref 15–37)
BASOPHILS # BLD: 0.1 K/UL (ref 0–0.2)
BASOPHILS NFR BLD: 1 % (ref 0–2)
BILIRUB SERPL-MCNC: 0.2 MG/DL (ref 0.2–1.1)
BUN SERPL-MCNC: 52 MG/DL (ref 6–23)
CALCIUM SERPL-MCNC: 8.5 MG/DL (ref 8.3–10.4)
CHLORIDE SERPL-SCNC: 105 MMOL/L (ref 98–107)
CO2 SERPL-SCNC: 28 MMOL/L (ref 21–32)
CREAT SERPL-MCNC: 6.07 MG/DL (ref 0.6–1)
DIFFERENTIAL METHOD BLD: ABNORMAL
EOSINOPHIL # BLD: 0.2 K/UL (ref 0–0.8)
EOSINOPHIL NFR BLD: 2 % (ref 0.5–7.8)
ERYTHROCYTE [DISTWIDTH] IN BLOOD BY AUTOMATED COUNT: 14.6 % (ref 11.9–14.6)
GLOBULIN SER CALC-MCNC: 4.1 G/DL (ref 2.3–3.5)
GLUCOSE BLD STRIP.AUTO-MCNC: 120 MG/DL (ref 65–100)
GLUCOSE SERPL-MCNC: 144 MG/DL (ref 65–100)
HCT VFR BLD AUTO: 25.1 % (ref 35.8–46.3)
HGB BLD-MCNC: 7.9 G/DL (ref 11.7–15.4)
IMM GRANULOCYTES # BLD AUTO: 0.2 K/UL (ref 0–0.5)
IMM GRANULOCYTES NFR BLD AUTO: 2 % (ref 0–5)
LYMPHOCYTES # BLD: 1.6 K/UL (ref 0.5–4.6)
LYMPHOCYTES NFR BLD: 16 % (ref 13–44)
MCH RBC QN AUTO: 30.6 PG (ref 26.1–32.9)
MCHC RBC AUTO-ENTMCNC: 31.5 G/DL (ref 31.4–35)
MCV RBC AUTO: 97.3 FL (ref 79.6–97.8)
MONOCYTES # BLD: 0.8 K/UL (ref 0.1–1.3)
MONOCYTES NFR BLD: 8 % (ref 4–12)
NEUTS SEG # BLD: 7.1 K/UL (ref 1.7–8.2)
NEUTS SEG NFR BLD: 71 % (ref 43–78)
NRBC # BLD: 0 K/UL (ref 0–0.2)
PLATELET # BLD AUTO: 302 K/UL (ref 150–450)
PMV BLD AUTO: 10.1 FL (ref 9.4–12.3)
POTASSIUM SERPL-SCNC: 4.8 MMOL/L (ref 3.5–5.1)
PROT SERPL-MCNC: 6.3 G/DL (ref 6.3–8.2)
RBC # BLD AUTO: 2.58 M/UL (ref 4.05–5.2)
SERVICE CMNT-IMP: ABNORMAL
SODIUM SERPL-SCNC: 139 MMOL/L (ref 136–145)
WBC # BLD AUTO: 10 K/UL (ref 4.3–11.1)

## 2021-12-27 PROCEDURE — 80053 COMPREHEN METABOLIC PANEL: CPT

## 2021-12-27 PROCEDURE — 74011636637 HC RX REV CODE- 636/637: Performed by: STUDENT IN AN ORGANIZED HEALTH CARE EDUCATION/TRAINING PROGRAM

## 2021-12-27 PROCEDURE — 82962 GLUCOSE BLOOD TEST: CPT

## 2021-12-27 PROCEDURE — 74011000258 HC RX REV CODE- 258: Performed by: STUDENT IN AN ORGANIZED HEALTH CARE EDUCATION/TRAINING PROGRAM

## 2021-12-27 PROCEDURE — 74011250637 HC RX REV CODE- 250/637: Performed by: INTERNAL MEDICINE

## 2021-12-27 PROCEDURE — 90935 HEMODIALYSIS ONE EVALUATION: CPT

## 2021-12-27 PROCEDURE — 71045 X-RAY EXAM CHEST 1 VIEW: CPT

## 2021-12-27 PROCEDURE — 74011250637 HC RX REV CODE- 250/637: Performed by: NURSE PRACTITIONER

## 2021-12-27 PROCEDURE — 74011250636 HC RX REV CODE- 250/636: Performed by: STUDENT IN AN ORGANIZED HEALTH CARE EDUCATION/TRAINING PROGRAM

## 2021-12-27 PROCEDURE — 36591 DRAW BLOOD OFF VENOUS DEVICE: CPT

## 2021-12-27 PROCEDURE — 85025 COMPLETE CBC W/AUTO DIFF WBC: CPT

## 2021-12-27 RX ORDER — CALCIUM ACETATE 667 MG/1
667 TABLET ORAL
Qty: 90 TABLET | Refills: 0 | Status: SHIPPED
Start: 2021-12-27 | End: 2022-01-26

## 2021-12-27 RX ORDER — FAMOTIDINE 20 MG/1
20 TABLET, FILM COATED ORAL DAILY
Qty: 30 TABLET | Refills: 0 | Status: SHIPPED
Start: 2021-12-28 | End: 2022-01-27 | Stop reason: SDUPTHER

## 2021-12-27 RX ORDER — INSULIN GLARGINE 100 [IU]/ML
INJECTION, SOLUTION SUBCUTANEOUS
Qty: 1 ML | Refills: 0 | Status: SHIPPED
Start: 2021-12-28 | End: 2022-01-27 | Stop reason: DRUGHIGH

## 2021-12-27 RX ORDER — HYDROMORPHONE HYDROCHLORIDE 1 MG/ML
0.5 INJECTION, SOLUTION INTRAMUSCULAR; INTRAVENOUS; SUBCUTANEOUS
Qty: 1 ML | Refills: 0 | Status: SHIPPED
Start: 2021-12-27 | End: 2021-12-30

## 2021-12-27 RX ORDER — OXYCODONE AND ACETAMINOPHEN 5; 325 MG/1; MG/1
1 TABLET ORAL
Qty: 12 TABLET | Refills: 0 | Status: SHIPPED
Start: 2021-12-27 | End: 2021-12-30

## 2021-12-27 RX ORDER — INSULIN LISPRO 100 [IU]/ML
INJECTION, SOLUTION INTRAVENOUS; SUBCUTANEOUS
Qty: 1 EACH | Refills: 0 | Status: SHIPPED
Start: 2021-12-27 | End: 2022-03-14

## 2021-12-27 RX ADMIN — AMLODIPINE BESYLATE 5 MG: 5 TABLET ORAL at 11:04

## 2021-12-27 RX ADMIN — CALCIUM ACETATE 667 MG: 667 TABLET ORAL at 11:04

## 2021-12-27 RX ADMIN — Medication 10 ML: at 13:40

## 2021-12-27 RX ADMIN — FAMOTIDINE 20 MG: 20 TABLET ORAL at 11:04

## 2021-12-27 RX ADMIN — SERTRALINE 100 MG: 50 TABLET, FILM COATED ORAL at 11:04

## 2021-12-27 RX ADMIN — INSULIN GLARGINE 25 UNITS: 100 INJECTION, SOLUTION SUBCUTANEOUS at 11:50

## 2021-12-27 RX ADMIN — METOPROLOL TARTRATE 100 MG: 50 TABLET, FILM COATED ORAL at 11:04

## 2021-12-27 RX ADMIN — Medication 3 UNITS: at 11:51

## 2021-12-27 RX ADMIN — Medication 10 ML: at 06:13

## 2021-12-27 RX ADMIN — AMPICILLIN SODIUM AND SULBACTAM SODIUM 3 G: 2; 1 INJECTION, POWDER, FOR SOLUTION INTRAMUSCULAR; INTRAVENOUS at 11:03

## 2021-12-27 NOTE — DIALYSIS
TRANSFER OUT- DIALYSIS    Hemodialysis treatment completed without complications. Patient a/ox3 and  /64,   P 67      No fluid removed per orders. Flushed both ports with 10 mL of NS.  CVC dressing clean, dry, and intact, tego caps intact. Patient to 612 after dialysis.

## 2021-12-27 NOTE — PROGRESS NOTES
Luzmaria Ames  Admission Date: 12/15/2021         [facility] Nephrology Progress Note: 12/27/2021    Follow-up for: DEVENDRA on CKD      Subjective:   Seen on HD #3 today. Doing well. No acute events overnight.  .     She feels well    ROS:  Gen - no fever, no chills, appetite unchanged  CV - no chest pain, no palpitation  Lung - no shortness of breath, no cough  Abd - no tenderness, no nausea/vomiting, no diarrhea  Ext - + edema    Current Facility-Administered Medications   Medication Dose Route Frequency    insulin glargine (LANTUS) injection 25 Units  25 Units SubCUTAneous DAILY    ampicillin-sulbactam (UNASYN) 3 g in 0.9% sodium chloride (MBP/ADV) 100 mL MBP  3 g IntraVENous Q24H    insulin lispro (HUMALOG) injection 3 Units  3 Units SubCUTAneous TIDAC    amLODIPine (NORVASC) tablet 5 mg  5 mg Oral DAILY    sertraline (ZOLOFT) tablet 100 mg  100 mg Oral DAILY    metoprolol tartrate (LOPRESSOR) tablet 100 mg  100 mg Oral BID    oxyCODONE-acetaminophen (PERCOCET) 5-325 mg per tablet 1 Tablet  1 Tablet Oral Q4H PRN    famotidine (PEPCID) tablet 20 mg  20 mg Oral DAILY    calcium acetate (phosphate binder) (PHOSLO) tablet 667 mg  1 Tablet Oral TID WITH MEALS    insulin lispro (HUMALOG) injection   SubCUTAneous AC&HS    [Held by provider] heparin (porcine) injection 5,000 Units  5,000 Units SubCUTAneous Q8H    0.9% sodium chloride infusion 250 mL  250 mL IntraVENous PRN    sodium chloride (NS) flush 5-40 mL  5-40 mL IntraVENous Q8H    sodium chloride (NS) flush 5-40 mL  5-40 mL IntraVENous PRN    ondansetron (ZOFRAN ODT) tablet 4 mg  4 mg Oral Q8H PRN    Or    ondansetron (ZOFRAN) injection 4 mg  4 mg IntraVENous Q6H PRN    potassium chloride 10 mEq in 100 ml IVPB  10 mEq IntraVENous PRN    magnesium sulfate 2 g/50 ml IVPB (premix or compounded)  2 g IntraVENous PRN    HYDROmorphone (DILAUDID) injection 0.5 mg  0.5 mg IntraVENous Q3H PRN         Objective:     Vitals:    12/27/21 0618 12/27/21 0745 12/27/21 0800 12/27/21 0834   BP: 133/69 (!) 119/50 125/84 134/71   Pulse: 80 73 83 71   Resp: 16      Temp: 98.7 °F (37.1 °C)      SpO2: 96%      Weight:       Height:         Intake and Output:   12/25 1901 - 12/27 0700  In: -   Out: 2450 [Urine:2450]  No intake/output data recorded. Physical Exam:   Constitutional: AO x 3. in no acute distress  HEENT:  Sclera clear, pupils equal, oral mucosa moist  Lungs: clear to auscultation bilaterally  Cardiovascular:  RRR, no rub  Abd/GI: soft and non-tender; with positive bowel sounds. Ext: warm without cyanosis. There is trace upper and lower leg edema. [facility] HD catheter      LAB  Recent Labs     12/27/21  0650 12/26/21  0625 12/25/21  0430   WBC 10.0 11.8* 10.6   HGB 7.9* 8.2* 8.7*   HCT 25.1* 26.8* 27.5*    296 301     Recent Labs     12/27/21  0650 12/26/21  0625 12/25/21  0430    139 137   K 4.8 4.9 5.0    104 104   CO2 28 24 24   * 149* 158*   BUN 52* 76* 72*   CREA 6.07* 7.75* 7.27*   PHOS  --  8.1*  --    ALB 2.2* 2.1* 2.2*     No results for input(s): PH, PCO2, PO2, HCO3 in the last 72 hours.       Assessment:  (Medical Decision Making)     Hospital Problems  Date Reviewed: 12/25/2015          Codes Class Noted POA    Atrial fibrillation with RVR ([facility] ICD-10-CM: I48.91  ICD-9-CM: 427.31  12/22/2021 Unknown        DEVENDRA (acute kidney injury) ([facility] ICD-10-CM: N17.9  ICD-9-CM: 584.9  12/16/2021 Yes        Acute respiratory failure ([facility] ICD-10-CM: J96.00  ICD-9-CM: 518.81  12/16/2021 No        Septic shock (HCC) ICD-10-CM: A41.9, R65.21  ICD-9-CM: 038.9, 785.52, 995.92  12/16/2021 Unknown        * (Principal) Necrotizing fasciitis ([facility] ICD-10-CM: M72.6  ICD-9-CM: 728.86  12/15/2021 Yes        Anemia ICD-10-CM: D64.9  ICD-9-CM: 285.9  7/13/2018 Yes        Obesity, morbid (HCC) (Chronic) ICD-10-CM: E66.01  ICD-9-CM: 278.01  7/12/2018 Yes        DM2 (diabetes mellitus, type 2) (HCC) (Chronic) ICD-10-CM: E11.9  ICD-9-CM: 250.00  12/25/2015 Yes        HTN (hypertension) (Chronic) ICD-10-CM: I10  ICD-9-CM: 401.9  12/25/2015 Yes              Plan:  (Medical Decision Making)   1. DEVENDRA with unclear hx of CKD, secondary to DM ( no recent lab with which to compare). Suspect non-oliguric ATN now.  -imaging CT showed some cortical thinning associated with CKD. -vanc level was elevated up to 23.4 on 12/16 off vanc   -Temp HD catheter placement 12/23  -HD initiation 12/24   -HD today for UF and clearance  -Seen on HD, no complication     2. Hypermagnesemia  -stopped mag supplement 12/22    3. Hyperphosphatemia  -on binders. Recheck tomorrow. Should improve with ongoing HD. 4. Hyperkalemia - resolved    5. Necrotizing fasciitis. - s/p I&D perineum and trunk, gen surgery following, plastic surgery for wound closure       Raya Obrien MD  [facility] Nephrology

## 2021-12-27 NOTE — WOUND CARE
Patient seen after dialysis, spoke with Maria Alejandra Marshall with [facility]. Patient being transferred today to their unit and they will need to do wound evaluation so dressing to be held on our end. Explained this to patient and she is in agreement, verbalized understanding of the plan. Wound team will sign off.

## 2021-12-27 NOTE — DIALYSIS
TRANSFER IN - DIALYSIS    Received patient in dialysis unit  from [facility] (unit) for ordered procedure. Consent verified for renal replacement therapy. Patient alert and vital signs stable. /50 P73      Hemodialysis initiated using left I J catheter. Aspirated and flushed both ports without difficulty. Dressing clean, dry and intact. Machine settings per MD order. Will monitor during treatment.

## 2021-12-27 NOTE — PROGRESS NOTES
Hospitalist Progress Note   Admit Date:  12/15/2021  9:53 AM   Name:  Ashleigh Renee   Age:  46 y.o. Sex:  female  :  1970   MRN:  [id number]   Room:  /    Presenting Complaint: Vomiting    Reason(s) for Admission: Necrotizing fasciitis [facility]) [M72.6]     Hospital Course & Interval History:   46 y. o. female with history of Morbid obesity, DM who was admitted for septic shock 2/2 necrotizing fascitis involving her pannus and perineum associated with respiratory failure, severe DEVENDRA (Scr 11), acidosis pH 7.1. She was placed on levo/jessica, intubated and treated with vanc/zosyn initially. Outpatient she was started on bactrim and unable to tolerate it then changed to doxycycline. Per  was not taking in any PO and became progressively weaker at home.  denies known history of renal disease. Made little urine prior to admission. She is s/p excisional debridement on 12/15, . OP note reports foul smelling necrotizing fasciitis extending from the perineum, left labia deep to the pubic bone and along the fascia to the abdominal wall above the iliac bone into the left flank. She rec'd ancef in OR and was changed to vanc/clinda/meropenem on 12/15 by pulmonology. Discussed with Dr. Ingris Quarles, no evidence of osteomyelitis. Extubated . New AFIB RVR with Diltiazem gtt initiated, now on PO Metoprolol. Cardiology following - will need [address] at d/c once wound is closed. ID is following. Nephrology following for DEVENDRA on CKD now with HD initiated . Plan to return to OR approx.  for additional debridement. Subjective (21): \"Yeh I just got back from HD. \"  Pleasant MO 51y.o. WF sitting up in bed,  at beside, without complaints other than mild abd pain on movement; all questions / issues answered and addressed    Review of Systems:  10 systems reviewed and negative except as noted in HPI.       Assessment & Plan:     Principal Problem:  Septic Shock ([address]  Necrotizing fasciitis [facility])  - s/p excisional debridement 12/15 and 12/16  - shock resolved; off pressors  - wound cultures from 12/15/2021 revealed proteus, coag neg staph, enterococcus; cont unasyn  - wound care dressing changes daily  - plastic surgery Dr. Carlton Sherwood recommends surgery for abd wound closure potentially this week     Active Problems:  DEVENDRA on CKD, now on HD  - continued HD day #3 (newly started this admission)  - appreciate nephrology ongoing assistance    Atrial Fibrillation with RVR  - off diltiazem gtt, maintained on Metoprolol 100 mg BID  - cards recommended [address] once abd wound closed (signed off); requested to be notified once pt closer to discharge     Acute on Chronic Anemia   ABLA  - required 2units PRBC for Hgb 5.8  - Iron stores and B12 wnl  - hgb this AM 7.9 (yesterday 8.2); closely monitor, transfuse if hgb <7.0     DM2 (diabetes mellitus, type 2) ([address]   - acceptable range on lantus 25u daily  - cont sliding scale coverage as needed    HTN (hypertension)   - acceptable control with metoprolol / norvasc    Acute respiratory failure ([address] requiring mechanical ventilation, resolved  - Extubated 12/16, remains stable on RA    Obesity, morbid ([address]   - Increases all-cause mortality, needs diet and lifestyle modifications  - wound benefit from wt loss! !      Hyperphosphatemia / hyperkalemia  - defer electrolyte abnl mgmt to nephrology team, with thanks     Dispo/Discharge Planning:  d/c to [facility] [place] [facility] once medically stable     Diet:  ADULT DIET Regular; 4 carb choices (60 gm/meal)  DVT PPx: SCDs  Code status: Full Code    Hospital Problems as of 12/27/2021 Date Reviewed: 12/25/2015          Codes Class Noted - Resolved POA    Atrial fibrillation with RVR ([address] ICD-10-CM: I48.91  ICD-9-CM: 427.31  12/22/2021 - Present Unknown        DEVENDRA (acute kidney injury) ([address] ICD-10-CM: N17.9  ICD-9-CM: 584.9  12/16/2021 - Present Yes        Acute respiratory failure ([address] ICD-10-CM: J96.00  ICD-9-CM: 518.81  12/16/2021 - Present No        Septic shock (HCC) ICD-10-CM: A41.9, R65.21  ICD-9-CM: 038.9, 785.52, 995.92  12/16/2021 - Present Unknown        * (Principal) Necrotizing fasciitis ([facility] ICD-10-CM: M72.6  ICD-9-CM: 728.86  12/15/2021 - Present Yes        Anemia ICD-10-CM: D64.9  ICD-9-CM: 285.9  7/13/2018 - Present Yes        Obesity, morbid ([facility] (Chronic) ICD-10-CM: E66.01  ICD-9-CM: 278.01  7/12/2018 - Present Yes        DM2 (diabetes mellitus, type 2) ([place] [id number].) (Chronic) ICD-10-CM: E11.9  ICD-9-CM: 250.00  12/25/2015 - Present Yes        HTN (hypertension) (Chronic) ICD-10-CM: I10  ICD-9-CM: 401.9  12/25/2015 - Present Yes              Objective:     Patient Vitals for the past 24 hrs:   Temp Pulse Resp BP SpO2   12/27/21 1040 -- 67 -- 120/64 --   12/27/21 1035 -- 69 -- 111/60 --   12/27/21 1001 -- 74 -- (!) 127/45 --   12/27/21 0933 -- 73 -- 129/69 --   12/27/21 0901 -- 61 -- 106/74 --   12/27/21 0834 -- 71 -- 134/71 --   12/27/21 0800 -- 83 -- 125/84 --   12/27/21 0745 -- 73 -- (!) 119/50 --   12/27/21 0618 98.7 °F (37.1 °C) 80 16 133/69 96 %   12/27/21 0041 98.3 °F (36.8 °C) 73 16 138/76 96 %   12/26/21 1942 -- -- -- 121/62 --   12/26/21 1939 97.5 °F (36.4 °C) 92 18 (!) 131/94 98 %   12/26/21 1622 98 °F (36.7 °C) 90 18 136/74 96 %   12/26/21 1234 98.9 °F (37.2 °C) 99 18 (!) 126/99 98 %     Oxygen Therapy  O2 Sat (%): 96 % (12/27/21 0618)  Pulse via Oximetry: 90 beats per minute (12/25/21 1924)  O2 Device: None (Room air) (12/26/21 1939)  Skin Assessment: Clean, dry, & intact (12/25/21 1924)  Skin Protection for O2 Device: No (12/17/21 0300)  Orientation: Bilateral (12/16/21 0800)  Location: Cheek (12/16/21 0800)  Interventions: Mouth Care;Hydrocolloid Dressing (12/16/21 0800)  O2 Flow Rate (L/min): 1 l/min (12/17/21 2028)  FIO2 (%): 28 % (12/17/21 1013)    Estimated body mass index is 48.31 kg/m² as calculated from the following:    Height as of this encounter: 5' 7\" (1.702 m).     Weight as of this encounter: 139.9 kg (308 lb 6.8 oz). Intake/Output Summary (Last 24 hours) at 12/27/2021 1115  Last data filed at 12/27/2021 1040  Gross per 24 hour   Intake --   Output 1200 ml   Net -1200 ml         Physical Exam:   Blood pressure 120/64, pulse 67, temperature 98.7 °F (37.1 °C), resp. rate 16, height 5' 7\" (1.702 m), weight 139.9 kg (308 lb 6.8 oz), SpO2 96 %. General:    Morbidly obese, BMI 47.93. No overt distress   Head:  Normocephalic, atraumatic  Eyes:  Sclerae appear normal.  Pupils equally round. ENT:  Nares appear normal, no drainage. Moist oral mucosa  Neck:  No restricted ROM. Trachea midline   CV:   RRR. No m/r/g. No jugular venous distension. Triple lumen left upper chest.  Lungs:   CTAB. No wheezing, rhonchi, or rales. Respirations even, unlabored  Abdomen: Bowel sounds present. Soft, obese, nondistended. Abdominal dressing dry/intact. Tender to palpation  Extremities: No cyanosis or clubbing. No edema; + mvmt x 4  Skin:     No rashes and normal coloration. Warm and dry. Neuro:  CN II-XII grossly intact. Sensation intact. A&Ox3  Psych:  Normal mood and affect.       I have reviewed ordered lab tests and independently visualized imaging below:    Recent Labs:  Recent Results (from the past 48 hour(s))   GLUCOSE, POC    Collection Time: 12/25/21  3:12 PM   Result Value Ref Range    Glucose (POC) 183 (H) 65 - 100 mg/dL    Performed by Michele Montano    GLUCOSE, POC    Collection Time: 12/25/21  9:12 PM   Result Value Ref Range    Glucose (POC) 226 (H) 65 - 100 mg/dL    Performed by [employer]    METABOLIC PANEL, COMPREHENSIVE    Collection Time: 12/26/21  6:25 AM   Result Value Ref Range    Sodium 139 136 - 145 mmol/L    Potassium 4.9 3.5 - 5.1 mmol/L    Chloride 104 98 - 107 mmol/L    CO2 24 21 - 32 mmol/L    Anion gap 11 7 - 16 mmol/L    Glucose 149 (H) 65 - 100 mg/dL    BUN 76 (H) 6 - 23 MG/DL    Creatinine 7.75 (H) 0.6 - 1.0 MG/DL    GFR est AA 7 (L) >60 ml/min/1.73m2    GFR est non-AA 6 (L) >60 ml/min/1.73m2    Calcium 8.4 8.3 - 10.4 MG/DL    Bilirubin, total 0.3 0.2 - 1.1 MG/DL    ALT (SGPT) 12 12 - 65 U/L    AST (SGOT) 12 (L) 15 - 37 U/L    Alk. phosphatase 64 50 - 136 U/L    Protein, total 6.2 (L) 6.3 - 8.2 g/dL    Albumin 2.1 (L) 3.5 - 5.0 g/dL    Globulin 4.1 (H) 2.3 - 3.5 g/dL    A-G Ratio 0.5 (L) 1.2 - 3.5     CBC WITH AUTOMATED DIFF    Collection Time: 12/26/21  6:25 AM   Result Value Ref Range    WBC 11.8 (H) 4.3 - 11.1 K/uL    RBC 2.76 (L) 4.05 - 5.2 M/uL    HGB 8.2 (L) 11.7 - 15.4 g/dL    HCT 26.8 (L) 35.8 - 46.3 %    MCV 97.1 79.6 - 97.8 FL    MCH 29.7 26.1 - 32.9 PG    MCHC 30.6 (L) 31.4 - 35.0 g/dL    RDW 14.6 11.9 - 14.6 %    PLATELET 749 281 - 411 K/uL    MPV 10.1 9.4 - 12.3 FL    ABSOLUTE NRBC 0.00 0.0 - 0.2 K/uL    DF AUTOMATED      NEUTROPHILS 72 43 - 78 %    LYMPHOCYTES 15 13 - 44 %    MONOCYTES 8 4.0 - 12.0 %    EOSINOPHILS 2 0.5 - 7.8 %    BASOPHILS 1 0.0 - 2.0 %    IMMATURE GRANULOCYTES 3 0.0 - 5.0 %    ABS. NEUTROPHILS 8.5 (H) 1.7 - 8.2 K/UL    ABS. LYMPHOCYTES 1.8 0.5 - 4.6 K/UL    ABS. MONOCYTES 0.9 0.1 - 1.3 K/UL    ABS. EOSINOPHILS 0.2 0.0 - 0.8 K/UL    ABS. BASOPHILS 0.1 0.0 - 0.2 K/UL    ABS. IMM. GRANS.  0.4 0.0 - 0.5 K/UL   PHOSPHORUS    Collection Time: 12/26/21  6:25 AM   Result Value Ref Range    Phosphorus 8.1 (H) 2.5 - 4.5 MG/DL   GLUCOSE, POC    Collection Time: 12/26/21  7:30 AM   Result Value Ref Range    Glucose (POC) 175 (H) 65 - 100 mg/dL    Performed by [employer]    GLUCOSE, POC    Collection Time: 12/26/21 12:33 PM   Result Value Ref Range    Glucose (POC) 126 (H) 65 - 100 mg/dL    Performed by [employer]    GLUCOSE, POC    Collection Time: 12/26/21  4:25 PM   Result Value Ref Range    Glucose (POC) 186 (H) 65 - 100 mg/dL    Performed by [employer]    GLUCOSE, POC    Collection Time: 12/26/21  9:45 PM   Result Value Ref Range    Glucose (POC) 213 (H) 65 - 100 mg/dL    Performed by [facility] [id number], COMPREHENSIVE    Collection Time: 12/27/21  6:50 AM   Result Value Ref Range    Sodium 139 136 - 145 mmol/L    Potassium 4.8 3.5 - 5.1 mmol/L    Chloride 105 98 - 107 mmol/L    CO2 28 21 - 32 mmol/L    Anion gap 6 (L) 7 - 16 mmol/L    Glucose 144 (H) 65 - 100 mg/dL    BUN 52 (H) 6 - 23 MG/DL    Creatinine 6.07 (H) 0.6 - 1.0 MG/DL    GFR est AA 9 (L) >60 ml/min/1.73m2    GFR est non-AA 8 (L) >60 ml/min/1.73m2    Calcium 8.5 8.3 - 10.4 MG/DL    Bilirubin, total 0.2 0.2 - 1.1 MG/DL    ALT (SGPT) 11 (L) 12 - 65 U/L    AST (SGOT) 10 (L) 15 - 37 U/L    Alk. phosphatase 62 50 - 136 U/L    Protein, total 6.3 6.3 - 8.2 g/dL    Albumin 2.2 (L) 3.5 - 5.0 g/dL    Globulin 4.1 (H) 2.3 - 3.5 g/dL    A-G Ratio 0.5 (L) 1.2 - 3.5     CBC WITH AUTOMATED DIFF    Collection Time: 12/27/21  6:50 AM   Result Value Ref Range    WBC 10.0 4.3 - 11.1 K/uL    RBC 2.58 (L) 4.05 - 5.2 M/uL    HGB 7.9 (L) 11.7 - 15.4 g/dL    HCT 25.1 (L) 35.8 - 46.3 %    MCV 97.3 79.6 - 97.8 FL    MCH 30.6 26.1 - 32.9 PG    MCHC 31.5 31.4 - 35.0 g/dL    RDW 14.6 11.9 - 14.6 %    PLATELET 523 283 - 372 K/uL    MPV 10.1 9.4 - 12.3 FL    ABSOLUTE NRBC 0.00 0.0 - 0.2 K/uL    DF AUTOMATED      NEUTROPHILS 71 43 - 78 %    LYMPHOCYTES 16 13 - 44 %    MONOCYTES 8 4.0 - 12.0 %    EOSINOPHILS 2 0.5 - 7.8 %    BASOPHILS 1 0.0 - 2.0 %    IMMATURE GRANULOCYTES 2 0.0 - 5.0 %    ABS. NEUTROPHILS 7.1 1.7 - 8.2 K/UL    ABS. LYMPHOCYTES 1.6 0.5 - 4.6 K/UL    ABS. MONOCYTES 0.8 0.1 - 1.3 K/UL    ABS. EOSINOPHILS 0.2 0.0 - 0.8 K/UL    ABS. BASOPHILS 0.1 0.0 - 0.2 K/UL    ABS. IMM.  GRANS. 0.2 0.0 - 0.5 K/UL   GLUCOSE, POC    Collection Time: 12/27/21 10:25 AM   Result Value Ref Range    Glucose (POC) 120 (H) 65 - 100 mg/dL    Performed by [facility]        All Micro Results     Procedure Component Value Units Date/Time    CULTURE, ANAEROBIC [[id number]] Collected: 12/15/21 1710    Order Status: Completed Specimen: Peritoneum Updated: 12/22/21 0926     Special Requests: NO SPECIAL REQUESTS        Culture result:       MULTIPLE ANAEROBES ISOLATED, SUSCEPTIBILITY AND IDENTIFICATION AVAILABLE UPON REQUEST. ORGANISM WILL BE HELD FOR 5 DAYS. BLOOD CULTURE [[id number]] Collected: 12/15/21 1215    Order Status: Completed Specimen: Blood Updated: 12/20/21 0653     Special Requests: --        LEFT  HAND       Culture result: NO GROWTH 5 DAYS       BLOOD CULTURE [[id number]] Collected: 12/15/21 1236    Order Status: Completed Specimen: Blood Updated: 12/20/21 0653     Special Requests: --        RIGHT  HAND       Culture result: NO GROWTH 5 DAYS       CULTURE, WOUND Caron Mulch STAIN [[id number]]  (Abnormal)  (Susceptibility) Collected: 12/15/21 1323    Order Status: Completed Specimen: Wound from Groin Updated: 12/19/21 0732     Special Requests: NO SPECIAL REQUESTS        GRAM STAIN 0 TO 36 WBCS PER OIF      MANY GRAM POSITIVE COCCI               MODERATE GRAM NEGATIVE RODS                  MODERATE GRAM POSITIVE RODS           Culture result:       MODERATE PROTEUS MIRABILIS                  MODERATE ENTEROCOCCUS FAECALIS GROUP D                  LIGHT MIXED SKIN MASSIMO ISOLATED          CULTURE, Juan Kelch STAIN [[id number]]  (Abnormal)  (Susceptibility) Collected: 12/15/21 1710    Order Status: Completed Specimen: Peritoneum Updated: 12/18/21 0936     Special Requests: NO SPECIAL REQUESTS        GRAM STAIN 0 TO 6 WBCS PER OIF      MANY GRAM POSITIVE COCCI         MANY GRAM NEGATIVE RODS        Culture result:       MODERATE PROTEUS MIRABILIS                  MODERATE STAPHYLOCOCCUS SPECIES, COAGULASE NEGATIVE THIS ORGANISM WILL BE HELD FOR 7 DAYS. IF FURTHER TESTING IS REQUIRED PLEASE NOTIFY MICROBIOLOGY          COVID-19 RAPID TEST [[id number]] Collected: 12/15/21 1519    Order Status: Completed Specimen: Nasopharyngeal Updated: 12/15/21 1549     Specimen source NASAL        COVID-19 rapid test Not detected        Comment:      The specimen is NEGATIVE for SARS-CoV-2, the novel coronavirus associated with COVID-19.   A negative result does not rule out COVID-19. This test has been authorized by the FDA under an Emergency Use Authorization (EUA) for use by authorized laboratories. Fact sheet for Healthcare Providers: [employer]  Fact sheet for Patients: kstattoo.com       Methodology: Isothermal Nucleic Acid Amplification               Other Studies:  No results found.     Current Meds:  Current Facility-Administered Medications   Medication Dose Route Frequency    insulin glargine (LANTUS) injection 25 Units  25 Units SubCUTAneous DAILY    ampicillin-sulbactam (UNASYN) 3 g in 0.9% sodium chloride (MBP/ADV) 100 mL MBP  3 g IntraVENous Q24H    insulin lispro (HUMALOG) injection 3 Units  3 Units SubCUTAneous TIDAC    amLODIPine (NORVASC) tablet 5 mg  5 mg Oral DAILY    sertraline (ZOLOFT) tablet 100 mg  100 mg Oral DAILY    metoprolol tartrate (LOPRESSOR) tablet 100 mg  100 mg Oral BID    oxyCODONE-acetaminophen (PERCOCET) 5-325 mg per tablet 1 Tablet  1 Tablet Oral Q4H PRN    famotidine (PEPCID) tablet 20 mg  20 mg Oral DAILY    calcium acetate (phosphate binder) (PHOSLO) tablet 667 mg  1 Tablet Oral TID WITH MEALS    insulin lispro (HUMALOG) injection   SubCUTAneous AC&HS    [Held by provider] heparin (porcine) injection 5,000 Units  5,000 Units SubCUTAneous Q8H    0.9% sodium chloride infusion 250 mL  250 mL IntraVENous PRN    sodium chloride (NS) flush 5-40 mL  5-40 mL IntraVENous Q8H    sodium chloride (NS) flush 5-40 mL  5-40 mL IntraVENous PRN    ondansetron (ZOFRAN ODT) tablet 4 mg  4 mg Oral Q8H PRN    Or    ondansetron (ZOFRAN) injection 4 mg  4 mg IntraVENous Q6H PRN    potassium chloride 10 mEq in 100 ml IVPB  10 mEq IntraVENous PRN    magnesium sulfate 2 g/50 ml IVPB (premix or compounded)  2 g IntraVENous PRN    HYDROmorphone (DILAUDID) injection 0.5 mg  0.5 mg IntraVENous Q3H PRN       Signed:  MARIA EUGENIA Marks

## 2021-12-27 NOTE — PROGRESS NOTES
Bedside shift change report given to [employer] by [facility] RN. Report included the following information Kardex. Pt resting quiet, no distress noted.

## 2021-12-27 NOTE — PROGRESS NOTES
Patient's insurance has approved admission to [facility]; bed is available today. Asuncion Zhong via [facility]; he is ok with patient transferring at any time. Hospitalist updated. CM will follow to assist with discharge plans.

## 2021-12-27 NOTE — DISCHARGE SUMMARY
Hospitalist Discharge Summary   Admit Date:  12/15/2021  9:53 AM   DC Note date: 2021  Name:  Latisha Wu   Age:  46 y.o. Sex:  female  :  1970   MRN:  [id number]   Room:  [facility]  PCP:  Anne-Marie Syed NP    Presenting Complaint: Vomiting    Initial Admission Diagnosis: Necrotizing fasciitis ([place] [address].) [M72.6]     Problem List for this Hospitalization:  Hospital Problems as of 2021 Date Reviewed: 2015          Codes Class Noted - Resolved POA    Atrial fibrillation with RVR ([place] [address].) ICD-10-CM: I48.91  ICD-9-CM: 427.31  2021 - Present Unknown        DEVENDRA (acute kidney injury) ([place] [address].) ICD-10-CM: N17.9  ICD-9-CM: 584.9  2021 - Present Yes        Acute respiratory failure ([place] [address].) ICD-10-CM: J96.00  ICD-9-CM: 518.81  2021 - Present No        Septic shock (HCC) ICD-10-CM: A41.9, R65.21  ICD-9-CM: 038.9, 785.52, 995.92  2021 - Present Unknown        * (Principal) Necrotizing fasciitis ([place] [address].) ICD-10-CM: M72.6  ICD-9-CM: 728.86  12/15/2021 - Present Yes        Anemia ICD-10-CM: D64.9  ICD-9-CM: 285.9  2018 - Present Yes        Obesity, morbid ([place] [address].) (Chronic) ICD-10-CM: E66.01  ICD-9-CM: 278.01  2018 - Present Yes        DM2 (diabetes mellitus, type 2) ([place] [address].) (Chronic) ICD-10-CM: E11.9  ICD-9-CM: 250.00  2015 - Present Yes        HTN (hypertension) (Chronic) ICD-10-CM: I10  ICD-9-CM: 401.9  2015 - Present Yes            Did Patient have Sepsis (YES OR NO): yes    Hospital Course:  46 y. o. female with history of Morbid obesity, DM who was admitted for septic shock 2/2 necrotizing fascitis involving her pannus and perineum associated with respiratory failure, severe DEVENDRA (Scr 11), acidosis pH 7.1. She was placed on levo/jessica, intubated and treated with vanc/zosyn initially. In the outpatient setting pt was being treated with bactrim for abd cellulitis.   She was unable to tolerate bactrim and transitioned to doxycycline but as she became progressively sicker, she stopped taking any medications.  denied any known history of renal disease but as pt starting making less and less urine with increasing lethargy, he brought her to the hospital for further evaluation. During her hospitalization, pt was s/p excisional debridement by Dr. Malena Franco on 12/15, 12/16. OP note reported to foul smelling necrotizing fasciitis extending from the perineum, left labia deep to the pubic bone and along the fascia to the abdominal wall above the iliac bone into the left flank. She rec'd ancef in OR and thereafter pt was started on vanc / clinda / meropenem which was subsequently de-escalated by ID recs to unasyn EOT 12/29/2021. Pt developed atrial fibrillation new onset while hospitalized and was seen and managed by Dr. Deepika Chauhan. After stably being weaned off cardizem gtt pt was transitioned to oral BB (metoprolol) with good HR control. Per cards, once wound closed and no further surgical procedures planned, recommendations was to start oral anticoagulation. Ms. Андрей Tolliver was in acute renal failure from sepsis and despite IVF and BP support, her renal function did not improve. Pt was followed by nephrology and on 12/23/2021 IR placed a trialysis catheter so that HD could be started. Today pt is s/p her third HD session; renal function is still not recovered and will need ongoing care per nephrology and continuation of HD for now. Plans per plastic surgery was to close abd wound ~12/29/2021; DR. Tammy Rojo was notified of pt's plans for LTAC transfer and agrees with plans for dc to LTAC today; he can follow her there and plan accordingly to take her to OR for abd wound closure. Pt has a bed available at [facility] today and will be transferred there in stable condition. Disposition: Long Term Care  Diet: ADULT DIET Regular; 4 carb choices (60 gm/meal)  ADULT ORAL NUTRITION SUPPLEMENT Breakfast, Lunch, Dinner;  Wound Healing Supplement  Code Status: Full Code    Follow Up Orders: Follow-up Appointments   Procedures    FOLLOW UP VISIT Appointment in: One Week F/u PCP one week post dc from LTAC     F/u PCP one week post dc from LTAC     Standing Status:   Standing     Number of Occurrences:   1     Order Specific Question:   Appointment in     Answer: One Week       Follow-up Information     Follow up With Specialties Details Why Contact Info    Warren Otero NP Nurse Practitioner   [address] [facility] [id number]  [phone]            Follow up labs/diagnostics (ultimately defer to outpatient provider):  CBC / BMP    Time spent in patient discharge and coordination 45 minutes. Plan was discussed with patient, , RN, case mgmt. All questions answered. Patient was stable at time of discharge. Instructions given to call a physician or return if any concerns. Discharge Info:   Current Discharge Medication List      START taking these medications    Details   ampicillin-sulbactam 3 gram 3 g IVPB 3 g by IntraVENous route every twenty-four (24) hours for 3 days. Qty: 3 Dose, Refills: 0  Start date: 12/28/2021, End date: 12/31/2021      oxyCODONE-acetaminophen (PERCOCET) 5-325 mg per tablet Take 1 Tablet by mouth every four (4) hours as needed for Pain for up to 3 days. Max Daily Amount: 6 Tablets. Qty: 12 Tablet, Refills: 0  Start date: 12/27/2021, End date: 12/30/2021    Associated Diagnoses: Necrotizing fasciitis ([address]      insulin glargine (LANTUS) 100 unit/mL injection 25units qday  Qty: 1 mL, Refills: 0  Start date: 12/28/2021      insulin lispro (HUMALOG) 100 unit/mL injection Per sliding scale  Qty: 1 Each, Refills: 0  Start date: 12/27/2021      HYDROmorphone (DILAUDID) 1 mg/mL injection 0.5 mL by IntraVENous route every three (3) hours as needed for Pain for up to 3 days. Max Daily Amount: 4 mg.   Qty: 1 mL, Refills: 0  Start date: 12/27/2021, End date: 12/30/2021    Associated Diagnoses: Necrotizing fasciitis ([address]      famotidine (PEPCID) 20 mg tablet Take 1 Tablet by mouth daily for 30 days. Qty: 30 Tablet, Refills: 0  Start date: 12/28/2021, End date: 1/27/2022      calcium acetate, phosphate binder, (PHOSLO) 667 mg tab tablet Take 1 Tablet by mouth three (3) times daily (with meals) for 30 days. Qty: 90 Tablet, Refills: 0  Start date: 12/27/2021, End date: 1/26/2022         CONTINUE these medications which have NOT CHANGED    Details   amLODIPine (NORVASC) 5 mg tablet Take 5 mg by mouth daily. gemfibroziL (LOPID) 600 mg tablet Take 600 mg by mouth two (2) times a day. metoprolol tartrate (LOPRESSOR) 100 mg IR tablet Take 100 mg by mouth two (2) times a day. Per anesthesia protocol:instructed to take am of surgery. allopurinol (ZYLOPRIM) 300 mg tablet Take 300 mg by mouth daily. Per anesthesia protocol:instructed to take am of surgery. Indications: prevention of acute gout attack      sertraline (ZOLOFT) 100 mg tablet Take 100 mg by mouth daily. Per anesthesia protocol:instructed to take am of surgery.          STOP taking these medications       losartan 50 mg tab 100 mg, hydroCHLOROthiazide 25 mg tab 25 mg Comments:   Reason for Stopping:         cyanocobalamin (VITAMIN B12) 500 mcg tablet Comments:   Reason for Stopping:         ondansetron (ZOFRAN ODT) 4 mg disintegrating tablet Comments:   Reason for Stopping:         doxycycline (MONODOX) 100 mg capsule Comments:   Reason for Stopping:         dicyclomine (BENTYL) 10 mg capsule Comments:   Reason for Stopping:         cyanocobalamin (VITAMIN B12) 1,000 mcg/mL injection Comments:   Reason for Stopping:         metFORMIN (GLUCOPHAGE) 500 mg tablet Comments:   Reason for Stopping:               Procedures done this admission:  Procedure(s):  [address] [facility] this admission:  IP CONSULT TO HOSPITALIST  IP CONSULT TO HOSPITALIST  IP CONSULT TO INTENSIVIST  IP CONSULT TO NEPHROLOGY  IP CONSULT TO INFECTIOUS DISEASES  IP CONSULT TO PLASTIC SURGERY  IP CONSULT TO PLASTIC SURGERY  IP CONSULT TO CARDIOLOGY    Echocardiogram/EKG results:  Results from Hospital Encounter encounter on 12/15/21    ECHO ADULT COMPLETE    Interpretation Summary    Left Ventricle: Left ventricle size is normal. Mildly increased wall thickness. Normal wall motion. Normal left ventricular systolic function with a visually estimated EF of 55 - 60%. Indeterminate diastolic function.   Aortic Valve: Mildly sclerosed cusps.   Mitral Valve: Mild transvalvular regurgitation.   Tricuspid Valve: Mild transvalvular regurgitation. Normal RVSP.   Technical qualifiers: Echo study was technically difficult due to patient's body habitus.   Contrast used: Definity.        EKG Results     Procedure 720 Value Units Date/Time    EKG, 12 LEAD, SUBSEQUENT [[id number]] Collected: 12/21/21 2138    Order Status: Completed Updated: 12/22/21 0718     Ventricular Rate 96 BPM      Atrial Rate 256 BPM      QRS Duration 86 ms      Q-T Interval 392 ms      QTC Calculation (Bezet) 495 ms      Calculated R Axis -5 degrees      Calculated T Axis 81 degrees      Diagnosis --     Atrial flutter with variable A-V block  Low voltage QRS  Cannot rule out Anteroseptal infarct (cited on or before 19-DEC-2021)      Abnormal ECG  When compared with ECG of 19-DEC-2021 09:00,  Serial changes of evolving Anteroseptal infarct Present  Confirmed by [facility]  MD (), Federica Wallace ([id number]) on 12/22/2021 7:18:41 AM      EKG, 12 LEAD, INITIAL [[id number]] Collected: 12/19/21 0900    Order Status: Completed Updated: 12/19/21 1039     Ventricular Rate 121 BPM      Atrial Rate 264 BPM      QRS Duration 88 ms      Q-T Interval 392 ms      QTC Calculation (Bezet) 556 ms      Calculated R Axis 10 degrees      Calculated T Axis 102 degrees      Diagnosis --     Atrial flutter with variable A-V block  Low voltage QRS  Septal infarct , age undetermined  T wave abnormality, consider inferior ischemia  Abnormal ECG  When compared with ECG of 16-DEC-2021 16:40,  Atrial flutter has replaced Atrial fibrillation  Incomplete right bundle branch block is no longer Present  Septal infarct is now Present  Confirmed by Gregorio Alford ([id number]) on 12/19/2021 10:39:28 AM      EKG, 12 LEAD, INITIAL [[id number]] Collected: 12/16/21 1640    Order Status: Completed Updated: 12/17/21 0746     Ventricular Rate 127 BPM      Atrial Rate 138 BPM      QRS Duration 100 ms      Q-T Interval 398 ms      QTC Calculation (Bezet) 578 ms      Calculated R Axis 6 degrees      Calculated T Axis 72 degrees      Diagnosis --     Atrial fibrillation with rapid ventricular response  Low voltage QRS  Incomplete right bundle branch block  Abnormal ECG  When compared with ECG of 16-DEC-2021 16:39,  Previous ECG has undetermined rhythm, needs review  Confirmed by Geovanny Huntley MD (), TINO BERG ([id number]) on 12/17/2021 7:46:04 AM      EKG 12 LEAD INITIAL [[id number]] Collected: 12/15/21 1133    Order Status: Completed Updated: 12/15/21 1446     Ventricular Rate 80 BPM      Atrial Rate 80 BPM      P-R Interval 222 ms      QRS Duration 124 ms      Q-T Interval 463 ms      QTC Calculation (Bezet) 535 ms      Calculated P Axis 67 degrees      Calculated R Axis 9 degrees      Calculated T Axis 42 degrees      Diagnosis --     Sinus rhythm  first degree avb  Nonspecific intraventricular conduction delay    Confirmed by [facility]  MD ()MARIFER ([id number]) on 12/15/2021 2:42:19 PM            Diagnostic Imaging/Tests:   XR CHEST SNGL V    Result Date: 12/16/2021  EXAMINATION: One view chest HISTORY: intubated TECHNIQUE: Frontal chest. COMPARISON: 12/15/2021 FINDINGS:  Stable supportive devices. Persistent bilateral lung infiltrates. There is no significant pneumothorax. There is no significant pleural effusion. The heart is unchanged. No other significant interval changes. 1. Findings as described above.      XR CHEST SNGL V    Result Date: 12/15/2021  EXAM: XR CHEST SNGL V INDICATION: ETT and line placement COMPARISON: None. FINDINGS: A portable AP radiograph of the chest was obtained at 2007 hours. Endotracheal tube in adequate position. Central venous catheter has its tip in the SVC. Franchesca Dye The lungs are clear. The cardiac and mediastinal contours and pulmonary vascularity are normal.  The bones and soft tissues are grossly within normal limits. No acute abnormalities. Unremarkable hardware position. CT ABD PELV WO CONT    Result Date: 12/15/2021  EXAMINATION: CT ABD PELV WO CONT 12/15/2021 2:16 PM ACCESSION NUMBER:  [id number] INDICATION:  labial abscess COMPARISON: None available TECHNIQUE: Contiguous axial computed tomographic images were obtained of the abdomen and pelvis without intravenous or oral contrast.  Coronal reconstructions were performed. Radiation dose reduction techniques were used for this study:  Our CT scanners use one or all of the following: Automated exposure control, adjustment of the mA and/or kVp according to patient's size, iterative reconstruction. FINDINGS: LIMITED THORAX:The included portions of the heart and pericardium are unremarkable. 0.2 cm left lower lobe nodule (axial image 17). 1.5 cm subcutaneous skin nodule in the anterior left chest wall (axial image 8). PERITONEUM/MESENTERY: No free fluid or free gas. Mild bilateral inguinal lymph node enlargement. GI TRACT: Sigmoid colonic diverticulosis without evidence of diverticulitis. The appendix is normal. The terminal ileum is unremarkable. There is no evidence of a small bowel obstruction. The stomach is unremarkable. SPLEEN: Normal ADRENALS: Normal PANCREAS: Normal LIVER: Normal GALLBLADDER: Mildly distended. No surrounding free fluid or wall thickening. KIDNEYS: Bilateral renal cortical thinning, left greater than right. No evidence of hydroureteronephrosis or nephroureterolithiasis. BLADDER/: Pardo catheter in place. Uterine calcifications likely represent small fibroids. VESSELS: Mild scattered atherosclerosis. BONES/SOFT TISSUES: Lumbar spine spondylosis without suspicious lytic or blastic bony lesions. There is fat stranding and subcutaneous emphysema in the left labia and left perineal soft tissues. This extends into the midline and left ventral pannus, along the anterior/left pelvic wall, and of the left flank. There is extensive gas throughout the soft tissues in this region, without definite evidence of a well-formed fluid collection within the limits of noncontrast technique. 1. There is soft tissue stranding and extensive subcutaneous emphysema, beginning in the left labia and left perineal soft tissues, extending anterior and inferiorly within the ventral pannus, and extending superiorly and laterally throughout the ventral pannus, ventral pelvic wall, and left flank fat. The cephalad extent is through the level of the anterior superior iliac spine. The constellation of features is consistent with necrotizing fasciitis, without definite evidence of a well-formed fluid collection within the limits of noncontrast technique. 2. No evidence of intraperitoneal extension. 3. Left greater than right renal cortical thinning. 4. Mildly distended gallbladder. Absent gallbladder wall thickening and surrounding free fluid, this is most likely a mildly patulous gallbladder. Discussed with Dr. Srinivasa Nguyne by Dr. Shook Every at 2:36 PM 12/15/2021. VOICE DICTATED BY: Dr. Rachana Manjarrez    Result Date: 12/22/2021  EXAM:  US RETROPERITONEUM LTD INDICATION:  DEVENDRA, septic shock COMPARISON: None. TECHNIQUE: Real-time sonography of the kidneys, retroperitoneum and bladder was performed with multiple static images obtained. FINDINGS: The right kidney demonstrates increased echogenicity and measures 11.5 cm. The left kidney is not seen. The exam is limited due to the patient's body habitus. No hydronephrosis demonstrated. No renal mass. The bladder is not seen clearly.      Limited study due to the patient's large body habitus. Increased right renal echogenicity raise the possibility of chronic medical renal disease. All Micro Results     Procedure Component Value Units Date/Time    CULTURE, ANAEROBIC [[id number]] Collected: 12/15/21 1710    Order Status: Completed Specimen: Peritoneum Updated: 12/22/21 0926     Special Requests: NO SPECIAL REQUESTS        Culture result:       MULTIPLE ANAEROBES ISOLATED, SUSCEPTIBILITY AND IDENTIFICATION AVAILABLE UPON REQUEST. ORGANISM WILL BE HELD FOR 5 DAYS. BLOOD CULTURE [[id number]] Collected: 12/15/21 1215    Order Status: Completed Specimen: Blood Updated: 12/20/21 0653     Special Requests: --        LEFT  HAND       Culture result: NO GROWTH 5 DAYS       BLOOD CULTURE [[id number]] Collected: 12/15/21 1236    Order Status: Completed Specimen: Blood Updated: 12/20/21 0653     Special Requests: --        RIGHT  HAND       Culture result: NO GROWTH 5 DAYS       CULTURE, WOUND Jas Espinosaiths STAIN [[id number]]  (Abnormal)  (Susceptibility) Collected: 12/15/21 1323    Order Status: Completed Specimen: Wound from Groin Updated: 12/19/21 0732     Special Requests: NO SPECIAL REQUESTS        GRAM STAIN 0 TO 36 WBCS PER OIF      MANY GRAM POSITIVE COCCI               MODERATE GRAM NEGATIVE RODS                  MODERATE GRAM POSITIVE RODS           Culture result:       MODERATE PROTEUS MIRABILIS                  MODERATE ENTEROCOCCUS FAECALIS GROUP D                  LIGHT MIXED SKIN MASSIMO ISOLATED          CULTURE, Dulcy Radha STAIN [[id number]]  (Abnormal)  (Susceptibility) Collected: 12/15/21 1710    Order Status: Completed Specimen: Peritoneum Updated: 12/18/21 0936     Special Requests: NO SPECIAL REQUESTS        GRAM STAIN 0 TO 6 WBCS PER OIF      MANY GRAM POSITIVE COCCI         MANY GRAM NEGATIVE RODS        Culture result:       MODERATE PROTEUS MIRABILIS                  MODERATE STAPHYLOCOCCUS SPECIES, COAGULASE NEGATIVE THIS ORGANISM WILL BE HELD FOR 7 DAYS.  IF FURTHER TESTING IS REQUIRED PLEASE NOTIFY MICROBIOLOGY          COVID-19 RAPID TEST [[id number]] Collected: 12/15/21 1519    Order Status: Completed Specimen: Nasopharyngeal Updated: 12/15/21 1546     Specimen source NASAL        COVID-19 rapid test Not detected        Comment:      The specimen is NEGATIVE for SARS-CoV-2, the novel coronavirus associated with COVID-19. A negative result does not rule out COVID-19. This test has been authorized by the FDA under an Emergency Use Authorization (EUA) for use by authorized laboratories.         Fact sheet for Healthcare Providers: ConventionUpdate.co.nz  Fact sheet for Patients: ConventionUpdate.co.nz       Methodology: Isothermal Nucleic Acid Amplification               Labs: Results:       BMP, Mg, Phos Recent Labs     12/27/21  0650 12/26/21  0625 12/25/21  0430    139 137   K 4.8 4.9 5.0    104 104   CO2 28 24 24   AGAP 6* 11 9   BUN 52* 76* 72*   CREA 6.07* 7.75* 7.27*   CA 8.5 8.4 8.0*   * 149* 158*   PHOS  --  8.1*  --       CBC Recent Labs     12/27/21  0650 12/26/21  0625 12/25/21  0430   WBC 10.0 11.8* 10.6   RBC 2.58* 2.76* 2.90*   HGB 7.9* 8.2* 8.7*   HCT 25.1* 26.8* 27.5*    296 301   GRANS 71 72 69   LYMPH 16 15 15   EOS 2 2 2   MONOS 8 8 8   BASOS 1 1 1   IG 2 3 5   ANEU 7.1 8.5* 7.3   ABL 1.6 1.8 1.6   CHRISTINA 0.2 0.2 0.2   ABM 0.8 0.9 0.9   ABB 0.1 0.1 0.1   AIG 0.2 0.4 0.5      LFT Recent Labs     12/27/21  0650 12/26/21  0625 12/25/21  0430   ALT 11* 12 15   AP 62 64 71   TP 6.3 6.2* 5.6*   ALB 2.2* 2.1* 2.2*   GLOB 4.1* 4.1* 3.4   AGRAT 0.5* 0.5* 0.6*      Cardiac Testing No results found for: BNPP, BNP, CPK, RCK1, RCK2, RCK3, RCK4, CKMB, CKNDX, CKND1, TROPT, TROIQ   Coagulation Tests No results found for: PTP, INR, APTT, INREXT   A1c Lab Results   Component Value Date/Time    Hemoglobin A1c 6.6 (H) 12/16/2021 03:30 AM    Hemoglobin A1c 6.4 (H) 12/15/2021 07:31 PM      Lipid Panel No results found for: CHOL, CHOLPOCT, CHOLX, CHLST, CHOLV, 510855, HDL, HDLP, LDL, LDLC, DLDLP, 088179, VLDLC, VLDL, TGLX, TRIGL, TRIGP, TGLPOCT, [facility], [facility]   Thyroid Panel Lab Results   Component Value Date/Time    TSH 3.830 07/12/2018 01:35 PM        Most Recent UA Lab Results   Component Value Date/Time    Color YELLOW 12/15/2021 01:07 PM    Appearance CLEAR 12/15/2021 01:07 PM    pH (UA) 5.5 12/15/2021 01:07 PM    Protein 100 (A) 12/15/2021 01:07 PM    Glucose 100 12/15/2021 01:07 PM    Ketone Negative 12/15/2021 01:07 PM    Bilirubin Negative 12/15/2021 01:07 PM    Blood SMALL (A) 12/15/2021 01:07 PM    Urobilinogen 0.2 12/15/2021 01:07 PM    Nitrites Negative 12/15/2021 01:07 PM    Leukocyte Esterase Negative 12/15/2021 01:07 PM    WBC 3-5 12/15/2021 01:07 PM    RBC 0-3 12/15/2021 01:07 PM    Epithelial cells 0-3 12/15/2021 01:07 PM    Bacteria 1+ (H) 12/15/2021 01:07 PM    Other observations RESULTS VERIFIED MANUALLY 12/15/2021 01:07 PM          All Labs from Last 24 Hrs:  Recent Results (from the past 24 hour(s))   GLUCOSE, POC    Collection Time: 12/26/21  4:25 PM   Result Value Ref Range    Glucose (POC) 186 (H) 65 - 100 mg/dL    Performed by Angelica    GLUCOSE, POC    Collection Time: 12/26/21  9:45 PM   Result Value Ref Range    Glucose (POC) 213 (H) 65 - 100 mg/dL    Performed by Gretchen Antoine    METABOLIC PANEL, COMPREHENSIVE    Collection Time: 12/27/21  6:50 AM   Result Value Ref Range    Sodium 139 136 - 145 mmol/L    Potassium 4.8 3.5 - 5.1 mmol/L    Chloride 105 98 - 107 mmol/L    CO2 28 21 - 32 mmol/L    Anion gap 6 (L) 7 - 16 mmol/L    Glucose 144 (H) 65 - 100 mg/dL    BUN 52 (H) 6 - 23 MG/DL    Creatinine 6.07 (H) 0.6 - 1.0 MG/DL    GFR est AA 9 (L) >60 ml/min/1.73m2    GFR est non-AA 8 (L) >60 ml/min/1.73m2    Calcium 8.5 8.3 - 10.4 MG/DL    Bilirubin, total 0.2 0.2 - 1.1 MG/DL    ALT (SGPT) 11 (L) 12 - 65 U/L    AST (SGOT) 10 (L) 15 - 37 U/L    Alk.  phosphatase 62 50 - 136 U/L    Protein, total 6.3 6.3 - 8.2 g/dL    Albumin 2.2 (L) 3.5 - 5.0 g/dL    Globulin 4.1 (H) 2.3 - 3.5 g/dL    A-G Ratio 0.5 (L) 1.2 - 3.5     CBC WITH AUTOMATED DIFF    Collection Time: 12/27/21  6:50 AM   Result Value Ref Range    WBC 10.0 4.3 - 11.1 K/uL    RBC 2.58 (L) 4.05 - 5.2 M/uL    HGB 7.9 (L) 11.7 - 15.4 g/dL    HCT 25.1 (L) 35.8 - 46.3 %    MCV 97.3 79.6 - 97.8 FL    MCH 30.6 26.1 - 32.9 PG    MCHC 31.5 31.4 - 35.0 g/dL    RDW 14.6 11.9 - 14.6 %    PLATELET 030 812 - 033 K/uL    MPV 10.1 9.4 - 12.3 FL    ABSOLUTE NRBC 0.00 0.0 - 0.2 K/uL    DF AUTOMATED      NEUTROPHILS 71 43 - 78 %    LYMPHOCYTES 16 13 - 44 %    MONOCYTES 8 4.0 - 12.0 %    EOSINOPHILS 2 0.5 - 7.8 %    BASOPHILS 1 0.0 - 2.0 %    IMMATURE GRANULOCYTES 2 0.0 - 5.0 %    ABS. NEUTROPHILS 7.1 1.7 - 8.2 K/UL    ABS. LYMPHOCYTES 1.6 0.5 - 4.6 K/UL    ABS. MONOCYTES 0.8 0.1 - 1.3 K/UL    ABS. EOSINOPHILS 0.2 0.0 - 0.8 K/UL    ABS. BASOPHILS 0.1 0.0 - 0.2 K/UL    ABS. IMM.  GRANS. 0.2 0.0 - 0.5 K/UL   GLUCOSE, POC    Collection Time: 12/27/21 10:25 AM   Result Value Ref Range    Glucose (POC) 120 (H) 65 - 100 mg/dL    Performed by [employer]        Current Med List in Hospital:   Current Facility-Administered Medications   Medication Dose Route Frequency    insulin glargine (LANTUS) injection 25 Units  25 Units SubCUTAneous DAILY    ampicillin-sulbactam (UNASYN) 3 g in 0.9% sodium chloride (MBP/ADV) 100 mL MBP  3 g IntraVENous Q24H    insulin lispro (HUMALOG) injection 3 Units  3 Units SubCUTAneous TIDAC    amLODIPine (NORVASC) tablet 5 mg  5 mg Oral DAILY    sertraline (ZOLOFT) tablet 100 mg  100 mg Oral DAILY    metoprolol tartrate (LOPRESSOR) tablet 100 mg  100 mg Oral BID    oxyCODONE-acetaminophen (PERCOCET) 5-325 mg per tablet 1 Tablet  1 Tablet Oral Q4H PRN    famotidine (PEPCID) tablet 20 mg  20 mg Oral DAILY    calcium acetate (phosphate binder) (PHOSLO) tablet 667 mg  1 Tablet Oral TID WITH MEALS    insulin lispro (HUMALOG) injection SubCUTAneous AC&HS    [Held by provider] heparin (porcine) injection 5,000 Units  5,000 Units SubCUTAneous Q8H    0.9% sodium chloride infusion 250 mL  250 mL IntraVENous PRN    sodium chloride (NS) flush 5-40 mL  5-40 mL IntraVENous Q8H    sodium chloride (NS) flush 5-40 mL  5-40 mL IntraVENous PRN    ondansetron (ZOFRAN ODT) tablet 4 mg  4 mg Oral Q8H PRN    Or    ondansetron (ZOFRAN) injection 4 mg  4 mg IntraVENous Q6H PRN    potassium chloride 10 mEq in 100 ml IVPB  10 mEq IntraVENous PRN    magnesium sulfate 2 g/50 ml IVPB (premix or compounded)  2 g IntraVENous PRN    HYDROmorphone (DILAUDID) injection 0.5 mg  0.5 mg IntraVENous Q3H PRN       Allergies   Allergen Reactions    Ace Inhibitors Angioedema    Arb-Angiotensin Receptor Antagonist Angioedema    Cayenne Pepper Hives     Swelling of the mouth    Bactrim [Sulfamethoprim] Other (comments)     Nausea, vomiting, making her feel like she is going to pass out      Keflex [Cephalexin] Nausea and Vomiting       There is no immunization history on file for this patient.     Recent Vital Data:  Patient Vitals for the past 24 hrs:   Temp Pulse Resp BP SpO2   12/27/21 1040 -- 67 -- 120/64 --   12/27/21 1035 -- 69 -- 111/60 --   12/27/21 1001 -- 74 -- (!) 127/45 --   12/27/21 0933 -- 73 -- 129/69 --   12/27/21 0901 -- 61 -- 106/74 --   12/27/21 0834 -- 71 -- 134/71 --   12/27/21 0800 -- 83 -- 125/84 --   12/27/21 0745 -- 73 -- (!) 119/50 --   12/27/21 0618 98.7 °F (37.1 °C) 80 16 133/69 96 %   12/27/21 0041 98.3 °F (36.8 °C) 73 16 138/76 96 %   12/26/21 1942 -- -- -- 121/62 --   12/26/21 1939 97.5 °F (36.4 °C) 92 18 (!) 131/94 98 %   12/26/21 1622 98 °F (36.7 °C) 90 18 136/74 96 %     Oxygen Therapy  O2 Sat (%): 96 % (12/27/21 0618)  Pulse via Oximetry: 90 beats per minute (12/25/21 1924)  O2 Device: None (Room air) (12/26/21 1939)  Skin Assessment: Clean, dry, & intact (12/25/21 1924)  Skin Protection for O2 Device: No (12/17/21 3804)  Orientation: Bilateral (12/16/21 0800)  Location: Cheek (12/16/21 0800)  Interventions: Mouth Care;Hydrocolloid Dressing (12/16/21 0800)  O2 Flow Rate (L/min): 1 l/min (12/17/21 2028)  FIO2 (%): 28 % (12/17/21 1013)    Estimated body mass index is 48.31 kg/m² as calculated from the following:    Height as of this encounter: 5' 7\" (1.702 m). Weight as of this encounter: 139.9 kg (308 lb 6.8 oz). Intake/Output Summary (Last 24 hours) at 12/27/2021 1242  Last data filed at 12/27/2021 1040  Gross per 24 hour   Intake --   Output 1200 ml   Net -1200 ml         Physical Exam:    General:          Morbidly obese, BMI 47.93. No overt distress   Head:               Normocephalic, atraumatic  Eyes:               Sclerae appear normal.  Pupils equally round. ENT:                Nares appear normal, no drainage. Moist oral mucosa  Neck:               No restricted ROM. Trachea midline   CV:                  RRR. No m/r/g. No jugular venous distension. Triple lumen left upper chest.  Lungs:             CTAB. No wheezing, rhonchi, or rales. Respirations even, unlabored  Abdomen: Bowel sounds present. Soft, obese, nondistended. Abdominal dressing dry/intact. Tender to palpation  Extremities:     No cyanosis or clubbing. No edema; + mvmt x 4  Skin:                No rashes and normal coloration. Warm and dry. Neuro:             CN II-XII grossly intact. Sensation intact. A&Ox3  Psych:             Normal mood and affect      Signed:  MARIA EUGENIA Amos    Part of this note may have been written by using a voice dictation software. The note has been proof read but may still contain some grammatical/other typographical errors.

## 2021-12-27 NOTE — PROGRESS NOTES
Problem: Pressure Injury - Risk of  Goal: *Prevention of pressure injury  Description: Document Juventino Scale and appropriate interventions in the flowsheet. Outcome: Progressing Towards Goal  Note: Pressure Injury Interventions:  Sensory Interventions: Assess changes in LOC,Check visual cues for pain,Keep linens dry and wrinkle-free,Minimize linen layers,Pressure redistribution bed/mattress (bed type)    Moisture Interventions: Absorbent underpads,Check for incontinence Q2 hours and as needed,Minimize layers    Activity Interventions: Assess need for specialty bed,Pressure redistribution bed/mattress(bed type)    Mobility Interventions: Assess need for specialty bed,Pressure redistribution bed/mattress (bed type)    Nutrition Interventions: Document food/fluid/supplement intake    Friction and Shear Interventions: HOB 30 degrees or less,Minimize layers                Problem: Patient Education: Go to Patient Education Activity  Goal: Patient/Family Education  Outcome: Progressing Towards Goal     Problem: Patient Education: Go to Patient Education Activity  Goal: Patient/Family Education  Outcome: Progressing Towards Goal     Problem: Diabetes Self-Management  Goal: *Disease process and treatment process  Description: Define diabetes and identify own type of diabetes; list 3 options for treating diabetes. Outcome: Progressing Towards Goal  Goal: *Incorporating nutritional management into lifestyle  Description: Describe effect of type, amount and timing of food on blood glucose; list 3 methods for planning meals. Outcome: Progressing Towards Goal  Goal: *Incorporating physical activity into lifestyle  Description: State effect of exercise on blood glucose levels. Outcome: Progressing Towards Goal  Goal: *Developing strategies to promote health/change behavior  Description: Define the ABC's of diabetes; identify appropriate screenings, schedule and personal plan for screenings.   Outcome: Progressing Towards Goal  Goal: *Using medications safely  Description: State effect of diabetes medications on diabetes; name diabetes medication taking, action and side effects. Outcome: Progressing Towards Goal  Goal: *Monitoring blood glucose, interpreting and using results  Description: Identify recommended blood glucose targets  and personal targets. Outcome: Progressing Towards Goal  Goal: *Prevention, detection, treatment of acute complications  Description: List symptoms of hyper- and hypoglycemia; describe how to treat low blood sugar and actions for lowering  high blood glucose level. Outcome: Progressing Towards Goal  Goal: *Prevention, detection and treatment of chronic complications  Description: Define the natural course of diabetes and describe the relationship of blood glucose levels to long term complications of diabetes. Outcome: Progressing Towards Goal  Goal: *Developing strategies to address psychosocial issues  Description: Describe feelings about living with diabetes; identify support needed and support network  Outcome: Progressing Towards Goal  Goal: *Insulin pump training  Outcome: Progressing Towards Goal  Goal: *Sick day guidelines  Outcome: Progressing Towards Goal  Goal: *Patient Specific Goal (EDIT GOAL, INSERT TEXT)  Outcome: Progressing Towards Goal     Problem: Patient Education: Go to Patient Education Activity  Goal: Patient/Family Education  Outcome: Progressing Towards Goal     Problem: Surgical Wound Care  Goal: *Non-infected Wound: Absence of infection signs and symptoms  Description: Infection control procedures (eg: clean dressings, clean gloves, hand washing, precautions to isolate wound from contamination, sterile instruments used for wound debridement) should be implemented.   Outcome: Progressing Towards Goal  Goal: *Infected Wound: Prevention of further infection and promotion of healing  Description: Consider the use of systemic antibiotics in patients with cellulitis, osteomyelitis, bacteremia, or sepsis if there are no contraindications.   Outcome: Progressing Towards Goal  Goal: *Improvement of existing wound and maintenance of skin integrity  Outcome: Progressing Towards Goal

## 2021-12-27 NOTE — PROGRESS NOTES
Patient stable for discharge to [facility] today. Patient in agreement with discharge plan. Primary RN to call report to [phone] and arrange transport. CM will remain available should new needs arise. Care Management Interventions  PCP Verified by CM: Yes  Mode of Transport at Discharge:  Other (see comment) (Transport)  Transition of Care Consult (CM Consult): LTAC  Discharge Durable Medical Equipment: No  Physical Therapy Consult: No  Occupational Therapy Consult: No  Support Systems: Spouse/Significant Other  Confirm Follow Up Transport: Family  The Patient and/or Patient Representative was Provided with a Choice of Provider and Agrees with the Discharge Plan?: Yes  Freedom of Choice List was Provided with Basic Dialogue that Supports the Patient's Individualized Plan of Care/Goals, Treatment Preferences and Shares the Quality Data Associated with the Providers?: Yes  Discharge Location  Discharge Placement: [address] [facility] [facility] [address]

## 2021-12-27 NOTE — PROGRESS NOTES
Discharge instructions given. Education provided. All questions answered and verbally voiced understanding. Patient  discharge to [facility]. Report called to Razia Peralta RN. Spouse at bedside will follow patient to room.

## 2021-12-28 LAB
ALBUMIN SERPL-MCNC: 2.1 G/DL (ref 3.5–5)
ALBUMIN/GLOB SERPL: 0.5 {RATIO} (ref 1.2–3.5)
ALP SERPL-CCNC: 64 U/L (ref 50–136)
ALT SERPL-CCNC: 10 U/L (ref 12–65)
ANION GAP SERPL CALC-SCNC: 9 MMOL/L (ref 7–16)
AST SERPL-CCNC: 9 U/L (ref 15–37)
BASOPHILS # BLD: 0.1 K/UL (ref 0–0.2)
BASOPHILS NFR BLD: 1 % (ref 0–2)
BILIRUB SERPL-MCNC: 0.2 MG/DL (ref 0.2–1.1)
BUN SERPL-MCNC: 33 MG/DL (ref 6–23)
CALCIUM SERPL-MCNC: 8.3 MG/DL (ref 8.3–10.4)
CHLORIDE SERPL-SCNC: 106 MMOL/L (ref 98–107)
CO2 SERPL-SCNC: 26 MMOL/L (ref 21–32)
CREAT SERPL-MCNC: 5.1 MG/DL (ref 0.6–1)
CREAT UR-MCNC: 50.7 MG/DL
DIFFERENTIAL METHOD BLD: ABNORMAL
EOSINOPHIL # BLD: 0.2 K/UL (ref 0–0.8)
EOSINOPHIL NFR BLD: 2 % (ref 0.5–7.8)
ERYTHROCYTE [DISTWIDTH] IN BLOOD BY AUTOMATED COUNT: 14.3 % (ref 11.9–14.6)
GLOBULIN SER CALC-MCNC: 3.9 G/DL (ref 2.3–3.5)
GLUCOSE SERPL-MCNC: 119 MG/DL (ref 65–100)
HCT VFR BLD AUTO: 25.1 % (ref 35.8–46.3)
HGB BLD-MCNC: 7.7 G/DL (ref 11.7–15.4)
IMM GRANULOCYTES # BLD AUTO: 0.1 K/UL (ref 0–0.5)
IMM GRANULOCYTES NFR BLD AUTO: 1 % (ref 0–5)
LYMPHOCYTES # BLD: 1.1 K/UL (ref 0.5–4.6)
LYMPHOCYTES NFR BLD: 14 % (ref 13–44)
MCH RBC QN AUTO: 29.7 PG (ref 26.1–32.9)
MCHC RBC AUTO-ENTMCNC: 30.7 G/DL (ref 31.4–35)
MCV RBC AUTO: 96.9 FL (ref 79.6–97.8)
MONOCYTES # BLD: 0.6 K/UL (ref 0.1–1.3)
MONOCYTES NFR BLD: 8 % (ref 4–12)
NEUTS SEG # BLD: 5.9 K/UL (ref 1.7–8.2)
NEUTS SEG NFR BLD: 74 % (ref 43–78)
NRBC # BLD: 0 K/UL (ref 0–0.2)
PLATELET # BLD AUTO: 260 K/UL (ref 150–450)
PMV BLD AUTO: 9.8 FL (ref 9.4–12.3)
POTASSIUM SERPL-SCNC: 4 MMOL/L (ref 3.5–5.1)
PROT SERPL-MCNC: 6 G/DL (ref 6.3–8.2)
PROT UR-MCNC: 211 MG/DL
PROT/CREAT UR-RTO: 4.2
RBC # BLD AUTO: 2.59 M/UL (ref 4.05–5.2)
SODIUM SERPL-SCNC: 141 MMOL/L (ref 136–145)
WBC # BLD AUTO: 8 K/UL (ref 4.3–11.1)

## 2021-12-28 PROCEDURE — 85025 COMPLETE CBC W/AUTO DIFF WBC: CPT

## 2021-12-28 PROCEDURE — 84156 ASSAY OF PROTEIN URINE: CPT

## 2021-12-28 PROCEDURE — 80053 COMPREHEN METABOLIC PANEL: CPT

## 2021-12-28 PROCEDURE — 36415 COLL VENOUS BLD VENIPUNCTURE: CPT

## 2021-12-29 LAB
ANION GAP SERPL CALC-SCNC: 8 MMOL/L (ref 7–16)
BUN SERPL-MCNC: 42 MG/DL (ref 6–23)
CALCIUM SERPL-MCNC: 8.8 MG/DL (ref 8.3–10.4)
CHLORIDE SERPL-SCNC: 105 MMOL/L (ref 98–107)
CO2 SERPL-SCNC: 25 MMOL/L (ref 21–32)
CREAT SERPL-MCNC: 6.18 MG/DL (ref 0.6–1)
ERYTHROCYTE [DISTWIDTH] IN BLOOD BY AUTOMATED COUNT: 14.6 % (ref 11.9–14.6)
GLUCOSE SERPL-MCNC: 120 MG/DL (ref 65–100)
HBV SURFACE AB SERPL IA-ACNC: 52.42 MIU/ML
HBV SURFACE AG SER QL: NONREACTIVE
HCT VFR BLD AUTO: 28.5 % (ref 35.8–46.3)
HGB BLD-MCNC: 8.8 G/DL (ref 11.7–15.4)
MCH RBC QN AUTO: 29.9 PG (ref 26.1–32.9)
MCHC RBC AUTO-ENTMCNC: 30.9 G/DL (ref 31.4–35)
MCV RBC AUTO: 96.9 FL (ref 79.6–97.8)
NRBC # BLD: 0 K/UL (ref 0–0.2)
PLATELET # BLD AUTO: 312 K/UL (ref 150–450)
PMV BLD AUTO: 10 FL (ref 9.4–12.3)
POTASSIUM SERPL-SCNC: 4.4 MMOL/L (ref 3.5–5.1)
RBC # BLD AUTO: 2.94 M/UL (ref 4.05–5.2)
SODIUM SERPL-SCNC: 138 MMOL/L (ref 136–145)
WBC # BLD AUTO: 9.4 K/UL (ref 4.3–11.1)

## 2021-12-29 PROCEDURE — 85027 COMPLETE CBC AUTOMATED: CPT

## 2021-12-29 PROCEDURE — 87340 HEPATITIS B SURFACE AG IA: CPT

## 2021-12-29 PROCEDURE — 86705 HEP B CORE ANTIBODY IGM: CPT

## 2021-12-29 PROCEDURE — 80048 BASIC METABOLIC PNL TOTAL CA: CPT

## 2021-12-29 PROCEDURE — 36415 COLL VENOUS BLD VENIPUNCTURE: CPT

## 2021-12-29 PROCEDURE — 86706 HEP B SURFACE ANTIBODY: CPT

## 2021-12-30 LAB
ANION GAP SERPL CALC-SCNC: 8 MMOL/L (ref 7–16)
BUN SERPL-MCNC: 29 MG/DL (ref 6–23)
CALCIUM SERPL-MCNC: 8.6 MG/DL (ref 8.3–10.4)
CHLORIDE SERPL-SCNC: 102 MMOL/L (ref 98–107)
CO2 SERPL-SCNC: 29 MMOL/L (ref 21–32)
CREAT SERPL-MCNC: 5.11 MG/DL (ref 0.6–1)
ERYTHROCYTE [DISTWIDTH] IN BLOOD BY AUTOMATED COUNT: 14.5 % (ref 11.9–14.6)
GLUCOSE SERPL-MCNC: 133 MG/DL (ref 65–100)
HBV CORE IGM SERPL QL IA: NEGATIVE
HCT VFR BLD AUTO: 25.7 % (ref 35.8–46.3)
HGB BLD-MCNC: 8.1 G/DL (ref 11.7–15.4)
MCH RBC QN AUTO: 30.5 PG (ref 26.1–32.9)
MCHC RBC AUTO-ENTMCNC: 31.5 G/DL (ref 31.4–35)
MCV RBC AUTO: 96.6 FL (ref 79.6–97.8)
NRBC # BLD: 0 K/UL (ref 0–0.2)
PLATELET # BLD AUTO: 218 K/UL (ref 150–450)
PMV BLD AUTO: 10.1 FL (ref 9.4–12.3)
POTASSIUM SERPL-SCNC: 4.2 MMOL/L (ref 3.5–5.1)
RBC # BLD AUTO: 2.66 M/UL (ref 4.05–5.2)
SODIUM SERPL-SCNC: 139 MMOL/L (ref 136–145)
WBC # BLD AUTO: 6 K/UL (ref 4.3–11.1)

## 2021-12-30 PROCEDURE — 85027 COMPLETE CBC AUTOMATED: CPT

## 2021-12-30 PROCEDURE — 80048 BASIC METABOLIC PNL TOTAL CA: CPT

## 2021-12-30 PROCEDURE — 36415 COLL VENOUS BLD VENIPUNCTURE: CPT

## 2021-12-31 LAB
ANION GAP SERPL CALC-SCNC: 8 MMOL/L (ref 7–16)
B PERT DNA SPEC QL NAA+PROBE: NOT DETECTED
BORDETELLA PARAPERTUSSIS PCR, BORPAR: NOT DETECTED
BUN SERPL-MCNC: 22 MG/DL (ref 6–23)
C PNEUM DNA SPEC QL NAA+PROBE: NOT DETECTED
CALCIUM SERPL-MCNC: 9.1 MG/DL (ref 8.3–10.4)
CHLORIDE SERPL-SCNC: 99 MMOL/L (ref 98–107)
CO2 SERPL-SCNC: 28 MMOL/L (ref 21–32)
COLLECTION COMMENT, COLCM: NORMAL
CREAT SERPL-MCNC: 4.18 MG/DL (ref 0.6–1)
ERYTHROCYTE [DISTWIDTH] IN BLOOD BY AUTOMATED COUNT: 14.3 % (ref 11.9–14.6)
FLUAV SUBTYP SPEC NAA+PROBE: NOT DETECTED
FLUBV RNA SPEC QL NAA+PROBE: NOT DETECTED
GLUCOSE SERPL-MCNC: 120 MG/DL (ref 65–100)
HADV DNA SPEC QL NAA+PROBE: NOT DETECTED
HCOV 229E RNA SPEC QL NAA+PROBE: NOT DETECTED
HCOV HKU1 RNA SPEC QL NAA+PROBE: NOT DETECTED
HCOV NL63 RNA SPEC QL NAA+PROBE: NOT DETECTED
HCOV OC43 RNA SPEC QL NAA+PROBE: NOT DETECTED
HCT VFR BLD AUTO: 28.4 % (ref 35.8–46.3)
HGB BLD-MCNC: 9 G/DL (ref 11.7–15.4)
HMPV RNA SPEC QL NAA+PROBE: NOT DETECTED
HPIV1 RNA SPEC QL NAA+PROBE: NOT DETECTED
HPIV2 RNA SPEC QL NAA+PROBE: NOT DETECTED
HPIV3 RNA SPEC QL NAA+PROBE: NOT DETECTED
HPIV4 RNA SPEC QL NAA+PROBE: NOT DETECTED
M PNEUMO DNA SPEC QL NAA+PROBE: NOT DETECTED
MCH RBC QN AUTO: 29.9 PG (ref 26.1–32.9)
MCHC RBC AUTO-ENTMCNC: 31.7 G/DL (ref 31.4–35)
MCV RBC AUTO: 94.4 FL (ref 79.6–97.8)
NRBC # BLD: 0 K/UL (ref 0–0.2)
PLATELET # BLD AUTO: 227 K/UL (ref 150–450)
PMV BLD AUTO: 10.3 FL (ref 9.4–12.3)
POTASSIUM SERPL-SCNC: 4.1 MMOL/L (ref 3.5–5.1)
RBC # BLD AUTO: 3.01 M/UL (ref 4.05–5.2)
RSV RNA SPEC QL NAA+PROBE: NOT DETECTED
RV+EV RNA SPEC QL NAA+PROBE: NOT DETECTED
SARS-COV-2 PCR, COVPCR: DETECTED
SODIUM SERPL-SCNC: 135 MMOL/L (ref 136–145)
WBC # BLD AUTO: 4.7 K/UL (ref 4.3–11.1)

## 2021-12-31 PROCEDURE — 85027 COMPLETE CBC AUTOMATED: CPT

## 2021-12-31 PROCEDURE — 0202U NFCT DS 22 TRGT SARS-COV-2: CPT

## 2021-12-31 PROCEDURE — 36415 COLL VENOUS BLD VENIPUNCTURE: CPT

## 2021-12-31 PROCEDURE — 87040 BLOOD CULTURE FOR BACTERIA: CPT

## 2021-12-31 PROCEDURE — 80048 BASIC METABOLIC PNL TOTAL CA: CPT

## 2022-01-01 ENCOUNTER — APPOINTMENT (OUTPATIENT)
Dept: GENERAL RADIOLOGY | Age: 52
End: 2022-01-01
Attending: INTERNAL MEDICINE

## 2022-01-01 LAB
ANION GAP SERPL CALC-SCNC: 7 MMOL/L (ref 7–16)
BUN SERPL-MCNC: 38 MG/DL (ref 6–23)
CALCIUM SERPL-MCNC: 8.3 MG/DL (ref 8.3–10.4)
CHLORIDE SERPL-SCNC: 102 MMOL/L (ref 98–107)
CO2 SERPL-SCNC: 27 MMOL/L (ref 21–32)
CREAT SERPL-MCNC: 5.44 MG/DL (ref 0.6–1)
ERYTHROCYTE [DISTWIDTH] IN BLOOD BY AUTOMATED COUNT: 14.1 % (ref 11.9–14.6)
GLUCOSE SERPL-MCNC: 109 MG/DL (ref 65–100)
HCT VFR BLD AUTO: 25 % (ref 35.8–46.3)
HGB BLD-MCNC: 7.9 G/DL (ref 11.7–15.4)
MCH RBC QN AUTO: 29.8 PG (ref 26.1–32.9)
MCHC RBC AUTO-ENTMCNC: 31.6 G/DL (ref 31.4–35)
MCV RBC AUTO: 94.3 FL (ref 79.6–97.8)
NRBC # BLD: 0 K/UL (ref 0–0.2)
PLATELET # BLD AUTO: 193 K/UL (ref 150–450)
PMV BLD AUTO: 10.3 FL (ref 9.4–12.3)
POTASSIUM SERPL-SCNC: 4.6 MMOL/L (ref 3.5–5.1)
RBC # BLD AUTO: 2.65 M/UL (ref 4.05–5.2)
SODIUM SERPL-SCNC: 136 MMOL/L (ref 136–145)
WBC # BLD AUTO: 3.2 K/UL (ref 4.3–11.1)

## 2022-01-01 PROCEDURE — 36415 COLL VENOUS BLD VENIPUNCTURE: CPT

## 2022-01-01 PROCEDURE — 71045 X-RAY EXAM CHEST 1 VIEW: CPT

## 2022-01-01 PROCEDURE — 80048 BASIC METABOLIC PNL TOTAL CA: CPT

## 2022-01-01 PROCEDURE — 85027 COMPLETE CBC AUTOMATED: CPT

## 2022-01-02 LAB
ANION GAP SERPL CALC-SCNC: 9 MMOL/L (ref 7–16)
BUN SERPL-MCNC: 49 MG/DL (ref 6–23)
CALCIUM SERPL-MCNC: 8.2 MG/DL (ref 8.3–10.4)
CHLORIDE SERPL-SCNC: 101 MMOL/L (ref 98–107)
CO2 SERPL-SCNC: 26 MMOL/L (ref 21–32)
CREAT SERPL-MCNC: 6.35 MG/DL (ref 0.6–1)
ERYTHROCYTE [DISTWIDTH] IN BLOOD BY AUTOMATED COUNT: 14.2 % (ref 11.9–14.6)
GLUCOSE SERPL-MCNC: 90 MG/DL (ref 65–100)
HCT VFR BLD AUTO: 24.5 % (ref 35.8–46.3)
HGB BLD-MCNC: 7.6 G/DL (ref 11.7–15.4)
MCH RBC QN AUTO: 29.7 PG (ref 26.1–32.9)
MCHC RBC AUTO-ENTMCNC: 31 G/DL (ref 31.4–35)
MCV RBC AUTO: 95.7 FL (ref 79.6–97.8)
NRBC # BLD: 0 K/UL (ref 0–0.2)
PLATELET # BLD AUTO: 208 K/UL (ref 150–450)
PMV BLD AUTO: 10.6 FL (ref 9.4–12.3)
POTASSIUM SERPL-SCNC: 4.4 MMOL/L (ref 3.5–5.1)
RBC # BLD AUTO: 2.56 M/UL (ref 4.05–5.2)
SODIUM SERPL-SCNC: 136 MMOL/L (ref 136–145)
WBC # BLD AUTO: 4.3 K/UL (ref 4.3–11.1)

## 2022-01-02 PROCEDURE — 36415 COLL VENOUS BLD VENIPUNCTURE: CPT

## 2022-01-02 PROCEDURE — 85027 COMPLETE CBC AUTOMATED: CPT

## 2022-01-02 PROCEDURE — 80048 BASIC METABOLIC PNL TOTAL CA: CPT

## 2022-01-03 ENCOUNTER — HOSPITAL ENCOUNTER (OUTPATIENT)
Dept: SURGERY | Age: 52
Discharge: HOME | End: 2022-01-03

## 2022-01-03 LAB
ALBUMIN SERPL-MCNC: 2.4 G/DL (ref 3.5–5)
ALBUMIN/GLOB SERPL: 0.6 {RATIO} (ref 1.2–3.5)
ALP SERPL-CCNC: 69 U/L (ref 50–136)
ALT SERPL-CCNC: 12 U/L (ref 12–65)
ANION GAP SERPL CALC-SCNC: 8 MMOL/L (ref 7–16)
AST SERPL-CCNC: 10 U/L (ref 15–37)
BASOPHILS # BLD: 0 K/UL (ref 0–0.2)
BASOPHILS NFR BLD: 1 % (ref 0–2)
BILIRUB SERPL-MCNC: 0.2 MG/DL (ref 0.2–1.1)
BUN SERPL-MCNC: 63 MG/DL (ref 6–23)
CALCIUM SERPL-MCNC: 6.8 MG/DL (ref 8.3–10.4)
CHLORIDE SERPL-SCNC: 104 MMOL/L (ref 98–107)
CO2 SERPL-SCNC: 24 MMOL/L (ref 21–32)
CREAT SERPL-MCNC: 7.29 MG/DL (ref 0.6–1)
DIFFERENTIAL METHOD BLD: ABNORMAL
EOSINOPHIL # BLD: 0.1 K/UL (ref 0–0.8)
EOSINOPHIL NFR BLD: 1 % (ref 0.5–7.8)
ERYTHROCYTE [DISTWIDTH] IN BLOOD BY AUTOMATED COUNT: 14.2 % (ref 11.9–14.6)
GLOBULIN SER CALC-MCNC: 3.9 G/DL (ref 2.3–3.5)
GLUCOSE SERPL-MCNC: 88 MG/DL (ref 65–100)
HCT VFR BLD AUTO: 24.5 % (ref 35.8–46.3)
HGB BLD-MCNC: 7.7 G/DL (ref 11.7–15.4)
IMM GRANULOCYTES # BLD AUTO: 0.1 K/UL (ref 0–0.5)
IMM GRANULOCYTES NFR BLD AUTO: 1 % (ref 0–5)
LYMPHOCYTES # BLD: 2.1 K/UL (ref 0.5–4.6)
LYMPHOCYTES NFR BLD: 31 % (ref 13–44)
MCH RBC QN AUTO: 28.9 PG (ref 26.1–32.9)
MCHC RBC AUTO-ENTMCNC: 31.4 G/DL (ref 31.4–35)
MCV RBC AUTO: 92.1 FL (ref 79.6–97.8)
MONOCYTES # BLD: 0.6 K/UL (ref 0.1–1.3)
MONOCYTES NFR BLD: 9 % (ref 4–12)
NEUTS SEG # BLD: 3.7 K/UL (ref 1.7–8.2)
NEUTS SEG NFR BLD: 57 % (ref 43–78)
NRBC # BLD: 0 K/UL (ref 0–0.2)
PLATELET # BLD AUTO: 238 K/UL (ref 150–450)
PMV BLD AUTO: 10.6 FL (ref 9.4–12.3)
POTASSIUM SERPL-SCNC: 5.2 MMOL/L (ref 3.5–5.1)
PROT SERPL-MCNC: 6.3 G/DL (ref 6.3–8.2)
RBC # BLD AUTO: 2.66 M/UL (ref 4.05–5.2)
SODIUM SERPL-SCNC: 136 MMOL/L (ref 136–145)
WBC # BLD AUTO: 6.6 K/UL (ref 4.3–11.1)

## 2022-01-03 PROCEDURE — 85025 COMPLETE CBC W/AUTO DIFF WBC: CPT

## 2022-01-03 PROCEDURE — 80053 COMPREHEN METABOLIC PANEL: CPT

## 2022-01-05 LAB
ANION GAP SERPL CALC-SCNC: 7 MMOL/L (ref 7–16)
BACTERIA SPEC CULT: NORMAL
BACTERIA SPEC CULT: NORMAL
BUN SERPL-MCNC: 70 MG/DL (ref 6–23)
CALCIUM SERPL-MCNC: 8.4 MG/DL (ref 8.3–10.4)
CHLORIDE SERPL-SCNC: 103 MMOL/L (ref 98–107)
CO2 SERPL-SCNC: 25 MMOL/L (ref 21–32)
CREAT SERPL-MCNC: 6.65 MG/DL (ref 0.6–1)
ERYTHROCYTE [DISTWIDTH] IN BLOOD BY AUTOMATED COUNT: 14.1 % (ref 11.9–14.6)
GLUCOSE SERPL-MCNC: 99 MG/DL (ref 65–100)
HCT VFR BLD AUTO: 25.1 % (ref 35.8–46.3)
HGB BLD-MCNC: 8 G/DL (ref 11.7–15.4)
MCH RBC QN AUTO: 29.5 PG (ref 26.1–32.9)
MCHC RBC AUTO-ENTMCNC: 31.9 G/DL (ref 31.4–35)
MCV RBC AUTO: 92.6 FL (ref 79.6–97.8)
NRBC # BLD: 0 K/UL (ref 0–0.2)
PLATELET # BLD AUTO: 262 K/UL (ref 150–450)
PMV BLD AUTO: 10.6 FL (ref 9.4–12.3)
POTASSIUM SERPL-SCNC: 5.2 MMOL/L (ref 3.5–5.1)
RBC # BLD AUTO: 2.71 M/UL (ref 4.05–5.2)
SERVICE CMNT-IMP: NORMAL
SERVICE CMNT-IMP: NORMAL
SODIUM SERPL-SCNC: 135 MMOL/L (ref 136–145)
WBC # BLD AUTO: 6 K/UL (ref 4.3–11.1)

## 2022-01-05 PROCEDURE — 80048 BASIC METABOLIC PNL TOTAL CA: CPT

## 2022-01-05 PROCEDURE — 85027 COMPLETE CBC AUTOMATED: CPT

## 2022-01-06 LAB
COVID-19 RAPID TEST, COVR: DETECTED
SARS-COV-2, COV2: NORMAL
SOURCE, COVRS: ABNORMAL

## 2022-01-06 PROCEDURE — 87635 SARS-COV-2 COVID-19 AMP PRB: CPT

## 2022-01-07 LAB
ALBUMIN SERPL-MCNC: 2.7 G/DL (ref 3.5–5)
ANION GAP SERPL CALC-SCNC: 7 MMOL/L (ref 7–16)
B PERT DNA SPEC QL NAA+PROBE: NOT DETECTED
BORDETELLA PARAPERTUSSIS PCR, BORPAR: NOT DETECTED
BUN SERPL-MCNC: 75 MG/DL (ref 6–23)
C PNEUM DNA SPEC QL NAA+PROBE: NOT DETECTED
CALCIUM SERPL-MCNC: 8.7 MG/DL (ref 8.3–10.4)
CHLORIDE SERPL-SCNC: 102 MMOL/L (ref 98–107)
CO2 SERPL-SCNC: 27 MMOL/L (ref 21–32)
CREAT SERPL-MCNC: 6.54 MG/DL (ref 0.6–1)
D DIMER PPP FEU-MCNC: 4.02 UG/ML(FEU)
ERYTHROCYTE [DISTWIDTH] IN BLOOD BY AUTOMATED COUNT: 14 % (ref 11.9–14.6)
FLUAV SUBTYP SPEC NAA+PROBE: NOT DETECTED
FLUBV RNA SPEC QL NAA+PROBE: NOT DETECTED
GLUCOSE SERPL-MCNC: 105 MG/DL (ref 65–100)
HADV DNA SPEC QL NAA+PROBE: NOT DETECTED
HCOV 229E RNA SPEC QL NAA+PROBE: NOT DETECTED
HCOV HKU1 RNA SPEC QL NAA+PROBE: NOT DETECTED
HCOV NL63 RNA SPEC QL NAA+PROBE: NOT DETECTED
HCOV OC43 RNA SPEC QL NAA+PROBE: NOT DETECTED
HCT VFR BLD AUTO: 24.7 % (ref 35.8–46.3)
HGB BLD-MCNC: 8 G/DL (ref 11.7–15.4)
HMPV RNA SPEC QL NAA+PROBE: NOT DETECTED
HPIV1 RNA SPEC QL NAA+PROBE: NOT DETECTED
HPIV2 RNA SPEC QL NAA+PROBE: NOT DETECTED
HPIV3 RNA SPEC QL NAA+PROBE: NOT DETECTED
HPIV4 RNA SPEC QL NAA+PROBE: NOT DETECTED
M PNEUMO DNA SPEC QL NAA+PROBE: NOT DETECTED
MCH RBC QN AUTO: 30.3 PG (ref 26.1–32.9)
MCHC RBC AUTO-ENTMCNC: 32.4 G/DL (ref 31.4–35)
MCV RBC AUTO: 93.6 FL (ref 79.6–97.8)
NRBC # BLD: 0 K/UL (ref 0–0.2)
PHOSPHATE SERPL-MCNC: 5.4 MG/DL (ref 2.5–4.5)
PLATELET # BLD AUTO: 346 K/UL (ref 150–450)
PMV BLD AUTO: 10.4 FL (ref 9.4–12.3)
POTASSIUM SERPL-SCNC: 5.1 MMOL/L (ref 3.5–5.1)
RBC # BLD AUTO: 2.64 M/UL (ref 4.05–5.2)
RSV RNA SPEC QL NAA+PROBE: NOT DETECTED
RV+EV RNA SPEC QL NAA+PROBE: NOT DETECTED
SARS-COV-2 PCR, COVPCR: DETECTED
SODIUM SERPL-SCNC: 136 MMOL/L (ref 136–145)
WBC # BLD AUTO: 7.2 K/UL (ref 4.3–11.1)

## 2022-01-07 PROCEDURE — 0202U NFCT DS 22 TRGT SARS-COV-2: CPT

## 2022-01-07 PROCEDURE — 85379 FIBRIN DEGRADATION QUANT: CPT

## 2022-01-07 PROCEDURE — 80069 RENAL FUNCTION PANEL: CPT

## 2022-01-07 PROCEDURE — 85027 COMPLETE CBC AUTOMATED: CPT

## 2022-01-07 PROCEDURE — 36415 COLL VENOUS BLD VENIPUNCTURE: CPT

## 2022-01-10 LAB
ALBUMIN SERPL-MCNC: 2.7 G/DL (ref 3.5–5)
ALBUMIN/GLOB SERPL: 0.8 {RATIO} (ref 1.2–3.5)
ALP SERPL-CCNC: 65 U/L (ref 50–136)
ALT SERPL-CCNC: 13 U/L (ref 12–65)
ANION GAP SERPL CALC-SCNC: 7 MMOL/L (ref 7–16)
AST SERPL-CCNC: <3 U/L (ref 15–37)
BASOPHILS # BLD: 0.1 K/UL (ref 0–0.2)
BASOPHILS NFR BLD: 1 % (ref 0–2)
BILIRUB SERPL-MCNC: 0.3 MG/DL (ref 0.2–1.1)
BUN SERPL-MCNC: 113 MG/DL (ref 6–23)
CALCIUM SERPL-MCNC: 8.2 MG/DL (ref 8.3–10.4)
CHLORIDE SERPL-SCNC: 103 MMOL/L (ref 98–107)
CO2 SERPL-SCNC: 23 MMOL/L (ref 21–32)
CREAT SERPL-MCNC: 8.11 MG/DL (ref 0.6–1)
DIFFERENTIAL METHOD BLD: ABNORMAL
EOSINOPHIL # BLD: 0 K/UL (ref 0–0.8)
EOSINOPHIL NFR BLD: 0 % (ref 0.5–7.8)
ERYTHROCYTE [DISTWIDTH] IN BLOOD BY AUTOMATED COUNT: 14.2 % (ref 11.9–14.6)
GLOBULIN SER CALC-MCNC: 3.3 G/DL (ref 2.3–3.5)
GLUCOSE SERPL-MCNC: 126 MG/DL (ref 65–100)
HCT VFR BLD AUTO: 22.7 % (ref 35.8–46.3)
HGB BLD-MCNC: 7.2 G/DL (ref 11.7–15.4)
IMM GRANULOCYTES # BLD AUTO: 0.9 K/UL (ref 0–0.5)
IMM GRANULOCYTES NFR BLD AUTO: 11 % (ref 0–5)
LYMPHOCYTES # BLD: 1.9 K/UL (ref 0.5–4.6)
LYMPHOCYTES NFR BLD: 24 % (ref 13–44)
MCH RBC QN AUTO: 29.9 PG (ref 26.1–32.9)
MCHC RBC AUTO-ENTMCNC: 31.7 G/DL (ref 31.4–35)
MCV RBC AUTO: 94.2 FL (ref 79.6–97.8)
MONOCYTES # BLD: 0.8 K/UL (ref 0.1–1.3)
MONOCYTES NFR BLD: 10 % (ref 4–12)
NEUTS SEG # BLD: 4.1 K/UL (ref 1.7–8.2)
NEUTS SEG NFR BLD: 54 % (ref 43–78)
NRBC # BLD: 0 K/UL (ref 0–0.2)
PLATELET # BLD AUTO: 345 K/UL (ref 150–450)
PLATELET COMMENTS,PCOM: ADEQUATE
PMV BLD AUTO: 10.2 FL (ref 9.4–12.3)
POTASSIUM SERPL-SCNC: 5.8 MMOL/L (ref 3.5–5.1)
PROT SERPL-MCNC: 6 G/DL (ref 6.3–8.2)
RBC # BLD AUTO: 2.41 M/UL (ref 4.05–5.2)
RBC MORPH BLD: ABNORMAL
SODIUM SERPL-SCNC: 133 MMOL/L (ref 136–145)
WBC # BLD AUTO: 7.8 K/UL (ref 4.3–11.1)
WBC MORPH BLD: ABNORMAL

## 2022-01-10 PROCEDURE — 80053 COMPREHEN METABOLIC PANEL: CPT

## 2022-01-10 PROCEDURE — 85025 COMPLETE CBC W/AUTO DIFF WBC: CPT

## 2022-01-12 LAB
ANION GAP SERPL CALC-SCNC: 10 MMOL/L (ref 7–16)
BUN SERPL-MCNC: 87 MG/DL (ref 6–23)
CALCIUM SERPL-MCNC: 8.9 MG/DL (ref 8.3–10.4)
CHLORIDE SERPL-SCNC: 100 MMOL/L (ref 98–107)
CO2 SERPL-SCNC: 26 MMOL/L (ref 21–32)
CREAT SERPL-MCNC: 7.56 MG/DL (ref 0.6–1)
ERYTHROCYTE [DISTWIDTH] IN BLOOD BY AUTOMATED COUNT: 14.6 % (ref 11.9–14.6)
GLUCOSE SERPL-MCNC: 102 MG/DL (ref 65–100)
HCT VFR BLD AUTO: 24.5 % (ref 35.8–46.3)
HGB BLD-MCNC: 7.9 G/DL (ref 11.7–15.4)
MCH RBC QN AUTO: 30.2 PG (ref 26.1–32.9)
MCHC RBC AUTO-ENTMCNC: 32.2 G/DL (ref 31.4–35)
MCV RBC AUTO: 93.5 FL (ref 79.6–97.8)
NRBC # BLD: 0 K/UL (ref 0–0.2)
PLATELET # BLD AUTO: 334 K/UL (ref 150–450)
PMV BLD AUTO: 10 FL (ref 9.4–12.3)
POTASSIUM SERPL-SCNC: 5.4 MMOL/L (ref 3.5–5.1)
RBC # BLD AUTO: 2.62 M/UL (ref 4.05–5.2)
SODIUM SERPL-SCNC: 136 MMOL/L (ref 136–145)
WBC # BLD AUTO: 9.7 K/UL (ref 4.3–11.1)

## 2022-01-12 PROCEDURE — 36415 COLL VENOUS BLD VENIPUNCTURE: CPT

## 2022-01-12 PROCEDURE — 80048 BASIC METABOLIC PNL TOTAL CA: CPT

## 2022-01-12 PROCEDURE — 85027 COMPLETE CBC AUTOMATED: CPT

## 2022-01-14 LAB
APTT PPP: 37.3 SEC (ref 24.1–35.1)
INR PPP: 1.5
PROTHROMBIN TIME: 17.9 SEC (ref 12.6–14.5)

## 2022-01-14 PROCEDURE — 36415 COLL VENOUS BLD VENIPUNCTURE: CPT

## 2022-01-14 PROCEDURE — 85730 THROMBOPLASTIN TIME PARTIAL: CPT

## 2022-01-14 PROCEDURE — 85610 PROTHROMBIN TIME: CPT

## 2022-01-14 PROCEDURE — 82533 TOTAL CORTISOL: CPT

## 2022-01-15 LAB
APTT PPP: >200 SEC (ref 24.1–35.1)
ERYTHROCYTE [DISTWIDTH] IN BLOOD BY AUTOMATED COUNT: 14.6 % (ref 11.9–14.6)
HCT VFR BLD AUTO: 21.2 % (ref 35.8–46.3)
HEMOCCULT STL QL: NEGATIVE
HGB BLD-MCNC: 6.7 G/DL (ref 11.7–15.4)
HISTORY CHECKED?,CKHIST: NORMAL
MCH RBC QN AUTO: 29.1 PG (ref 26.1–32.9)
MCHC RBC AUTO-ENTMCNC: 31.6 G/DL (ref 31.4–35)
MCV RBC AUTO: 92.2 FL (ref 79.6–97.8)
NRBC # BLD: 0 K/UL (ref 0–0.2)
PLATELET # BLD AUTO: 267 K/UL (ref 150–450)
PMV BLD AUTO: 10.1 FL (ref 9.4–12.3)
RBC # BLD AUTO: 2.3 M/UL (ref 4.05–5.2)
WBC # BLD AUTO: 8.3 K/UL (ref 4.3–11.1)

## 2022-01-15 PROCEDURE — 36415 COLL VENOUS BLD VENIPUNCTURE: CPT

## 2022-01-15 PROCEDURE — P9016 RBC LEUKOCYTES REDUCED: HCPCS

## 2022-01-15 PROCEDURE — 85027 COMPLETE CBC AUTOMATED: CPT

## 2022-01-15 PROCEDURE — 82272 OCCULT BLD FECES 1-3 TESTS: CPT

## 2022-01-15 PROCEDURE — 86900 BLOOD TYPING SEROLOGIC ABO: CPT

## 2022-01-15 PROCEDURE — 86923 COMPATIBILITY TEST ELECTRIC: CPT

## 2022-01-15 PROCEDURE — 85730 THROMBOPLASTIN TIME PARTIAL: CPT

## 2022-01-16 LAB
ABO + RH BLD: NORMAL
BLD PROD TYP BPU: NORMAL
BLOOD GROUP ANTIBODIES SERPL: NORMAL
BPU ID: NORMAL
CORTIS AM PEAK SERPL-MCNC: 24.7 UG/DL (ref 7–25)
CROSSMATCH RESULT,%XM: NORMAL
HCT VFR BLD AUTO: 24.3 % (ref 35.8–46.3)
HGB BLD-MCNC: 7.8 G/DL (ref 11.7–15.4)
SPECIMEN EXP DATE BLD: NORMAL
STATUS OF UNIT,%ST: NORMAL
UNIT DIVISION, %UDIV: 0

## 2022-01-16 PROCEDURE — 85018 HEMOGLOBIN: CPT

## 2022-01-16 PROCEDURE — 36415 COLL VENOUS BLD VENIPUNCTURE: CPT

## 2022-01-17 ENCOUNTER — ANESTHESIA EVENT (OUTPATIENT)
Dept: SURGERY | Age: 52
End: 2022-01-17

## 2022-01-17 LAB
ALBUMIN SERPL-MCNC: 2.1 G/DL (ref 3.5–5)
ALBUMIN/GLOB SERPL: 0.6 {RATIO} (ref 1.2–3.5)
ALP SERPL-CCNC: 57 U/L (ref 50–136)
ALT SERPL-CCNC: 11 U/L (ref 12–65)
ANION GAP SERPL CALC-SCNC: 10 MMOL/L (ref 7–16)
AST SERPL-CCNC: 9 U/L (ref 15–37)
BASOPHILS # BLD: 0 K/UL (ref 0–0.2)
BASOPHILS NFR BLD: 1 % (ref 0–2)
BILIRUB SERPL-MCNC: 0.2 MG/DL (ref 0.2–1.1)
BUN SERPL-MCNC: 75 MG/DL (ref 6–23)
CALCIUM SERPL-MCNC: 8.9 MG/DL (ref 8.3–10.4)
CHLORIDE SERPL-SCNC: 99 MMOL/L (ref 98–107)
CO2 SERPL-SCNC: 25 MMOL/L (ref 21–32)
CREAT SERPL-MCNC: 8.29 MG/DL (ref 0.6–1)
DIFFERENTIAL METHOD BLD: ABNORMAL
EOSINOPHIL # BLD: 0.2 K/UL (ref 0–0.8)
EOSINOPHIL NFR BLD: 4 % (ref 0.5–7.8)
ERYTHROCYTE [DISTWIDTH] IN BLOOD BY AUTOMATED COUNT: 14.8 % (ref 11.9–14.6)
GLOBULIN SER CALC-MCNC: 3.8 G/DL (ref 2.3–3.5)
GLUCOSE SERPL-MCNC: 97 MG/DL (ref 65–100)
HCT VFR BLD AUTO: 25.8 % (ref 35.8–46.3)
HGB BLD-MCNC: 8.3 G/DL (ref 11.7–15.4)
IMM GRANULOCYTES # BLD AUTO: 0 K/UL (ref 0–0.5)
IMM GRANULOCYTES NFR BLD AUTO: 1 % (ref 0–5)
LYMPHOCYTES # BLD: 1.1 K/UL (ref 0.5–4.6)
LYMPHOCYTES NFR BLD: 20 % (ref 13–44)
MCH RBC QN AUTO: 28.9 PG (ref 26.1–32.9)
MCHC RBC AUTO-ENTMCNC: 32.2 G/DL (ref 31.4–35)
MCV RBC AUTO: 89.9 FL (ref 79.6–97.8)
MONOCYTES # BLD: 0.7 K/UL (ref 0.1–1.3)
MONOCYTES NFR BLD: 12 % (ref 4–12)
NEUTS SEG # BLD: 3.7 K/UL (ref 1.7–8.2)
NEUTS SEG NFR BLD: 63 % (ref 43–78)
NRBC # BLD: 0 K/UL (ref 0–0.2)
PLATELET # BLD AUTO: 222 K/UL (ref 150–450)
PMV BLD AUTO: 10.4 FL (ref 9.4–12.3)
POTASSIUM SERPL-SCNC: 4.9 MMOL/L (ref 3.5–5.1)
PROT SERPL-MCNC: 5.9 G/DL (ref 6.3–8.2)
RBC # BLD AUTO: 2.87 M/UL (ref 4.05–5.2)
SODIUM SERPL-SCNC: 134 MMOL/L (ref 136–145)
WBC # BLD AUTO: 5.8 K/UL (ref 4.3–11.1)

## 2022-01-17 PROCEDURE — 36415 COLL VENOUS BLD VENIPUNCTURE: CPT

## 2022-01-17 PROCEDURE — 80053 COMPREHEN METABOLIC PANEL: CPT

## 2022-01-17 PROCEDURE — 85025 COMPLETE CBC W/AUTO DIFF WBC: CPT

## 2022-01-17 NOTE — ANESTHESIA PREPROCEDURE EVALUATION
Relevant Problems   CARDIOVASCULAR   (+) Atrial fibrillation with RVR (HCC)   (+) HTN (hypertension)      RENAL FAILURE   (+) DEVENDRA (acute kidney injury) ([facility] [address]      ENDOCRINE   (+) DM2 (diabetes mellitus, type 2) (HCC)   (+) Obesity, morbid (HCC)      HEMATOLOGY   (+) Anemia     Relevant Problems   CARDIOVASCULAR   (+) Atrial fibrillation with RVR (HCC)   (+) HTN (hypertension)      RENAL FAILURE   (+) DEVENDRA (acute kidney injury) ([facility] [address]      ENDOCRINE   (+) DM2 (diabetes mellitus, type 2) (HCC)   (+) Obesity, morbid (HCC)      HEMATOLOGY   (+) Anemia       Anesthetic History   No history of anesthetic complications            Review of Systems / Medical History  Patient summary reviewed and pertinent labs reviewed    Pulmonary  Within defined limits                 Neuro/Psych   Within defined limits           Cardiovascular    Hypertension: well controlled              Exercise tolerance: <4 METS     GI/Hepatic/Renal         Renal disease: ARF       Endo/Other    Diabetes: well controlled, type 2    Morbid obesity and anemia     Other Findings   Comments: Necrotizing fasciitis          Physical Exam    Airway          Intubated   Cardiovascular    Rhythm: regular  Rate: normal         Dental         Pulmonary  Breath sounds clear to auscultation               Abdominal  GI exam deferred       Other Findings            Anesthetic Plan    ASA: 5  Anesthesia type: general        Post procedure ventilation   Induction: Inhalational  Anesthetic plan and risks discussed with: Patient and Spouse      Patient s/p extensive debridement for necrotizing fasciitis. Remains intubated/sedated. I spoke with her  and obtained verbal consent for the procedure tomorrow.   Patient will remain intubated s/p procedure        Anesthetic History   No history of anesthetic complications            Review of Systems / Medical History  Patient summary reviewed and pertinent labs reviewed    Pulmonary  Within defined limits                 Neuro/Psych   Within defined limits           Cardiovascular    Hypertension: well controlled        Dysrhythmias : atrial fibrillation      Exercise tolerance: <4 METS     GI/Hepatic/Renal     GERD: well controlled    Renal disease: ARF       Endo/Other    Diabetes: well controlled, type 2    Morbid obesity and anemia (Hb 7.9)     Other Findings   Comments: Necrotizing fasciitis          Physical Exam    Airway  Mallampati: II  TM Distance: 4 - 6 cm  Neck ROM: normal range of motion   Mouth opening: Normal     Cardiovascular  Regular rate and rhythm,  S1 and S2 normal,  no murmur, click, rub, or gallop  Rhythm: regular  Rate: normal         Dental         Pulmonary  Breath sounds clear to auscultation               Abdominal  GI exam deferred       Other Findings            Anesthetic Plan    ASA: 4  Anesthesia type: general          Induction: Intravenous  Anesthetic plan and risks discussed with: Patient and Spouse      Patient s/p extensive debridement for necrotizing fasciitis.

## 2022-01-18 ENCOUNTER — ANESTHESIA (OUTPATIENT)
Dept: SURGERY | Age: 52
End: 2022-01-18

## 2022-01-18 LAB
APPEARANCE UR: CLEAR
BACTERIA URNS QL MICRO: 0 /HPF
BASOPHILS # BLD: 0 K/UL (ref 0–0.2)
BASOPHILS NFR BLD: 0 % (ref 0–2)
BILIRUB UR QL: NEGATIVE
CASTS URNS QL MICRO: ABNORMAL /LPF
COLOR UR: YELLOW
DIFFERENTIAL METHOD BLD: ABNORMAL
EOSINOPHIL # BLD: 0.1 K/UL (ref 0–0.8)
EOSINOPHIL NFR BLD: 2 % (ref 0.5–7.8)
EPI CELLS #/AREA URNS HPF: 0 /HPF
ERYTHROCYTE [DISTWIDTH] IN BLOOD BY AUTOMATED COUNT: 14.7 % (ref 11.9–14.6)
ERYTHROCYTE [DISTWIDTH] IN BLOOD BY AUTOMATED COUNT: 14.7 % (ref 11.9–14.6)
GLUCOSE UR STRIP.AUTO-MCNC: NEGATIVE MG/DL
HCT VFR BLD AUTO: 24.6 % (ref 35.8–46.3)
HCT VFR BLD AUTO: 26.3 % (ref 35.8–46.3)
HGB BLD-MCNC: 7.9 G/DL (ref 11.7–15.4)
HGB BLD-MCNC: 8.6 G/DL (ref 11.7–15.4)
HGB UR QL STRIP: NEGATIVE
IMM GRANULOCYTES # BLD AUTO: 0.1 K/UL (ref 0–0.5)
IMM GRANULOCYTES NFR BLD AUTO: 1 % (ref 0–5)
KETONES UR QL STRIP.AUTO: NEGATIVE MG/DL
LACTATE SERPL-SCNC: 1.5 MMOL/L (ref 0.4–2)
LEUKOCYTE ESTERASE UR QL STRIP.AUTO: NEGATIVE
LYMPHOCYTES # BLD: 0.2 K/UL (ref 0.5–4.6)
LYMPHOCYTES NFR BLD: 4 % (ref 13–44)
MCH RBC QN AUTO: 28.9 PG (ref 26.1–32.9)
MCH RBC QN AUTO: 29.5 PG (ref 26.1–32.9)
MCHC RBC AUTO-ENTMCNC: 32.1 G/DL (ref 31.4–35)
MCHC RBC AUTO-ENTMCNC: 32.7 G/DL (ref 31.4–35)
MCV RBC AUTO: 90.1 FL (ref 79.6–97.8)
MCV RBC AUTO: 90.1 FL (ref 79.6–97.8)
MONOCYTES # BLD: 0.5 K/UL (ref 0.1–1.3)
MONOCYTES NFR BLD: 9 % (ref 4–12)
NEUTS SEG # BLD: 5.1 K/UL (ref 1.7–8.2)
NEUTS SEG NFR BLD: 84 % (ref 43–78)
NITRITE UR QL STRIP.AUTO: NEGATIVE
NRBC # BLD: 0 K/UL (ref 0–0.2)
NRBC # BLD: 0 K/UL (ref 0–0.2)
PH UR STRIP: 8 [PH] (ref 5–9)
PLATELET # BLD AUTO: 167 K/UL (ref 150–450)
PLATELET # BLD AUTO: 200 K/UL (ref 150–450)
PLATELET COMMENTS,PCOM: ADEQUATE
PMV BLD AUTO: 10.3 FL (ref 9.4–12.3)
PMV BLD AUTO: 9.9 FL (ref 9.4–12.3)
PROCALCITONIN SERPL-MCNC: 0.78 NG/ML (ref 0–0.49)
PROT UR STRIP-MCNC: 100 MG/DL
RBC # BLD AUTO: 2.73 M/UL (ref 4.05–5.2)
RBC # BLD AUTO: 2.92 M/UL (ref 4.05–5.2)
RBC #/AREA URNS HPF: 0 /HPF
RBC MORPH BLD: ABNORMAL
SP GR UR REFRACTOMETRY: 1.01 (ref 1–1.02)
UROBILINOGEN UR QL STRIP.AUTO: 0.2 EU/DL (ref 0.2–1)
WBC # BLD AUTO: 4.9 K/UL (ref 4.3–11.1)
WBC # BLD AUTO: 6 K/UL (ref 4.3–11.1)
WBC MORPH BLD: ABNORMAL
WBC URNS QL MICRO: 0 /HPF

## 2022-01-18 PROCEDURE — 84145 PROCALCITONIN (PCT): CPT

## 2022-01-18 PROCEDURE — 85025 COMPLETE CBC W/AUTO DIFF WBC: CPT

## 2022-01-18 PROCEDURE — 85027 COMPLETE CBC AUTOMATED: CPT

## 2022-01-18 PROCEDURE — 36415 COLL VENOUS BLD VENIPUNCTURE: CPT

## 2022-01-18 PROCEDURE — 83605 ASSAY OF LACTIC ACID: CPT

## 2022-01-18 PROCEDURE — 81001 URINALYSIS AUTO W/SCOPE: CPT

## 2022-01-18 PROCEDURE — 87040 BLOOD CULTURE FOR BACTERIA: CPT

## 2022-01-19 LAB
ANION GAP SERPL CALC-SCNC: 10 MMOL/L (ref 7–16)
BUN SERPL-MCNC: 49 MG/DL (ref 6–23)
CALCIUM SERPL-MCNC: 8.6 MG/DL (ref 8.3–10.4)
CHLORIDE SERPL-SCNC: 94 MMOL/L (ref 98–107)
CO2 SERPL-SCNC: 28 MMOL/L (ref 21–32)
CREAT SERPL-MCNC: 7.26 MG/DL (ref 0.6–1)
ERYTHROCYTE [DISTWIDTH] IN BLOOD BY AUTOMATED COUNT: 14.7 % (ref 11.9–14.6)
FERRITIN SERPL-MCNC: 268 NG/ML (ref 8–388)
GLUCOSE SERPL-MCNC: 105 MG/DL (ref 65–100)
HCT VFR BLD AUTO: 26.4 % (ref 35.8–46.3)
HGB BLD-MCNC: 8.5 G/DL (ref 11.7–15.4)
IRON SATN MFR SERPL: 10 %
IRON SERPL-MCNC: 16 UG/DL (ref 35–150)
IRON SERPL-MCNC: 19 UG/DL (ref 35–150)
MCH RBC QN AUTO: 28.8 PG (ref 26.1–32.9)
MCHC RBC AUTO-ENTMCNC: 32.2 G/DL (ref 31.4–35)
MCV RBC AUTO: 89.5 FL (ref 79.6–97.8)
NRBC # BLD: 0 K/UL (ref 0–0.2)
PLATELET # BLD AUTO: 200 K/UL (ref 150–450)
PMV BLD AUTO: 10.3 FL (ref 9.4–12.3)
POTASSIUM SERPL-SCNC: 4.6 MMOL/L (ref 3.5–5.1)
RBC # BLD AUTO: 2.95 M/UL (ref 4.05–5.2)
SODIUM SERPL-SCNC: 132 MMOL/L (ref 136–145)
TIBC SERPL-MCNC: 168 UG/DL (ref 250–450)
TRANSFERRIN SERPL-MCNC: 128 MG/DL (ref 202–364)
WBC # BLD AUTO: 6.1 K/UL (ref 4.3–11.1)

## 2022-01-19 PROCEDURE — 83540 ASSAY OF IRON: CPT

## 2022-01-19 PROCEDURE — 82728 ASSAY OF FERRITIN: CPT

## 2022-01-19 PROCEDURE — 85027 COMPLETE CBC AUTOMATED: CPT

## 2022-01-19 PROCEDURE — 36415 COLL VENOUS BLD VENIPUNCTURE: CPT

## 2022-01-19 PROCEDURE — 80048 BASIC METABOLIC PNL TOTAL CA: CPT

## 2022-01-19 PROCEDURE — 84466 ASSAY OF TRANSFERRIN: CPT

## 2022-01-20 LAB
ANION GAP SERPL CALC-SCNC: 5 MMOL/L (ref 7–16)
BUN SERPL-MCNC: 35 MG/DL (ref 6–23)
CALCIUM SERPL-MCNC: 8.8 MG/DL (ref 8.3–10.4)
CHLORIDE SERPL-SCNC: 95 MMOL/L (ref 98–107)
CO2 SERPL-SCNC: 32 MMOL/L (ref 21–32)
CREAT SERPL-MCNC: 5.04 MG/DL (ref 0.6–1)
ERYTHROCYTE [DISTWIDTH] IN BLOOD BY AUTOMATED COUNT: 14.6 % (ref 11.9–14.6)
GLUCOSE SERPL-MCNC: 113 MG/DL (ref 65–100)
HCT VFR BLD AUTO: 23 % (ref 35.8–46.3)
HGB BLD-MCNC: 7.5 G/DL (ref 11.7–15.4)
MCH RBC QN AUTO: 30.2 PG (ref 26.1–32.9)
MCHC RBC AUTO-ENTMCNC: 32.6 G/DL (ref 31.4–35)
MCV RBC AUTO: 92.7 FL (ref 79.6–97.8)
NRBC # BLD: 0 K/UL (ref 0–0.2)
PLATELET # BLD AUTO: 181 K/UL (ref 150–450)
PMV BLD AUTO: 10.5 FL (ref 9.4–12.3)
POTASSIUM SERPL-SCNC: 4 MMOL/L (ref 3.5–5.1)
RBC # BLD AUTO: 2.48 M/UL (ref 4.05–5.2)
SODIUM SERPL-SCNC: 132 MMOL/L (ref 136–145)
WBC # BLD AUTO: 5.3 K/UL (ref 4.3–11.1)

## 2022-01-20 PROCEDURE — 36415 COLL VENOUS BLD VENIPUNCTURE: CPT

## 2022-01-20 PROCEDURE — 80048 BASIC METABOLIC PNL TOTAL CA: CPT

## 2022-01-20 PROCEDURE — 85027 COMPLETE CBC AUTOMATED: CPT

## 2022-01-21 ENCOUNTER — HOSPITAL ENCOUNTER (OUTPATIENT)
Dept: INTERVENTIONAL RADIOLOGY/VASCULAR | Age: 52
Discharge: HOME | End: 2022-01-21
Attending: NURSE PRACTITIONER
Payer: COMMERCIAL

## 2022-01-21 VITALS
HEART RATE: 77 BPM | DIASTOLIC BLOOD PRESSURE: 60 MMHG | OXYGEN SATURATION: 99 % | TEMPERATURE: 98.4 F | RESPIRATION RATE: 16 BRPM | SYSTOLIC BLOOD PRESSURE: 124 MMHG

## 2022-01-21 LAB
ANION GAP SERPL CALC-SCNC: 7 MMOL/L (ref 7–16)
BUN SERPL-MCNC: 53 MG/DL (ref 6–23)
CALCIUM SERPL-MCNC: 9.1 MG/DL (ref 8.3–10.4)
CHLORIDE SERPL-SCNC: 96 MMOL/L (ref 98–107)
CO2 SERPL-SCNC: 30 MMOL/L (ref 21–32)
CREAT SERPL-MCNC: 6.54 MG/DL (ref 0.6–1)
ERYTHROCYTE [DISTWIDTH] IN BLOOD BY AUTOMATED COUNT: 14.4 % (ref 11.9–14.6)
GLUCOSE SERPL-MCNC: 115 MG/DL (ref 65–100)
HCT VFR BLD AUTO: 25.7 % (ref 35.8–46.3)
HGB BLD-MCNC: 8.4 G/DL (ref 11.7–15.4)
MCH RBC QN AUTO: 29.2 PG (ref 26.1–32.9)
MCHC RBC AUTO-ENTMCNC: 32.7 G/DL (ref 31.4–35)
MCV RBC AUTO: 89.2 FL (ref 79.6–97.8)
NRBC # BLD: 0 K/UL (ref 0–0.2)
PLATELET # BLD AUTO: 189 K/UL (ref 150–450)
PMV BLD AUTO: 10 FL (ref 9.4–12.3)
POTASSIUM SERPL-SCNC: 4 MMOL/L (ref 3.5–5.1)
RBC # BLD AUTO: 2.88 M/UL (ref 4.05–5.2)
SODIUM SERPL-SCNC: 133 MMOL/L (ref 136–145)
WBC # BLD AUTO: 6.3 K/UL (ref 4.3–11.1)

## 2022-01-21 PROCEDURE — 77030010507 HC ADH SKN DERMBND J&J -B

## 2022-01-21 PROCEDURE — 36415 COLL VENOUS BLD VENIPUNCTURE: CPT

## 2022-01-21 PROCEDURE — 80048 BASIC METABOLIC PNL TOTAL CA: CPT

## 2022-01-21 PROCEDURE — 74011250636 HC RX REV CODE- 250/636: Performed by: RADIOLOGY

## 2022-01-21 PROCEDURE — 36558 INSERT TUNNELED CV CATH: CPT

## 2022-01-21 PROCEDURE — 74011000250 HC RX REV CODE- 250: Performed by: RADIOLOGY

## 2022-01-21 PROCEDURE — 77030018719 HC DRSG PTCH ANTIMIC J&J -A

## 2022-01-21 PROCEDURE — 85027 COMPLETE CBC AUTOMATED: CPT

## 2022-01-21 PROCEDURE — 77030002983 HC SUT POLYSRB COVD -A

## 2022-01-21 PROCEDURE — 77030002916 HC SUT ETHLN J&J -A

## 2022-01-21 PROCEDURE — C1750 CATH, HEMODIALYSIS,LONG-TERM: HCPCS

## 2022-01-21 PROCEDURE — C1894 INTRO/SHEATH, NON-LASER: HCPCS

## 2022-01-21 RX ORDER — HEPARIN SODIUM 1000 [USP'U]/ML
5000 INJECTION, SOLUTION INTRAVENOUS; SUBCUTANEOUS ONCE
Status: COMPLETED | OUTPATIENT
Start: 2022-01-21 | End: 2022-01-21

## 2022-01-21 RX ORDER — MIDAZOLAM HYDROCHLORIDE 1 MG/ML
.5-2 INJECTION, SOLUTION INTRAMUSCULAR; INTRAVENOUS
Status: DISCONTINUED | OUTPATIENT
Start: 2022-01-21 | End: 2022-01-25 | Stop reason: HOSPADM

## 2022-01-21 RX ORDER — HYDROCODONE BITARTRATE AND ACETAMINOPHEN 7.5; 325 MG/1; MG/1
1 TABLET ORAL
Status: DISCONTINUED | OUTPATIENT
Start: 2022-01-21 | End: 2022-01-25 | Stop reason: HOSPADM

## 2022-01-21 RX ORDER — LIDOCAINE HYDROCHLORIDE AND EPINEPHRINE 10; 10 MG/ML; UG/ML
1-20 INJECTION, SOLUTION INFILTRATION; PERINEURAL
Status: DISCONTINUED | OUTPATIENT
Start: 2022-01-21 | End: 2022-01-25 | Stop reason: HOSPADM

## 2022-01-21 RX ORDER — SODIUM CHLORIDE 9 MG/ML
25 INJECTION, SOLUTION INTRAVENOUS ONCE
Status: COMPLETED | OUTPATIENT
Start: 2022-01-21 | End: 2022-01-21

## 2022-01-21 RX ORDER — FENTANYL CITRATE 50 UG/ML
25-100 INJECTION, SOLUTION INTRAMUSCULAR; INTRAVENOUS
Status: DISCONTINUED | OUTPATIENT
Start: 2022-01-21 | End: 2022-01-25 | Stop reason: HOSPADM

## 2022-01-21 RX ORDER — LIDOCAINE HYDROCHLORIDE 20 MG/ML
20-300 INJECTION, SOLUTION INFILTRATION; PERINEURAL
Status: DISCONTINUED | OUTPATIENT
Start: 2022-01-21 | End: 2022-01-25 | Stop reason: HOSPADM

## 2022-01-21 RX ADMIN — MIDAZOLAM 1 MG: 1 INJECTION INTRAMUSCULAR; INTRAVENOUS at 10:58

## 2022-01-21 RX ADMIN — FENTANYL CITRATE 50 MCG: 50 INJECTION, SOLUTION INTRAMUSCULAR; INTRAVENOUS at 10:58

## 2022-01-21 RX ADMIN — FENTANYL CITRATE 50 MCG: 50 INJECTION, SOLUTION INTRAMUSCULAR; INTRAVENOUS at 11:03

## 2022-01-21 RX ADMIN — LIDOCAINE HYDROCHLORIDE AND EPINEPHRINE 100 MG: 10; 10 INJECTION, SOLUTION INFILTRATION; PERINEURAL at 11:08

## 2022-01-21 RX ADMIN — MIDAZOLAM 1 MG: 1 INJECTION INTRAMUSCULAR; INTRAVENOUS at 11:03

## 2022-01-21 RX ADMIN — HEPARIN SODIUM 4200 UNITS: 1000 INJECTION INTRAVENOUS; SUBCUTANEOUS at 11:08

## 2022-01-21 RX ADMIN — LIDOCAINE HYDROCHLORIDE 80 MG: 20 INJECTION, SOLUTION INFILTRATION; PERINEURAL at 11:07

## 2022-01-21 RX ADMIN — SODIUM CHLORIDE 25 ML/HR: 900 INJECTION, SOLUTION INTRAVENOUS at 10:49

## 2022-01-21 RX ADMIN — FENTANYL CITRATE 50 MCG: 50 INJECTION, SOLUTION INTRAMUSCULAR; INTRAVENOUS at 10:49

## 2022-01-21 RX ADMIN — MIDAZOLAM 1 MG: 1 INJECTION INTRAMUSCULAR; INTRAVENOUS at 10:49

## 2022-01-21 NOTE — PROGRESS NOTES
TRANSFER - OUT REPORT:    Verbal report given to Corazon Early RN(name) on [employer]  being transferred to ir recovery(unit) for routine progression of care       Report consisted of patients Situation, Background, Assessment and   Recommendations(SBAR). Information from the following report(s) SBAR, Procedure Summary and MAR was reviewed with the receiving nurse. Opportunity for questions and clarification was provided. Conscious Sedation:   150 Mcg of Fentanyl administered  3 Mg of Versed administered    Pt tolerated procedure well. Visit Vitals  /64   Pulse 91   Temp 98.4 °F (36.9 °C)   Resp (!) 116   SpO2 99%     Past Medical History:   Diagnosis Date    Acute kidney injury ([address]     ARF with sepsis requiring hemodialysis    Anemia     Anxiety     ARF (acute respiratory failure) ([address] 12/2021    required vent - now extubated    Atrial fibrillation with RVR ([address] 12/22/2021    Debility     Diabetes ([address]     type 2. Pt does not monitor bs. Last A1C (12/16/21) 6.6    GERD (gastroesophageal reflux disease)     OTC prn    Gout     Hypertension     controlled w/med    IBS (irritable bowel syndrome)     Necrotizing fasciitis ([address] 12/27/2021    left groin pannus and perineum    Personal history of COVID-19     12/31/21--- currently patient at [facility]) 12/2021     Saline Lock 01/21/22 Left;Posterior Hand (Active)

## 2022-01-21 NOTE — PROGRESS NOTES
TRANSFER - OUT REPORT:    Verbal report given to stef GOODWIN(name) on [employer]  being transferred to [facility](unit) for routine progression of care       Report consisted of patients Situation, Background, Assessment and   Recommendations(SBAR). Information from the following report(s) SBAR, Procedure Summary and MAR was reviewed with the receiving nurse. Opportunity for questions and clarification was provided. Conscious Sedation:   150 Mcg of Fentanyl administered  3 Mg of Versed administered    Pt tolerated procedure well. Visit Vitals  BP (!) 121/58   Pulse 84   Temp 98.4 °F (36.9 °C)   Resp 16   SpO2 98%     Past Medical History:   Diagnosis Date    Acute kidney injury ([address]     ARF with sepsis requiring hemodialysis    Anemia     Anxiety     ARF (acute respiratory failure) ([address] 12/2021    required vent - now extubated    Atrial fibrillation with RVR ([address] 12/22/2021    Debility     Diabetes ([address]     type 2. Pt does not monitor bs. Last A1C (12/16/21) 6.6    GERD (gastroesophageal reflux disease)     OTC prn    Gout     Hypertension     controlled w/med    IBS (irritable bowel syndrome)     Necrotizing fasciitis ([address] 12/27/2021    left groin pannus and perineum    Personal history of COVID-19     12/31/21--- currently patient at [address] [facility]) 12/2021     Saline Lock 01/21/22 Left;Posterior Hand (Active)

## 2022-01-21 NOTE — PROCEDURES
Department of Interventional Radiology  [phone]      Interventional Radiology Brief Procedure Note  Patient: Kirill Smith MRN: [id number]  SSN: [id number]    YOB: 1970  Age: 46 y.o. Sex: female      Date of Procedure: 1/21/2022  Pre-Procedure Diagnosis: ESRD. Post-Procedure Diagnosis: SAME  Procedure(s): Tunneled Central Venous Catheter  Brief Description of Procedure: RIJV  Performed By: Marion Esqueda MD   Assistants: None  Anesthesia:Moderate Sedation  Estimated Blood Loss: Less than 10ml  Specimens:  None  Implants:  Tunnelled Hemodialysis Catheter  Findings: Tip in RA. Complications: None  Recommendations: Elevate HOB x 1 hour. Ready to use. Follow Up: PRN.    Signed By: Marion Esqueda MD     January 21, 2022

## 2022-01-21 NOTE — H&P
Department of Interventional Radiology  ([phone])    History and Physical    Patient:  Myles Guthrie MRN:  [id number]  SSN:  [id number]    YOB: 1970  Age:  46 y.o. Sex:  female      Primary Care Provider:  Tristen Garcia NP  Referring Physician:  Yvonne Soliz NP    Subjective:     Chief Complaint: ESRD    History of the Present Illness: The patient is a 46 y.o. female who presents with ESRD in need for longterm HemoDialysis access. LIJV Temp HD catheter removed. Perineal necrotizing fasciitis. NPO. Past Medical History:   Diagnosis Date    Acute kidney injury ([address]     ARF with sepsis requiring hemodialysis    Anemia     Anxiety     ARF (acute respiratory failure) ([address] 12/2021    required vent - now extubated    Atrial fibrillation with RVR ([address] 12/22/2021    Debility     Diabetes ([facility] [address]     type 2. Pt does not monitor bs. Last A1C (12/16/21) 6.6    GERD (gastroesophageal reflux disease)     OTC prn    Gout     Hypertension     controlled w/med    IBS (irritable bowel syndrome)     Necrotizing fasciitis ([address] 12/27/2021    left groin pannus and perineum    Personal history of COVID-19     12/31/21--- currently patient at [address] [facility]) 12/2021     Past Surgical History:   Procedure Laterality Date    HX DILATION AND CURETTAGE  07/2018    HX OTHER SURGICAL      I&D of groin    IR INSERT NON TUNL CVC OVER 5 YRS  12/23/2021       Review of Systems:    Pertinent items are noted in the History of Present Illness. Current Outpatient Medications   Medication Sig    ondansetron hcl (ZOFRAN) 4 mg tablet Take 4 mg by mouth every six (6) hours as needed for Nausea or Vomiting.  apixaban (Eliquis) 5 mg tablet Take 5 mg by mouth two (2) times a day. Per [facility] nurse, last dose 1/14/22 am    sodium hypochlorite (DAKIN'S SOLUTION) Apply 0.25 % to affected area daily.     fluticasone propionate (Flonase Allergy Relief) 50 mcg/actuation nasal spray 2 Sprays by Both Nostrils route daily.  sodium chloride (Saline NasaL) 0.65 % nasal squeeze bottle 1 Spray as needed for Congestion.  HYDROmorphone (DILAUDID) 2 mg/mL injection 2 mg by IntraVENous route every eight (8) hours as needed for Pain (For pain scale of 10).  HYDROcodone-acetaminophen (NORCO) 5-325 mg per tablet Take  by mouth every six (6) hours as needed for Pain (NRS 4-6, CPOT 3-5).  glucagon 1 mg solr 1 mg by IntraMUSCular route as needed.  dextrose (D50W) 50% solution 50 mL by IntraVENous route as needed. If NPO give 50 ML D50 IV over 3 mins for blood glucose <70 mg/Dl    oxyCODONE IR (ROXICODONE) 5 mg immediate release tablet Take 5 mg by mouth every six (6) hours as needed for Pain (For pain  7-9).  acetaminophen (TYLENOL) 325 mg tablet Take 650 mg by mouth every six (6) hours as needed for Pain.  heparin sodium,porcine (heparin, porcine,) 5,000 unit/mL injection 5,000 Units three (3) times daily. Follow surgeon's instructions regarding heparin.  insulin glargine (LANTUS) 100 unit/mL injection 25units qday    insulin lispro (HUMALOG) 100 unit/mL injection Per sliding scale (Patient taking differently: three (3) times daily (with meals). Per sliding scale  Indications: type 1 diabetes mellitus)    famotidine (PEPCID) 20 mg tablet Take 1 Tablet by mouth daily for 30 days.  calcium acetate, phosphate binder, (PHOSLO) 667 mg tab tablet Take 1 Tablet by mouth three (3) times daily (with meals) for 30 days.  metoprolol tartrate (LOPRESSOR) 100 mg IR tablet Take 100 mg by mouth two (2) times a day. Per anesthesia protocol:instructed to take am of surgery.  sertraline (ZOLOFT) 100 mg tablet Take 100 mg by mouth daily. Per anesthesia protocol:instructed to take am of surgery. No current facility-administered medications for this encounter.         Allergies   Allergen Reactions    Ace Inhibitors Angioedema    Arb-Angiotensin Receptor Antagonist Angioedema    Cayenne Pepper Hives     Swelling of the mouth    Bactrim [Sulfamethoprim] Other (comments)     Nausea, vomiting, making her feel like she is going to pass out      Keflex [Cephalexin] Nausea and Vomiting       Family History   Problem Relation Age of Onset    Heart Failure Mother         congestive    Heart Disease Mother     Hypertension Mother     Cancer Father         Lung/Mets to Bone and Brain and liver    Diabetes Father     Hypertension Father      Social History     Tobacco Use    Smoking status: Never Smoker    Smokeless tobacco: Never Used   Substance Use Topics    Alcohol use: Yes     Comment: occ        Objective:       Physical Examination:    There were no vitals filed for this visit. There were no vitals taken for this visit.   General:  alert, cooperative, no distress  Heart:  regular rate and rhythm, S1, S2 normal, no murmur, click, rub or gallop  Lungs:  clear to auscultation bilaterally  Abdomen:  soft, protuberant, nondistended, normal bowel sounds, nontender  Neuro:  normal without focal findings    Laboratory:     Lab Results   Component Value Date/Time    Sodium 133 (L) 01/21/2022 08:25 AM    Sodium 132 (L) 01/20/2022 06:49 AM    Potassium 4.0 01/21/2022 08:25 AM    Potassium 4.0 01/20/2022 06:49 AM    Chloride 96 (L) 01/21/2022 08:25 AM    Chloride 95 (L) 01/20/2022 06:49 AM    CO2 30 01/21/2022 08:25 AM    CO2 32 01/20/2022 06:49 AM    Anion gap 7 01/21/2022 08:25 AM    Anion gap 5 (L) 01/20/2022 06:49 AM    Glucose 115 (H) 01/21/2022 08:25 AM    Glucose 113 (H) 01/20/2022 06:49 AM    BUN 53 (H) 01/21/2022 08:25 AM    BUN 35 (H) 01/20/2022 06:49 AM    Creatinine 6.54 (H) 01/21/2022 08:25 AM    Creatinine 5.04 (H) 01/20/2022 06:49 AM    GFR est AA 9 (L) 01/21/2022 08:25 AM    GFR est AA 12 (L) 01/20/2022 06:49 AM    GFR est non-AA 7 (L) 01/21/2022 08:25 AM    GFR est non-AA 10 (L) 01/20/2022 06:49 AM    Calcium 9.1 01/21/2022 08:25 AM    Calcium 8.8 01/20/2022 06:49 AM Magnesium 3.3 (H) 12/23/2021 07:48 AM    Magnesium 3.2 (H) 12/22/2021 04:24 AM    Phosphorus 5.4 (H) 01/07/2022 08:41 AM    Phosphorus 8.1 (H) 12/26/2021 06:25 AM    Albumin 2.1 (L) 01/17/2022 06:47 AM    Albumin 2.7 (L) 01/10/2022 07:26 AM    Protein, total 5.9 (L) 01/17/2022 06:47 AM    Protein, total 6.0 (L) 01/10/2022 07:26 AM    Globulin 3.8 (H) 01/17/2022 06:47 AM    Globulin 3.3 01/10/2022 07:26 AM    A-G Ratio 0.6 (L) 01/17/2022 06:47 AM    A-G Ratio 0.8 (L) 01/10/2022 07:26 AM    ALT (SGPT) 11 (L) 01/17/2022 06:47 AM    ALT (SGPT) 13 01/10/2022 07:26 AM     Lab Results   Component Value Date/Time    WBC 6.3 01/21/2022 08:25 AM    WBC 5.3 01/20/2022 06:49 AM    HGB 8.4 (L) 01/21/2022 08:25 AM    HGB 7.5 (L) 01/20/2022 06:49 AM    HCT 25.7 (L) 01/21/2022 08:25 AM    HCT 23.0 (L) 01/20/2022 06:49 AM    PLATELET 089 84/02/3878 08:25 AM    PLATELET 378 12/96/8381 06:49 AM     Lab Results   Component Value Date/Time    aPTT >200.0 (HH) 01/15/2022 03:34 AM    aPTT 37.3 (H) 01/14/2022 06:59 PM    Prothrombin time 17.9 (H) 01/14/2022 06:59 PM    INR 1.5 01/14/2022 06:59 PM       Assessment:     ESRD    Plan:     Planned Procedure: Tunnelled HD catheter placement. Sedation. Risks, benefits, and alternatives reviewed with patient and she agrees to proceed with the procedure.       Signed By: Karla Yancey MD     January 21, 2022

## 2022-01-23 LAB
BACTERIA SPEC CULT: NORMAL
BACTERIA SPEC CULT: NORMAL
SERVICE CMNT-IMP: NORMAL
SERVICE CMNT-IMP: NORMAL

## 2022-01-24 ENCOUNTER — HOSPITAL ENCOUNTER (EMERGENCY)
Age: 52
Discharge: LEFT BEFORE DISCHARGE | End: 2022-01-24
Attending: EMERGENCY MEDICINE
Payer: COMMERCIAL

## 2022-01-24 VITALS
HEART RATE: 114 BPM | OXYGEN SATURATION: 98 % | DIASTOLIC BLOOD PRESSURE: 100 MMHG | RESPIRATION RATE: 20 BRPM | SYSTOLIC BLOOD PRESSURE: 136 MMHG | TEMPERATURE: 98.5 F

## 2022-01-24 LAB
ALBUMIN SERPL-MCNC: 2.1 G/DL (ref 3.5–5)
ALBUMIN/GLOB SERPL: 0.5 {RATIO} (ref 1.2–3.5)
ALP SERPL-CCNC: 83 U/L (ref 50–136)
ALT SERPL-CCNC: 18 U/L (ref 12–65)
ANION GAP SERPL CALC-SCNC: 15 MMOL/L (ref 7–16)
AST SERPL-CCNC: 29 U/L (ref 15–37)
BASOPHILS # BLD: 0.1 K/UL (ref 0–0.2)
BASOPHILS NFR BLD: 1 % (ref 0–2)
BILIRUB SERPL-MCNC: 0.5 MG/DL (ref 0.2–1.1)
BUN SERPL-MCNC: 17 MG/DL (ref 6–23)
CALCIUM SERPL-MCNC: 8.3 MG/DL (ref 8.3–10.4)
CHLORIDE SERPL-SCNC: 96 MMOL/L (ref 98–107)
CO2 SERPL-SCNC: 22 MMOL/L (ref 21–32)
CREAT SERPL-MCNC: 3.65 MG/DL (ref 0.6–1)
DIFFERENTIAL METHOD BLD: ABNORMAL
EOSINOPHIL # BLD: 0.3 K/UL (ref 0–0.8)
EOSINOPHIL NFR BLD: 3 % (ref 0.5–7.8)
ERYTHROCYTE [DISTWIDTH] IN BLOOD BY AUTOMATED COUNT: 14.9 % (ref 11.9–14.6)
GLOBULIN SER CALC-MCNC: 3.9 G/DL (ref 2.3–3.5)
GLUCOSE SERPL-MCNC: 134 MG/DL (ref 65–100)
HCT VFR BLD AUTO: 29.6 % (ref 35.8–46.3)
HGB BLD-MCNC: 9.3 G/DL (ref 11.7–15.4)
IMM GRANULOCYTES # BLD AUTO: 0.6 K/UL (ref 0–0.5)
IMM GRANULOCYTES NFR BLD AUTO: 6 % (ref 0–5)
LYMPHOCYTES # BLD: 1 K/UL (ref 0.5–4.6)
LYMPHOCYTES NFR BLD: 9 % (ref 13–44)
MAGNESIUM SERPL-MCNC: 2.2 MG/DL (ref 1.8–2.4)
MCH RBC QN AUTO: 29.9 PG (ref 26.1–32.9)
MCHC RBC AUTO-ENTMCNC: 31.4 G/DL (ref 31.4–35)
MCV RBC AUTO: 95.2 FL (ref 79.6–97.8)
MONOCYTES # BLD: 0.8 K/UL (ref 0.1–1.3)
MONOCYTES NFR BLD: 7 % (ref 4–12)
NEUTS SEG # BLD: 7.9 K/UL (ref 1.7–8.2)
NEUTS SEG NFR BLD: 74 % (ref 43–78)
NRBC # BLD: 0 K/UL (ref 0–0.2)
PLATELET # BLD AUTO: 206 K/UL (ref 150–450)
PMV BLD AUTO: 11.5 FL (ref 9.4–12.3)
POTASSIUM SERPL-SCNC: 4.1 MMOL/L (ref 3.5–5.1)
PROT SERPL-MCNC: 6 G/DL (ref 6.3–8.2)
RBC # BLD AUTO: 3.11 M/UL (ref 4.05–5.2)
SODIUM SERPL-SCNC: 133 MMOL/L (ref 136–145)
WBC # BLD AUTO: 10.7 K/UL (ref 4.3–11.1)

## 2022-01-24 PROCEDURE — 83735 ASSAY OF MAGNESIUM: CPT

## 2022-01-24 PROCEDURE — 85025 COMPLETE CBC W/AUTO DIFF WBC: CPT

## 2022-01-24 PROCEDURE — 75810000275 HC EMERGENCY DEPT VISIT NO LEVEL OF CARE

## 2022-01-24 PROCEDURE — 80053 COMPREHEN METABOLIC PANEL: CPT

## 2022-02-03 ENCOUNTER — ANESTHESIA EVENT (OUTPATIENT)
Dept: SURGERY | Age: 52
End: 2022-02-03
Payer: COMMERCIAL

## 2022-02-04 ENCOUNTER — HOSPITAL ENCOUNTER (OUTPATIENT)
Age: 52
Setting detail: OUTPATIENT SURGERY
Discharge: HOME | End: 2022-02-04
Attending: SURGERY | Admitting: SURGERY
Payer: COMMERCIAL

## 2022-02-04 ENCOUNTER — ANESTHESIA (OUTPATIENT)
Dept: SURGERY | Age: 52
End: 2022-02-04
Payer: COMMERCIAL

## 2022-02-04 VITALS
HEART RATE: 69 BPM | OXYGEN SATURATION: 100 % | BODY MASS INDEX: 43.4 KG/M2 | TEMPERATURE: 97.8 F | SYSTOLIC BLOOD PRESSURE: 138 MMHG | WEIGHT: 293 LBS | HEIGHT: 69 IN | RESPIRATION RATE: 16 BRPM | DIASTOLIC BLOOD PRESSURE: 63 MMHG

## 2022-02-04 LAB
GLUCOSE BLD STRIP.AUTO-MCNC: 141 MG/DL (ref 65–100)
POTASSIUM BLD-SCNC: 3.3 MMOL/L (ref 3.5–5.1)
SERVICE CMNT-IMP: ABNORMAL

## 2022-02-04 PROCEDURE — 76210000020 HC REC RM PH II FIRST 0.5 HR: Performed by: SURGERY

## 2022-02-04 PROCEDURE — 76060000033 HC ANESTHESIA 1 TO 1.5 HR: Performed by: SURGERY

## 2022-02-04 PROCEDURE — 74011250636 HC RX REV CODE- 250/636: Performed by: NURSE ANESTHETIST, CERTIFIED REGISTERED

## 2022-02-04 PROCEDURE — 76010000138 HC OR TIME 0.5 TO 1 HR: Performed by: SURGERY

## 2022-02-04 PROCEDURE — 74011250637 HC RX REV CODE- 250/637: Performed by: ANESTHESIOLOGY

## 2022-02-04 PROCEDURE — 76210000006 HC OR PH I REC 0.5 TO 1 HR: Performed by: SURGERY

## 2022-02-04 PROCEDURE — 2709999900 HC NON-CHARGEABLE SUPPLY: Performed by: SURGERY

## 2022-02-04 PROCEDURE — 74011000250 HC RX REV CODE- 250: Performed by: NURSE ANESTHETIST, CERTIFIED REGISTERED

## 2022-02-04 PROCEDURE — 84132 ASSAY OF SERUM POTASSIUM: CPT

## 2022-02-04 PROCEDURE — 77030013708 HC HNDPC SUC IRR PULS STRY –B: Performed by: SURGERY

## 2022-02-04 PROCEDURE — 77030037088 HC TUBE ENDOTRACH ORAL NSL COVD-A: Performed by: ANESTHESIOLOGY

## 2022-02-04 PROCEDURE — 74011250636 HC RX REV CODE- 250/636: Performed by: SURGERY

## 2022-02-04 PROCEDURE — 77030039425 HC BLD LARYNG TRULITE DISP TELE -A: Performed by: ANESTHESIOLOGY

## 2022-02-04 PROCEDURE — 77030019908 HC STETH ESOPH SIMS -A: Performed by: ANESTHESIOLOGY

## 2022-02-04 PROCEDURE — 82962 GLUCOSE BLOOD TEST: CPT

## 2022-02-04 PROCEDURE — 77030033542 HC RETRCTR RETNTS PANICLS GQSM -B: Performed by: SURGERY

## 2022-02-04 PROCEDURE — 77030040922 HC BLNKT HYPOTHRM STRY -A: Performed by: ANESTHESIOLOGY

## 2022-02-04 PROCEDURE — 74011250636 HC RX REV CODE- 250/636: Performed by: REGISTERED NURSE

## 2022-02-04 RX ORDER — ONDANSETRON 2 MG/ML
4 INJECTION INTRAMUSCULAR; INTRAVENOUS
Status: DISCONTINUED | OUTPATIENT
Start: 2022-02-04 | End: 2022-02-04 | Stop reason: HOSPADM

## 2022-02-04 RX ORDER — ONDANSETRON 2 MG/ML
INJECTION INTRAMUSCULAR; INTRAVENOUS AS NEEDED
Status: DISCONTINUED | OUTPATIENT
Start: 2022-02-04 | End: 2022-02-04 | Stop reason: HOSPADM

## 2022-02-04 RX ORDER — HALOPERIDOL 5 MG/ML
1 INJECTION INTRAMUSCULAR
Status: DISCONTINUED | OUTPATIENT
Start: 2022-02-04 | End: 2022-02-04 | Stop reason: HOSPADM

## 2022-02-04 RX ORDER — FENTANYL CITRATE 50 UG/ML
INJECTION, SOLUTION INTRAMUSCULAR; INTRAVENOUS AS NEEDED
Status: DISCONTINUED | OUTPATIENT
Start: 2022-02-04 | End: 2022-02-04 | Stop reason: HOSPADM

## 2022-02-04 RX ORDER — SODIUM CHLORIDE, SODIUM LACTATE, POTASSIUM CHLORIDE, CALCIUM CHLORIDE 600; 310; 30; 20 MG/100ML; MG/100ML; MG/100ML; MG/100ML
25 INJECTION, SOLUTION INTRAVENOUS CONTINUOUS
Status: DISCONTINUED | OUTPATIENT
Start: 2022-02-04 | End: 2022-02-04 | Stop reason: HOSPADM

## 2022-02-04 RX ORDER — ROCURONIUM BROMIDE 10 MG/ML
INJECTION, SOLUTION INTRAVENOUS AS NEEDED
Status: DISCONTINUED | OUTPATIENT
Start: 2022-02-04 | End: 2022-02-04 | Stop reason: HOSPADM

## 2022-02-04 RX ORDER — ACETAMINOPHEN 500 MG
1000 TABLET ORAL ONCE
Status: COMPLETED | OUTPATIENT
Start: 2022-02-04 | End: 2022-02-04

## 2022-02-04 RX ORDER — NALOXONE HYDROCHLORIDE 0.4 MG/ML
0.2 INJECTION, SOLUTION INTRAMUSCULAR; INTRAVENOUS; SUBCUTANEOUS
Status: DISCONTINUED | OUTPATIENT
Start: 2022-02-04 | End: 2022-02-04 | Stop reason: HOSPADM

## 2022-02-04 RX ORDER — MIDAZOLAM HYDROCHLORIDE 1 MG/ML
INJECTION, SOLUTION INTRAMUSCULAR; INTRAVENOUS AS NEEDED
Status: DISCONTINUED | OUTPATIENT
Start: 2022-02-04 | End: 2022-02-04 | Stop reason: HOSPADM

## 2022-02-04 RX ORDER — LIDOCAINE HYDROCHLORIDE 20 MG/ML
INJECTION, SOLUTION EPIDURAL; INFILTRATION; INTRACAUDAL; PERINEURAL AS NEEDED
Status: DISCONTINUED | OUTPATIENT
Start: 2022-02-04 | End: 2022-02-04 | Stop reason: HOSPADM

## 2022-02-04 RX ORDER — FENTANYL CITRATE 50 UG/ML
100 INJECTION, SOLUTION INTRAMUSCULAR; INTRAVENOUS ONCE
Status: DISCONTINUED | OUTPATIENT
Start: 2022-02-04 | End: 2022-02-04 | Stop reason: HOSPADM

## 2022-02-04 RX ORDER — OXYCODONE HYDROCHLORIDE 5 MG/1
5 TABLET ORAL
Status: COMPLETED | OUTPATIENT
Start: 2022-02-04 | End: 2022-02-04

## 2022-02-04 RX ORDER — SODIUM CHLORIDE, SODIUM LACTATE, POTASSIUM CHLORIDE, CALCIUM CHLORIDE 600; 310; 30; 20 MG/100ML; MG/100ML; MG/100ML; MG/100ML
75 INJECTION, SOLUTION INTRAVENOUS CONTINUOUS
Status: DISCONTINUED | OUTPATIENT
Start: 2022-02-04 | End: 2022-02-04 | Stop reason: HOSPADM

## 2022-02-04 RX ORDER — PROPOFOL 10 MG/ML
INJECTION, EMULSION INTRAVENOUS AS NEEDED
Status: DISCONTINUED | OUTPATIENT
Start: 2022-02-04 | End: 2022-02-04 | Stop reason: HOSPADM

## 2022-02-04 RX ORDER — NALOXONE HYDROCHLORIDE 0.4 MG/ML
0.2 INJECTION, SOLUTION INTRAMUSCULAR; INTRAVENOUS; SUBCUTANEOUS AS NEEDED
Status: DISCONTINUED | OUTPATIENT
Start: 2022-02-04 | End: 2022-02-04 | Stop reason: HOSPADM

## 2022-02-04 RX ORDER — HYDROMORPHONE HYDROCHLORIDE 2 MG/ML
0.5 INJECTION, SOLUTION INTRAMUSCULAR; INTRAVENOUS; SUBCUTANEOUS
Status: DISCONTINUED | OUTPATIENT
Start: 2022-02-04 | End: 2022-02-04 | Stop reason: HOSPADM

## 2022-02-04 RX ORDER — EPHEDRINE SULFATE/0.9% NACL/PF 50 MG/5 ML
SYRINGE (ML) INTRAVENOUS AS NEEDED
Status: DISCONTINUED | OUTPATIENT
Start: 2022-02-04 | End: 2022-02-04 | Stop reason: HOSPADM

## 2022-02-04 RX ORDER — LIDOCAINE HYDROCHLORIDE 10 MG/ML
0.1 INJECTION INFILTRATION; PERINEURAL AS NEEDED
Status: DISCONTINUED | OUTPATIENT
Start: 2022-02-04 | End: 2022-02-04 | Stop reason: HOSPADM

## 2022-02-04 RX ORDER — SODIUM CHLORIDE 9 MG/ML
INJECTION, SOLUTION INTRAVENOUS
Status: DISCONTINUED | OUTPATIENT
Start: 2022-02-04 | End: 2022-02-04 | Stop reason: HOSPADM

## 2022-02-04 RX ORDER — MIDAZOLAM HYDROCHLORIDE 1 MG/ML
2 INJECTION, SOLUTION INTRAMUSCULAR; INTRAVENOUS
Status: DISCONTINUED | OUTPATIENT
Start: 2022-02-04 | End: 2022-02-04 | Stop reason: HOSPADM

## 2022-02-04 RX ORDER — MIDAZOLAM HYDROCHLORIDE 1 MG/ML
2 INJECTION, SOLUTION INTRAMUSCULAR; INTRAVENOUS ONCE
Status: DISCONTINUED | OUTPATIENT
Start: 2022-02-04 | End: 2022-02-04 | Stop reason: HOSPADM

## 2022-02-04 RX ORDER — DEXAMETHASONE SODIUM PHOSPHATE 4 MG/ML
INJECTION, SOLUTION INTRA-ARTICULAR; INTRALESIONAL; INTRAMUSCULAR; INTRAVENOUS; SOFT TISSUE AS NEEDED
Status: DISCONTINUED | OUTPATIENT
Start: 2022-02-04 | End: 2022-02-04 | Stop reason: HOSPADM

## 2022-02-04 RX ORDER — CEFAZOLIN SODIUM 1 G/3ML
1 INJECTION, POWDER, FOR SOLUTION INTRAMUSCULAR; INTRAVENOUS ONCE
Status: DISCONTINUED | OUTPATIENT
Start: 2022-02-04 | End: 2022-02-04

## 2022-02-04 RX ADMIN — PROPOFOL 200 MG: 10 INJECTION, EMULSION INTRAVENOUS at 08:21

## 2022-02-04 RX ADMIN — Medication 10 MG: at 08:36

## 2022-02-04 RX ADMIN — ACETAMINOPHEN 1000 MG: 500 TABLET, FILM COATED ORAL at 06:21

## 2022-02-04 RX ADMIN — ROCURONIUM BROMIDE 50 MG: 10 INJECTION, SOLUTION INTRAVENOUS at 08:21

## 2022-02-04 RX ADMIN — SODIUM CHLORIDE: 900 INJECTION, SOLUTION INTRAVENOUS at 08:15

## 2022-02-04 RX ADMIN — MIDAZOLAM 2 MG: 1 INJECTION INTRAMUSCULAR; INTRAVENOUS at 08:13

## 2022-02-04 RX ADMIN — DEXAMETHASONE SODIUM PHOSPHATE 4 MG: 4 INJECTION, SOLUTION INTRAMUSCULAR; INTRAVENOUS at 08:57

## 2022-02-04 RX ADMIN — FENTANYL CITRATE 100 MCG: 50 INJECTION INTRAMUSCULAR; INTRAVENOUS at 08:21

## 2022-02-04 RX ADMIN — ONDANSETRON 4 MG: 2 INJECTION INTRAMUSCULAR; INTRAVENOUS at 08:57

## 2022-02-04 RX ADMIN — OXYCODONE HYDROCHLORIDE 5 MG: 5 TABLET ORAL at 09:44

## 2022-02-04 RX ADMIN — PHENYLEPHRINE HYDROCHLORIDE 50 MCG: 10 INJECTION INTRAVENOUS at 08:36

## 2022-02-04 RX ADMIN — Medication 3 G: at 08:30

## 2022-02-04 RX ADMIN — SUGAMMADEX 400 MG: 100 INJECTION, SOLUTION INTRAVENOUS at 08:58

## 2022-02-04 RX ADMIN — LIDOCAINE HYDROCHLORIDE 100 MG: 20 INJECTION, SOLUTION EPIDURAL; INFILTRATION; INTRACAUDAL; PERINEURAL at 08:21

## 2022-02-04 NOTE — ANESTHESIA PREPROCEDURE EVALUATION
Relevant Problems   CARDIOVASCULAR   (+) Atrial fibrillation with RVR (HCC)   (+) HTN (hypertension)      RENAL FAILURE   (+) DEVENDRA (acute kidney injury) ([facility] [address]      ENDOCRINE   (+) DM2 (diabetes mellitus, type 2) (HCC)   (+) Obesity, morbid (HCC)      HEMATOLOGY   (+) Anemia     Relevant Problems   CARDIOVASCULAR   (+) Atrial fibrillation with RVR (HCC)   (+) HTN (hypertension)      RENAL FAILURE   (+) DEVENDRA (acute kidney injury) ([facility] [address]      ENDOCRINE   (+) DM2 (diabetes mellitus, type 2) (HCC)   (+) Obesity, morbid (HCC)      HEMATOLOGY   (+) Anemia       Anesthetic History   No history of anesthetic complications            Review of Systems / Medical History  Patient summary reviewed and pertinent labs reviewed    Pulmonary  Within defined limits                 Neuro/Psych   Within defined limits           Cardiovascular    Hypertension: well controlled              Exercise tolerance: <4 METS     GI/Hepatic/Renal         Renal disease: ARF       Endo/Other    Diabetes: well controlled, type 2    Morbid obesity and anemia     Other Findings   Comments: Necrotizing fasciitis          Physical Exam    Airway          Intubated   Cardiovascular    Rhythm: regular  Rate: normal         Dental         Pulmonary  Breath sounds clear to auscultation               Abdominal  GI exam deferred       Other Findings            Anesthetic Plan    ASA: 5  Anesthesia type: general        Post procedure ventilation   Induction: Inhalational  Anesthetic plan and risks discussed with: Patient and Spouse      Patient s/p extensive debridement for necrotizing fasciitis. Remains intubated/sedated. I spoke with her  and obtained verbal consent for the procedure tomorrow.   Patient will remain intubated s/p procedure        Anesthetic History   No history of anesthetic complications            Review of Systems / Medical History  Patient summary reviewed and pertinent labs reviewed    Pulmonary  Within defined limits                 Neuro/Psych   Within defined limits           Cardiovascular    Hypertension: well controlled        Dysrhythmias : atrial fibrillation      Exercise tolerance: <4 METS     GI/Hepatic/Renal     GERD: well controlled    Renal disease: ARF       Endo/Other    Diabetes: well controlled, type 2    Morbid obesity and anemia (Hb 7.9)     Other Findings   Comments: Necrotizing fasciitis            Physical Exam    Airway  Mallampati: II  TM Distance: 4 - 6 cm  Neck ROM: normal range of motion   Mouth opening: Normal     Cardiovascular  Regular rate and rhythm,  S1 and S2 normal,  no murmur, click, rub, or gallop  Rhythm: regular  Rate: normal         Dental         Pulmonary  Breath sounds clear to auscultation               Abdominal  GI exam deferred       Other Findings            Anesthetic Plan    ASA: 4  Anesthesia type: general          Induction: Intravenous  Anesthetic plan and risks discussed with: Patient and Spouse      Patient s/p extensive debridement for necrotizing fasciitis.

## 2022-02-04 NOTE — PERIOP NOTES
Pt and  Adore Wood given discharge instructions at bedside, both verbalize understanding. All questions answered.

## 2022-02-04 NOTE — ANESTHESIA PREPROCEDURE EVALUATION
Relevant Problems   CARDIOVASCULAR   (+) Atrial fibrillation with RVR (HCC)   (+) HTN (hypertension)      RENAL FAILURE   (+) DEVENDRA (acute kidney injury) ([facility] [address]      ENDOCRINE   (+) DM2 (diabetes mellitus, type 2) (HCC)   (+) Obesity, morbid (HCC)      HEMATOLOGY   (+) Anemia       Anesthetic History   No history of anesthetic complications            Review of Systems / Medical History  Patient summary reviewed, nursing notes reviewed and pertinent labs reviewed    Pulmonary  Within defined limits                 Neuro/Psych   Within defined limits           Cardiovascular    Hypertension        Dysrhythmias : atrial fibrillation      Exercise tolerance: >4 METS     GI/Hepatic/Renal     GERD           Endo/Other    Diabetes: well controlled, type 2         Other Findings              Physical Exam    Airway  Mallampati: III  TM Distance: 4 - 6 cm  Neck ROM: normal range of motion   Mouth opening: Normal     Cardiovascular  Regular rate and rhythm,  S1 and S2 normal,  no murmur, click, rub, or gallop             Dental  No notable dental hx       Pulmonary  Breath sounds clear to auscultation               Abdominal         Other Findings            Anesthetic Plan    ASA: 3  Anesthesia type: general          Induction: Intravenous  Anesthetic plan and risks discussed with: Patient

## 2022-02-04 NOTE — ANESTHESIA POSTPROCEDURE EVALUATION
Procedure(s):  LEFT GROIN AND ABDOMEN WOUND DEBRIDEMENT/ LITHOTOMY. general    Anesthesia Post Evaluation      Multimodal analgesia: multimodal analgesia used between 6 hours prior to anesthesia start to PACU discharge  Patient location during evaluation: PACU  Patient participation: complete - patient participated  Level of consciousness: awake and awake and alert  Pain management: adequate  Airway patency: patent  Anesthetic complications: no  Cardiovascular status: acceptable  Respiratory status: acceptable  Hydration status: acceptable  Post anesthesia nausea and vomiting:  controlled      INITIAL Post-op Vital signs:   Vitals Value Taken Time   /63 02/04/22 0950   Temp 36.6 °C (97.8 °F) 02/04/22 0950   Pulse 70 02/04/22 0950   Resp 16 02/04/22 0950   SpO2 100 % 02/04/22 0950   Vitals shown include unvalidated device data.

## 2022-02-04 NOTE — DISCHARGE INSTRUCTIONS
Continue daily dakin's dressing changes    ACTIVITY  · As tolerated and as directed by your doctor. · Bathe or shower as directed by your doctor. DIET  · Clear liquids until no nausea or vomiting; then light diet for the first day. · Advance to regular diet on second day, unless your doctor orders otherwise. · If nausea and vomiting continues, call your doctor. PAIN  · Take pain medication as directed by your doctor. · Call your doctor if pain is NOT relieved by medication. · DO NOT take aspirin of blood thinners unless directed by your doctor. MEDICATION INTERACTION:During your procedure you potentially received a medication or medications which may reduce the effectiveness of oral contraceptives. Please consider other forms of contraception for 1 month following your procedure if you are currently using oral contraceptives as your primary form of birth control. In addition to this, we recommend continuing your oral contraceptive as prescribed, unless otherwise instructed by your physician, during this time      [address] IF   · Excessive bleeding that does not stop after holding pressure over the area  · Temperature of 101 degrees F or above  · Excessive redness, swelling or bruising, and/ or green or yellow, smelly discharge from incision    After general anesthesia or intravenous sedation, for 24 hours or while taking prescription Narcotics:  · Limit your activities  · A responsible adult needs to be with you for the next 24 hours  · Do not drive and operate hazardous machinery  · Do not make important personal or business decisions  · Do not drink alcoholic beverages  · If you have not urinated within 8 hours after discharge, and you are experiencing discomfort from urinary retention, please go to the nearest ED. · If you have sleep apnea and have a CPAP machine, please use it for all naps and sleeping.   · Please use caution when taking narcotics and any of your home medications that may cause drowsiness. *  Please give a list of your current medications to your Primary Care Provider. *  Please update this list whenever your medications are discontinued, doses are      changed, or new medications (including over-the-counter products) are added. *  Please carry medication information at all times in case of emergency situations. These are general instructions for a healthy lifestyle:  No smoking/ No tobacco products/ Avoid exposure to second hand smoke  Surgeon General's Warning:  Quitting smoking now greatly reduces serious risk to your health. Obesity, smoking, and sedentary lifestyle greatly increases your risk for illness  A healthy diet, regular physical exercise & weight monitoring are important for maintaining a healthy lifestyle    You may be retaining fluid if you have a history of heart failure or if you experience any of the following symptoms:  Weight gain of 3 pounds or more overnight or 5 pounds in a week, increased swelling in our hands or feet or shortness of breath while lying flat in bed. Please call your doctor as soon as you notice any of these symptoms; do not wait until your next office visit.

## 2022-02-14 NOTE — OP NOTES
Operative Note    Patient: Graciela Calle  YOB: 1970  MRN: [id number]    Date of Procedure: 2/4/2022     Pre-Op Diagnosis: Necrotizing fasciitis ([facility] [address] [M72.6]    Post-Op Diagnosis: Same    Procedure(s):  LEFT GROIN AND ABDOMEN WOUND SURGICAL DEBRIDEMENT 20 x 30 cm      Prior to procedure informed consent obtained from patient follow discussion of risks, benefits and alternatives. Surgical timeout performed. Devitalized skin and subcutaneous fat and fascia as sharply, surgically, excisionally, debrided to the level of more healthy appearing fat and some punctate bleeding using scissors and 15 blade. Minimal bleeding (<1 cc) encountered. No sign of infection encountered. Final wound measurements:    20 x 30 x 6 cm  100% debrided    Material removed: Eschar, skin, subcutaneous fat, fascia    Planned frequency of debridements:  Unknown, will continue debridement as tissue demarcates until healthy wound bed is encountered. Wound packed.        Surgeon(s):  Padmini Jones MD    Surgical Assistant: None    Anesthesia: General     Estimated Blood Loss (mL): 50 ml    Complications:  None    Specimens: None    Implants: None

## 2022-03-14 ENCOUNTER — ANESTHESIA EVENT (OUTPATIENT)
Dept: SURGERY | Age: 52
End: 2022-03-14
Payer: COMMERCIAL

## 2022-03-15 ENCOUNTER — ANESTHESIA (OUTPATIENT)
Dept: SURGERY | Age: 52
End: 2022-03-15
Payer: COMMERCIAL

## 2022-03-15 ENCOUNTER — HOSPITAL ENCOUNTER (OUTPATIENT)
Age: 52
Setting detail: OBSERVATION
Discharge: HOME | End: 2022-03-22
Attending: SURGERY | Admitting: SURGERY
Payer: COMMERCIAL

## 2022-03-15 DIAGNOSIS — L76.82 PAIN AT SURGICAL INCISION: Primary | ICD-10-CM

## 2022-03-15 PROBLEM — Z99.2 DIALYSIS PATIENT (HCC): Status: ACTIVE | Noted: 2022-03-15

## 2022-03-15 LAB
ANION GAP SERPL CALC-SCNC: 7 MMOL/L (ref 7–16)
BASOPHILS # BLD: 0.1 K/UL (ref 0–0.2)
BASOPHILS NFR BLD: 1 % (ref 0–2)
BUN SERPL-MCNC: 20 MG/DL (ref 6–23)
CALCIUM SERPL-MCNC: 8.4 MG/DL (ref 8.3–10.4)
CHLORIDE SERPL-SCNC: 106 MMOL/L (ref 98–107)
CO2 SERPL-SCNC: 28 MMOL/L (ref 21–32)
CREAT SERPL-MCNC: 4.1 MG/DL (ref 0.6–1)
DIFFERENTIAL METHOD BLD: ABNORMAL
EOSINOPHIL # BLD: 0.1 K/UL (ref 0–0.8)
EOSINOPHIL NFR BLD: 2 % (ref 0.5–7.8)
ERYTHROCYTE [DISTWIDTH] IN BLOOD BY AUTOMATED COUNT: 14.4 % (ref 11.9–14.6)
ERYTHROCYTE [DISTWIDTH] IN BLOOD BY AUTOMATED COUNT: 16.9 % (ref 11.9–14.6)
GLUCOSE BLD STRIP.AUTO-MCNC: 115 MG/DL (ref 65–100)
GLUCOSE BLD STRIP.AUTO-MCNC: 146 MG/DL (ref 65–100)
GLUCOSE BLD STRIP.AUTO-MCNC: 150 MG/DL (ref 65–100)
GLUCOSE SERPL-MCNC: 144 MG/DL (ref 65–100)
HCT VFR BLD AUTO: 24.2 % (ref 35.8–46.3)
HCT VFR BLD AUTO: 28.4 % (ref 35.8–46.3)
HGB BLD-MCNC: 7.8 G/DL (ref 11.7–15.4)
HGB BLD-MCNC: 9 G/DL (ref 11.7–15.4)
HISTORY CHECKED?,CKHIST: NORMAL
IMM GRANULOCYTES # BLD AUTO: 0.2 K/UL (ref 0–0.5)
IMM GRANULOCYTES NFR BLD AUTO: 3 % (ref 0–5)
LYMPHOCYTES # BLD: 1.5 K/UL (ref 0.5–4.6)
LYMPHOCYTES NFR BLD: 19 % (ref 13–44)
MCH RBC QN AUTO: 29.7 PG (ref 26.1–32.9)
MCH RBC QN AUTO: 31.1 PG (ref 26.1–32.9)
MCHC RBC AUTO-ENTMCNC: 31.7 G/DL (ref 31.4–35)
MCHC RBC AUTO-ENTMCNC: 32.2 G/DL (ref 31.4–35)
MCV RBC AUTO: 93.7 FL (ref 79.6–97.8)
MCV RBC AUTO: 96.4 FL (ref 79.6–97.8)
MONOCYTES # BLD: 0.5 K/UL (ref 0.1–1.3)
MONOCYTES NFR BLD: 7 % (ref 4–12)
NEUTS SEG # BLD: 5.5 K/UL (ref 1.7–8.2)
NEUTS SEG NFR BLD: 69 % (ref 43–78)
NRBC # BLD: 0 K/UL (ref 0–0.2)
NRBC # BLD: 0 K/UL (ref 0–0.2)
PLATELET # BLD AUTO: 245 K/UL (ref 150–450)
PLATELET # BLD AUTO: 275 K/UL (ref 150–450)
PMV BLD AUTO: 10.1 FL (ref 9.4–12.3)
PMV BLD AUTO: 10.4 FL (ref 9.4–12.3)
POTASSIUM BLD-SCNC: 3.6 MMOL/L (ref 3.5–5.1)
POTASSIUM SERPL-SCNC: 3.4 MMOL/L (ref 3.5–5.1)
RBC # BLD AUTO: 2.51 M/UL (ref 4.05–5.2)
RBC # BLD AUTO: 3.03 M/UL (ref 4.05–5.2)
SERVICE CMNT-IMP: ABNORMAL
SODIUM SERPL-SCNC: 141 MMOL/L (ref 136–145)
WBC # BLD AUTO: 7.9 K/UL (ref 4.3–11.1)
WBC # BLD AUTO: 8 K/UL (ref 4.3–11.1)

## 2022-03-15 PROCEDURE — 77030031139 HC SUT VCRL2 J&J -A: Performed by: SURGERY

## 2022-03-15 PROCEDURE — 36415 COLL VENOUS BLD VENIPUNCTURE: CPT

## 2022-03-15 PROCEDURE — 80048 BASIC METABOLIC PNL TOTAL CA: CPT

## 2022-03-15 PROCEDURE — 77030021678 HC GLIDESCP STAT DISP VERT -B: Performed by: ANESTHESIOLOGY

## 2022-03-15 PROCEDURE — 74011250636 HC RX REV CODE- 250/636: Performed by: ANESTHESIOLOGY

## 2022-03-15 PROCEDURE — 77030008462 HC STPLR SKN PROX J&J -A: Performed by: SURGERY

## 2022-03-15 PROCEDURE — 74011250636 HC RX REV CODE- 250/636: Performed by: FAMILY MEDICINE

## 2022-03-15 PROCEDURE — 77030033067 HC SUT PDO STRATFX SPIR J&J -B: Performed by: SURGERY

## 2022-03-15 PROCEDURE — 74011250636 HC RX REV CODE- 250/636: Performed by: SURGERY

## 2022-03-15 PROCEDURE — 77030040922 HC BLNKT HYPOTHRM STRY -A: Performed by: ANESTHESIOLOGY

## 2022-03-15 PROCEDURE — 77030008703 HC TU ET UNCUF COVD -A: Performed by: ANESTHESIOLOGY

## 2022-03-15 PROCEDURE — 77030027138 HC INCENT SPIROMETER -A

## 2022-03-15 PROCEDURE — 2709999900 HC NON-CHARGEABLE SUPPLY: Performed by: SURGERY

## 2022-03-15 PROCEDURE — 77030002996 HC SUT SLK J&J -A: Performed by: SURGERY

## 2022-03-15 PROCEDURE — G0378 HOSPITAL OBSERVATION PER HR: HCPCS

## 2022-03-15 PROCEDURE — 77030012058: Performed by: SURGERY

## 2022-03-15 PROCEDURE — 77030040506 HC DRN WND MDII -A: Performed by: SURGERY

## 2022-03-15 PROCEDURE — 77030018673: Performed by: SURGERY

## 2022-03-15 PROCEDURE — 2709999900 HC NON-CHARGEABLE SUPPLY

## 2022-03-15 PROCEDURE — 77030003666 HC NDL SPINAL BD -A: Performed by: SURGERY

## 2022-03-15 PROCEDURE — P9016 RBC LEUKOCYTES REDUCED: HCPCS

## 2022-03-15 PROCEDURE — 86923 COMPATIBILITY TEST ELECTRIC: CPT

## 2022-03-15 PROCEDURE — 82962 GLUCOSE BLOOD TEST: CPT

## 2022-03-15 PROCEDURE — 77030018547 HC SUT ETHBND1 J&J -B: Performed by: SURGERY

## 2022-03-15 PROCEDURE — 77030013629 HC ELECTRD NDL STRY -B: Performed by: SURGERY

## 2022-03-15 PROCEDURE — 77030002933 HC SUT MCRYL J&J -A: Performed by: SURGERY

## 2022-03-15 PROCEDURE — 85025 COMPLETE CBC W/AUTO DIFF WBC: CPT

## 2022-03-15 PROCEDURE — 76210000006 HC OR PH I REC 0.5 TO 1 HR: Performed by: SURGERY

## 2022-03-15 PROCEDURE — 77030041445 HC PENCL ELECT SMK EVAC STRY -B: Performed by: SURGERY

## 2022-03-15 PROCEDURE — 86900 BLOOD TYPING SEROLOGIC ABO: CPT

## 2022-03-15 PROCEDURE — 77030020407 HC IV BLD WRMR ST 3M -A: Performed by: ANESTHESIOLOGY

## 2022-03-15 PROCEDURE — 77030008459 HC STPLR SKN COOP -B: Performed by: SURGERY

## 2022-03-15 PROCEDURE — 74011000258 HC RX REV CODE- 258: Performed by: FAMILY MEDICINE

## 2022-03-15 PROCEDURE — 77030040361 HC SLV COMPR DVT MDII -B: Performed by: SURGERY

## 2022-03-15 PROCEDURE — 76010000179 HC OR TIME 6 TO 6.5 HR INTENSV-TIER 1: Performed by: SURGERY

## 2022-03-15 PROCEDURE — 74011250637 HC RX REV CODE- 250/637: Performed by: SURGERY

## 2022-03-15 PROCEDURE — 74011000250 HC RX REV CODE- 250: Performed by: NURSE ANESTHETIST, CERTIFIED REGISTERED

## 2022-03-15 PROCEDURE — 76060000043 HC ANESTHESIA 6 TO 6.5 HR: Performed by: SURGERY

## 2022-03-15 PROCEDURE — 74011250637 HC RX REV CODE- 250/637: Performed by: ANESTHESIOLOGY

## 2022-03-15 PROCEDURE — 74011250636 HC RX REV CODE- 250/636: Performed by: NURSE ANESTHETIST, CERTIFIED REGISTERED

## 2022-03-15 PROCEDURE — 74011000250 HC RX REV CODE- 250: Performed by: SURGERY

## 2022-03-15 PROCEDURE — 84132 ASSAY OF SERUM POTASSIUM: CPT

## 2022-03-15 PROCEDURE — 85027 COMPLETE CBC AUTOMATED: CPT

## 2022-03-15 PROCEDURE — 77030027515 HC TBNG LIPO SUC MDCO -B: Performed by: SURGERY

## 2022-03-15 RX ORDER — SODIUM CHLORIDE, SODIUM LACTATE, POTASSIUM CHLORIDE, CALCIUM CHLORIDE 600; 310; 30; 20 MG/100ML; MG/100ML; MG/100ML; MG/100ML
75 INJECTION, SOLUTION INTRAVENOUS CONTINUOUS
Status: DISCONTINUED | OUTPATIENT
Start: 2022-03-15 | End: 2022-03-17

## 2022-03-15 RX ORDER — PROPOFOL 10 MG/ML
INJECTION, EMULSION INTRAVENOUS AS NEEDED
Status: DISCONTINUED | OUTPATIENT
Start: 2022-03-15 | End: 2022-03-15 | Stop reason: HOSPADM

## 2022-03-15 RX ORDER — ONDANSETRON 4 MG/1
4 TABLET, ORALLY DISINTEGRATING ORAL
Status: DISCONTINUED | OUTPATIENT
Start: 2022-03-15 | End: 2022-03-22 | Stop reason: HOSPADM

## 2022-03-15 RX ORDER — POLYETHYLENE GLYCOL 3350 17 G/17G
17 POWDER, FOR SOLUTION ORAL DAILY PRN
Status: DISCONTINUED | OUTPATIENT
Start: 2022-03-15 | End: 2022-03-22 | Stop reason: HOSPADM

## 2022-03-15 RX ORDER — SODIUM CHLORIDE 9 MG/ML
25 INJECTION, SOLUTION INTRAVENOUS CONTINUOUS
Status: DISCONTINUED | OUTPATIENT
Start: 2022-03-15 | End: 2022-03-15 | Stop reason: HOSPADM

## 2022-03-15 RX ORDER — SODIUM CHLORIDE 0.9 % (FLUSH) 0.9 %
5-40 SYRINGE (ML) INJECTION AS NEEDED
Status: DISCONTINUED | OUTPATIENT
Start: 2022-03-15 | End: 2022-03-22 | Stop reason: HOSPADM

## 2022-03-15 RX ORDER — ACETAMINOPHEN 650 MG/1
650 SUPPOSITORY RECTAL
Status: DISCONTINUED | OUTPATIENT
Start: 2022-03-15 | End: 2022-03-22 | Stop reason: HOSPADM

## 2022-03-15 RX ORDER — SODIUM CHLORIDE 9 MG/ML
INJECTION, SOLUTION INTRAVENOUS
Status: DISCONTINUED | OUTPATIENT
Start: 2022-03-15 | End: 2022-03-15 | Stop reason: HOSPADM

## 2022-03-15 RX ORDER — SODIUM CHLORIDE 9 MG/ML
250 INJECTION, SOLUTION INTRAVENOUS AS NEEDED
Status: DISCONTINUED | OUTPATIENT
Start: 2022-03-15 | End: 2022-03-22 | Stop reason: HOSPADM

## 2022-03-15 RX ORDER — SODIUM CHLORIDE 9 MG/ML
INJECTION, SOLUTION INTRAVENOUS
Status: COMPLETED | OUTPATIENT
Start: 2022-03-15 | End: 2022-03-15

## 2022-03-15 RX ORDER — ONDANSETRON 2 MG/ML
4 INJECTION INTRAMUSCULAR; INTRAVENOUS
Status: DISCONTINUED | OUTPATIENT
Start: 2022-03-15 | End: 2022-03-22 | Stop reason: HOSPADM

## 2022-03-15 RX ORDER — FAMOTIDINE 20 MG/1
10 TABLET, FILM COATED ORAL DAILY
Status: DISCONTINUED | OUTPATIENT
Start: 2022-03-16 | End: 2022-03-22 | Stop reason: HOSPADM

## 2022-03-15 RX ORDER — INSULIN LISPRO 100 [IU]/ML
INJECTION, SOLUTION INTRAVENOUS; SUBCUTANEOUS
Status: DISCONTINUED | OUTPATIENT
Start: 2022-03-15 | End: 2022-03-22 | Stop reason: HOSPADM

## 2022-03-15 RX ORDER — NALOXONE HYDROCHLORIDE 0.4 MG/ML
0.1 INJECTION, SOLUTION INTRAMUSCULAR; INTRAVENOUS; SUBCUTANEOUS
Status: DISCONTINUED | OUTPATIENT
Start: 2022-03-15 | End: 2022-03-22 | Stop reason: HOSPADM

## 2022-03-15 RX ORDER — ROCURONIUM BROMIDE 10 MG/ML
INJECTION, SOLUTION INTRAVENOUS AS NEEDED
Status: DISCONTINUED | OUTPATIENT
Start: 2022-03-15 | End: 2022-03-15 | Stop reason: HOSPADM

## 2022-03-15 RX ORDER — LIDOCAINE HYDROCHLORIDE AND EPINEPHRINE 10; 10 MG/ML; UG/ML
INJECTION, SOLUTION INFILTRATION; PERINEURAL AS NEEDED
Status: DISCONTINUED | OUTPATIENT
Start: 2022-03-15 | End: 2022-03-15 | Stop reason: HOSPADM

## 2022-03-15 RX ORDER — HYDROMORPHONE HYDROCHLORIDE 2 MG/ML
INJECTION, SOLUTION INTRAMUSCULAR; INTRAVENOUS; SUBCUTANEOUS AS NEEDED
Status: DISCONTINUED | OUTPATIENT
Start: 2022-03-15 | End: 2022-03-15 | Stop reason: HOSPADM

## 2022-03-15 RX ORDER — KETAMINE HYDROCHLORIDE 50 MG/ML
INJECTION, SOLUTION INTRAMUSCULAR; INTRAVENOUS AS NEEDED
Status: DISCONTINUED | OUTPATIENT
Start: 2022-03-15 | End: 2022-03-15 | Stop reason: HOSPADM

## 2022-03-15 RX ORDER — LIDOCAINE HYDROCHLORIDE 10 MG/ML
0.1 INJECTION INFILTRATION; PERINEURAL AS NEEDED
Status: DISCONTINUED | OUTPATIENT
Start: 2022-03-15 | End: 2022-03-15 | Stop reason: HOSPADM

## 2022-03-15 RX ORDER — LIDOCAINE HYDROCHLORIDE 10 MG/ML
INJECTION INFILTRATION; PERINEURAL AS NEEDED
Status: DISCONTINUED | OUTPATIENT
Start: 2022-03-15 | End: 2022-03-15 | Stop reason: HOSPADM

## 2022-03-15 RX ORDER — EPINEPHRINE 1 MG/ML
INJECTION, SOLUTION, CONCENTRATE INTRAVENOUS AS NEEDED
Status: DISCONTINUED | OUTPATIENT
Start: 2022-03-15 | End: 2022-03-15 | Stop reason: HOSPADM

## 2022-03-15 RX ORDER — HYDROMORPHONE HYDROCHLORIDE 2 MG/ML
0.2 INJECTION, SOLUTION INTRAMUSCULAR; INTRAVENOUS; SUBCUTANEOUS
Status: DISCONTINUED | OUTPATIENT
Start: 2022-03-15 | End: 2022-03-15 | Stop reason: SDUPTHER

## 2022-03-15 RX ORDER — ACETAMINOPHEN 500 MG
1000 TABLET ORAL ONCE
Status: COMPLETED | OUTPATIENT
Start: 2022-03-15 | End: 2022-03-15

## 2022-03-15 RX ORDER — OXYCODONE AND ACETAMINOPHEN 5; 325 MG/1; MG/1
1 TABLET ORAL
Status: DISCONTINUED | OUTPATIENT
Start: 2022-03-15 | End: 2022-03-22 | Stop reason: HOSPADM

## 2022-03-15 RX ORDER — SODIUM CHLORIDE 0.9 % (FLUSH) 0.9 %
5-40 SYRINGE (ML) INJECTION EVERY 8 HOURS
Status: DISCONTINUED | OUTPATIENT
Start: 2022-03-15 | End: 2022-03-22 | Stop reason: HOSPADM

## 2022-03-15 RX ORDER — SERTRALINE HYDROCHLORIDE 100 MG/1
100 TABLET, FILM COATED ORAL DAILY
Status: DISCONTINUED | OUTPATIENT
Start: 2022-03-16 | End: 2022-03-22 | Stop reason: HOSPADM

## 2022-03-15 RX ORDER — ONDANSETRON 2 MG/ML
INJECTION INTRAMUSCULAR; INTRAVENOUS AS NEEDED
Status: DISCONTINUED | OUTPATIENT
Start: 2022-03-15 | End: 2022-03-15 | Stop reason: HOSPADM

## 2022-03-15 RX ORDER — EPHEDRINE SULFATE/0.9% NACL/PF 50 MG/5 ML
SYRINGE (ML) INTRAVENOUS AS NEEDED
Status: DISCONTINUED | OUTPATIENT
Start: 2022-03-15 | End: 2022-03-15 | Stop reason: HOSPADM

## 2022-03-15 RX ORDER — FENTANYL CITRATE 50 UG/ML
INJECTION, SOLUTION INTRAMUSCULAR; INTRAVENOUS AS NEEDED
Status: DISCONTINUED | OUTPATIENT
Start: 2022-03-15 | End: 2022-03-15 | Stop reason: HOSPADM

## 2022-03-15 RX ORDER — LIDOCAINE HYDROCHLORIDE 20 MG/ML
INJECTION, SOLUTION EPIDURAL; INFILTRATION; INTRACAUDAL; PERINEURAL AS NEEDED
Status: DISCONTINUED | OUTPATIENT
Start: 2022-03-15 | End: 2022-03-15 | Stop reason: HOSPADM

## 2022-03-15 RX ORDER — OXYCODONE HYDROCHLORIDE 5 MG/1
5 TABLET ORAL
Status: DISCONTINUED | OUTPATIENT
Start: 2022-03-15 | End: 2022-03-15 | Stop reason: SDUPTHER

## 2022-03-15 RX ORDER — ACETAMINOPHEN 325 MG/1
650 TABLET ORAL
Status: DISCONTINUED | OUTPATIENT
Start: 2022-03-15 | End: 2022-03-22 | Stop reason: HOSPADM

## 2022-03-15 RX ORDER — SODIUM CHLORIDE, SODIUM LACTATE, POTASSIUM CHLORIDE, CALCIUM CHLORIDE 600; 310; 30; 20 MG/100ML; MG/100ML; MG/100ML; MG/100ML
100 INJECTION, SOLUTION INTRAVENOUS CONTINUOUS
Status: DISCONTINUED | OUTPATIENT
Start: 2022-03-15 | End: 2022-03-15 | Stop reason: HOSPADM

## 2022-03-15 RX ORDER — CEPHALEXIN 500 MG/1
500 CAPSULE ORAL EVERY 6 HOURS
Status: DISCONTINUED | OUTPATIENT
Start: 2022-03-15 | End: 2022-03-15

## 2022-03-15 RX ORDER — HYDROMORPHONE HYDROCHLORIDE 2 MG/ML
0.5 INJECTION, SOLUTION INTRAMUSCULAR; INTRAVENOUS; SUBCUTANEOUS
Status: DISCONTINUED | OUTPATIENT
Start: 2022-03-15 | End: 2022-03-15 | Stop reason: SDUPTHER

## 2022-03-15 RX ORDER — MIDAZOLAM HYDROCHLORIDE 1 MG/ML
2 INJECTION, SOLUTION INTRAMUSCULAR; INTRAVENOUS
Status: DISCONTINUED | OUTPATIENT
Start: 2022-03-15 | End: 2022-03-15 | Stop reason: HOSPADM

## 2022-03-15 RX ORDER — HYDROMORPHONE HYDROCHLORIDE 1 MG/ML
0.2 INJECTION, SOLUTION INTRAMUSCULAR; INTRAVENOUS; SUBCUTANEOUS
Status: DISCONTINUED | OUTPATIENT
Start: 2022-03-15 | End: 2022-03-22 | Stop reason: HOSPADM

## 2022-03-15 RX ORDER — HALOPERIDOL 5 MG/ML
1 INJECTION INTRAMUSCULAR
Status: DISCONTINUED | OUTPATIENT
Start: 2022-03-15 | End: 2022-03-15 | Stop reason: HOSPADM

## 2022-03-15 RX ORDER — DIPHENHYDRAMINE HYDROCHLORIDE 50 MG/ML
12.5 INJECTION, SOLUTION INTRAMUSCULAR; INTRAVENOUS
Status: DISCONTINUED | OUTPATIENT
Start: 2022-03-15 | End: 2022-03-15 | Stop reason: HOSPADM

## 2022-03-15 RX ORDER — FLUMAZENIL 0.1 MG/ML
0.2 INJECTION INTRAVENOUS
Status: DISCONTINUED | OUTPATIENT
Start: 2022-03-15 | End: 2022-03-15 | Stop reason: HOSPADM

## 2022-03-15 RX ADMIN — FENTANYL CITRATE 50 MCG: 50 INJECTION INTRAMUSCULAR; INTRAVENOUS at 08:54

## 2022-03-15 RX ADMIN — PROPOFOL 200 MG: 10 INJECTION, EMULSION INTRAVENOUS at 07:51

## 2022-03-15 RX ADMIN — ONDANSETRON 4 MG: 2 INJECTION INTRAMUSCULAR; INTRAVENOUS at 13:11

## 2022-03-15 RX ADMIN — Medication 10 MG: at 12:35

## 2022-03-15 RX ADMIN — HYDROMORPHONE HYDROCHLORIDE 0.2 MG: 2 INJECTION INTRAMUSCULAR; INTRAVENOUS; SUBCUTANEOUS at 12:29

## 2022-03-15 RX ADMIN — FENTANYL CITRATE 100 MCG: 50 INJECTION INTRAMUSCULAR; INTRAVENOUS at 07:51

## 2022-03-15 RX ADMIN — OXYCODONE AND ACETAMINOPHEN 1 TABLET: 5; 325 TABLET ORAL at 18:24

## 2022-03-15 RX ADMIN — SODIUM CHLORIDE 25 ML/HR: 900 INJECTION, SOLUTION INTRAVENOUS at 06:52

## 2022-03-15 RX ADMIN — SODIUM CHLORIDE, PRESERVATIVE FREE 10 ML: 5 INJECTION INTRAVENOUS at 21:02

## 2022-03-15 RX ADMIN — FENTANYL CITRATE 50 MCG: 50 INJECTION INTRAMUSCULAR; INTRAVENOUS at 09:46

## 2022-03-15 RX ADMIN — Medication 5 MG: at 08:14

## 2022-03-15 RX ADMIN — SODIUM CHLORIDE, SODIUM LACTATE, POTASSIUM CHLORIDE, AND CALCIUM CHLORIDE 75 ML/HR: 600; 310; 30; 20 INJECTION, SOLUTION INTRAVENOUS at 15:04

## 2022-03-15 RX ADMIN — ROCURONIUM BROMIDE 50 MG: 50 INJECTION, SOLUTION INTRAVENOUS at 07:51

## 2022-03-15 RX ADMIN — SODIUM CHLORIDE, PRESERVATIVE FREE 10 ML: 5 INJECTION INTRAVENOUS at 15:04

## 2022-03-15 RX ADMIN — FENTANYL CITRATE 50 MCG: 50 INJECTION INTRAMUSCULAR; INTRAVENOUS at 09:03

## 2022-03-15 RX ADMIN — OXYCODONE AND ACETAMINOPHEN 1 TABLET: 5; 325 TABLET ORAL at 14:04

## 2022-03-15 RX ADMIN — ACETAMINOPHEN 1000 MG: 500 TABLET, FILM COATED ORAL at 06:01

## 2022-03-15 RX ADMIN — LIDOCAINE HYDROCHLORIDE 100 MG: 20 INJECTION, SOLUTION EPIDURAL; INFILTRATION; INTRACAUDAL; PERINEURAL at 07:51

## 2022-03-15 RX ADMIN — Medication 3 G: at 12:31

## 2022-03-15 RX ADMIN — HYDROMORPHONE HYDROCHLORIDE 0.2 MG: 2 INJECTION INTRAMUSCULAR; INTRAVENOUS; SUBCUTANEOUS at 11:15

## 2022-03-15 RX ADMIN — SODIUM CHLORIDE: 9 INJECTION, SOLUTION INTRAVENOUS at 08:11

## 2022-03-15 RX ADMIN — SUGAMMADEX 200 MG: 100 INJECTION, SOLUTION INTRAVENOUS at 13:36

## 2022-03-15 RX ADMIN — ROCURONIUM BROMIDE 10 MG: 50 INJECTION, SOLUTION INTRAVENOUS at 10:25

## 2022-03-15 RX ADMIN — KETAMINE HYDROCHLORIDE 50 MG: 50 INJECTION INTRAMUSCULAR; INTRAVENOUS at 10:56

## 2022-03-15 RX ADMIN — Medication 1 AMPULE: at 20:56

## 2022-03-15 RX ADMIN — Medication 5 MG: at 09:14

## 2022-03-15 RX ADMIN — CEFAZOLIN 1 G: 1 INJECTION, POWDER, FOR SOLUTION INTRAMUSCULAR; INTRAVENOUS at 20:55

## 2022-03-15 RX ADMIN — Medication 5 MG: at 08:08

## 2022-03-15 RX ADMIN — ROCURONIUM BROMIDE 10 MG: 50 INJECTION, SOLUTION INTRAVENOUS at 10:56

## 2022-03-15 RX ADMIN — HYDROMORPHONE HYDROCHLORIDE 0.2 MG: 1 INJECTION, SOLUTION INTRAMUSCULAR; INTRAVENOUS; SUBCUTANEOUS at 20:54

## 2022-03-15 RX ADMIN — KETAMINE HYDROCHLORIDE 25 MG: 50 INJECTION INTRAMUSCULAR; INTRAVENOUS at 11:57

## 2022-03-15 RX ADMIN — PROPOFOL 70 MG: 10 INJECTION, EMULSION INTRAVENOUS at 10:50

## 2022-03-15 RX ADMIN — Medication 3 G: at 08:33

## 2022-03-15 RX ADMIN — FENTANYL CITRATE 50 MCG: 50 INJECTION INTRAMUSCULAR; INTRAVENOUS at 10:25

## 2022-03-15 NOTE — PROGRESS NOTES
RNCM met with patient in room 201 to discuss discharge planning. Patient awake in bed, alert and oriented. Spouse at bedside. Patient lives in one level home with spouse. Three steps to enter. Patient ESRD on HD at [facility] in [facility], dialysis on M/W/F. Patient has BSC at home but independent in mobility and ADL's. Patients drives. Patient has been to [facility] rehab in past. Denies home health. Patient confirmed demographics, PCP and uses [address] in [facility]. CM following for supportive needs at discharge. Care Management Interventions  PCP Verified by CM:  Yes (Dr Chely Cortez  [phone])  Mode of Transport at Discharge: Self  Transition of Care Consult (CM Consult): Discharge Planning  Support Systems: Spouse/Significant Other (spouse:  Jacqui Andre  [phone])  Confirm Follow Up Transport: Family  The Plan for Transition of Care is Related to the Following Treatment Goals : return to baseline  The Patient and/or Patient Representative was Provided with a Choice of Provider and Agrees with the Discharge Plan?: Yes  Freedom of Choice List was Provided with Basic Dialogue that Supports the Patient's Individualized Plan of Care/Goals, Treatment Preferences and Shares the Quality Data Associated with the Providers?: Yes   Resource Information Provided?: No  Discharge Location  Patient Expects to be Discharged to[de-identified] Home with family assistance

## 2022-03-15 NOTE — PROGRESS NOTES
Physical Therapy Note:    Pt declining all mobility at time of PT Eval attempt as she had just arrived to room from surgery. Was able to verify home-living/PLOF  info in CM note is accurate. Will try back at later time/ date pending pt status and availability.      Thank Odalis Irizarry PT, DPT, CSCS

## 2022-03-15 NOTE — PROGRESS NOTES
Problem: Falls - Risk of  Goal: *Absence of Falls  Description: Document Colton Noonan Fall Risk and appropriate interventions in the flowsheet.   Outcome: Progressing Towards Goal  Note: Fall Risk Interventions:            Medication Interventions: Patient to call before getting OOB,Teach patient to arise slowly

## 2022-03-15 NOTE — PERIOP NOTES
TRANSFER - OUT REPORT:    Verbal report given to CHRISTA Hinton(name) on Maron Klinefelter  being transferred to room 201 (unit) for routine post - op       Report consisted of patients Situation, Background, Assessment and   Recommendations(SBAR). Information from the following report(s) SBAR, Kardex, OR Summary, MAR and Cardiac Rhythm sinus rhythm was reviewed with the receiving nurse. Lines:   Peripheral IV 03/15/22 Left; Inner Forearm (Active)   Site Assessment Clean, dry, & intact 03/15/22 1357   Phlebitis Assessment 0 03/15/22 1357   Infiltration Assessment 0 03/15/22 1357   Dressing Status Clean, dry, & intact 03/15/22 1357   Dressing Type Tape 03/15/22 1357   Hub Color/Line Status Infusing;Patent 03/15/22 1357       Peripheral IV 03/15/22 Right Antecubital (Active)   Site Assessment Clean, dry, & intact 03/15/22 1357   Phlebitis Assessment 0 03/15/22 1357   Infiltration Assessment 0 03/15/22 1357   Dressing Status Clean, dry, & intact 03/15/22 1357   Dressing Type Tape;Transparent 03/15/22 1357   Hub Color/Line Status Capped 03/15/22 1357        Opportunity for questions and clarification was provided.       Patient transported with:   O2 @ 2 liters  Tech

## 2022-03-15 NOTE — PROGRESS NOTES
END OF SHIFT NOTE:    INTAKE/OUTPUT  No intake/output data recorded. Voiding: NO  Catheter: YES  Drain:   Norwegian Berta #1 03/15/22 Anterior;Right; Lower Abdomen (Active)   Site Assessment Clean, dry, & intact 03/15/22 1508   Dressing Status Clean, dry, & intact 03/15/22 1508   Drainage Description Serosanguinous 03/15/22 1508   Status Patent; Charged;Draining 03/15/22 1508   Y Connector Used No 03/15/22 1357   Output (ml) 0 ml 03/15/22 1508       Igor Drain #2 03/15/22 Left; Lower Abdomen (Active)   Site Assessment Clean, dry, & intact 03/15/22 1508   Dressing Status Clean, dry, & intact 03/15/22 1508   Drainage Description Serosanguinous 03/15/22 1508   Status Patent; Charged;Draining 03/15/22 1508   Y Connector Used No 03/15/22 1357   Output (ml) 0 ml 03/15/22 1508       Igor Drain #3 03/15/22 Anterior;Right;Upper; Inner Leg (Active)   Site Assessment Clean, dry, & intact 03/15/22 1508   Dressing Status Clean, dry, & intact 03/15/22 1508   Drainage Description Serosanguinous 03/15/22 1508   Status Patent; Charged 03/15/22 1508   Y Connector Used No 03/15/22 1357               Flatus: Patient does not have flatus present. Stool:  0 occurrences. Characteristics:       Emesis: 0 occurrences.     Characteristics:        VITAL SIGNS  Patient Vitals for the past 12 hrs:   Temp Pulse Resp BP SpO2   03/15/22 1904 97.7 °F (36.5 °C) 72 18 (!) 172/70 96 %   03/15/22 1518 -- -- -- -- 97 %   03/15/22 1504 97.6 °F (36.4 °C) 71 18 (!) 156/87 96 %   03/15/22 1437 -- 65 17 (!) 167/84 98 %   03/15/22 1432 -- 68 16 (!) 149/70 96 %   03/15/22 1427 -- 68 16 (!) 167/74 97 %   03/15/22 1422 -- 68 16 (!) 184/87 98 %   03/15/22 1417 -- 70 16 (!) 185/77 98 %   03/15/22 1412 -- 70 16 (!) 198/85 96 %   03/15/22 1410 -- 70 16 (!) 204/88 97 %   03/15/22 1403 -- 70 17 (!) 190/81 96 %   03/15/22 1400 -- 71 17 (!) 179/78 93 %   03/15/22 1357 97.8 °F (36.6 °C) 72 18 (!) 161/86 96 %       Pain Assessment  Pain Intensity 1: 7 (03/15/22 1824)  Pain Location 1: Ankle  Pain Intervention(s) 1: Medication (see MAR)  Patient Stated Pain Goal: 3    Ambulating  No    Shift report given to oncoming nurse at the bedside.     Deb Dobson

## 2022-03-15 NOTE — PROGRESS NOTES
03/15/22 1508   Dual Skin Pressure Injury Assessment   Dual Skin Pressure Injury Assessment WDL   Second Care Provider (Based on [address] [place] [address]) [facility]

## 2022-03-15 NOTE — PROGRESS NOTES
TRANSFER - IN REPORT:    Verbal report received from Shavonne Barnes RN(name) on [facility]  being received from [employer]) for routine progression of care      Report consisted of patients Situation, Background, Assessment and   Recommendations(SBAR). Information from the following report(s) SBAR, Kardex and Procedure Summary was reviewed with the receiving nurse. Opportunity for questions and clarification was provided. Assessment completed upon patients arrival to unit and care assumed.

## 2022-03-15 NOTE — CONSULTS
Consulted to review medications for DM type II. A1C in Jan 6.1 and glucose controlled here in hospital. Personally spoke to attending who is okay with chart review alone. Would only perform SS while here and seems diet controlled at home. She is currently NPO. She states she has not been using metformin or insulin when speaking with the nurse. Will sign off. No charge for this review.

## 2022-03-15 NOTE — ANESTHESIA PREPROCEDURE EVALUATION
Anesthetic History   No history of anesthetic complications            Review of Systems / Medical History  Patient summary reviewed and pertinent labs reviewed    Pulmonary                Comments: Prolonged hospitalization from necrotizing fascitis in Dec 2021. Also developed Covid 19 Dec 2021. Neuro/Psych   Within defined limits           Cardiovascular    Hypertension        Dysrhythmias : atrial fibrillation      Exercise tolerance: <4 METS: Due to debility and body habitus     GI/Hepatic/Renal     GERD    Renal disease: dialysis and CRI       Endo/Other    Diabetes: well controlled, type 2    Morbid obesity and anemia     Other Findings              Physical Exam    Airway  Mallampati: III  TM Distance: 4 - 6 cm  Neck ROM: normal range of motion   Mouth opening: Normal     Cardiovascular  Regular rate and rhythm,  S1 and S2 normal,  no murmur, click, rub, or gallop             Dental  No notable dental hx       Pulmonary  Breath sounds clear to auscultation               Abdominal         Other Findings            Anesthetic Plan    ASA: 3  Anesthesia type: general          Induction: Intravenous  Anesthetic plan and risks discussed with: Patient and Spouse      Pt with CRI and on HD MWF. Her necrotizing fascitis/sepsis is the source of her renal disease and she is improving. Last Hgb 8.5. Current CBC Pending.  Type and Cross available

## 2022-03-15 NOTE — H&P
CC: abdominal and perineal wounds    HPI: 44 yo h/o necrotizing fasciitis with large abdominal and perineal wounds s/p debridement. Ready for reconstruction. Past Medical History:   Diagnosis Date    Acute kidney injury ([address]     ARF with sepsis requiring hemodialysis    Anemia     Anxiety     ARF (acute respiratory failure) ([address] 12/2021    required vent at [facility] Atrial fibrillation with RVR ([address] 12/22/2021    while hospitalized for sepsis.  Chronic kidney disease     secondary to sepsis 12/2021--- US Renal in [facility] on M, W, F    Debility     Diabetes [facility])     type 2. does not check since on dialysis,no meds; no hypo    Dialysis patient [facility]) 12/2021    Mon, Wed, Fri    GERD (gastroesophageal reflux disease)     OTC daily med    Gout     Gout     History of recent blood transfusion     1 unit 2/17/22     1 unit 2/19/22 per [facility] records    Hypertension     controlled w/med    IBS (irritable bowel syndrome)     Iron deficiency anemia     Necrotizing fasciitis ([address] 12/27/2021    left groin pannus and perineum    Personal history of COVID-19     12/31/21 was hospitalized at [facility] when diagnosed---states \"extremely mild cough\"     Sepsis ([address] 12/2021     caused CRF, on dialysisi     Past Surgical History:   Procedure Laterality Date    HX DILATION AND CURETTAGE  07/2018    HX OTHER SURGICAL  12/2021     X 2 groin wound    IR INSERT NON TUNL CVC OVER 5 YRS  12/23/2021    IR INSERT TUNL CVC W/O PORT OVER 5 YR  1/21/2022    [place] ABDOMEN SURGERY PROC UNLISTED  02/2022    debridement     A&O x3  Irreg Irreg  Resp unlabored  Abdominal and perineal wound c/d/i. A/P:  Will proceed with panniculectomy and flap reconstruction of perineum and abdomen. Discussed risks, benefits and alternatives. Consent affirmed.

## 2022-03-16 LAB
ABO + RH BLD: NORMAL
ALBUMIN SERPL-MCNC: 2.5 G/DL (ref 3.5–5)
ANION GAP SERPL CALC-SCNC: 9 MMOL/L (ref 7–16)
BLD PROD TYP BPU: NORMAL
BLD PROD TYP BPU: NORMAL
BLOOD GROUP ANTIBODIES SERPL: NORMAL
BPU ID: NORMAL
BPU ID: NORMAL
BUN SERPL-MCNC: 22 MG/DL (ref 6–23)
CALCIUM SERPL-MCNC: 8.5 MG/DL (ref 8.3–10.4)
CHLORIDE SERPL-SCNC: 107 MMOL/L (ref 98–107)
CO2 SERPL-SCNC: 26 MMOL/L (ref 21–32)
CREAT SERPL-MCNC: 4.6 MG/DL (ref 0.6–1)
CROSSMATCH RESULT,%XM: NORMAL
CROSSMATCH RESULT,%XM: NORMAL
ERYTHROCYTE [DISTWIDTH] IN BLOOD BY AUTOMATED COUNT: 17.5 % (ref 11.9–14.6)
GLUCOSE BLD STRIP.AUTO-MCNC: 123 MG/DL (ref 65–100)
GLUCOSE BLD STRIP.AUTO-MCNC: 136 MG/DL (ref 65–100)
GLUCOSE BLD STRIP.AUTO-MCNC: 140 MG/DL (ref 65–100)
GLUCOSE BLD STRIP.AUTO-MCNC: 149 MG/DL (ref 65–100)
GLUCOSE BLD STRIP.AUTO-MCNC: 201 MG/DL (ref 65–100)
GLUCOSE SERPL-MCNC: 136 MG/DL (ref 65–100)
HCT VFR BLD AUTO: 27.7 % (ref 35.8–46.3)
HGB BLD-MCNC: 8.8 G/DL (ref 11.7–15.4)
MCH RBC QN AUTO: 29.5 PG (ref 26.1–32.9)
MCHC RBC AUTO-ENTMCNC: 31.8 G/DL (ref 31.4–35)
MCV RBC AUTO: 93 FL (ref 79.6–97.8)
NRBC # BLD: 0 K/UL (ref 0–0.2)
PHOSPHATE SERPL-MCNC: 5.9 MG/DL (ref 2.5–4.5)
PLATELET # BLD AUTO: 204 K/UL (ref 150–450)
PMV BLD AUTO: 10 FL (ref 9.4–12.3)
POTASSIUM SERPL-SCNC: 3.8 MMOL/L (ref 3.5–5.1)
RBC # BLD AUTO: 2.98 M/UL (ref 4.05–5.2)
SERVICE CMNT-IMP: ABNORMAL
SODIUM SERPL-SCNC: 142 MMOL/L (ref 136–145)
SPECIMEN EXP DATE BLD: NORMAL
STATUS OF UNIT,%ST: NORMAL
STATUS OF UNIT,%ST: NORMAL
UNIT DIVISION, %UDIV: 0
UNIT DIVISION, %UDIV: 0
WBC # BLD AUTO: 8.8 K/UL (ref 4.3–11.1)

## 2022-03-16 PROCEDURE — 96374 THER/PROPH/DIAG INJ IV PUSH: CPT

## 2022-03-16 PROCEDURE — 80069 RENAL FUNCTION PANEL: CPT

## 2022-03-16 PROCEDURE — 74011250637 HC RX REV CODE- 250/637: Performed by: NURSE PRACTITIONER

## 2022-03-16 PROCEDURE — 74011250636 HC RX REV CODE- 250/636: Performed by: SURGERY

## 2022-03-16 PROCEDURE — 85027 COMPLETE CBC AUTOMATED: CPT

## 2022-03-16 PROCEDURE — 74011250636 HC RX REV CODE- 250/636: Performed by: FAMILY MEDICINE

## 2022-03-16 PROCEDURE — 90935 HEMODIALYSIS ONE EVALUATION: CPT

## 2022-03-16 PROCEDURE — 74011250637 HC RX REV CODE- 250/637: Performed by: SURGERY

## 2022-03-16 PROCEDURE — 74011000258 HC RX REV CODE- 258: Performed by: FAMILY MEDICINE

## 2022-03-16 PROCEDURE — 82962 GLUCOSE BLOOD TEST: CPT

## 2022-03-16 PROCEDURE — 74011250636 HC RX REV CODE- 250/636: Performed by: ANESTHESIOLOGY

## 2022-03-16 PROCEDURE — G0378 HOSPITAL OBSERVATION PER HR: HCPCS

## 2022-03-16 PROCEDURE — 2709999900 HC NON-CHARGEABLE SUPPLY

## 2022-03-16 PROCEDURE — 97530 THERAPEUTIC ACTIVITIES: CPT

## 2022-03-16 PROCEDURE — 74011000250 HC RX REV CODE- 250: Performed by: SURGERY

## 2022-03-16 PROCEDURE — 97161 PT EVAL LOW COMPLEX 20 MIN: CPT

## 2022-03-16 PROCEDURE — 36415 COLL VENOUS BLD VENIPUNCTURE: CPT

## 2022-03-16 RX ORDER — AMLODIPINE BESYLATE 5 MG/1
5 TABLET ORAL DAILY
Status: DISCONTINUED | OUTPATIENT
Start: 2022-03-16 | End: 2022-03-22 | Stop reason: HOSPADM

## 2022-03-16 RX ADMIN — SODIUM CHLORIDE, PRESERVATIVE FREE 10 ML: 5 INJECTION INTRAVENOUS at 14:00

## 2022-03-16 RX ADMIN — SODIUM CHLORIDE, SODIUM LACTATE, POTASSIUM CHLORIDE, AND CALCIUM CHLORIDE 75 ML/HR: 600; 310; 30; 20 INJECTION, SOLUTION INTRAVENOUS at 23:25

## 2022-03-16 RX ADMIN — Medication 1 AMPULE: at 21:19

## 2022-03-16 RX ADMIN — SODIUM CHLORIDE, PRESERVATIVE FREE 10 ML: 5 INJECTION INTRAVENOUS at 21:19

## 2022-03-16 RX ADMIN — SODIUM CHLORIDE, SODIUM LACTATE, POTASSIUM CHLORIDE, AND CALCIUM CHLORIDE 75 ML/HR: 600; 310; 30; 20 INJECTION, SOLUTION INTRAVENOUS at 05:20

## 2022-03-16 RX ADMIN — SERTRALINE 100 MG: 100 TABLET, FILM COATED ORAL at 08:09

## 2022-03-16 RX ADMIN — CEFAZOLIN 1 G: 1 INJECTION, POWDER, FOR SOLUTION INTRAMUSCULAR; INTRAVENOUS at 20:34

## 2022-03-16 RX ADMIN — FAMOTIDINE 10 MG: 20 TABLET, FILM COATED ORAL at 08:09

## 2022-03-16 RX ADMIN — OXYCODONE AND ACETAMINOPHEN 1 TABLET: 5; 325 TABLET ORAL at 02:50

## 2022-03-16 RX ADMIN — AMLODIPINE BESYLATE 5 MG: 5 TABLET ORAL at 14:02

## 2022-03-16 RX ADMIN — CEFAZOLIN 1 G: 1 INJECTION, POWDER, FOR SOLUTION INTRAMUSCULAR; INTRAVENOUS at 08:09

## 2022-03-16 RX ADMIN — OXYCODONE AND ACETAMINOPHEN 1 TABLET: 5; 325 TABLET ORAL at 15:20

## 2022-03-16 RX ADMIN — Medication 1 AMPULE: at 08:09

## 2022-03-16 RX ADMIN — SODIUM CHLORIDE, PRESERVATIVE FREE 10 ML: 5 INJECTION INTRAVENOUS at 05:20

## 2022-03-16 RX ADMIN — OXYCODONE AND ACETAMINOPHEN 1 TABLET: 5; 325 TABLET ORAL at 21:19

## 2022-03-16 RX ADMIN — HYDROMORPHONE HYDROCHLORIDE 0.2 MG: 1 INJECTION, SOLUTION INTRAMUSCULAR; INTRAVENOUS; SUBCUTANEOUS at 06:47

## 2022-03-16 RX ADMIN — OXYCODONE AND ACETAMINOPHEN 1 TABLET: 5; 325 TABLET ORAL at 09:07

## 2022-03-16 NOTE — PROGRESS NOTES
ACUTE PHYSICAL THERAPY GOALS:  (Developed with and agreed upon by patient and/or caregiver.)    (1.) Chetan Vazquez  will move from supine to sit and sit to supine  with INDEPENDENCE within 7 treatment day(s). (2.) Chetan Vazquez will transfer from bed to chair and chair to bed with INDEPENDENCE using the least restrictive device within 7 treatment day(s). (3.) Chetan Vazquez will ambulate with INDEPENDENCE for 250 feet with the least restrictive device within 7 treatment day(s). (4.) Chetan Vazquez will perform standing static and dynamic balance activities x 20 minutes with SUPERVISION to improve safety within 7 treatment day(s). (5.) Chetan Vazquez will ascend and descend 3 stairs using B hand rail(s) with SUPERVISION to improve functional mobility and safety within 7 treatment day(s). (6.) Chetan Vazquez will perform bilateral lower extremity exercises x 20 min for HEP with INDEPENDENCE to improve strength, endurance, and functional mobility within 7 treatment day(s).      PHYSICAL THERAPY ASSESSMENT: Initial Assessment, Daily Note and AM PT Treatment Day # 1      Chetan Vazquez is a 45 y.o. female   PRIMARY DIAGNOSIS: <principal problem not specified>  Necrotizing fasciitis ([facility] [address] [M72.6]  [facility] [Z99.2]  Procedure(s) (LRB):  MUSCLE FLAP TO GROIN AND ABDOMEN (N/A)  PANNICULECTOMY (N/A)  PERINEAL RECONSTRUCTION (N/A)  1 Day Post-Op  Reason for Referral:    ICD-10: Treatment Diagnosis: Difficulty in walking, Not elsewhere classified (R26.2)  OBSERVATION: Payor: [facility] / Plan: [employer] HMO/CHOICE PLUS/POS / Product Type: HMO /     ASSESSMENT:     REHAB RECOMMENDATIONS:   Recommendation to date pending progress:  [address] [place] [address] Road pt may prefer OP  Equipment:    To Be Determined     PRIOR LEVEL OF FUNCTION:  (Prior to Hospitalization) INITIAL/CURRENT LEVEL OF FUNCTION:  (Most Recently Demonstrated)   Bed Mobility:   Independent  Sit to Stand:   Independent  Transfers:   Independent  Gait/Mobility:   Independent Bed Mobility:   Minimal Assistance  Sit to Stand:   CGA from bed, Margaret from low toilet  Transfers:   Contact Guard Assistance  Gait/Mobility:   Contact Guard Assistance     ASSESSMENT:  Ms. Mary Lou Sanchez  is a 45year old F who presents s/p above procedure, POD 1. Pt had prior surgery in Dec followed by a stay at [facility], reports she has been independent with mobility at home since then. This date pt performs mobility including bed mobility with Margaret. She was able to ambulate 30 ft, 5 ft x 2 and perform toilet transfer with SBA-CGA. Some unsteadiness and pain with mobility but overall doing well. She is primarily limited by expected postop pain and weakness. Pt eager to sit up but reports recliners are very uncomfortable for her abdomen. Pt wanting to sit at EOB- left sitting at EOB with good sitting balance, visitors at bedside and RN notified. Pt presents as functioning below her baseline, with deficits in mobility including transfers, gait, balance, and activity tolerance. Pt will benefit from skilled therapy services to address stated deficits to promote return to highest level of function, independence, and safety. Will continue to follow.      SUBJECTIVE:   Ms. Mary Lou Sanchez states, \"I may want outpatient therapy\"    SOCIAL HISTORY/LIVING ENVIRONMENT: PLOF: ind with mobility, has BSC but no other DME, lives with spouse in single level home with 3 RAMONITA, no falls  Home Environment: Private residence  One/Two Story Residence: One story  Living Alone: No  Support Systems: Spouse/Significant Other (spouse:  Kenny Buenrostro  [phone])  OBJECTIVE:     PAIN: VITAL SIGNS: LINES/DRAINS:   Pre Treatment: Pain Screen  Pain Scale 1: Numeric (0 - 10)  Pain Intensity 1: 7  Pain Onset 1: postop  Pain Location 1: Abdomen  Pain Intervention(s) 1: Ambulation/Increased Activity  Post Treatment: 7   Pardo Catheter, IV and VENKATESH Drain  O2 Device: None (Room air)     GROSS EVALUATION:  B LE Within Functional Limits Abnormal/ Functional Abnormal/ Non-Functional (see comments) Not Tested Comments:   AROM [x] [] [] []    PROM [] [] [] [x]    Strength [] [x] [] [] Generalized weakness   Balance [] [x] [] [] Good sitting, fair standing    Posture [x] [] [] []    Sensation [] [] [] [x]    Coordination [] [] [] [x]    Tone [] [] [] [x]    Edema [] [] [] [x]    Activity Tolerance [] [x] [] [] Below baseline     [] [] [] []      COGNITION/  PERCEPTION: Intact Impaired   (see comments) Comments:   Orientation [x] []    Vision [x] []    Hearing [x] []    Command Following [x] []    Safety Awareness [x] []     [] []      MOBILITY: I Mod I S SBA CGA Min Mod Max Total  NT x2 Comments:   Bed Mobility    Rolling [] [] [] [] [] [x] [] [] [] [] []    Supine to Sit [] [] [] [] [] [x] [] [] [] [] []    Scooting [] [] [] [] [] [x] [] [] [] [] []    Sit to Supine [] [] [] [] [] [] [] [] [] [x] []    Transfers    Sit to Stand [] [] [] [] [x] [x] [] [] [] [] []    Bed to Chair [] [] [] [] [x] [] [] [] [] [] []    Stand to Sit [] [] [] [] [x] [] [] [] [] [] []    I=Independent, Mod I=Modified Independent, S=Supervision, SBA=Standby Assistance, CGA=Contact Guard Assistance,   Min=Minimal Assistance, Mod=Moderate Assistance, Max=Maximal Assistance, Total=Total Assistance, NT=Not Tested  GAIT: I Mod I S SBA CGA Min Mod Max Total  NT x2 Comments:   Level of Assistance [] [] [] [x] [x] [] [] [] [] [] []    Distance 30 ft, 5 ft x 2    DME None    Gait Quality Wide YOLANDA, some unsteadiness, pain    Weightbearing Status N/A     I=Independent, Mod I=Modified Independent, S=Supervision, SBA=Standby Assistance, CGA=Contact Guard Assistance,   Min=Minimal Assistance, Mod=Moderate Assistance, Max=Maximal Assistance, Total=Total Assistance, NT=Not Tested    [address] [facility] [address] [id number] AM-PAC 6 Clicks   Basic Mobility Inpatient Short Form       How much difficulty does the patient currently have. .. Unable A Lot A Little None   1. Turning over in bed (including adjusting bedclothes, sheets and blankets)? [] 1   [] 2   [x] 3   [] 4   2. Sitting down on and standing up from a chair with arms ( e.g., wheelchair, bedside commode, etc.)   [] 1   [] 2   [x] 3   [] 4   3. Moving from lying on back to sitting on the side of the bed? [] 1   [] 2   [x] 3   [] 4   How much help from another person does the patient currently need. .. Total A Lot A Little None   4. Moving to and from a bed to a chair (including a wheelchair)? [] 1   [] 2   [x] 3   [] 4   5. Need to walk in hospital room? [] 1   [] 2   [x] 3   [] 4   6. Climbing 3-5 steps with a railing? [] 1   [] 2   [x] 3   [] 4   © 2007, Trustees of [facility] [employer], under license to [employer]. All rights reserved     Score:  Initial: 18 Most Recent: X (Date: -- )    Interpretation of Tool:  Represents activities that are increasingly more difficult (i.e. Bed mobility, Transfers, Gait). PLAN:   FREQUENCY/DURATION: PT Plan of Care: 3 times/week for duration of hospital stay or until stated goals are met, whichever comes first.    PROBLEM LIST:   (Skilled intervention is medically necessary to address:)  1. Decreased ADL/Functional Activities  2. Decreased Activity Tolerance  3. Decreased Balance  4. Decreased Gait Ability  5. Decreased Strength  6. Decreased Transfer Abilities  7. Increased Pain   INTERVENTIONS PLANNED:   (Benefits and precautions of physical therapy have been discussed with the patient.)  1. Therapeutic Activity  2. Therapeutic Exercise/HEP  3. Neuromuscular Re-education  4. Gait Training  5. Education     TREATMENT:     EVALUATION: Low Complexity : (Untimed Charge)    TREATMENT:   (     )  Therapeutic Activity (31 Minutes):  Therapeutic activity included Rolling, Supine to Sit, Scooting, Transfer Training, Ambulation on level ground, Sitting balance  and Standing balance to improve functional Mobility, Strength and Activity tolerance.     TREATMENT GRID:  N/A    AFTER TREATMENT POSITION/PRECAUTIONS:  Bed, Needs within reach, RN notified and Visitors at bedside    INTERDISCIPLINARY COLLABORATION:  RN/PCT    TOTAL TREATMENT DURATION:  PT Patient Time In/Time Out  Time In: 0642  Time Out: [address] [facility],[address], PT

## 2022-03-16 NOTE — PROGRESS NOTES
END OF SHIFT NOTE:    INTAKE/OUTPUT  03/15 0701 - 03/16 0700  In: 2555.6 [I.V.:1935.6]  Out: 1160 [Urine:810; Drains:100]  Voiding: NO  Catheter: YES  Drain:   [place] Cipro #1 03/15/22 Anterior;Right; Lower Abdomen (Active)   Site Assessment Clean, dry, & intact 03/15/22 2055   Dressing Status Clean, dry, & intact 03/15/22 2055   Drainage Description Serosanguinous 03/16/22 0250   Igor Drain Airleak No 03/15/22 2055   Status Patent;Draining; Charged 03/15/22 2055   Y Connector Used No 03/15/22 1357   Output (ml) 10 ml 03/16/22 0250       Igor Drain #2 03/15/22 Left; Lower Abdomen (Active)   Site Assessment Clean, dry, & intact 03/15/22 2055   Dressing Status Clean, dry, & intact 03/15/22 2055   Drainage Description Sanguinous 03/16/22 0250   Igor Drain Airleak No 03/15/22 2055   Status Patent;Draining; Charged 03/15/22 2055   Y Connector Used No 03/15/22 1357   Output (ml) 15 ml 03/16/22 0250       Igor Drain #3 03/15/22 Anterior;Right;Upper; Inner Leg (Active)   Site Assessment Clean, dry, & intact 03/15/22 2055   Dressing Status Clean, dry, & intact 03/15/22 2055   Drainage Description Serous 03/16/22 0250   Igor Drain Airleak No 03/15/22 2055   Status Patent;Draining; Charged 03/15/22 2055   Y Connector Used No 03/15/22 1357   Output (ml) 5 ml 03/16/22 0250         Flatus: Patient does have flatus present. Stool:  0 occurrences. Characteristics:       Emesis: 0 occurrences.     Characteristics:        VITAL SIGNS  Patient Vitals for the past 12 hrs:   Temp Pulse Resp BP SpO2   03/16/22 0345 -- -- -- (!) 162/86 --   03/16/22 0329 99.3 °F (37.4 °C) 81 17 (!) 179/85 95 %   03/15/22 2323 98.1 °F (36.7 °C) 77 18 (!) 170/80 96 %   03/15/22 1904 97.7 °F (36.5 °C) 72 18 (!) 172/70 96 %       Pain Assessment  Pain Intensity 1: 0 (03/16/22 0345)  Pain Location 1: Flank,Abdomen  Pain Intervention(s) 1: Medication (see MAR)  Patient Stated Pain Goal: 0    Ambulating  No    Shift report to be given to oncoming nurse at the bedside.     [address] [facility]

## 2022-03-16 NOTE — PROGRESS NOTES
Chart reviewed by [facility]. Patient POD 1; s/p panniculectomy with perineal reconstruction. Patient ESRD, in Dialysis. PT/OT evals pending recommendations. CM following.

## 2022-03-16 NOTE — BRIEF OP NOTE
Brief Postoperative Note    Patient: Asa'carsarmiut Stefan  YOB: 1970  MRN: [id number]    Date of Procedure: 3/15/2022     Pre-Op Diagnosis: Necrotizing fasciitis ([facility] [address] [M72.6]    Post-Op Diagnosis: Same    Procedure(s): MUSCLE FLAP TO GROIN AND ABDOMEN  PANNICULECTOMY  PERINEAL RECONSTRUCTION    Surgeon(s):  Faye Bangura MD    Surgical Assistant: None    Anesthesia: General     Estimated Blood Loss (mL): 892 ml    Complications: None immediate    Specimens: None    Implants: None    Drains:   Bill Jones #1 03/15/22 Anterior;Right; Lower Abdomen (Active)   Site Assessment Clean, dry, & intact 03/15/22 1508   Dressing Status Clean, dry, & intact 03/15/22 1508   Drainage Description Serosanguinous 03/15/22 1508   Status Patent; Charged;Draining 03/15/22 1508   Y Connector Used No 03/15/22 1357   Output (ml) 0 ml 03/15/22 1508       Igor Drain #2 03/15/22 Left; Lower Abdomen (Active)   Site Assessment Clean, dry, & intact 03/15/22 1508   Dressing Status Clean, dry, & intact 03/15/22 1508   Drainage Description Serosanguinous 03/15/22 1508   Status Patent; Charged;Draining 03/15/22 1508   Y Connector Used No 03/15/22 1357   Output (ml) 0 ml 03/15/22 1508       Igor Drain #3 03/15/22 Anterior;Right;Upper; Inner Leg (Active)   Site Assessment Clean, dry, & intact 03/15/22 1508   Dressing Status Clean, dry, & intact 03/15/22 1508   Drainage Description Serosanguinous 03/15/22 1508   Status Patent; Charged 03/15/22 1508   Y Connector Used No 03/15/22 1357

## 2022-03-16 NOTE — ANESTHESIA POSTPROCEDURE EVALUATION
Procedure(s): MUSCLE FLAP TO GROIN AND ABDOMEN  PANNICULECTOMY  PERINEAL RECONSTRUCTION. general    Anesthesia Post Evaluation      Multimodal analgesia: multimodal analgesia used between 6 hours prior to anesthesia start to PACU discharge  Patient location during evaluation: bedside  Patient participation: complete - patient participated  Level of consciousness: awake  Pain management: adequate  Airway patency: patent  Anesthetic complications: no  Cardiovascular status: acceptable  Respiratory status: spontaneous ventilation and acceptable  Hydration status: acceptable  Comments: Postop Hgb 9. Pain well controlled in PACU. VSS. Post anesthesia nausea and vomiting:  none      INITIAL Post-op Vital signs:   Vitals Value Taken Time   /84 03/15/22 1437   Temp 36.6 °C (97.8 °F) 03/15/22 1357   Pulse 67 03/15/22 1441   Resp 17 03/15/22 1437   SpO2 98 % 03/15/22 1440   Vitals shown include unvalidated device data.

## 2022-03-16 NOTE — PROGRESS NOTES
TRANSFER IN - DIALYSIS    Received patient in dialysis unit  from [facility] (unit) for ordered procedure. Consent verified for renal replacement therapy. Procedure explained to patient, opportunity for Q&A provided. Call light given. Patient alert and vital signs stable. Visit Vitals  BP (!) 181/90   Pulse 82   Temp 97.9 °F (36.6 °C)   Resp 15   Wt 137.5 kg (303 lb 1.6 oz)   SpO2 94%   BMI 44.76 kg/m²         Hemodialysis initiated using right cvc. Aspirated and flushed both ports without difficulty. Dressing clean, dry and intact. Machine settings per MD order. Heparin 0 unit bolus and 0 units/hr. Will monitor during treatment.      03/16/22 7686   Patient Information   Acute or Chronic Care Acute (comment)   Treatment Number 1   Informed Consent Verified Yes   Dialysis Weight   Goal/Amount of Fluid to Remove (mL) 1600 mL   Dialyzer/Set Up Inspection   Dialyzer/Set Up Inspection Revaclear Max   Alarms Verified Yes   Test Pass Yes   pH 7   Machine Conductivity 14   Meter Conductivity 14   Reverse Osmosis Safety Checks   Reverse Osmosis Machine Log Completed Yes   Total Chlorine Test Negative   Machine Initiation   Machine Number t3   Hemodialysis Start Time 0223   Unused Lines Clamped Yes   Machine Temperature 95.9 °F (35.5 °C)   Dialysis Initiation   All Connections Secured Yes   NS Bag  Yes   Saline Line Double Clamped Yes   Prime Given   Air Foam Detector Engaged Yes   Dialysate NA (mEq/L) 140   Dialysate K (mEq/L) 3   Dialysate CA (mEq/L) 2.5   Dialysate HCO3 (mEq/L) 35   Citrasate No   During Hemodialysis    Pulse (Heart Rate) 82   BP (!) 181/90   MAP (Calculated) 120   Transducer Checks Dry   Saline Given (mL) 300 mL   Heparin Bolus (units) 0 units   Continuous Heparin Infusion (Units/hr) 0 Units/hr   Blood Flow Rate (ml/min) 350 ml/min   Dialysate Flow Rate (ml/hr) 600 ml/hr

## 2022-03-16 NOTE — PROGRESS NOTES
[address]  Admission Date: 3/15/2022             Renal Daily Progress Note: 3/16/2022    The patient's chart is reviewed and the patient is discussed with the staff. Pt admitted under observation for surgery s/p muscle flap to grain and abdomen, panniculectomy and perineal reconstruction 3/15,hx of necrotizing fasciitis. We are consulted for dialysis hx of DEVENDRA/CKD 3b 2/2 ATN HD dependent since 12/24/21. Subjective:   Pt seen and examined on HD, dialyzing via right  Qb,. UF 1600 tolerating dialysis, abd soreness.      ROS:  General: no fever/chills  CV: no CP  Lung: no SOB, no cough  GI: no N/V/D  : no dysuria  Ext: no edema       Current Facility-Administered Medications   Medication Dose Route Frequency    lactated Ringers infusion  75 mL/hr IntraVENous CONTINUOUS    naloxone (NARCAN) injection 0.1 mg  0.1 mg IntraVENous EVERY 2 MINUTES AS NEEDED    0.9% sodium chloride infusion 250 mL  250 mL IntraVENous PRN    0.9% sodium chloride infusion 250 mL  250 mL IntraVENous PRN    sodium chloride (NS) flush 5-40 mL  5-40 mL IntraVENous Q8H    sodium chloride (NS) flush 5-40 mL  5-40 mL IntraVENous PRN    acetaminophen (TYLENOL) tablet 650 mg  650 mg Oral Q6H PRN    Or    acetaminophen (TYLENOL) suppository 650 mg  650 mg Rectal Q6H PRN    polyethylene glycol (MIRALAX) packet 17 g  17 g Oral DAILY PRN    ondansetron (ZOFRAN ODT) tablet 4 mg  4 mg Oral Q8H PRN    Or    ondansetron (ZOFRAN) injection 4 mg  4 mg IntraVENous Q6H PRN    oxyCODONE-acetaminophen (PERCOCET) 5-325 mg per tablet 1 Tablet  1 Tablet Oral Q6H PRN    famotidine (PEPCID) tablet 10 mg  10 mg Oral DAILY    sertraline (ZOLOFT) tablet 100 mg  100 mg Oral DAILY    alcohol 62% (NOZIN) nasal  1 Ampule  1 Ampule Topical Q12H    insulin lispro (HUMALOG) injection   SubCUTAneous AC&HS    ceFAZolin (ANCEF) 1 g in 0.9% sodium chloride (MBP/ADV) 50 mL MBP  1 g IntraVENous Q12H    HYDROmorphone (DILAUDID) injection 0.2 mg  0.2 mg IntraVENous Q3H PRN         Objective:     Vitals:    03/16/22 0700 03/16/22 0952 03/16/22 1001 03/16/22 1030   BP: (!) 176/74 (!) 181/90 (!) 181/123 (!) 175/82   Pulse: 81 82 81 80   Resp: 15      Temp: 97.9 °F (36.6 °C)      SpO2: 94%      Weight:         Intake and Output:   03/14 1901 - 03/16 0700  In: 2555.6 [I.V.:1935.6]  Out: 1160 [Urine:810; Drains:100]  03/16 0701 - 03/16 1900  In: 450 [P.O.:450]  Out: 500 [Urine:500]    Physical Exam:   Constitutional:  the patient is well developed and in no acute distress, alert and oriented  HEENT:  Sclera clear, pupils equal, oral mucosa moist  Lungs: clear bilaterally   Cardiovascular:  Regular rate, S1, S2, no Rub   Abd/GI: soft and + tender; with positive bowel sounds. Ext: warm without cyanosis. There is trace lower leg edema. Skin:  no jaundice or rashes  Neuro: no gross neuro deficits   Psychiatric: Calm. Access: Right TCC- intact       LAB  Recent Labs     03/16/22  0708 03/15/22  1435 03/15/22  0557   WBC 8.8 7.9 8.0   HGB 8.8* 9.0* 7.8*   HCT 27.7* 28.4* 24.2*    245 275     Recent Labs     03/16/22  0708 03/15/22  1435    141   K 3.8 3.4*    106   CO2 26 28   * 144*   BUN 22 20   CREA 4.60* 4.10*   PHOS 5.9*  --    ALB 2.5*  --      No results for input(s): PH, PCO2, PO2, HCO3 in the last 72 hours. Assessment:  (Medical Decision Making)     Hospital Problems  Date Reviewed: 12/25/2015          Codes Class Noted POA    Dialysis patient [facility]) ICD-10-CM: Z99.2  ICD-9-CM: V45.11  3/15/2022 Unknown              Plan:  (Medical Decision Making)   1. DEVENDRA/CKD 3b, ESRD HD dependent since 12/24/21. Seen on HD, tolerating dialysis     2. Hx necrotizing fasciitis s/p muscle flap to grain and abdomen, panniculectomy and perineal reconstruction 3/15 per plastic surgery    3.  Hypertension- resume flor Mckeon, NP

## 2022-03-16 NOTE — DIALYSIS
TRANSFER OUT- DIALYSIS    Hemodialysis treatment completed 20 min early d/t clotting in circuit. Unable to return venous line. Dr. Mike Hernandez notified. Patient a/ox3 and   /84,  P 79      1 Kgs removed. Flushed both ports with 10 mL of NS.  CVC dressing clean, dry, and intact, tego caps intact, curos caps placed, bilateral lumens wrapped with 4x4 gauze. Patient to 201 after dialysis.

## 2022-03-17 LAB
GLUCOSE BLD STRIP.AUTO-MCNC: 157 MG/DL (ref 65–100)
GLUCOSE BLD STRIP.AUTO-MCNC: 163 MG/DL (ref 65–100)
GLUCOSE BLD STRIP.AUTO-MCNC: 166 MG/DL (ref 65–100)
GLUCOSE BLD STRIP.AUTO-MCNC: 185 MG/DL (ref 65–100)
SERVICE CMNT-IMP: ABNORMAL

## 2022-03-17 PROCEDURE — G0378 HOSPITAL OBSERVATION PER HR: HCPCS

## 2022-03-17 PROCEDURE — 74011250637 HC RX REV CODE- 250/637: Performed by: NURSE PRACTITIONER

## 2022-03-17 PROCEDURE — 82962 GLUCOSE BLOOD TEST: CPT

## 2022-03-17 PROCEDURE — 97530 THERAPEUTIC ACTIVITIES: CPT

## 2022-03-17 PROCEDURE — 74011000250 HC RX REV CODE- 250: Performed by: SURGERY

## 2022-03-17 PROCEDURE — 74011250637 HC RX REV CODE- 250/637: Performed by: SURGERY

## 2022-03-17 PROCEDURE — 77030041974 HC CATH SYS PERIPH TELE -B

## 2022-03-17 PROCEDURE — 76937 US GUIDE VASCULAR ACCESS: CPT

## 2022-03-17 PROCEDURE — 74011250636 HC RX REV CODE- 250/636: Performed by: FAMILY MEDICINE

## 2022-03-17 PROCEDURE — 74011000258 HC RX REV CODE- 258: Performed by: FAMILY MEDICINE

## 2022-03-17 PROCEDURE — 96376 TX/PRO/DX INJ SAME DRUG ADON: CPT

## 2022-03-17 PROCEDURE — 74011636637 HC RX REV CODE- 636/637: Performed by: FAMILY MEDICINE

## 2022-03-17 RX ADMIN — SODIUM CHLORIDE, PRESERVATIVE FREE 10 ML: 5 INJECTION INTRAVENOUS at 22:35

## 2022-03-17 RX ADMIN — CEFAZOLIN 1 G: 1 INJECTION, POWDER, FOR SOLUTION INTRAMUSCULAR; INTRAVENOUS at 20:19

## 2022-03-17 RX ADMIN — Medication 1 AMPULE: at 20:19

## 2022-03-17 RX ADMIN — Medication 2 UNITS: at 16:30

## 2022-03-17 RX ADMIN — Medication 2 UNITS: at 22:35

## 2022-03-17 RX ADMIN — SERTRALINE 100 MG: 100 TABLET, FILM COATED ORAL at 08:37

## 2022-03-17 RX ADMIN — SODIUM CHLORIDE, PRESERVATIVE FREE 10 ML: 5 INJECTION INTRAVENOUS at 15:57

## 2022-03-17 RX ADMIN — CEFAZOLIN 1 G: 1 INJECTION, POWDER, FOR SOLUTION INTRAMUSCULAR; INTRAVENOUS at 08:37

## 2022-03-17 RX ADMIN — OXYCODONE AND ACETAMINOPHEN 1 TABLET: 5; 325 TABLET ORAL at 15:56

## 2022-03-17 RX ADMIN — AMLODIPINE BESYLATE 5 MG: 5 TABLET ORAL at 08:37

## 2022-03-17 RX ADMIN — Medication 2 UNITS: at 07:30

## 2022-03-17 RX ADMIN — Medication 2 UNITS: at 11:30

## 2022-03-17 RX ADMIN — Medication 1 AMPULE: at 08:48

## 2022-03-17 RX ADMIN — OXYCODONE AND ACETAMINOPHEN 1 TABLET: 5; 325 TABLET ORAL at 05:18

## 2022-03-17 RX ADMIN — SODIUM CHLORIDE, PRESERVATIVE FREE 10 ML: 5 INJECTION INTRAVENOUS at 05:18

## 2022-03-17 RX ADMIN — FAMOTIDINE 10 MG: 20 TABLET, FILM COATED ORAL at 08:37

## 2022-03-17 NOTE — PROGRESS NOTES
IV Access    Called for assistance with IV access. Placed 20g 6 cm Endurance extended dwell catheter in patient's left forearm with ultrasound assistance x 1 attempt Lot#[id number]. Blood return noted and PIV flushed without difficulty. Pt tarik well. [address] [facility]  [facility]

## 2022-03-17 NOTE — PROGRESS NOTES
END OF SHIFT NOTE:    INTAKE/OUTPUT  03/16 0701 - 03/17 0700  In: 2373 [P.O.:920; I.V.:1453]  Out: 2058 [Urine:2200; Drains:100]  Voiding: NO  Catheter: YES  Drain:   Yuli Estrada #1 03/15/22 Anterior;Right; Lower Abdomen (Active)   Site Assessment Clean, dry, & intact 03/17/22 0215   Dressing Status Clean, dry, & intact 03/17/22 0215   Drainage Description Serosanguinous 03/17/22 0215   Igor Drain Airleak No 03/17/22 0215   Status Patent; Charged;Draining 03/17/22 0215   Y Connector Used No 03/16/22 1910   Output (ml) 10 ml 03/17/22 0518       Yuli Estrada #2 03/15/22 Left; Lower Abdomen (Active)   Site Assessment Clean, dry, & intact 03/17/22 0215   Dressing Status Clean, dry, & intact 03/17/22 0215   Drainage Description Serosanguinous 03/17/22 0215   Igor Drain Airleak No 03/17/22 0215   Status Patent; Charged;Draining 03/17/22 0215   Y Connector Used No 03/17/22 0215   Output (ml) 15 ml 03/17/22 0518       Yuli Estrada #3 03/15/22 Anterior;Right;Upper; Inner Leg (Active)   Site Assessment Clean, dry, & intact 03/17/22 0215   Dressing Status Clean, dry, & intact 03/17/22 0215   Drainage Description Serosanguinous 03/17/22 0215   Igor Drain Airleak No 03/17/22 0215   Status Patent; Charged;Draining 03/17/22 0215   Y Connector Used No 03/17/22 0215   Output (ml) 5 ml 03/17/22 0518               Flatus: Patient does have flatus present. Stool:  0 occurrences. Characteristics:       Emesis: 0 occurrences.     Characteristics:        VITAL SIGNS  Patient Vitals for the past 12 hrs:   Temp Pulse Resp BP SpO2   03/17/22 0309 98.8 °F (37.1 °C) 86 18 (!) 170/83 95 %   03/17/22 0022 99.1 °F (37.3 °C) 87 18 (!) 155/76 96 %   03/16/22 2218 -- -- -- (!) 170/73 --   03/16/22 2103 100 °F (37.8 °C) 93 18 (!) 176/79 98 %       Pain Assessment  Pain Intensity 1: 7 (03/17/22 0518)  Pain Location 1: Abdomen  Pain Intervention(s) 1: Medication (see MAR)  Patient Stated Pain Goal: 0    Ambulating  No    Shift report given to oncoming nurse at the bedside.     Blake Horne RN

## 2022-03-17 NOTE — PROGRESS NOTES
ACUTE PHYSICAL THERAPY GOALS:  (Developed with and agreed upon by patient and/or caregiver.)  (1.) Christiane Calle  will move from supine to sit and sit to supine  with INDEPENDENCE within 7 treatment day(s). (2.) Christiane Calle will transfer from bed to chair and chair to bed with INDEPENDENCE using the least restrictive device within 7 treatment day(s). (3.) Christiane Calle will ambulate with INDEPENDENCE for 250 feet with the least restrictive device within 7 treatment day(s). (4.) Christiane Calle will perform standing static and dynamic balance activities x 20 minutes with SUPERVISION to improve safety within 7 treatment day(s). (5.) Christiane Calle will ascend and descend 3 stairs using B hand rail(s) with SUPERVISION to improve functional mobility and safety within 7 treatment day(s). (6.) Christiane Calle will perform bilateral lower extremity exercises x 20 min for HEP with INDEPENDENCE to improve strength, endurance, and functional mobility within 7 treatment day(s). PHYSICAL THERAPY: Daily Note and PM Treatment Day # 2    Christiane Calle is a 45 y.o. female   PRIMARY DIAGNOSIS: <principal problem not specified>  Necrotizing fasciitis ([facility] [M72.6]  Dialysis patient ([facility] [address] [Z99.2]  Procedure(s) (LRB):  MUSCLE FLAP TO GROIN AND ABDOMEN (N/A)  PANNICULECTOMY (N/A)  PERINEAL RECONSTRUCTION (N/A)  2 Days Post-Op    ASSESSMENT:     REHAB RECOMMENDATIONS: CURRENT LEVEL OF FUNCTION:  (Most Recently Demonstrated)   Recommendation to date pending progress:  Setting:  [facility] however the patient may prefer OPPT due to her dogs  Equipment:    None Bed Mobility:   Modified Independent  Sit to Stand:   Supervision  Transfers:   Supervision  Gait/Mobility:   Supervision     ASSESSMENT:  Ms. Tylor Terry is supine bed and agreeable to therapy. RN is re-dressing her wound.  at bedside. Bed mobility is modified independent.   Gait training with use of the IV pole x 500 feet with good tasha. Patient is doing well and making good progress. Patient is returned to the room and to sitting edge of bed. A really nice lady. Continue PT efforts. HHPT vs OPPT due to her dogs. Encouraged patient to increase OOB activities and walking with .      SUBJECTIVE:   Ms. Dary De La Rosa states, \"I'm ready\"    SOCIAL HISTORY/ LIVING ENVIRONMENT: see zaida  Home Environment: Private residence  One/Two Story Residence: One story  Living Alone: No  Support Systems: Spouse/Significant Other (spouse:  Adore Wood  [phone])  OBJECTIVE:     PAIN: VITAL SIGNS: LINES/DRAINS:   Pre Treatment: Pain Screen  Pain Scale 1: Numeric (0 - 10)  Pain Intensity 1: 0/10  Post Treatment: 0/10   Pardo Catheter, IV and VENKATESH Drain  O2 Device: None (Room air)     MOBILITY: I Mod I S SBA CGA Min Mod Max Total  NT x2 Comments:   Bed Mobility    Rolling [] [x] [] [] [] [] [] [] [] [] []    Supine to Sit [] [x] [] [] [] [] [] [] [] [] []    Scooting [] [x] [] [] [] [] [] [] [] [] []    Sit to Supine [] [] [] [] [] [] [] [] [] [x] []    Transfers    Sit to Stand [] [] [x] [] [] [] [] [] [] [] []    Bed to Chair [] [] [] [] [] [] [] [] [] [x] []    Stand to Sit [] [] [x] [] [] [] [] [] [] [] []    I=Independent, Mod I=Modified Independent, S=Supervision, SBA=Standby Assistance, CGA=Contact Guard Assistance,   Min=Minimal Assistance, Mod=Moderate Assistance, Max=Maximal Assistance, Total=Total Assistance, NT=Not Tested    BALANCE: Good Fair+ Fair Fair- Poor NT Comments   Sitting Static [x] [] [] [] [] []    Sitting Dynamic [x] [] [] [] [] []              Standing Static [x] [] [] [] [] []    Standing Dynamic [x] [] [] [] [] []      GAIT: I Mod I S SBA CGA Min Mod Max Total  NT x2 Comments:   Level of Assistance [] [] [x] [] [] [] [] [] [] [] []    Distance 500 feet     DME IV pole    Gait Quality Good tasha    Weightbearing  Status N/A     I=Independent, Mod I=Modified Independent, S=Supervision, SBA=Standby Assistance, CGA=Contact Guard Assistance,   Min=Minimal Assistance, Mod=Moderate Assistance, Max=Maximal Assistance, Total=Total Assistance, NT=Not Tested    PLAN:   FREQUENCY/DURATION: PT Plan of Care: 3 times/week for duration of hospital stay or until stated goals are met, whichever comes first.  TREATMENT:     TREATMENT:   ($$ Therapeutic Activity: 23-37 mins    )  Therapeutic Activity (23 Minutes): Therapeutic activity included Rolling, Supine to Sit, Scooting, Transfer Training, Ambulation on level ground, Sitting balance  and Standing balance to improve functional Mobility, Strength and Activity tolerance.     TREATMENT GRID:  N/A    AFTER TREATMENT POSITION/PRECAUTIONS:  Needs within reach, Visitors at bedside and sitting edge of bed  RN at bedside    INTERDISCIPLINARY COLLABORATION:  RN/PCT and PT/PTA    TOTAL TREATMENT DURATION:  PT Patient Time In/Time Out  Time In: 1454  Time Out: 1517    Shelley De La Paz PTA

## 2022-03-17 NOTE — PROGRESS NOTES
Bedside report being given. Large amount of gelatinous bloody drainage mixed with some thin sanguinous drainage noted. 4 x 4s tucked into the bottom of the dressing and covered with tegaderm.   Pt tolerated well

## 2022-03-17 NOTE — PROGRESS NOTES
CC: abdominal and perineal wounds    HPI: 44 yo h/o necrotizing fasciitis with large abdominal and perineal wounds s/p debridement. Doing well post op. Incisions painful but pain controlled. No BM. Past Medical History:   Diagnosis Date    Acute kidney injury ([address]     ARF with sepsis requiring hemodialysis    Anemia     Anxiety     ARF (acute respiratory failure) ([address] 12/2021    required vent at [facility] Atrial fibrillation with RVR ([address] 12/22/2021    while hospitalized for sepsis.  Chronic kidney disease     secondary to sepsis 12/2021--- US Renal in TR on M, W, F    Debility     Diabetes [facility])     type 2. does not check since on dialysis,no meds; no hypo    Dialysis patient [facility]) 12/2021    Mon, Wed, Fri    GERD (gastroesophageal reflux disease)     OTC daily med    Gout     Gout     History of recent blood transfusion     1 unit 2/17/22     1 unit 2/19/22 per [facility] records    Hypertension     controlled w/med    IBS (irritable bowel syndrome)     Iron deficiency anemia     Necrotizing fasciitis ([address] 12/27/2021    left groin pannus and perineum    Personal history of COVID-19     12/31/21 [facility] diagnosed---states \"extremely mild cough\"     Sepsis ([address] 12/2021     caused CRF, on dialysisi     Past Surgical History:   Procedure Laterality Date    HX DILATION AND CURETTAGE  07/2018    HX OTHER SURGICAL  12/2021     X 2 groin wound    IR INSERT NON TUNL CVC OVER 5 YRS  12/23/2021    IR INSERT TUNL CVC W/O PORT OVER 5 YR  1/21/2022    NE ABDOMEN SURGERY PROC UNLISTED  02/2022    debridement     A&O x3  Irreg Irreg  Resp unlabored  Incisions clean, appropriate drainage. Drains serosang. A/P:  Continue drains and advance diet and activity. Nephro following.

## 2022-03-18 PROBLEM — E66.01 OBESITY, MORBID (HCC): Status: ACTIVE | Noted: 2018-07-12

## 2022-03-18 PROBLEM — J96.00 ACUTE RESPIRATORY FAILURE (HCC): Status: ACTIVE | Noted: 2021-12-16

## 2022-03-18 LAB
GLUCOSE BLD STRIP.AUTO-MCNC: 144 MG/DL (ref 65–100)
GLUCOSE BLD STRIP.AUTO-MCNC: 146 MG/DL (ref 65–100)
GLUCOSE BLD STRIP.AUTO-MCNC: 157 MG/DL (ref 65–100)
GLUCOSE BLD STRIP.AUTO-MCNC: 200 MG/DL (ref 65–100)
SERVICE CMNT-IMP: ABNORMAL

## 2022-03-18 PROCEDURE — G0378 HOSPITAL OBSERVATION PER HR: HCPCS

## 2022-03-18 PROCEDURE — 74011250637 HC RX REV CODE- 250/637: Performed by: SURGERY

## 2022-03-18 PROCEDURE — 74011250636 HC RX REV CODE- 250/636: Performed by: FAMILY MEDICINE

## 2022-03-18 PROCEDURE — 2709999900 HC NON-CHARGEABLE SUPPLY

## 2022-03-18 PROCEDURE — 74011636637 HC RX REV CODE- 636/637: Performed by: FAMILY MEDICINE

## 2022-03-18 PROCEDURE — 96376 TX/PRO/DX INJ SAME DRUG ADON: CPT

## 2022-03-18 PROCEDURE — 74011250637 HC RX REV CODE- 250/637: Performed by: NURSE PRACTITIONER

## 2022-03-18 PROCEDURE — 74011000258 HC RX REV CODE- 258: Performed by: FAMILY MEDICINE

## 2022-03-18 PROCEDURE — 74011250636 HC RX REV CODE- 250/636: Performed by: INTERNAL MEDICINE

## 2022-03-18 PROCEDURE — 97530 THERAPEUTIC ACTIVITIES: CPT

## 2022-03-18 PROCEDURE — 74011000250 HC RX REV CODE- 250: Performed by: SURGERY

## 2022-03-18 PROCEDURE — 82962 GLUCOSE BLOOD TEST: CPT

## 2022-03-18 PROCEDURE — 90935 HEMODIALYSIS ONE EVALUATION: CPT

## 2022-03-18 RX ORDER — HEPARIN SODIUM 1000 [USP'U]/ML
5000 INJECTION, SOLUTION INTRAVENOUS; SUBCUTANEOUS
Status: DISCONTINUED | OUTPATIENT
Start: 2022-03-18 | End: 2022-03-22 | Stop reason: HOSPADM

## 2022-03-18 RX ADMIN — Medication 1 AMPULE: at 12:04

## 2022-03-18 RX ADMIN — SODIUM CHLORIDE, PRESERVATIVE FREE 10 ML: 5 INJECTION INTRAVENOUS at 06:00

## 2022-03-18 RX ADMIN — CEFAZOLIN 1 G: 1 INJECTION, POWDER, FOR SOLUTION INTRAMUSCULAR; INTRAVENOUS at 19:49

## 2022-03-18 RX ADMIN — AMLODIPINE BESYLATE 5 MG: 5 TABLET ORAL at 12:03

## 2022-03-18 RX ADMIN — SODIUM CHLORIDE, PRESERVATIVE FREE 10 ML: 5 INJECTION INTRAVENOUS at 14:00

## 2022-03-18 RX ADMIN — SERTRALINE 100 MG: 100 TABLET, FILM COATED ORAL at 12:03

## 2022-03-18 RX ADMIN — OXYCODONE AND ACETAMINOPHEN 1 TABLET: 5; 325 TABLET ORAL at 12:15

## 2022-03-18 RX ADMIN — SODIUM CHLORIDE, PRESERVATIVE FREE 10 ML: 5 INJECTION INTRAVENOUS at 21:43

## 2022-03-18 RX ADMIN — FAMOTIDINE 10 MG: 20 TABLET, FILM COATED ORAL at 12:03

## 2022-03-18 RX ADMIN — HEPARIN SODIUM 5000 UNITS: 1000 INJECTION INTRAVENOUS; SUBCUTANEOUS at 07:01

## 2022-03-18 RX ADMIN — Medication 1 AMPULE: at 21:40

## 2022-03-18 RX ADMIN — CEFAZOLIN 1 G: 1 INJECTION, POWDER, FOR SOLUTION INTRAMUSCULAR; INTRAVENOUS at 12:03

## 2022-03-18 RX ADMIN — Medication 4 UNITS: at 21:40

## 2022-03-18 NOTE — PROGRESS NOTES
[id number]  Admission Date: 3/15/2022             Renal Daily Progress Note: 3/18/2022    The patient's chart is reviewed and the patient is discussed with the staff. Pt admitted under observation for surgery s/p muscle flap to grain and abdomen, panniculectomy and perineal reconstruction 3/15,hx of necrotizing fasciitis. We are consulted for dialysis hx of DEVENDRA/CKD 3b 2/2 ATN HD dependent since 12/24/21. Subjective:   Pt seen and examined on HD, dialyzing via right  Qb,. UF 1600 tolerating dialysis, abd soreness, drains intact.      ROS:  General: no fever/chills  CV: no CP  Lung: no SOB, no cough  GI: no N/V/D  : no dysuria  Ext: no edema       Current Facility-Administered Medications   Medication Dose Route Frequency    heparin (porcine) 1,000 unit/mL injection 5,000 Units  5,000 Units Hemodialysis DIALYSIS PRN    amLODIPine (NORVASC) tablet 5 mg  5 mg Oral DAILY    naloxone (NARCAN) injection 0.1 mg  0.1 mg IntraVENous EVERY 2 MINUTES AS NEEDED    0.9% sodium chloride infusion 250 mL  250 mL IntraVENous PRN    0.9% sodium chloride infusion 250 mL  250 mL IntraVENous PRN    sodium chloride (NS) flush 5-40 mL  5-40 mL IntraVENous Q8H    sodium chloride (NS) flush 5-40 mL  5-40 mL IntraVENous PRN    acetaminophen (TYLENOL) tablet 650 mg  650 mg Oral Q6H PRN    Or    acetaminophen (TYLENOL) suppository 650 mg  650 mg Rectal Q6H PRN    polyethylene glycol (MIRALAX) packet 17 g  17 g Oral DAILY PRN    ondansetron (ZOFRAN ODT) tablet 4 mg  4 mg Oral Q8H PRN    Or    ondansetron (ZOFRAN) injection 4 mg  4 mg IntraVENous Q6H PRN    oxyCODONE-acetaminophen (PERCOCET) 5-325 mg per tablet 1 Tablet  1 Tablet Oral Q6H PRN    famotidine (PEPCID) tablet 10 mg  10 mg Oral DAILY    sertraline (ZOLOFT) tablet 100 mg  100 mg Oral DAILY    alcohol 62% (NOZIN) nasal  1 Ampule  1 Ampule Topical Q12H    insulin lispro (HUMALOG) injection   SubCUTAneous AC&HS    ceFAZolin (ANCEF) 1 g in 0.9% sodium chloride (MBP/ADV) 50 mL MBP  1 g IntraVENous Q12H    HYDROmorphone (DILAUDID) injection 0.2 mg  0.2 mg IntraVENous Q3H PRN         Objective:     Vitals:    03/17/22 2258 03/18/22 0331 03/18/22 0659 03/18/22 0730   BP: (!) 146/85 (!) 174/93 (!) 144/96 (!) 188/85   Pulse: 93 93 89 88   Resp: 18 18     Temp: 100.2 °F (37.9 °C) 98.1 °F (36.7 °C)     SpO2: 99% 100%     Weight:         Intake and Output:   03/16 1901 - 03/18 0700  In: 6517 [P.O.:1992; I.V.:1939]  Out: 3031 [Urine:3250; Drains:78]  No intake/output data recorded. Physical Exam:   Constitutional:  the patient is well developed and in no acute distress, alert and oriented  HEENT:  Sclera clear, pupils equal, oral mucosa moist  Lungs: clear bilaterally   Cardiovascular:  Regular rate, S1, S2, no Rub   Abd/GI: soft and + tender; with positive bowel sounds. Ext: warm without cyanosis. There is trace lower leg edema. Skin:  no jaundice or rashes  Neuro: no gross neuro deficits   Psychiatric: Calm. Access: Right TCC- intact       LAB  Recent Labs     03/16/22  0708 03/15/22  1435   WBC 8.8 7.9   HGB 8.8* 9.0*   HCT 27.7* 28.4*    245     Recent Labs     03/16/22  0708 03/15/22  1435    141   K 3.8 3.4*    106   CO2 26 28   * 144*   BUN 22 20   CREA 4.60* 4.10*   PHOS 5.9*  --    ALB 2.5*  --      No results for input(s): PH, PCO2, PO2, HCO3 in the last 72 hours. Assessment:  (Medical Decision Making)     Hospital Problems  Date Reviewed: 12/25/2015          Codes Class Noted POA    Dialysis patient [facility]) ICD-10-CM: Z99.2  ICD-9-CM: V45.11  3/15/2022 Unknown              Plan:  (Medical Decision Making)   1. DEVENDRA/CKD 3b, ESRD HD dependent since 12/24/21. Seen on HD, tolerating dialysis     2. Hx necrotizing fasciitis s/p muscle flap to grain and abdomen, panniculectomy and perineal reconstruction 3/15 per plastic surgery    3.  Hypertension- back on [facility]     Smita Lamb NP

## 2022-03-18 NOTE — PROGRESS NOTES
Chart reviewed by [facility]. POD 3, s/p panniculectomy. PT/OT recommending home health when stable for discharge. [facility] referral started via fax [phone]. CM following.

## 2022-03-18 NOTE — PROGRESS NOTES
Perineal dressing saturated and fell while pt went to urinate. Dressing changed at this time. Pt tolerated it well. Denies pain and other needs.

## 2022-03-18 NOTE — PROGRESS NOTES
ACUTE PHYSICAL THERAPY GOALS:  (Developed with and agreed upon by patient and/or caregiver.)  (1.) Sigrid Dawson  will move from supine to sit and sit to supine  with INDEPENDENCE within 7 treatment day(s). (2.) Sigrid Dawson will transfer from bed to chair and chair to bed with INDEPENDENCE using the least restrictive device within 7 treatment day(s). (3.) Sigrid Dawson will ambulate with INDEPENDENCE for 250 feet with the least restrictive device within 7 treatment day(s). (4.) Sigrid Dawson will perform standing static and dynamic balance activities x 20 minutes with SUPERVISION to improve safety within 7 treatment day(s). (5.) Sigrid Dawson will ascend and descend 3 stairs using B hand rail(s) with SUPERVISION to improve functional mobility and safety within 7 treatment day(s). (6.) Sigrid Dawson will perform bilateral lower extremity exercises x 20 min for HEP with INDEPENDENCE to improve strength, endurance, and functional mobility within 7 treatment day(s). PHYSICAL THERAPY: Daily Note and PM Treatment Day # 3    Sigrid Dawson is a 45 y.o. female   PRIMARY DIAGNOSIS: <principal problem not specified>  Necrotizing fasciitis ([facility] [address] [M72.6]  [facility] [Z99.2]  Procedure(s) (LRB):  MUSCLE FLAP TO GROIN AND ABDOMEN (N/A)  PANNICULECTOMY (N/A)  PERINEAL RECONSTRUCTION (N/A)  3 Days Post-Op    ASSESSMENT:     REHAB RECOMMENDATIONS: CURRENT LEVEL OF FUNCTION:  (Most Recently Demonstrated)   Recommendation to date pending progress:  Setting:  [facility] however the patient may prefer OPPT due to her dogs  Equipment:    None Bed Mobility:   Modified Independent  Sit to Stand:   Independent  Transfers:   Independent  Gait/Mobility:   Supervision     ASSESSMENT:  Ms. Marilyn Rodriguez is supine bed and agreeable to therapy.  at bedside. Bed mobility is modified independent. Gait training w/o AD  x 500 feet with good tasha.   Patient is doing well and making good progress. Patient is returned to the room and to sitting edge of bed. A really nice lady. Continue PT efforts. HHPT vs OPPT due to her dogs. Encouraged patient to increase OOB activities and walking with .      SUBJECTIVE:   Ms. Won Olsen states, \"Hello\"    SOCIAL HISTORY/ LIVING ENVIRONMENT: see eval  Home Environment: Private residence  One/Two Story Residence: One story  Living Alone: No  Support Systems: Spouse/Significant Other (spouse:  Lul Poll  [phone])  OBJECTIVE:     PAIN: VITAL SIGNS: LINES/DRAINS:   Pre Treatment: Pain Screen  Pain Scale 1: Numeric (0 - 10)  Pain Intensity 1:  (no number given)  Post Treatment: no number given   VENKATESH Drain  O2 Device: None (Room air)     MOBILITY: I Mod I S SBA CGA Min Mod Max Total  NT x2 Comments:   Bed Mobility    Rolling [x] [] [] [] [] [] [] [] [] [] []    Supine to Sit [x] [] [] [] [] [] [] [] [] [] []    Scooting [x] [] [] [] [] [] [] [] [] [] []    Sit to Supine [] [] [] [] [] [] [] [] [] [x] []    Transfers    Sit to Stand [x] [] [] [] [] [] [] [] [] [] []    Bed to Chair [] [] [] [] [] [] [] [] [] [x] []    Stand to Sit [x] [] [x] [] [] [] [] [] [] [] []    I=Independent, Mod I=Modified Independent, S=Supervision, SBA=Standby Assistance, CGA=Contact Guard Assistance,   Min=Minimal Assistance, Mod=Moderate Assistance, Max=Maximal Assistance, Total=Total Assistance, NT=Not Tested    BALANCE: Good Fair+ Fair Fair- Poor NT Comments   Sitting Static [x] [] [] [] [] []    Sitting Dynamic [x] [] [] [] [] []              Standing Static [x] [] [] [] [] []    Standing Dynamic [x] [] [] [] [] []      GAIT: I Mod I S SBA CGA Min Mod Max Total  NT x2 Comments:   Level of Assistance [] [] [x] [] [] [] [] [] [] [] []    Distance 500 feet     DME N/A    Gait Quality Good tasha    Weightbearing  Status N/A     I=Independent, Mod I=Modified Independent, S=Supervision, SBA=Standby Assistance, CGA=Contact Guard Assistance,   Min=Minimal Assistance, Mod=Moderate Assistance, Max=Maximal Assistance, Total=Total Assistance, NT=Not Tested    PLAN:   FREQUENCY/DURATION: PT Plan of Care: 3 times/week for duration of hospital stay or until stated goals are met, whichever comes first.  TREATMENT:     TREATMENT:   ($$ Therapeutic Activity: 8-22 mins    )  Therapeutic Activity (12 Minutes): Therapeutic activity included Rolling, Supine to Sit, Scooting, Transfer Training, Ambulation on level ground, Sitting balance  and Standing balance to improve functional Mobility, Strength and Activity tolerance.     TREATMENT GRID:  N/A    AFTER TREATMENT POSITION/PRECAUTIONS:  Needs within reach, Visitors at bedside and sitting edge of bed     INTERDISCIPLINARY COLLABORATION:  RN/PCT and PT/PTA    TOTAL TREATMENT DURATION:  PT Patient Time In/Time Out  Time In: 1310  Time Out: 1322    Kandy Perez PTA

## 2022-03-18 NOTE — PROGRESS NOTES
END OF SHIFT NOTE:    INTAKE/OUTPUT  03/17 0701 - 03/18 0700  In: 2476 [P.O.:1992; I.V.:486]  Out: 1598 [Urine:1550; Drains:48]  Voiding: YES  Catheter: NO  Drain:   Sowmya Khan #1 03/15/22 Anterior;Right; Lower Abdomen (Active)   Site Assessment Clean, dry, & intact 03/18/22 1454   Dressing Status Clean, dry, & intact 03/18/22 1454   Drainage Description Serosanguinous 03/18/22 1454   Igor Drain Airleak No 03/18/22 1454   Status Charged 03/18/22 1454   Y Connector Used No 03/18/22 1454   Output (ml) 10 ml 03/18/22 1454       Igor Drain #2 03/15/22 Left; Lower Abdomen (Active)   Site Assessment Clean, dry, & intact 03/18/22 1454   Dressing Status Clean, dry, & intact 03/18/22 1454   Drainage Description Serosanguinous 03/18/22 1454   Igor Drain Airleak No 03/18/22 1454   Status Charged 03/18/22 1454   Y Connector Used No 03/18/22 1454   Output (ml) 5 ml 03/18/22 1454       Igor Drain #3 03/15/22 Anterior;Right;Upper; Inner Leg (Active)   Site Assessment Clean, dry, & intact 03/18/22 1454   Dressing Status Clean, dry, & intact 03/18/22 1454   Drainage Description Serosanguinous 03/18/22 1454   Igor Drain Airleak No 03/18/22 1454   Status Charged 03/18/22 1454   Y Connector Used No 03/18/22 1454   Output (ml) 10 ml 03/18/22 1454               Flatus: Patient does have flatus present. Stool:  1 occurrences. Characteristics:  Stool Assessment  Stool Appearance: Other (comment) (\"nomal\" )    Emesis: 0 occurrences.     Characteristics:        VITAL SIGNS  Patient Vitals for the past 12 hrs:   Temp Pulse Resp BP SpO2   03/18/22 1552 98.7 °F (37.1 °C) 93 18 (!) 160/70 100 %   03/18/22 1141 97.8 °F (36.6 °C) 94 18 (!) 159/87 100 %   03/18/22 1025 -- 90 -- (!) 195/82 --   03/18/22 1003 -- 92 -- (!) 188/70 --   03/18/22 0932 -- 90 -- (!) 194/85 --   03/18/22 0904 -- 86 -- (!) 194/72 --   03/18/22 0830 -- 83 -- (!) 199/69 --   03/18/22 0800 -- 83 -- (!) 177/79 --   03/18/22 0730 -- 88 -- (!) 188/85 --   03/18/22 0659 -- 89 -- (!) 144/96 --       Pain Assessment  Pain Intensity 1:  (no number given) (03/18/22 1500)  Pain Location 1: Abdomen  Pain Intervention(s) 1: Medication (see MAR)  Patient Stated Pain Goal: 0    Ambulating  Yes    Shift report given to oncoming nurse at the bedside.     Lobo Bray RN

## 2022-03-18 NOTE — DIALYSIS
TRANSFER IN - DIALYSIS    Received patient in dialysis unit  from [facility] (unit) for ordered procedure. Consent verified for renal replacement therapy. Procedure explained to patient, opportunity for Q&A provided. Call light given. Patient alert and vital signs stable. /96,  P 89 . Hemodialysis initiated using Right CVC. Aspirated and flushed both ports without difficulty. Dressing clean, dry and intact. Machine settings per MD order. Heparin 2000 unit bolus and 500 units/hr. Will monitor during treatment.

## 2022-03-18 NOTE — PROGRESS NOTES
Problem: Falls - Risk of  Goal: *Absence of Falls  Description: Document Patricia Litter Fall Risk and appropriate interventions in the flowsheet.   Outcome: Progressing Towards Goal  Note: Fall Risk Interventions:  Mobility Interventions: Communicate number of staff needed for ambulation/transfer         Medication Interventions: Patient to call before getting OOB    Elimination Interventions: Call light in reach              Problem: Patient Education: Go to Patient Education Activity  Goal: Patient/Family Education  Outcome: Progressing Towards Goal     Problem: Patient Education: Go to Patient Education Activity  Goal: Patient/Family Education  Outcome: Progressing Towards Goal

## 2022-03-18 NOTE — DIALYSIS
TRANSFER OUT- DIALYSIS    Hemodialysis treatment completed without complications. Patient alert and VS stable  /82  P 90       1 Kgs removed. Flushed both ports with 10 mL of NS.  CVC dressing clean, dry, and intact, tego caps intact, curos caps placed, bilateral lumens wrapped with 4x4 gauze. Meds given: None. No units of RBCs given during dialysis. Patient to 201 after dialysis.

## 2022-03-19 PROBLEM — D64.9 ANEMIA: Status: ACTIVE | Noted: 2018-07-13

## 2022-03-19 PROBLEM — A41.9 SEPTIC SHOCK (HCC): Status: ACTIVE | Noted: 2021-12-16

## 2022-03-19 PROBLEM — N17.9 AKI (ACUTE KIDNEY INJURY) (HCC): Status: ACTIVE | Noted: 2021-12-16

## 2022-03-19 PROBLEM — I48.91 ATRIAL FIBRILLATION WITH RVR (HCC): Status: ACTIVE | Noted: 2021-12-22

## 2022-03-19 PROBLEM — R65.21 SEPTIC SHOCK (HCC): Status: ACTIVE | Noted: 2021-12-16

## 2022-03-19 LAB
GLUCOSE BLD STRIP.AUTO-MCNC: 137 MG/DL (ref 65–100)
GLUCOSE BLD STRIP.AUTO-MCNC: 160 MG/DL (ref 65–100)
GLUCOSE BLD STRIP.AUTO-MCNC: 167 MG/DL (ref 65–100)
GLUCOSE BLD STRIP.AUTO-MCNC: 204 MG/DL (ref 65–100)
SERVICE CMNT-IMP: ABNORMAL

## 2022-03-19 PROCEDURE — 74011636637 HC RX REV CODE- 636/637: Performed by: FAMILY MEDICINE

## 2022-03-19 PROCEDURE — 74011250636 HC RX REV CODE- 250/636: Performed by: FAMILY MEDICINE

## 2022-03-19 PROCEDURE — 74011250637 HC RX REV CODE- 250/637: Performed by: SURGERY

## 2022-03-19 PROCEDURE — 96376 TX/PRO/DX INJ SAME DRUG ADON: CPT

## 2022-03-19 PROCEDURE — 2709999900 HC NON-CHARGEABLE SUPPLY

## 2022-03-19 PROCEDURE — G0378 HOSPITAL OBSERVATION PER HR: HCPCS

## 2022-03-19 PROCEDURE — 82962 GLUCOSE BLOOD TEST: CPT

## 2022-03-19 PROCEDURE — 74011000258 HC RX REV CODE- 258: Performed by: FAMILY MEDICINE

## 2022-03-19 PROCEDURE — 74011250637 HC RX REV CODE- 250/637: Performed by: NURSE PRACTITIONER

## 2022-03-19 PROCEDURE — 74011000250 HC RX REV CODE- 250: Performed by: SURGERY

## 2022-03-19 RX ADMIN — OXYCODONE AND ACETAMINOPHEN 1 TABLET: 5; 325 TABLET ORAL at 00:05

## 2022-03-19 RX ADMIN — Medication 2 UNITS: at 15:33

## 2022-03-19 RX ADMIN — CEFAZOLIN 1 G: 1 INJECTION, POWDER, FOR SOLUTION INTRAMUSCULAR; INTRAVENOUS at 09:14

## 2022-03-19 RX ADMIN — OXYCODONE AND ACETAMINOPHEN 1 TABLET: 5; 325 TABLET ORAL at 21:57

## 2022-03-19 RX ADMIN — Medication 1 AMPULE: at 21:28

## 2022-03-19 RX ADMIN — OXYCODONE AND ACETAMINOPHEN 1 TABLET: 5; 325 TABLET ORAL at 15:05

## 2022-03-19 RX ADMIN — FAMOTIDINE 10 MG: 20 TABLET, FILM COATED ORAL at 09:13

## 2022-03-19 RX ADMIN — Medication 4 UNITS: at 21:28

## 2022-03-19 RX ADMIN — SERTRALINE 100 MG: 100 TABLET, FILM COATED ORAL at 09:13

## 2022-03-19 RX ADMIN — Medication 1 AMPULE: at 09:14

## 2022-03-19 RX ADMIN — CEFAZOLIN 1 G: 1 INJECTION, POWDER, FOR SOLUTION INTRAMUSCULAR; INTRAVENOUS at 19:45

## 2022-03-19 RX ADMIN — AMLODIPINE BESYLATE 5 MG: 5 TABLET ORAL at 09:13

## 2022-03-19 RX ADMIN — SODIUM CHLORIDE, PRESERVATIVE FREE 10 ML: 5 INJECTION INTRAVENOUS at 05:46

## 2022-03-19 RX ADMIN — SODIUM CHLORIDE, PRESERVATIVE FREE 10 ML: 5 INJECTION INTRAVENOUS at 21:46

## 2022-03-19 RX ADMIN — SODIUM CHLORIDE, PRESERVATIVE FREE 10 ML: 5 INJECTION INTRAVENOUS at 15:10

## 2022-03-19 NOTE — PROGRESS NOTES
END OF SHIFT NOTE:    INTAKE/OUTPUT  03/18 0701 - 03/19 0700  In: 120 [P.O.:120]  Out: 1050 [Drains:50]  Voiding: YES  Catheter: NO  Drain:   Bill Jones #1 03/15/22 Anterior;Right; Lower Abdomen (Active)   Site Assessment Clean, dry, & intact 03/19/22 0223   Dressing Status Clean, dry, & intact 03/19/22 0223   Drainage Description Serosanguinous 03/19/22 0223   Igor Drain Airleak No 03/19/22 0223   Status Patent; Charged 03/19/22 0223   Y Connector Used No 03/19/22 0223   Output (ml) 10 ml 03/18/22 1942       Bill Jones #2 03/15/22 Left; Lower Abdomen (Active)   Site Assessment Clean, dry, & intact 03/19/22 0223   Dressing Status Clean, dry, & intact 03/19/22 0223   Drainage Description Serosanguinous 03/19/22 0223   Igor Drain Airleak No 03/19/22 0223   Status Patent; Charged;Draining 03/19/22 0223   Y Connector Used No 03/19/22 0223   Output (ml) 5 ml 03/18/22 1942       Bill Jones #3 03/15/22 Anterior;Right;Upper; Inner Leg (Active)   Site Assessment Clean, dry, & intact 03/19/22 0223   Dressing Status Clean, dry, & intact 03/19/22 0223   Drainage Description Serosanguinous 03/19/22 0223   Igor Drain Airleak No 03/19/22 0223   Status Patent; Charged 03/19/22 0223   Y Connector Used No 03/19/22 0223   Output (ml) 10 ml 03/18/22 1942               Flatus: Patient does have flatus present. Stool:  0 occurrences. Characteristics:  Stool Assessment  Stool Appearance: Other (comment) (\"nomal\" )    Emesis: 0 occurrences.     Characteristics:        VITAL SIGNS  Patient Vitals for the past 12 hrs:   Temp Pulse Resp BP SpO2   03/19/22 1759 98.4 °F (36.9 °C) 93 18 (!) 184/93 99 %   03/19/22 1531 98.2 °F (36.8 °C) 88 18 (!) 157/80 99 %   03/19/22 1137 98.4 °F (36.9 °C) 91 18 (!) 151/82 100 %       Pain Assessment  Pain Intensity 1: 6 (03/19/22 0005)  Pain Location 1: Abdomen  Pain Intervention(s) 1: Medication (see MAR)  Patient Stated Pain Goal: 0    Ambulating  Yes    Shift report given to oncoming nurse at the bedside.     Joey Perez RN

## 2022-03-20 PROBLEM — M72.6 NECROTIZING FASCIITIS (HCC): Status: ACTIVE | Noted: 2021-12-15

## 2022-03-20 LAB
GLUCOSE BLD STRIP.AUTO-MCNC: 133 MG/DL (ref 65–100)
GLUCOSE BLD STRIP.AUTO-MCNC: 138 MG/DL (ref 65–100)
GLUCOSE BLD STRIP.AUTO-MCNC: 155 MG/DL (ref 65–100)
GLUCOSE BLD STRIP.AUTO-MCNC: 182 MG/DL (ref 65–100)
SERVICE CMNT-IMP: ABNORMAL

## 2022-03-20 PROCEDURE — 74011250637 HC RX REV CODE- 250/637: Performed by: SURGERY

## 2022-03-20 PROCEDURE — 96376 TX/PRO/DX INJ SAME DRUG ADON: CPT

## 2022-03-20 PROCEDURE — 74011000258 HC RX REV CODE- 258: Performed by: FAMILY MEDICINE

## 2022-03-20 PROCEDURE — 74011000250 HC RX REV CODE- 250: Performed by: SURGERY

## 2022-03-20 PROCEDURE — 82962 GLUCOSE BLOOD TEST: CPT

## 2022-03-20 PROCEDURE — G0378 HOSPITAL OBSERVATION PER HR: HCPCS

## 2022-03-20 PROCEDURE — 2709999900 HC NON-CHARGEABLE SUPPLY

## 2022-03-20 PROCEDURE — 74011250636 HC RX REV CODE- 250/636: Performed by: FAMILY MEDICINE

## 2022-03-20 PROCEDURE — 74011250637 HC RX REV CODE- 250/637: Performed by: NURSE PRACTITIONER

## 2022-03-20 PROCEDURE — 74011636637 HC RX REV CODE- 636/637: Performed by: FAMILY MEDICINE

## 2022-03-20 RX ADMIN — Medication 2 UNITS: at 21:32

## 2022-03-20 RX ADMIN — SODIUM CHLORIDE, PRESERVATIVE FREE 10 ML: 5 INJECTION INTRAVENOUS at 21:33

## 2022-03-20 RX ADMIN — SERTRALINE 100 MG: 100 TABLET, FILM COATED ORAL at 08:07

## 2022-03-20 RX ADMIN — CEFAZOLIN 1 G: 1 INJECTION, POWDER, FOR SOLUTION INTRAMUSCULAR; INTRAVENOUS at 08:09

## 2022-03-20 RX ADMIN — Medication 1 AMPULE: at 21:31

## 2022-03-20 RX ADMIN — Medication 1 AMPULE: at 08:07

## 2022-03-20 RX ADMIN — Medication 2 UNITS: at 08:09

## 2022-03-20 RX ADMIN — SODIUM CHLORIDE, PRESERVATIVE FREE 10 ML: 5 INJECTION INTRAVENOUS at 14:58

## 2022-03-20 RX ADMIN — FAMOTIDINE 10 MG: 20 TABLET, FILM COATED ORAL at 08:07

## 2022-03-20 RX ADMIN — SODIUM CHLORIDE, PRESERVATIVE FREE 10 ML: 5 INJECTION INTRAVENOUS at 05:16

## 2022-03-20 RX ADMIN — OXYCODONE AND ACETAMINOPHEN 1 TABLET: 5; 325 TABLET ORAL at 23:52

## 2022-03-20 RX ADMIN — AMLODIPINE BESYLATE 5 MG: 5 TABLET ORAL at 08:07

## 2022-03-20 NOTE — PROGRESS NOTES
Problem: Falls - Risk of  Goal: *Absence of Falls  Description: Document Edison Edwards Fall Risk and appropriate interventions in the flowsheet.   Outcome: Progressing Towards Goal  Note: Fall Risk Interventions:  Mobility Interventions: Communicate number of staff needed for ambulation/transfer,Patient to call before getting OOB         Medication Interventions: Patient to call before getting OOB,Teach patient to arise slowly    Elimination Interventions: Call light in reach,Patient to call for help with toileting needs              Problem: Patient Education: Go to Patient Education Activity  Goal: Patient/Family Education  Outcome: Progressing Towards Goal     Problem: Patient Education: Go to Patient Education Activity  Goal: Patient/Family Education  Outcome: Progressing Towards Goal     Problem: Chronic Renal Failure  Goal: *Fluid and electrolytes stabilized  Outcome: Progressing Towards Goal     Problem: Patient Education: Go to Patient Education Activity  Goal: Patient/Family Education  Outcome: Progressing Towards Goal     Problem: Surgical Pathway Post-Op Day 2 through Discharge  Goal: Activity/Safety  Outcome: Progressing Towards Goal  Goal: Nutrition/Diet  Outcome: Progressing Towards Goal  Goal: Discharge Planning  Outcome: Progressing Towards Goal  Goal: Medications  Outcome: Progressing Towards Goal  Goal: Respiratory  Outcome: Progressing Towards Goal  Goal: Treatments/Interventions/Procedures  Outcome: Progressing Towards Goal  Goal: Psychosocial  Outcome: Progressing Towards Goal  Goal: *No signs and symptoms of infection or wound complications  Outcome: Progressing Towards Goal  Goal: *Optimal pain control at patient's stated goal  Outcome: Progressing Towards Goal  Goal: *Adequate urinary output (equal to or greater than 30 milliliters/hour)  Outcome: Progressing Towards Goal  Goal: *Hemodynamically stable  Outcome: Progressing Towards Goal  Goal: *Tolerating diet  Outcome: Progressing Towards Goal  Goal: *Demonstrates progressive activity  Outcome: Progressing Towards Goal  Goal: *Lungs clear or at baseline  Outcome: Progressing Towards Goal

## 2022-03-20 NOTE — PROGRESS NOTES
END OF SHIFT NOTE:    INTAKE/OUTPUT  03/19 0701 - 03/20 0700  In: 950 [P.O.:950]  Out: 1025 [Urine:1000; Drains:25]  Voiding: YES  Catheter: NO  Drain:   Ana María Jump #1 03/15/22 Anterior;Right; Lower Abdomen (Active)   Site Assessment Clean, dry, & intact 03/20/22 0328   Dressing Status Clean, dry, & intact 03/20/22 0328   Drainage Description Serosanguinous 03/20/22 0328   Igor Drain Airleak No 03/20/22 0328   Status Patent; Charged;Draining 03/20/22 0328   Y Connector Used No 03/20/22 0328   Output (ml) 10 ml 03/19/22 1941       Ana María Jump #2 03/15/22 Left; Lower Abdomen (Active)   Site Assessment Clean, dry, & intact 03/20/22 0328   Dressing Status Clean, dry, & intact 03/20/22 0328   Drainage Description Serosanguinous 03/20/22 0328   Igor Drain Airleak No 03/20/22 0328   Status Patent; Charged;Draining 03/20/22 0328   Y Connector Used No 03/20/22 0328   Output (ml) 5 ml 03/19/22 1941       Igor Drain #3 03/15/22 Anterior;Right;Upper; Inner Leg (Active)   Site Assessment Clean, dry, & intact 03/20/22 0328   Dressing Status Clean, dry, & intact 03/20/22 0328   Drainage Description Serosanguinous 03/20/22 0328   Igor Drain Airleak No 03/20/22 0328   Status Patent; Charged;Draining 03/20/22 0328   Y Connector Used No 03/20/22 0328   Output (ml) 10 ml 03/19/22 1941               Flatus: Patient does have flatus present. Stool:  0 occurrences. Characteristics:  Stool Assessment  Stool Appearance: Other (comment) (\"nomal\" )    Emesis: 0 occurrences.     Characteristics:        VITAL SIGNS  Patient Vitals for the past 12 hrs:   Temp Pulse Resp BP SpO2   03/20/22 0328 98.4 °F (36.9 °C) 93 18 (!) 172/90 98 %   03/19/22 2352 98.3 °F (36.8 °C) 93 20 (!) 171/78 96 %   03/19/22 2029 -- 88 -- (!) 146/68 99 %   03/19/22 2027 98.2 °F (36.8 °C) 92 -- (!) 131/107 99 %       Pain Assessment  Pain Intensity 1: 2 (03/19/22 2256)  Pain Location 1: Abdomen  Pain Intervention(s) 1: Rest,Repositioned  Patient Stated Pain Goal: 0    Ambulating  Yes    Shift report given to oncoming nurse at the bedside.     Pennie Domínguez RN

## 2022-03-20 NOTE — PROGRESS NOTES
END OF SHIFT NOTE:    INTAKE/OUTPUT  03/19 0701 - 03/20 0700  In: 950 [P.O.:950]  Out: 1025 [Urine:1000; Drains:25]  Voiding: YES  Catheter: NO  Drain:   Denia Davis #1 03/15/22 Anterior;Right; Lower Abdomen (Active)   Site Assessment Clean, dry, & intact 03/20/22 1500   Dressing Status Clean, dry, & intact 03/20/22 1500   Drainage Description Serosanguinous 03/20/22 1500   Igor Drain Airleak No 03/20/22 1500   Status Patent; Charged;Draining 03/20/22 1500   Y Connector Used No 03/20/22 1500   Output (ml) 10 ml 03/20/22 1500       Igor Drain #2 03/15/22 Left; Lower Abdomen (Active)   Site Assessment Clean, dry, & intact 03/20/22 1500   Dressing Status Clean, dry, & intact 03/20/22 1500   Drainage Description Serosanguinous 03/20/22 1500   Igor Drain Airleak No 03/20/22 1500   Status Patent; Charged;Draining 03/20/22 1500   Y Connector Used No 03/20/22 1500   Output (ml) 5 ml 03/20/22 1500       Igor Drain #3 03/15/22 Anterior;Right;Upper; Inner Leg (Active)   Site Assessment Clean, dry, & intact 03/20/22 1500   Dressing Status Clean, dry, & intact 03/20/22 1500   Drainage Description Serosanguinous 03/20/22 1500   Igor Drain Airleak No 03/20/22 1500   Status Patent; Charged;Draining 03/20/22 1500   Y Connector Used No 03/20/22 1500   Output (ml) 10 ml 03/20/22 1500               Flatus: Patient does have flatus present. Stool:  0 occurrences. Characteristics:  Stool Assessment  Stool Appearance: Other (comment) (\"nomal\" )    Emesis: 0 occurrences.     Characteristics:        VITAL SIGNS  Patient Vitals for the past 12 hrs:   Temp Pulse Resp BP SpO2   03/20/22 1500 97.9 °F (36.6 °C) 87 18 (!) 154/81 99 %   03/20/22 1200 98.1 °F (36.7 °C) 84 18 (!) 160/91 100 %   03/20/22 0751 98.3 °F (36.8 °C) 91 18 132/83 100 %       Pain Assessment  Pain Intensity 1: 0 (03/20/22 1500)  Pain Location 1: Abdomen  Pain Intervention(s) 1: Rest,Repositioned  Patient Stated Pain Goal: 0    Ambulating  Yes    Shift report given to oncoming nurse at the bedside.     Phyliss Cons

## 2022-03-21 LAB
GLUCOSE BLD STRIP.AUTO-MCNC: 123 MG/DL (ref 65–100)
GLUCOSE BLD STRIP.AUTO-MCNC: 148 MG/DL (ref 65–100)
GLUCOSE BLD STRIP.AUTO-MCNC: 150 MG/DL (ref 65–100)
SERVICE CMNT-IMP: ABNORMAL

## 2022-03-21 PROCEDURE — 74011250637 HC RX REV CODE- 250/637: Performed by: NURSE PRACTITIONER

## 2022-03-21 PROCEDURE — 74011250636 HC RX REV CODE- 250/636: Performed by: INTERNAL MEDICINE

## 2022-03-21 PROCEDURE — G0378 HOSPITAL OBSERVATION PER HR: HCPCS

## 2022-03-21 PROCEDURE — 96376 TX/PRO/DX INJ SAME DRUG ADON: CPT

## 2022-03-21 PROCEDURE — 82962 GLUCOSE BLOOD TEST: CPT

## 2022-03-21 PROCEDURE — 74011250637 HC RX REV CODE- 250/637: Performed by: SURGERY

## 2022-03-21 PROCEDURE — 74011250636 HC RX REV CODE- 250/636: Performed by: SURGERY

## 2022-03-21 PROCEDURE — 74011636637 HC RX REV CODE- 636/637: Performed by: FAMILY MEDICINE

## 2022-03-21 PROCEDURE — 74011000250 HC RX REV CODE- 250: Performed by: SURGERY

## 2022-03-21 PROCEDURE — 90935 HEMODIALYSIS ONE EVALUATION: CPT

## 2022-03-21 PROCEDURE — 74011000258 HC RX REV CODE- 258: Performed by: SURGERY

## 2022-03-21 RX ADMIN — Medication 1 AMPULE: at 20:19

## 2022-03-21 RX ADMIN — HEPARIN SODIUM 5000 UNITS: 1000 INJECTION INTRAVENOUS; SUBCUTANEOUS at 06:45

## 2022-03-21 RX ADMIN — FAMOTIDINE 10 MG: 20 TABLET, FILM COATED ORAL at 10:49

## 2022-03-21 RX ADMIN — OXYCODONE AND ACETAMINOPHEN 1 TABLET: 5; 325 TABLET ORAL at 23:14

## 2022-03-21 RX ADMIN — SERTRALINE 100 MG: 100 TABLET, FILM COATED ORAL at 10:50

## 2022-03-21 RX ADMIN — AMLODIPINE BESYLATE 5 MG: 5 TABLET ORAL at 10:50

## 2022-03-21 RX ADMIN — Medication 1 AMPULE: at 10:49

## 2022-03-21 RX ADMIN — Medication 2 UNITS: at 11:32

## 2022-03-21 RX ADMIN — SODIUM CHLORIDE, PRESERVATIVE FREE 10 ML: 5 INJECTION INTRAVENOUS at 05:17

## 2022-03-21 RX ADMIN — SODIUM CHLORIDE, PRESERVATIVE FREE 10 ML: 5 INJECTION INTRAVENOUS at 21:23

## 2022-03-21 RX ADMIN — CEFAZOLIN 1 G: 1 INJECTION, POWDER, FOR SOLUTION INTRAMUSCULAR; INTRAVENOUS at 10:51

## 2022-03-21 RX ADMIN — SODIUM CHLORIDE, PRESERVATIVE FREE 10 ML: 5 INJECTION INTRAVENOUS at 16:44

## 2022-03-21 NOTE — PROGRESS NOTES
Problem: Falls - Risk of  Goal: *Absence of Falls  Description: Document [facility]ail Fall Risk and appropriate interventions in the flowsheet.   Outcome: Progressing Towards Goal  Note: Fall Risk Interventions:  Mobility Interventions: Communicate number of staff needed for ambulation/transfer,Patient to call before getting OOB         Medication Interventions: Patient to call before getting OOB,Teach patient to arise slowly    Elimination Interventions: Call light in reach              Problem: Patient Education: Go to Patient Education Activity  Goal: Patient/Family Education  Outcome: Progressing Towards Goal     Problem: Chronic Renal Failure  Goal: *Fluid and electrolytes stabilized  Outcome: Progressing Towards Goal

## 2022-03-21 NOTE — PROGRESS NOTES
END OF SHIFT NOTE:    INTAKE/OUTPUT  03/20 0701 - 03/21 0700  In: 1360 [P.O.:1360]  Out: 50 [Drains:50]  Voiding: YES  Catheter: NO  Drain:   Merlin Liming #1 03/15/22 Anterior;Right; Lower Abdomen (Active)   Site Assessment Clean, dry, & intact 03/21/22 0236   Dressing Status Clean, dry, & intact 03/21/22 0236   Drainage Description Serosanguinous 03/21/22 0236   Igor Drain Airleak No 03/21/22 0236   Status Patent;Draining; Charged 03/21/22 0236   Y Connector Used No 03/21/22 0236   Output (ml) 15 ml 03/21/22 0236       Savannah Liming #2 03/15/22 Left; Lower Abdomen (Active)   Site Assessment Clean, dry, & intact 03/21/22 0236   Dressing Status Clean, dry, & intact 03/21/22 0236   Drainage Description Serosanguinous 03/21/22 0236   Igor Drain Airleak No 03/21/22 0236   Status Patent; Charged;Draining 03/21/22 0236   Y Connector Used No 03/21/22 0236   Output (ml) 5 ml 03/21/22 0236       Savannah Liming #3 03/15/22 Anterior;Right;Upper; Inner Leg (Active)   Site Assessment Clean, dry, & intact 03/21/22 0236   Dressing Status Clean, dry, & intact 03/21/22 0236   Drainage Description Serosanguinous 03/21/22 0236   Igor Drain Airleak No 03/21/22 0236   Status Patent; Charged;Draining 03/21/22 0236   Y Connector Used No 03/21/22 0236   Output (ml) 5 ml 03/21/22 0236               Flatus: Patient does have flatus present. Stool:  0 occurrences. Characteristics:  Stool Assessment  Stool Appearance: Other (comment) (\"nomal\" )    Emesis: 0 occurrences.     Characteristics:        VITAL SIGNS  Patient Vitals for the past 12 hrs:   Temp Pulse Resp BP SpO2   03/21/22 0642 -- 90 -- (!) 179/83 --   03/21/22 0236 97.7 °F (36.5 °C) 84 16 138/68 100 %   03/20/22 2348 98.8 °F (37.1 °C) 89 18 (!) 154/99 99 %   03/20/22 1916 98 °F (36.7 °C) 89 18 (!) 178/97 100 %       Pain Assessment  Pain Intensity 1: 0 (03/21/22 0236)  Pain Location 1: Abdomen  Pain Intervention(s) 1: Medication (see MAR)  Patient Stated Pain Goal: 0    Ambulating  Yes    Shift report given to oncoming nurse at the bedside.     Kraig Neumann RN

## 2022-03-21 NOTE — PROGRESS NOTES
[id number]  Admission Date: 3/15/2022             Renal Daily Progress Note: 3/21/2022    The patient's chart is reviewed and the patient is discussed with the staff. Pt admitted under observation for surgery s/p muscle flap to grain and abdomen, panniculectomy and perineal reconstruction 3/15,hx of necrotizing fasciitis. We are consulted for dialysis hx of DEVENDRA/CKD 3b 2/2 ATN HD dependent since 12/24/21. Subjective:   Pt seen and examined on HD, dialyzing via right  Qb,. UF 3600 tolerating dialysis, abd soreness- improving, drains intact.      ROS:  General: no fever/chills  CV: no CP  Lung: no SOB, no cough  GI: no N/V/D  : no dysuria  Ext: no edema       Current Facility-Administered Medications   Medication Dose Route Frequency    ceFAZolin (ANCEF) 1 g in 0.9% sodium chloride (MBP/ADV) 50 mL MBP  1 g IntraVENous Q24H    heparin (porcine) 1,000 unit/mL injection 5,000 Units  5,000 Units Hemodialysis DIALYSIS PRN    amLODIPine (NORVASC) tablet 5 mg  5 mg Oral DAILY    naloxone (NARCAN) injection 0.1 mg  0.1 mg IntraVENous EVERY 2 MINUTES AS NEEDED    0.9% sodium chloride infusion 250 mL  250 mL IntraVENous PRN    0.9% sodium chloride infusion 250 mL  250 mL IntraVENous PRN    sodium chloride (NS) flush 5-40 mL  5-40 mL IntraVENous Q8H    sodium chloride (NS) flush 5-40 mL  5-40 mL IntraVENous PRN    acetaminophen (TYLENOL) tablet 650 mg  650 mg Oral Q6H PRN    Or    acetaminophen (TYLENOL) suppository 650 mg  650 mg Rectal Q6H PRN    polyethylene glycol (MIRALAX) packet 17 g  17 g Oral DAILY PRN    ondansetron (ZOFRAN ODT) tablet 4 mg  4 mg Oral Q8H PRN    Or    ondansetron (ZOFRAN) injection 4 mg  4 mg IntraVENous Q6H PRN    oxyCODONE-acetaminophen (PERCOCET) 5-325 mg per tablet 1 Tablet  1 Tablet Oral Q6H PRN    famotidine (PEPCID) tablet 10 mg  10 mg Oral DAILY    sertraline (ZOLOFT) tablet 100 mg  100 mg Oral DAILY    alcohol 62% (NOZIN) nasal  1 Ampule  1 Ampule Topical Q12H    insulin lispro (HUMALOG) injection   SubCUTAneous AC&HS    HYDROmorphone (DILAUDID) injection 0.2 mg  0.2 mg IntraVENous Q3H PRN         Objective:     Vitals:    03/20/22 2348 03/21/22 0236 03/21/22 0346 03/21/22 0642   BP: (!) 154/99 138/68  (!) 179/83   Pulse: 89 84  90   Resp: 18 16     Temp: 98.8 °F (37.1 °C) 97.7 °F (36.5 °C)     SpO2: 99% 100%     Weight:   134.2 kg (295 lb 12.8 oz)      Intake and Output:   03/19 1901 - 03/21 0700  In: 1360 [P.O.:1360]  Out: 75 [Drains:75]  No intake/output data recorded. Physical Exam:   Constitutional:  the patient is well developed and in no acute distress, alert and oriented  HEENT:  Sclera clear, pupils equal, oral mucosa moist  Lungs: clear bilaterally   Cardiovascular:  Regular rate, S1, S2, no Rub   Abd/GI: soft and mild tenderness; with positive bowel sounds. Ext: warm without cyanosis. There is trace lower leg edema. Skin:  no jaundice or rashes  Neuro: no gross neuro deficits   Psychiatric: Calm. Access: Right TCC- intact       LAB  No results for input(s): WBC, HGB, HCT, PLT, INR, HGBEXT, HCTEXT, PLTEXT, INREXT, HGBEXT, HCTEXT, PLTEXT, INREXT in the last 72 hours. No results for input(s): NA, K, CL, CO2, GLU, BUN, CREA, MG, PHOS, ALB, TBIL, BNPP in the last 72 hours. No lab exists for component: TROIP, GPT, SGOT  No results for input(s): PH, PCO2, PO2, HCO3 in the last 72 hours. Assessment:  (Medical Decision Making)     Hospital Problems  Date Reviewed: 12/25/2015          Codes Class Noted POA    Dialysis patient [facility]) ICD-10-CM: Z99.2  ICD-9-CM: V45.11  3/15/2022 Unknown              Plan:  (Medical Decision Making)   1. DEVENDRA/CKD 3b, ESRD HD dependent since 12/24/21. Seen on HD, tolerating dialysis     2. Hx necrotizing fasciitis s/p muscle flap to grain and abdomen, panniculectomy and perineal reconstruction 3/15 per plastic surgery    3.  Hypertension- back on norvasc     Dina Smith, NP

## 2022-03-21 NOTE — PROGRESS NOTES
END OF SHIFT NOTE:    INTAKE/OUTPUT  03/20 0701 - 03/21 0700  In: 1360 [P.O.:1360]  Out: 50 [Drains:50]  Voiding: YES  Catheter: NO  Drain:   Nahun Lung #1 03/15/22 Anterior;Right; Lower Abdomen (Active)   Site Assessment Clean, dry, & intact 03/21/22 1502   Dressing Status Clean, dry, & intact 03/21/22 1502   Drainage Description Serosanguinous 03/21/22 1502   Igor Drain Airleak No 03/21/22 1502   Status Patent; Charged;Draining 03/21/22 1502   Y Connector Used No 03/21/22 1502   Output (ml) 15 ml 03/21/22 0236       Nahun Lung #2 03/15/22 Left; Lower Abdomen (Active)   Site Assessment Clean, dry, & intact 03/21/22 1502   Dressing Status Clean, dry, & intact 03/21/22 1502   Drainage Description Serosanguinous 03/21/22 1502   Igor Drain Airleak No 03/21/22 1502   Status Patent; Charged;Draining 03/21/22 1502   Y Connector Used No 03/21/22 1502   Output (ml) 5 ml 03/21/22 0236       Igor Drain #3 03/15/22 Anterior;Right;Upper; Inner Leg (Active)   Site Assessment Clean, dry, & intact 03/21/22 1502   Dressing Status Clean, dry, & intact 03/21/22 1502   Drainage Description Serosanguinous 03/21/22 1502   Igor Drain Airleak No 03/21/22 1502   Status Patent; Charged;Draining 03/21/22 1502   Y Connector Used No 03/21/22 1502   Output (ml) 5 ml 03/21/22 0236               Flatus: Patient does have flatus present. Stool:  0 occurrences. Characteristics:  Stool Assessment  Stool Appearance: Other (comment) (\"nomal\" )    Emesis: 0 occurrences.     Characteristics:        VITAL SIGNS  Patient Vitals for the past 12 hrs:   Temp Pulse Resp BP SpO2   03/21/22 1602 98.2 °F (36.8 °C) 89 18 (!) 159/76 100 %   03/21/22 1037 98 °F (36.7 °C) 79 18 (!) 156/94 100 %   03/21/22 1000 -- 76 -- (!) 152/72 --   03/21/22 0930 -- 82 -- (!) 165/81 --   03/21/22 0900 -- 84 -- (!) 184/91 --   03/21/22 0830 -- 86 -- (!) 202/103 --   03/21/22 0803 -- 79 -- (!) 179/91 --       Pain Assessment  Pain Intensity 1: 0 (03/21/22 1502)  Pain Location 1: Abdomen  Pain Intervention(s) 1: Medication (see MAR)  Patient Stated Pain Goal: 0    Ambulating  Yes    Shift report given to oncoming nurse at the bedside.     Tonio Ambriz RN

## 2022-03-21 NOTE — PROGRESS NOTES
LOS 6d    Chart reviewed by [facility]. Patied POD 6, s/sp panniculectomy. Patient in dialysis currently. Referral to home health started, home when stable. CM following.

## 2022-03-21 NOTE — DIALYSIS
TRANSFER IN - DIALYSIS    Received patient in dialysis unit  from [facility] (unit) for ordered procedure. Consent verified for renal replacement therapy. Procedure explained to patient, opportunity for Q&A provided. Call light given. Patient a/ox3 and vital signs stable. /83,  P90 on RA. Hemodialysis initiated using R CVC. Aspirated and flushed both ports without difficulty. Dressing clean, dry and intact. Machine settings per MD order. Heparin 2000 unit bolus and 500 units/hr. Will monitor during treatment.

## 2022-03-21 NOTE — PROGRESS NOTES
PT attempted treatment 2 times today but the patient was very tired after HD and politely refused. PT checked back a 2nd time and she and her  were sound asleep.     MEGAN BahenaT

## 2022-03-21 NOTE — PROGRESS NOTES
CC: abdominal and perineal wounds    HPI: 44 yo h/o necrotizing fasciitis with large abdominal and perineal wounds s/p debridement and flap closure 3/15/2022. Required blood due to chronic anemia. No new respiratory sx. Doing well post op. Incisions painful but pain controlled. Tolerating dialysis. Some drainage from perineal incision. Has been up and walking with PT. Past Medical History:   Diagnosis Date    Acute kidney injury ([address]     ARF with sepsis requiring hemodialysis    Anemia     Anxiety     ARF (acute respiratory failure) ([address] 12/2021    required vent at [facility] Atrial fibrillation with RVR ([address] 12/22/2021    while hospitalized for sepsis.  Chronic kidney disease     secondary to sepsis 12/2021--- US Renal in TR on M, W, F    Debility     Diabetes [facility])     type 2. does not check since on dialysis,no meds; no hypo    Dialysis patient [facility]) 12/2021    Mon, Wed, Fri    GERD (gastroesophageal reflux disease)     OTC daily med    Gout     Gout     History of recent blood transfusion     1 unit 2/17/22     1 unit 2/19/22 per [facility] records    Hypertension     controlled w/med    IBS (irritable bowel syndrome)     Iron deficiency anemia     Necrotizing fasciitis ([address] 12/27/2021    left groin pannus and perineum    Personal history of COVID-19     12/31/21 was [facility] at [facility] when diagnosed---states \"extremely mild cough\"     Sepsis ([address] 12/2021     caused CRF, on dialysisi     Past Surgical History:   Procedure Laterality Date    HX DILATION AND CURETTAGE  07/2018    HX OTHER SURGICAL  12/2021     X 2 groin wound    IR INSERT NON TUNL CVC OVER 5 YRS  12/23/2021    IR INSERT TUNL CVC W/O PORT OVER 5 YR  1/21/2022    NV ABDOMEN SURGERY PROC UNLISTED  02/2022    debridement     A&O x3  RRR  Resp unlabored  Incisions clean, appropriate drainage. Drains serosang. A/P:  Continue drains. Will continue to monitor for any sign of bleeding.  Will plan for dialysis tomorrow and possible discharge following.

## 2022-03-21 NOTE — DIALYSIS
TRANSFER OUT- DIALYSIS    Hemodialysis treatment completed without complications. Patient a/o x3 and VS stable per patient norms  /72  P 76        2.6 Kgs removed. Flushed both ports with 10 mL of NS.  CVC dressing clean, dry, and intact, tego caps intact, curos caps placed, bilateral lumens wrapped with 4x4 gauze. Patient to 201 after dialysis.

## 2022-03-22 VITALS
DIASTOLIC BLOOD PRESSURE: 90 MMHG | BODY MASS INDEX: 42.66 KG/M2 | TEMPERATURE: 97.6 F | WEIGHT: 288.9 LBS | HEART RATE: 88 BPM | RESPIRATION RATE: 17 BRPM | SYSTOLIC BLOOD PRESSURE: 176 MMHG | OXYGEN SATURATION: 99 %

## 2022-03-22 LAB
GLUCOSE BLD STRIP.AUTO-MCNC: 116 MG/DL (ref 65–100)
GLUCOSE BLD STRIP.AUTO-MCNC: 133 MG/DL (ref 65–100)
GLUCOSE BLD STRIP.AUTO-MCNC: 141 MG/DL (ref 65–100)
SERVICE CMNT-IMP: ABNORMAL

## 2022-03-22 PROCEDURE — 74011000250 HC RX REV CODE- 250: Performed by: SURGERY

## 2022-03-22 PROCEDURE — 2709999900 HC NON-CHARGEABLE SUPPLY

## 2022-03-22 PROCEDURE — 74011250637 HC RX REV CODE- 250/637: Performed by: NURSE PRACTITIONER

## 2022-03-22 PROCEDURE — 74011250636 HC RX REV CODE- 250/636: Performed by: SURGERY

## 2022-03-22 PROCEDURE — G0378 HOSPITAL OBSERVATION PER HR: HCPCS

## 2022-03-22 PROCEDURE — 82962 GLUCOSE BLOOD TEST: CPT

## 2022-03-22 PROCEDURE — 96376 TX/PRO/DX INJ SAME DRUG ADON: CPT

## 2022-03-22 PROCEDURE — 74011250637 HC RX REV CODE- 250/637: Performed by: SURGERY

## 2022-03-22 PROCEDURE — 74011000258 HC RX REV CODE- 258: Performed by: SURGERY

## 2022-03-22 RX ORDER — OXYCODONE AND ACETAMINOPHEN 5; 325 MG/1; MG/1
1 TABLET ORAL
Qty: 30 TABLET | Refills: 0 | Status: SHIPPED | OUTPATIENT
Start: 2022-03-22 | End: 2022-03-29

## 2022-03-22 RX ORDER — ACETAMINOPHEN 500 MG
TABLET ORAL
Qty: 100 TABLET | Refills: 0 | Status: SHIPPED | OUTPATIENT
Start: 2022-03-22 | End: 2022-04-10

## 2022-03-22 RX ADMIN — Medication 1 AMPULE: at 08:13

## 2022-03-22 RX ADMIN — SERTRALINE 100 MG: 100 TABLET, FILM COATED ORAL at 08:12

## 2022-03-22 RX ADMIN — CEFAZOLIN 1 G: 1 INJECTION, POWDER, FOR SOLUTION INTRAMUSCULAR; INTRAVENOUS at 08:14

## 2022-03-22 RX ADMIN — OXYCODONE AND ACETAMINOPHEN 1 TABLET: 5; 325 TABLET ORAL at 16:41

## 2022-03-22 RX ADMIN — SODIUM CHLORIDE, PRESERVATIVE FREE 10 ML: 5 INJECTION INTRAVENOUS at 15:21

## 2022-03-22 RX ADMIN — AMLODIPINE BESYLATE 5 MG: 5 TABLET ORAL at 08:12

## 2022-03-22 RX ADMIN — SODIUM CHLORIDE, PRESERVATIVE FREE 10 ML: 5 INJECTION INTRAVENOUS at 06:06

## 2022-03-22 RX ADMIN — FAMOTIDINE 10 MG: 20 TABLET, FILM COATED ORAL at 08:13

## 2022-03-22 NOTE — PROGRESS NOTES
Pt's D/C instructions completed. Verbalized understanding of all instructions including diet, activity, s/sx to alert MD, medications, wound and drain care/management, and f/u appointment. Family at [facility]. Family demonstrated wound and drain care with no issues.

## 2022-03-22 NOTE — DISCHARGE INSTRUCTIONS
DISCHARGE SUMMARY from Nurse    PATIENT INSTRUCTIONS:    After general anesthesia or intravenous sedation, for 24 hours or while taking prescription Narcotics:  · Limit your activities  · Do not drive and operate hazardous machinery  · Do not make important personal or business decisions  · Do  not drink alcoholic beverages  · If you have not urinated within 8 hours after discharge, please contact your surgeon on call. Report the following to your surgeon:  · Excessive pain, swelling, redness or odor of or around the surgical area  · Temperature over 100.5  · Nausea and vomiting lasting longer than 4 hours or if unable to take medications  · Any signs of decreased circulation or nerve impairment to extremity: change in color, persistent  numbness, tingling, coldness or increase pain  · Any questions    What to do at Home:  Recommended activity: Activity as tolerated, no heavy lifting until follow-up appoint. If you experience any of the following symptoms fever of 101.4 or greater, tan or green discharge from incision site, pain unrelieved by mediation, nausea or vomitting, please follow up with Dr. Karen Fonseca. *  Please give a list of your current medications to your Primary Care Provider. *  Please update this list whenever your medications are discontinued, doses are      changed, or new medications (including over-the-counter products) are added. *  Please carry medication information at all times in case of emergency situations. These are general instructions for a healthy lifestyle:    No smoking/ No tobacco products/ Avoid exposure to second hand smoke  Surgeon General's Warning:  Quitting smoking now greatly reduces serious risk to your health.     Obesity, smoking, and sedentary lifestyle greatly increases your risk for illness    A healthy diet, regular physical exercise & weight monitoring are important for maintaining a healthy lifestyle    You may be retaining fluid if you have a history of heart failure or if you experience any of the following symptoms:  Weight gain of 3 pounds or more overnight or 5 pounds in a week, increased swelling in our hands or feet or shortness of breath while lying flat in bed. Please call your doctor as soon as you notice any of these symptoms; do not wait until your next office visit. The discharge information has been reviewed with the patient. The patient verbalized understanding. Discharge medications reviewed with the patientPatient Education        Panniculectomy: What to Expect at [place] [id number] panniculectomy is surgery to remove fat and skin that hangs down from your belly. Often the extra fat and skin come from losing a lot of weight. Your belly will be sore and swollen for the first week after surgery. The skin on your belly will probably be mostly numb for several weeks to months. Feeling will come back slowly. But you may have small areas around the incisions that are always numb. Don't use a heating pad on your stomach while it's still numb, or you could have severe burns. It's normal to feel tired while you heal. It can take 5 to 6 weeks for your energy to return. You won't be able to stand up straight when you get home. To regain your normal movement, you'll need to get up and walk every day. Between walks, move your feet and legs often. The surgery leaves one or more scars that will fade with time. If they don't fade, check with your doctor. This care sheet gives you a general idea about how long it will take for you to recover. But each person recovers at a different pace. Follow the steps below to get better as quickly as possible. How can you care for yourself at home? Activity    · Allow the area to heal. Don't move quickly or lift anything heavy until you are feeling better.     · Rest when you feel tired.     · Be active. Walking is a good choice.  For the first few weeks after the surgery, your doctor may have you bend slightly at the waist when you stand up and walk around.     · You can do your normal activities when it feels okay to do so.     · You will probably need to take 2 to 3 weeks off from work. It depends on the type of work you do and how you feel.     · Ask your doctor when it is okay for you to have sex.     · Hold a pillow over your incisions when you cough or take deep breaths. This will support your belly and may help to decrease your pain. Diet    · You can eat your normal diet. If your stomach is upset, try bland, low-fat foods like plain rice, broiled chicken, toast, and yogurt.     · If your bowel movements are not regular right after surgery, try to avoid constipation and straining. Drink plenty of water. Your doctor may suggest fiber, a stool softener, or a mild laxative. Medicines    · Be safe with medicines. Read and follow all instructions on the label. ? If the doctor gave you a prescription medicine for pain, take it as prescribed. ? If you are not taking a prescription pain medicine, ask your doctor if you can take an over-the-counter medicine.     · Your doctor will tell you if and when you can restart your medicines. He or she will also give you instructions about taking any new medicines.     · If you take aspirin or some other blood thinner, be sure to talk to your doctor. He or she will tell you if and when to start taking this medicine again. Make sure that you understand exactly what your doctor wants you to do. Incision care    · You will have a dressing over the cut (incision). A dressing helps the incision heal and protects it. Your doctor will tell you how to take care of this.     · If you have strips of tape on the incision, leave the tape on for a week or until it falls off.     · If you had stitches or staples, your doctor will tell you when to come back to have them removed.     · Wash the area daily with warm water, and pat it dry. Don't use hydrogen peroxide or alcohol.  They can slow healing.     · You may need to wait a few days before you can take a shower. Follow the instructions that your doctor gives you for bathing and showering. Other instructions    · You will likely have several drains near your incision. Your doctor will tell you how to take care of them.     · You may have a special girdle, called a binder, placed around the area where you had surgery. This binder will help ease swelling and pain. Your doctor will tell you how long to wear it.     · Your doctor may have you sleep in a recliner, or propped up in bed, with pillows under your knees. This will help keep your stitches from breaking. Follow-up care is a key part of your treatment and safety. Be sure to make and go to all appointments, and call your doctor if you are having problems. It's also a good idea to know your test results and keep a list of the medicines you take. When should you call for help? Call 911  anytime you think you may need emergency care. For example, call if:    · You passed out (lost consciousness).     · You have severe trouble breathing.     · You have symptoms of a blood clot in your lung (called a pulmonary embolism). These may include:  ? Sudden chest pain. ? Trouble breathing. ? Coughing up blood. Call your doctor now or seek immediate medical care if:    · You have pain that does not get better after you take pain medicine.     · You have loose stitches, or your incision comes open.     · You are bleeding from the incision.     · You have symptoms of infection, such as:  ? Increased pain, swelling, warmth, or redness. ? Red streaks leading from the incision. ? Pus draining from the incision. ? A fever. Watch closely for changes in your health, and be sure to contact your doctor if:    · You do not have a bowel movement after taking a laxative. Where can you learn more?   Go to http://www.gray.com/  Enter P185 in the search box to learn more about \"Panniculectomy: What to Expect at Home. \"  Current as of: November 15, 2021               Content Version: 13.2  © 2006-2022 [employer]. Care instructions adapted under license by [employer] (which disclaims liability or warranty for this information). If you have questions about a medical condition or this instruction, always ask your healthcare professional. [address] any warranty or liability for your use of this information. and appropriate educational materials and side effects teaching were provided.   ___________________________________________________________________________________________________________________________________

## 2022-03-22 NOTE — PROGRESS NOTES
CC: abdominal and perineal wounds    HPI: 44 yo h/o necrotizing fasciitis with large abdominal and perineal wounds s/p debridement and flap closure 3/15/2022. Required blood due to chronic anemia. No new respiratory sx. Doing well post op. Incisions painful but pain controlled. Tolerating dialysis. Some drainage from perineal incision. Has been up and walking with PT. Required some dressing change yesterday but has been has had no further drainage. Past Medical History:   Diagnosis Date    Acute kidney injury ([address]     ARF with sepsis requiring hemodialysis    Anemia     Anxiety     ARF (acute respiratory failure) ([address] 12/2021    [facility] vent at [facility] Atrial fibrillation with RVR ([address] 12/22/2021    while hospitalized for sepsis.  Chronic kidney disease     secondary to sepsis 12/2021--- US Renal in TR on M, W, F    Debility     Diabetes [facility])     type 2. does not check since on dialysis,no meds; no hypo    Dialysis patient [facility]) 12/2021    Mon, Wed, Fri    GERD (gastroesophageal reflux disease)     OTC daily med    Gout     Gout     History of recent blood transfusion     1 unit 2/17/22     1 unit 2/19/22 per [facility] records    Hypertension     controlled w/med    IBS (irritable bowel syndrome)     Iron deficiency anemia     Necrotizing fasciitis ([address] 12/27/2021    left groin pannus and perineum    Personal history of COVID-19     12/31/21 [facility] when diagnosed---states \"extremely mild cough\"     Sepsis ([address] 12/2021     caused CRF, on dialysisi     Past Surgical History:   Procedure Laterality Date    HX DILATION AND CURETTAGE  07/2018    HX OTHER SURGICAL  12/2021     X 2 groin wound    IR INSERT NON TUNL CVC OVER 5 YRS  12/23/2021    IR INSERT TUNL CVC W/O PORT OVER 5 YR  1/21/2022    VT ABDOMEN SURGERY PROC UNLISTED  02/2022    debridement     A&O x3  RRR  Resp unlabored  Incisions clean, appropriate drainage. Drains serosang.        A/P:  Continue drains. Ready for discharge.  comfortable with dressings.

## 2022-03-22 NOTE — PROGRESS NOTES
END OF SHIFT NOTE:    INTAKE/OUTPUT  03/21 0701 - 03/22 0700  In: -   Out: 2625 [Urine:100; Drains:25]  Voiding: YES  Catheter: NO  Drain:   [place] #1 03/15/22 Anterior;Right; Lower Abdomen (Active)   Site Assessment Clean, dry, & intact 03/22/22 0011   Dressing Status Clean, dry, & intact 03/22/22 0011   Drainage Description Serosanguinous 03/21/22 2018   Igor Drain Airleak No 03/22/22 0011   Status Patent; Charged 03/22/22 0011   Y Connector Used No 03/21/22 2018   Output (ml) 10 ml 03/21/22 2018       Elham [place] #2 03/15/22 Left; Lower Abdomen (Active)   Site Assessment Clean, dry, & intact 03/22/22 0011   Dressing Status Clean, dry, & intact 03/22/22 0011   Drainage Description Sanguinous 03/21/22 2018   Igor Drain Airleak No 03/22/22 0011   Status Patent; Charged 03/22/22 0011   Y Connector Used No 03/22/22 0011   Output (ml) 5 ml 03/21/22 2018       Elham [place]any #3 03/15/22 Anterior;Right;Upper; Inner Leg (Active)   Site Assessment Clean, dry, & intact 03/22/22 0011   Dressing Status Clean, dry, & intact 03/22/22 0011   Drainage Description Serosanguinous 03/21/22 2018   Igor Drain Airleak No 03/22/22 0011   Status Patent; Charged 03/22/22 0011   Y Connector Used No 03/22/22 0011   Output (ml) 10 ml 03/21/22 2018               Flatus: Patient does have flatus present. Stool:  1 occurrences. Characteristics:  Stool Assessment  Stool Appearance: Other (comment) (\"nomal\" )    Emesis: 0 occurrences.     Characteristics:        VITAL SIGNS  Patient Vitals for the past 12 hrs:   Temp Pulse Resp BP SpO2   03/22/22 0441 97.8 °F (36.6 °C) 88 17 132/77 98 %   03/21/22 2257 97.8 °F (36.6 °C) 97 18 (!) 168/86 100 %   03/21/22 2028 -- 85 -- (!) 165/79 --   03/21/22 1935 98.8 °F (37.1 °C) 87 18 (!) 171/82 100 %       Pain Assessment  Pain Intensity 1: 7 (03/21/22 2311)  Pain Location 1: Abdomen  Pain Intervention(s) 1: Medication (see MAR)  Patient Stated Pain Goal: 0    Ambulating  Yes    Shift report given to oncoming nurse at the bedside.     Edd Torrez RN

## 2022-03-22 NOTE — PROGRESS NOTES
Patient with discharge orders today. Interim Home health referral sent for RN/PT. No further supportive needs identified to CM. Patient's spouse to provide transportation home. Patient has met all treatment goals and milestones. CM following until discharged today. Care Management Interventions  PCP Verified by CM:  Yes (Dr Ramona Camarena  [phone])  Mode of Transport at Discharge: Self  Transition of Care Consult (CM Consult): Discharge Planning  Support Systems: Spouse/Significant Other (spouse:  Lul Ceballos  [phone])  Confirm Follow Up Transport: Family  The Plan for Transition of Care is Related to the Following Treatment Goals : return to baseline  The Patient and/or Patient Representative was Provided with a Choice of Provider and Agrees with the Discharge Plan?: Yes  Freedom of Choice List was Provided with Basic Dialogue that Supports the Patient's Individualized Plan of Care/Goals, Treatment Preferences and Shares the Quality Data Associated with the Providers?: Yes   Resource Information Provided?: No  Discharge Location  Patient Expects to be Discharged to[de-identified] Home with home health

## 2022-03-24 PROBLEM — Z99.2 DIALYSIS PATIENT (HCC): Status: ACTIVE | Noted: 2022-03-15

## 2022-03-28 NOTE — DISCHARGE SUMMARY
Discharge Summary     Patient: Myles Guthrie MRN: [id number]  SSN: [id number]    YOB: 1970  Age: 45 y.o. Sex: female       Admit Date: 3/15/2022    Discharge Date: 3/22/2022      Admission Diagnoses: Necrotizing fasciitis [facility]) [M72.6]  Dialysis patient [facility]) [Z99.2]    Discharge Diagnoses:   Problem List as of 3/22/2022 Date Reviewed: 12/25/2015          Codes Class Noted - Resolved    Atrial fibrillation with RVR ([place] [address] ICD-10-CM: I48.91  ICD-9-CM: 427.31  12/22/2021 - Present        DEVENDRA (acute kidney injury) ([place] [address] ICD-10-CM: N17.9  ICD-9-CM: 584.9  12/16/2021 - Present        Acute respiratory failure ([place] [address] ICD-10-CM: J96.00  ICD-9-CM: 518.81  12/16/2021 - Present        Septic shock ([place] [address] ICD-10-CM: A41.9, R65.21  ICD-9-CM: 038.9, 785.52, 995.92  12/16/2021 - Present        Necrotizing fasciitis ([place] [address] ICD-10-CM: M72.6  ICD-9-CM: 728.86  12/15/2021 - Present        Anemia ICD-10-CM: D64.9  ICD-9-CM: 285.9  7/13/2018 - Present        Obesity, morbid ([facility] (Chronic) ICD-10-CM: E66.01  ICD-9-CM: 278.01  7/12/2018 - Present        DM2 (diabetes mellitus, type 2) ([facility] (Chronic) ICD-10-CM: E11.9  ICD-9-CM: 250.00  12/25/2015 - Present        HTN (hypertension) (Chronic) ICD-10-CM: I10  ICD-9-CM: 401.9  12/25/2015 - Present        Gout (Chronic) ICD-10-CM: M10.9  ICD-9-CM: 274.9  12/25/2015 - Present        RESOLVED: Tongue swelling ICD-10-CM: R22.0  ICD-9-CM: 784.2  12/25/2015 - 12/16/2021        RESOLVED: Angioedema ICD-10-CM: T78. 3XXA  ICD-9-CM: 995.1  12/25/2015 - 12/16/2021        RESOLVED: Hypertensive urgency ICD-10-CM: I16.0  ICD-9-CM: 401.9  12/25/2015 - 12/16/2021        RESOLVED: Allergic reaction caused by a drug ICD-10-CM: T78.40XA  ICD-9-CM: 995.27  12/25/2015 - 12/16/2021        Dialysis patient [facility]) ICD-10-CM: Z99.2  ICD-9-CM: V45.11  3/15/2022 - Present               Discharge Condition: Good    Hospital Course: Patient admitted day of surgery following extensive panniculectomy and perineal reconstruction. Chronically anemic and required blood transfusion intraoperatively. Patient transferred to floor and Nephrology consulted. Patient convalesced without immediate complication. Her dialysis was restarted and patient monitored for any signs of post operative bleeding given anemia and large surgical area. Activity and diet advance. Physical therapy consulted and patient advanced to therapy goals. Patient stable and discharged home. Consults: Nephrology    Significant Diagnostic Studies: None    Disposition: Home      Activity: as tolerated  Diet: diabetic  Wound Care: Xeroform to incision lines    Follow-up Appointments   Procedures    FOLLOW UP VISIT Appointment in: [facility]     [facility]     Standing Status:   Standing     Number of Occurrences:   1     Order Specific Question:   Appointment in     Answer:    Other (Specify)

## 2022-03-28 NOTE — OP NOTES
Operative Note    Patient: Marge Dixon  YOB: 1970  MRN: [id number]    Date of Procedure: 3/15/2022     Pre-Op Diagnosis: Necrotizing fasciitis ([facility] [M72.6]    Post-Op Diagnosis: Same     Procedure(s):  Flap reconstruction of perineal defect  Panniculectomy    Prior to the procedure the patient was met in holding. Once again a discussion of the risks, benefits and alternatives was conducted including but not limited to bleeding, infection, failure of the surgery, need for further procedures, disappointing or unacceptable cosmesis, damage to adjacent structures was conducted. The patient again affirmed understanding and consent. The patient was marked in the upright and relaxed position. Patient brought to the OR, surgical timeout performed and anesthesia induced. Patient positioned in lithotomy position with Toan stirrups and all pressure points padded. Approximately 2 L of Frederick Allis solution was then infiltrated into the abdomen to reduce blood loss and provide some anesthesia. Attention first turned to the left groin wound. This was curretted and lavaged with pulsavac. Medial and lateral fasciocutaneous tissue elevated and mobilized into the left perineal defect. Colles fascia was still intact and used to secure the flaps using interrupted 2-0 monocryl at the fascia, 3-0 monocryl at the deep dermis and silks at the skin. This was closed over a 10 flat VENKATESH drain. The marked inferior horizontal portion of the incision was then made sharply after waiting 10 minutes for vasoconstrictive effect. The resection was carried down into the mons more prominently on the right side. This dissection was carried down to the level of the abdominal wall fascia using combination of sharp and blunt dissection. Area of planned resection only then undermined at the level of the anterior fascia. Care taken preserve LFCN laterally.      Skin advance and closure possible at initially marked lines with minimal tension. This kena was made sharply and panniculus passed off the field. 2 10 flat VENKATESH drains were placed and secured with 2-0 silk. These were placed above the incision line to avoid her multiple areas of candida and skin irritation. Closure the proceeded with 3 point 2-0 interrupted vicryls at the fat fasc to anterior rectus sheath. Interrupted 2-0 vicryls at the fat fascia laterally, Running 0 pds quill at the fat fascia, Interrupted and deep deep dermal 3-0 vicryls and staples. Patient was placed in an abdominal binder over xeroform and gauze.     Surgeon(s):  Hao Moreno MD    Surgical Assistant: None    Anesthesia: General     Estimated Blood Loss (mL): 596 ml    Complications: None immmediate

## 2022-07-10 ENCOUNTER — APPOINTMENT (OUTPATIENT)
Dept: GENERAL RADIOLOGY | Age: 52
End: 2022-07-10
Payer: COMMERCIAL

## 2022-07-10 ENCOUNTER — HOSPITAL ENCOUNTER (EMERGENCY)
Age: 52
Discharge: HOME | End: 2022-07-10
Attending: EMERGENCY MEDICINE
Payer: COMMERCIAL

## 2022-07-10 VITALS
OXYGEN SATURATION: 97 % | HEART RATE: 89 BPM | WEIGHT: 277 LBS | HEIGHT: 69 IN | BODY MASS INDEX: 41.03 KG/M2 | TEMPERATURE: 98 F | DIASTOLIC BLOOD PRESSURE: 87 MMHG | RESPIRATION RATE: 17 BRPM | SYSTOLIC BLOOD PRESSURE: 175 MMHG

## 2022-07-10 DIAGNOSIS — M24.811 INTERNAL DERANGEMENT OF RIGHT SHOULDER: Primary | ICD-10-CM

## 2022-07-10 PROCEDURE — 73030 X-RAY EXAM OF SHOULDER: CPT

## 2022-07-10 PROCEDURE — 6370000000 HC RX 637 (ALT 250 FOR IP): Performed by: PHYSICIAN ASSISTANT

## 2022-07-10 PROCEDURE — 99283 EMERGENCY DEPT VISIT LOW MDM: CPT

## 2022-07-10 RX ORDER — HYDROCODONE BITARTRATE AND ACETAMINOPHEN 5; 325 MG/1; MG/1
1 TABLET ORAL
Status: COMPLETED | OUTPATIENT
Start: 2022-07-10 | End: 2022-07-10

## 2022-07-10 RX ORDER — HYDROCODONE BITARTRATE AND ACETAMINOPHEN 5; 325 MG/1; MG/1
1 TABLET ORAL EVERY 4 HOURS PRN
Qty: 8 TABLET | Refills: 0 | Status: SHIPPED | OUTPATIENT
Start: 2022-07-10 | End: 2022-07-13

## 2022-07-10 RX ORDER — KETOROLAC TROMETHAMINE 30 MG/ML
30 INJECTION, SOLUTION INTRAMUSCULAR; INTRAVENOUS ONCE
Status: DISCONTINUED | OUTPATIENT
Start: 2022-07-10 | End: 2022-07-10

## 2022-07-10 RX ADMIN — HYDROCODONE BITARTRATE AND ACETAMINOPHEN 1 TABLET: 5; 325 TABLET ORAL at 17:44

## 2022-07-10 ASSESSMENT — PAIN DESCRIPTION - LOCATION
LOCATION: SHOULDER
LOCATION: SHOULDER

## 2022-07-10 ASSESSMENT — PAIN DESCRIPTION - ORIENTATION: ORIENTATION: RIGHT

## 2022-07-10 ASSESSMENT — PAIN - FUNCTIONAL ASSESSMENT: PAIN_FUNCTIONAL_ASSESSMENT: 0-10

## 2022-07-10 ASSESSMENT — PAIN DESCRIPTION - PAIN TYPE: TYPE: ACUTE PAIN

## 2022-07-10 ASSESSMENT — PAIN SCALES - GENERAL
PAINLEVEL_OUTOF10: 7
PAINLEVEL_OUTOF10: 7

## 2022-07-10 NOTE — ED TRIAGE NOTES
Pt ambulatory to triage for reports R shoulder pain. Pt states pain began when she woke up yesterday morning, pain unrelieved by anti-inflammatories at home. Pt denies any injury or recent heavy lifting, hx of gout. Pt states she is unable to lift R arm above head. Pt a&ox4.

## 2022-07-10 NOTE — ED PROVIDER NOTES
[facility] Emergency Department Provider Note                   PCP:                CHRIS Casiano - OLIVIA               Age: 45 y.o. Sex: female       ICD-10-CM    1. Internal derangement of right shoulder  M24.811 HYDROcodone-acetaminophen (LORCET) 5-325 MG per tablet       DISPOSITION Decision To Discharge 07/10/2022 05:57:21 PM        MDM  Number of Diagnoses or Management Options     Amount and/or Complexity of Data Reviewed  Tests in the radiology section of CPT®: ordered and reviewed    Risk of Complications, Morbidity, and/or Mortality  Presenting problems: low  Diagnostic procedures: low  Management options: low    Patient Progress  Patient progress: improved       Orders Placed This Encounter   Procedures    XR SHOULDER RIGHT (MIN 2 VIEWS)        Kevyn Ervin is a 45 y.o. female who presents to the Emergency Department with chief complaint of    Chief Complaint   Patient presents with    Shoulder Pain      78-year-old female presents to the emergency room with chief complaint of right shoulder pain. States she woke up with the right shoulder pain yesterday morning. Pain is unrelieved by anti-inflammatory medications at home. Denies any known injury or lifting heavy objects. States she has history of gout. Has been taking Advil at home without significant improvement of her symptoms. Review of Systems    Past Medical History:   Diagnosis Date    Acute kidney injury ([address]     ARF with sepsis requiring hemodialysis    Anemia     Anxiety     ARF (acute respiratory failure) ([address] 12/2021    required vent at [facility] Atrial fibrillation with RVR ([address] 12/22/2021    while hospitalized for sepsis.     Chronic kidney disease     secondary to sepsis 12/2021--- US Renal in TR on M, W, F    Debility     Diabetes [facility])     type 2. does not check since on dialysis,no meds; no hypo    Dialysis patient [facility]) 12/2021    Mon, Wed, Fri    GERD (gastroesophageal reflux disease) Cephalexin Nausea And Vomiting        Vitals signs and nursing note reviewed. Patient Vitals for the past 4 hrs:   Temp Pulse Resp BP SpO2   07/10/22 1800 98 °F (36.7 °C) 89 17 (!) 175/87 97 %   07/10/22 1635 98.5 °F (36.9 °C) 97 18 (!) 188/87 95 %          Physical Exam     Procedures      Labs Reviewed - No data to display     XR SHOULDER RIGHT (MIN 2 VIEWS)   Final Result   1. No acute fracture or dislocation. 2. Mild degenerative findings as above. Voice dictation software was used during the making of this note. This software is not perfect and grammatical and other typographical errors may be present. This note has not been completely proofread for errors.      Yohan Suarez  07/10/22 2166

## 2022-07-10 NOTE — ED NOTES
I have reviewed discharge instructions with the patient. The patient verbalized understanding. Patient left ED via Discharge Method: ambulatory to Home with   Opportunity for questions and clarification provided. Patient given 1 scripts. To continue your aftercare when you leave the hospital, you may receive an automated call from our care team to check in on how you are doing. This is a free service and part of our promise to provide the best care and service to meet your aftercare needs.  If you have questions, or wish to unsubscribe from this service please call [phone]. Thank you for Choosing our [facility] Emergency Department.         Luda Garcia RN  07/10/22 3183

## 2022-08-23 NOTE — ED NOTES
[facility] Provider Note       This is a late entry addendum to the ED note from date: 7/10/2022  Please add the below information to the original ED note. Review of Systems   Musculoskeletal:  Positive for arthralgias. All other systems reviewed and are negative. Past Medical History:   Diagnosis Date    Acute kidney injury ([address]     ARF with sepsis requiring hemodialysis    Anemia     Anxiety     ARF (acute respiratory failure) ([address] 12/2021    required vent at [facility]    Atrial fibrillation with RVR ([address] 12/22/2021    while hospitalized for sepsis.     Chronic kidney disease     secondary to sepsis 12/2021--- US Renal in [facility] on M, W, F    Debility     Diabetes [facility])     type 2. does not check since on dialysis,no meds; no hypo    Dialysis patient ([address] 12/2021    Mon, Wed, Fri    GERD (gastroesophageal reflux disease)     OTC daily med    Gout     Gout     History of recent blood transfusion     1 unit 2/17/22     1 unit 2/19/22 per [facility] records    Hypertension     controlled w/med    IBS (irritable bowel syndrome)     Iron deficiency anemia     Necrotizing fasciitis ([address] 12/27/2021    left groin pannus and perineum    Personal history of COVID-19     12/31/21 was hospitalized at [facility] when diagnosed---states \"extremely mild cough\"     Sepsis ([address] 12/2021     caused CRF, on dialysisi        Past Surgical History:   Procedure Laterality Date    DILATION AND CURETTAGE OF UTERUS  07/2018    IR NONTUNNELED VASCULAR CATHETER  12/23/2021    IR NONTUNNELED VASCULAR CATHETER  12/23/2021    IR NONTUNNELED VASCULAR CATHETER 12/23/2021 [facility]    IR TUNNELED CATHETER PLACEMENT GREATER THAN 5 YEARS  1/21/2022    IR TUNNELED CATHETER PLACEMENT GREATER THAN 5 YEARS  1/21/2022    IR TUNNELED CATHETER PLACEMENT GREATER THAN 5 YEARS 1/21/2022 [facility]    OTHER SURGICAL HISTORY  12/2021     X 2 groin wound    SD ABDOMEN SURGERY PROC UNLISTED  02/2022    debridement Family History   Problem Relation Age of Onset    Heart Failure Mother         congestive    Hypertension Mother     Cancer Father         Lung/Mets to Bone and Brain and liver    Diabetes Father     Hypertension Father     Heart Disease Mother         Social History     Socioeconomic History    Marital status:    Tobacco Use    Smoking status: Never    Smokeless tobacco: Never   Substance and Sexual Activity    Alcohol use: Not Currently    Drug use: No   Social History Narrative    Denies physical or sexual abuse        Physical Exam  Vitals and nursing note reviewed. Constitutional:       Appearance: Normal appearance. She is normal weight. HENT:      Head: Normocephalic and atraumatic. Nose: Nose normal.      Mouth/Throat:      Mouth: Mucous membranes are moist.   Eyes:      Pupils: Pupils are equal, round, and reactive to light. Cardiovascular:      Rate and Rhythm: Normal rate. Pulmonary:      Effort: Pulmonary effort is normal.   Abdominal:      General: Abdomen is flat. Palpations: Abdomen is soft. Musculoskeletal:         General: Tenderness (right shoulder) present. Comments: Intact ROM of right shoulder   Skin:     General: Skin is warm and dry. Neurological:      General: No focal deficit present. Mental Status: She is alert. Psychiatric:         Mood and Affect: Mood normal.             Voice dictation software was used during the making of this note. This software is not perfect and grammatical and other typographical errors may be present. This note has not been completely proofread for errors.        Zainab Hayden [place]  08/22/22 0617

## 2022-10-10 ENCOUNTER — NURSE TRIAGE (OUTPATIENT)
Dept: OTHER | Facility: CLINIC | Age: 52
End: 2022-10-10

## 2022-10-10 NOTE — TELEPHONE ENCOUNTER
Received call from [facility] at [facility] with [employer] Complaint. Subjective: Caller states \"Last Wednesday was the first time I noticed having a hard time walking on it. I don't remember twisting it, I didn't fall. It got sore and I thought I slept on it funny. I hobbled on it. Thursday the swelling started. I am scheduled for surgery on Tuesday. It is painful. I took Tylenol. Certain positions it eases off. Once the Tylenol wears off it hurts again. I can't twist it out. I am in a dialysis chair now and if I put it out straight it really hurts. \"     Current Symptoms: left knee swelling, intermittent hot to the touch. No trauma. Onset: 5 days ago; sudden    Associated Symptoms: no decreased sensation to toes    Pain Severity: 5/10; aching; constant, stabbing with movement. Temperature: denies     What has been tried: Tylenol, Advil    LMP: NA Pregnant: NA    Recommended disposition: See in Office Today    Care advice provided, patient verbalizes understanding; denies any other questions or concerns; instructed to call back for any new or worsening symptoms. Patient/Caller agrees with recommended disposition; writer provided warm transfer to Jimmy Banks at [facility] for appointment scheduling    Attention Provider: Thank you for allowing me to participate in the care of your patient. The patient was connected to triage in response to information provided to the ECC/PSC. Please do not respond through this encounter as the response is not directed to a shared pool.       Reason for Disposition   Very swollen knee joint    Protocols used: Knee Pain-ADULT-[place]

## 2022-10-11 ENCOUNTER — OFFICE VISIT (OUTPATIENT)
Dept: INTERNAL MEDICINE CLINIC | Facility: CLINIC | Age: 52
End: 2022-10-11
Payer: MEDICARE

## 2022-10-11 VITALS
HEIGHT: 69 IN | SYSTOLIC BLOOD PRESSURE: 122 MMHG | HEART RATE: 70 BPM | WEIGHT: 293 LBS | BODY MASS INDEX: 43.4 KG/M2 | DIASTOLIC BLOOD PRESSURE: 70 MMHG | OXYGEN SATURATION: 98 %

## 2022-10-11 DIAGNOSIS — E11.65 TYPE 2 DIABETES MELLITUS WITH HYPERGLYCEMIA, WITHOUT LONG-TERM CURRENT USE OF INSULIN (HCC): ICD-10-CM

## 2022-10-11 DIAGNOSIS — M25.562 ACUTE PAIN OF LEFT KNEE: ICD-10-CM

## 2022-10-11 DIAGNOSIS — N18.6 END STAGE RENAL DISEASE (HCC): ICD-10-CM

## 2022-10-11 DIAGNOSIS — I10 ESSENTIAL (PRIMARY) HYPERTENSION: ICD-10-CM

## 2022-10-11 DIAGNOSIS — Z99.2 DIALYSIS PATIENT (HCC): ICD-10-CM

## 2022-10-11 DIAGNOSIS — E66.01 MORBID (SEVERE) OBESITY DUE TO EXCESS CALORIES (HCC): ICD-10-CM

## 2022-10-11 DIAGNOSIS — Z23 IMMUNIZATION DUE: Primary | ICD-10-CM

## 2022-10-11 LAB
ERYTHROCYTE [DISTWIDTH] IN BLOOD BY AUTOMATED COUNT: 14.3 % (ref 11.9–14.6)
EST. AVERAGE GLUCOSE BLD GHB EST-MCNC: 134 MG/DL
HBA1C MFR BLD: 6.3 % (ref 4.8–5.6)
HCT VFR BLD AUTO: 27.1 % (ref 35.8–46.3)
HGB BLD-MCNC: 8.7 G/DL (ref 11.7–15.4)
MCH RBC QN AUTO: 30.4 PG (ref 26.1–32.9)
MCHC RBC AUTO-ENTMCNC: 32.1 G/DL (ref 31.4–35)
MCV RBC AUTO: 94.8 FL (ref 82–102)
NRBC # BLD: 0 K/UL (ref 0–0.2)
PLATELET # BLD AUTO: 293 K/UL (ref 150–450)
PMV BLD AUTO: 10.7 FL (ref 9.4–12.3)
RBC # BLD AUTO: 2.86 M/UL (ref 4.05–5.2)
WBC # BLD AUTO: 7.1 K/UL (ref 4.3–11.1)

## 2022-10-11 PROCEDURE — 3044F HG A1C LEVEL LT 7.0%: CPT | Performed by: NURSE PRACTITIONER

## 2022-10-11 PROCEDURE — 2022F DILAT RTA XM EVC RTNOPTHY: CPT | Performed by: NURSE PRACTITIONER

## 2022-10-11 PROCEDURE — G8427 DOCREV CUR MEDS BY ELIG CLIN: HCPCS | Performed by: NURSE PRACTITIONER

## 2022-10-11 PROCEDURE — 1036F TOBACCO NON-USER: CPT | Performed by: NURSE PRACTITIONER

## 2022-10-11 PROCEDURE — 3017F COLORECTAL CA SCREEN DOC REV: CPT | Performed by: NURSE PRACTITIONER

## 2022-10-11 PROCEDURE — G0008 ADMIN INFLUENZA VIRUS VAC: HCPCS | Performed by: NURSE PRACTITIONER

## 2022-10-11 PROCEDURE — G8417 CALC BMI ABV UP PARAM F/U: HCPCS | Performed by: NURSE PRACTITIONER

## 2022-10-11 PROCEDURE — 99214 OFFICE O/P EST MOD 30 MIN: CPT | Performed by: NURSE PRACTITIONER

## 2022-10-11 PROCEDURE — 90674 CCIIV4 VAC NO PRSV 0.5 ML IM: CPT | Performed by: NURSE PRACTITIONER

## 2022-10-11 PROCEDURE — G8482 FLU IMMUNIZE ORDER/ADMIN: HCPCS | Performed by: NURSE PRACTITIONER

## 2022-10-11 RX ORDER — FERRIC CITRATE 210 MG/1
TABLET, COATED ORAL
COMMUNITY
Start: 2022-08-23

## 2022-10-11 RX ORDER — TRAMADOL HYDROCHLORIDE 50 MG/1
50 TABLET ORAL EVERY 6 HOURS PRN
Qty: 28 TABLET | Refills: 0 | Status: SHIPPED | OUTPATIENT
Start: 2022-10-11 | End: 2022-10-12 | Stop reason: SDUPTHER

## 2022-10-11 RX ORDER — FUROSEMIDE 40 MG/1
TABLET ORAL
COMMUNITY
Start: 2022-09-28

## 2022-10-11 ASSESSMENT — PATIENT HEALTH QUESTIONNAIRE - PHQ9
1. LITTLE INTEREST OR PLEASURE IN DOING THINGS: 0
SUM OF ALL RESPONSES TO PHQ QUESTIONS 1-9: 0
SUM OF ALL RESPONSES TO PHQ9 QUESTIONS 1 & 2: 0
SUM OF ALL RESPONSES TO PHQ QUESTIONS 1-9: 0
2. FEELING DOWN, DEPRESSED OR HOPELESS: 0

## 2022-10-11 NOTE — PROGRESS NOTES
Laura Park (:  1970) is a 45 y.o. female,Established patient, here for evaluation of the following chief complaint(s):  Knee Pain         ASSESSMENT/PLAN:  1. Immunization due  -     Influenza, FLUCELVAX, (age 10 mo+), IM, Preservative Free, 0.5 mL  2. Type 2 diabetes mellitus with hyperglycemia, without long-term current use of insulin (HCC)  -     Hemoglobin A1C; Future  -     Lipid Panel; Future  -     TSH  -     Comprehensive Metabolic Panel; Future  -     CBC; Future  3. End stage renal disease (HCC)  -     Hemoglobin A1C; Future  -     Lipid Panel; Future  -     TSH  -     Comprehensive Metabolic Panel; Future  -     CBC; Future  4. Essential (primary) hypertension  -     Hemoglobin A1C; Future  -     Lipid Panel; Future  -     TSH  -     Comprehensive Metabolic Panel; Future  -     CBC; Future  5. Morbid (severe) obesity due to excess calories ([facility]  6. Dialysis patient ([facility]  7. Acute pain of left knee  -     XR KNEE LEFT (3 VIEWS); Future  -     traMADol (ULTRAM) 50 MG tablet; Take 1 tablet by mouth every 6 hours as needed for Pain for up to 7 days. Intended supply: 7 days. Take lowest dose possible to manage pain, Disp-28 tablet, R-0Print      Return in about 3 months (around 2023). Patient with left knee swelling and pain  Improved some today  She has been taking oxycodone and tylenol post-surgery, that was temporary  Try tramadol for pain now, get xray  Declines PT or home health  F/u if worsening    Subjective   SUBJECTIVE/OBJECTIVE:  Patient is here for knee pain. She had knee pain last Wednesday. She got up and limped around on  and it didn't improve. It worsened over the weekend and then swelled up over the weekend. She had no known injury. There is swlling, hurts to move, bend, twist, walk. No pain with touching unless pressing firmly, that is tender. No redness but it is swollen and a little warm. It was more swollen yesterday.      She had surgery last week for peritoneal dialysis port. Knee Pain   The incident occurred 5 to 7 days ago. There was no injury mechanism. The pain is present in the left knee. The quality of the pain is described as aching. Associated symptoms include a loss of motion. Pertinent negatives include no inability to bear weight, muscle weakness, numbness or tingling. The symptoms are aggravated by movement and weight bearing. She has tried acetaminophen (oxycodone) for the symptoms. The treatment provided moderate relief. Review of Systems   Neurological:  Negative for tingling and numbness. Objective   Physical Exam  Constitutional:       General: She is not in acute distress. Appearance: Normal appearance. She is obese. HENT:      Head: Normocephalic. Eyes:      Extraocular Movements: Extraocular movements intact. Pupils: Pupils are equal, round, and reactive to light. Cardiovascular:      Rate and Rhythm: Normal rate and regular rhythm. Pulmonary:      Effort: Pulmonary effort is normal.      Breath sounds: Normal breath sounds. Musculoskeletal:         General: Tenderness present. Cervical back: Neck supple. Left knee: Swelling present. Decreased range of motion. Tenderness present over the medial joint line. Skin:     General: Skin is warm and dry. Neurological:      General: No focal deficit present. Mental Status: She is alert and oriented to person, place, and time. Psychiatric:         Mood and Affect: Mood normal.         Behavior: Behavior normal.                An electronic signature was used to authenticate this note.     --CHRIS Be - CNP

## 2022-10-12 DIAGNOSIS — M25.562 ACUTE PAIN OF LEFT KNEE: ICD-10-CM

## 2022-10-12 LAB
ALBUMIN SERPL-MCNC: 3.6 G/DL (ref 3.5–5)
ALBUMIN/GLOB SERPL: 1 {RATIO} (ref 0.4–1.6)
ALP SERPL-CCNC: 83 U/L (ref 50–136)
ALT SERPL-CCNC: 8 U/L (ref 12–65)
ANION GAP SERPL CALC-SCNC: 10 MMOL/L (ref 2–11)
AST SERPL-CCNC: 9 U/L (ref 15–37)
BILIRUB SERPL-MCNC: 0.2 MG/DL (ref 0.2–1.1)
BUN SERPL-MCNC: 45 MG/DL (ref 6–23)
CALCIUM SERPL-MCNC: 8.9 MG/DL (ref 8.3–10.4)
CHLORIDE SERPL-SCNC: 98 MMOL/L (ref 101–110)
CHOLEST SERPL-MCNC: 199 MG/DL
CO2 SERPL-SCNC: 24 MMOL/L (ref 21–32)
CREAT SERPL-MCNC: 7.4 MG/DL (ref 0.6–1)
GLOBULIN SER CALC-MCNC: 3.6 G/DL (ref 2.8–4.5)
GLUCOSE SERPL-MCNC: 213 MG/DL (ref 65–100)
HDLC SERPL-MCNC: 33 MG/DL (ref 40–60)
HDLC SERPL: 6 {RATIO}
LDLC SERPL CALC-MCNC: 97.2 MG/DL
POTASSIUM SERPL-SCNC: 4.3 MMOL/L (ref 3.5–5.1)
PROT SERPL-MCNC: 7.2 G/DL (ref 6.3–8.2)
SODIUM SERPL-SCNC: 132 MMOL/L (ref 133–143)
TRIGL SERPL-MCNC: 344 MG/DL (ref 35–150)
TSH, 3RD GENERATION: 2.25 UIU/ML (ref 0.36–3.74)
VLDLC SERPL CALC-MCNC: 68.8 MG/DL (ref 6–23)

## 2022-10-12 RX ORDER — TRAMADOL HYDROCHLORIDE 50 MG/1
50 TABLET ORAL EVERY 6 HOURS PRN
Qty: 28 TABLET | Refills: 0 | Status: SHIPPED | OUTPATIENT
Start: 2022-10-12 | End: 2022-10-19

## 2022-10-12 RX ORDER — TRAMADOL HYDROCHLORIDE 50 MG/1
50 TABLET ORAL EVERY 6 HOURS PRN
Qty: 28 TABLET | Refills: 0 | Status: CANCELLED | OUTPATIENT
Start: 2022-10-12 | End: 2022-10-19

## 2022-11-01 DIAGNOSIS — M25.562 ACUTE PAIN OF LEFT KNEE: Primary | ICD-10-CM

## 2022-11-16 ENCOUNTER — OFFICE VISIT (OUTPATIENT)
Dept: INTERNAL MEDICINE CLINIC | Facility: CLINIC | Age: 52
End: 2022-11-16
Payer: MEDICARE

## 2022-11-16 VITALS — DIASTOLIC BLOOD PRESSURE: 80 MMHG | TEMPERATURE: 97.9 F | SYSTOLIC BLOOD PRESSURE: 130 MMHG

## 2022-11-16 DIAGNOSIS — Z99.2 DIALYSIS PATIENT (HCC): ICD-10-CM

## 2022-11-16 DIAGNOSIS — J02.9 SORE THROAT: ICD-10-CM

## 2022-11-16 DIAGNOSIS — H92.03 OTALGIA OF BOTH EARS: Primary | ICD-10-CM

## 2022-11-16 LAB
EXP DATE SOLUTION: NORMAL
EXP DATE SWAB: NORMAL
EXPIRATION DATE: NORMAL
LOT NUMBER POC: NORMAL
LOT NUMBER SOLUTION: NORMAL
LOT NUMBER SWAB: NORMAL
QUICKVUE INFLUENZA TEST: NEGATIVE
SARS-COV-2 RNA, POC: NEGATIVE
VALID INTERNAL CONTROL, POC: YES

## 2022-11-16 PROCEDURE — G8482 FLU IMMUNIZE ORDER/ADMIN: HCPCS | Performed by: INTERNAL MEDICINE

## 2022-11-16 PROCEDURE — 87635 SARS-COV-2 COVID-19 AMP PRB: CPT | Performed by: INTERNAL MEDICINE

## 2022-11-16 PROCEDURE — 3017F COLORECTAL CA SCREEN DOC REV: CPT | Performed by: INTERNAL MEDICINE

## 2022-11-16 PROCEDURE — 87804 INFLUENZA ASSAY W/OPTIC: CPT | Performed by: INTERNAL MEDICINE

## 2022-11-16 PROCEDURE — 99213 OFFICE O/P EST LOW 20 MIN: CPT | Performed by: INTERNAL MEDICINE

## 2022-11-16 PROCEDURE — G8427 DOCREV CUR MEDS BY ELIG CLIN: HCPCS | Performed by: INTERNAL MEDICINE

## 2022-11-16 PROCEDURE — 1036F TOBACCO NON-USER: CPT | Performed by: INTERNAL MEDICINE

## 2022-11-16 PROCEDURE — G8417 CALC BMI ABV UP PARAM F/U: HCPCS | Performed by: INTERNAL MEDICINE

## 2022-11-16 PROCEDURE — 3078F DIAST BP <80 MM HG: CPT | Performed by: INTERNAL MEDICINE

## 2022-11-16 PROCEDURE — 3074F SYST BP LT 130 MM HG: CPT | Performed by: INTERNAL MEDICINE

## 2022-11-16 RX ORDER — DICYCLOMINE HYDROCHLORIDE 10 MG/1
10 CAPSULE ORAL 3 TIMES DAILY PRN
Qty: 90 CAPSULE | Refills: 5 | Status: SHIPPED | OUTPATIENT
Start: 2022-11-16

## 2022-11-16 RX ORDER — AZITHROMYCIN 250 MG/1
250 TABLET, FILM COATED ORAL SEE ADMIN INSTRUCTIONS
Qty: 6 TABLET | Refills: 0 | Status: SHIPPED | OUTPATIENT
Start: 2022-11-16 | End: 2022-11-21

## 2022-11-16 RX ORDER — HYDRALAZINE HYDROCHLORIDE 50 MG/1
50 TABLET, FILM COATED ORAL 3 TIMES DAILY
COMMUNITY
Start: 2022-11-11

## 2022-11-16 RX ORDER — METOPROLOL TARTRATE 50 MG/1
50 TABLET, FILM COATED ORAL 2 TIMES DAILY
Qty: 180 TABLET | Refills: 3 | Status: SHIPPED | OUTPATIENT
Start: 2022-11-16

## 2022-11-16 ASSESSMENT — ENCOUNTER SYMPTOMS
VOMITING: 0
SINUS PAIN: 0
WHEEZING: 0
CONSTIPATION: 0
NAUSEA: 0
ABDOMINAL PAIN: 0
SHORTNESS OF BREATH: 0
DIARRHEA: 0

## 2022-11-16 NOTE — PROGRESS NOTES
Shonna Cherry was seen today for cough. Diagnoses and all orders for this visit:    Otalgia of both ears  -     AMB POC RAPID INFLUENZA TEST  -     AMB POC COVID-19 COV  -     azithromycin (ZITHROMAX) 250 MG tablet; Take 1 tablet by mouth See Admin Instructions for 5 days 500mg on day 1 followed by 250mg on days 2 - 5    Sore throat  -     AMB POC RAPID INFLUENZA TEST  -     AMB POC COVID-19 COV    Dialysis patient ([facility] [address]    Other orders  -     metoprolol (LOPRESSOR) 50 MG tablet; Take 1 tablet by mouth 2 times daily  -     dicyclomine (BENTYL) 10 MG capsule;  Take 1 capsule by mouth 3 times daily as needed (IBS)        Waqas Peters is a 45 y.o. female    Chief Complaint   Patient presents with    Cough     Low grade fever     Rt ear congested coughing  Temp 99.8 this am      Office Visit on 10/11/2022   Component Date Value Ref Range Status    TSH, 3RD GENERATION 10/11/2022 2.250  0.358 - 3.740 uIU/mL Final    WBC 10/11/2022 7.1  4.3 - 11.1 K/uL Final    RBC 10/11/2022 2.86 (A)  4.05 - 5.2 M/uL Final    Hemoglobin 10/11/2022 8.7 (A)  11.7 - 15.4 g/dL Final    Hematocrit 10/11/2022 27.1 (A)  35.8 - 46.3 % Final    MCV 10/11/2022 94.8  82 - 102 FL Final    MCH 10/11/2022 30.4  26.1 - 32.9 PG Final    MCHC 10/11/2022 32.1  31.4 - 35.0 g/dL Final    RDW 10/11/2022 14.3  11.9 - 14.6 % Final    Platelets 68/12/8652 293  150 - 450 K/uL Final    MPV 10/11/2022 10.7  9.4 - 12.3 FL Final    nRBC 10/11/2022 0.00  0.0 - 0.2 K/uL Final    **Note: Absolute NRBC parameter is now reported with Hemogram**    Sodium 10/11/2022 132 (A)  133 - 143 mmol/L Final    Potassium 10/11/2022 4.3  3.5 - 5.1 mmol/L Final    Chloride 10/11/2022 98 (A)  101 - 110 mmol/L Final    CO2 10/11/2022 24  21 - 32 mmol/L Final    Anion Gap 10/11/2022 10  2 - 11 mmol/L Final    Glucose 10/11/2022 213 (A)  65 - 100 mg/dL Final    BUN 10/11/2022 45 (A)  6 - 23 MG/DL Final    Creatinine 10/11/2022 7.40 (A)  0.6 - 1.0 MG/DL Final    Est, Glom Filt Rate 10/11/2022 6 (A)  >60 ml/min/1.73m2 Final    Comment:   Pediatric calculator link: Anabel.at. org/professionals/kdoqi/gfr_calculatorped    Effective Oct 3, 2022    These results are not intended for use in patients <25years of age. eGFR results are calculated without a race factor using  the 2021 CKD-EPI equation. Careful clinical correlation is recommended, particularly when comparing to results calculated using previous equations. The CKD-EPI equation is less accurate in patients with extremes of muscle mass, extra-renal metabolism of creatinine, excessive creatine ingestion, or following therapy that affects renal tubular secretion.       Calcium 10/11/2022 8.9  8.3 - 10.4 MG/DL Final    Total Bilirubin 10/11/2022 0.2  0.2 - 1.1 MG/DL Final    ALT 10/11/2022 8 (A)  12 - 65 U/L Final    AST 10/11/2022 9 (A)  15 - 37 U/L Final    Alk Phosphatase 10/11/2022 83  50 - 136 U/L Final    Total Protein 10/11/2022 7.2  6.3 - 8.2 g/dL Final    Albumin 10/11/2022 3.6  3.5 - 5.0 g/dL Final    Globulin 10/11/2022 3.6  2.8 - 4.5 g/dL Final    Albumin/Globulin Ratio 10/11/2022 1.0  0.4 - 1.6   Final    Cholesterol, Total 10/11/2022 199  <200 MG/DL Final    Comment: Borderline High: 200-239 mg/dL  High: Greater than or equal to 240 mg/dL      Triglycerides 10/11/2022 344 (A)  35 - 150 MG/DL Final    Comment: Borderline High: 150-199 mg/dL, High: 200-499 mg/dL  Very High: Greater than or equal to 500 mg/dL      HDL 10/11/2022 33 (A)  40 - 60 MG/DL Final    LDL Calculated 10/11/2022 97.2  <100 MG/DL Final    Comment: Near Optimal: 100-129 mg/dL  Borderline High: 130-159, High: 160-189 mg/dL  Very High: Greater than or equal to 190 mg/dL      VLDL Cholesterol Calculated 10/11/2022 68.8 (A)  6.0 - 23.0 MG/DL Final    Chol/HDL Ratio 10/11/2022 6.0    Final    Hemoglobin A1C 10/11/2022 6.3 (A)  4.8 - 5.6 % Final    eAG 10/11/2022 134  mg/dL Final    Comment: Reference Range  Normal: 4.8-5.6  Diabetic >=6.5%  Normal <5.7%          Past Medical History:   Diagnosis Date    Acute kidney injury ([facility]     ARF with sepsis requiring hemodialysis    Anemia     Anxiety     ARF (acute respiratory failure) ([address] 12/2021    required vent at [facility]    Atrial fibrillation with RVR ([address] 12/22/2021    while hospitalized for sepsis.     Chronic kidney disease     secondary to sepsis 12/2021--- US Renal in [facility] on M, W, F    Debility     Diabetes [facility])     type 2. does not check since on dialysis,no meds; no hypo    Dialysis patient ([address] 12/2021    Mon, Wed, Fri    GERD (gastroesophageal reflux disease)     OTC daily med    Gout     Gout     History of recent blood transfusion     1 unit 2/17/22     1 unit 2/19/22 per [facility] records    Hypertension     controlled w/med    IBS (irritable bowel syndrome)     Iron deficiency anemia     Necrotizing fasciitis ([address] 12/27/2021    left groin pannus and perineum    Personal history of COVID-19     12/31/21 was hospitalized at [facility] when diagnosed---states \"extremely mild cough\"     Sepsis ([facility] 12/2021     caused CRF, on dialysisi       Family History   Problem Relation Age of Onset    Heart Failure Mother         congestive    Hypertension Mother     Cancer Father         Lung/Mets to Bone and Brain and liver    Diabetes Father     Hypertension Father     Heart Disease Mother         Social History     Socioeconomic History    Marital status:      Spouse name: Not on file    Number of children: Not on file    Years of education: Not on file    Highest education level: Not on file   Occupational History    Not on file   Tobacco Use    Smoking status: Never    Smokeless tobacco: Never   Substance and Sexual Activity    Alcohol use: Not Currently    Drug use: No    Sexual activity: Not on file   Other Topics Concern    Not on file   Social History Narrative    Denies physical or sexual abuse     Social Determinants of Health     Financial Resource Strain: Not on file   Food Insecurity: Not on file Transportation Needs: Not on file   Physical Activity: Not on file   Stress: Not on file   Social Connections: Not on file   Intimate Partner Violence: Not on file   Housing Stability: Not on file         Current Outpatient Medications:     hydrALAZINE (APRESOLINE) 50 MG tablet, Take 50 mg by mouth 3 times daily, Disp: , Rfl:     metoprolol (LOPRESSOR) 50 MG tablet, Take 1 tablet by mouth 2 times daily, Disp: 180 tablet, Rfl: 3    dicyclomine (BENTYL) 10 MG capsule, Take 1 capsule by mouth 3 times daily as needed (IBS), Disp: 90 capsule, Rfl: 5    AURYXIA 1  MG(Fe) TABS tablet, TAKE 2 TABLETS BY MOUTH THREE TIMES DAILY WITH MEALS, Disp: , Rfl:     furosemide (LASIX) 40 MG tablet, , Disp: , Rfl:     blood glucose test strips (ASCENSIA AUTODISC VI;ONE TOUCH ULTRA TEST VI) strip, by Other route 2 times daily, Disp: , Rfl:     acetaminophen (TYLENOL) 325 MG tablet, Take 650 mg by mouth every 6 hours as needed, Disp: , Rfl:     amLODIPine (NORVASC) 10 MG tablet, Take 10 mg by mouth daily, Disp: , Rfl:     apixaban (ELIQUIS) 5 MG TABS tablet, Take 5 mg by mouth 2 times daily, Disp: , Rfl:     famotidine (PEPCID) 20 MG tablet, Take 20 mg by mouth daily, Disp: , Rfl:     fluticasone (FLONASE) 50 MCG/ACT nasal spray, 2 sprays by Nasal route daily, Disp: , Rfl:     ondansetron (ZOFRAN-ODT) 4 MG disintegrating tablet, Take 4 mg by mouth every 8 hours as needed, Disp: , Rfl:     sertraline (ZOLOFT) 100 MG tablet, Take 100 mg by mouth 2 times daily, Disp: , Rfl:     Allergies   Allergen Reactions    Ace Inhibitors Angioedema     Other reaction(s): Hives/Swelling-Allergy  Swelling of the mouth    Angiotensin Receptor Blockers Angioedema    Cayenne Hives     Swelling of the mouth  Other reaction(s): Hives/Swelling-Allergy  Patient states she is not allergic    Sulfamethoxazole-Trimethoprim Other (See Comments)     Nausea, vomiting, making her feel like she is going to pass out    Cephalexin Nausea And Vomiting Review of Systems   Constitutional:  Negative for appetite change, chills, fatigue and fever. HENT:  Negative for congestion and sinus pain. Respiratory:  Negative for shortness of breath and wheezing. Cardiovascular:  Negative for chest pain. Gastrointestinal:  Negative for abdominal pain, constipation, diarrhea, nausea and vomiting. Genitourinary:  Negative for dysuria and frequency. Musculoskeletal:  Negative for myalgias. Neurological:  Negative for dizziness. Psychiatric/Behavioral:  Negative for suicidal ideas. All other systems reviewed and are negative. Vitals:    11/16/22 1512   BP: 130/80   Temp: 97.9 °F (36.6 °C)   TempSrc: Temporal           Physical Exam  Constitutional:       Appearance: She is obese. She is not ill-appearing. HENT:      Right Ear: Tympanic membrane normal.      Left Ear: Tympanic membrane normal.      Nose: Congestion present. Eyes:      Extraocular Movements: Extraocular movements intact. Conjunctiva/sclera: Conjunctivae normal.   Cardiovascular:      Rate and Rhythm: Normal rate and regular rhythm. Heart sounds: Normal heart sounds. Pulmonary:      Effort: Pulmonary effort is normal.      Breath sounds: Normal breath sounds. Abdominal:      General: Bowel sounds are normal.      Palpations: Abdomen is soft. Skin:     General: Skin is warm. Coloration: Skin is not jaundiced. Neurological:      General: No focal deficit present. Mental Status: She is alert. Mental status is at baseline. Psychiatric:         Mood and Affect: Mood normal.         Thought Content: Thought content normal.          Tiffany Collazo was seen today for cough. Diagnoses and all orders for this visit:    Otalgia of both ears  -     AMB POC RAPID INFLUENZA TEST  -     AMB POC COVID-19 COV  -     azithromycin (ZITHROMAX) 250 MG tablet;  Take 1 tablet by mouth See Admin Instructions for 5 days 500mg on day 1 followed by 250mg on days 2 - 5    Sore throat  -     AMB POC RAPID INFLUENZA TEST  -     AMB POC COVID-19 COV    Dialysis patient ([facility] [address]    Other orders  -     metoprolol (LOPRESSOR) 50 MG tablet; Take 1 tablet by mouth 2 times daily  -     dicyclomine (BENTYL) 10 MG capsule;  Take 1 capsule by mouth 3 times daily as needed (IBS)               Lily Juan DO

## 2022-11-28 RX ORDER — SERTRALINE HYDROCHLORIDE 100 MG/1
TABLET, FILM COATED ORAL
Qty: 180 TABLET | Refills: 1 | Status: SHIPPED | OUTPATIENT
Start: 2022-11-28

## 2022-11-30 ENCOUNTER — HOSPITAL ENCOUNTER (OUTPATIENT)
Dept: INTERVENTIONAL RADIOLOGY/VASCULAR | Age: 52
Discharge: HOME | End: 2022-12-03
Payer: COMMERCIAL

## 2022-11-30 VITALS
TEMPERATURE: 97.7 F | HEART RATE: 72 BPM | RESPIRATION RATE: 17 BRPM | SYSTOLIC BLOOD PRESSURE: 153 MMHG | DIASTOLIC BLOOD PRESSURE: 74 MMHG | OXYGEN SATURATION: 100 %

## 2022-11-30 DIAGNOSIS — Z53.9 PROCEDURE DISCONTINUED: ICD-10-CM

## 2022-11-30 PROCEDURE — 2500000003 HC RX 250 WO HCPCS: Performed by: PHYSICIAN ASSISTANT

## 2022-11-30 PROCEDURE — 36589 REMOVAL TUNNELED CV CATH: CPT

## 2022-11-30 RX ORDER — LIDOCAINE HYDROCHLORIDE AND EPINEPHRINE BITARTRATE 20; .01 MG/ML; MG/ML
INJECTION, SOLUTION SUBCUTANEOUS
Status: COMPLETED | OUTPATIENT
Start: 2022-11-30 | End: 2022-11-30

## 2022-11-30 RX ADMIN — LIDOCAINE HYDROCHLORIDE,EPINEPHRINE BITARTRATE 20 ML: 20; .01 INJECTION, SOLUTION INFILTRATION; PERINEURAL at 15:23

## 2022-11-30 NOTE — DISCHARGE INSTRUCTIONS
Port/Catheter Removal Discharge Instructions          General information: Your doctor has ordered us to remove your port, or catheter. This could be that you do not need it anymore, it is not doing its job, or your physician has decided on another plan for your treatment. Post-Op Instructions:     Patients may experience some mild swelling, redness and or bruising around the incision. The area may be tender. This is normal after a surgical procedure. This may take several days to resolve. Do not allow bra straps or any clothing to rub the skin over the incision with skin glue present     You may start taking showers again after 48 hours (about 2 days). Please protect the area with saran wrap if possible. Do not take a bath or swim until the skin has healed. Skin glue will flake off within 7-10 days (about 1 and a half weeks). Once it is healed it is okay to bathe and swim. Do not use antibiotic ointment, rubbing alcohol or hydrogen peroxide on the skin glue as it can break down the adhesive     Restrict yourself to light activity for the first 3 days after getting the port out, after that, resume normal activity slowly. You may resume your normal diet and medications. If you have pain, you can take whatever you have at home including acetaminophen (Tylenol) or Ibuprofren (Advil) as needed. Ice packs are also recommended. Please do not drive, make important legal decisions, or drink alcohol after receiving sedation or anesthesia for the next 24 hours. Call if: You should call your Physician and/or the Radiology Nurse if you notice:      Signs of infection such as swelling and redness that is warm to the touch or streaking, or pus     Fever (greater than 100.4). Severe arm / chest pain coming from your port site     Active bleeding     We can be reached at [phone], between 8 am and 5 pm Monday through Friday.  After hours call the answering service at [phone] ask for the radiologist to be paged. If you have a medical emergency, go to the nearest emergency room, or call 911. Follow-Up Instructions: Please follow up with the ordering provider.

## 2022-11-30 NOTE — OR NURSING
Recovery period without difficulty. Pt alert and oriented and denies pain. Dressing is clean, dry, and intact. Reviewed discharge instructions with patient and spouse, both verbalized understanding. Pt ambulatory, and walked out with spouse. Patient appeared stable.

## 2023-01-16 ENCOUNTER — OFFICE VISIT (OUTPATIENT)
Dept: INTERNAL MEDICINE CLINIC | Facility: CLINIC | Age: 53
End: 2023-01-16
Payer: MEDICARE

## 2023-01-16 VITALS
WEIGHT: 293 LBS | OXYGEN SATURATION: 94 % | HEART RATE: 90 BPM | SYSTOLIC BLOOD PRESSURE: 140 MMHG | HEIGHT: 69 IN | DIASTOLIC BLOOD PRESSURE: 90 MMHG | BODY MASS INDEX: 43.4 KG/M2

## 2023-01-16 DIAGNOSIS — I48.91 ATRIAL FIBRILLATION WITH RVR (HCC): ICD-10-CM

## 2023-01-16 DIAGNOSIS — E11.65 TYPE 2 DIABETES MELLITUS WITH HYPERGLYCEMIA, WITHOUT LONG-TERM CURRENT USE OF INSULIN (HCC): ICD-10-CM

## 2023-01-16 DIAGNOSIS — D64.9 ANEMIA, UNSPECIFIED TYPE: ICD-10-CM

## 2023-01-16 DIAGNOSIS — N18.6 END STAGE RENAL DISEASE (HCC): ICD-10-CM

## 2023-01-16 DIAGNOSIS — Z12.31 ENCOUNTER FOR SCREENING MAMMOGRAM FOR MALIGNANT NEOPLASM OF BREAST: ICD-10-CM

## 2023-01-16 DIAGNOSIS — R06.02 SHORTNESS OF BREATH: ICD-10-CM

## 2023-01-16 DIAGNOSIS — I10 ESSENTIAL (PRIMARY) HYPERTENSION: Primary | ICD-10-CM

## 2023-01-16 DIAGNOSIS — E66.01 MORBID (SEVERE) OBESITY DUE TO EXCESS CALORIES (HCC): ICD-10-CM

## 2023-01-16 DIAGNOSIS — Z12.11 COLON CANCER SCREENING: ICD-10-CM

## 2023-01-16 LAB
ERYTHROCYTE [DISTWIDTH] IN BLOOD BY AUTOMATED COUNT: 15.1 % (ref 11.9–14.6)
HCT VFR BLD AUTO: 30.7 % (ref 35.8–46.3)
HGB BLD-MCNC: 9.8 G/DL (ref 11.7–15.4)
HGB RETIC QN AUTO: 35 PG (ref 29–35)
IMM RETICS NFR: 9.8 % (ref 3–15.9)
MCH RBC QN AUTO: 31.5 PG (ref 26.1–32.9)
MCHC RBC AUTO-ENTMCNC: 31.9 G/DL (ref 31.4–35)
MCV RBC AUTO: 98.7 FL (ref 82–102)
NRBC # BLD: 0 K/UL (ref 0–0.2)
PLATELET # BLD AUTO: 237 K/UL (ref 150–450)
PMV BLD AUTO: 10.7 FL (ref 9.4–12.3)
RBC # BLD AUTO: 3.11 M/UL (ref 4.05–5.2)
RETICS # AUTO: 0.04 M/UL (ref 0.03–0.1)
RETICS/RBC NFR AUTO: 1.3 % (ref 0.3–2)
WBC # BLD AUTO: 7.6 K/UL (ref 4.3–11.1)

## 2023-01-16 PROCEDURE — 3080F DIAST BP >= 90 MM HG: CPT | Performed by: NURSE PRACTITIONER

## 2023-01-16 PROCEDURE — 99214 OFFICE O/P EST MOD 30 MIN: CPT | Performed by: NURSE PRACTITIONER

## 2023-01-16 PROCEDURE — 3046F HEMOGLOBIN A1C LEVEL >9.0%: CPT | Performed by: NURSE PRACTITIONER

## 2023-01-16 PROCEDURE — 3017F COLORECTAL CA SCREEN DOC REV: CPT | Performed by: NURSE PRACTITIONER

## 2023-01-16 PROCEDURE — 3077F SYST BP >= 140 MM HG: CPT | Performed by: NURSE PRACTITIONER

## 2023-01-16 PROCEDURE — G8427 DOCREV CUR MEDS BY ELIG CLIN: HCPCS | Performed by: NURSE PRACTITIONER

## 2023-01-16 PROCEDURE — 1036F TOBACCO NON-USER: CPT | Performed by: NURSE PRACTITIONER

## 2023-01-16 PROCEDURE — G8482 FLU IMMUNIZE ORDER/ADMIN: HCPCS | Performed by: NURSE PRACTITIONER

## 2023-01-16 PROCEDURE — 2022F DILAT RTA XM EVC RTNOPTHY: CPT | Performed by: NURSE PRACTITIONER

## 2023-01-16 PROCEDURE — G8417 CALC BMI ABV UP PARAM F/U: HCPCS | Performed by: NURSE PRACTITIONER

## 2023-01-16 SDOH — ECONOMIC STABILITY: FOOD INSECURITY: WITHIN THE PAST 12 MONTHS, YOU WORRIED THAT YOUR FOOD WOULD RUN OUT BEFORE YOU GOT MONEY TO BUY MORE.: SOMETIMES TRUE

## 2023-01-16 SDOH — ECONOMIC STABILITY: FOOD INSECURITY: WITHIN THE PAST 12 MONTHS, THE FOOD YOU BOUGHT JUST DIDN'T LAST AND YOU DIDN'T HAVE MONEY TO GET MORE.: SOMETIMES TRUE

## 2023-01-16 ASSESSMENT — PATIENT HEALTH QUESTIONNAIRE - PHQ9
SUM OF ALL RESPONSES TO PHQ QUESTIONS 1-9: 0
1. LITTLE INTEREST OR PLEASURE IN DOING THINGS: 0
SUM OF ALL RESPONSES TO PHQ9 QUESTIONS 1 & 2: 0
SUM OF ALL RESPONSES TO PHQ QUESTIONS 1-9: 0
2. FEELING DOWN, DEPRESSED OR HOPELESS: 0

## 2023-01-16 ASSESSMENT — SOCIAL DETERMINANTS OF HEALTH (SDOH): HOW HARD IS IT FOR YOU TO PAY FOR THE VERY BASICS LIKE FOOD, HOUSING, MEDICAL CARE, AND HEATING?: VERY HARD

## 2023-01-16 NOTE — PROGRESS NOTES
Martine Valencia (:  1970) is a 45 y.o. female,Established patient, here for evaluation of the following chief complaint(s):  Follow-up         ASSESSMENT/PLAN:  1. Essential (primary) hypertension  2. End stage renal disease ([facility] [address]  3. Type 2 diabetes mellitus with hyperglycemia, without long-term current use of insulin (HCC)  -     Hemoglobin A1C  4. Morbid (severe) obesity due to excess calories ([facility] [address]  5. Shortness of breath  -     XR CHEST (2 VW); Future  -     [address]  6. Anemia, unspecified type  -     CBC; Future  -     Iron; Future  -     Ferritin; Future  -     Total Iron Binding Capacity; Future  -     Reticulocytes; Future  -     Vitamin B12; Future  -     Basic Metabolic Panel; Future  7. Encounter for screening mammogram for malignant neoplasm of breast  -     [facility] DIGITAL SCREEN W OR WO CAD BILATERAL; Future  8. Colon cancer screening  -     [facility]  9. Atrial fibrillation with RVR ([facility] [address]  -     [address]    Return in about 3 months (around 2023). Lungs clear, still shortness of breath activity  Reviewed last echo  Get chest xray, no increased leg swelling  Check lab and a1c  Get colonoscopy    Subjective   SUBJECTIVE/OBJECTIVE:  Patient is here for follow up. She is doing peritoneal dialysis and doing well. She had a cold in November. She was given a -zpack and felt better but then got hit again around aidan with more cough and congestion. She still gets a little short of breath when walking to the car. She talked to dialysis and they didn't think she was fluid overloaded. She feels better and is taking mucinex but she finds she wheezes at night and she feels mucous clogged up when she first wakes up. She then does well except for moving around. Review of Systems       Objective   Physical Exam  Constitutional:       Appearance: Normal appearance. She is obese. She is not ill-appearing. HENT:      Head: Normocephalic. Nose: Nose normal.   Eyes:      Extraocular Movements: Extraocular movements intact. Pupils: Pupils are equal, round, and reactive to light. Cardiovascular:      Rate and Rhythm: Normal rate and regular rhythm. Pulmonary:      Breath sounds: No wheezing or rhonchi. Musculoskeletal:      Cervical back: Neck supple. Neurological:      General: No focal deficit present. Mental Status: She is alert and oriented to person, place, and time. Psychiatric:         Mood and Affect: Mood normal.         Behavior: Behavior normal.                An electronic signature was used to authenticate this note.     --Kiki Moran, APRMICHAEL - CNP

## 2023-01-17 LAB
ANION GAP SERPL CALC-SCNC: 9 MMOL/L (ref 2–11)
BUN SERPL-MCNC: 73 MG/DL (ref 6–23)
CALCIUM SERPL-MCNC: 8.1 MG/DL (ref 8.3–10.4)
CHLORIDE SERPL-SCNC: 106 MMOL/L (ref 101–110)
CO2 SERPL-SCNC: 25 MMOL/L (ref 21–32)
CREAT SERPL-MCNC: 9 MG/DL (ref 0.6–1)
EST. AVERAGE GLUCOSE BLD GHB EST-MCNC: 114 MG/DL
FERRITIN SERPL-MCNC: 215 NG/ML (ref 8–388)
GLUCOSE SERPL-MCNC: 143 MG/DL (ref 65–100)
HBA1C MFR BLD: 5.6 % (ref 4.8–5.6)
IRON SERPL-MCNC: 265 UG/DL (ref 35–150)
POTASSIUM SERPL-SCNC: 5.4 MMOL/L (ref 3.5–5.1)
SODIUM SERPL-SCNC: 140 MMOL/L (ref 133–143)
TIBC SERPL-MCNC: 329 UG/DL (ref 250–450)
VIT B12 SERPL-MCNC: 273 PG/ML (ref 193–986)

## 2023-05-15 RX ORDER — APIXABAN 5 MG/1
TABLET, FILM COATED ORAL
Qty: 60 TABLET | Refills: 0 | Status: SHIPPED | OUTPATIENT
Start: 2023-05-15

## 2023-05-24 DIAGNOSIS — M25.562 ACUTE PAIN OF LEFT KNEE: ICD-10-CM

## 2023-05-24 RX ORDER — ONDANSETRON 4 MG/1
TABLET, ORALLY DISINTEGRATING ORAL
Qty: 30 TABLET | Refills: 5 | OUTPATIENT
Start: 2023-05-24

## 2023-05-24 RX ORDER — TRAMADOL HYDROCHLORIDE 50 MG/1
TABLET ORAL
Qty: 28 TABLET | Refills: 0 | OUTPATIENT
Start: 2023-05-24

## 2023-06-13 RX ORDER — SERTRALINE HYDROCHLORIDE 100 MG/1
100 TABLET, FILM COATED ORAL 2 TIMES DAILY
Qty: 180 TABLET | Refills: 1 | OUTPATIENT
Start: 2023-06-13

## 2023-06-13 RX ORDER — DICYCLOMINE HYDROCHLORIDE 10 MG/1
10 CAPSULE ORAL 3 TIMES DAILY PRN
Qty: 90 CAPSULE | Refills: 5 | OUTPATIENT
Start: 2023-06-13

## 2023-06-14 RX ORDER — METOPROLOL TARTRATE 50 MG/1
50 TABLET, FILM COATED ORAL 2 TIMES DAILY
Qty: 180 TABLET | Refills: 3 | OUTPATIENT
Start: 2023-06-14

## 2023-06-14 RX ORDER — SERTRALINE HYDROCHLORIDE 100 MG/1
100 TABLET, FILM COATED ORAL 2 TIMES DAILY
Qty: 180 TABLET | Refills: 1 | OUTPATIENT
Start: 2023-06-14

## 2023-06-14 RX ORDER — DICYCLOMINE HYDROCHLORIDE 10 MG/1
10 CAPSULE ORAL 3 TIMES DAILY PRN
Qty: 90 CAPSULE | Refills: 5 | OUTPATIENT
Start: 2023-06-14

## 2023-06-20 ENCOUNTER — HOSPITAL ENCOUNTER (EMERGENCY)
Age: 53
Discharge: HOME | End: 2023-06-20
Attending: EMERGENCY MEDICINE
Payer: MEDICARE

## 2023-06-20 ENCOUNTER — APPOINTMENT (OUTPATIENT)
Dept: GENERAL RADIOLOGY | Age: 53
End: 2023-06-20
Payer: MEDICARE

## 2023-06-20 VITALS
SYSTOLIC BLOOD PRESSURE: 159 MMHG | WEIGHT: 293 LBS | HEIGHT: 69 IN | OXYGEN SATURATION: 99 % | HEART RATE: 88 BPM | TEMPERATURE: 99.9 F | BODY MASS INDEX: 43.4 KG/M2 | RESPIRATION RATE: 18 BRPM | DIASTOLIC BLOOD PRESSURE: 83 MMHG

## 2023-06-20 DIAGNOSIS — L03.116 CELLULITIS OF LEFT LOWER EXTREMITY: Primary | ICD-10-CM

## 2023-06-20 LAB
ALBUMIN SERPL-MCNC: 2.7 G/DL (ref 3.5–5)
ALBUMIN/GLOB SERPL: 0.5 (ref 0.4–1.6)
ALP SERPL-CCNC: 103 U/L (ref 50–136)
ALT SERPL-CCNC: 10 U/L (ref 12–65)
ANION GAP SERPL CALC-SCNC: 14 MMOL/L (ref 2–11)
AST SERPL-CCNC: 5 U/L (ref 15–37)
BASOPHILS # BLD: 0.1 K/UL (ref 0–0.2)
BASOPHILS NFR BLD: 0 % (ref 0–2)
BILIRUB SERPL-MCNC: 0.1 MG/DL (ref 0.2–1.1)
BUN SERPL-MCNC: 72 MG/DL (ref 6–23)
CALCIUM SERPL-MCNC: 8.4 MG/DL (ref 8.3–10.4)
CHLORIDE SERPL-SCNC: 94 MMOL/L (ref 101–110)
CO2 SERPL-SCNC: 22 MMOL/L (ref 21–32)
CREAT SERPL-MCNC: 10.9 MG/DL (ref 0.6–1)
DIFFERENTIAL METHOD BLD: ABNORMAL
EOSINOPHIL # BLD: 0.1 K/UL (ref 0–0.8)
EOSINOPHIL NFR BLD: 1 % (ref 0.5–7.8)
ERYTHROCYTE [DISTWIDTH] IN BLOOD BY AUTOMATED COUNT: 14.6 % (ref 11.9–14.6)
GLOBULIN SER CALC-MCNC: 5 G/DL (ref 2.8–4.5)
GLUCOSE SERPL-MCNC: 194 MG/DL (ref 65–100)
HCT VFR BLD AUTO: 24.2 % (ref 35.8–46.3)
HGB BLD-MCNC: 7.9 G/DL (ref 11.7–15.4)
IMM GRANULOCYTES # BLD AUTO: 0.2 K/UL (ref 0–0.5)
IMM GRANULOCYTES NFR BLD AUTO: 1 % (ref 0–5)
LACTATE SERPL-SCNC: 1.7 MMOL/L (ref 0.4–2)
LYMPHOCYTES # BLD: 0.9 K/UL (ref 0.5–4.6)
LYMPHOCYTES NFR BLD: 6 % (ref 13–44)
MCH RBC QN AUTO: 30.7 PG (ref 26.1–32.9)
MCHC RBC AUTO-ENTMCNC: 32.6 G/DL (ref 31.4–35)
MCV RBC AUTO: 94.2 FL (ref 82–102)
MONOCYTES # BLD: 0.8 K/UL (ref 0.1–1.3)
MONOCYTES NFR BLD: 6 % (ref 4–12)
NEUTS SEG # BLD: 11.8 K/UL (ref 1.7–8.2)
NEUTS SEG NFR BLD: 86 % (ref 43–78)
NRBC # BLD: 0 K/UL (ref 0–0.2)
PLATELET # BLD AUTO: 305 K/UL (ref 150–450)
PMV BLD AUTO: 10.2 FL (ref 9.4–12.3)
POTASSIUM SERPL-SCNC: 3.8 MMOL/L (ref 3.5–5.1)
PROT SERPL-MCNC: 7.7 G/DL (ref 6.3–8.2)
RBC # BLD AUTO: 2.57 M/UL (ref 4.05–5.2)
SODIUM SERPL-SCNC: 130 MMOL/L (ref 133–143)
WBC # BLD AUTO: 13.8 K/UL (ref 4.3–11.1)

## 2023-06-20 PROCEDURE — 83605 ASSAY OF LACTIC ACID: CPT

## 2023-06-20 PROCEDURE — 85025 COMPLETE CBC W/AUTO DIFF WBC: CPT

## 2023-06-20 PROCEDURE — 96365 THER/PROPH/DIAG IV INF INIT: CPT

## 2023-06-20 PROCEDURE — 87040 BLOOD CULTURE FOR BACTERIA: CPT

## 2023-06-20 PROCEDURE — 73502 X-RAY EXAM HIP UNI 2-3 VIEWS: CPT

## 2023-06-20 PROCEDURE — 2500000003 HC RX 250 WO HCPCS: Performed by: PHYSICIAN ASSISTANT

## 2023-06-20 PROCEDURE — 99284 EMERGENCY DEPT VISIT MOD MDM: CPT

## 2023-06-20 PROCEDURE — 80053 COMPREHEN METABOLIC PANEL: CPT

## 2023-06-20 RX ORDER — CLINDAMYCIN PHOSPHATE 900 MG/50ML
900 INJECTION INTRAVENOUS
Status: COMPLETED | OUTPATIENT
Start: 2023-06-20 | End: 2023-06-20

## 2023-06-20 RX ORDER — CLINDAMYCIN HYDROCHLORIDE 300 MG/1
300 CAPSULE ORAL 4 TIMES DAILY
Qty: 40 CAPSULE | Refills: 0 | Status: SHIPPED | OUTPATIENT
Start: 2023-06-20 | End: 2023-06-30

## 2023-06-20 RX ADMIN — CLINDAMYCIN PHOSPHATE 900 MG: 900 INJECTION, SOLUTION INTRAVENOUS at 17:03

## 2023-06-20 ASSESSMENT — LIFESTYLE VARIABLES
HOW OFTEN DO YOU HAVE A DRINK CONTAINING ALCOHOL: NEVER
HOW MANY STANDARD DRINKS CONTAINING ALCOHOL DO YOU HAVE ON A TYPICAL DAY: PATIENT DOES NOT DRINK

## 2023-06-20 ASSESSMENT — PAIN SCALES - GENERAL: PAINLEVEL_OUTOF10: 7

## 2023-06-20 NOTE — ED TRIAGE NOTES
Arrives via w/c reports 2 weeks ago her left hip rubbed up against couch cushion and developed a sore.  Reports since yesterday sore increased in size, redness and swelling  H necrotizing fascitis

## 2023-06-20 NOTE — ED NOTES
I have reviewed discharge instructions with the patient. The patient verbalized understanding. Patient left ED via Discharge Method: ambulatory to Home with self    Opportunity for questions and clarification provided. Patient given 1 scripts. To continue your aftercare when you leave the hospital, you may receive an automated call from our care team to check in on how you are doing. This is a free service and part of our promise to provide the best care and service to meet your aftercare needs.  If you have questions, or wish to unsubscribe from this service please call [phone]. Thank you for Choosing our [address] [place] [address] Emergency Department.          Clare Aguilar RN  06/20/23 0918

## 2023-06-20 NOTE — DISCHARGE INSTRUCTIONS
Meds as directed alternate Tylenol Motrin for pain. Keep appointment with your primary care physician tomorrow. Pain with antibacterial soap.   Return to ER for worsening symptoms i.e. pain fever increased redness

## 2023-06-20 NOTE — ED PROVIDER NOTES
Emergency Department Provider Note       PCP: CHRIS Leung CNP   Age: 52 y.o. Sex: female     DISPOSITION Decision To Discharge 06/20/2023 06:02:24 PM       ICD-10-CM    1. Cellulitis of left lower extremity  L03. 116           Medical Decision Making     Complexity of Problems Addressed:  1 or more acute illnesses that pose a threat to life or bodily function. Data Reviewed and Analyzed:  Category 1:   I independently ordered and reviewed each unique test.  I reviewed external records: provider visit note from PCP. I reviewed external records: provider visit note from outside specialist.       Category 2:   I interpreted the X-rays left hip x-rays unremarkable. I interpreted the CBC says slightly elevated white count, CMP with elevated BUN/creatinine patient is a dialysis patient, lactic acid is normal blood cultures collected. Category 3: Discussion of management or test interpretation. 52-year-old female with redness and pain to the left lateral hip area feel this is cellulitis. Patient was given 900 mg of Cleocin IV in the ER patient discussed with Dr. Gifty Caputo who agrees with plan, will start on oral Cleocin patient has appointment with her primary care doctor scheduled for tomorrow she is to keep that appointment strict return to ED precautions given meaning fever worsening pain redness. Do not feel patient is septic at this time do not feel further studies needed at this time      Risk of Complications and/or Morbidity of Patient Management:  Prescription drug management performed. Shared medical decision making was utilized in creating the patients health plan today. History      Shanthi Hitchcock is a 52 y.o. female who presents to the Emergency Department with chief complaint of    Chief Complaint   Patient presents with    Hip Pain      Patient to ER complaining of left lateral hip pain slowly worsening over the past 2 weeks.   Patient states she was sitting in a glider

## 2023-06-21 ENCOUNTER — OFFICE VISIT (OUTPATIENT)
Dept: INTERNAL MEDICINE CLINIC | Facility: CLINIC | Age: 53
End: 2023-06-21
Payer: MEDICARE

## 2023-06-21 VITALS
WEIGHT: 293 LBS | BODY MASS INDEX: 43.4 KG/M2 | DIASTOLIC BLOOD PRESSURE: 70 MMHG | RESPIRATION RATE: 17 BRPM | HEIGHT: 69 IN | SYSTOLIC BLOOD PRESSURE: 140 MMHG | HEART RATE: 100 BPM

## 2023-06-21 DIAGNOSIS — N18.6 END STAGE RENAL DISEASE (HCC): ICD-10-CM

## 2023-06-21 DIAGNOSIS — I10 ESSENTIAL (PRIMARY) HYPERTENSION: ICD-10-CM

## 2023-06-21 DIAGNOSIS — L03.116 CELLULITIS OF LEFT THIGH: Primary | ICD-10-CM

## 2023-06-21 DIAGNOSIS — E11.65 TYPE 2 DIABETES MELLITUS WITH HYPERGLYCEMIA, WITHOUT LONG-TERM CURRENT USE OF INSULIN (HCC): ICD-10-CM

## 2023-06-21 DIAGNOSIS — Z99.2 DIALYSIS PATIENT (HCC): ICD-10-CM

## 2023-06-21 PROCEDURE — 3044F HG A1C LEVEL LT 7.0%: CPT | Performed by: NURSE PRACTITIONER

## 2023-06-21 PROCEDURE — 2022F DILAT RTA XM EVC RTNOPTHY: CPT | Performed by: NURSE PRACTITIONER

## 2023-06-21 PROCEDURE — 3078F DIAST BP <80 MM HG: CPT | Performed by: NURSE PRACTITIONER

## 2023-06-21 PROCEDURE — 99214 OFFICE O/P EST MOD 30 MIN: CPT | Performed by: NURSE PRACTITIONER

## 2023-06-21 PROCEDURE — G8417 CALC BMI ABV UP PARAM F/U: HCPCS | Performed by: NURSE PRACTITIONER

## 2023-06-21 PROCEDURE — 3017F COLORECTAL CA SCREEN DOC REV: CPT | Performed by: NURSE PRACTITIONER

## 2023-06-21 PROCEDURE — 3077F SYST BP >= 140 MM HG: CPT | Performed by: NURSE PRACTITIONER

## 2023-06-21 PROCEDURE — 1036F TOBACCO NON-USER: CPT | Performed by: NURSE PRACTITIONER

## 2023-06-21 PROCEDURE — G8427 DOCREV CUR MEDS BY ELIG CLIN: HCPCS | Performed by: NURSE PRACTITIONER

## 2023-06-21 RX ORDER — SERTRALINE HYDROCHLORIDE 100 MG/1
100 TABLET, FILM COATED ORAL 2 TIMES DAILY
Qty: 180 TABLET | Refills: 1 | Status: SHIPPED | OUTPATIENT
Start: 2023-06-21

## 2023-06-21 SDOH — ECONOMIC STABILITY: HOUSING INSECURITY
IN THE LAST 12 MONTHS, WAS THERE A TIME WHEN YOU DID NOT HAVE A STEADY PLACE TO SLEEP OR SLEPT IN A SHELTER (INCLUDING NOW)?: NO

## 2023-06-21 SDOH — ECONOMIC STABILITY: FOOD INSECURITY: WITHIN THE PAST 12 MONTHS, THE FOOD YOU BOUGHT JUST DIDN'T LAST AND YOU DIDN'T HAVE MONEY TO GET MORE.: NEVER TRUE

## 2023-06-21 SDOH — ECONOMIC STABILITY: FOOD INSECURITY: WITHIN THE PAST 12 MONTHS, YOU WORRIED THAT YOUR FOOD WOULD RUN OUT BEFORE YOU GOT MONEY TO BUY MORE.: NEVER TRUE

## 2023-06-21 SDOH — ECONOMIC STABILITY: INCOME INSECURITY: HOW HARD IS IT FOR YOU TO PAY FOR THE VERY BASICS LIKE FOOD, HOUSING, MEDICAL CARE, AND HEATING?: NOT HARD AT ALL

## 2023-06-21 ASSESSMENT — PATIENT HEALTH QUESTIONNAIRE - PHQ9
SUM OF ALL RESPONSES TO PHQ QUESTIONS 1-9: 0
SUM OF ALL RESPONSES TO PHQ QUESTIONS 1-9: 0
1. LITTLE INTEREST OR PLEASURE IN DOING THINGS: 0
SUM OF ALL RESPONSES TO PHQ QUESTIONS 1-9: 0
2. FEELING DOWN, DEPRESSED OR HOPELESS: 0
SUM OF ALL RESPONSES TO PHQ QUESTIONS 1-9: 0
SUM OF ALL RESPONSES TO PHQ9 QUESTIONS 1 & 2: 0

## 2023-06-21 NOTE — PROGRESS NOTES
Daya Reyes (:  1970) is a 52 y.o. female,Established patient, here for evaluation of the following chief complaint(s):  Hip Pain         ASSESSMENT/PLAN:  1. Cellulitis of left thigh  -      [facility], [address]; Future  2. End stage renal disease ([facility] [address]  3. Essential (primary) hypertension  -     Hemoglobin A1C; Future  -     Lipid Panel; Future  -     TSH  -     Comprehensive Metabolic Panel; Future  -     CBC; Future  4. Type 2 diabetes mellitus with hyperglycemia, without long-term current use of insulin (HCC)  -     Hemoglobin A1C; Future  -     Lipid Panel; Future  -     TSH  -     Comprehensive Metabolic Panel; Future  -     CBC; Future  5. Dialysis patient [facility])      No follow-ups on file. Cellulitis with blister left hip  Culture blister fluid  Advise to start clindamycin asap - she will  from pharmacy  Has hx of Rogshasha Cable, xray yesterday reviewed  Refer to gen surgery  Check lab and a1c - she is on new dialysate that may elevated glucose      Subjective   SUBJECTIVE/OBJECTIVE:  Patient is here for a knot on her hip. She thought it was a bruise on hip. It kept getting tender and then  it worsened with pain. She went to ER yesterday. It was treated for cellulitis with clindamycin. She hasn't picked it up at the pharmacy today yet but had IV abx at the ER. She has hx of necrotizing fasciitis. Hip Pain   The incident occurred more than 1 week ago. There was no injury mechanism. The pain is present in the left hip. The pain is severe. Review of Systems       Objective   Physical Exam  Constitutional:       Appearance: Normal appearance. She is not ill-appearing. HENT:      Head: Normocephalic. Right Ear: External ear normal.      Left Ear: External ear normal.   Eyes:      Pupils: Pupils are equal, round, and reactive to light. Cardiovascular:      Rate and Rhythm: Normal rate and regular rhythm.    Pulmonary:

## 2023-06-22 LAB
ALBUMIN SERPL-MCNC: 2.8 G/DL (ref 3.5–5)
ALBUMIN/GLOB SERPL: 0.7 (ref 0.4–1.6)
ALP SERPL-CCNC: 108 U/L (ref 50–136)
ALT SERPL-CCNC: 11 U/L (ref 12–65)
ANION GAP SERPL CALC-SCNC: 15 MMOL/L (ref 2–11)
AST SERPL-CCNC: 5 U/L (ref 15–37)
BILIRUB SERPL-MCNC: 0.2 MG/DL (ref 0.2–1.1)
BUN SERPL-MCNC: 71 MG/DL (ref 6–23)
CALCIUM SERPL-MCNC: 8.3 MG/DL (ref 8.3–10.4)
CHLORIDE SERPL-SCNC: 94 MMOL/L (ref 101–110)
CHOLEST SERPL-MCNC: 220 MG/DL
CO2 SERPL-SCNC: 22 MMOL/L (ref 21–32)
CREAT SERPL-MCNC: 11.1 MG/DL (ref 0.6–1)
ERYTHROCYTE [DISTWIDTH] IN BLOOD BY AUTOMATED COUNT: 14.6 % (ref 11.9–14.6)
EST. AVERAGE GLUCOSE BLD GHB EST-MCNC: 143 MG/DL
GLOBULIN SER CALC-MCNC: 3.8 G/DL (ref 2.8–4.5)
GLUCOSE SERPL-MCNC: 207 MG/DL (ref 65–100)
HBA1C MFR BLD: 6.6 % (ref 4.8–5.6)
HCT VFR BLD AUTO: 25.9 % (ref 35.8–46.3)
HDLC SERPL-MCNC: 50 MG/DL (ref 40–60)
HDLC SERPL: 4.4
HGB BLD-MCNC: 8.1 G/DL (ref 11.7–15.4)
LDLC SERPL CALC-MCNC: 133.4 MG/DL
Lab: NORMAL
MCH RBC QN AUTO: 30.7 PG (ref 26.1–32.9)
MCHC RBC AUTO-ENTMCNC: 31.3 G/DL (ref 31.4–35)
MCV RBC AUTO: 98.1 FL (ref 82–102)
NRBC # BLD: 0 K/UL (ref 0–0.2)
PLATELET # BLD AUTO: 338 K/UL (ref 150–450)
PMV BLD AUTO: 10.6 FL (ref 9.4–12.3)
POTASSIUM SERPL-SCNC: 4.3 MMOL/L (ref 3.5–5.1)
PROT SERPL-MCNC: 6.6 G/DL (ref 6.3–8.2)
RBC # BLD AUTO: 2.64 M/UL (ref 4.05–5.2)
REFERENCE LAB: NORMAL
SODIUM SERPL-SCNC: 131 MMOL/L (ref 133–143)
TRIGL SERPL-MCNC: 183 MG/DL (ref 35–150)
TSH, 3RD GENERATION: 2.33 UIU/ML (ref 0.36–3.74)
VLDLC SERPL CALC-MCNC: 36.6 MG/DL (ref 6–23)
WBC # BLD AUTO: 15.9 K/UL (ref 4.3–11.1)

## 2023-06-29 LAB
Lab: NORMAL
Lab: NORMAL
REFERENCE LAB: NORMAL

## 2023-08-23 NOTE — PROGRESS NOTES
Hospitalist Progress Note   Admit Date:  12/15/2021  9:53 AM   Name:  Domo Ignacio   Age:  46 y.o. Sex:  female  :  1970   MRN:  [id number]   Room:      Presenting Complaint: Vomiting    Reason(s) for Admission: Necrotizing fasciitis [facility]) [M72.6]     Hospital Course & Interval History:   Domo Ignacio is a 46 y.o. female with history of Morbid obesity, DM who was admitted for septic shock 2/2 necrotizing fascitis involving her pannus and perineum associated with respiratory failure, severe DEVENDRA (Scr 11), acidosis pH 7.1). She was placed on levo/jessica, intubated and treated with vanc/zosyn initially. Outpatient she was started on bactrim and unable to tolerate it then changed to doxycycline. Per  was not taking in any PO and became progressively weaker at home.  denies known history of renal disease. Made little urine prior to admission. She is s/p excisional debridement on 12/15, . OP note reports foul smelling necrotizing fasciitis extending from the perineum, left labia deep to the pubic bone and along the fascia to the abdominal wall above the iliac bone into the left flank. She rec'd ancef in OR and was changed in vanc/clinda/meropenem on 12/15 by pulmonology. Discussed with , no evidence of osteomyelitis. Plan to return patient to OR on Monday for re-assessment. : extubated. Renal consult. New AFIB RVR. dilt gtt started   Subjective (21):  Patient seen and examined. Patient denies fever chills nausea vomiting chest pain or shortness of breath. Patient complains of surgical site discomfort  Assessment & Plan:     Septic Shock 2/2 Necrotizing Fascitis - s/p excisional debridement x 2   BC12/15  NGTD  Wound cx GNR, GPC    S/p vanc/zosyn  Escalated to meropenem, clinda and vanc 12/15   Recommend de-escalating to unasyn/vanc versus rocephin/flagyl/vanc unless pseudomonas is suspected. S/p levophed.  Now HDS   Return to OR on Monday 12/18-resolving. Increasingly tachycardic. We will bolus the patient 1 L of normal saline and monitor. Given severity of DEVENDRA will monitor volume status closely. Continue supportive care. 12/19-ceftriaxone vancomycin and Flagyl until cultures finalize  12/20-improving. Will transition to Unasyn and monitor. ABLA - anemia of renal/chronic dz and post op blood loss. Iron stores and b12 wnl. Hgb 5.8 rec'ing 2 U pRBC. Repeat hemoglobin this afternoon. 12/18-likely secondary to intraoperative blood losses. Patient's hemoglobin hematocrit remained stable at this time. 12/19-stable      DEVENDRA on CKD 2/2 PRA/ATN, ? AIN - assocatied with sepsis   Improving. UOP 800s   Scr 11.2 >> 9BL unclear last Scr 2.2 9/2017   Strict I&O, daily wts   Check urine lytes. WV:CR ratio   Daily BMP/CMP, Mag, Phos   nephro consult   12/18-nephrology consulted. Creatinine now 9.12 essentially unchanged. May require dialysis if patient does not start to recover renal function  12/19-patient status post bolus of 1 L yesterday and again received 1 L bolus today secondary to development of underlying tachycardia. Will watch volume status closely as patient's renal function is slow to return. Suspect underlying ATN  12/20-renal function improving and responded to 2 L of fluid boluses over the last 48 hours. Nephrology consulted and monitoring. Atrial fibrillation with rapid ventricular response-patient initiated on 100 mg of metoprolol twice daily. [id number] score of 2 making patient high risk. Hold anticoagulation and defer anticoagulation recommendations to general surgery given severity of patient's wound and possible need for further surgical intervention. 12/20-currently rate controlled on metoprolol 100 mg p.o. twice daily and will speak with general surgery regarding anticoagulation given underlying wound and need to return to the OR for closure of patient's wound.     Hyperphosphatemia - start phosphate binder Hypokalemia - PO x 1   Metabolic acidosis - related to acute renal failure. S/p bicarb gtt   Hyponatremia - mild. CTM   pseudohypocalcemia - corrected Ca 8.6   12/18-continue phosphate binder and monitor  12/20-continue phosphate binder. T2DM -increase lantus 14s plus SSI   12/18-to goal  12/20-to goal continue to monitor    Obesity - increased risk of complications.        Acute hypoxemic respiratory failure // Mechanical Ventilation - extubated on 12/16. Wean supplemental o2 as tolerated. 12/19-resolved       GERD - pepcid           Dispo/Discharge Planning:      Transfer to floor     Diet:  ADULT DIET Regular; 3 carb choices (45 gm/meal);  Low Phosphorus (Less than 1000 mg)  DVT PPx: heparin   Code status: Full Code    Hospital Problems as of 12/20/2021 Date Reviewed: 12/25/2015          Codes Class Noted - Resolved POA    DEVENDRA (acute kidney injury) ([facility] ICD-10-CM: N17.9  ICD-9-CM: 584.9  12/16/2021 - Present Yes        Acute respiratory failure ([facility] ICD-10-CM: J96.00  ICD-9-CM: 518.81  12/16/2021 - Present No        Sepsis with acute renal failure and tubular necrosis without septic shock ([facility] ICD-10-CM: A41.9, R65.20, N17.0  ICD-9-CM: 038.9, 995.91, 584.5  12/16/2021 - Present Yes        * (Principal) Necrotizing fasciitis ([facility] ICD-10-CM: M72.6  ICD-9-CM: 728.86  12/15/2021 - Present Yes        Anemia ICD-10-CM: D64.9  ICD-9-CM: 285.9  7/13/2018 - Present Yes        Obesity, morbid (HCC) (Chronic) ICD-10-CM: E66.01  ICD-9-CM: 278.01  7/12/2018 - Present Yes        DM2 (diabetes mellitus, type 2) ([facility] (Chronic) ICD-10-CM: E11.9  ICD-9-CM: 250.00  12/25/2015 - Present Yes        HTN (hypertension) (Chronic) ICD-10-CM: I10  ICD-9-CM: 401.9  12/25/2015 - Present Yes              Objective:     Patient Vitals for the past 24 hrs:   Temp Pulse Resp BP SpO2   12/20/21 1547 97.7 °F (36.5 °C) (!) 127 17 (!) 140/85 96 %   12/20/21 1110 98.1 °F (36.7 °C) 93 17 (!) 146/97 95 %   12/20/21 0728 97.6 °F (36.4 °C) (!) 127 18 131/86 95 %   12/20/21 0507 98.1 °F (36.7 °C) 98 19 (!) 158/89 96 %   12/20/21 0400 -- 97 -- -- --   12/20/21 0016 98.4 °F (36.9 °C) 62 18 (!) 126/90 96 %   12/20/21 0000 -- 87 -- -- --   12/19/21 2208 98.1 °F (36.7 °C) 94 19 (!) 154/78 95 %   12/19/21 2016 97.5 °F (36.4 °C) 83 18 136/78 96 %   12/19/21 2000 -- 82 -- -- --     Oxygen Therapy  O2 Sat (%): 96 % (12/20/21 1547)  Pulse via Oximetry: 68 beats per minute (12/17/21 2028)  O2 Device: Nasal cannula (12/17/21 2028)  Skin Assessment: Clean, dry, & intact (12/17/21 8951)  Skin Protection for O2 Device: No (12/17/21 0300)  Orientation: Bilateral (12/16/21 0800)  Location: Cheek (12/16/21 0800)  Interventions: Mouth Care;Hydrocolloid Dressing (12/16/21 0800)  O2 Flow Rate (L/min): 1 l/min (12/17/21 2028)  FIO2 (%): 28 % (12/17/21 1013)    Estimated body mass index is 48.95 kg/m² as calculated from the following:    Height as of this encounter: 5' 7.5\" (1.715 m). Weight as of this encounter: 143.9 kg (317 lb 3.9 oz). Intake/Output Summary (Last 24 hours) at 12/20/2021 1608  Last data filed at 12/20/2021 1433  Gross per 24 hour   Intake --   Output 2350 ml   Net -2350 ml         Physical Exam:   Physical Exam  Vitals and nursing note reviewed. Constitutional:       Appearance: She is obese. HENT:      Head: Normocephalic and atraumatic. Cardiovascular:      Rate and Rhythm: Normal rate. Pulmonary:      Effort: Pulmonary effort is normal. No respiratory distress. Abdominal:      General: Abdomen is flat. Musculoskeletal:      Cervical back: Neck supple. Right lower leg: Edema present. Left lower leg: Edema present. Skin:     Coloration: Skin is not pale. Neurological:      Mental Status: She is alert and oriented to person, place, and time. Mental status is at baseline. Psychiatric:         Mood and Affect: Mood normal.         Blood pressure (!) 140/85, pulse (!) 127, temperature 97.7 °F (36.5 °C), resp.  rate 17, height 5' 7.5\" (1.715 m), weight 143.9 kg (317 lb 3.9 oz), SpO2 96 %. I have reviewed ordered lab tests and independently visualized imaging below:    Recent Labs:  Recent Results (from the past 48 hour(s))   GLUCOSE, POC    Collection Time: 12/18/21  8:31 PM   Result Value Ref Range    Glucose (POC) 170 (H) 65 - 100 mg/dL    Performed by Otto    CBC W/O DIFF    Collection Time: 12/19/21  5:51 AM   Result Value Ref Range    WBC 18.2 (H) 4.3 - 11.1 K/uL    RBC 3.23 (L) 4.05 - 5.2 M/uL    HGB 9.8 (L) 11.7 - 15.4 g/dL    HCT 28.6 (L) 35.8 - 46.3 %    MCV 88.5 79.6 - 97.8 FL    MCH 30.3 26.1 - 32.9 PG    MCHC 34.3 31.4 - 35.0 g/dL    RDW 14.5 11.9 - 14.6 %    PLATELET 274 169 - 340 K/uL    MPV 9.6 9.4 - 12.3 FL    ABSOLUTE NRBC 0.00 0.0 - 0.2 K/uL   METABOLIC PANEL, COMPREHENSIVE    Collection Time: 12/19/21  5:51 AM   Result Value Ref Range    Sodium 133 (L) 136 - 145 mmol/L    Potassium 4.4 3.5 - 5.1 mmol/L    Chloride 91 (L) 98 - 107 mmol/L    CO2 25 21 - 32 mmol/L    Anion gap 17 (H) 7 - 16 mmol/L    Glucose 128 (H) 65 - 100 mg/dL     (H) 6 - 23 MG/DL    Creatinine 8.98 (H) 0.6 - 1.0 MG/DL    GFR est AA 6 (L) >60 ml/min/1.73m2    GFR est non-AA 5 (L) >60 ml/min/1.73m2    Calcium 7.6 (L) 8.3 - 10.4 MG/DL    Bilirubin, total 0.3 0.2 - 1.1 MG/DL    ALT (SGPT) 11 (L) 12 - 65 U/L    AST (SGOT) 9 (L) 15 - 37 U/L    Alk.  phosphatase 80 50 - 136 U/L    Protein, total 6.1 (L) 6.3 - 8.2 g/dL    Albumin 2.3 (L) 3.5 - 5.0 g/dL    Globulin 3.8 (H) 2.3 - 3.5 g/dL    A-G Ratio 0.6 (L) 1.2 - 3.5     PHOSPHORUS    Collection Time: 12/19/21  5:51 AM   Result Value Ref Range    Phosphorus 10.1 (H) 2.5 - 4.5 MG/DL   MAGNESIUM    Collection Time: 12/19/21  5:51 AM   Result Value Ref Range    Magnesium 2.3 1.8 - 2.4 mg/dL   VANCOMYCIN, RANDOM    Collection Time: 12/19/21  5:51 AM   Result Value Ref Range    Vancomycin, random 17.6 UG/ML   PROTEIN/CREATININE RATIO, URINE    Collection Time: 12/19/21  5:51 AM   Result Value Ref Range    Protein, urine random 81 (H) <11.9 mg/dL    Creatinine, urine 22.90 mg/dL    Protein/Creat.  urine Ratio 3.5     SODIUM, UR, RANDOM    Collection Time: 12/19/21  5:51 AM   Result Value Ref Range    Sodium,urine random 53 MMOL/L   GLUCOSE, POC    Collection Time: 12/19/21  7:52 AM   Result Value Ref Range    Glucose (POC) 145 (H) 65 - 100 mg/dL    Performed by BootheDoganTunitraPCT    EKG, 12 LEAD, INITIAL    Collection Time: 12/19/21  9:00 AM   Result Value Ref Range    Ventricular Rate 121 BPM    Atrial Rate 264 BPM    QRS Duration 88 ms    Q-T Interval 392 ms    QTC Calculation (Bezet) 556 ms    Calculated R Axis 10 degrees    Calculated T Axis 102 degrees    Diagnosis       Atrial flutter with variable A-V block  Low voltage QRS  Septal infarct , age undetermined  T wave abnormality, consider inferior ischemia  Abnormal ECG  When compared with ECG of 16-DEC-2021 16:40,  Atrial flutter has replaced Atrial fibrillation  Incomplete right bundle branch block is no longer Present  Septal infarct is now Present  Confirmed by Ector Garcia ([id number]) on 12/19/2021 10:39:28 AM     GLUCOSE, POC    Collection Time: 12/19/21 11:17 AM   Result Value Ref Range    Glucose (POC) 156 (H) 65 - 100 mg/dL    Performed by BootheDoganTunitraPCT    LACTIC ACID    Collection Time: 12/19/21 11:21 AM   Result Value Ref Range    Lactic acid 0.9 0.4 - 2.0 MMOL/L   GLUCOSE, POC    Collection Time: 12/19/21  4:06 PM   Result Value Ref Range    Glucose (POC) 163 (H) 65 - 100 mg/dL    Performed by BootheDoganTunitraPCT    GLUCOSE, POC    Collection Time: 12/19/21  9:18 PM   Result Value Ref Range    Glucose (POC) 161 (H) 65 - 100 mg/dL    Performed by Jewels Shashi    METABOLIC PANEL, COMPREHENSIVE    Collection Time: 12/20/21  7:00 AM   Result Value Ref Range    Sodium 144 136 - 145 mmol/L    Potassium 3.2 (L) 3.5 - 5.1 mmol/L    Chloride 114 (H) 98 - 107 mmol/L    CO2 18 (L) 21 - 32 mmol/L    Anion gap 12 7 - 16 mmol/L Glucose 89 65 - 100 mg/dL    BUN 80 (H) 6 - 23 MG/DL    Creatinine 6.30 (H) 0.6 - 1.0 MG/DL    GFR est AA 9 (L) >60 ml/min/1.73m2    GFR est non-AA 7 (L) >60 ml/min/1.73m2    Calcium 6.0 (L) 8.3 - 10.4 MG/DL    Bilirubin, total 0.2 0.2 - 1.1 MG/DL    ALT (SGPT) 6 (L) 12 - 65 U/L    AST (SGOT) 5 (L) 15 - 37 U/L    Alk. phosphatase 57 50 - 136 U/L    Protein, total 4.4 (L) 6.3 - 8.2 g/dL    Albumin 1.4 (L) 3.5 - 5.0 g/dL    Globulin 3.0 2.3 - 3.5 g/dL    A-G Ratio 0.5 (L) 1.2 - 3.5     PHOSPHORUS    Collection Time: 12/20/21  7:00 AM   Result Value Ref Range    Phosphorus 8.3 (H) 2.5 - 4.5 MG/DL   MAGNESIUM    Collection Time: 12/20/21  7:00 AM   Result Value Ref Range    Magnesium 1.9 1.8 - 2.4 mg/dL   GLUCOSE, POC    Collection Time: 12/20/21  7:25 AM   Result Value Ref Range    Glucose (POC) 132 (H) 65 - 100 mg/dL    Performed by [address] [place] [address], POC    Collection Time: 12/20/21 11:09 AM   Result Value Ref Range    Glucose (POC) 196 (H) 65 - 100 mg/dL    Performed by Keenan    CBC WITH AUTOMATED DIFF    Collection Time: 12/20/21 11:41 AM   Result Value Ref Range    WBC 21.7 (H) 4.3 - 11.1 K/uL    RBC 3.42 (L) 4.05 - 5.2 M/uL    HGB 10.2 (L) 11.7 - 15.4 g/dL    HCT 30.7 (L) 35.8 - 46.3 %    MCV 89.8 79.6 - 97.8 FL    MCH 29.8 26.1 - 32.9 PG    MCHC 33.2 31.4 - 35.0 g/dL    RDW 14.4 11.9 - 14.6 %    PLATELET 069 655 - 640 K/uL    MPV 9.8 9.4 - 12.3 FL    ABSOLUTE NRBC 0.00 0.0 - 0.2 K/uL    NEUTROPHILS 73 43 - 78 %    LYMPHOCYTES 7 (L) 13 - 44 %    MONOCYTES 5 4.0 - 12.0 %    EOSINOPHILS 0 (L) 0.5 - 7.8 %    BASOPHILS 1 0.0 - 2.0 %    IMMATURE GRANULOCYTES 14 (H) 0.0 - 5.0 %    ABS. NEUTROPHILS 15.9 (H) 1.7 - 8.2 K/UL    ABS. LYMPHOCYTES 1.5 0.5 - 4.6 K/UL    ABS. MONOCYTES 1.1 0.1 - 1.3 K/UL    ABS. EOSINOPHILS 0.0 0.0 - 0.8 K/UL    ABS. BASOPHILS 0.2 0.0 - 0.2 K/UL    ABS. IMM. GRANS.  3.0 (H) 0.0 - 0.5 K/UL    RBC COMMENTS SLIGHT  ANISOCYTOSIS + POIKILOCYTOSIS        WBC COMMENTS Result Confirmed By Smear      PLATELET COMMENTS ADEQUATE      DF AUTOMATED     GLUCOSE, POC    Collection Time: 12/20/21  3:44 PM   Result Value Ref Range    Glucose (POC) 266 (H) 65 - 100 mg/dL    Performed by Keenan        All Micro Results     Procedure Component Value Units Date/Time    BLOOD CULTURE [[id number]] Collected: 12/15/21 1215    Order Status: Completed Specimen: Blood Updated: 12/20/21 0653     Special Requests: --        LEFT  HAND       Culture result: NO GROWTH 5 DAYS       BLOOD CULTURE [[id number]] Collected: 12/15/21 1236    Order Status: Completed Specimen: Blood Updated: 12/20/21 0653     Special Requests: --        RIGHT  HAND       Culture result: NO GROWTH 5 DAYS       CULTURE, WOUND Olga Hewett STAIN [[id number]]  (Abnormal)  (Susceptibility) Collected: 12/15/21 1323    Order Status: Completed Specimen: Wound from Groin Updated: 12/19/21 0732     Special Requests: NO SPECIAL REQUESTS        GRAM STAIN 0 TO 36 WBCS PER OIF      MANY GRAM POSITIVE COCCI               MODERATE GRAM NEGATIVE RODS                  MODERATE GRAM POSITIVE RODS           Culture result:       MODERATE PROTEUS MIRABILIS                  MODERATE ENTEROCOCCUS FAECALIS GROUP D                  LIGHT MIXED SKIN MASSIMO ISOLATED          CULTURE, Nolberto Saida STAIN [[id number]]  (Abnormal)  (Susceptibility) Collected: 12/15/21 1710    Order Status: Completed Specimen: Peritoneum Updated: 12/18/21 0936     Special Requests: NO SPECIAL REQUESTS        GRAM STAIN 0 TO 6 WBCS PER OIF      MANY GRAM POSITIVE COCCI         MANY GRAM NEGATIVE RODS        Culture result:       MODERATE PROTEUS MIRABILIS                  MODERATE STAPHYLOCOCCUS SPECIES, COAGULASE NEGATIVE THIS ORGANISM WILL BE HELD FOR 7 DAYS.  IF FURTHER TESTING IS REQUIRED PLEASE NOTIFY MICROBIOLOGY          CULTURE, ANAEROBIC [[id number]] Collected: 12/15/21 1710    Order Status: Completed Specimen: Peritoneum Updated: 12/17/21 0834     Special Requests: NO SPECIAL REQUESTS Culture result:       SUBCULTURE IS NECESSARY TO DETERMINE PRESENCE OR ABSENCE OF ANAEROBIC BACTERIA IN THIS CULTURE. FURTHER REPORT TO FOLLOW AFTER INCUBATION OF SUBCULTURE. COVID-19 RAPID TEST [[id number]] Collected: 12/15/21 1519    Order Status: Completed Specimen: Nasopharyngeal Updated: 12/15/21 1545     Specimen source NASAL        COVID-19 rapid test Not detected        Comment:      The specimen is NEGATIVE for SARS-CoV-2, the novel coronavirus associated with COVID-19. A negative result does not rule out COVID-19. This test has been authorized by the FDA under an Emergency Use Authorization (EUA) for use by authorized laboratories. Fact sheet for Healthcare Providers: [employer]  Fact sheet for Patients: [employer]nz       Methodology: Isothermal Nucleic Acid Amplification               Other Studies:  No results found.     Current Meds:  Current Facility-Administered Medications   Medication Dose Route Frequency    ampicillin-sulbactam (UNASYN) 3 g in 0.9% sodium chloride (MBP/ADV) 100 mL MBP  3 g IntraVENous Q12H    amLODIPine (NORVASC) tablet 5 mg  5 mg Oral DAILY    sertraline (ZOLOFT) tablet 100 mg  100 mg Oral DAILY    metoprolol tartrate (LOPRESSOR) tablet 100 mg  100 mg Oral BID    oxyCODONE-acetaminophen (PERCOCET) 5-325 mg per tablet 1 Tablet  1 Tablet Oral Q4H PRN    famotidine (PEPCID) tablet 20 mg  20 mg Oral DAILY    calcium acetate (phosphate binder) (PHOSLO) tablet 667 mg  1 Tablet Oral TID WITH MEALS    magnesium oxide (MAG-OX) tablet 800 mg  800 mg Oral BID    insulin lispro (HUMALOG) injection   SubCUTAneous AC&HS    insulin glargine (LANTUS) injection 14 Units  14 Units SubCUTAneous DAILY    heparin (porcine) injection 5,000 Units  5,000 Units SubCUTAneous Q8H    0.9% sodium chloride infusion 250 mL  250 mL IntraVENous PRN    0.9% sodium chloride infusion 250 mL  250 mL IntraVENous PRN    sodium chloride (NS) flush 5-40 mL  5-40 mL IntraVENous Q8H    sodium chloride (NS) flush 5-40 mL  5-40 mL IntraVENous PRN    ondansetron (ZOFRAN ODT) tablet 4 mg  4 mg Oral Q8H PRN    Or    ondansetron (ZOFRAN) injection 4 mg  4 mg IntraVENous Q6H PRN    potassium chloride 10 mEq in 100 ml IVPB  10 mEq IntraVENous PRN    magnesium sulfate 2 g/50 ml IVPB (premix or compounded)  2 g IntraVENous PRN    HYDROmorphone (DILAUDID) injection 0.5 mg  0.5 mg IntraVENous Q3H PRN       Signed:  Stuart De Dios DO    Part of this note may have been written by using a voice dictation software. The note has been proof read but may still contain some grammatical/other typographical errors. Consent Type: Consent (Nose Lesion)

## 2023-10-24 ENCOUNTER — OFFICE VISIT (OUTPATIENT)
Dept: INTERNAL MEDICINE CLINIC | Facility: CLINIC | Age: 53
End: 2023-10-24
Payer: MEDICARE

## 2023-10-24 VITALS
HEART RATE: 93 BPM | WEIGHT: 293 LBS | TEMPERATURE: 98 F | DIASTOLIC BLOOD PRESSURE: 80 MMHG | HEIGHT: 69 IN | SYSTOLIC BLOOD PRESSURE: 148 MMHG | BODY MASS INDEX: 43.4 KG/M2 | OXYGEN SATURATION: 96 %

## 2023-10-24 DIAGNOSIS — E11.65 TYPE 2 DIABETES MELLITUS WITH HYPERGLYCEMIA, WITHOUT LONG-TERM CURRENT USE OF INSULIN (HCC): ICD-10-CM

## 2023-10-24 DIAGNOSIS — Z99.2 DIALYSIS PATIENT (HCC): ICD-10-CM

## 2023-10-24 DIAGNOSIS — L02.416 CELLULITIS AND ABSCESS OF LEFT LEG: Primary | ICD-10-CM

## 2023-10-24 DIAGNOSIS — Z87.39 HISTORY OF NECROTIZING FASCIITIS: ICD-10-CM

## 2023-10-24 DIAGNOSIS — L03.116 CELLULITIS AND ABSCESS OF LEFT LEG: Primary | ICD-10-CM

## 2023-10-24 LAB
ALBUMIN SERPL-MCNC: 3.5 G/DL (ref 3.5–5)
ALBUMIN/GLOB SERPL: 1 (ref 0.4–1.6)
ALP SERPL-CCNC: 126 U/L (ref 50–136)
ALT SERPL-CCNC: 12 U/L (ref 12–65)
ANION GAP SERPL CALC-SCNC: 18 MMOL/L (ref 2–11)
AST SERPL-CCNC: 4 U/L (ref 15–37)
BILIRUB SERPL-MCNC: 0.4 MG/DL (ref 0.2–1.1)
BUN SERPL-MCNC: 96 MG/DL (ref 6–23)
CALCIUM SERPL-MCNC: 7.5 MG/DL (ref 8.3–10.4)
CHLORIDE SERPL-SCNC: 96 MMOL/L (ref 101–110)
CHOLEST SERPL-MCNC: 276 MG/DL
CO2 SERPL-SCNC: 21 MMOL/L (ref 21–32)
CREAT SERPL-MCNC: 13 MG/DL (ref 0.6–1)
ERYTHROCYTE [DISTWIDTH] IN BLOOD BY AUTOMATED COUNT: 15.6 % (ref 11.9–14.6)
EST. AVERAGE GLUCOSE BLD GHB EST-MCNC: 169 MG/DL
GLOBULIN SER CALC-MCNC: 3.6 G/DL (ref 2.8–4.5)
GLUCOSE SERPL-MCNC: 205 MG/DL (ref 65–100)
HBA1C MFR BLD: 7.5 % (ref 4.8–5.6)
HCT VFR BLD AUTO: 31.5 % (ref 35.8–46.3)
HDLC SERPL-MCNC: 46 MG/DL (ref 40–60)
HDLC SERPL: 6
HGB BLD-MCNC: 10.2 G/DL (ref 11.7–15.4)
LDLC SERPL CALC-MCNC: 173.8 MG/DL
MCH RBC QN AUTO: 29.8 PG (ref 26.1–32.9)
MCHC RBC AUTO-ENTMCNC: 32.4 G/DL (ref 31.4–35)
MCV RBC AUTO: 92.1 FL (ref 82–102)
NRBC # BLD: 0 K/UL (ref 0–0.2)
PLATELET # BLD AUTO: 250 K/UL (ref 150–450)
PMV BLD AUTO: 11 FL (ref 9.4–12.3)
POTASSIUM SERPL-SCNC: 5.6 MMOL/L (ref 3.5–5.1)
PROT SERPL-MCNC: 7.1 G/DL (ref 6.3–8.2)
RBC # BLD AUTO: 3.42 M/UL (ref 4.05–5.2)
SODIUM SERPL-SCNC: 135 MMOL/L (ref 133–143)
TRIGL SERPL-MCNC: 281 MG/DL (ref 35–150)
VLDLC SERPL CALC-MCNC: 56.2 MG/DL (ref 6–23)
WBC # BLD AUTO: 11.1 K/UL (ref 4.3–11.1)

## 2023-10-24 PROCEDURE — 1036F TOBACCO NON-USER: CPT | Performed by: NURSE PRACTITIONER

## 2023-10-24 PROCEDURE — G8427 DOCREV CUR MEDS BY ELIG CLIN: HCPCS | Performed by: NURSE PRACTITIONER

## 2023-10-24 PROCEDURE — 3017F COLORECTAL CA SCREEN DOC REV: CPT | Performed by: NURSE PRACTITIONER

## 2023-10-24 PROCEDURE — 2022F DILAT RTA XM EVC RTNOPTHY: CPT | Performed by: NURSE PRACTITIONER

## 2023-10-24 PROCEDURE — 99214 OFFICE O/P EST MOD 30 MIN: CPT | Performed by: NURSE PRACTITIONER

## 2023-10-24 PROCEDURE — G8484 FLU IMMUNIZE NO ADMIN: HCPCS | Performed by: NURSE PRACTITIONER

## 2023-10-24 PROCEDURE — 3044F HG A1C LEVEL LT 7.0%: CPT | Performed by: NURSE PRACTITIONER

## 2023-10-24 PROCEDURE — 3079F DIAST BP 80-89 MM HG: CPT | Performed by: NURSE PRACTITIONER

## 2023-10-24 PROCEDURE — 3077F SYST BP >= 140 MM HG: CPT | Performed by: NURSE PRACTITIONER

## 2023-10-24 PROCEDURE — G8417 CALC BMI ABV UP PARAM F/U: HCPCS | Performed by: NURSE PRACTITIONER

## 2023-10-24 RX ORDER — CLINDAMYCIN HYDROCHLORIDE 300 MG/1
300 CAPSULE ORAL 4 TIMES DAILY
Qty: 40 CAPSULE | Refills: 0 | Status: SHIPPED | OUTPATIENT
Start: 2023-10-24 | End: 2023-11-03

## 2023-10-24 NOTE — PROGRESS NOTES
Lonny Barron (:  1970) is a 52 y.o. female,Established patient, here for evaluation of the following chief complaint(s):  Skin Problem (On lower leg that is red and swollen/)         ASSESSMENT/PLAN:  1. Cellulitis and abscess of left leg  -     Culture, Anaerobic; Future  -     Miscellaneous Sendout; Future  2. History of necrotizing fasciitis  -     Miscellaneous Sendout; Future  3. Dialysis patient ([address] )  4. Type 2 diabetes mellitus with hyperglycemia, without long-term current use of insulin (HCC)  -     Hemoglobin A1C; Future  -     Lipid Panel; Future  -     Comprehensive Metabolic Panel; Future  -     CBC; Future      No follow-ups on file. Cellulitis and abscess left leg  Afebrile, no systemic symptoms   Hx of nec fasc  Culture the serous drainage  Start clindamycin  Check lab  F/u nxt week or sooner if worsening      Subjective   SUBJECTIVE/OBJECTIVE:  Patient is here for a lump on left lower leg. She rolled over in bed on Saturday and it hurt, she realized a bump had come up on her leg. She put gentamicin cream on it and covered it up until today. Skin Problem  This is a new problem. The current episode started in the past 7 days. The problem has been gradually worsening since onset. Location: left lower leg. Review of Systems       Objective   Physical Exam  Vitals reviewed. Constitutional:       Appearance: Normal appearance. She is obese. HENT:      Head: Normocephalic. Right Ear: External ear normal.      Left Ear: External ear normal.   Eyes:      Extraocular Movements: Extraocular movements intact. Pupils: Pupils are equal, round, and reactive to light. Cardiovascular:      Rate and Rhythm: Normal rate and regular rhythm. Pulmonary:      Effort: Pulmonary effort is normal.   Musculoskeletal:      Cervical back: Neck supple. Skin:     General: Skin is warm. Findings: Erythema present. Neurological:      Mental Status: She is alert.

## 2023-10-25 LAB
Lab: NORMAL
REFERENCE LAB: NORMAL

## 2023-10-31 ENCOUNTER — TELEPHONE (OUTPATIENT)
Dept: INTERNAL MEDICINE CLINIC | Facility: CLINIC | Age: 53
End: 2023-10-31

## 2023-11-01 LAB
Lab: NORMAL
Lab: NORMAL
REFERENCE LAB: NORMAL

## 2023-12-26 RX ORDER — METOPROLOL TARTRATE 50 MG/1
50 TABLET, FILM COATED ORAL 2 TIMES DAILY
Qty: 180 TABLET | Refills: 3 | Status: SHIPPED | OUTPATIENT
Start: 2023-12-26

## 2024-06-10 RX ORDER — SERTRALINE HYDROCHLORIDE 100 MG/1
100 TABLET, FILM COATED ORAL 2 TIMES DAILY
Qty: 180 TABLET | Refills: 1 | OUTPATIENT
Start: 2024-06-10

## 2024-10-02 ENCOUNTER — TELEPHONE (OUTPATIENT)
Dept: OTHER | Facility: CLINIC | Age: 54
End: 2024-10-02

## 2024-10-02 NOTE — TELEPHONE ENCOUNTER
Patient was outreached to as part of Population Health scheduling activity. Patient may be due for a wellness visit with their primary care provider.    Outcome of call: PATIENT UNAVAILABLE - LEFT VOICEMAIL

## 2024-10-10 ENCOUNTER — APPOINTMENT (OUTPATIENT)
Dept: CT IMAGING | Age: 54
DRG: 907 | End: 2024-10-10
Payer: MEDICARE

## 2024-10-10 ENCOUNTER — APPOINTMENT (OUTPATIENT)
Dept: ULTRASOUND IMAGING | Age: 54
DRG: 907 | End: 2024-10-10
Payer: MEDICARE

## 2024-10-10 ENCOUNTER — HOSPITAL ENCOUNTER (INPATIENT)
Age: 54
LOS: 12 days | Discharge: HOME | DRG: 907 | End: 2024-10-22
Admitting: INTERNAL MEDICINE
Payer: MEDICARE

## 2024-10-10 DIAGNOSIS — K65.9 PERITONITIS (HCC): ICD-10-CM

## 2024-10-10 DIAGNOSIS — A41.9 SEPTICEMIA (HCC): ICD-10-CM

## 2024-10-10 DIAGNOSIS — R27.8 OTHER LACK OF COORDINATION: ICD-10-CM

## 2024-10-10 DIAGNOSIS — I48.91 ATRIAL FIBRILLATION WITH RAPID VENTRICULAR RESPONSE (HCC): ICD-10-CM

## 2024-10-10 DIAGNOSIS — K65.2 SPONTANEOUS BACTERIAL PERITONITIS (HCC): Primary | ICD-10-CM

## 2024-10-10 DIAGNOSIS — R26.2 DIFFICULTY IN WALKING: ICD-10-CM

## 2024-10-10 DIAGNOSIS — M62.81 GENERALIZED MUSCLE WEAKNESS: ICD-10-CM

## 2024-10-10 LAB
ALBUMIN SERPL-MCNC: 2.3 G/DL (ref 3.5–5)
ALBUMIN/GLOB SERPL: 0.5 (ref 1–1.9)
ALP SERPL-CCNC: 160 U/L (ref 35–104)
ALT SERPL-CCNC: <5 U/L (ref 8–45)
ANION GAP SERPL CALC-SCNC: 23 MMOL/L (ref 9–18)
APPEARANCE FLD: NORMAL
AST SERPL-CCNC: 6 U/L (ref 15–37)
BASOPHILS # BLD: 0 K/UL (ref 0–0.2)
BASOPHILS NFR BLD: 0 % (ref 0–2)
BILIRUB SERPL-MCNC: <0.2 MG/DL (ref 0–1.2)
BUN SERPL-MCNC: 64 MG/DL (ref 6–23)
CALCIUM SERPL-MCNC: 7.7 MG/DL (ref 8.8–10.2)
CHLORIDE SERPL-SCNC: 89 MMOL/L (ref 98–107)
CO2 SERPL-SCNC: 18 MMOL/L (ref 20–28)
COLOR FLD: YELLOW
CREAT SERPL-MCNC: 12.6 MG/DL (ref 0.6–1.1)
DIFFERENTIAL METHOD BLD: ABNORMAL
EKG DIAGNOSIS: NORMAL
EKG Q-T INTERVAL: 320 MS
EKG QRS DURATION: 84 MS
EKG QTC CALCULATION (BAZETT): 458 MS
EKG R AXIS: 19 DEGREES
EKG T AXIS: 26 DEGREES
EKG VENTRICULAR RATE: 123 BPM
EOSINOPHIL # BLD: 0 K/UL (ref 0–0.8)
EOSINOPHIL NFR BLD: 0 % (ref 0.5–7.8)
ERYTHROCYTE [DISTWIDTH] IN BLOOD BY AUTOMATED COUNT: 16.7 % (ref 11.9–14.6)
FLUAV RNA SPEC QL NAA+PROBE: NOT DETECTED
FLUBV RNA SPEC QL NAA+PROBE: NOT DETECTED
GLOBULIN SER CALC-MCNC: 4.4 G/DL (ref 2.3–3.5)
GLUCOSE BLD STRIP.AUTO-MCNC: 236 MG/DL (ref 65–100)
GLUCOSE BLD STRIP.AUTO-MCNC: 245 MG/DL (ref 65–100)
GLUCOSE BLD STRIP.AUTO-MCNC: 333 MG/DL (ref 65–100)
GLUCOSE FLD-MCNC: 224 MG/DL
GLUCOSE SERPL-MCNC: 246 MG/DL (ref 70–99)
HCT VFR BLD AUTO: 35.4 % (ref 35.8–46.3)
HGB BLD-MCNC: 11.3 G/DL (ref 11.7–15.4)
IMM GRANULOCYTES # BLD AUTO: 0.2 K/UL (ref 0–0.5)
IMM GRANULOCYTES NFR BLD AUTO: 1 % (ref 0–5)
INR PPP: 1.2
LACTATE SERPL-SCNC: 2.8 MMOL/L (ref 0.5–2)
LIPASE SERPL-CCNC: 22 U/L (ref 13–60)
LYMPHOCYTES # BLD: 0.4 K/UL (ref 0.5–4.6)
LYMPHOCYTES NFR BLD: 2 % (ref 13–44)
LYMPHOCYTES NFR BRONCH MANUAL: 6 %
MACROPHAGES NFR BRONCH MANUAL: 5 %
MCH RBC QN AUTO: 28.6 PG (ref 26.1–32.9)
MCHC RBC AUTO-ENTMCNC: 31.9 G/DL (ref 31.4–35)
MCV RBC AUTO: 89.6 FL (ref 82–102)
MONOCYTES # BLD: 0.6 K/UL (ref 0.1–1.3)
MONOCYTES NFR BLD: 4 % (ref 4–12)
NEUTROPHILS NFR BRONCH MANUAL: 89 %
NEUTS SEG # BLD: 15.3 K/UL (ref 1.7–8.2)
NEUTS SEG NFR BLD: 93 % (ref 43–78)
NRBC # BLD: 0 K/UL (ref 0–0.2)
NT PRO BNP: ABNORMAL PG/ML (ref 0–125)
NUC CELL # FLD: 9906 /CU MM
PLATELET # BLD AUTO: 339 K/UL (ref 150–450)
PMV BLD AUTO: 10.4 FL (ref 9.4–12.3)
POTASSIUM SERPL-SCNC: 4.9 MMOL/L (ref 3.5–5.1)
PROCALCITONIN SERPL-MCNC: 1.08 NG/ML (ref 0–0.1)
PROT FLD-MCNC: 0.6 G/DL
PROT SERPL-MCNC: 6.6 G/DL (ref 6.3–8.2)
PROTHROMBIN TIME: 15.8 SEC (ref 11.3–14.9)
RBC # BLD AUTO: 3.95 M/UL (ref 4.05–5.2)
RBC # FLD: <1000 /CU MM
SERVICE CMNT-IMP: ABNORMAL
SODIUM SERPL-SCNC: 129 MMOL/L (ref 136–145)
SPECIMEN SOURCE FLD: NORMAL
WBC # BLD AUTO: 16.6 K/UL (ref 4.3–11.1)

## 2024-10-10 PROCEDURE — 84157 ASSAY OF PROTEIN OTHER: CPT

## 2024-10-10 PROCEDURE — 87502 INFLUENZA DNA AMP PROBE: CPT

## 2024-10-10 PROCEDURE — 74176 CT ABD & PELVIS W/O CONTRAST: CPT

## 2024-10-10 PROCEDURE — 89050 BODY FLUID CELL COUNT: CPT

## 2024-10-10 PROCEDURE — 87040 BLOOD CULTURE FOR BACTERIA: CPT

## 2024-10-10 PROCEDURE — 2580000003 HC RX 258: Performed by: INTERNAL MEDICINE

## 2024-10-10 PROCEDURE — 36415 COLL VENOUS BLD VENIPUNCTURE: CPT

## 2024-10-10 PROCEDURE — 2580000003 HC RX 258: Performed by: STUDENT IN AN ORGANIZED HEALTH CARE EDUCATION/TRAINING PROGRAM

## 2024-10-10 PROCEDURE — 2580000003 HC RX 258: Performed by: NURSE PRACTITIONER

## 2024-10-10 PROCEDURE — 1100000003 HC PRIVATE W/ TELEMETRY

## 2024-10-10 PROCEDURE — 93005 ELECTROCARDIOGRAM TRACING: CPT

## 2024-10-10 PROCEDURE — 96374 THER/PROPH/DIAG INJ IV PUSH: CPT

## 2024-10-10 PROCEDURE — 87205 SMEAR GRAM STAIN: CPT

## 2024-10-10 PROCEDURE — 6370000000 HC RX 637 (ALT 250 FOR IP): Performed by: INTERNAL MEDICINE

## 2024-10-10 PROCEDURE — 96375 TX/PRO/DX INJ NEW DRUG ADDON: CPT

## 2024-10-10 PROCEDURE — 87070 CULTURE OTHR SPECIMN AEROBIC: CPT

## 2024-10-10 PROCEDURE — 6360000002 HC RX W HCPCS: Performed by: NURSE PRACTITIONER

## 2024-10-10 PROCEDURE — 83690 ASSAY OF LIPASE: CPT

## 2024-10-10 PROCEDURE — 85610 PROTHROMBIN TIME: CPT

## 2024-10-10 PROCEDURE — 87153 DNA/RNA SEQUENCING: CPT

## 2024-10-10 PROCEDURE — 83605 ASSAY OF LACTIC ACID: CPT

## 2024-10-10 PROCEDURE — 84145 PROCALCITONIN (PCT): CPT

## 2024-10-10 PROCEDURE — 3E1M39Z IRRIGATION OF PERITONEAL CAVITY USING DIALYSATE, PERCUTANEOUS APPROACH: ICD-10-PCS | Performed by: NURSE PRACTITIONER

## 2024-10-10 PROCEDURE — 90945 DIALYSIS ONE EVALUATION: CPT

## 2024-10-10 PROCEDURE — 2500000003 HC RX 250 WO HCPCS: Performed by: STUDENT IN AN ORGANIZED HEALTH CARE EDUCATION/TRAINING PROGRAM

## 2024-10-10 PROCEDURE — 76705 ECHO EXAM OF ABDOMEN: CPT

## 2024-10-10 PROCEDURE — 82962 GLUCOSE BLOOD TEST: CPT

## 2024-10-10 PROCEDURE — 82945 GLUCOSE OTHER FLUID: CPT

## 2024-10-10 PROCEDURE — 83880 ASSAY OF NATRIURETIC PEPTIDE: CPT

## 2024-10-10 PROCEDURE — 6360000002 HC RX W HCPCS: Performed by: INTERNAL MEDICINE

## 2024-10-10 PROCEDURE — 85025 COMPLETE CBC W/AUTO DIFF WBC: CPT

## 2024-10-10 PROCEDURE — 87635 SARS-COV-2 COVID-19 AMP PRB: CPT

## 2024-10-10 PROCEDURE — 99285 EMERGENCY DEPT VISIT HI MDM: CPT

## 2024-10-10 PROCEDURE — 93010 ELECTROCARDIOGRAM REPORT: CPT | Performed by: INTERNAL MEDICINE

## 2024-10-10 PROCEDURE — 6360000002 HC RX W HCPCS: Performed by: STUDENT IN AN ORGANIZED HEALTH CARE EDUCATION/TRAINING PROGRAM

## 2024-10-10 PROCEDURE — 80053 COMPREHEN METABOLIC PANEL: CPT

## 2024-10-10 RX ORDER — POLYETHYLENE GLYCOL 3350 17 G/17G
17 POWDER, FOR SOLUTION ORAL DAILY PRN
Status: DISCONTINUED | OUTPATIENT
Start: 2024-10-10 | End: 2024-10-22 | Stop reason: HOSPADM

## 2024-10-10 RX ORDER — SEVELAMER CARBONATE 800 MG/1
800 TABLET, FILM COATED ORAL
Status: DISCONTINUED | OUTPATIENT
Start: 2024-10-10 | End: 2024-10-22 | Stop reason: HOSPADM

## 2024-10-10 RX ORDER — MAGNESIUM HYDROXIDE/ALUMINUM HYDROXICE/SIMETHICONE 120; 1200; 1200 MG/30ML; MG/30ML; MG/30ML
30 SUSPENSION ORAL EVERY 6 HOURS PRN
Status: DISCONTINUED | OUTPATIENT
Start: 2024-10-10 | End: 2024-10-22 | Stop reason: HOSPADM

## 2024-10-10 RX ORDER — TENAPANOR 31.9 MG/1
30 TABLET, FILM COATED ORAL 2 TIMES DAILY
Status: ON HOLD | COMMUNITY
Start: 2024-08-28

## 2024-10-10 RX ORDER — ONDANSETRON 4 MG/1
4 TABLET, ORALLY DISINTEGRATING ORAL EVERY 8 HOURS PRN
Status: DISCONTINUED | OUTPATIENT
Start: 2024-10-10 | End: 2024-10-22 | Stop reason: HOSPADM

## 2024-10-10 RX ORDER — SODIUM CHLORIDE 0.9 % (FLUSH) 0.9 %
5-40 SYRINGE (ML) INJECTION PRN
Status: DISCONTINUED | OUTPATIENT
Start: 2024-10-10 | End: 2024-10-22 | Stop reason: HOSPADM

## 2024-10-10 RX ORDER — FUROSEMIDE 40 MG/1
40 TABLET ORAL DAILY
Status: DISCONTINUED | OUTPATIENT
Start: 2024-10-10 | End: 2024-10-11

## 2024-10-10 RX ORDER — INSULIN LISPRO 100 [IU]/ML
0-10 INJECTION, SOLUTION INTRAVENOUS; SUBCUTANEOUS
Status: DISCONTINUED | OUTPATIENT
Start: 2024-10-10 | End: 2024-10-16

## 2024-10-10 RX ORDER — FUROSEMIDE 10 MG/ML
60 INJECTION INTRAMUSCULAR; INTRAVENOUS 2 TIMES DAILY
Status: DISCONTINUED | OUTPATIENT
Start: 2024-10-10 | End: 2024-10-11

## 2024-10-10 RX ORDER — FLUCONAZOLE 100 MG/1
100 TABLET ORAL DAILY
Status: DISCONTINUED | OUTPATIENT
Start: 2024-10-10 | End: 2024-10-19

## 2024-10-10 RX ORDER — FAMOTIDINE 20 MG/1
10 TABLET, FILM COATED ORAL DAILY PRN
Status: DISCONTINUED | OUTPATIENT
Start: 2024-10-10 | End: 2024-10-22 | Stop reason: HOSPADM

## 2024-10-10 RX ORDER — AMLODIPINE BESYLATE 10 MG/1
10 TABLET ORAL DAILY
Status: DISCONTINUED | OUTPATIENT
Start: 2024-10-10 | End: 2024-10-13

## 2024-10-10 RX ORDER — SERTRALINE HYDROCHLORIDE 100 MG/1
100 TABLET, FILM COATED ORAL 2 TIMES DAILY
Status: DISCONTINUED | OUTPATIENT
Start: 2024-10-10 | End: 2024-10-22 | Stop reason: HOSPADM

## 2024-10-10 RX ORDER — SODIUM CHLORIDE 0.9 % (FLUSH) 0.9 %
5-40 SYRINGE (ML) INJECTION EVERY 12 HOURS SCHEDULED
Status: DISCONTINUED | OUTPATIENT
Start: 2024-10-10 | End: 2024-10-22 | Stop reason: HOSPADM

## 2024-10-10 RX ORDER — ACETAMINOPHEN 325 MG/1
650 TABLET ORAL EVERY 6 HOURS PRN
Status: DISCONTINUED | OUTPATIENT
Start: 2024-10-10 | End: 2024-10-22 | Stop reason: HOSPADM

## 2024-10-10 RX ORDER — METOPROLOL SUCCINATE 100 MG/1
100 TABLET, EXTENDED RELEASE ORAL DAILY
Status: ON HOLD | COMMUNITY
End: 2024-10-22 | Stop reason: HOSPADM

## 2024-10-10 RX ORDER — ONDANSETRON 2 MG/ML
4 INJECTION INTRAMUSCULAR; INTRAVENOUS
Status: COMPLETED | OUTPATIENT
Start: 2024-10-10 | End: 2024-10-10

## 2024-10-10 RX ORDER — LINEZOLID 2 MG/ML
600 INJECTION, SOLUTION INTRAVENOUS EVERY 12 HOURS
Status: DISCONTINUED | OUTPATIENT
Start: 2024-10-10 | End: 2024-10-14

## 2024-10-10 RX ORDER — DILTIAZEM HYDROCHLORIDE 5 MG/ML
10 INJECTION INTRAVENOUS
Status: COMPLETED | OUTPATIENT
Start: 2024-10-10 | End: 2024-10-10

## 2024-10-10 RX ORDER — HYDRALAZINE HYDROCHLORIDE 50 MG/1
50 TABLET, FILM COATED ORAL 3 TIMES DAILY
Status: DISCONTINUED | OUTPATIENT
Start: 2024-10-10 | End: 2024-10-13

## 2024-10-10 RX ORDER — ONDANSETRON 2 MG/ML
4 INJECTION INTRAMUSCULAR; INTRAVENOUS EVERY 6 HOURS PRN
Status: DISCONTINUED | OUTPATIENT
Start: 2024-10-10 | End: 2024-10-22 | Stop reason: HOSPADM

## 2024-10-10 RX ORDER — ACETAMINOPHEN 650 MG/1
650 SUPPOSITORY RECTAL EVERY 6 HOURS PRN
Status: DISCONTINUED | OUTPATIENT
Start: 2024-10-10 | End: 2024-10-22 | Stop reason: HOSPADM

## 2024-10-10 RX ORDER — DEXTROSE MONOHYDRATE 100 MG/ML
INJECTION, SOLUTION INTRAVENOUS CONTINUOUS PRN
Status: DISCONTINUED | OUTPATIENT
Start: 2024-10-10 | End: 2024-10-22 | Stop reason: HOSPADM

## 2024-10-10 RX ORDER — IBUPROFEN 600 MG/1
1 TABLET ORAL PRN
Status: DISCONTINUED | OUTPATIENT
Start: 2024-10-10 | End: 2024-10-22 | Stop reason: HOSPADM

## 2024-10-10 RX ORDER — SODIUM CHLORIDE 9 MG/ML
INJECTION, SOLUTION INTRAVENOUS PRN
Status: DISCONTINUED | OUTPATIENT
Start: 2024-10-10 | End: 2024-10-22 | Stop reason: HOSPADM

## 2024-10-10 RX ORDER — METOPROLOL TARTRATE 50 MG
50 TABLET ORAL 2 TIMES DAILY
Status: DISCONTINUED | OUTPATIENT
Start: 2024-10-10 | End: 2024-10-11

## 2024-10-10 RX ORDER — BISACODYL 10 MG
10 SUPPOSITORY, RECTAL RECTAL DAILY PRN
Status: DISCONTINUED | OUTPATIENT
Start: 2024-10-10 | End: 2024-10-22 | Stop reason: HOSPADM

## 2024-10-10 RX ORDER — HYDROMORPHONE HYDROCHLORIDE 1 MG/ML
0.5 INJECTION, SOLUTION INTRAMUSCULAR; INTRAVENOUS; SUBCUTANEOUS EVERY 4 HOURS PRN
Status: DISCONTINUED | OUTPATIENT
Start: 2024-10-10 | End: 2024-10-16 | Stop reason: SDUPTHER

## 2024-10-10 RX ADMIN — CEFTRIAXONE SODIUM 2000 MG: 2 INJECTION, POWDER, FOR SOLUTION INTRAMUSCULAR; INTRAVENOUS at 13:33

## 2024-10-10 RX ADMIN — LINEZOLID 600 MG: 600 INJECTION, SOLUTION INTRAVENOUS at 18:30

## 2024-10-10 RX ADMIN — DILTIAZEM HYDROCHLORIDE 10 MG: 5 INJECTION, SOLUTION INTRAVENOUS at 13:31

## 2024-10-10 RX ADMIN — ONDANSETRON 4 MG: 2 INJECTION INTRAMUSCULAR; INTRAVENOUS at 22:02

## 2024-10-10 RX ADMIN — ONDANSETRON 4 MG: 2 INJECTION INTRAMUSCULAR; INTRAVENOUS at 11:02

## 2024-10-10 RX ADMIN — HYDROMORPHONE HYDROCHLORIDE 0.5 MG: 1 INJECTION, SOLUTION INTRAMUSCULAR; INTRAVENOUS; SUBCUTANEOUS at 18:20

## 2024-10-10 RX ADMIN — SEVELAMER CARBONATE 800 MG: 800 TABLET, FILM COATED ORAL at 18:23

## 2024-10-10 RX ADMIN — CEFEPIME 1000 MG: 1 INJECTION, POWDER, FOR SOLUTION INTRAMUSCULAR; INTRAVENOUS at 18:30

## 2024-10-10 RX ADMIN — METOPROLOL TARTRATE 50 MG: 50 TABLET, FILM COATED ORAL at 19:53

## 2024-10-10 RX ADMIN — HYDROMORPHONE HYDROCHLORIDE 0.5 MG: 1 INJECTION, SOLUTION INTRAMUSCULAR; INTRAVENOUS; SUBCUTANEOUS at 22:02

## 2024-10-10 RX ADMIN — SERTRALINE 100 MG: 100 TABLET, FILM COATED ORAL at 19:52

## 2024-10-10 RX ADMIN — FUROSEMIDE 60 MG: 10 INJECTION, SOLUTION INTRAMUSCULAR; INTRAVENOUS at 18:58

## 2024-10-10 RX ADMIN — SODIUM CHLORIDE, PRESERVATIVE FREE 10 ML: 5 INJECTION INTRAVENOUS at 19:54

## 2024-10-10 RX ADMIN — APIXABAN 5 MG: 5 TABLET, FILM COATED ORAL at 20:30

## 2024-10-10 RX ADMIN — VANCOMYCIN HYDROCHLORIDE: 1 INJECTION, POWDER, LYOPHILIZED, FOR SOLUTION INTRAVENOUS at 18:23

## 2024-10-10 RX ADMIN — FENTANYL CITRATE 100 MCG: 50 INJECTION INTRAMUSCULAR; INTRAVENOUS at 11:02

## 2024-10-10 RX ADMIN — INSULIN LISPRO 8 UNITS: 100 INJECTION, SOLUTION INTRAVENOUS; SUBCUTANEOUS at 19:53

## 2024-10-10 RX ADMIN — INSULIN LISPRO 4 UNITS: 100 INJECTION, SOLUTION INTRAVENOUS; SUBCUTANEOUS at 18:14

## 2024-10-10 ASSESSMENT — PAIN DESCRIPTION - LOCATION
LOCATION: ABDOMEN
LOCATION: ABDOMEN

## 2024-10-10 ASSESSMENT — PAIN SCALES - GENERAL
PAINLEVEL_OUTOF10: 8
PAINLEVEL_OUTOF10: 7
PAINLEVEL_OUTOF10: 0
PAINLEVEL_OUTOF10: 5
PAINLEVEL_OUTOF10: 8

## 2024-10-10 ASSESSMENT — PAIN - FUNCTIONAL ASSESSMENT: PAIN_FUNCTIONAL_ASSESSMENT: ACTIVITIES ARE NOT PREVENTED

## 2024-10-10 ASSESSMENT — PAIN DESCRIPTION - DESCRIPTORS
DESCRIPTORS: DISCOMFORT;ACHING
DESCRIPTORS: CRAMPING

## 2024-10-10 ASSESSMENT — PAIN DESCRIPTION - ORIENTATION: ORIENTATION: LEFT;LOWER;MID

## 2024-10-10 NOTE — PROGRESS NOTES
4 Eyes Skin Assessment     NAME:  Giselle Rosado  YOB: 1970  MEDICAL RECORD NUMBER:  [id number]    The patient is being assessed for  Admission    I agree that at least one RN has performed a thorough Head to Toe Skin Assessment on the patient. ALL assessment sites listed below have been assessed.      Areas assessed by both nurses:    Head, Face, Ears, Shoulders, Back, Chest, Arms, Elbows, Hands, Sacrum. Buttock, Coccyx, Ischium, Legs. Feet and Heels, and Under Medical Devices         Does the Patient have a Wound? No noted wound(s)       [place] Prevention initiated by RN: Yes  Wound Care Orders initiated by RN: No    Pressure Injury (Stage 3,4, Unstageable, DTI, NWPT, and Complex wounds) if present, place Wound referral order by RN under : No    New Ostomies, if present place, Ostomy referral order under : No     Nurse 1 eSignature: Electronically signed by Monica Charles RN on 10/10/24 at 5:12 PM EDT    **SHARE this note so that the co-signing nurse can place an eSignature**    Nurse 2 eSignature: Electronically signed by Jessica Shelton RN on 10/10/24 at 5:15 PM EDT

## 2024-10-10 NOTE — PROGRESS NOTES
TRANSFER - IN REPORT:    Verbal report received from Chrissy ARCINIEGA on Giselle Rosado  being received from ED for routine progression of patient care      Report consisted of patient's Situation, Background, Assessment and   Recommendations(SBAR).     Information from the following report(s) Nurse Handoff Report was reviewed with the receiving nurse.    Opportunity for questions and clarification was provided.      Assessment to be completed upon patient's arrival to unit and then care will be assumed.

## 2024-10-10 NOTE — CONSULTS
Nephrology consult    Admission Date:  10/10/2024    Admission Diagnosis  Spontaneous bacterial peritonitis (formerly [facility]) [K65.2]  Septicemia (formerly [facility]) [A41.9]  Atrial fibrillation with rapid ventricular response (formerly [facility]) [I48.91]  Sepsis (formerly [facility]) [A41.9]      History of Present Illness:    54 year old female with medical history for ESRD on peritoneal dialysis he was admitted on October 10 for acute abdominal pain.  She reports that this started abruptly yesterday.  She has a history of peritonitis several months ago that required 3 weeks of IP antibiotics. Organism was corynebacterium.     She is known to our group from ESRD on PD at [facility].  Home Rx includes manual 3 exchanges  of 4% with a last fill of icodextrin.  Due to the power outage last week, patient was only doing 3 manual exchanges daily.  She has had building edema since.  [place] staff reports medical noncompliance at times.     She denies shortness of breath.+ Chronic diarrhea-this is unchanged.  + Abdominal pain with guarding.  She denies any wet/dry contamination.    Past Medical History:   Diagnosis Date    Acute kidney injury (formerly [facility])     ARF with sepsis requiring hemodialysis    Anemia     Anxiety     ARF (acute respiratory failure) 12/2021    required vent at [facility]    Atrial fibrillation with RVR (formerly [facility]) 12/22/2021    while hospitalized for sepsis.    Chronic kidney disease     secondary to sepsis 12/2021--- US Renal in TR on M, W, F    Debility     Diabetes (formerly [facility])     type 2. does not check since on dialysis,no meds; no hypo    Dialysis patient (formerly [facility]) 12/2021    Mon, Wed, Fri    GERD (gastroesophageal reflux disease)     OTC daily med    Gout     Gout     History of recent blood transfusion     1 unit 2/17/22     1 unit 2/19/22 per [facility] records    Hypertension     controlled w/med    IBS (irritable bowel syndrome)     Iron deficiency anemia     Necrotizing fasciitis 12/27/2021    left groin pannus and perineum    Personal history of COVID-19     12/31/21 was  hydrALAZINE (APRESOLINE) tablet 50 mg  50 mg Oral TID    [Held by provider] furosemide (LASIX) tablet 40 mg  40 mg Oral Daily     Allergies   Allergen Reactions    Ace Inhibitors Angioedema     Other reaction(s): Hives/Swelling-Allergy  Swelling of the mouth    Angiotensin Receptor Blockers Angioedema    [place] Hives     Swelling of the mouth  Other reaction(s): Hives/Swelling-Allergy  Patient states she is not allergic    Sulfamethoxazole-Trimethoprim Other (See Comments)     Nausea, vomiting, making her feel like she is going to pass out    Cephalexin Nausea And Vomiting      Social History     Tobacco Use    Smoking status: Never    Smokeless tobacco: Never   Substance Use Topics    Alcohol use: Not Currently      Family History   Problem Relation Age of Onset    Heart Failure Mother         congestive    Hypertension Mother     Cancer Father         Lung/Mets to Bone and Brain and liver    Diabetes Father     Hypertension Father     Heart Disease Mother         Review of Systems  Gen - no fever, no chills, appetite okay  HEENT - no sore throat, no decreased vision, no hearing loss  Neck - no neck mass  CV - no chest pain, no palpitation, no orthopnea  Lung - no shortness of breath, no cough, no hemoptysis  Abd - no tenderness, no nausea/vomiting, no bloody stool  Ext - no edema, no clubbing, no cyanosis  Musculoskeletal - no joint pain, no back pain  Neurologic - no headaches, no dizziness, no seizures  Psychiatric - no anxiety, no depression  Skin - no rashes, no pupura  Genitourinary - no decreased urine output, no hematuria, no foamy urine    Objective:     Vitals:    10/10/24 1102 10/10/24 1331 10/10/24 1425 10/10/24 1500   BP: 112/79 118/81 (!) 115/55    Pulse: (!) 112 (!) 115 77    Resp: 18  19    Temp:   98.5 °F (36.9 °C)    TempSrc:   Oral    SpO2: 98%  100%    Weight:    (!) 153.6 kg (338 lb 9.6 oz)   Height:    1.753 m (5' 9\")     No intake or output data in the 24 hours ending 10/10/24

## 2024-10-10 NOTE — ED PROVIDER NOTES
Emergency Department Provider Note       PCP: Tanisha Powers, APRN - CNP   Age: 54 y.o.   Sex: female     DISPOSITION Admitted 10/10/2024 12:55:44 PM  Condition at Disposition: Data Unavailable       ICD-10-CM    1. Spontaneous bacterial peritonitis (HCC)  K65.2       2. Atrial fibrillation with rapid ventricular response (HCC)  I48.91       3. Septicemia (HCC)  A41.9           Medical Decision Making     In summary this is a 54-year-old female patient who presented for evaluation of abdominal pain associate with nausea and abdominal distention.  She had generalized tenderness and after full workup today, I am concerned she has spontaneous bacterial peritonitis.  She was found to be in A-fib with RVR.  She does have leukocytosis and lactic acidosis and elevated procalcitonin.  Peritoneal dialysis team asked to come draw fluids off.  Her ascites did have significant elevated white blood cell count.  2 g Rocephin given here in the ED.  Diltiazem given to control the A-fib with RVR.  Patient admitted for further treatment.  Patient history, ROS, physical exam, labs, radiological workup and all pertinent findings were discussed with attending Dr. Bazan.   ED Course as of 10/10/24 1410   Thu Oct 10, 2024   1122 11:22 AM  Patient found to be in A-fib with RVR.  She does have a history of A-fib and did not take her metoprolol today.  Her white blood cell count just returned elevated so we will initiate sepsis protocols.  She does have generalized tenderness in the abdomen so we will have dialysis team come test acetic fluid for spontaneous bacterial peritonitis [NR]   1238 12:38 PM  Consulted Dr. Margie Mackey of hospitalist service to admit patient [NR]      ED Course User Index  [NR] Ash Marshall PA     1 or more acute illnesses that pose a threat to life or bodily function.   1 or more chronic illnesses with a severe exacerbation or progression.  Parental controlled substances given in the ED.  Drug  epigastric, ruq, rlq since yesterday.  associated with diarrhea, nausea. hx peritoneal dialysis    COMPARISON: December 15, 2021    TECHNIQUE: Axial CT images were obtained of the abdomen and pelvis without  intravenous contrast. Multiplanar reconstructions were performed.       ABDOMEN/PELVIS FINDINGS:    Lower Chest: Unremarkable.    Liver: Normal nonenhanced appearance and contour.    Biliary/Gallbladder: Unremarkable.    Pancreas: Unremarkable.    Spleen: Mild splenomegaly measuring 14.1 cm.    Adrenal Glands: Unremarkable.    Kidneys: Moderate bilateral renal atrophy.    Gastrointestinal/Peritoneum: There is a moderate volume of ascites with a  peritoneal dialysis catheter present in the pelvis. A duodenal diverticulum is  present measuring 4.2 x 2.9 cm. Mild colonic diverticulosis is present. The  appendix is unremarkable. There is no free air.    Vascular: Unremarkable.    Lymph Nodes: No enlarged lymph nodes by CT size criteria.    Pelvic Organs: Coarse calcifications are present in the uterus, likely due to  chronic involuted fibroids.    Bladder: Unremarkable.    Bones: No acute osseous abnormality.    Soft tissues: There is mild subcutaneous edema at the lower anterior abdominal  wall.        Impression    1.  Moderate volume of ascites with a peritoneal dialysis catheter present in  the pelvis.    2.  Splenomegaly.    3.  Mild body wall edema versus cellulitis at the lower anterior abdominal wall.    4.  Moderate bilateral renal atrophy.          Electronically signed by Janel Zamora   US ABDOMEN LIMITED Specify organ? GALLBLADDER, LIVER    Narrative    RIGHT UPPER QUADRANT  ULTRASOUND    INDICATION: generalized abd and ruq abd pain. eval for signs of cholecystitis,  ascites    COMPARISON: 15 December 2021  Transabdominal imaging of the upper abdomen was performed.    FINDINGS: The examination is limited secondary to overlying bowel gas and the  patient's body habitus.  The liver is enlarged at 22.1

## 2024-10-10 NOTE — ED TRIAGE NOTES
Pt presents to triage via wheelchair c/o right and Mid upper quadrant abdominal pain 7/10 xs 2 days. Pt reports N/V/D and HX renal failure, necrotizing fasciitis, and  Afib.

## 2024-10-10 NOTE — H&P
Hospitalist History and Physical   Admit Date:  10/10/2024  9:15 AM   Name:  Giselle Rosado   Age:  54 y.o.  Sex:  female  :  1970   MRN:  [id number]   Room:  [facility]/    Presenting/Chief Complaint: Abdominal Pain     Reason(s) for Admission: Spontaneous bacterial peritonitis (HCC) [K65.2]  Septicemia (HCC) [A41.9]  Atrial fibrillation with rapid ventricular response (HCC) [I48.91]  Sepsis (HCC) [A41.9]     History of Present Illness:   Giselle Rosado is a 54 y.o. female with medical history of ESRD on peritoneal dialysis, history of paroxysmal atrial fibrillation no longer on blood thinners, hypertension, depression, irritable bowel syndrome who presents emergency room with 2 days of right upper quadrant abdominal pain.  Patient reports the pain was so severe that woke her up from sleep.  Pain has been moving from right upper quadrant to her mid abdomen as well as lower quadrant.  Reports pain was initially sharp and also squeezing sensation.   Patient has had history of peritonitis and reports that pain felt different this time. Reports feeling chills but always had issues with chills. Having nausea and dry heaving.    In the ED, she met criteria for sepsis with leukocytosis and tachycardia. EKG with Afib rvr and received cardizem IV with improvement.  Laboratory workup was notable for sodium of 129, creatinine of 12.60, lactic acid of 2.8, proBNP of 34,531, WBC of 16.6 and hemoglobin 13.3.  CT abdomen and pelvis with moderate volume ascites with peritoneal dialysis catheter present, splenomegaly, body wall edema versus cellulitis at the lower anterior abdominal wall.      Assessment & Plan:     Sepsis with concern for peritonitis  ESRD on PD  Meets criteria for sepsis with tachycardia and leukocytosis  -Follow-up blood cultures  -Peritoneal fluid culture has been sent  -Start patient on vancomycin and cefepime at this time.    -Nephrology consulted for peritoneal dialysis    A-fib RVR  Has

## 2024-10-10 NOTE — ED PROVIDER NOTES
ED ATTENDING SHARED SERVICES NOTE:     I have seen and evaluated the patient and performed an independent history and physical exam, and I agree with the assessment and plan as documented in the advanced provider note.  I performed the history of present illness, physical exam, and medical decision making in its entirety.  With the following updates: Now having leukocytosis on lab work.  I do not think the edema of her abdominal wall represents cellulitis but do have concerns for possible sepsis secondary to SBP.  Will ask peritoneal dialysis team to come draw some fluid for testing.    1 or more chronic illnesses with a severe exacerbation or progression.  1 acute complicated illness or injury.  Over the counter drug management performed.  Prescription drug management performed.       I interpreted the CT Scan abdominal wall edema.                  Timothy Bazan,   10/10/24 9936

## 2024-10-11 LAB
ANION GAP SERPL CALC-SCNC: 22 MMOL/L (ref 9–18)
BASOPHILS # BLD: 0.1 K/UL (ref 0–0.2)
BASOPHILS NFR BLD: 1 % (ref 0–2)
BUN SERPL-MCNC: 59 MG/DL (ref 6–23)
CALCIUM SERPL-MCNC: 7.3 MG/DL (ref 8.8–10.2)
CHLORIDE SERPL-SCNC: 88 MMOL/L (ref 98–107)
CO2 SERPL-SCNC: 19 MMOL/L (ref 20–28)
CREAT SERPL-MCNC: 11.3 MG/DL (ref 0.6–1.1)
DIFFERENTIAL METHOD BLD: ABNORMAL
EOSINOPHIL # BLD: 0.1 K/UL (ref 0–0.8)
EOSINOPHIL NFR BLD: 1 % (ref 0.5–7.8)
ERYTHROCYTE [DISTWIDTH] IN BLOOD BY AUTOMATED COUNT: 17 % (ref 11.9–14.6)
GLUCOSE BLD STRIP.AUTO-MCNC: 241 MG/DL (ref 65–100)
GLUCOSE BLD STRIP.AUTO-MCNC: 285 MG/DL (ref 65–100)
GLUCOSE BLD STRIP.AUTO-MCNC: 299 MG/DL (ref 65–100)
GLUCOSE BLD STRIP.AUTO-MCNC: 327 MG/DL (ref 65–100)
GLUCOSE SERPL-MCNC: 325 MG/DL (ref 70–99)
HCT VFR BLD AUTO: 34.1 % (ref 35.8–46.3)
HGB BLD-MCNC: 10.7 G/DL (ref 11.7–15.4)
IMM GRANULOCYTES # BLD AUTO: 0.3 K/UL (ref 0–0.5)
IMM GRANULOCYTES NFR BLD AUTO: 2 % (ref 0–5)
LYMPHOCYTES # BLD: 0.4 K/UL (ref 0.5–4.6)
LYMPHOCYTES NFR BLD: 3 % (ref 13–44)
MCH RBC QN AUTO: 28.9 PG (ref 26.1–32.9)
MCHC RBC AUTO-ENTMCNC: 31.4 G/DL (ref 31.4–35)
MCV RBC AUTO: 92.2 FL (ref 82–102)
MONOCYTES # BLD: 0.8 K/UL (ref 0.1–1.3)
MONOCYTES NFR BLD: 7 % (ref 4–12)
NEUTS SEG # BLD: 10.2 K/UL (ref 1.7–8.2)
NEUTS SEG NFR BLD: 86 % (ref 43–78)
NRBC # BLD: 0 K/UL (ref 0–0.2)
PLATELET # BLD AUTO: 327 K/UL (ref 150–450)
PMV BLD AUTO: 9.8 FL (ref 9.4–12.3)
POTASSIUM SERPL-SCNC: 4.7 MMOL/L (ref 3.5–5.1)
RBC # BLD AUTO: 3.7 M/UL (ref 4.05–5.2)
SARS-COV-2 RDRP RESP QL NAA+PROBE: NOT DETECTED
SERVICE CMNT-IMP: ABNORMAL
SODIUM SERPL-SCNC: 129 MMOL/L (ref 136–145)
SOURCE: NORMAL
WBC # BLD AUTO: 11.8 K/UL (ref 4.3–11.1)

## 2024-10-11 PROCEDURE — 85025 COMPLETE CBC W/AUTO DIFF WBC: CPT

## 2024-10-11 PROCEDURE — 6370000000 HC RX 637 (ALT 250 FOR IP): Performed by: NURSE PRACTITIONER

## 2024-10-11 PROCEDURE — 6360000002 HC RX W HCPCS: Performed by: INTERNAL MEDICINE

## 2024-10-11 PROCEDURE — 36415 COLL VENOUS BLD VENIPUNCTURE: CPT

## 2024-10-11 PROCEDURE — 2580000003 HC RX 258: Performed by: INTERNAL MEDICINE

## 2024-10-11 PROCEDURE — 6370000000 HC RX 637 (ALT 250 FOR IP): Performed by: INTERNAL MEDICINE

## 2024-10-11 PROCEDURE — 1100000003 HC PRIVATE W/ TELEMETRY

## 2024-10-11 PROCEDURE — 80048 BASIC METABOLIC PNL TOTAL CA: CPT

## 2024-10-11 PROCEDURE — 82962 GLUCOSE BLOOD TEST: CPT

## 2024-10-11 PROCEDURE — 90945 DIALYSIS ONE EVALUATION: CPT

## 2024-10-11 RX ORDER — FUROSEMIDE 10 MG/ML
100 INJECTION INTRAMUSCULAR; INTRAVENOUS 2 TIMES DAILY
Status: DISCONTINUED | OUTPATIENT
Start: 2024-10-11 | End: 2024-10-14

## 2024-10-11 RX ORDER — HYDROCODONE BITARTRATE AND ACETAMINOPHEN 5; 325 MG/1; MG/1
1 TABLET ORAL EVERY 6 HOURS PRN
Status: DISCONTINUED | OUTPATIENT
Start: 2024-10-11 | End: 2024-10-17 | Stop reason: SDUPTHER

## 2024-10-11 RX ORDER — METOPROLOL TARTRATE 50 MG
100 TABLET ORAL 2 TIMES DAILY
Status: DISCONTINUED | OUTPATIENT
Start: 2024-10-11 | End: 2024-10-16

## 2024-10-11 RX ORDER — METOCLOPRAMIDE HYDROCHLORIDE 5 MG/ML
10 INJECTION INTRAMUSCULAR; INTRAVENOUS EVERY 6 HOURS PRN
Status: DISCONTINUED | OUTPATIENT
Start: 2024-10-11 | End: 2024-10-22 | Stop reason: HOSPADM

## 2024-10-11 RX ADMIN — SEVELAMER CARBONATE 800 MG: 800 TABLET, FILM COATED ORAL at 09:41

## 2024-10-11 RX ADMIN — HEPARIN SODIUM: 5000 INJECTION INTRAVENOUS; SUBCUTANEOUS at 14:19

## 2024-10-11 RX ADMIN — FLUCONAZOLE 100 MG: 100 TABLET ORAL at 09:41

## 2024-10-11 RX ADMIN — INSULIN LISPRO 8 UNITS: 100 INJECTION, SOLUTION INTRAVENOUS; SUBCUTANEOUS at 21:10

## 2024-10-11 RX ADMIN — HYDROMORPHONE HYDROCHLORIDE 0.5 MG: 1 INJECTION, SOLUTION INTRAMUSCULAR; INTRAVENOUS; SUBCUTANEOUS at 14:25

## 2024-10-11 RX ADMIN — SERTRALINE 100 MG: 100 TABLET, FILM COATED ORAL at 09:43

## 2024-10-11 RX ADMIN — HYDROCODONE BITARTRATE AND ACETAMINOPHEN 1 TABLET: 5; 325 TABLET ORAL at 21:15

## 2024-10-11 RX ADMIN — SODIUM CHLORIDE, PRESERVATIVE FREE 10 ML: 5 INJECTION INTRAVENOUS at 21:11

## 2024-10-11 RX ADMIN — SODIUM CHLORIDE, PRESERVATIVE FREE 10 ML: 5 INJECTION INTRAVENOUS at 09:44

## 2024-10-11 RX ADMIN — HYDROCODONE BITARTRATE AND ACETAMINOPHEN 1 TABLET: 5; 325 TABLET ORAL at 09:49

## 2024-10-11 RX ADMIN — SERTRALINE 100 MG: 100 TABLET, FILM COATED ORAL at 21:11

## 2024-10-11 RX ADMIN — HEPARIN SODIUM: 5000 INJECTION INTRAVENOUS; SUBCUTANEOUS at 14:20

## 2024-10-11 RX ADMIN — INSULIN LISPRO 4 UNITS: 100 INJECTION, SOLUTION INTRAVENOUS; SUBCUTANEOUS at 11:47

## 2024-10-11 RX ADMIN — SEVELAMER CARBONATE 800 MG: 800 TABLET, FILM COATED ORAL at 17:06

## 2024-10-11 RX ADMIN — METOCLOPRAMIDE 10 MG: 5 INJECTION, SOLUTION INTRAMUSCULAR; INTRAVENOUS at 09:41

## 2024-10-11 RX ADMIN — ONDANSETRON 4 MG: 2 INJECTION INTRAMUSCULAR; INTRAVENOUS at 05:05

## 2024-10-11 RX ADMIN — METOPROLOL TARTRATE 100 MG: 50 TABLET, FILM COATED ORAL at 21:10

## 2024-10-11 RX ADMIN — INSULIN LISPRO 6 UNITS: 100 INJECTION, SOLUTION INTRAVENOUS; SUBCUTANEOUS at 17:06

## 2024-10-11 RX ADMIN — LINEZOLID 600 MG: 600 INJECTION, SOLUTION INTRAVENOUS at 05:09

## 2024-10-11 RX ADMIN — METOCLOPRAMIDE 10 MG: 5 INJECTION, SOLUTION INTRAMUSCULAR; INTRAVENOUS at 01:53

## 2024-10-11 RX ADMIN — APIXABAN 5 MG: 5 TABLET, FILM COATED ORAL at 21:11

## 2024-10-11 RX ADMIN — APIXABAN 5 MG: 5 TABLET, FILM COATED ORAL at 09:44

## 2024-10-11 RX ADMIN — INSULIN LISPRO 6 UNITS: 100 INJECTION, SOLUTION INTRAVENOUS; SUBCUTANEOUS at 09:43

## 2024-10-11 RX ADMIN — CEFEPIME 1000 MG: 1 INJECTION, POWDER, FOR SOLUTION INTRAMUSCULAR; INTRAVENOUS at 17:08

## 2024-10-11 RX ADMIN — LINEZOLID 600 MG: 600 INJECTION, SOLUTION INTRAVENOUS at 17:08

## 2024-10-11 RX ADMIN — CEFEPIME: 1 INJECTION, POWDER, FOR SOLUTION INTRAMUSCULAR; INTRAVENOUS at 14:19

## 2024-10-11 RX ADMIN — SEVELAMER CARBONATE 800 MG: 800 TABLET, FILM COATED ORAL at 11:47

## 2024-10-11 RX ADMIN — METOPROLOL TARTRATE 50 MG: 50 TABLET, FILM COATED ORAL at 09:42

## 2024-10-11 RX ADMIN — HYDROMORPHONE HYDROCHLORIDE 0.5 MG: 1 INJECTION, SOLUTION INTRAMUSCULAR; INTRAVENOUS; SUBCUTANEOUS at 01:56

## 2024-10-11 ASSESSMENT — PAIN DESCRIPTION - DESCRIPTORS
DESCRIPTORS: DISCOMFORT
DESCRIPTORS: CRAMPING;GNAWING
DESCRIPTORS: CRAMPING;NAGGING
DESCRIPTORS: CRAMPING;GNAWING

## 2024-10-11 ASSESSMENT — PAIN DESCRIPTION - ORIENTATION
ORIENTATION: MID;LOWER
ORIENTATION: LOWER

## 2024-10-11 ASSESSMENT — PAIN DESCRIPTION - LOCATION
LOCATION: ABDOMEN

## 2024-10-11 ASSESSMENT — PAIN SCALES - GENERAL
PAINLEVEL_OUTOF10: 7
PAINLEVEL_OUTOF10: 7
PAINLEVEL_OUTOF10: 5
PAINLEVEL_OUTOF10: 0
PAINLEVEL_OUTOF10: 0
PAINLEVEL_OUTOF10: 2
PAINLEVEL_OUTOF10: 7

## 2024-10-11 ASSESSMENT — PAIN - FUNCTIONAL ASSESSMENT: PAIN_FUNCTIONAL_ASSESSMENT: ACTIVITIES ARE NOT PREVENTED

## 2024-10-11 NOTE — PROGRESS NOTES
Subjective:   Daily Progress Note: 10/11/2024 10:54 AM    Still with abd pain    No CP No SOB  + Abd pain  MS -      Current Facility-Administered Medications   Medication Dose Route Frequency    metoclopramide (REGLAN) injection 10 mg  10 mg IntraVENous Q6H PRN    HYDROcodone-acetaminophen (NORCO) 5-325 MG per tablet 1 tablet  1 tablet Oral Q6H PRN    metoprolol tartrate (LOPRESSOR) tablet 100 mg  100 mg Oral BID    furosemide (LASIX) injection 100 mg  100 mg IntraVENous BID    sodium chloride flush 0.9 % injection 5-40 mL  5-40 mL IntraVENous 2 times per day    sodium chloride flush 0.9 % injection 5-40 mL  5-40 mL IntraVENous PRN    0.9 % sodium chloride infusion   IntraVENous PRN    ondansetron (ZOFRAN-ODT) disintegrating tablet 4 mg  4 mg Oral Q8H PRN    Or    ondansetron (ZOFRAN) injection 4 mg  4 mg IntraVENous Q6H PRN    polyethylene glycol (GLYCOLAX) packet 17 g  17 g Oral Daily PRN    bisacodyl (DULCOLAX) suppository 10 mg  10 mg Rectal Daily PRN    famotidine (PEPCID) tablet 10 mg  10 mg Oral Daily PRN    aluminum & magnesium hydroxide-simethicone (MAALOX) 200-200-20 MG/5ML suspension 30 mL  30 mL Oral Q6H PRN    acetaminophen (TYLENOL) tablet 650 mg  650 mg Oral Q6H PRN    Or    acetaminophen (TYLENOL) suppository 650 mg  650 mg Rectal Q6H PRN    apixaban (ELIQUIS) tablet 5 mg  5 mg Oral BID    sertraline (ZOLOFT) tablet 100 mg  100 mg Oral BID    [Held by provider] amLODIPine (NORVASC) tablet 10 mg  10 mg Oral Daily    sevelamer (RENVELA) tablet 800 mg  800 mg Oral TID WC    [Held by provider] hydrALAZINE (APRESOLINE) tablet 50 mg  50 mg Oral TID    insulin lispro (HUMALOG,ADMELOG) injection vial 0-10 Units  0-10 Units SubCUTAneous 4x Daily AC & HS    HYDROmorphone HCl PF (DILAUDID) injection 0.5 mg  0.5 mg IntraVENous Q4H PRN    glucose chewable tablet 16 g  4 tablet Oral PRN    dextrose bolus 10% 125 mL  125 mL IntraVENous PRN    Or    dextrose bolus 10% 250 mL  250 mL IntraVENous PRN    Glucagon

## 2024-10-11 NOTE — CARE COORDINATION
10/11/24 1105   Service Assessment   Patient Orientation Alert and Oriented   Cognition Alert   History Provided By Patient   Primary Caregiver Self   Accompanied By/Relationship - bryan   Support Systems Spouse/Significant Other;Family Members   Patient's Healthcare Decision Maker is: Legal Next of Kin   PCP Verified by CM Yes   Last Visit to PCP Within last 6 months   Prior Functional Level Independent in ADLs/IADLs   Current Functional Level Independent in ADLs/IADLs   Can patient return to prior living arrangement Yes   Ability to make needs known: Good   Family able to assist with home care needs: Yes   Would you like for me to discuss the discharge plan with any other family members/significant others, and if so, who? Yes  (- adams)   Financial Resources Other (Comment)  (BCBS)   Community Resources None   Condition of Participation: Discharge Planning   The Plan for Transition of Care is related to the following treatment goals: return home with spouse   The Patient and/or Patient Representative was provided with a Choice of Provider? Patient   The Patient and/Or Patient Representative agree with the Discharge Plan? Yes   Freedom of Choice list was provided with basic dialogue that supports the patient's individualized plan of care/goals, treatment preferences, and shares the quality data associated with the providers?  Yes     Assessment completed with patient at bedside . Patient lives with her , Bryan. Patient reports being mostly independent at baseline. Patient spouse assist as need with alds. Patient uses a wheelchair for long distances. She report she is a active , but spouse also assist with transportation needs. Patient does PD at home, her home clinic is [facility]. Demographics and PC confirmed. Discharge plan is to return home with spouse.    Laurita URIAS, ACM  [place]           restart home meds

## 2024-10-11 NOTE — DIALYSIS
Disconnected PD cycler from patient. Betadine cap intact. Patient tolerated well.     UF amount: 3169ml removed. Effluent: cloudy, fibrin, light yellow    Separate drain at 0915 with 1591 UF removed. 1670 mL of solution with Vanc and Cefepime instilled after drain. Will dwell today.

## 2024-10-11 NOTE — PROGRESS NOTES
Subjective:   Daily Progress Note: 10/11/2024 10:58 AM    Still with abd pain    No CP No SOB  + Abd pain  MS -      Current Facility-Administered Medications   Medication Dose Route Frequency    metoclopramide (REGLAN) injection 10 mg  10 mg IntraVENous Q6H PRN    HYDROcodone-acetaminophen (NORCO) 5-325 MG per tablet 1 tablet  1 tablet Oral Q6H PRN    metoprolol tartrate (LOPRESSOR) tablet 100 mg  100 mg Oral BID    furosemide (LASIX) injection 100 mg  100 mg IntraVENous BID    sodium chloride flush 0.9 % injection 5-40 mL  5-40 mL IntraVENous 2 times per day    sodium chloride flush 0.9 % injection 5-40 mL  5-40 mL IntraVENous PRN    0.9 % sodium chloride infusion   IntraVENous PRN    ondansetron (ZOFRAN-ODT) disintegrating tablet 4 mg  4 mg Oral Q8H PRN    Or    ondansetron (ZOFRAN) injection 4 mg  4 mg IntraVENous Q6H PRN    polyethylene glycol (GLYCOLAX) packet 17 g  17 g Oral Daily PRN    bisacodyl (DULCOLAX) suppository 10 mg  10 mg Rectal Daily PRN    famotidine (PEPCID) tablet 10 mg  10 mg Oral Daily PRN    aluminum & magnesium hydroxide-simethicone (MAALOX) 200-200-20 MG/5ML suspension 30 mL  30 mL Oral Q6H PRN    acetaminophen (TYLENOL) tablet 650 mg  650 mg Oral Q6H PRN    Or    acetaminophen (TYLENOL) suppository 650 mg  650 mg Rectal Q6H PRN    apixaban (ELIQUIS) tablet 5 mg  5 mg Oral BID    sertraline (ZOLOFT) tablet 100 mg  100 mg Oral BID    [Held by provider] amLODIPine (NORVASC) tablet 10 mg  10 mg Oral Daily    sevelamer (RENVELA) tablet 800 mg  800 mg Oral TID WC    [Held by provider] hydrALAZINE (APRESOLINE) tablet 50 mg  50 mg Oral TID    insulin lispro (HUMALOG,ADMELOG) injection vial 0-10 Units  0-10 Units SubCUTAneous 4x Daily AC & HS    HYDROmorphone HCl PF (DILAUDID) injection 0.5 mg  0.5 mg IntraVENous Q4H PRN    glucose chewable tablet 16 g  4 tablet Oral PRN    dextrose bolus 10% 125 mL  125 mL IntraVENous PRN    Or    dextrose bolus 10% 250 mL  250 mL IntraVENous PRN    Glucagon

## 2024-10-11 NOTE — PROGRESS NOTES
Pt had some pain this shift see MAR for tx. Pt is resting in bed without any complaints. Hourly rounds completed and all needs met. Bed is low, locked,call light is in reach and pt is encouraged to call for assistance.

## 2024-10-11 NOTE — PROGRESS NOTES
Hourly rounds performed this shift. Bed lowered and locked. Call light within reach.PRN pain and nausea medication given per MAR. PD completed this shift.

## 2024-10-11 NOTE — PROGRESS NOTES
Hospitalist Progress Note   Admit Date:  10/10/2024  9:15 AM   Name:  Giselle Rosado   Age:  54 y.o.  Sex:  female  :  1970   MRN:  [id number]   Room:  [facility]/    Presenting/Chief Complaint: Abdominal Pain     Reason(s) for Admission: Spontaneous bacterial peritonitis (HCC) [K65.2]  Septicemia (HCC) [A41.9]  Atrial fibrillation with rapid ventricular response (HCC) [I48.91]  Sepsis (HCC) [A41.9]     Hospital Course:   Please refer to the admission H&P for details of presentation.      In summary, Giselle Rosado is a 54 y.o. female with medical history significant for ESRD on peritoneal dialysis, history of paroxysmal atrial fibrillation no longer on blood thinners, hypertension, depression, irritable bowel syndrome who presents emergency room with 2 days of right upper quadrant abdominal pain.   Patient was admitted with sepsis(leukocytosis and tachycardia) with concern for peritonitis.  In addition, she was noted to be in A-fib RVR and had received IV Cardizem in the ED with improvement.  CT abdomen pelvis with moderate volume ascites with peritoneal dialysis catheter present, splenomegaly, body wall edema versus cellulitis at the lower anterior abdominal wall.    Subjective/24 hr Events (10/11/24) :  Patient is seen and examined at bedside.    Patient is sitting up in bed.  Reports the pain medication has helped but currently in pain.  Pain has not improved overall compared to prior to admission.  Still tachycardic at times.  Patient denies fever, chills, chest pains, shortness of breath.      Assessment & Plan:   Sepsis with concern for peritonitis  ESRD on PD  Meets criteria for sepsis with tachycardia and leukocytosis  10/11/24: still with significant abdominal pain needing IV dilaudid.  -Follow-up blood cultures and PD fluid culture  -would continue with vancomycin and cefepime as well as fluconazole at this time pending culture results  -peritoneal dialysis as per Nephro  -PRN pain meds  injection 60 mg  60 mg IntraVENous BID    fluconazole (DIFLUCAN) tablet 100 mg  100 mg Oral Daily    cefepime (MAXIPIME) 1,000 mg in sodium chloride 0.9 % 50 mL IVPB (mini-bag)  1,000 mg IntraVENous Q24H    delflex lo-leanna 1.5% 2,000 mL with vancomycin (VANCOCIN) 2,000 mg, cefepime (MAXIPIME) 1,000 mg   IntraPERitoneal PRN    linezolid (ZYVOX) IVPB 600 mg  600 mg IntraVENous Q12H       Signed:  Jodi Calderon MD    Part of this note may have been written by using a voice dictation software.  The note has been proof read but may still contain some grammatical/other typographical errors.

## 2024-10-11 NOTE — DIALYSIS
Peritoneal dialysis initiated as ordered. Pt changed dressing this am and states site has no s/s of redness, swelling, or exudate. Patient states no complaints at this time. Report given to primary RN.

## 2024-10-12 LAB
ANION GAP SERPL CALC-SCNC: 19 MMOL/L (ref 9–18)
BASOPHILS # BLD: 0.1 K/UL (ref 0–0.2)
BASOPHILS NFR BLD: 1 % (ref 0–2)
BUN SERPL-MCNC: 54 MG/DL (ref 6–23)
CALCIUM SERPL-MCNC: 7.8 MG/DL (ref 8.8–10.2)
CHLORIDE SERPL-SCNC: 89 MMOL/L (ref 98–107)
CO2 SERPL-SCNC: 22 MMOL/L (ref 20–28)
CREAT SERPL-MCNC: 11.1 MG/DL (ref 0.6–1.1)
DIFFERENTIAL METHOD BLD: ABNORMAL
EOSINOPHIL # BLD: 0.3 K/UL (ref 0–0.8)
EOSINOPHIL NFR BLD: 3 % (ref 0.5–7.8)
ERYTHROCYTE [DISTWIDTH] IN BLOOD BY AUTOMATED COUNT: 16.7 % (ref 11.9–14.6)
GLUCOSE BLD STRIP.AUTO-MCNC: 172 MG/DL (ref 65–100)
GLUCOSE BLD STRIP.AUTO-MCNC: 176 MG/DL (ref 65–100)
GLUCOSE BLD STRIP.AUTO-MCNC: 176 MG/DL (ref 65–100)
GLUCOSE BLD STRIP.AUTO-MCNC: 434 MG/DL (ref 65–100)
GLUCOSE SERPL-MCNC: 127 MG/DL (ref 70–99)
HCT VFR BLD AUTO: 34.9 % (ref 35.8–46.3)
HGB BLD-MCNC: 11.3 G/DL (ref 11.7–15.4)
IMM GRANULOCYTES # BLD AUTO: 0.2 K/UL (ref 0–0.5)
IMM GRANULOCYTES NFR BLD AUTO: 2 % (ref 0–5)
LYMPHOCYTES # BLD: 0.6 K/UL (ref 0.5–4.6)
LYMPHOCYTES NFR BLD: 7 % (ref 13–44)
MCH RBC QN AUTO: 29.1 PG (ref 26.1–32.9)
MCHC RBC AUTO-ENTMCNC: 32.4 G/DL (ref 31.4–35)
MCV RBC AUTO: 89.9 FL (ref 82–102)
MONOCYTES # BLD: 0.7 K/UL (ref 0.1–1.3)
MONOCYTES NFR BLD: 8 % (ref 4–12)
NEUTS SEG # BLD: 7.6 K/UL (ref 1.7–8.2)
NEUTS SEG NFR BLD: 80 % (ref 43–78)
NRBC # BLD: 0 K/UL (ref 0–0.2)
PLATELET # BLD AUTO: 315 K/UL (ref 150–450)
PMV BLD AUTO: 10.5 FL (ref 9.4–12.3)
POTASSIUM SERPL-SCNC: 4.4 MMOL/L (ref 3.5–5.1)
RBC # BLD AUTO: 3.88 M/UL (ref 4.05–5.2)
SERVICE CMNT-IMP: ABNORMAL
SODIUM SERPL-SCNC: 130 MMOL/L (ref 136–145)
WBC # BLD AUTO: 9.4 K/UL (ref 4.3–11.1)

## 2024-10-12 PROCEDURE — 36415 COLL VENOUS BLD VENIPUNCTURE: CPT

## 2024-10-12 PROCEDURE — 85025 COMPLETE CBC W/AUTO DIFF WBC: CPT

## 2024-10-12 PROCEDURE — 80048 BASIC METABOLIC PNL TOTAL CA: CPT

## 2024-10-12 PROCEDURE — 90945 DIALYSIS ONE EVALUATION: CPT

## 2024-10-12 PROCEDURE — 6360000002 HC RX W HCPCS: Performed by: INTERNAL MEDICINE

## 2024-10-12 PROCEDURE — 1100000003 HC PRIVATE W/ TELEMETRY

## 2024-10-12 PROCEDURE — 82962 GLUCOSE BLOOD TEST: CPT

## 2024-10-12 PROCEDURE — 2580000003 HC RX 258: Performed by: INTERNAL MEDICINE

## 2024-10-12 PROCEDURE — 6370000000 HC RX 637 (ALT 250 FOR IP): Performed by: INTERNAL MEDICINE

## 2024-10-12 RX ADMIN — INSULIN LISPRO 2 UNITS: 100 INJECTION, SOLUTION INTRAVENOUS; SUBCUTANEOUS at 10:34

## 2024-10-12 RX ADMIN — APIXABAN 5 MG: 5 TABLET, FILM COATED ORAL at 20:40

## 2024-10-12 RX ADMIN — INSULIN LISPRO 10 UNITS: 100 INJECTION, SOLUTION INTRAVENOUS; SUBCUTANEOUS at 20:39

## 2024-10-12 RX ADMIN — CEFEPIME: 1 INJECTION, POWDER, FOR SOLUTION INTRAMUSCULAR; INTRAVENOUS at 15:12

## 2024-10-12 RX ADMIN — METOPROLOL TARTRATE 100 MG: 50 TABLET, FILM COATED ORAL at 20:39

## 2024-10-12 RX ADMIN — APIXABAN 5 MG: 5 TABLET, FILM COATED ORAL at 10:29

## 2024-10-12 RX ADMIN — SERTRALINE 100 MG: 100 TABLET, FILM COATED ORAL at 10:28

## 2024-10-12 RX ADMIN — LINEZOLID 600 MG: 600 INJECTION, SOLUTION INTRAVENOUS at 18:05

## 2024-10-12 RX ADMIN — SODIUM CHLORIDE, PRESERVATIVE FREE 10 ML: 5 INJECTION INTRAVENOUS at 20:40

## 2024-10-12 RX ADMIN — SEVELAMER CARBONATE 800 MG: 800 TABLET, FILM COATED ORAL at 18:00

## 2024-10-12 RX ADMIN — SODIUM CHLORIDE, PRESERVATIVE FREE 10 ML: 5 INJECTION INTRAVENOUS at 10:55

## 2024-10-12 RX ADMIN — SERTRALINE 100 MG: 100 TABLET, FILM COATED ORAL at 20:40

## 2024-10-12 RX ADMIN — INSULIN LISPRO 2 UNITS: 100 INJECTION, SOLUTION INTRAVENOUS; SUBCUTANEOUS at 13:17

## 2024-10-12 RX ADMIN — HYDROCODONE BITARTRATE AND ACETAMINOPHEN 1 TABLET: 5; 325 TABLET ORAL at 13:22

## 2024-10-12 RX ADMIN — HEPARIN SODIUM: 1000 INJECTION INTRAVENOUS; SUBCUTANEOUS at 15:12

## 2024-10-12 RX ADMIN — INSULIN LISPRO 2 UNITS: 100 INJECTION, SOLUTION INTRAVENOUS; SUBCUTANEOUS at 18:00

## 2024-10-12 RX ADMIN — LINEZOLID 600 MG: 600 INJECTION, SOLUTION INTRAVENOUS at 05:22

## 2024-10-12 RX ADMIN — HYDROCODONE BITARTRATE AND ACETAMINOPHEN 1 TABLET: 5; 325 TABLET ORAL at 07:08

## 2024-10-12 RX ADMIN — WATER 1000 MG: 1 INJECTION INTRAMUSCULAR; INTRAVENOUS; SUBCUTANEOUS at 18:00

## 2024-10-12 RX ADMIN — SEVELAMER CARBONATE 800 MG: 800 TABLET, FILM COATED ORAL at 10:29

## 2024-10-12 RX ADMIN — HYDROCODONE BITARTRATE AND ACETAMINOPHEN 1 TABLET: 5; 325 TABLET ORAL at 20:58

## 2024-10-12 RX ADMIN — SEVELAMER CARBONATE 800 MG: 800 TABLET, FILM COATED ORAL at 13:17

## 2024-10-12 ASSESSMENT — PAIN DESCRIPTION - LOCATION
LOCATION: ABDOMEN

## 2024-10-12 ASSESSMENT — PAIN SCALES - GENERAL
PAINLEVEL_OUTOF10: 2
PAINLEVEL_OUTOF10: 4
PAINLEVEL_OUTOF10: 5
PAINLEVEL_OUTOF10: 5
PAINLEVEL_OUTOF10: 2

## 2024-10-12 ASSESSMENT — PAIN - FUNCTIONAL ASSESSMENT: PAIN_FUNCTIONAL_ASSESSMENT: ACTIVITIES ARE NOT PREVENTED

## 2024-10-12 ASSESSMENT — PAIN DESCRIPTION - ORIENTATION: ORIENTATION: LOWER

## 2024-10-12 ASSESSMENT — PAIN DESCRIPTION - DESCRIPTORS: DESCRIPTORS: CRAMPING

## 2024-10-12 NOTE — DIALYSIS
Disconnected PD cycler from patient. Betadine cap intact. Patient tolerated well.     UF amount: 589ml removed. Effluent: cloudy, light, yellow

## 2024-10-12 NOTE — PROGRESS NOTES
Subjective:   Daily Progress Note: 10/12/2024 11:34 AM    Pain better    No CP No SOB  + Abd pain  MS -      Current Facility-Administered Medications   Medication Dose Route Frequency    cefepime (MAXIPIME) 1,000 mg in sterile water 10 mL IV syringe  1,000 mg IntraVENous Q24H    delflex lo-leanna 4.25% 5,000 mL with heparin (porcine)  5,000 mL IntraPERitoneal Once in dialysis    delflex lo-leanna 4.25% 5,000 mL, heparin (porcine)  5,000 mL IntraPERitoneal Once in dialysis    metoclopramide (REGLAN) injection 10 mg  10 mg IntraVENous Q6H PRN    HYDROcodone-acetaminophen (NORCO) 5-325 MG per tablet 1 tablet  1 tablet Oral Q6H PRN    metoprolol tartrate (LOPRESSOR) tablet 100 mg  100 mg Oral BID    furosemide (LASIX) injection 100 mg  100 mg IntraVENous BID    delflex lo-leanna 1.5% 2,000 mL with ceFEPIme (MAXIPIME) 1,000 mg   IntraPERitoneal Daily PRN    sodium chloride flush 0.9 % injection 5-40 mL  5-40 mL IntraVENous 2 times per day    sodium chloride flush 0.9 % injection 5-40 mL  5-40 mL IntraVENous PRN    0.9 % sodium chloride infusion   IntraVENous PRN    ondansetron (ZOFRAN-ODT) disintegrating tablet 4 mg  4 mg Oral Q8H PRN    Or    ondansetron (ZOFRAN) injection 4 mg  4 mg IntraVENous Q6H PRN    polyethylene glycol (GLYCOLAX) packet 17 g  17 g Oral Daily PRN    bisacodyl (DULCOLAX) suppository 10 mg  10 mg Rectal Daily PRN    famotidine (PEPCID) tablet 10 mg  10 mg Oral Daily PRN    aluminum & magnesium hydroxide-simethicone (MAALOX) 200-200-20 MG/5ML suspension 30 mL  30 mL Oral Q6H PRN    acetaminophen (TYLENOL) tablet 650 mg  650 mg Oral Q6H PRN    Or    acetaminophen (TYLENOL) suppository 650 mg  650 mg Rectal Q6H PRN    apixaban (ELIQUIS) tablet 5 mg  5 mg Oral BID    sertraline (ZOLOFT) tablet 100 mg  100 mg Oral BID    [Held by provider] amLODIPine (NORVASC) tablet 10 mg  10 mg Oral Daily    sevelamer (RENVELA) tablet 800 mg  800 mg Oral TID WC    [Held by provider] hydrALAZINE (APRESOLINE) tablet 50 mg  50 mg

## 2024-10-12 NOTE — DIALYSIS
Peritoneal dialysis initiated as ordered. Peritoneal catheter exit site cleaned with Chlorhexidine scrub and Gentamicin cream applied to PD cath exit site. Dressing changed, site has no s/s of redness, swelling, or exudate. Patient states no complaints at this time. Report given to primary RN.

## 2024-10-12 NOTE — PROGRESS NOTES
Hospitalist Progress Note   Admit Date:  10/10/2024  9:15 AM   Name:  Giselle Rosado   Age:  54 y.o.  Sex:  female  :  1970   MRN:  [id number]   Room:  [facility]/    Presenting/Chief Complaint: Abdominal Pain     Reason(s) for Admission: Spontaneous bacterial peritonitis (HCC) [K65.2]  Septicemia (HCC) [A41.9]  Atrial fibrillation with rapid ventricular response (HCC) [I48.91]  Sepsis (HCC) [A41.9]     Hospital Course:   Please refer to the admission H&P for details of presentation.      In summary, Giselle Rosado is a 54 y.o. female with medical history significant for ESRD on peritoneal dialysis, history of paroxysmal atrial fibrillation no longer on blood thinners, hypertension, depression, irritable bowel syndrome who presents emergency room with 2 days of right upper quadrant abdominal pain.   Patient was admitted with sepsis(leukocytosis and tachycardia) with concern for peritonitis.  In addition, she was noted to be in A-fib RVR and had received IV Cardizem in the ED with improvement.  CT abdomen pelvis with moderate volume ascites with peritoneal dialysis catheter present, splenomegaly, body wall edema versus cellulitis at the lower anterior abdominal wall.    Subjective/24 hr Events (10/12/24) :  Patient is seen and examined at bedside.    Patient laying bed.  Reports that she feels better today compared to last several days.  Still with abdominal pain but pain did not seem to have improved.  Patient denies fever, chills, chest pains, shortness of breath.      Assessment & Plan:   Sepsis with concern for peritonitis  ESRD on PD  Meets criteria for sepsis with tachycardia and leukocytosis  10/12/24: Still with normal pain but improved in terms of intensity.  -Follow-up blood cultures and PD fluid culture.  PD fluid culture with gram-positive rods  -would continue with vancomycin and cefepime as well as fluconazole at this time  -peritoneal dialysis as per Nephro  -PRN pain meds with IV

## 2024-10-13 LAB
ANION GAP SERPL CALC-SCNC: 18 MMOL/L (ref 9–18)
BASOPHILS # BLD: 0.1 K/UL (ref 0–0.2)
BASOPHILS NFR BLD: 1 % (ref 0–2)
BUN SERPL-MCNC: 51 MG/DL (ref 6–23)
CALCIUM SERPL-MCNC: 7.3 MG/DL (ref 8.8–10.2)
CHLORIDE SERPL-SCNC: 89 MMOL/L (ref 98–107)
CO2 SERPL-SCNC: 23 MMOL/L (ref 20–28)
CREAT SERPL-MCNC: 10.7 MG/DL (ref 0.6–1.1)
DIFFERENTIAL METHOD BLD: ABNORMAL
EOSINOPHIL # BLD: 0.3 K/UL (ref 0–0.8)
EOSINOPHIL NFR BLD: 3 % (ref 0.5–7.8)
ERYTHROCYTE [DISTWIDTH] IN BLOOD BY AUTOMATED COUNT: 16.7 % (ref 11.9–14.6)
GLUCOSE BLD STRIP.AUTO-MCNC: 174 MG/DL (ref 65–100)
GLUCOSE BLD STRIP.AUTO-MCNC: 176 MG/DL (ref 65–100)
GLUCOSE BLD STRIP.AUTO-MCNC: 226 MG/DL (ref 65–100)
GLUCOSE BLD STRIP.AUTO-MCNC: 366 MG/DL (ref 65–100)
GLUCOSE SERPL-MCNC: 178 MG/DL (ref 70–99)
HCT VFR BLD AUTO: 31.6 % (ref 35.8–46.3)
HGB BLD-MCNC: 10 G/DL (ref 11.7–15.4)
IMM GRANULOCYTES # BLD AUTO: 0.2 K/UL (ref 0–0.5)
IMM GRANULOCYTES NFR BLD AUTO: 2 % (ref 0–5)
LYMPHOCYTES # BLD: 0.5 K/UL (ref 0.5–4.6)
LYMPHOCYTES NFR BLD: 5 % (ref 13–44)
MCH RBC QN AUTO: 28.5 PG (ref 26.1–32.9)
MCHC RBC AUTO-ENTMCNC: 31.6 G/DL (ref 31.4–35)
MCV RBC AUTO: 90 FL (ref 82–102)
MONOCYTES # BLD: 0.6 K/UL (ref 0.1–1.3)
MONOCYTES NFR BLD: 6 % (ref 4–12)
NEUTS SEG # BLD: 8.5 K/UL (ref 1.7–8.2)
NEUTS SEG NFR BLD: 83 % (ref 43–78)
NRBC # BLD: 0 K/UL (ref 0–0.2)
PLATELET # BLD AUTO: 340 K/UL (ref 150–450)
PMV BLD AUTO: 9.9 FL (ref 9.4–12.3)
POTASSIUM SERPL-SCNC: 4 MMOL/L (ref 3.5–5.1)
RBC # BLD AUTO: 3.51 M/UL (ref 4.05–5.2)
SERVICE CMNT-IMP: ABNORMAL
SODIUM SERPL-SCNC: 130 MMOL/L (ref 136–145)
WBC # BLD AUTO: 10.1 K/UL (ref 4.3–11.1)

## 2024-10-13 PROCEDURE — 6360000002 HC RX W HCPCS: Performed by: INTERNAL MEDICINE

## 2024-10-13 PROCEDURE — 85025 COMPLETE CBC W/AUTO DIFF WBC: CPT

## 2024-10-13 PROCEDURE — 6370000000 HC RX 637 (ALT 250 FOR IP): Performed by: NURSE PRACTITIONER

## 2024-10-13 PROCEDURE — 82962 GLUCOSE BLOOD TEST: CPT

## 2024-10-13 PROCEDURE — 6370000000 HC RX 637 (ALT 250 FOR IP): Performed by: INTERNAL MEDICINE

## 2024-10-13 PROCEDURE — 2580000003 HC RX 258: Performed by: INTERNAL MEDICINE

## 2024-10-13 PROCEDURE — 90945 DIALYSIS ONE EVALUATION: CPT

## 2024-10-13 PROCEDURE — 1100000003 HC PRIVATE W/ TELEMETRY

## 2024-10-13 PROCEDURE — 80048 BASIC METABOLIC PNL TOTAL CA: CPT

## 2024-10-13 PROCEDURE — 36415 COLL VENOUS BLD VENIPUNCTURE: CPT

## 2024-10-13 RX ADMIN — FUROSEMIDE 100 MG: 10 INJECTION, SOLUTION INTRAMUSCULAR; INTRAVENOUS at 17:32

## 2024-10-13 RX ADMIN — LINEZOLID 600 MG: 600 INJECTION, SOLUTION INTRAVENOUS at 17:35

## 2024-10-13 RX ADMIN — SERTRALINE 100 MG: 100 TABLET, FILM COATED ORAL at 21:34

## 2024-10-13 RX ADMIN — HYDROCODONE BITARTRATE AND ACETAMINOPHEN 1 TABLET: 5; 325 TABLET ORAL at 08:17

## 2024-10-13 RX ADMIN — CEFEPIME: 1 INJECTION, POWDER, FOR SOLUTION INTRAMUSCULAR; INTRAVENOUS at 15:10

## 2024-10-13 RX ADMIN — METOPROLOL TARTRATE 100 MG: 50 TABLET, FILM COATED ORAL at 08:17

## 2024-10-13 RX ADMIN — SEVELAMER CARBONATE 800 MG: 800 TABLET, FILM COATED ORAL at 08:17

## 2024-10-13 RX ADMIN — HEPARIN SODIUM: 1000 INJECTION INTRAVENOUS; SUBCUTANEOUS at 15:10

## 2024-10-13 RX ADMIN — APIXABAN 5 MG: 5 TABLET, FILM COATED ORAL at 21:34

## 2024-10-13 RX ADMIN — SERTRALINE 100 MG: 100 TABLET, FILM COATED ORAL at 08:17

## 2024-10-13 RX ADMIN — ACETAMINOPHEN 650 MG: 325 TABLET ORAL at 11:59

## 2024-10-13 RX ADMIN — APIXABAN 5 MG: 5 TABLET, FILM COATED ORAL at 08:14

## 2024-10-13 RX ADMIN — HYDROCODONE BITARTRATE AND ACETAMINOPHEN 1 TABLET: 5; 325 TABLET ORAL at 21:36

## 2024-10-13 RX ADMIN — METOPROLOL TARTRATE 100 MG: 50 TABLET, FILM COATED ORAL at 21:34

## 2024-10-13 RX ADMIN — SODIUM CHLORIDE, PRESERVATIVE FREE 10 ML: 5 INJECTION INTRAVENOUS at 08:22

## 2024-10-13 RX ADMIN — INSULIN LISPRO 4 UNITS: 100 INJECTION, SOLUTION INTRAVENOUS; SUBCUTANEOUS at 17:31

## 2024-10-13 RX ADMIN — SEVELAMER CARBONATE 800 MG: 800 TABLET, FILM COATED ORAL at 11:57

## 2024-10-13 RX ADMIN — SEVELAMER CARBONATE 800 MG: 800 TABLET, FILM COATED ORAL at 17:31

## 2024-10-13 RX ADMIN — SODIUM CHLORIDE, PRESERVATIVE FREE 10 ML: 5 INJECTION INTRAVENOUS at 21:42

## 2024-10-13 RX ADMIN — LINEZOLID 600 MG: 600 INJECTION, SOLUTION INTRAVENOUS at 05:05

## 2024-10-13 RX ADMIN — INSULIN LISPRO 10 UNITS: 100 INJECTION, SOLUTION INTRAVENOUS; SUBCUTANEOUS at 21:33

## 2024-10-13 RX ADMIN — FUROSEMIDE 100 MG: 10 INJECTION, SOLUTION INTRAMUSCULAR; INTRAVENOUS at 08:12

## 2024-10-13 RX ADMIN — FLUCONAZOLE 100 MG: 100 TABLET ORAL at 08:17

## 2024-10-13 RX ADMIN — HYDROCODONE BITARTRATE AND ACETAMINOPHEN 1 TABLET: 5; 325 TABLET ORAL at 15:49

## 2024-10-13 RX ADMIN — ONDANSETRON 4 MG: 2 INJECTION INTRAMUSCULAR; INTRAVENOUS at 08:17

## 2024-10-13 RX ADMIN — WATER 1000 MG: 1 INJECTION INTRAMUSCULAR; INTRAVENOUS; SUBCUTANEOUS at 17:33

## 2024-10-13 ASSESSMENT — PAIN SCALES - GENERAL
PAINLEVEL_OUTOF10: 5
PAINLEVEL_OUTOF10: 6
PAINLEVEL_OUTOF10: 3
PAINLEVEL_OUTOF10: 0
PAINLEVEL_OUTOF10: 5

## 2024-10-13 ASSESSMENT — PAIN DESCRIPTION - LOCATION: LOCATION: ABDOMEN

## 2024-10-13 NOTE — PROGRESS NOTES
IntraVENous Q4H PRN    glucose chewable tablet 16 g  4 tablet Oral PRN    dextrose bolus 10% 125 mL  125 mL IntraVENous PRN    Or    dextrose bolus 10% 250 mL  250 mL IntraVENous PRN    Glucagon Emergency KIT 1 mg  1 mg SubCUTAneous PRN    dextrose 10 % infusion   IntraVENous Continuous PRN    fluconazole (DIFLUCAN) tablet 100 mg  100 mg Oral Daily    delflex lo-leanna 1.5% 2,000 mL with vancomycin (VANCOCIN) 2,000 mg, cefepime (MAXIPIME) 1,000 mg   IntraPERitoneal PRN    linezolid (ZYVOX) IVPB 600 mg  600 mg IntraVENous Q12H         Objective:     /66   Pulse 92   Temp 97.9 °F (36.6 °C) (Oral)   Resp 18   Ht 1.753 m (5' 9\")   Wt (!) 153.6 kg (338 lb 9.6 oz)   SpO2 98%   BMI 50.00 kg/m²         Temp (24hrs), Av.8 °F (36.6 °C), Min:97.5 °F (36.4 °C), Max:97.9 °F (36.6 °C)      No intake/output data recorded.  10/11 1901 - 10/13 0700  In: 240 [P.O.:240]  Out: 589       /66   Pulse 92   Temp 97.9 °F (36.6 °C) (Oral)   Resp 18   Ht 1.753 m (5' 9\")   Wt (!) 153.6 kg (338 lb 9.6 oz)   SpO2 98%   BMI 50.00 kg/m²   On commode  Head: Normocephalic, without obvious abnormality  Neck: no JVD  Abdomen: obese  Extremities: + edema    Data Review    Recent Results (from the past 48 hour(s))   POCT Glucose    Collection Time: 10/11/24 11:07 AM   Result Value Ref Range    POC Glucose 241 (H) 65 - 100 mg/dL    Performed by: [employer]    POCT Glucose    Collection Time: 10/11/24  4:05 PM   Result Value Ref Range    POC Glucose 299 (H) 65 - 100 mg/dL    Performed by: [employer]    POCT Glucose    Collection Time: 10/11/24  7:53 PM   Result Value Ref Range    POC Glucose 327 (H) 65 - 100 mg/dL    Performed by: John    Basic Metabolic Panel w/ Reflex to MG    Collection Time: 10/12/24  3:53 AM   Result Value Ref Range    Sodium 130 (L) 136 - 145 mmol/L    Potassium 4.4 3.5 - 5.1 mmol/L    Chloride 89 (L) 98 - 107 mmol/L    CO2 22 20 - 28 mmol/L    Anion Gap 19 (H) 9 - 18 mmol/L    Glucose 127  cell count showing 9906 WBC.   -Recent history of corynebacterium peritonitis s/p IP vancomycin and fortaz.   -Continue IV vancomycin/cefepime/Fluconazole  -Peritoneal culture pending but GS with Gr+ rods. Suspect same organism she had a month ago. If confirmed, would advocate for removal of PD catheter and a 1 month holiday on HD.     Would give another 2 g of Vancomycin IP on 10/15 (so with PD orders on 10/14)     ESRD on PD  -[facility]  -PD orders written for today.  Cont same PD, all 4.25%. Continue with heparin to bags for fibrin     Volume overload   -Same PD, increased Lasix, better

## 2024-10-13 NOTE — PROGRESS NOTES
Hospitalist Progress Note   Admit Date:  10/10/2024  9:15 AM   Name:  Giselle Rosado   Age:  54 y.o.  Sex:  female  :  1970   MRN:  [id number]   Room:  [facility]/    Presenting/Chief Complaint: Abdominal Pain     Reason(s) for Admission: Spontaneous bacterial peritonitis (HCC) [K65.2]  Septicemia (HCC) [A41.9]  Atrial fibrillation with rapid ventricular response (HCC) [I48.91]  Sepsis (HCC) [A41.9]     Hospital Course:   Please refer to the admission H&P for details of presentation.      In summary, Giselle Rosado is a 54 y.o. female with medical history significant for ESRD on peritoneal dialysis, history of paroxysmal atrial fibrillation no longer on blood thinners, hypertension, depression, irritable bowel syndrome who presents emergency room with 2 days of right upper quadrant abdominal pain.   Patient was admitted with sepsis(leukocytosis and tachycardia) with concern for peritonitis.  In addition, she was noted to be in A-fib RVR and had received IV Cardizem in the ED with improvement.  CT abdomen pelvis with moderate volume ascites with peritoneal dialysis catheter present, splenomegaly, body wall edema versus cellulitis at the lower anterior abdominal wall.    Subjective/24 hr Events (10/13/24) :  Patient is seen and examined at bedside.    Sitting up in bed.  Reports she was doing well until this AM when she felt pain on left side of her abdomen when she attempted to pull herself up from bed to get up.  Right sided abdominal pain had improved.   Patient denies fever, chills, chest pains, shortness of breath.      Assessment & Plan:   Sepsis with concern for peritonitis  ESRD on PD  Meets criteria for sepsis with tachycardia and leukocytosis  10/13/24: new left sided abdominal pain this AM.  -PD fluid culture with gram-positive rods - Staph Coag neg species. Bcx neg thus far  -would continue with vancomycin and cefepime as well as fluconazole at this time  -peritoneal dialysis as per Nephro.  Planned Vanc IP on 10/14 and 10/15.  -PRN pain meds with IV dilaudid. Added PO oxycodone      A-fib RVR  Has history of paroxysmal atrial fibrillation and no longer on Eliquis.   Intermittent tachycardia but no palpitation per patient.  -restarted on Eliquis.  -Continue with home metoprolol. Will increase dose to 100mg BID for now  -Monitor on telemetry     Hypertension: Given sepsis, will hold Norvasc and hydralazine at this time.  On IV Lasix and has been increased to 100mg BID as per nephro     Depression: on home Zoloft      Anticipated Discharge Arrangements:   Home    PT/OT evals ordered?  Therapy evals ordered  Diet:  ADULT DIET; Regular; Low Potassium (Less than 3000 mg/day); Low Phosphorus (Less than 1000 mg)  VTE prophylaxis: Already on anticoagulation  Code status: Full Code      Non-peripheral Lines and Tubes (if present):          Telemetry (if present):  Cardiac/Telemetry Monitor On: Portable telemetry pack applied        Hospital Problems:  Principal Problem:    Sepsis (HCC)  Active Problems:    Obesity, morbid    Anemia    HTN (hypertension)    DM2 (diabetes mellitus, type 2) (HCC)    Atrial fibrillation with RVR (HCC)    Peritonitis (HCC)  Resolved Problems:    * No resolved hospital problems. *      Objective:   Patient Vitals for the past 24 hrs:   Temp Pulse Resp BP SpO2   10/13/24 0738 97.9 °F (36.6 °C) 92 18 116/66 98 %   10/13/24 0400 97.7 °F (36.5 °C) 95 20 (!) 96/56 97 %   10/12/24 2128 -- -- 20 -- --   10/12/24 2058 -- -- 18 -- --   10/12/24 1950 97.9 °F (36.6 °C) 100 18 123/75 95 %   10/12/24 1915 -- 90 -- -- --   10/12/24 1651 -- 90 -- -- --   10/12/24 1531 97.5 °F (36.4 °C) 100 16 116/76 92 %   10/12/24 1352 -- -- 16 -- --   10/12/24 1322 -- -- 18 -- --       Oxygen Therapy  SpO2: 98 %  O2 Device: None (Room air)    Estimated body mass index is 50 kg/m² as calculated from the following:    Height as of this encounter: 1.753 m (5' 9\").    Weight as of this encounter: 153.6 kg (338 lb 9.6

## 2024-10-13 NOTE — PROGRESS NOTES
Hourly rounds completed.  Patient alert and oriented, family at bedside.  Patient c/o pain to abdomen, medicated prn per MAR.  No needs voiced at this time.  Report will be given to oncoming RN.

## 2024-10-13 NOTE — DIALYSIS
Disconnected PD cycler from patient. Betadine cap intact. Patient tolerated well.     UF amount: 718 ml removed. Effluent: clear, yellow with fibrin

## 2024-10-13 NOTE — PROGRESS NOTES
Pt  this evening, 10 units humalog given per sliding scale order.  Provider Maria Del Carmen Banks NP notified via secure message.  No new orders placed.  Pt denies symptoms of hyperglycemia and is in NAD.

## 2024-10-14 LAB
ANION GAP SERPL CALC-SCNC: 20 MMOL/L (ref 9–18)
BASOPHILS # BLD: 0.1 K/UL (ref 0–0.2)
BASOPHILS NFR BLD: 1 % (ref 0–2)
BUN SERPL-MCNC: 48 MG/DL (ref 6–23)
CALCIUM SERPL-MCNC: 7.2 MG/DL (ref 8.8–10.2)
CHLORIDE SERPL-SCNC: 88 MMOL/L (ref 98–107)
CO2 SERPL-SCNC: 21 MMOL/L (ref 20–28)
CREAT SERPL-MCNC: 10.3 MG/DL (ref 0.6–1.1)
DIFFERENTIAL METHOD BLD: ABNORMAL
EOSINOPHIL # BLD: 0.3 K/UL (ref 0–0.8)
EOSINOPHIL NFR BLD: 3 % (ref 0.5–7.8)
ERYTHROCYTE [DISTWIDTH] IN BLOOD BY AUTOMATED COUNT: 16.4 % (ref 11.9–14.6)
GLUCOSE BLD STRIP.AUTO-MCNC: 151 MG/DL (ref 65–100)
GLUCOSE BLD STRIP.AUTO-MCNC: 176 MG/DL (ref 65–100)
GLUCOSE BLD STRIP.AUTO-MCNC: 194 MG/DL (ref 65–100)
GLUCOSE BLD STRIP.AUTO-MCNC: 323 MG/DL (ref 65–100)
GLUCOSE BLD STRIP.AUTO-MCNC: 362 MG/DL (ref 65–100)
GLUCOSE SERPL-MCNC: 166 MG/DL (ref 70–99)
HCT VFR BLD AUTO: 32 % (ref 35.8–46.3)
HGB BLD-MCNC: 10.3 G/DL (ref 11.7–15.4)
IMM GRANULOCYTES # BLD AUTO: 0.2 K/UL (ref 0–0.5)
IMM GRANULOCYTES NFR BLD AUTO: 2 % (ref 0–5)
LYMPHOCYTES # BLD: 0.5 K/UL (ref 0.5–4.6)
LYMPHOCYTES NFR BLD: 5 % (ref 13–44)
MCH RBC QN AUTO: 29.3 PG (ref 26.1–32.9)
MCHC RBC AUTO-ENTMCNC: 32.2 G/DL (ref 31.4–35)
MCV RBC AUTO: 90.9 FL (ref 82–102)
MONOCYTES # BLD: 0.7 K/UL (ref 0.1–1.3)
MONOCYTES NFR BLD: 7 % (ref 4–12)
NEUTS SEG # BLD: 8.2 K/UL (ref 1.7–8.2)
NEUTS SEG NFR BLD: 82 % (ref 43–78)
NRBC # BLD: 0.02 K/UL (ref 0–0.2)
PLATELET # BLD AUTO: 327 K/UL (ref 150–450)
PMV BLD AUTO: 10 FL (ref 9.4–12.3)
POTASSIUM SERPL-SCNC: 4.1 MMOL/L (ref 3.5–5.1)
RBC # BLD AUTO: 3.52 M/UL (ref 4.05–5.2)
SERVICE CMNT-IMP: ABNORMAL
SODIUM SERPL-SCNC: 129 MMOL/L (ref 136–145)
VANCOMYCIN TROUGH SERPL-MCNC: 8.8 UG/ML (ref 10–20)
WBC # BLD AUTO: 10 K/UL (ref 4.3–11.1)

## 2024-10-14 PROCEDURE — 85025 COMPLETE CBC W/AUTO DIFF WBC: CPT

## 2024-10-14 PROCEDURE — 80202 ASSAY OF VANCOMYCIN: CPT

## 2024-10-14 PROCEDURE — 80048 BASIC METABOLIC PNL TOTAL CA: CPT

## 2024-10-14 PROCEDURE — 6360000002 HC RX W HCPCS: Performed by: INTERNAL MEDICINE

## 2024-10-14 PROCEDURE — 6360000002 HC RX W HCPCS: Performed by: NURSE PRACTITIONER

## 2024-10-14 PROCEDURE — 90945 DIALYSIS ONE EVALUATION: CPT

## 2024-10-14 PROCEDURE — 1100000000 HC RM PRIVATE

## 2024-10-14 PROCEDURE — 82962 GLUCOSE BLOOD TEST: CPT

## 2024-10-14 PROCEDURE — 2580000003 HC RX 258: Performed by: INTERNAL MEDICINE

## 2024-10-14 PROCEDURE — 6370000000 HC RX 637 (ALT 250 FOR IP): Performed by: NURSE PRACTITIONER

## 2024-10-14 PROCEDURE — 2580000003 HC RX 258: Performed by: NURSE PRACTITIONER

## 2024-10-14 PROCEDURE — 2500000003 HC RX 250 WO HCPCS: Performed by: INTERNAL MEDICINE

## 2024-10-14 PROCEDURE — 36415 COLL VENOUS BLD VENIPUNCTURE: CPT

## 2024-10-14 PROCEDURE — 6370000000 HC RX 637 (ALT 250 FOR IP): Performed by: INTERNAL MEDICINE

## 2024-10-14 RX ORDER — FUROSEMIDE 40 MG/1
80 TABLET ORAL DAILY
Status: DISCONTINUED | OUTPATIENT
Start: 2024-10-14 | End: 2024-10-15

## 2024-10-14 RX ADMIN — HYDROCODONE BITARTRATE AND ACETAMINOPHEN 1 TABLET: 5; 325 TABLET ORAL at 17:11

## 2024-10-14 RX ADMIN — FUROSEMIDE 100 MG: 10 INJECTION, SOLUTION INTRAMUSCULAR; INTRAVENOUS at 09:43

## 2024-10-14 RX ADMIN — APIXABAN 5 MG: 5 TABLET, FILM COATED ORAL at 09:42

## 2024-10-14 RX ADMIN — HYDROCODONE BITARTRATE AND ACETAMINOPHEN 1 TABLET: 5; 325 TABLET ORAL at 05:42

## 2024-10-14 RX ADMIN — SODIUM CHLORIDE, PRESERVATIVE FREE 5 ML: 5 INJECTION INTRAVENOUS at 09:43

## 2024-10-14 RX ADMIN — METOPROLOL TARTRATE 100 MG: 50 TABLET, FILM COATED ORAL at 20:45

## 2024-10-14 RX ADMIN — SEVELAMER CARBONATE 800 MG: 800 TABLET, FILM COATED ORAL at 12:15

## 2024-10-14 RX ADMIN — VANCOMYCIN HYDROCHLORIDE: 10 INJECTION, POWDER, LYOPHILIZED, FOR SOLUTION INTRAVENOUS at 14:42

## 2024-10-14 RX ADMIN — APIXABAN 5 MG: 5 TABLET, FILM COATED ORAL at 20:45

## 2024-10-14 RX ADMIN — SODIUM CHLORIDE, PRESERVATIVE FREE 10 ML: 5 INJECTION INTRAVENOUS at 20:45

## 2024-10-14 RX ADMIN — INSULIN LISPRO 2 UNITS: 100 INJECTION, SOLUTION INTRAVENOUS; SUBCUTANEOUS at 12:15

## 2024-10-14 RX ADMIN — SERTRALINE 100 MG: 100 TABLET, FILM COATED ORAL at 20:46

## 2024-10-14 RX ADMIN — LINEZOLID 600 MG: 600 INJECTION, SOLUTION INTRAVENOUS at 05:35

## 2024-10-14 RX ADMIN — FLUCONAZOLE 100 MG: 100 TABLET ORAL at 09:43

## 2024-10-14 RX ADMIN — INSULIN LISPRO 10 UNITS: 100 INJECTION, SOLUTION INTRAVENOUS; SUBCUTANEOUS at 21:41

## 2024-10-14 RX ADMIN — INSULIN LISPRO 2 UNITS: 100 INJECTION, SOLUTION INTRAVENOUS; SUBCUTANEOUS at 17:11

## 2024-10-14 RX ADMIN — HEPARIN SODIUM: 1000 INJECTION INTRAVENOUS; SUBCUTANEOUS at 14:41

## 2024-10-14 RX ADMIN — METOPROLOL TARTRATE 100 MG: 50 TABLET, FILM COATED ORAL at 09:42

## 2024-10-14 RX ADMIN — SERTRALINE 100 MG: 100 TABLET, FILM COATED ORAL at 09:43

## 2024-10-14 RX ADMIN — ONDANSETRON 4 MG: 2 INJECTION INTRAMUSCULAR; INTRAVENOUS at 17:11

## 2024-10-14 RX ADMIN — SEVELAMER CARBONATE 800 MG: 800 TABLET, FILM COATED ORAL at 09:42

## 2024-10-14 RX ADMIN — HYDROMORPHONE HYDROCHLORIDE 0.5 MG: 1 INJECTION, SOLUTION INTRAMUSCULAR; INTRAVENOUS; SUBCUTANEOUS at 20:44

## 2024-10-14 RX ADMIN — ONDANSETRON 4 MG: 4 TABLET, ORALLY DISINTEGRATING ORAL at 05:42

## 2024-10-14 RX ADMIN — HEPARIN SODIUM: 1000 INJECTION INTRAVENOUS; SUBCUTANEOUS at 14:43

## 2024-10-14 ASSESSMENT — PAIN DESCRIPTION - LOCATION
LOCATION: ABDOMEN

## 2024-10-14 ASSESSMENT — PAIN SCALES - GENERAL
PAINLEVEL_OUTOF10: 4
PAINLEVEL_OUTOF10: 4
PAINLEVEL_OUTOF10: 0
PAINLEVEL_OUTOF10: 7

## 2024-10-14 ASSESSMENT — PAIN DESCRIPTION - DESCRIPTORS
DESCRIPTORS: TENDER
DESCRIPTORS: ACHING

## 2024-10-14 ASSESSMENT — PAIN DESCRIPTION - ORIENTATION: ORIENTATION: LOWER

## 2024-10-14 ASSESSMENT — PAIN - FUNCTIONAL ASSESSMENT: PAIN_FUNCTIONAL_ASSESSMENT: ACTIVITIES ARE NOT PREVENTED

## 2024-10-14 NOTE — PROGRESS NOTES
While pt was receiving IV lasix, she became symptomatic. Nurse stopped infusing at 70mg due to pt c/o nausea. Nephrologist is at the bedside. She states the last time her BP dropped she started feeling nauseous. Nurse checked BP when pt complained. First check was 91/47 on the right lower extremity. BP rechecked and was 101/69.

## 2024-10-14 NOTE — CARE COORDINATION
Chart reviewed and patient discussed in IDT rounds this AM. Cultures pending. Depending on results of cultures patient may need to transition to HD for roughly one month. Patient lives with her  and is mostly independent at baseline. CM following for discharge needs.      Laurita VIGILSW, ACM  [place]

## 2024-10-14 NOTE — PROGRESS NOTES
Giselle Rosado  Admission Date: 10/10/2024         [facility] Progress Note: 10/14/2024    Follow-up for: ESRD    The patient's chart is reviewed and the patient is discussed with the staff.    Subjective:   Pt seen and examined in room, spouse and RN at bedside, pt with episodes of hypotension with diuretic and reports poor uop. No issues with PD overnight, pain controlled, still with LE pitting edema.   PD effluent noted to be cloudy per dialysis RN note    ROS:  Gen - no fever, no chills, appetite unchanged  CV - no chest pain, no palpitation  Lung - no shortness of breath, no cough  Abd - no tenderness, no nausea/vomiting, no diarrhea  Ext - + edema    Current Facility-Administered Medications   Medication Dose Route Frequency    metoclopramide (REGLAN) injection 10 mg  10 mg IntraVENous Q6H PRN    HYDROcodone-acetaminophen (NORCO) 5-325 MG per tablet 1 tablet  1 tablet Oral Q6H PRN    metoprolol tartrate (LOPRESSOR) tablet 100 mg  100 mg Oral BID    furosemide (LASIX) injection 100 mg  100 mg IntraVENous BID    delflex lo-leanna 1.5% 2,000 mL with ceFEPIme (MAXIPIME) 1,000 mg   IntraPERitoneal Daily PRN    sodium chloride flush 0.9 % injection 5-40 mL  5-40 mL IntraVENous 2 times per day    sodium chloride flush 0.9 % injection 5-40 mL  5-40 mL IntraVENous PRN    0.9 % sodium chloride infusion   IntraVENous PRN    ondansetron (ZOFRAN-ODT) disintegrating tablet 4 mg  4 mg Oral Q8H PRN    Or    ondansetron (ZOFRAN) injection 4 mg  4 mg IntraVENous Q6H PRN    polyethylene glycol (GLYCOLAX) packet 17 g  17 g Oral Daily PRN    bisacodyl (DULCOLAX) suppository 10 mg  10 mg Rectal Daily PRN    famotidine (PEPCID) tablet 10 mg  10 mg Oral Daily PRN    aluminum & magnesium hydroxide-simethicone (MAALOX) 200-200-20 MG/5ML suspension 30 mL  30 mL Oral Q6H PRN    acetaminophen (TYLENOL) tablet 650 mg  650 mg Oral Q6H PRN    Or    acetaminophen (TYLENOL) suppository 650 mg  650 mg Rectal Q6H  PRN    apixaban (ELIQUIS) tablet 5 mg  5 mg Oral BID    sertraline (ZOLOFT) tablet 100 mg  100 mg Oral BID    sevelamer (RENVELA) tablet 800 mg  800 mg Oral TID WC    insulin lispro (HUMALOG,ADMELOG) injection vial 0-10 Units  0-10 Units SubCUTAneous 4x Daily AC & HS    HYDROmorphone HCl PF (DILAUDID) injection 0.5 mg  0.5 mg IntraVENous Q4H PRN    glucose chewable tablet 16 g  4 tablet Oral PRN    dextrose bolus 10% 125 mL  125 mL IntraVENous PRN    Or    dextrose bolus 10% 250 mL  250 mL IntraVENous PRN    Glucagon Emergency KIT 1 mg  1 mg SubCUTAneous PRN    dextrose 10 % infusion   IntraVENous Continuous PRN    fluconazole (DIFLUCAN) tablet 100 mg  100 mg Oral Daily    delflex lo-leanna 1.5% 2,000 mL with vancomycin (VANCOCIN) 2,000 mg, cefepime (MAXIPIME) 1,000 mg   IntraPERitoneal PRN         Objective:     Vitals:    10/14/24 0542 10/14/24 0739 10/14/24 0945 10/14/24 1000   BP:  112/79 (!) 91/47 101/69   Pulse:  91 90 (!) 106   Resp: 18 18     Temp:  98.4 °F (36.9 °C)     TempSrc:  Oral     SpO2:  95%     Weight:       Height:         Intake and Output:   10/12 1901 - 10/14 0700  In: -   Out: 718   10/14 0701 - 10/14 1900  In: 2411.1   Out: 991     Physical Exam:   Constitutional:  the patient is well developed and in no acute distress, alert and oriented   HEENT:  Sclera clear, pupils equal, oral mucosa moist  Lungs: clear low lung volumes bilaterally   Cardiovascular:  Regular rate, S1, S2, no Rub   Abd/GI: soft and non-tender; with positive bowel sounds.  Ext: warm without cyanosis. There is 2+ lower leg edema.  Skin:  no jaundice or rashes  Neuro: no gross neuro deficits   Psychiatric: Calm.   Access: PD catheter intact       LAB  Recent Labs     10/12/24  0353 10/13/24  0504 10/14/24  0352   WBC 9.4 10.1 10.0   HGB 11.3* 10.0* 10.3*   HCT 34.9* 31.6* 32.0*    340 327     Recent Labs     10/12/24  0353 10/13/24  0504 10/14/24  0352   * 130* 129*   K 4.4 4.0 4.1   CL 89* 89* 88*   CO2 22 23 21

## 2024-10-14 NOTE — DIALYSIS
Disconnected PD cycler from patient. Betadine cap intact. Patient tolerated well.     UF amount: 991ml removed. Effluent: yellow and cloudy, pt received last fill w/antibiotics

## 2024-10-14 NOTE — PROGRESS NOTES
Hospitalist Progress Note   Admit Date:  10/10/2024  9:15 AM   Name:  Giselle Rosado   Age:  54 y.o.  Sex:  female  :  1970   MRN:  [id number]   Room:  [facility]/    Presenting/Chief Complaint: Abdominal Pain     Reason(s) for Admission: Spontaneous bacterial peritonitis (HCC) [K65.2]  Septicemia (HCC) [A41.9]  Atrial fibrillation with rapid ventricular response (HCC) [I48.91]  Sepsis (HCC) [A41.9]     Hospital Course:   Please refer to the admission H&P for details of presentation.      In summary, Giselle Rosado is a 54 y.o. female with medical history significant for ESRD on peritoneal dialysis, history of paroxysmal atrial fibrillation no longer on blood thinners, hypertension, depression, irritable bowel syndrome who presents emergency room with 2 days of right upper quadrant abdominal pain.   Patient was admitted with sepsis(leukocytosis and tachycardia) with concern for peritonitis.  In addition, she was noted to be in A-fib RVR and had received IV Cardizem in the ED with improvement.  CT abdomen pelvis with moderate volume ascites with peritoneal dialysis catheter present, splenomegaly, body wall edema versus cellulitis at the lower anterior abdominal wall.    Subjective/24 hr Events (10/14/24) :  Patient is seen and examined at bedside.    Sitting up on side of the bed.  Abdominal pain improved but not resolved. Still with left sided abdominal pain and left hip pain worse with movement. Denies any trauma but left sided pain started after attempting to pull her self up from laying position yesterday.     Patient denies fever, chills, chest pains, shortness of breath.      Assessment & Plan:   Sepsis with concern for peritonitis  ESRD on PD  Meets criteria for sepsis with tachycardia and leukocytosis  10/14/24: still with abdominal pain but improved.   -PD fluid culture with gram-positive rods - Staph Coag neg species. Bcx neg thus far.   -continue with fluconazole at this time  -peritoneal

## 2024-10-14 NOTE — PROGRESS NOTES
Pt  this evening, Dr. Miranda notified via secure message.  No new orders at this time.  10 units humalog given per sliding scale order.

## 2024-10-14 NOTE — PROGRESS NOTES
Subjective:   Daily Progress Note: 10/14/2024 10:47 AM    Pain better. On Commode    No CP No SOB  + Abd pain        Current Facility-Administered Medications   Medication Dose Route Frequency    metoclopramide (REGLAN) injection 10 mg  10 mg IntraVENous Q6H PRN    HYDROcodone-acetaminophen (NORCO) 5-325 MG per tablet 1 tablet  1 tablet Oral Q6H PRN    metoprolol tartrate (LOPRESSOR) tablet 100 mg  100 mg Oral BID    delflex lo-leanna 1.5% 2,000 mL with ceFEPIme (MAXIPIME) 1,000 mg   IntraPERitoneal Daily PRN    sodium chloride flush 0.9 % injection 5-40 mL  5-40 mL IntraVENous 2 times per day    sodium chloride flush 0.9 % injection 5-40 mL  5-40 mL IntraVENous PRN    0.9 % sodium chloride infusion   IntraVENous PRN    ondansetron (ZOFRAN-ODT) disintegrating tablet 4 mg  4 mg Oral Q8H PRN    Or    ondansetron (ZOFRAN) injection 4 mg  4 mg IntraVENous Q6H PRN    polyethylene glycol (GLYCOLAX) packet 17 g  17 g Oral Daily PRN    bisacodyl (DULCOLAX) suppository 10 mg  10 mg Rectal Daily PRN    famotidine (PEPCID) tablet 10 mg  10 mg Oral Daily PRN    aluminum & magnesium hydroxide-simethicone (MAALOX) 200-200-20 MG/5ML suspension 30 mL  30 mL Oral Q6H PRN    acetaminophen (TYLENOL) tablet 650 mg  650 mg Oral Q6H PRN    Or    acetaminophen (TYLENOL) suppository 650 mg  650 mg Rectal Q6H PRN    apixaban (ELIQUIS) tablet 5 mg  5 mg Oral BID    sertraline (ZOLOFT) tablet 100 mg  100 mg Oral BID    sevelamer (RENVELA) tablet 800 mg  800 mg Oral TID WC    insulin lispro (HUMALOG,ADMELOG) injection vial 0-10 Units  0-10 Units SubCUTAneous 4x Daily AC & HS    HYDROmorphone HCl PF (DILAUDID) injection 0.5 mg  0.5 mg IntraVENous Q4H PRN    glucose chewable tablet 16 g  4 tablet Oral PRN    dextrose bolus 10% 125 mL  125 mL IntraVENous PRN    Or    dextrose bolus 10% 250 mL  250 mL IntraVENous PRN    Glucagon Emergency KIT 1 mg  1 mg SubCUTAneous PRN    dextrose 10 % infusion   IntraVENous Continuous PRN    fluconazole (DIFLUCAN)

## 2024-10-14 NOTE — PROGRESS NOTES
Hourly rounds performed this shift. Bed lowered and locked. Call light within reach. All needs met at this time. C/o nausea and pain once during shift. PRN meds administered with relief noted.

## 2024-10-15 LAB
ANION GAP SERPL CALC-SCNC: 22 MMOL/L (ref 9–18)
BACTERIA ISLT: NORMAL
BACTERIA ISLT: NORMAL
BACTERIA SPEC CULT: ABNORMAL
BACTERIA SPEC CULT: NORMAL
BACTERIA SPEC CULT: NORMAL
BASOPHILS # BLD: 0.1 K/UL (ref 0–0.2)
BASOPHILS NFR BLD: 1 % (ref 0–2)
BUN SERPL-MCNC: 48 MG/DL (ref 6–23)
CALCIUM SERPL-MCNC: 7.2 MG/DL (ref 8.8–10.2)
CHLORIDE SERPL-SCNC: 89 MMOL/L (ref 98–107)
CO2 SERPL-SCNC: 19 MMOL/L (ref 20–28)
CREAT SERPL-MCNC: 10 MG/DL (ref 0.6–1.1)
DIFFERENTIAL METHOD BLD: ABNORMAL
EOSINOPHIL # BLD: 0.2 K/UL (ref 0–0.8)
EOSINOPHIL NFR BLD: 2 % (ref 0.5–7.8)
ERYTHROCYTE [DISTWIDTH] IN BLOOD BY AUTOMATED COUNT: 16.6 % (ref 11.9–14.6)
GLUCOSE BLD STRIP.AUTO-MCNC: 125 MG/DL (ref 65–100)
GLUCOSE BLD STRIP.AUTO-MCNC: 162 MG/DL (ref 65–100)
GLUCOSE BLD STRIP.AUTO-MCNC: 185 MG/DL (ref 65–100)
GLUCOSE BLD STRIP.AUTO-MCNC: 279 MG/DL (ref 65–100)
GLUCOSE SERPL-MCNC: 177 MG/DL (ref 70–99)
GRAM STN SPEC: ABNORMAL
GRAM STN SPEC: ABNORMAL
HCT VFR BLD AUTO: 34.3 % (ref 35.8–46.3)
HGB BLD-MCNC: 11.1 G/DL (ref 11.7–15.4)
IMM GRANULOCYTES # BLD AUTO: 0.2 K/UL (ref 0–0.5)
IMM GRANULOCYTES NFR BLD AUTO: 2 % (ref 0–5)
LYMPHOCYTES # BLD: 0.5 K/UL (ref 0.5–4.6)
LYMPHOCYTES NFR BLD: 5 % (ref 13–44)
MCH RBC QN AUTO: 29.4 PG (ref 26.1–32.9)
MCHC RBC AUTO-ENTMCNC: 32.4 G/DL (ref 31.4–35)
MCV RBC AUTO: 91 FL (ref 82–102)
MONOCYTES # BLD: 0.7 K/UL (ref 0.1–1.3)
MONOCYTES NFR BLD: 6 % (ref 4–12)
NEUTS SEG # BLD: 9.6 K/UL (ref 1.7–8.2)
NEUTS SEG NFR BLD: 84 % (ref 43–78)
NRBC # BLD: 0 K/UL (ref 0–0.2)
PLATELET # BLD AUTO: 346 K/UL (ref 150–450)
PMV BLD AUTO: 9.7 FL (ref 9.4–12.3)
POTASSIUM SERPL-SCNC: ABNORMAL MMOL/L (ref 3.5–5.1)
RBC # BLD AUTO: 3.77 M/UL (ref 4.05–5.2)
SERVICE CMNT-IMP: ABNORMAL
SERVICE CMNT-IMP: NORMAL
SERVICE CMNT-IMP: NORMAL
SODIUM SERPL-SCNC: 129 MMOL/L (ref 136–145)
SPECIMEN SOURCE: NORMAL
WBC # BLD AUTO: 11.3 K/UL (ref 4.3–11.1)

## 2024-10-15 PROCEDURE — 82962 GLUCOSE BLOOD TEST: CPT

## 2024-10-15 PROCEDURE — 97165 OT EVAL LOW COMPLEX 30 MIN: CPT

## 2024-10-15 PROCEDURE — 85025 COMPLETE CBC W/AUTO DIFF WBC: CPT

## 2024-10-15 PROCEDURE — 6370000000 HC RX 637 (ALT 250 FOR IP): Performed by: NURSE PRACTITIONER

## 2024-10-15 PROCEDURE — 6360000002 HC RX W HCPCS: Performed by: NURSE PRACTITIONER

## 2024-10-15 PROCEDURE — 80048 BASIC METABOLIC PNL TOTAL CA: CPT

## 2024-10-15 PROCEDURE — 2580000003 HC RX 258: Performed by: INTERNAL MEDICINE

## 2024-10-15 PROCEDURE — 36415 COLL VENOUS BLD VENIPUNCTURE: CPT

## 2024-10-15 PROCEDURE — 90945 DIALYSIS ONE EVALUATION: CPT

## 2024-10-15 PROCEDURE — 6370000000 HC RX 637 (ALT 250 FOR IP): Performed by: INTERNAL MEDICINE

## 2024-10-15 PROCEDURE — 6360000002 HC RX W HCPCS: Performed by: INTERNAL MEDICINE

## 2024-10-15 PROCEDURE — 97535 SELF CARE MNGMENT TRAINING: CPT

## 2024-10-15 PROCEDURE — 1100000000 HC RM PRIVATE

## 2024-10-15 PROCEDURE — 97530 THERAPEUTIC ACTIVITIES: CPT

## 2024-10-15 PROCEDURE — 2580000003 HC RX 258: Performed by: NURSE PRACTITIONER

## 2024-10-15 PROCEDURE — 97161 PT EVAL LOW COMPLEX 20 MIN: CPT

## 2024-10-15 RX ADMIN — FLUCONAZOLE 100 MG: 100 TABLET ORAL at 09:59

## 2024-10-15 RX ADMIN — HEPARIN SODIUM: 1000 INJECTION INTRAVENOUS; SUBCUTANEOUS at 16:30

## 2024-10-15 RX ADMIN — SEVELAMER CARBONATE 800 MG: 800 TABLET, FILM COATED ORAL at 09:59

## 2024-10-15 RX ADMIN — INSULIN LISPRO 6 UNITS: 100 INJECTION, SOLUTION INTRAVENOUS; SUBCUTANEOUS at 21:20

## 2024-10-15 RX ADMIN — INSULIN LISPRO 2 UNITS: 100 INJECTION, SOLUTION INTRAVENOUS; SUBCUTANEOUS at 09:58

## 2024-10-15 RX ADMIN — APIXABAN 5 MG: 5 TABLET, FILM COATED ORAL at 21:19

## 2024-10-15 RX ADMIN — HYDROMORPHONE HYDROCHLORIDE 0.5 MG: 1 INJECTION, SOLUTION INTRAMUSCULAR; INTRAVENOUS; SUBCUTANEOUS at 21:30

## 2024-10-15 RX ADMIN — METOPROLOL TARTRATE 100 MG: 50 TABLET, FILM COATED ORAL at 09:59

## 2024-10-15 RX ADMIN — SODIUM CHLORIDE, PRESERVATIVE FREE 10 ML: 5 INJECTION INTRAVENOUS at 21:20

## 2024-10-15 RX ADMIN — METOPROLOL TARTRATE 100 MG: 50 TABLET, FILM COATED ORAL at 21:19

## 2024-10-15 RX ADMIN — SODIUM CHLORIDE, PRESERVATIVE FREE 10 ML: 5 INJECTION INTRAVENOUS at 10:00

## 2024-10-15 RX ADMIN — SERTRALINE 100 MG: 100 TABLET, FILM COATED ORAL at 21:18

## 2024-10-15 RX ADMIN — HYDROCODONE BITARTRATE AND ACETAMINOPHEN 1 TABLET: 5; 325 TABLET ORAL at 17:45

## 2024-10-15 RX ADMIN — SERTRALINE 100 MG: 100 TABLET, FILM COATED ORAL at 09:59

## 2024-10-15 RX ADMIN — HYDROMORPHONE HYDROCHLORIDE 0.5 MG: 1 INJECTION, SOLUTION INTRAMUSCULAR; INTRAVENOUS; SUBCUTANEOUS at 15:10

## 2024-10-15 RX ADMIN — APIXABAN 5 MG: 5 TABLET, FILM COATED ORAL at 09:59

## 2024-10-15 RX ADMIN — HYDROCODONE BITARTRATE AND ACETAMINOPHEN 1 TABLET: 5; 325 TABLET ORAL at 05:49

## 2024-10-15 RX ADMIN — SEVELAMER CARBONATE 800 MG: 800 TABLET, FILM COATED ORAL at 11:55

## 2024-10-15 RX ADMIN — SEVELAMER CARBONATE 800 MG: 800 TABLET, FILM COATED ORAL at 17:45

## 2024-10-15 RX ADMIN — INSULIN LISPRO 2 UNITS: 100 INJECTION, SOLUTION INTRAVENOUS; SUBCUTANEOUS at 11:56

## 2024-10-15 RX ADMIN — HEPARIN SODIUM: 1000 INJECTION INTRAVENOUS; SUBCUTANEOUS at 16:28

## 2024-10-15 ASSESSMENT — PAIN SCALES - GENERAL
PAINLEVEL_OUTOF10: 3
PAINLEVEL_OUTOF10: 5
PAINLEVEL_OUTOF10: 6
PAINLEVEL_OUTOF10: 7
PAINLEVEL_OUTOF10: 7

## 2024-10-15 ASSESSMENT — PAIN DESCRIPTION - ORIENTATION
ORIENTATION: LEFT
ORIENTATION: LOWER
ORIENTATION: LOWER
ORIENTATION: LEFT;LOWER

## 2024-10-15 ASSESSMENT — PAIN DESCRIPTION - LOCATION
LOCATION: ABDOMEN

## 2024-10-15 ASSESSMENT — PAIN DESCRIPTION - DESCRIPTORS
DESCRIPTORS: ACHING
DESCRIPTORS: ACHING

## 2024-10-15 ASSESSMENT — PAIN - FUNCTIONAL ASSESSMENT: PAIN_FUNCTIONAL_ASSESSMENT: ACTIVITIES ARE NOT PREVENTED

## 2024-10-15 NOTE — PROGRESS NOTES
Hospitalist Progress Note   Admit Date:  10/10/2024  9:15 AM   Name:  Giselle Rosado   Age:  54 y.o.  Sex:  female  :  1970   MRN:  [id number]   Room:  [facility]/    Presenting/Chief Complaint: Abdominal Pain     Reason(s) for Admission: Spontaneous bacterial peritonitis (HCC) [K65.2]  Septicemia (HCC) [A41.9]  Atrial fibrillation with rapid ventricular response (HCC) [I48.91]  Sepsis (HCC) [A41.9]     Hospital Course:   In summary, Giselle Rosado is a 54 y.o. female with medical history significant for ESRD on peritoneal dialysis, history of paroxysmal atrial fibrillation no longer on blood thinners, hypertension, depression, irritable bowel syndrome who presents emergency room with 2 days of right upper quadrant abdominal pain.   Patient was admitted with sepsis(leukocytosis and tachycardia) with concern for peritonitis.  In addition, she was noted to be in A-fib RVR and had received IV Cardizem in the ED with improvement.  CT abdomen pelvis with moderate volume ascites with peritoneal dialysis catheter present, splenomegaly, body wall edema versus cellulitis at the lower anterior abdominal wal      Subjective & 24hr Events:   Reports abdominal pain is slowly getting better, mostly around her PD site. Denied any other complaints this morning. No overnight events reported.  present at bedside and updated.       Assessment & Plan:     Sepsis due to peritonitis  ESRD on PD  Meets criteria for sepsis with tachycardia and leukocytosis  -still with abdominal pain but improved.   -PD fluid culture with gram-positive rods - Staph Coag neg species. Bcx neg thus far.   -continue with fluconazole at this time  -peritoneal dialysis as per Nephro.   -Continue on Vanc IP and Cefepime IP   -PRN pain meds with IV dilaudid. Added PO oxycodone   -PD culture pending. If cultures positive then will have PD catheter removed and temp catheter placed for HD for one month holiday.     A-fib RVR - resolved  History

## 2024-10-15 NOTE — PROGRESS NOTES
ACUTE PHYSICAL THERAPY GOALS:   (Developed with and agreed upon by patient and/or caregiver.)    LTG:  (1.)Ms. Rosado will move from supine to sit and sit to supine , scoot up and down, and roll side to side in bed with INDEPENDENT within 7 treatment day(s).    (2.)Ms. Rosado will transfer from bed to chair and chair to bed with INDEPENDENT using the least restrictive device within 7 treatment day(s).    (3.)Ms. Rosado will ambulate with INDEPENDENT for 150 feet with the least restrictive device within 7 treatment day(s).  (4.)Ms. Rosado will tolerate at least 23 min of dynamic standing activity to assist standing ADLs with the least restrictive device within 7 treatment days.      ________________________________________________________________________________________________      PHYSICAL THERAPY Initial Assessment, Daily Note, and PM  (Link to Caseload Tracking: PT Visit Days : 1  Acknowledge Orders  Time In/Out  PT Charge Capture  Rehab Caseload Tracker    Giselle Rosado is a 54 y.o. female   PRIMARY DIAGNOSIS: Sepsis (HCC)  Spontaneous bacterial peritonitis (HCC) [K65.2]  Septicemia (HCC) [A41.9]  Atrial fibrillation with rapid ventricular response (HCC) [I48.91]  Sepsis (HCC) [A41.9]       Reason for Referral: Generalized Muscle Weakness (M62.81)  Other lack of cordination (R27.8)  Difficulty in walking, Not elsewhere classified (R26.2)  Other abnormalities of gait and mobility (R26.89)  Inpatient: Payor: [employer] / Plan: [employer]BS OUT OF STATE / Product Type: *No Product type* /     ASSESSMENT:     REHAB RECOMMENDATIONS:   Recommendation to date pending progress:  Setting:  Outpatient Therapy  For hip pain and activity tolerance    Equipment:    To Be Determined     ASSESSMENT:  Ms. Rosado presents in supine, agreeable to shortened session only, seen together by PT/OT due to decreased activity tolerance.      Upon entering, Pnt is agreeable to PT treatment.  she reports moderate L hip pain at rest. Pnt performed

## 2024-10-15 NOTE — PROGRESS NOTES
Giselle Rosado  Admission Date: 10/10/2024         [facility] Progress Note: 10/15/2024    Follow-up for: ESRD    The patient's chart is reviewed and the patient is discussed with the staff.    Subjective:   Pt seen and examined in room, spouse at bedside, pt c/o ongoing abdominal pain, tolerated PD overnight.     ROS:  Gen - no fever, no chills, appetite unchanged  CV - no chest pain  Lung - no shortness of breath, no cough  Abd - + tenderness, no nausea/vomiting, no diarrhea  Ext - + edema    Current Facility-Administered Medications   Medication Dose Route Frequency    furosemide (LASIX) tablet 80 mg  80 mg Oral Daily    Delflex SM 1.5% 5000 ml with Vancomycin 5 g   IntraPERitoneal PRN    metoclopramide (REGLAN) injection 10 mg  10 mg IntraVENous Q6H PRN    HYDROcodone-acetaminophen (NORCO) 5-325 MG per tablet 1 tablet  1 tablet Oral Q6H PRN    metoprolol tartrate (LOPRESSOR) tablet 100 mg  100 mg Oral BID    sodium chloride flush 0.9 % injection 5-40 mL  5-40 mL IntraVENous 2 times per day    sodium chloride flush 0.9 % injection 5-40 mL  5-40 mL IntraVENous PRN    0.9 % sodium chloride infusion   IntraVENous PRN    ondansetron (ZOFRAN-ODT) disintegrating tablet 4 mg  4 mg Oral Q8H PRN    Or    ondansetron (ZOFRAN) injection 4 mg  4 mg IntraVENous Q6H PRN    polyethylene glycol (GLYCOLAX) packet 17 g  17 g Oral Daily PRN    bisacodyl (DULCOLAX) suppository 10 mg  10 mg Rectal Daily PRN    famotidine (PEPCID) tablet 10 mg  10 mg Oral Daily PRN    aluminum & magnesium hydroxide-simethicone (MAALOX) 200-200-20 MG/5ML suspension 30 mL  30 mL Oral Q6H PRN    acetaminophen (TYLENOL) tablet 650 mg  650 mg Oral Q6H PRN    Or    acetaminophen (TYLENOL) suppository 650 mg  650 mg Rectal Q6H PRN    apixaban (ELIQUIS) tablet 5 mg  5 mg Oral BID    sertraline (ZOLOFT) tablet 100 mg  100 mg Oral BID    sevelamer (RENVELA) tablet 800 mg  800 mg Oral TID WC    insulin lispro (HUMALOG,ADMELOG)  injection vial 0-10 Units  0-10 Units SubCUTAneous 4x Daily AC & HS    HYDROmorphone HCl PF (DILAUDID) injection 0.5 mg  0.5 mg IntraVENous Q4H PRN    glucose chewable tablet 16 g  4 tablet Oral PRN    dextrose bolus 10% 125 mL  125 mL IntraVENous PRN    Or    dextrose bolus 10% 250 mL  250 mL IntraVENous PRN    Glucagon Emergency KIT 1 mg  1 mg SubCUTAneous PRN    dextrose 10 % infusion   IntraVENous Continuous PRN    fluconazole (DIFLUCAN) tablet 100 mg  100 mg Oral Daily         Objective:     Vitals:    10/15/24 0549 10/15/24 0619 10/15/24 0710 10/15/24 1000   BP:   (!) 94/55 (!) 103/58   Pulse:   88 97   Resp: 18 16 16    Temp:   98 °F (36.7 °C)    TempSrc:   Oral    SpO2:   96%    Weight:       Height:         Intake and Output:   10/13 1901 - 10/15 0700  In: 2411.1   Out: 991   10/15 0701 - 10/15 1900  In: -   Out: 1161     Physical Exam:   Constitutional:  the patient is well developed and in no acute distress, alert and oriented   HEENT:  Sclera clear, pupils equal, oral mucosa moist  Lungs: clear low lung volumes bilaterally   Cardiovascular:  Regular rate, S1, S2, no Rub   Abd/GI: soft and + tender; with positive bowel sounds.  Ext: warm without cyanosis. There is +1 lower leg edema.  Skin:  no jaundice or rashes  Neuro: no gross neuro deficits   Psychiatric: Calm.   Access: PD catheter intact      LAB  Recent Labs     10/13/24  0504 10/14/24  0352 10/15/24  0528   WBC 10.1 10.0 11.3*   HGB 10.0* 10.3* 11.1*   HCT 31.6* 32.0* 34.3*    327 346     Recent Labs     10/13/24  0504 10/14/24  0352 10/15/24  0528   * 129* 129*   K 4.0 4.1 HEMOLYZED SPECIMEN   CL 89* 88* 89*   CO2 23 21 19*   BUN 51* 48* 48*   CREATININE 10.70* 10.30* 10.00*     No results for input(s): \"PH\", \"PCO2\", \"PO2\", \"HCO3\" in the last 72 hours.      Assessment/Plan:  (Medical Decision Making)   ESRD on PD  -[facility]  -PD all 4.25%. Continue with heparin to bags for fibrin        2. Peritonitis with cell count showing 9906

## 2024-10-15 NOTE — THERAPY EVALUATION
ACUTE OCCUPATIONAL THERAPY GOALS:   (Developed with and agreed upon by patient and/or caregiver.)  1. Patient will complete lower body bathing and dressing with INDEPENDENCE and adaptive equipment as needed.     2. Patient will complete toileting with INDEPENDENCE.  3. Patient will complete grooming ADL standing edge of sink with INDEPENDENCE.  4. Patient will tolerate 25 minutes of OT treatment with 1-2 rest breaks to increase activity tolerance for ADLs.   5. Patient will complete functional transfers with INDEPENDENCE and adaptive equipment as needed.   6. Patient will tolerate 10 minutes BUE exercises to increase strength for safe, functional transfers.     Timeframe: 7 visits      OCCUPATIONAL THERAPY Initial Assessment, Daily Note, and PM       OT Visit Days: 1  Acknowledge Orders  Time  OT Charge Capture  Rehab Caseload Tracker      Giselle Rosado is a 54 y.o. female   PRIMARY DIAGNOSIS: Sepsis (HCC)  Spontaneous bacterial peritonitis (HCC) [K65.2]  Septicemia (HCC) [A41.9]  Atrial fibrillation with rapid ventricular response (HCC) [I48.91]  Sepsis (HCC) [A41.9]       Reason for Referral: Generalized Muscle Weakness (M62.81)  Other lack of cordination (R27.8)  Difficulty in walking, Not elsewhere classified (R26.2)  Inpatient: Payor: BCBS / Plan: BCBS OUT OF STATE / Product Type: *No Product type* /     ASSESSMENT:     REHAB RECOMMENDATIONS:   Recommendation to date pending progress:  Setting:  No further skilled occupational therapy after discharge from hospital    Equipment:    To Be Determined     ASSESSMENT:  Ms. Rosado is a 55 y/o female presented to ED on 10/10/24 with c/o abdominal pain, admitted with peritonitis and sepsis. At baseline pt is independent in ADLs and functional mobility, but reports increasing SOB and difficulty with community mobility over the past couple months. Today, pt demonstrates impairments in activity tolerance, strength, balance as well as increased L hip pain limiting

## 2024-10-15 NOTE — PROGRESS NOTES
0110 Disconnected PD cycler from patient. Betadine cap intact. Patient tolerated well.    0636 Hourly rounding completed. Bed locked/low position. VSS. IV patent, clean,dry, and intact. PRN medication administered 2x per MAR. Spouse at bedside.All safety measures in place per protocol. [facility].VITALS:  /69   Pulse 90   Temp 98.2 °F (36.8 °C) (Oral)   Resp 16   Ht 1.753 m (5' 9\")   Wt (!) 153.6 kg (338 lb 9.6 oz)   SpO2 97%   BMI 50.00 kg/m²   OUTPUT:  In: 2411.1   Out: 991 .

## 2024-10-15 NOTE — DIALYSIS
Disconnected PD cycler from patient. Betadine cap intact. Patient tolerated well.     UF amount: 1161ml removed. Effluent: clear, light, yellow with fibrin. NP notified.

## 2024-10-15 NOTE — PROGRESS NOTES
Pt resting in bed. Hourly rounds completed. PRN pain medicine given per MAR. Pt's bed low and locked. Call light and personal items within pt's reach. Pt denies needs at this time. End of shift report given to night shift RN.

## 2024-10-16 LAB
APPEARANCE FLD: NORMAL
COLOR FLD: YELLOW
EOSINOPHIL NFR BRONCH MANUAL: 2 %
GLUCOSE BLD STRIP.AUTO-MCNC: 179 MG/DL (ref 65–100)
GLUCOSE BLD STRIP.AUTO-MCNC: 199 MG/DL (ref 65–100)
GLUCOSE BLD STRIP.AUTO-MCNC: 220 MG/DL (ref 65–100)
GLUCOSE BLD STRIP.AUTO-MCNC: 302 MG/DL (ref 65–100)
LYMPHOCYTES NFR BRONCH MANUAL: 5 %
MACROPHAGES NFR BRONCH MANUAL: 4 %
NEUTROPHILS NFR BRONCH MANUAL: 89 %
NUC CELL # FLD: 2606 /CU MM
RBC # FLD: 2000 /CU MM
SERVICE CMNT-IMP: ABNORMAL
SPECIMEN SOURCE FLD: NORMAL

## 2024-10-16 PROCEDURE — 6370000000 HC RX 637 (ALT 250 FOR IP)

## 2024-10-16 PROCEDURE — 2580000003 HC RX 258: Performed by: NURSE PRACTITIONER

## 2024-10-16 PROCEDURE — 6370000000 HC RX 637 (ALT 250 FOR IP): Performed by: NURSE PRACTITIONER

## 2024-10-16 PROCEDURE — 90945 DIALYSIS ONE EVALUATION: CPT

## 2024-10-16 PROCEDURE — 1100000000 HC RM PRIVATE

## 2024-10-16 PROCEDURE — 89050 BODY FLUID CELL COUNT: CPT

## 2024-10-16 PROCEDURE — 82962 GLUCOSE BLOOD TEST: CPT

## 2024-10-16 PROCEDURE — 2580000003 HC RX 258: Performed by: INTERNAL MEDICINE

## 2024-10-16 PROCEDURE — 6360000002 HC RX W HCPCS: Performed by: NURSE PRACTITIONER

## 2024-10-16 PROCEDURE — 6370000000 HC RX 637 (ALT 250 FOR IP): Performed by: INTERNAL MEDICINE

## 2024-10-16 PROCEDURE — 6360000002 HC RX W HCPCS: Performed by: INTERNAL MEDICINE

## 2024-10-16 RX ORDER — INSULIN GLARGINE 100 [IU]/ML
8 INJECTION, SOLUTION SUBCUTANEOUS DAILY
Status: DISCONTINUED | OUTPATIENT
Start: 2024-10-16 | End: 2024-10-16

## 2024-10-16 RX ORDER — METOPROLOL TARTRATE 25 MG/1
25 TABLET, FILM COATED ORAL 2 TIMES DAILY
Status: DISCONTINUED | OUTPATIENT
Start: 2024-10-16 | End: 2024-10-22 | Stop reason: HOSPADM

## 2024-10-16 RX ORDER — INSULIN LISPRO 100 [IU]/ML
0-8 INJECTION, SOLUTION INTRAVENOUS; SUBCUTANEOUS
Status: DISCONTINUED | OUTPATIENT
Start: 2024-10-16 | End: 2024-10-22 | Stop reason: HOSPADM

## 2024-10-16 RX ORDER — HYDROMORPHONE HYDROCHLORIDE 1 MG/ML
0.5 INJECTION, SOLUTION INTRAMUSCULAR; INTRAVENOUS; SUBCUTANEOUS EVERY 4 HOURS PRN
Status: DISCONTINUED | OUTPATIENT
Start: 2024-10-16 | End: 2024-10-22 | Stop reason: HOSPADM

## 2024-10-16 RX ORDER — IBUPROFEN 600 MG/1
1 TABLET ORAL PRN
Status: DISCONTINUED | OUTPATIENT
Start: 2024-10-16 | End: 2024-10-16 | Stop reason: SDUPTHER

## 2024-10-16 RX ORDER — DEXTROSE MONOHYDRATE 100 MG/ML
INJECTION, SOLUTION INTRAVENOUS CONTINUOUS PRN
Status: DISCONTINUED | OUTPATIENT
Start: 2024-10-16 | End: 2024-10-16 | Stop reason: SDUPTHER

## 2024-10-16 RX ORDER — METOPROLOL TARTRATE 50 MG
50 TABLET ORAL 2 TIMES DAILY
Status: DISCONTINUED | OUTPATIENT
Start: 2024-10-16 | End: 2024-10-16

## 2024-10-16 RX ORDER — INSULIN GLARGINE 100 [IU]/ML
8 INJECTION, SOLUTION SUBCUTANEOUS NIGHTLY
Status: DISCONTINUED | OUTPATIENT
Start: 2024-10-16 | End: 2024-10-22 | Stop reason: HOSPADM

## 2024-10-16 RX ADMIN — SERTRALINE 100 MG: 100 TABLET, FILM COATED ORAL at 08:23

## 2024-10-16 RX ADMIN — SEVELAMER CARBONATE 800 MG: 800 TABLET, FILM COATED ORAL at 11:50

## 2024-10-16 RX ADMIN — ONDANSETRON 4 MG: 2 INJECTION INTRAMUSCULAR; INTRAVENOUS at 08:55

## 2024-10-16 RX ADMIN — HYDROCODONE BITARTRATE AND ACETAMINOPHEN 1 TABLET: 5; 325 TABLET ORAL at 08:23

## 2024-10-16 RX ADMIN — HYDROCODONE BITARTRATE AND ACETAMINOPHEN 1 TABLET: 5; 325 TABLET ORAL at 00:18

## 2024-10-16 RX ADMIN — SEVELAMER CARBONATE 800 MG: 800 TABLET, FILM COATED ORAL at 16:37

## 2024-10-16 RX ADMIN — METOPROLOL TARTRATE 100 MG: 50 TABLET, FILM COATED ORAL at 08:23

## 2024-10-16 RX ADMIN — INSULIN LISPRO 2 UNITS: 100 INJECTION, SOLUTION INTRAVENOUS; SUBCUTANEOUS at 16:37

## 2024-10-16 RX ADMIN — SODIUM CHLORIDE, PRESERVATIVE FREE 10 ML: 5 INJECTION INTRAVENOUS at 20:45

## 2024-10-16 RX ADMIN — METOPROLOL TARTRATE 25 MG: 25 TABLET, FILM COATED ORAL at 20:43

## 2024-10-16 RX ADMIN — APIXABAN 5 MG: 5 TABLET, FILM COATED ORAL at 08:23

## 2024-10-16 RX ADMIN — HYDROCODONE BITARTRATE AND ACETAMINOPHEN 1 TABLET: 5; 325 TABLET ORAL at 14:58

## 2024-10-16 RX ADMIN — HEPARIN SODIUM: 1000 INJECTION INTRAVENOUS; SUBCUTANEOUS at 14:03

## 2024-10-16 RX ADMIN — HEPARIN SODIUM: 1000 INJECTION INTRAVENOUS; SUBCUTANEOUS at 14:02

## 2024-10-16 RX ADMIN — HYDROCODONE BITARTRATE AND ACETAMINOPHEN 1 TABLET: 5; 325 TABLET ORAL at 21:19

## 2024-10-16 RX ADMIN — INSULIN GLARGINE 8 UNITS: 100 INJECTION, SOLUTION SUBCUTANEOUS at 20:42

## 2024-10-16 RX ADMIN — SEVELAMER CARBONATE 800 MG: 800 TABLET, FILM COATED ORAL at 08:23

## 2024-10-16 RX ADMIN — SERTRALINE 100 MG: 100 TABLET, FILM COATED ORAL at 20:44

## 2024-10-16 RX ADMIN — FLUCONAZOLE 100 MG: 100 TABLET ORAL at 08:23

## 2024-10-16 ASSESSMENT — PAIN DESCRIPTION - LOCATION
LOCATION: ABDOMEN

## 2024-10-16 ASSESSMENT — PAIN SCALES - GENERAL
PAINLEVEL_OUTOF10: 6
PAINLEVEL_OUTOF10: 4
PAINLEVEL_OUTOF10: 6
PAINLEVEL_OUTOF10: 3
PAINLEVEL_OUTOF10: 5

## 2024-10-16 ASSESSMENT — PAIN DESCRIPTION - DESCRIPTORS
DESCRIPTORS: DISCOMFORT
DESCRIPTORS: ACHING
DESCRIPTORS: DISCOMFORT
DESCRIPTORS: ACHING

## 2024-10-16 ASSESSMENT — PAIN - FUNCTIONAL ASSESSMENT
PAIN_FUNCTIONAL_ASSESSMENT: ACTIVITIES ARE NOT PREVENTED
PAIN_FUNCTIONAL_ASSESSMENT: ACTIVITIES ARE NOT PREVENTED

## 2024-10-16 ASSESSMENT — PAIN DESCRIPTION - ORIENTATION
ORIENTATION: LEFT
ORIENTATION: RIGHT;LEFT;MID
ORIENTATION: RIGHT;LEFT;MID;LOWER

## 2024-10-16 NOTE — PROGRESS NOTES
0110 Disconnected PD cycler from patient. Betadine cap intact. Patient tolerated well.   0631 Hourly rounding completed. Bed locked/low position. VSS. IV patent, clean,dry, and intact. PRN medication administered per MAR. All safety measures in place per protocol. Report will be given to oncoming day shift nurse.VITALS:  /64   Pulse 86   Temp 98.1 °F (36.7 °C) (Oral)   Resp 19   Ht 1.753 m (5' 9\")   Wt (!) 153.6 kg (338 lb 9.6 oz)   SpO2 97%   BMI 50.00 kg/m²   OUTPUT:  In: -   Out: 1161 .

## 2024-10-16 NOTE — CARE COORDINATION
Chart reviewed and patient discussed in IDT rounds this AM. PD cultures pending, per nephrology, if WBC is still high patient to get PD cath removed and transition to HD one 1 month. Therapy recommends outpatient therapy. Referral sent to [facility] outpatient therapy. CM following for discharge planning needs.    Laurita URIAS, ACM  [place]

## 2024-10-16 NOTE — PROGRESS NOTES
Giselle Rosado  Admission Date: 10/10/2024         [facility] Progress Note: 10/16/2024    Follow-up for: ESRD    The patient's chart is reviewed and the patient is discussed with the staff.    Subjective:   Pt seen and examined in room, spouse at bedside, pt complains of ongoing abdominal pain, poor PO intake.   PD effluent remains cloudy with fibrin     ROS:  Gen - no fever, no chills  CV - no chest pain  Lung - no shortness of breath, no cough  Abd - + tenderness, no nausea/vomiting, no diarrhea  Ext - + edema    Current Facility-Administered Medications   Medication Dose Route Frequency    HYDROmorphone HCl PF (DILAUDID) injection 0.5 mg  0.5 mg IntraVENous Q4H PRN    metoprolol tartrate (LOPRESSOR) tablet 50 mg  50 mg Oral BID    Delflex SM 1.5% 5000 ml with Vancomycin 5 g   IntraPERitoneal PRN    metoclopramide (REGLAN) injection 10 mg  10 mg IntraVENous Q6H PRN    HYDROcodone-acetaminophen (NORCO) 5-325 MG per tablet 1 tablet  1 tablet Oral Q6H PRN    sodium chloride flush 0.9 % injection 5-40 mL  5-40 mL IntraVENous 2 times per day    sodium chloride flush 0.9 % injection 5-40 mL  5-40 mL IntraVENous PRN    0.9 % sodium chloride infusion   IntraVENous PRN    ondansetron (ZOFRAN-ODT) disintegrating tablet 4 mg  4 mg Oral Q8H PRN    Or    ondansetron (ZOFRAN) injection 4 mg  4 mg IntraVENous Q6H PRN    polyethylene glycol (GLYCOLAX) packet 17 g  17 g Oral Daily PRN    bisacodyl (DULCOLAX) suppository 10 mg  10 mg Rectal Daily PRN    famotidine (PEPCID) tablet 10 mg  10 mg Oral Daily PRN    aluminum & magnesium hydroxide-simethicone (MAALOX) 200-200-20 MG/5ML suspension 30 mL  30 mL Oral Q6H PRN    acetaminophen (TYLENOL) tablet 650 mg  650 mg Oral Q6H PRN    Or    acetaminophen (TYLENOL) suppository 650 mg  650 mg Rectal Q6H PRN    apixaban (ELIQUIS) tablet 5 mg  5 mg Oral BID    sertraline (ZOLOFT) tablet 100 mg  100 mg Oral BID    sevelamer (RENVELA) tablet 800 mg  800 mg

## 2024-10-16 NOTE — PROGRESS NOTES
Pt complained of moderate pain and was given norco twice. Pt responded well to pain medication.     Pt's metoprolol was adjusted this shift. Pt was consulted with general surgery. Pt's eliquis was held in preparation for potential HD catheter needed.     All known needs met at this time. Bed is currently low, call light was left in reach, and pt was encouraged to ask for assistance. Pt was left resting in bed with no complaints.

## 2024-10-16 NOTE — PROGRESS NOTES
Hospitalist Progress Note   Admit Date:  10/10/2024  9:15 AM   Name:  Giselle Rosado   Age:  54 y.o.  Sex:  female  :  1970   MRN:  [id number]   Room:  /    Presenting/Chief Complaint: Abdominal Pain     Reason(s) for Admission: Spontaneous bacterial peritonitis (HCC) [K65.2]  Septicemia (HCC) [A41.9]  Atrial fibrillation with rapid ventricular response (HCC) [I48.91]  Sepsis (HCC) [A41.9]     Hospital Course:   Giselle Rosado is a 54 y.o. female with medical history significant for ESRD on peritoneal dialysis, history of paroxysmal atrial fibrillation no longer on blood thinners, hypertension, depression, irritable bowel syndrome who presents emergency room with 2 days of right upper quadrant abdominal pain.   Patient was admitted with sepsis(leukocytosis and tachycardia) with concern for peritonitis.  In addition, she was noted to be in A-fib RVR and had received IV Cardizem in the ED with improvement.  CT abdomen pelvis with moderate volume ascites with peritoneal dialysis catheter present, splenomegaly, body wall edema versus cellulitis at the lower anterior abdominal wall. Peritoneal cell count with 9900 WBC.       Subjective & 24hr Events:   No overnight events reported. Continues to endorse diffuse abdominal tenderness. Reported adequate bowel movements. Denied any other complaints.       Assessment & Plan:     Sepsis due to peritonitis  ESRD on PD  Meets criteria for sepsis with tachycardia and leukocytosis  -still with abdominal pain   -PD fluid culture with gram-positive rods - Staph Coag neg species. Bcx neg thus far.   -continue with fluconazole for fungal prophylaxis  -peritoneal dialysis as per Nephro.   -Continue on Vanc IP and Cefepime IP   -PRN pain meds with IV dilaudid. Added PO Norco   -PD culture pending  -Plan for PD cath removal. Dialysis timing per nephro recs.     A-fib RVR - resolved  History of A-fib  Has history of paroxysmal atrial fibrillation   -On  Meds:  Current Facility-Administered Medications   Medication Dose Route Frequency    HYDROmorphone HCl PF (DILAUDID) injection 0.5 mg  0.5 mg IntraVENous Q4H PRN    metoprolol tartrate (LOPRESSOR) tablet 25 mg  25 mg Oral BID    delflex lo-leanna 4.25% 5,000 mL with heparin (porcine) 5,000 Units   IntraPERitoneal Once in dialysis    delflex lo-leanna 4.25% 5,000 mL with heparin (porcine) 5,000 Units   IntraPERitoneal Once in dialysis    Delflex SM 1.5% 5000 ml with Vancomycin 5 g   IntraPERitoneal PRN    metoclopramide (REGLAN) injection 10 mg  10 mg IntraVENous Q6H PRN    HYDROcodone-acetaminophen (NORCO) 5-325 MG per tablet 1 tablet  1 tablet Oral Q6H PRN    sodium chloride flush 0.9 % injection 5-40 mL  5-40 mL IntraVENous 2 times per day    sodium chloride flush 0.9 % injection 5-40 mL  5-40 mL IntraVENous PRN    0.9 % sodium chloride infusion   IntraVENous PRN    ondansetron (ZOFRAN-ODT) disintegrating tablet 4 mg  4 mg Oral Q8H PRN    Or    ondansetron (ZOFRAN) injection 4 mg  4 mg IntraVENous Q6H PRN    polyethylene glycol (GLYCOLAX) packet 17 g  17 g Oral Daily PRN    bisacodyl (DULCOLAX) suppository 10 mg  10 mg Rectal Daily PRN    famotidine (PEPCID) tablet 10 mg  10 mg Oral Daily PRN    aluminum & magnesium hydroxide-simethicone (MAALOX) 200-200-20 MG/5ML suspension 30 mL  30 mL Oral Q6H PRN    acetaminophen (TYLENOL) tablet 650 mg  650 mg Oral Q6H PRN    Or    acetaminophen (TYLENOL) suppository 650 mg  650 mg Rectal Q6H PRN    apixaban (ELIQUIS) tablet 5 mg  5 mg Oral BID    sertraline (ZOLOFT) tablet 100 mg  100 mg Oral BID    sevelamer (RENVELA) tablet 800 mg  800 mg Oral TID WC    insulin lispro (HUMALOG,ADMELOG) injection vial 0-10 Units  0-10 Units SubCUTAneous 4x Daily AC & HS    glucose chewable tablet 16 g  4 tablet Oral PRN    dextrose bolus 10% 125 mL  125 mL IntraVENous PRN    Or    dextrose bolus 10% 250 mL  250 mL IntraVENous PRN    Glucagon Emergency KIT 1 mg  1 mg SubCUTAneous PRN    dextrose

## 2024-10-16 NOTE — DIALYSIS
Disconnected PD cycler from patient. Betadine cap intact. Patient tolerated well.     UF amount: 1223ml removed. Effluent: yellow, light, clear, small amount of fibrin present

## 2024-10-17 ENCOUNTER — ANESTHESIA EVENT (OUTPATIENT)
Dept: SURGERY | Age: 54
End: 2024-10-17
Payer: MEDICARE

## 2024-10-17 ENCOUNTER — PREP FOR PROCEDURE (OUTPATIENT)
Dept: SURGERY | Age: 54
End: 2024-10-17

## 2024-10-17 DIAGNOSIS — N18.6 END STAGE CHRONIC KIDNEY DISEASE (HCC): ICD-10-CM

## 2024-10-17 PROBLEM — K65.2 SPONTANEOUS BACTERIAL PERITONITIS (HCC): Status: ACTIVE | Noted: 2024-10-17

## 2024-10-17 LAB
ANION GAP SERPL CALC-SCNC: 21 MMOL/L (ref 9–18)
BASOPHILS # BLD: 0.1 K/UL (ref 0–0.2)
BASOPHILS NFR BLD: 1 % (ref 0–2)
BUN SERPL-MCNC: 49 MG/DL (ref 6–23)
CALCIUM SERPL-MCNC: 7.1 MG/DL (ref 8.8–10.2)
CHLORIDE SERPL-SCNC: 88 MMOL/L (ref 98–107)
CO2 SERPL-SCNC: 21 MMOL/L (ref 20–28)
CREAT SERPL-MCNC: 10.7 MG/DL (ref 0.6–1.1)
DIFFERENTIAL METHOD BLD: ABNORMAL
EOSINOPHIL # BLD: 0.2 K/UL (ref 0–0.8)
EOSINOPHIL NFR BLD: 1 % (ref 0.5–7.8)
ERYTHROCYTE [DISTWIDTH] IN BLOOD BY AUTOMATED COUNT: 16.5 % (ref 11.9–14.6)
GLUCOSE BLD STRIP.AUTO-MCNC: 160 MG/DL (ref 65–100)
GLUCOSE BLD STRIP.AUTO-MCNC: 175 MG/DL (ref 65–100)
GLUCOSE BLD STRIP.AUTO-MCNC: 183 MG/DL (ref 65–100)
GLUCOSE BLD STRIP.AUTO-MCNC: 186 MG/DL (ref 65–100)
GLUCOSE SERPL-MCNC: 180 MG/DL (ref 70–99)
HAV IGM SER QL: NONREACTIVE
HBV CORE IGM SER QL: NONREACTIVE
HBV SURFACE AB SERPL IA-ACNC: 6268 MIU/ML
HBV SURFACE AG SER QL: NONREACTIVE
HCT VFR BLD AUTO: 32.9 % (ref 35.8–46.3)
HCV AB SER QL: NONREACTIVE
HGB BLD-MCNC: 10.7 G/DL (ref 11.7–15.4)
IMM GRANULOCYTES # BLD AUTO: 0.5 K/UL (ref 0–0.5)
IMM GRANULOCYTES NFR BLD AUTO: 4 % (ref 0–5)
LYMPHOCYTES # BLD: 0.8 K/UL (ref 0.5–4.6)
LYMPHOCYTES NFR BLD: 6 % (ref 13–44)
MCH RBC QN AUTO: 29.2 PG (ref 26.1–32.9)
MCHC RBC AUTO-ENTMCNC: 32.5 G/DL (ref 31.4–35)
MCV RBC AUTO: 89.6 FL (ref 82–102)
MONOCYTES # BLD: 1.2 K/UL (ref 0.1–1.3)
MONOCYTES NFR BLD: 9 % (ref 4–12)
NEUTS SEG # BLD: 10.1 K/UL (ref 1.7–8.2)
NEUTS SEG NFR BLD: 79 % (ref 43–78)
NRBC # BLD: 0 K/UL (ref 0–0.2)
PLATELET # BLD AUTO: 283 K/UL (ref 150–450)
PMV BLD AUTO: 9.6 FL (ref 9.4–12.3)
POTASSIUM SERPL-SCNC: 4.6 MMOL/L (ref 3.5–5.1)
RBC # BLD AUTO: 3.67 M/UL (ref 4.05–5.2)
SERVICE CMNT-IMP: ABNORMAL
SODIUM SERPL-SCNC: 129 MMOL/L (ref 136–145)
WBC # BLD AUTO: 12.8 K/UL (ref 4.3–11.1)

## 2024-10-17 PROCEDURE — 85025 COMPLETE CBC W/AUTO DIFF WBC: CPT

## 2024-10-17 PROCEDURE — 80074 ACUTE HEPATITIS PANEL: CPT

## 2024-10-17 PROCEDURE — 2580000003 HC RX 258: Performed by: INTERNAL MEDICINE

## 2024-10-17 PROCEDURE — 80048 BASIC METABOLIC PNL TOTAL CA: CPT

## 2024-10-17 PROCEDURE — 6370000000 HC RX 637 (ALT 250 FOR IP)

## 2024-10-17 PROCEDURE — 86704 HEP B CORE ANTIBODY TOTAL: CPT

## 2024-10-17 PROCEDURE — 86706 HEP B SURFACE ANTIBODY: CPT

## 2024-10-17 PROCEDURE — 6360000002 HC RX W HCPCS: Performed by: INTERNAL MEDICINE

## 2024-10-17 PROCEDURE — 6370000000 HC RX 637 (ALT 250 FOR IP): Performed by: NURSE PRACTITIONER

## 2024-10-17 PROCEDURE — 82962 GLUCOSE BLOOD TEST: CPT

## 2024-10-17 PROCEDURE — 1100000000 HC RM PRIVATE

## 2024-10-17 PROCEDURE — 2500000003 HC RX 250 WO HCPCS: Performed by: NURSE PRACTITIONER

## 2024-10-17 PROCEDURE — 5A1D70Z PERFORMANCE OF URINARY FILTRATION, INTERMITTENT, LESS THAN 6 HOURS PER DAY: ICD-10-PCS | Performed by: NURSE PRACTITIONER

## 2024-10-17 PROCEDURE — 6360000002 HC RX W HCPCS

## 2024-10-17 PROCEDURE — 99222 1ST HOSP IP/OBS MODERATE 55: CPT | Performed by: SURGERY

## 2024-10-17 PROCEDURE — 36415 COLL VENOUS BLD VENIPUNCTURE: CPT

## 2024-10-17 PROCEDURE — 6370000000 HC RX 637 (ALT 250 FOR IP): Performed by: INTERNAL MEDICINE

## 2024-10-17 RX ORDER — HYDROCODONE BITARTRATE AND ACETAMINOPHEN 5; 325 MG/1; MG/1
2 TABLET ORAL EVERY 6 HOURS PRN
Status: DISCONTINUED | OUTPATIENT
Start: 2024-10-17 | End: 2024-10-22 | Stop reason: HOSPADM

## 2024-10-17 RX ADMIN — HYDROCODONE BITARTRATE AND ACETAMINOPHEN 1 TABLET: 5; 325 TABLET ORAL at 07:50

## 2024-10-17 RX ADMIN — SEVELAMER CARBONATE 800 MG: 800 TABLET, FILM COATED ORAL at 07:50

## 2024-10-17 RX ADMIN — ONDANSETRON 4 MG: 2 INJECTION INTRAMUSCULAR; INTRAVENOUS at 00:49

## 2024-10-17 RX ADMIN — SEVELAMER CARBONATE 800 MG: 800 TABLET, FILM COATED ORAL at 16:49

## 2024-10-17 RX ADMIN — HYDROCODONE BITARTRATE AND ACETAMINOPHEN 2 TABLET: 5; 325 TABLET ORAL at 16:49

## 2024-10-17 RX ADMIN — INSULIN LISPRO 2 UNITS: 100 INJECTION, SOLUTION INTRAVENOUS; SUBCUTANEOUS at 12:03

## 2024-10-17 RX ADMIN — HYDROMORPHONE HYDROCHLORIDE 0.5 MG: 1 INJECTION, SOLUTION INTRAMUSCULAR; INTRAVENOUS; SUBCUTANEOUS at 11:57

## 2024-10-17 RX ADMIN — SERTRALINE 100 MG: 100 TABLET, FILM COATED ORAL at 20:17

## 2024-10-17 RX ADMIN — HYDROMORPHONE HYDROCHLORIDE 0.5 MG: 1 INJECTION, SOLUTION INTRAMUSCULAR; INTRAVENOUS; SUBCUTANEOUS at 20:24

## 2024-10-17 RX ADMIN — HYDROCODONE BITARTRATE AND ACETAMINOPHEN 2 TABLET: 5; 325 TABLET ORAL at 22:46

## 2024-10-17 RX ADMIN — SERTRALINE 100 MG: 100 TABLET, FILM COATED ORAL at 07:50

## 2024-10-17 RX ADMIN — FLUCONAZOLE 100 MG: 100 TABLET ORAL at 07:50

## 2024-10-17 RX ADMIN — SODIUM CHLORIDE, PRESERVATIVE FREE 10 ML: 5 INJECTION INTRAVENOUS at 20:20

## 2024-10-17 RX ADMIN — HYDROMORPHONE HYDROCHLORIDE 0.5 MG: 1 INJECTION, SOLUTION INTRAMUSCULAR; INTRAVENOUS; SUBCUTANEOUS at 00:48

## 2024-10-17 RX ADMIN — TUBERCULIN PURIFIED PROTEIN DERIVATIVE 5 UNITS: 5 INJECTION, SOLUTION INTRADERMAL at 11:57

## 2024-10-17 RX ADMIN — SEVELAMER CARBONATE 800 MG: 800 TABLET, FILM COATED ORAL at 12:03

## 2024-10-17 ASSESSMENT — PAIN - FUNCTIONAL ASSESSMENT: PAIN_FUNCTIONAL_ASSESSMENT: ACTIVITIES ARE NOT PREVENTED

## 2024-10-17 ASSESSMENT — PAIN SCALES - GENERAL
PAINLEVEL_OUTOF10: 6
PAINLEVEL_OUTOF10: 5
PAINLEVEL_OUTOF10: 7
PAINLEVEL_OUTOF10: 3
PAINLEVEL_OUTOF10: 5
PAINLEVEL_OUTOF10: 7
PAINLEVEL_OUTOF10: 3
PAINLEVEL_OUTOF10: 6
PAINLEVEL_OUTOF10: 7

## 2024-10-17 ASSESSMENT — PAIN DESCRIPTION - LOCATION
LOCATION: ABDOMEN
LOCATION: BACK
LOCATION: BACK

## 2024-10-17 ASSESSMENT — PAIN DESCRIPTION - DESCRIPTORS
DESCRIPTORS: DISCOMFORT
DESCRIPTORS: DISCOMFORT
DESCRIPTORS: ACHING
DESCRIPTORS: DISCOMFORT
DESCRIPTORS: DISCOMFORT
DESCRIPTORS: ACHING
DESCRIPTORS: DISCOMFORT

## 2024-10-17 ASSESSMENT — PAIN DESCRIPTION - ORIENTATION
ORIENTATION: RIGHT;LEFT;MID
ORIENTATION: RIGHT;LEFT
ORIENTATION: MID
ORIENTATION: RIGHT;LEFT
ORIENTATION: RIGHT;LEFT;MID
ORIENTATION: LOWER

## 2024-10-17 NOTE — PROGRESS NOTES
Giselle Rosado  Admission Date: 10/10/2024         [facility] Progress Note: 10/17/2024    Follow-up for: ESRD    The patient's chart is reviewed and the patient is discussed with the staff.    Subjective:   Pt seen and examined in room, spouse at bedside, pt reports abdominal pain with movement or palpation ongoing, PO intake better, + LE edema.     ROS:  Gen - no fever, no chills  CV - no chest pain  Lung - no shortness of breath, no cough  Abd - + tenderness, no nausea/vomiting, no diarrhea  Ext - + edema    Current Facility-Administered Medications   Medication Dose Route Frequency    HYDROmorphone HCl PF (DILAUDID) injection 0.5 mg  0.5 mg IntraVENous Q4H PRN    metoprolol tartrate (LOPRESSOR) tablet 25 mg  25 mg Oral BID    insulin lispro (HUMALOG,ADMELOG) injection vial 0-8 Units  0-8 Units SubCUTAneous 4x Daily AC & HS    insulin glargine (LANTUS) injection vial 8 Units  8 Units SubCUTAneous Nightly    Delflex SM 1.5% 5000 ml with Vancomycin 5 g   IntraPERitoneal PRN    metoclopramide (REGLAN) injection 10 mg  10 mg IntraVENous Q6H PRN    HYDROcodone-acetaminophen (NORCO) 5-325 MG per tablet 1 tablet  1 tablet Oral Q6H PRN    sodium chloride flush 0.9 % injection 5-40 mL  5-40 mL IntraVENous 2 times per day    sodium chloride flush 0.9 % injection 5-40 mL  5-40 mL IntraVENous PRN    0.9 % sodium chloride infusion   IntraVENous PRN    ondansetron (ZOFRAN-ODT) disintegrating tablet 4 mg  4 mg Oral Q8H PRN    Or    ondansetron (ZOFRAN) injection 4 mg  4 mg IntraVENous Q6H PRN    polyethylene glycol (GLYCOLAX) packet 17 g  17 g Oral Daily PRN    bisacodyl (DULCOLAX) suppository 10 mg  10 mg Rectal Daily PRN    famotidine (PEPCID) tablet 10 mg  10 mg Oral Daily PRN    aluminum & magnesium hydroxide-simethicone (MAALOX) 200-200-20 MG/5ML suspension 30 mL  30 mL Oral Q6H PRN    acetaminophen (TYLENOL) tablet 650 mg  650 mg Oral Q6H PRN    Or    acetaminophen (TYLENOL) suppository  TCC placement and HD  - update hepatitis studies, PPD and consult CW for outpt HD clinic [facility] per pt request         2. Peritonitis with cell count showing 9906 WBC.   -Recent history of corynebacterium peritonitis s/p IP vancomycin and fortaz.   -Continue IP vancomycin, s/p IP cefepime, continue Fluconazole  -Peritoneal culture pending but GS with Gr+ rods. Suspect same organism she had a month ago. DW lab today 10/15, PD Cx was sent out to lab [facility] 10/14 and results are still pending.   - repeat Cell count with ongoing WBC elevated, PD catheter removal planned for 10/18.      3. Volume overload- UF with PD  D/c lasix she does not produce any uop    4. Hypotension- decrease BB  Hx atrial Fib   Eliquis on hold for TCC placement and PD catheter removal       CHRIS Mejia - CNP  [facility]

## 2024-10-17 NOTE — PROGRESS NOTES
Hospitalist Progress Note   Admit Date:  10/10/2024  9:15 AM   Name:  Giselle Rosado   Age:  54 y.o.  Sex:  female  :  1970   MRN:  [id number]   Room:  /    Presenting/Chief Complaint: Abdominal Pain     Reason(s) for Admission: Spontaneous bacterial peritonitis (HCC) [K65.2]  Septicemia (HCC) [A41.9]  Atrial fibrillation with rapid ventricular response (HCC) [I48.91]  Sepsis (HCC) [A41.9]     Hospital Course:   Giselle Rosado is a 54 y.o. female with medical history significant for ESRD on peritoneal dialysis, history of paroxysmal atrial fibrillation no longer on blood thinners, hypertension, depression, irritable bowel syndrome who presents emergency room with 2 days of right upper quadrant abdominal pain.   Patient was admitted with sepsis(leukocytosis and tachycardia) with concern for peritonitis.  In addition, she was noted to be in A-fib RVR and had received IV Cardizem in the ED with improvement.  CT abdomen pelvis with moderate volume ascites with peritoneal dialysis catheter present, splenomegaly, body wall edema versus cellulitis at the lower anterior abdominal wall. Peritoneal cell count with 9900 WBC. General surgery consulted for PD cath removal.       Subjective & 24hr Events:   No overnight events reported. Abdominal tenderness improved on right side this morning. Repeat peritoneal cell count showed elevated WBCs.       Assessment & Plan:     Sepsis due to peritonitis  ESRD on PD  Meets criteria for sepsis with tachycardia and leukocytosis  -still with abdominal pain   -PD fluid culture with gram-positive rods - Staph Coag neg species  -continue with fluconazole for fungal prophylaxis  -PRN pain meds   -PD culture still pending  -Dialysis timing per nephro recs.  -Continue on Vanc IP and Cefepime IP. Will have to switch these to IV once PD cath is removed.   -Plan for PD cath removal. NPO after midnight.     A-fib RVR - resolved  History of A-fib  Has history of paroxysmal  [facility]    POCT Glucose    Collection Time: 10/16/24  4:13 PM   Result Value Ref Range    POC Glucose 220 (H) 65 - 100 mg/dL    Performed by: Samanta    POCT Glucose    Collection Time: 10/16/24  8:41 PM   Result Value Ref Range    POC Glucose 302 (H) 65 - 100 mg/dL    Performed by: Babak    CBC with Auto Differential    Collection Time: 10/17/24  4:01 AM   Result Value Ref Range    WBC 12.8 (H) 4.3 - 11.1 K/uL    RBC 3.67 (L) 4.05 - 5.2 M/uL    Hemoglobin 10.7 (L) 11.7 - 15.4 g/dL    Hematocrit 32.9 (L) 35.8 - 46.3 %    MCV 89.6 82 - 102 FL    MCH 29.2 26.1 - 32.9 PG    MCHC 32.5 31.4 - 35.0 g/dL    RDW 16.5 (H) 11.9 - 14.6 %    Platelets 283 150 - 450 K/uL    MPV 9.6 9.4 - 12.3 FL    nRBC 0.00 0.0 - 0.2 K/uL    Differential Type AUTOMATED      Neutrophils % 79 (H) 43 - 78 %    Lymphocytes % 6 (L) 13 - 44 %    Monocytes % 9 4.0 - 12.0 %    Eosinophils % 1 0.5 - 7.8 %    Basophils % 1 0.0 - 2.0 %    Immature Granulocytes % 4 0.0 - 5.0 %    Neutrophils Absolute 10.1 (H) 1.7 - 8.2 K/UL    Lymphocytes Absolute 0.8 0.5 - 4.6 K/UL    Monocytes Absolute 1.2 0.1 - 1.3 K/UL    Eosinophils Absolute 0.2 0.0 - 0.8 K/UL    Basophils Absolute 0.1 0.0 - 0.2 K/UL    Immature Granulocytes Absolute 0.5 0.0 - 0.5 K/UL   Basic Metabolic Panel w/ Reflex to MG    Collection Time: 10/17/24  6:27 AM   Result Value Ref Range    Sodium 129 (L) 136 - 145 mmol/L    Potassium 4.6 3.5 - 5.1 mmol/L    Chloride 88 (L) 98 - 107 mmol/L    CO2 21 20 - 28 mmol/L    Anion Gap 21 (H) 9 - 18 mmol/L    Glucose 180 (H) 70 - 99 mg/dL    BUN 49 (H) 6 - 23 MG/DL    Creatinine 10.70 (H) 0.60 - 1.10 MG/DL    Est, Glom Filt Rate 4 (L) >60 ml/min/1.73m2    Calcium 7.1 (L) 8.8 - 10.2 MG/DL   POCT Glucose    Collection Time: 10/17/24  7:25 AM   Result Value Ref Range    POC Glucose 175 (H) 65 - 100 mg/dL    Performed by: Samanta    POCT Glucose    Collection Time: 10/17/24 11:22 AM   Result Value Ref Range    POC

## 2024-10-17 NOTE — PROGRESS NOTES
Pt will be NPO tonight for placement of HD catheter with IR and removal of PD catheter with general surgery. Pt had a PPD placed per case management to facilitate dialysis after discharge.      See below for white count trend.      Latest Reference Range & Units 10/15/24 05:28 10/17/24 04:01   WBC 4.3 - 11.1 K/uL 11.3 (H) 12.8 (H)   (H): Data is abnormally high    Pt continues to complain of moderate to severe abdominal pain. Pain management education given and Norco order adjusted to better fit patient needs. Pt was given Norco twice and dilaudid once this shift, per MAR.     Per preop, pt will be transported tomorrow morning at 0730.    All known needs met at this time. Bed is currently low, call light was left in reach, and pt was encouraged to ask for assistance. Pt was left resting in bed with no complaints.      at bedside.

## 2024-10-17 NOTE — PROGRESS NOTES
0025Disconnected PD cycler from patient. Betadine cap intact. Patient tolerated well.   0628 Hourly rounding completed. Bed locked/low position. VSS. IV patent, clean,dry, and intact. PRN medication administered 2x per MAR. All safety measures in place per protocol. [facility].VITALS:  BP 95/77   Pulse 81   Temp 98.7 °F (37.1 °C) (Oral)   Resp 14   Ht 1.753 m (5' 9\")   Wt (!) 151 kg (332 lb 12.8 oz)   SpO2 100%   BMI 49.15 kg/m²   OUTPUT:  In: -   Out: 1223 .

## 2024-10-17 NOTE — PROGRESS NOTES
Nursing notified of pending tomorrow 10/18-to be in preop at 0730   Airway patent, Nasal mucosa clear. Mouth with normal mucosa. Throat has no vesicles, no oropharyngeal exudates and uvula is midline.

## 2024-10-17 NOTE — DIALYSIS
Disconnected PD cycler from patient. Betadine cap intact. Patient tolerated well.     UF amount: 1203ml removed. Effluent: yellow, light, and clear (no fibrin noted this tx)

## 2024-10-17 NOTE — CARE COORDINATION
Consult for outpatient HD slot received. Patient prefers [facility] MWF 2nd shift, unsure if possible but CM will try. Heb labs and Ppd have been ordered, awaiting those results before submitting for HD slot.    Laurita URIAS, ACM  [place]

## 2024-10-17 NOTE — CONSULTS
Consult Note:  Giselle Rosado  MRN: [id number]  :1970  Age:54 y.o.    HPI: Giselle Rosado is a 54 y.o. female who general surgery was asked in consultation by Karlo Cortés NP (nephrology) to see for peritoneal dialysis catheter removal in the setting of person persistent peritonitis.  The patient has a PMHx of ESRD (peritoneal dialysis),CKD (secondary to sepsis 2021-left groin/pannus/perineum necrotizing fasciitis), A-fib  (on Eliquis), anemia, anxiety, debility, DM II, GERD, gout, HTN, IBS, and AMRITA.   She presented 10/10/2024 with 2 days of RUQ ABD pain.  Pain awakened her from sleep.  RUQ ABD pain with radiation to mid abdomen and right lower quadrant.  Abdominal pain was described as initially sharp with pressure.  Associated symptoms included chills, nausea, and dry heaves.  In the ED 10/10/2024, EKG with A-fib RVR  Lactic acid 2.8 WBC 16.6  CT ABD/PELV 10/10/24 10:14  IMPRESSION:     1.  Moderate volume of ascites with a peritoneal dialysis catheter present in  the pelvis.     2.  Splenomegaly.     3.  Mild body wall edema versus cellulitis at the lower anterior abdominal wall.     4.  Moderate bilateral renal atrophy.    She was admitted by the hospitalist service on 10/10/2024 for spontaneous bacterial peritonitis.     Of note, patient with recent history (several months ago) of corynebacterium peritonitis status post intraperitoneal vancomycin and Fortaz.  10/10/24 Peritoneal culture pending (sent to [facility]) with moderate gram-positive rods and scant Staphylococcus species, coagulase negative.  10/10/2024 BC x 2 with no growth in 5 days    Patient with regular consistency, carbohydrate controlled, renal diet at time of consultation,  Patient takes Eliquis.  Last dose Eliquis 10/16/2024-a.m.  Previous abdominal surgeries: Laparoscopic peritoneal dialysis placement (10/4/2022)    Past Medical History:   Diagnosis Date    Acute kidney injury (HCC)     ARF with sepsis requiring hemodialysis     dialysis 10/11-14/2024.    Patient received intraperitoneal vancomycin via peritoneal dialysis 10/14/2024.    Patient on 100 mg oral Diflucan daily  3 g IV Ancef on-call to the OR  Procedure consent laparoscopic peritoneal dialysis catheter removal possible open.  Dr. Alexander planned 10/18/2024-p.m.    Dr. Marrufo placed PD catheter 10/2022. Dr. Marrufo notified pt that PD catheter is a 2 piece PD catheter. Dr. Marrufo ensured that pt would communicate 2 piece PD catheter is PD catheter was removed.     Further input per attending surgeon    Signed:  CHRIS Heller - NP

## 2024-10-18 ENCOUNTER — ANESTHESIA (OUTPATIENT)
Dept: SURGERY | Age: 54
End: 2024-10-18
Payer: MEDICARE

## 2024-10-18 ENCOUNTER — APPOINTMENT (OUTPATIENT)
Dept: INTERVENTIONAL RADIOLOGY/VASCULAR | Age: 54
DRG: 907 | End: 2024-10-18
Payer: MEDICARE

## 2024-10-18 LAB
ANION GAP SERPL CALC-SCNC: 20 MMOL/L (ref 9–18)
BASOPHILS # BLD: 0.1 K/UL (ref 0–0.2)
BASOPHILS NFR BLD: 1 % (ref 0–2)
BUN SERPL-MCNC: 58 MG/DL (ref 6–23)
CALCIUM SERPL-MCNC: 6.5 MG/DL (ref 8.8–10.2)
CHLORIDE SERPL-SCNC: 88 MMOL/L (ref 98–107)
CO2 SERPL-SCNC: 20 MMOL/L (ref 20–28)
CREAT SERPL-MCNC: 11.7 MG/DL (ref 0.6–1.1)
DIFFERENTIAL METHOD BLD: ABNORMAL
EOSINOPHIL # BLD: 0.2 K/UL (ref 0–0.8)
EOSINOPHIL NFR BLD: 1 % (ref 0.5–7.8)
ERYTHROCYTE [DISTWIDTH] IN BLOOD BY AUTOMATED COUNT: 16.9 % (ref 11.9–14.6)
GLUCOSE BLD STRIP.AUTO-MCNC: 145 MG/DL (ref 65–100)
GLUCOSE BLD STRIP.AUTO-MCNC: 176 MG/DL (ref 65–100)
GLUCOSE BLD STRIP.AUTO-MCNC: 194 MG/DL (ref 65–100)
GLUCOSE SERPL-MCNC: 180 MG/DL (ref 70–99)
HCT VFR BLD AUTO: 31.8 % (ref 35.8–46.3)
HGB BLD-MCNC: 10.2 G/DL (ref 11.7–15.4)
IMM GRANULOCYTES # BLD AUTO: 0.6 K/UL (ref 0–0.5)
IMM GRANULOCYTES NFR BLD AUTO: 5 % (ref 0–5)
LYMPHOCYTES # BLD: 0.9 K/UL (ref 0.5–4.6)
LYMPHOCYTES NFR BLD: 7 % (ref 13–44)
MCH RBC QN AUTO: 29.1 PG (ref 26.1–32.9)
MCHC RBC AUTO-ENTMCNC: 32.1 G/DL (ref 31.4–35)
MCV RBC AUTO: 90.6 FL (ref 82–102)
MONOCYTES # BLD: 1.1 K/UL (ref 0.1–1.3)
MONOCYTES NFR BLD: 9 % (ref 4–12)
NEUTS SEG # BLD: 9.5 K/UL (ref 1.7–8.2)
NEUTS SEG NFR BLD: 77 % (ref 43–78)
NRBC # BLD: 0 K/UL (ref 0–0.2)
PLATELET # BLD AUTO: 259 K/UL (ref 150–450)
PMV BLD AUTO: 9.6 FL (ref 9.4–12.3)
POTASSIUM SERPL-SCNC: 4.5 MMOL/L (ref 3.5–5.1)
RBC # BLD AUTO: 3.51 M/UL (ref 4.05–5.2)
SERVICE CMNT-IMP: ABNORMAL
SODIUM SERPL-SCNC: 128 MMOL/L (ref 136–145)
WBC # BLD AUTO: 12.3 K/UL (ref 4.3–11.1)

## 2024-10-18 PROCEDURE — 36140 INTRO NDL ICATH UPR/LXTR ART: CPT | Performed by: RADIOLOGY

## 2024-10-18 PROCEDURE — 3700000000 HC ANESTHESIA ATTENDED CARE: Performed by: SURGERY

## 2024-10-18 PROCEDURE — 82962 GLUCOSE BLOOD TEST: CPT

## 2024-10-18 PROCEDURE — 6360000002 HC RX W HCPCS: Performed by: RADIOLOGY

## 2024-10-18 PROCEDURE — 2580000003 HC RX 258

## 2024-10-18 PROCEDURE — 49422 REMOVE TUNNELED IP CATH: CPT | Performed by: SURGERY

## 2024-10-18 PROCEDURE — 0WPG03Z REMOVAL OF INFUSION DEVICE FROM PERITONEAL CAVITY, OPEN APPROACH: ICD-10-PCS | Performed by: RADIOLOGY

## 2024-10-18 PROCEDURE — 2500000003 HC RX 250 WO HCPCS: Performed by: RADIOLOGY

## 2024-10-18 PROCEDURE — 77001 FLUOROGUIDE FOR VEIN DEVICE: CPT | Performed by: RADIOLOGY

## 2024-10-18 PROCEDURE — 7100000001 HC PACU RECOVERY - ADDTL 15 MIN: Performed by: SURGERY

## 2024-10-18 PROCEDURE — B5151ZZ FLUOROSCOPY OF BILATERAL JUGULAR VEINS USING LOW OSMOLAR CONTRAST: ICD-10-PCS | Performed by: RADIOLOGY

## 2024-10-18 PROCEDURE — 6360000002 HC RX W HCPCS

## 2024-10-18 PROCEDURE — 2580000003 HC RX 258: Performed by: RADIOLOGY

## 2024-10-18 PROCEDURE — 6360000002 HC RX W HCPCS: Performed by: ANESTHESIOLOGY

## 2024-10-18 PROCEDURE — 3600000002 HC SURGERY LEVEL 2 BASE: Performed by: SURGERY

## 2024-10-18 PROCEDURE — 2709999900 IR TUNNELED CVC PLACE WO SQ PORT/PUMP > 5 YEARS

## 2024-10-18 PROCEDURE — 1100000000 HC RM PRIVATE

## 2024-10-18 PROCEDURE — 36415 COLL VENOUS BLD VENIPUNCTURE: CPT

## 2024-10-18 PROCEDURE — B5181ZA FLUOROSCOPY OF SUPERIOR VENA CAVA USING LOW OSMOLAR CONTRAST, GUIDANCE: ICD-10-PCS | Performed by: RADIOLOGY

## 2024-10-18 PROCEDURE — 76937 US GUIDE VASCULAR ACCESS: CPT | Performed by: RADIOLOGY

## 2024-10-18 PROCEDURE — 2580000003 HC RX 258: Performed by: INTERNAL MEDICINE

## 2024-10-18 PROCEDURE — 6360000004 HC RX CONTRAST MEDICATION: Performed by: RADIOLOGY

## 2024-10-18 PROCEDURE — 3700000001 HC ADD 15 MINUTES (ANESTHESIA): Performed by: SURGERY

## 2024-10-18 PROCEDURE — 0JH63XZ INSERTION OF TUNNELED VASCULAR ACCESS DEVICE INTO CHEST SUBCUTANEOUS TISSUE AND FASCIA, PERCUTANEOUS APPROACH: ICD-10-PCS | Performed by: RADIOLOGY

## 2024-10-18 PROCEDURE — 2500000003 HC RX 250 WO HCPCS

## 2024-10-18 PROCEDURE — 36558 INSERT TUNNELED CV CATH: CPT

## 2024-10-18 PROCEDURE — 75860 VEIN X-RAY NECK: CPT | Performed by: RADIOLOGY

## 2024-10-18 PROCEDURE — 6370000000 HC RX 637 (ALT 250 FOR IP): Performed by: NURSE PRACTITIONER

## 2024-10-18 PROCEDURE — 2500000003 HC RX 250 WO HCPCS: Performed by: SURGERY

## 2024-10-18 PROCEDURE — 36558 INSERT TUNNELED CV CATH: CPT | Performed by: RADIOLOGY

## 2024-10-18 PROCEDURE — 02HV33Z INSERTION OF INFUSION DEVICE INTO SUPERIOR VENA CAVA, PERCUTANEOUS APPROACH: ICD-10-PCS | Performed by: RADIOLOGY

## 2024-10-18 PROCEDURE — 7100000000 HC PACU RECOVERY - FIRST 15 MIN: Performed by: SURGERY

## 2024-10-18 PROCEDURE — 80048 BASIC METABOLIC PNL TOTAL CA: CPT

## 2024-10-18 PROCEDURE — 85025 COMPLETE CBC W/AUTO DIFF WBC: CPT

## 2024-10-18 PROCEDURE — 3600000012 HC SURGERY LEVEL 2 ADDTL 15MIN: Performed by: SURGERY

## 2024-10-18 PROCEDURE — 2709999900 HC NON-CHARGEABLE SUPPLY: Performed by: SURGERY

## 2024-10-18 PROCEDURE — 6370000000 HC RX 637 (ALT 250 FOR IP): Performed by: INTERNAL MEDICINE

## 2024-10-18 PROCEDURE — 6370000000 HC RX 637 (ALT 250 FOR IP)

## 2024-10-18 PROCEDURE — 99152 MOD SED SAME PHYS/QHP 5/>YRS: CPT | Performed by: RADIOLOGY

## 2024-10-18 RX ORDER — MIDAZOLAM HYDROCHLORIDE 1 MG/ML
INJECTION INTRAMUSCULAR; INTRAVENOUS
Status: DISCONTINUED | OUTPATIENT
Start: 2024-10-18 | End: 2024-10-18 | Stop reason: SDUPTHER

## 2024-10-18 RX ORDER — SODIUM CHLORIDE 0.9 % (FLUSH) 0.9 %
5-40 SYRINGE (ML) INJECTION EVERY 12 HOURS SCHEDULED
Status: DISCONTINUED | OUTPATIENT
Start: 2024-10-18 | End: 2024-10-18 | Stop reason: HOSPADM

## 2024-10-18 RX ORDER — PHENYLEPHRINE HYDROCHLORIDE 10 MG/ML
INJECTION INTRAVENOUS
Status: DISCONTINUED | OUTPATIENT
Start: 2024-10-18 | End: 2024-10-18 | Stop reason: SDUPTHER

## 2024-10-18 RX ORDER — SODIUM CHLORIDE, SODIUM LACTATE, POTASSIUM CHLORIDE, CALCIUM CHLORIDE 600; 310; 30; 20 MG/100ML; MG/100ML; MG/100ML; MG/100ML
INJECTION, SOLUTION INTRAVENOUS CONTINUOUS
Status: DISCONTINUED | OUTPATIENT
Start: 2024-10-18 | End: 2024-10-18 | Stop reason: HOSPADM

## 2024-10-18 RX ORDER — DIPHENHYDRAMINE HYDROCHLORIDE 50 MG/ML
INJECTION INTRAMUSCULAR; INTRAVENOUS PRN
Status: COMPLETED | OUTPATIENT
Start: 2024-10-18 | End: 2024-10-18

## 2024-10-18 RX ORDER — HALOPERIDOL 5 MG/ML
1 INJECTION INTRAMUSCULAR
Status: DISCONTINUED | OUTPATIENT
Start: 2024-10-18 | End: 2024-10-18 | Stop reason: HOSPADM

## 2024-10-18 RX ORDER — PROPOFOL 10 MG/ML
INJECTION, EMULSION INTRAVENOUS
Status: DISCONTINUED | OUTPATIENT
Start: 2024-10-18 | End: 2024-10-18 | Stop reason: SDUPTHER

## 2024-10-18 RX ORDER — SODIUM CHLORIDE 9 MG/ML
INJECTION, SOLUTION INTRAVENOUS CONTINUOUS PRN
Status: COMPLETED | OUTPATIENT
Start: 2024-10-18 | End: 2024-10-18

## 2024-10-18 RX ORDER — HYDROMORPHONE HYDROCHLORIDE 2 MG/ML
0.5 INJECTION, SOLUTION INTRAMUSCULAR; INTRAVENOUS; SUBCUTANEOUS EVERY 5 MIN PRN
Status: DISCONTINUED | OUTPATIENT
Start: 2024-10-18 | End: 2024-10-18 | Stop reason: HOSPADM

## 2024-10-18 RX ORDER — DEXMEDETOMIDINE HYDROCHLORIDE 100 UG/ML
INJECTION, SOLUTION INTRAVENOUS
Status: DISCONTINUED | OUTPATIENT
Start: 2024-10-18 | End: 2024-10-18 | Stop reason: SDUPTHER

## 2024-10-18 RX ORDER — SODIUM CHLORIDE, SODIUM LACTATE, POTASSIUM CHLORIDE, CALCIUM CHLORIDE 600; 310; 30; 20 MG/100ML; MG/100ML; MG/100ML; MG/100ML
INJECTION, SOLUTION INTRAVENOUS
Status: DISCONTINUED | OUTPATIENT
Start: 2024-10-18 | End: 2024-10-18 | Stop reason: SDUPTHER

## 2024-10-18 RX ORDER — OXYCODONE HYDROCHLORIDE 5 MG/1
5 TABLET ORAL
Status: DISCONTINUED | OUTPATIENT
Start: 2024-10-18 | End: 2024-10-18 | Stop reason: HOSPADM

## 2024-10-18 RX ORDER — LIDOCAINE HYDROCHLORIDE AND EPINEPHRINE BITARTRATE 20; .01 MG/ML; MG/ML
INJECTION, SOLUTION SUBCUTANEOUS PRN
Status: COMPLETED | OUTPATIENT
Start: 2024-10-18 | End: 2024-10-18

## 2024-10-18 RX ORDER — HEPARIN SODIUM 1000 [USP'U]/ML
INJECTION, SOLUTION INTRAVENOUS; SUBCUTANEOUS PRN
Status: COMPLETED | OUTPATIENT
Start: 2024-10-18 | End: 2024-10-18

## 2024-10-18 RX ORDER — LIDOCAINE HYDROCHLORIDE 20 MG/ML
INJECTION, SOLUTION INFILTRATION; PERINEURAL PRN
Status: COMPLETED | OUTPATIENT
Start: 2024-10-18 | End: 2024-10-18

## 2024-10-18 RX ORDER — IPRATROPIUM BROMIDE AND ALBUTEROL SULFATE 2.5; .5 MG/3ML; MG/3ML
1 SOLUTION RESPIRATORY (INHALATION)
Status: DISCONTINUED | OUTPATIENT
Start: 2024-10-18 | End: 2024-10-18 | Stop reason: HOSPADM

## 2024-10-18 RX ORDER — LABETALOL HYDROCHLORIDE 5 MG/ML
10 INJECTION, SOLUTION INTRAVENOUS
Status: DISCONTINUED | OUTPATIENT
Start: 2024-10-18 | End: 2024-10-18 | Stop reason: HOSPADM

## 2024-10-18 RX ORDER — SODIUM CHLORIDE 0.9 % (FLUSH) 0.9 %
5-40 SYRINGE (ML) INJECTION PRN
Status: DISCONTINUED | OUTPATIENT
Start: 2024-10-18 | End: 2024-10-18 | Stop reason: HOSPADM

## 2024-10-18 RX ORDER — MIDAZOLAM HYDROCHLORIDE 1 MG/ML
INJECTION, SOLUTION INTRAMUSCULAR; INTRAVENOUS PRN
Status: COMPLETED | OUTPATIENT
Start: 2024-10-18 | End: 2024-10-18

## 2024-10-18 RX ORDER — PROCHLORPERAZINE EDISYLATE 5 MG/ML
5 INJECTION INTRAMUSCULAR; INTRAVENOUS
Status: DISCONTINUED | OUTPATIENT
Start: 2024-10-18 | End: 2024-10-18 | Stop reason: HOSPADM

## 2024-10-18 RX ORDER — LIDOCAINE HYDROCHLORIDE 10 MG/ML
1 INJECTION, SOLUTION INFILTRATION; PERINEURAL
Status: DISCONTINUED | OUTPATIENT
Start: 2024-10-18 | End: 2024-10-18 | Stop reason: HOSPADM

## 2024-10-18 RX ORDER — SODIUM CHLORIDE 9 MG/ML
INJECTION, SOLUTION INTRAVENOUS PRN
Status: DISCONTINUED | OUTPATIENT
Start: 2024-10-18 | End: 2024-10-18 | Stop reason: HOSPADM

## 2024-10-18 RX ORDER — HYDROMORPHONE HYDROCHLORIDE 2 MG/ML
0.5 INJECTION, SOLUTION INTRAMUSCULAR; INTRAVENOUS; SUBCUTANEOUS ONCE
Status: COMPLETED | OUTPATIENT
Start: 2024-10-18 | End: 2024-10-18

## 2024-10-18 RX ORDER — BUPIVACAINE HYDROCHLORIDE AND EPINEPHRINE 5; 5 MG/ML; UG/ML
INJECTION, SOLUTION EPIDURAL; INTRACAUDAL; PERINEURAL PRN
Status: DISCONTINUED | OUTPATIENT
Start: 2024-10-18 | End: 2024-10-18 | Stop reason: ALTCHOICE

## 2024-10-18 RX ORDER — NALOXONE HYDROCHLORIDE 0.4 MG/ML
INJECTION, SOLUTION INTRAMUSCULAR; INTRAVENOUS; SUBCUTANEOUS PRN
Status: DISCONTINUED | OUTPATIENT
Start: 2024-10-18 | End: 2024-10-18 | Stop reason: HOSPADM

## 2024-10-18 RX ORDER — FENTANYL CITRATE 50 UG/ML
INJECTION, SOLUTION INTRAMUSCULAR; INTRAVENOUS PRN
Status: COMPLETED | OUTPATIENT
Start: 2024-10-18 | End: 2024-10-18

## 2024-10-18 RX ADMIN — DEXMEDETOMIDINE 4 MCG: 100 INJECTION, SOLUTION, CONCENTRATE INTRAVENOUS at 16:31

## 2024-10-18 RX ADMIN — IOHEXOL 12 ML: 300 INJECTION, SOLUTION INTRAVENOUS at 10:00

## 2024-10-18 RX ADMIN — HYDROCODONE BITARTRATE AND ACETAMINOPHEN 2 TABLET: 5; 325 TABLET ORAL at 20:53

## 2024-10-18 RX ADMIN — PHENYLEPHRINE HYDROCHLORIDE 200 MCG: 10 INJECTION INTRAVENOUS at 16:18

## 2024-10-18 RX ADMIN — MIDAZOLAM 1 MG: 1 INJECTION INTRAMUSCULAR; INTRAVENOUS at 09:36

## 2024-10-18 RX ADMIN — PHENYLEPHRINE HYDROCHLORIDE 200 MCG: 10 INJECTION INTRAVENOUS at 17:14

## 2024-10-18 RX ADMIN — PROPOFOL 40 MG: 10 INJECTION, EMULSION INTRAVENOUS at 17:20

## 2024-10-18 RX ADMIN — HYDROMORPHONE HYDROCHLORIDE 0.5 MG: 1 INJECTION, SOLUTION INTRAMUSCULAR; INTRAVENOUS; SUBCUTANEOUS at 19:25

## 2024-10-18 RX ADMIN — PHENYLEPHRINE HYDROCHLORIDE 200 MCG: 10 INJECTION INTRAVENOUS at 17:04

## 2024-10-18 RX ADMIN — PHENYLEPHRINE HYDROCHLORIDE 200 MCG: 10 INJECTION INTRAVENOUS at 17:06

## 2024-10-18 RX ADMIN — DIPHENHYDRAMINE HYDROCHLORIDE 12.5 MG: 50 INJECTION, SOLUTION INTRAMUSCULAR; INTRAVENOUS at 09:31

## 2024-10-18 RX ADMIN — MIDAZOLAM 1 MG: 1 INJECTION INTRAMUSCULAR; INTRAVENOUS at 09:31

## 2024-10-18 RX ADMIN — FENTANYL CITRATE 50 MCG: 50 INJECTION, SOLUTION INTRAMUSCULAR; INTRAVENOUS at 09:36

## 2024-10-18 RX ADMIN — SODIUM CHLORIDE, PRESERVATIVE FREE 10 ML: 5 INJECTION INTRAVENOUS at 20:56

## 2024-10-18 RX ADMIN — DEXMEDETOMIDINE 4 MCG: 100 INJECTION, SOLUTION, CONCENTRATE INTRAVENOUS at 16:14

## 2024-10-18 RX ADMIN — HYDROMORPHONE HYDROCHLORIDE 0.5 MG: 2 INJECTION INTRAMUSCULAR; INTRAVENOUS; SUBCUTANEOUS at 13:39

## 2024-10-18 RX ADMIN — PHENYLEPHRINE HYDROCHLORIDE 200 MCG: 10 INJECTION INTRAVENOUS at 17:12

## 2024-10-18 RX ADMIN — SERTRALINE 100 MG: 100 TABLET, FILM COATED ORAL at 20:53

## 2024-10-18 RX ADMIN — PHENYLEPHRINE HYDROCHLORIDE 200 MCG: 10 INJECTION INTRAVENOUS at 16:30

## 2024-10-18 RX ADMIN — PROPOFOL 100 MCG/KG/MIN: 10 INJECTION, EMULSION INTRAVENOUS at 16:09

## 2024-10-18 RX ADMIN — PROPOFOL 30 MG: 10 INJECTION, EMULSION INTRAVENOUS at 16:08

## 2024-10-18 RX ADMIN — DEXMEDETOMIDINE 4 MCG: 100 INJECTION, SOLUTION, CONCENTRATE INTRAVENOUS at 16:18

## 2024-10-18 RX ADMIN — HYDROCODONE BITARTRATE AND ACETAMINOPHEN 2 TABLET: 5; 325 TABLET ORAL at 05:15

## 2024-10-18 RX ADMIN — SODIUM CHLORIDE 50 ML/HR: 9 INJECTION, SOLUTION INTRAVENOUS at 09:30

## 2024-10-18 RX ADMIN — LIDOCAINE HYDROCHLORIDE AND EPINEPHRINE 10 ML: 20; 10 INJECTION, SOLUTION INFILTRATION; PERINEURAL at 09:33

## 2024-10-18 RX ADMIN — PROPOFOL 30 MG: 10 INJECTION, EMULSION INTRAVENOUS at 16:10

## 2024-10-18 RX ADMIN — PHENYLEPHRINE HYDROCHLORIDE 200 MCG: 10 INJECTION INTRAVENOUS at 17:28

## 2024-10-18 RX ADMIN — MIDAZOLAM 2 MG: 1 INJECTION INTRAMUSCULAR; INTRAVENOUS at 16:06

## 2024-10-18 RX ADMIN — DEXMEDETOMIDINE 4 MCG: 100 INJECTION, SOLUTION, CONCENTRATE INTRAVENOUS at 16:10

## 2024-10-18 RX ADMIN — PHENYLEPHRINE HYDROCHLORIDE 200 MCG: 10 INJECTION INTRAVENOUS at 16:37

## 2024-10-18 RX ADMIN — DEXMEDETOMIDINE 4 MCG: 100 INJECTION, SOLUTION, CONCENTRATE INTRAVENOUS at 17:15

## 2024-10-18 RX ADMIN — METOPROLOL TARTRATE 25 MG: 25 TABLET, FILM COATED ORAL at 20:53

## 2024-10-18 RX ADMIN — PHENYLEPHRINE HYDROCHLORIDE 200 MCG: 10 INJECTION INTRAVENOUS at 17:43

## 2024-10-18 RX ADMIN — WATER 3000 MG: 1 INJECTION INTRAMUSCULAR; INTRAVENOUS; SUBCUTANEOUS at 09:31

## 2024-10-18 RX ADMIN — PHENYLEPHRINE HYDROCHLORIDE 200 MCG: 10 INJECTION INTRAVENOUS at 17:07

## 2024-10-18 RX ADMIN — HEPARIN SODIUM 4200 UNITS: 1000 INJECTION, SOLUTION INTRAVENOUS; SUBCUTANEOUS at 10:25

## 2024-10-18 RX ADMIN — LIDOCAINE HYDROCHLORIDE 5 ML: 20 INJECTION, SOLUTION INFILTRATION; PERINEURAL at 09:33

## 2024-10-18 RX ADMIN — FENTANYL CITRATE 50 MCG: 50 INJECTION, SOLUTION INTRAMUSCULAR; INTRAVENOUS at 09:31

## 2024-10-18 RX ADMIN — CEFAZOLIN 3000 MG: 10 INJECTION, POWDER, FOR SOLUTION INTRAVENOUS at 16:16

## 2024-10-18 RX ADMIN — SODIUM CHLORIDE, SODIUM LACTATE, POTASSIUM CHLORIDE, AND CALCIUM CHLORIDE: 600; 310; 30; 20 INJECTION, SOLUTION INTRAVENOUS at 15:42

## 2024-10-18 RX ADMIN — PHENYLEPHRINE HYDROCHLORIDE 200 MCG: 10 INJECTION INTRAVENOUS at 17:25

## 2024-10-18 ASSESSMENT — PAIN - FUNCTIONAL ASSESSMENT
PAIN_FUNCTIONAL_ASSESSMENT: NONE - DENIES PAIN
PAIN_FUNCTIONAL_ASSESSMENT: 0-10
PAIN_FUNCTIONAL_ASSESSMENT: NONE - DENIES PAIN
PAIN_FUNCTIONAL_ASSESSMENT: PREVENTS OR INTERFERES SOME ACTIVE ACTIVITIES AND ADLS
PAIN_FUNCTIONAL_ASSESSMENT: NONE - DENIES PAIN
PAIN_FUNCTIONAL_ASSESSMENT: NONE - DENIES PAIN

## 2024-10-18 ASSESSMENT — PAIN DESCRIPTION - ORIENTATION
ORIENTATION: LEFT;LOWER
ORIENTATION: LOWER

## 2024-10-18 ASSESSMENT — PAIN SCALES - GENERAL
PAINLEVEL_OUTOF10: 4
PAINLEVEL_OUTOF10: 7
PAINLEVEL_OUTOF10: 8
PAINLEVEL_OUTOF10: 7
PAINLEVEL_OUTOF10: 7
PAINLEVEL_OUTOF10: 9
PAINLEVEL_OUTOF10: 7
PAINLEVEL_OUTOF10: 5

## 2024-10-18 ASSESSMENT — PAIN DESCRIPTION - LOCATION
LOCATION: BACK;ABDOMEN
LOCATION: ABDOMEN

## 2024-10-18 ASSESSMENT — PAIN DESCRIPTION - DESCRIPTORS
DESCRIPTORS: ACHING;SORE;DISCOMFORT
DESCRIPTORS: DISCOMFORT;SHARP;PRESSURE
DESCRIPTORS: ACHING
DESCRIPTORS: ACHING
DESCRIPTORS: DULL

## 2024-10-18 NOTE — PROGRESS NOTES
Hospitalist Progress Note   Admit Date:  10/10/2024  9:15 AM   Name:  Giselle Rosado   Age:  54 y.o.  Sex:  female  :  1970   MRN:  [id number]   Room:  [facility] [place]/    Presenting/Chief Complaint: Abdominal Pain     Reason(s) for Admission: Spontaneous bacterial peritonitis (HCC) [K65.2]  Septicemia (HCC) [A41.9]  Atrial fibrillation with rapid ventricular response (HCC) [I48.91]  Sepsis (HCC) [A41.9]     Hospital Course:   Giselle Rosado is a 54 y.o. female with medical history significant for ESRD on peritoneal dialysis, history of paroxysmal atrial fibrillation no longer on blood thinners, hypertension, depression, irritable bowel syndrome who presents emergency room with 2 days of right upper quadrant abdominal pain.   Patient was admitted with sepsis(leukocytosis and tachycardia) with concern for peritonitis.  In addition, she was noted to be in A-fib RVR and had received IV Cardizem in the ED with improvement.  CT abdomen pelvis with moderate volume ascites with peritoneal dialysis catheter present, splenomegaly, body wall edema versus cellulitis at the lower anterior abdominal wall. Peritoneal cell count with 9900 WBC. General surgery consulted for PD cath removal.      Subjective & 24hr Events:   Patient away in OR this morning when I went to room.  Checked back in on in the afternoon.  Patient was still away in the OR.       Assessment & Plan:     Sepsis due to peritonitis  ESRD on PD  Meets criteria for sepsis with tachycardia and leukocytosis  -still with abdominal pain   -PD fluid culture with gram-positive rods - Staph Coag neg species  -continue with fluconazole for fungal prophylaxis  -PRN pain meds   -PD culture still pending  -Dialysis timing per nephro recs.  -Continue on Vanc IP and Cefepime IP. Will have to switch these to IV once PD cath is removed.   -PD cath removal today. Away in OR.  -Plan for permcath placement tomorrow for HD. Holding Eliquis.      A-fib RVR -  mg Oral Daily     Facility-Administered Medications Ordered in Other Encounters   Medication Dose Route Frequency    lactated ringers IV soln infusion SOLN   IntraVENous Continuous PRN    midazolam (VERSED) injection   IntraVENous Once PRN    propofol infusion   IntraVENous Once PRN    dexmedeTOMIDine (PRECEDEX) injection   IntraVENous Once PRN    phenylephrine (VAZCULEP) injection   IntraVENous Once PRN       Signed:  Sae Abel MD    Part of this note may have been written by using a voice dictation software.  The note has been proof read but may still contain some grammatical/other typographical errors.

## 2024-10-18 NOTE — ANESTHESIA POSTPROCEDURE EVALUATION
Department of Anesthesiology  Postprocedure Note    Patient: Giselle Rosado  MRN: [id number]  YOB: 1970  Date of evaluation: 10/18/2024    Procedure Summary       Date: 10/18/24 Room / Location: [facility] MAIN OR 11 / [facility] MAIN OR    Anesthesia Start: 1537 Anesthesia Stop: 1756    Procedure: REMOVAL PD CATHETER (Abdomen) Diagnosis:       End stage chronic kidney disease (HCC)      (End stage chronic kidney disease (HCC) [N18.6])    Providers: Umair Alexander MD Responsible Provider: LIN Haq MD    Anesthesia Type: TIVA ASA Status: 3            Anesthesia Type: TIVA    Tika Phase I: Tika Score: 7    Tika Phase II:      Anesthesia Post Evaluation    Patient location during evaluation: PACU  Patient participation: complete - patient participated  Level of consciousness: awake and alert  Airway patency: patent  Nausea & Vomiting: no nausea and no vomiting  Cardiovascular status: hemodynamically stable  Respiratory status: acceptable  Hydration status: euvolemic  Comments: Blood pressure 111/65, pulse (!) 101, temperature 98.1 °F (36.7 °C), temperature source Temporal, resp. rate 14, height 1.753 m (5' 9\"), weight (!) 150.6 kg (332 lb), SpO2 100%.    No apparent anesthetic complications.  Pt stable for discharge from PACU  Multimodal analgesia pain management approach  Pain management: adequate    No notable events documented.

## 2024-10-18 NOTE — DISCHARGE INSTRUCTIONS
Hold a.m. metoprolol on dialysis days    Continue abdominal binder    IV antibiotics with dialysis      [facility] [place]IVE [place]    Medications  Take tylenol, ibuprofen, and oxycodone as prescribed for pain control.   Continue your home medications as listed on your discharge instructions    Diet  Resume a regular home diet    Activity  Walk as tolerated  May shower starting tomorrow; no soaking tub baths or swimming for 2 weeks  No lifting heavier than 10 pounds (a jug of milk) for 6 weeks  No strenuous activity for 6 weeks    Incision Care  Keep incision clean and dry. It's okay to wash the skin around incision with mild soap and water and shower 1 days after surgery.  You have wound glue (dermabond) over your incisions, this will peel off in about 10 days, do NOT scrub incisions, just let soap and water run off.  Don't submerge in a bathtub, pool, or hot tub for at least 2 weeks.  Avoid picking, scratching, or pulling at incision.  Don't use oils, lotions, or creams on incision.    Call for the following symptoms:  Fever > 101.5 F  Uncontrolled nausea, vomiting, or diarrhea for more than 24 hours  Severe or new onset abdominal pain  Redness, swelling, or new/increased drainage from incision or drain sites  Feeling like your heart is beating too fast  New onset shortness of breath    M-F 7a-3p - Call General Surgery Clinic [phone].  Leave your name, date of birth, and phone number where you can be reached.     Nights & Weekends - Call [facility]  [phone].    Follow-up Appointments  You will be seen in the Surgery Clinic for follow-up in approximately 2 weeks.  Please see your primary care doctor to review your home medications.  Please see your other specialty doctors as needed.

## 2024-10-18 NOTE — OR NURSING
TRANSFER - OUT REPORT:     Verbal report given to Nicole Bryan RN on Giselle Rosado  being transferred to IR Recovery for routine progression of patient care.      Report consisted of patient’s Situation, Background, Assessment and Recommendations(SBAR).        Information from the following report(s) SBAR, Procedure Summary, and MAR was reviewed with the receiving nurse.     Opportunity for questions and clarification was provided.        Conscious Sedation:    100 Mcg of Fentanyl administered   2 Mg of Versed administered   12.5 Mg of Benadryl administered      Pt tolerated procedure Well.      Peripheral Intravenous Line:   Peripheral IV 10/10/24 Left Antecubital (Active)   Site Assessment Clean, dry & intact 10/18/24 0329   Line Status Capped 10/18/24 0329   Line Care Connections checked and tightened 10/18/24 0329   Phlebitis Assessment No symptoms 10/18/24 0329   Infiltration Assessment 0 10/18/24 0329   Alcohol Cap Used Yes 10/18/24 0329   Dressing Status Clean, dry & intact 10/18/24 0329   Dressing Type Transparent 10/18/24 0329    and Hemodialysis Catheter:      Right Chest has a  sterile and transparent film that is clean, dry, intact, and non-tender.    VITALS:  /67   Pulse (!) 114   Temp 97.9 °F (36.6 °C) (Infrared)   Resp 18   Ht 1.753 m (5' 9\")   Wt (!) 150.6 kg (332 lb)   SpO2 97%   BMI 49.03 kg/m² .

## 2024-10-18 NOTE — PERIOP NOTE
TRANSFER - OUT REPORT:    Verbal report given to Tanisha ARCINIEGA on Giselle Rosado  being transferred to [facility] for routine progression of patient care       Report consisted of patient’s Situation, Background, Assessment and   Recommendations(SBAR).     Information from the following report(s) Nurse Handoff Report, Adult Overview, Surgery Report, Intake/Output, MAR, Cardiac Rhythm Afib to Sinus Tachy, Procedure Verification, Quality Measures, Neuro Assessment, and Event Log was reviewed with the receiving nurse.    Lines:   Peripheral IV 10/10/24 Left Antecubital (Active)   Site Assessment Clean, dry & intact 10/18/24 0329   Line Status Capped 10/18/24 0329   Line Care Connections checked and tightened 10/18/24 0329   Phlebitis Assessment No symptoms 10/18/24 0329   Infiltration Assessment 0 10/18/24 0329   Alcohol Cap Used Yes 10/18/24 0329   Dressing Status Clean, dry & intact 10/18/24 0329   Dressing Type Transparent 10/18/24 0329       Peripheral IV 10/18/24 Right Forearm (Active)   Site Assessment Clean, dry & intact 10/18/24 1757   Line Status Flushed;Normal saline locked 10/18/24 1757   Line Care Connections checked and tightened 10/18/24 1757   Phlebitis Assessment No symptoms 10/18/24 1757   Infiltration Assessment 0 10/18/24 1757   Dressing Status Clean, dry & intact 10/18/24 1757   Dressing Type Transparent 10/18/24 1757        Opportunity for questions and clarification was provided.      Patient transported with:   O2 @ 3 liters    VTE prophylaxis orders have been written for Giselle Rosado.

## 2024-10-18 NOTE — CARE COORDINATION
Patient off the unit today to have her peritoneal dialysis catheter removal. Patient will be switched to HD temporarily due to infection. Patient will have to have HD cath placed. CM has started the paperwork to switch to HD, pending labs results and PPD result.    Laurita URIAS, ACM  [place]                no

## 2024-10-18 NOTE — OP NOTE
Operative Note      Patient: Giselle Rosado  YOB: 1970  MRN: [id number]    Date of Procedure: 10/18/2024    Pre-Op Diagnosis Codes:      * End stage chronic kidney disease (HCC) [N18.6]    Post-Op Diagnosis: Same       Procedure(s):  REMOVAL PD CATHETER    Surgeon(s):  Umair Alexander MD    Assistant:   * No surgical staff found *    Anesthesia: Monitor Anesthesia Care    Indication: Giselle Rosado is a 54 y.o. female with history of ESRD (peritoneal dialysis), CKD (secondary to sepsis 12/2021-left groin/pannus/perineum necrotizing fasciitis), A-fib (on Eliquis), anemia, anxiety, debility, DM II, GERD, gout, HTN, IBS, and AMRITA who presents with persistent bacterial peritonitis and need for PD  cath removal.  Therefore, patient was taken to the OR for PD catheter removal.  Discussed all risks, benefits, and alternatives to the procedure including, but not limited to, the risk of bleeding, infection, damage to surrounding structures, MI, CVA, DVT/PE, and need for additional procedures.  Patient wishes to proceed with the planned procedure.  Informed consent was obtained.       Procedure Details: Pre-operative consent was obtained and documented in the record. The patient was brought to the operating room and placed in supine position. The skin was prepped using chlorhexidine scrub and sterile drapes were applied. A surgical timeout was performed per operating room regulations.    Local anesthetic using 0.5% Marcaine with epinephrine was infiltrated over the planned site of incision.  A transverse incision at the left hemiabdomen over the previous incision was made using a 15 blade scalpel.  Dissection was carried down through the skin and subcutaneous tissue using Bovie electrocautery.  Once the catheter was identified the cuff was freed from the surrounding subcutaneous tissue using Bovie electrocautery and additional cuff was seen more approximately in the tunnel to the exit site of the skin

## 2024-10-18 NOTE — PROGRESS NOTES
Patient received Dilaudid once and Norco twice this shift for complaints of lower back and abdominal pain; see MAR. NPO since midnight in preparation for procedure this AM.  remains at side. No distress noted.

## 2024-10-18 NOTE — BRIEF OP NOTE
[facility]  Department of Interventional Radiology  [phone]        Brief Procedure Note    Patient: Giselle Rosado MRN: [id number]  SSN: [id number]    YOB: 1970  Age: 54 y.o.  Sex: female      Date of Procedure: 10/18/2024     Pre-Procedure Diagnosis:   ESRD.      Post-Procedure Diagnosis:  SAME    Procedure(s):  Tunneled Central Venous Catheter    Brief Description of Procedure:  LIJV access    Performed By:  LEYLA GARVEY MD     Assistants:  None    Anesthesia:  Moderate Sedation    Estimated Blood Loss:  Less than 10ml    Specimens:  None    Implants:  Tunneled Hemodialysis Catheter    Findings:  RIJV is occluded.  LINV is narrowed but patent.      Complications:  None    Recommendations:  Ready to use.       Follow Up:  PRN    Signed By: LEYLA GARVEY MD     October 18, 2024

## 2024-10-18 NOTE — PROGRESS NOTES
Physical therapy note: Attempted PT this AM.  Patient CHELSEY for procedure at time of attempt.  Will continue to treat as pt is able and time/schedule permit.    Nisreen Oneal, BANDAR    Rehab Caseload Tracker

## 2024-10-18 NOTE — ANESTHESIA PRE PROCEDURE
Department of Anesthesiology  Preprocedure Note       Name:  Giselle Rosado   Age:  54 y.o.  :  1970                                          MRN:  [id number]         Date:  10/17/2024      Surgeon: Surgeon(s):  Umair Alexander MD    Procedure: Procedure(s):  REMOVAL PD CATHETER    Medications prior to admission:   Prior to Admission medications    Medication Sig Start Date End Date Taking? Authorizing Provider   metoprolol succinate (TOPROL XL) 100 MG extended release tablet Take 1 tablet by mouth daily   Yes Provider, Historical, MD   XPHOZAH 30 MG TABS Take 30 mg by mouth in the morning and at bedtime TAKE 1 TABLET BY MOUTH BEFORE BREAKFAST AND BEFORE DINNER DAILY 24  Yes Provider, Historical, MD   sertraline (ZOLOFT) 100 MG tablet Take 1 tablet by mouth 2 times daily 23  Yes Tanisha Powers APRN - CNP   hydrALAZINE (APRESOLINE) 50 MG tablet Take 2 tablets by mouth in the morning and at bedtime 22  Yes Provider, Historical, MD   dicyclomine (BENTYL) 10 MG capsule Take 1 capsule by mouth 3 times daily as needed (IBS) 22  Yes Jonel López DO   AURYXIA 1  MG(Fe) TABS tablet One tablet/day 22  Yes Provider, Historical, MD   furosemide (LASIX) 40 MG tablet  22  Yes Provider, Historical, MD   amLODIPine (NORVASC) 10 MG tablet Take 1 tablet by mouth daily   Yes Automatic Reconciliation, Ar   famotidine (PEPCID) 20 MG tablet Take 1 tablet by mouth daily   Yes Automatic Reconciliation, Ar   ondansetron (ZOFRAN-ODT) 4 MG disintegrating tablet Take 1 tablet by mouth every 8 hours as needed 22  Yes Automatic Reconciliation, Ar   metoprolol tartrate (LOPRESSOR) 50 MG tablet Take 1 tablet by mouth 2 times daily  Patient not taking: Reported on 10/10/2024 12/26/23   SueersGiacomo DO   apixaban (ELIQUIS) 5 MG TABS tablet Take 1 tablet by mouth 2 times daily  Patient not taking: Reported on 10/10/2024 6/21/23   Tanisha Powers APRN - CNP   blood

## 2024-10-18 NOTE — PROGRESS NOTES
Occupational Therapy Note:    Attempted to see patient this AM for occupational therapy treatment  session. Patient CHELSEY for procedure at time of attempt. Will follow and re-attempt as schedule permits/patient available. Thank you,    Laxmi Mcfarlane, OT    Rehab Caseload Tracker

## 2024-10-18 NOTE — CONSULTS
General Surgery Update H&P:     Patient was seen and re-examined and there are no significant clinical changes.  Surgical site was confirmed by patient and surgeon.    To OR for peritoneal dialysis catheter removal    Discussed all risks, benefits, and alternatives to the procedure including, but not limited to, the risk of bleeding, infection, damage to surrounding structures, MI, CVA, DVT/PE, and need for additional procedures.  Patient wishes to proceed with the planned procedure.  Informed consent was obtained.      Umair Alexander MD  General Surgeon  [facility] [address] [place] [address]  [phone]                History and Physical Note:  Giselle Rosado  MRN: [id number]  :1970  Age:54 y.o.    HPI: Giselle Rosado is a 54 y.o. female who general surgery was asked in consultation by Karlo Cortés NP (nephrology) to see for peritoneal dialysis catheter removal in the setting of person persistent peritonitis.  The patient has a PMHx of ESRD (peritoneal dialysis),CKD (secondary to sepsis 2021-left groin/pannus/perineum necrotizing fasciitis), A-fib  (on Eliquis), anemia, anxiety, debility, DM II, GERD, gout, HTN, IBS, and AMRITA.   She presented 10/10/2024 with 2 days of RUQ ABD pain.  Pain awakened her from sleep.  RUQ ABD pain with radiation to mid abdomen and right lower quadrant.  Abdominal pain was described as initially sharp with pressure.  Associated symptoms included chills, nausea, and dry heaves.  In the ED 10/10/2024, EKG with A-fib RVR  Lactic acid 2.8 WBC 16.6  CT ABD/PELV 10/10/24 10:14  IMPRESSION:     1.  Moderate volume of ascites with a peritoneal dialysis catheter present in  the pelvis.     2.  Splenomegaly.     3.  Mild body wall edema versus cellulitis at the lower anterior abdominal wall.     4.  Moderate bilateral renal atrophy.    She was admitted by the hospitalist service on 10/10/2024 for spontaneous bacterial  placement from IR 10/18/2024 AM      SURGICAL PLAN  N.p.o. past midnight 10/18/24  IVF per anesthesia  ABX course: Patient received IV cefepime every 24 hours 10/10-14/2024.   Patient received intraperitoneal cefepime via peritoneal dialysis 10/11-14/2024.    Patient received intraperitoneal vancomycin via peritoneal dialysis 10/14/2024.    Patient on 100 mg oral Diflucan daily  3 g IV Ancef on-call to the OR  Procedure consent laparoscopic peritoneal dialysis catheter removal possible open.  Dr. Alexander planned 10/18/2024-p.m.    Dr. Marrufo placed PD catheter 10/2022. Dr. Marrufo notified pt that PD catheter is a 2 piece PD catheter. Dr. Marrufo ensured that pt would communicate 2 piece PD catheter is PD catheter was removed.       Signed:  Umair Alexander MD

## 2024-10-18 NOTE — PRE SEDATION
Sedation Pre-Procedure Note    Patient Name: Giselle Rosado   YOB: 1970  Room/Bed: [facility]  Medical Record Number: [id number]  Date: 10/18/2024   Time: 9:15 AM       Indication:  ESRD.      Consent: I have discussed with the patient and/or the patient representative the indication, alternatives, and the possible risks and/or complications of the planned procedure and the anesthesia methods. The patient and/or patient representative appear to understand and agree to proceed.    Vital Signs:   Vitals:    10/18/24 0739   BP: 99/63   Pulse: (!) 110   Resp: 20   Temp: 97.9 °F (36.6 °C)   SpO2: 98%       Past Medical History:   has a past medical history of Acute kidney injury ([facility]), Anemia, Anxiety, ARF (acute respiratory failure), Atrial fibrillation with RVR ([facility]), Chronic kidney disease, Debility, Diabetes ([facility]), Dialysis patient ([facility]), GERD (gastroesophageal reflux disease), Gout, Gout, History of recent blood transfusion, Hypertension, IBS (irritable bowel syndrome), Iron deficiency anemia, Necrotizing fasciitis, Personal history of COVID-19, and Sepsis ([facility]).    Past Surgical History:   has a past surgical history that includes Dilation and curettage of uterus (07/2018); pr unlisted procedure abdomen peritoneum & omentum (02/2022); IR TUNNELED CVC PLACE WO SQ PORT/PUMP > 5 YEARS (1/21/2022); IR NONTUNNELED VASCULAR CATHETER > 5 YEARS (12/23/2021); other surgical history (12/2021); IR TUNNELED CVC PLACE WO SQ PORT/PUMP > 5 YEARS (1/21/2022); and IR NONTUNNELED VASCULAR CATHETER > 5 YEARS (12/23/2021).    Medications:   Scheduled Meds:    tuberculin  5 Units IntraDERmal Once    ceFAZolin  3,000 mg IntraVENous On Call to OR    metoprolol tartrate  25 mg Oral BID    insulin lispro  0-8 Units SubCUTAneous 4x Daily AC & HS    insulin glargine  8 Units SubCUTAneous Nightly    sodium chloride flush  5-40 mL IntraVENous 2 times per day    [Held by provider] apixaban  5 mg Oral BID    sertraline  100 mg Oral  BID    sevelamer  800 mg Oral TID WC    fluconazole  100 mg Oral Daily     Continuous Infusions:    sodium chloride      dextrose       PRN Meds: HYDROcodone-acetaminophen, HYDROmorphone, Delflex SM 1.5% 5000 ml with Vancomycin 5 g, metoclopramide, sodium chloride flush, sodium chloride, ondansetron **OR** ondansetron, polyethylene glycol, bisacodyl, famotidine, aluminum & magnesium hydroxide-simethicone, acetaminophen **OR** acetaminophen, glucose, dextrose bolus **OR** dextrose bolus, glucagon (rDNA), dextrose  Home Meds:   Prior to Admission medications    Medication Sig Start Date End Date Taking? Authorizing Provider   metoprolol succinate (TOPROL XL) 100 MG extended release tablet Take 1 tablet by mouth daily   Yes Provider, Historical, MD   XPHOZAH 30 MG TABS Take 30 mg by mouth in the morning and at bedtime TAKE 1 TABLET BY MOUTH BEFORE BREAKFAST AND BEFORE DINNER DAILY 8/28/24  Yes Provider, Historical, MD   sertraline (ZOLOFT) 100 MG tablet Take 1 tablet by mouth 2 times daily 6/21/23  Yes Tanisha Powers APRN - CNP   hydrALAZINE (APRESOLINE) 50 MG tablet Take 2 tablets by mouth in the morning and at bedtime 11/11/22  Yes Provider, Historical, MD   dicyclomine (BENTYL) 10 MG capsule Take 1 capsule by mouth 3 times daily as needed (IBS) 11/16/22  Yes Jonel López DO   AURYXIA 1  MG(Fe) TABS tablet One tablet/day 8/23/22  Yes Provider, MD Cynthia   furosemide (LASIX) 40 MG tablet  9/28/22  Yes Provider, Historical, MD   amLODIPine (NORVASC) 10 MG tablet Take 1 tablet by mouth daily   Yes Automatic Reconciliation, Ar   famotidine (PEPCID) 20 MG tablet Take 1 tablet by mouth daily   Yes Automatic Reconciliation, Ar   ondansetron (ZOFRAN-ODT) 4 MG disintegrating tablet Take 1 tablet by mouth every 8 hours as needed 1/27/22  Yes Automatic Reconciliation, Ar   metoprolol tartrate (LOPRESSOR) 50 MG tablet Take 1 tablet by mouth 2 times daily  Patient not taking: Reported on 10/10/2024 12/26/23

## 2024-10-19 ENCOUNTER — APPOINTMENT (OUTPATIENT)
Dept: CT IMAGING | Age: 54
DRG: 907 | End: 2024-10-19
Payer: MEDICARE

## 2024-10-19 LAB
ANION GAP SERPL CALC-SCNC: 23 MMOL/L (ref 9–18)
BASOPHILS # BLD: 0.1 K/UL (ref 0–0.2)
BASOPHILS NFR BLD: 1 % (ref 0–2)
BUN SERPL-MCNC: 68 MG/DL (ref 6–23)
CALCIUM SERPL-MCNC: 6.6 MG/DL (ref 8.8–10.2)
CHLORIDE SERPL-SCNC: 90 MMOL/L (ref 98–107)
CO2 SERPL-SCNC: 16 MMOL/L (ref 20–28)
CREAT SERPL-MCNC: 12.9 MG/DL (ref 0.6–1.1)
DIFFERENTIAL METHOD BLD: ABNORMAL
EOSINOPHIL # BLD: 0.1 K/UL (ref 0–0.8)
EOSINOPHIL NFR BLD: 1 % (ref 0.5–7.8)
ERYTHROCYTE [DISTWIDTH] IN BLOOD BY AUTOMATED COUNT: 17 % (ref 11.9–14.6)
GLUCOSE BLD STRIP.AUTO-MCNC: 158 MG/DL (ref 65–100)
GLUCOSE BLD STRIP.AUTO-MCNC: 165 MG/DL (ref 65–100)
GLUCOSE BLD STRIP.AUTO-MCNC: 182 MG/DL (ref 65–100)
GLUCOSE SERPL-MCNC: 156 MG/DL (ref 70–99)
HCT VFR BLD AUTO: 29.7 % (ref 35.8–46.3)
HGB BLD-MCNC: 9 G/DL (ref 11.7–15.4)
IMM GRANULOCYTES # BLD AUTO: 0.6 K/UL (ref 0–0.5)
IMM GRANULOCYTES NFR BLD AUTO: 5 % (ref 0–5)
LYMPHOCYTES # BLD: 0.8 K/UL (ref 0.5–4.6)
LYMPHOCYTES NFR BLD: 8 % (ref 13–44)
MCH RBC QN AUTO: 28.5 PG (ref 26.1–32.9)
MCHC RBC AUTO-ENTMCNC: 30.3 G/DL (ref 31.4–35)
MCV RBC AUTO: 94 FL (ref 82–102)
MONOCYTES # BLD: 1 K/UL (ref 0.1–1.3)
MONOCYTES NFR BLD: 9 % (ref 4–12)
NEUTS SEG # BLD: 8 K/UL (ref 1.7–8.2)
NEUTS SEG NFR BLD: 76 % (ref 43–78)
NRBC # BLD: 0 K/UL (ref 0–0.2)
PLATELET # BLD AUTO: 262 K/UL (ref 150–450)
PMV BLD AUTO: 9.9 FL (ref 9.4–12.3)
POTASSIUM SERPL-SCNC: 5.3 MMOL/L (ref 3.5–5.1)
RBC # BLD AUTO: 3.16 M/UL (ref 4.05–5.2)
SERVICE CMNT-IMP: ABNORMAL
SODIUM SERPL-SCNC: 129 MMOL/L (ref 136–145)
VANCOMYCIN SERPL-MCNC: 14.3 UG/ML
WBC # BLD AUTO: 10.6 K/UL (ref 4.3–11.1)

## 2024-10-19 PROCEDURE — 6370000000 HC RX 637 (ALT 250 FOR IP): Performed by: INTERNAL MEDICINE

## 2024-10-19 PROCEDURE — 80048 BASIC METABOLIC PNL TOTAL CA: CPT

## 2024-10-19 PROCEDURE — 85025 COMPLETE CBC W/AUTO DIFF WBC: CPT

## 2024-10-19 PROCEDURE — 90935 HEMODIALYSIS ONE EVALUATION: CPT

## 2024-10-19 PROCEDURE — 82962 GLUCOSE BLOOD TEST: CPT

## 2024-10-19 PROCEDURE — 2580000003 HC RX 258

## 2024-10-19 PROCEDURE — 73701 CT LOWER EXTREMITY W/DYE: CPT

## 2024-10-19 PROCEDURE — 6360000004 HC RX CONTRAST MEDICATION: Performed by: INTERNAL MEDICINE

## 2024-10-19 PROCEDURE — 74177 CT ABD & PELVIS W/CONTRAST: CPT

## 2024-10-19 PROCEDURE — 6360000002 HC RX W HCPCS: Performed by: INTERNAL MEDICINE

## 2024-10-19 PROCEDURE — 1100000000 HC RM PRIVATE

## 2024-10-19 PROCEDURE — 6360000002 HC RX W HCPCS

## 2024-10-19 PROCEDURE — 80202 ASSAY OF VANCOMYCIN: CPT

## 2024-10-19 PROCEDURE — 2580000003 HC RX 258: Performed by: INTERNAL MEDICINE

## 2024-10-19 PROCEDURE — 6370000000 HC RX 637 (ALT 250 FOR IP)

## 2024-10-19 PROCEDURE — 36415 COLL VENOUS BLD VENIPUNCTURE: CPT

## 2024-10-19 RX ORDER — HYDROMORPHONE HYDROCHLORIDE 1 MG/ML
1 INJECTION, SOLUTION INTRAMUSCULAR; INTRAVENOUS; SUBCUTANEOUS ONCE
Status: COMPLETED | OUTPATIENT
Start: 2024-10-19 | End: 2024-10-19

## 2024-10-19 RX ORDER — IOPAMIDOL 755 MG/ML
100 INJECTION, SOLUTION INTRAVASCULAR
Status: COMPLETED | OUTPATIENT
Start: 2024-10-19 | End: 2024-10-19

## 2024-10-19 RX ADMIN — LORAZEPAM 1 MG: 2 INJECTION INTRAMUSCULAR; INTRAVENOUS at 12:44

## 2024-10-19 RX ADMIN — SEVELAMER CARBONATE 800 MG: 800 TABLET, FILM COATED ORAL at 10:55

## 2024-10-19 RX ADMIN — SODIUM CHLORIDE, PRESERVATIVE FREE 10 ML: 5 INJECTION INTRAVENOUS at 10:58

## 2024-10-19 RX ADMIN — HYDROMORPHONE HYDROCHLORIDE 1 MG: 1 INJECTION, SOLUTION INTRAMUSCULAR; INTRAVENOUS; SUBCUTANEOUS at 12:37

## 2024-10-19 RX ADMIN — VANCOMYCIN HYDROCHLORIDE 1000 MG: 1 INJECTION, POWDER, LYOPHILIZED, FOR SOLUTION INTRAVENOUS at 14:04

## 2024-10-19 RX ADMIN — SERTRALINE 100 MG: 100 TABLET, FILM COATED ORAL at 22:18

## 2024-10-19 RX ADMIN — SERTRALINE 100 MG: 100 TABLET, FILM COATED ORAL at 10:55

## 2024-10-19 RX ADMIN — ONDANSETRON 4 MG: 2 INJECTION INTRAMUSCULAR; INTRAVENOUS at 08:43

## 2024-10-19 RX ADMIN — SEVELAMER CARBONATE 800 MG: 800 TABLET, FILM COATED ORAL at 17:18

## 2024-10-19 RX ADMIN — SODIUM CHLORIDE, PRESERVATIVE FREE 10 ML: 5 INJECTION INTRAVENOUS at 20:55

## 2024-10-19 RX ADMIN — HYDROMORPHONE HYDROCHLORIDE 0.5 MG: 1 INJECTION, SOLUTION INTRAMUSCULAR; INTRAVENOUS; SUBCUTANEOUS at 20:49

## 2024-10-19 RX ADMIN — ACETAMINOPHEN 650 MG: 325 TABLET ORAL at 09:36

## 2024-10-19 RX ADMIN — HYDROMORPHONE HYDROCHLORIDE 0.5 MG: 1 INJECTION, SOLUTION INTRAMUSCULAR; INTRAVENOUS; SUBCUTANEOUS at 10:51

## 2024-10-19 RX ADMIN — HYDROCODONE BITARTRATE AND ACETAMINOPHEN 2 TABLET: 5; 325 TABLET ORAL at 06:46

## 2024-10-19 RX ADMIN — HYDROCODONE BITARTRATE AND ACETAMINOPHEN 2 TABLET: 5; 325 TABLET ORAL at 13:52

## 2024-10-19 RX ADMIN — IOPAMIDOL 100 ML: 755 INJECTION, SOLUTION INTRAVENOUS at 13:15

## 2024-10-19 RX ADMIN — HYDROCODONE BITARTRATE AND ACETAMINOPHEN 2 TABLET: 5; 325 TABLET ORAL at 22:18

## 2024-10-19 ASSESSMENT — PAIN SCALES - GENERAL
PAINLEVEL_OUTOF10: 7
PAINLEVEL_OUTOF10: 7
PAINLEVEL_OUTOF10: 0
PAINLEVEL_OUTOF10: 7
PAINLEVEL_OUTOF10: 9
PAINLEVEL_OUTOF10: 7
PAINLEVEL_OUTOF10: 9
PAINLEVEL_OUTOF10: 2
PAINLEVEL_OUTOF10: 7
PAINLEVEL_OUTOF10: 7

## 2024-10-19 ASSESSMENT — PAIN DESCRIPTION - LOCATION
LOCATION: ABDOMEN;HIP;BACK
LOCATION: ABDOMEN;HIP
LOCATION: ABDOMEN;BACK;GENERALIZED
LOCATION: ABDOMEN;HIP;BACK
LOCATION: ABDOMEN;HIP;BACK

## 2024-10-19 ASSESSMENT — PAIN DESCRIPTION - ORIENTATION
ORIENTATION: RIGHT
ORIENTATION: RIGHT;LEFT;ANTERIOR;POSTERIOR
ORIENTATION: ANTERIOR;POSTERIOR
ORIENTATION: RIGHT;LEFT;ANTERIOR;POSTERIOR

## 2024-10-19 ASSESSMENT — PAIN - FUNCTIONAL ASSESSMENT
PAIN_FUNCTIONAL_ASSESSMENT: PREVENTS OR INTERFERES SOME ACTIVE ACTIVITIES AND ADLS
PAIN_FUNCTIONAL_ASSESSMENT: PREVENTS OR INTERFERES SOME ACTIVE ACTIVITIES AND ADLS

## 2024-10-19 ASSESSMENT — PAIN DESCRIPTION - DESCRIPTORS
DESCRIPTORS: GNAWING
DESCRIPTORS: DISCOMFORT
DESCRIPTORS: ACHING;GNAWING
DESCRIPTORS: PRESSURE;DISCOMFORT

## 2024-10-19 ASSESSMENT — PAIN SCALES - WONG BAKER: WONGBAKER_NUMERICALRESPONSE: NO HURT

## 2024-10-19 NOTE — PROGRESS NOTES
1159:  Notified MD and charge RN pt's BP and skin surrounding bilat hips and thighs, and R. Groin and bilat lateral thighs discolored w/reddened, purple patchy colration, skin is hardened and firm to touch, painful to the touch per pt.    [facility]comb here at bedside assessing pt, no new recommendations.   CT and meds ordered by Dr. Abel.    1306: Pt medicated with Ativan prior to leaving for CT. Pt's  accompanying her to Radiology.     1530: Pt resting quietly, spouse at bedside.    1808: Am labs ordered.  Pt denies needs at this time, NAD, BP WNL's now, CT today, see results, Vancomycin IV given per order. Hourly and PRN rounds completed. Bed in lowest and locked position, call light and personal items within reach.     BSSR to oncoming nurse.

## 2024-10-19 NOTE — PROGRESS NOTES
Comprehensive Nutrition Assessment    Type and Reason for Visit: Initial, RD Nutrition Re-Screen/LOS  Length of Stay    Nutrition Recommendations/Plan:   Meals and Snacks:  Diet: Continue current order  Oral Nutriton Supplement Therapy:   Medical food supplement therapy:  Initiate Nepro with Carbsteady (renal oral supplement) 420 calories, 19 grams protein per 8 ounce serving     Malnutrition Assessment:  Malnutrition Status: At risk for malnutrition (Comment) (poor intake ~1 day pta and majority of admission)    no dorothy wasting  Nutrition Assessment:  Nutrition History:  reports minimal intake starting ~1 day pta. He stated prior to this pt was eating well. Pt reports some ONS use at baseline however nothing consistent.      Do You Have Any Cultural, Orthodoxy, or Ethnic Food Preferences?: No   Weight History: Pt reports wt loss due to fluid. Per EMR wt hx review 10/24/23 330lb (IM).  Nutrition Background:       PMH significant for ERSD on PD, afib, HTN, and IBS. Pt admitted with sepsis due to peritonitis. S/p removal of PD cathter for temporary transition to HD 10/18.   Nutrition Interval:  Pt seen asleep in bed with  and Mai ARCINIEGA present during visit.  provided hx as above. Pt awoke during visit. She stated she ate fairly well at lunch today. She stated this is the first time she has felt like eating in a while. Pt reports she was taking some Nepro from nursing staff. Pt is agreeable to have Nepro with meals.     Current Nutrition Therapies:  ADULT DIET; Regular; 3 carb choices (45 gm/meal); Low Sodium (2 gm); Low Potassium (Less than 3000 mg/day); Low Phosphorus (Less than 1000 mg); 1200 ml    Current Intake:   Average Meal Intake: 51-75% (of lunch serving today per pt report)        Anthropometric Measures:  Height: 175.3 cm (5' 9\")  Current Body Wt: 150.6 kg (332 lb) (10/18), Weight source: Bed Scale  BMI: 49, Obese Class 3 (BMI 40.0 or greater)  Admission Body Weight: 153.6 kg (338 lb 10

## 2024-10-19 NOTE — PROGRESS NOTES
Giselle Rosado  Admission Date: 10/10/2024         [facility] Progress Note: 10/19/2024    Follow-up for: ESRD    The patient's chart is reviewed and the patient is discussed with the staff.    Subjective:   Pt seen and examined in room, spouse at bedside, pt reports abdominal pain with movement or palpation ongoing, PO intake better, + LE edema.     ROS:  Gen - no fever, no chills  CV - no chest pain  Lung - no shortness of breath, no cough  Abd - + tenderness, no nausea/vomiting, no diarrhea  Ext - + edema    Current Facility-Administered Medications   Medication Dose Route Frequency    vancomycin (VANCOCIN) intermittent dosing (placeholder)   Other RX Placeholder    cefepime (MAXIPIME) 1,000 mg in sterile water 10 mL IV syringe  1,000 mg IntraVENous Q24H    tuberculin injection 5 Units  5 Units IntraDERmal Once    HYDROcodone-acetaminophen (NORCO) 5-325 MG per tablet 2 tablet  2 tablet Oral Q6H PRN    HYDROmorphone HCl PF (DILAUDID) injection 0.5 mg  0.5 mg IntraVENous Q4H PRN    metoprolol tartrate (LOPRESSOR) tablet 25 mg  25 mg Oral BID    insulin lispro (HUMALOG,ADMELOG) injection vial 0-8 Units  0-8 Units SubCUTAneous 4x Daily AC & HS    insulin glargine (LANTUS) injection vial 8 Units  8 Units SubCUTAneous Nightly    Delflex SM 1.5% 5000 ml with Vancomycin 5 g   IntraPERitoneal PRN    metoclopramide (REGLAN) injection 10 mg  10 mg IntraVENous Q6H PRN    sodium chloride flush 0.9 % injection 5-40 mL  5-40 mL IntraVENous 2 times per day    sodium chloride flush 0.9 % injection 5-40 mL  5-40 mL IntraVENous PRN    0.9 % sodium chloride infusion   IntraVENous PRN    ondansetron (ZOFRAN-ODT) disintegrating tablet 4 mg  4 mg Oral Q8H PRN    Or    ondansetron (ZOFRAN) injection 4 mg  4 mg IntraVENous Q6H PRN    polyethylene glycol (GLYCOLAX) packet 17 g  17 g Oral Daily PRN    bisacodyl (DULCOLAX) suppository 10 mg  10 mg Rectal Daily PRN    famotidine (PEPCID) tablet 10 mg  10 mg  \"PH\", \"PCO2\", \"PO2\", \"HCO3\" in the last 72 hours.      Assessment/Plan:  (Medical Decision Making)   ESRD on PD  -[facility]  PD catheter removed 10/18/24  Seen on HD   consult CW for outpt HD clinic at [facility] per pt request         2. Peritonitis with cell count showing 9906 WBC.   -Recent history of corynebacterium peritonitis s/p IP vancomycin and fortaz.   -Continue  vancomycin, cefepime, continue Fluconazole  -Peritoneal culture pending but GS with Gr+ rods. Suspect same organism she had a month ago. DW lab today 10/15, PD Cx was sent out to [facility] 10/14 and results are still pending.   - repeat Cell count with ongoing WBC elevated,   PD catheter removed 10/18.      3. Volume overload-  UF    4. Hypotension- decrease BB  Hx atrial Fib         Gordo Guadalupe MD  [facility]

## 2024-10-19 NOTE — DIALYSIS
TRANSFER OUT- DIALYSIS    Hemodialysis treatment completed. PT ran 3.25 hours, without complications. Requested to come off early due to being uncomfortable in bed.     Patient alert and VS stable  BP 84/24  P 75       1.5 Kg removed.      Flushed both ports with 10 mL of NS.  CVC dressing clean, dry, and intact, tego caps intact, curos caps placed.      Meds given: zofran, Tylonol.    RBCs given during dialysis: 0    Patient to 631 after dialysis.

## 2024-10-19 NOTE — PROGRESS NOTES
HD cath dressing changed using sterile technique with secondary RN due to old and new drainage of blood around access site and on dressing. Pt tolerated dressing change well.

## 2024-10-19 NOTE — PROGRESS NOTES
CT abd/plv with contrast showing hematoma in subcutaneous plane of lower abdominal wall, and additional small hematoma with active extravasation of contrast. I discussed this case with interventional radiology. Patient has significant calcification of vessels as she is ESRD and is not a great candidate for embolization. Discussed with general surgery team as well who reviewed images. Recommended placing abdominal binder and trending hemoglobin. Eliquis is being held.

## 2024-10-19 NOTE — PROGRESS NOTES
VANCO DAILY FOLLOW UP RENAL INSUFFICIENCY PATIENT   [facility]   Pharmacy Pharmacokinetic Monitoring Service - Vancomycin    Consulting Provider: Dr. Abel   Indication: PD catheter related peritonitis  Target Concentration: Pre-dialysis concentration 15-20 mg/L  Day of Therapy: 1  Additional Antimicrobials: none    Pertinent Laboratory Values:   Wt Readings from Last 1 Encounters:   10/18/24 (!) 150.6 kg (332 lb)     Temp Readings from Last 1 Encounters:   10/19/24 99 °F (37.2 °C)     Recent Labs     10/17/24  0401 10/17/24  0627 10/18/24  0609 10/19/24  0625   BUN  --  49* 58* 68*   CREATININE  --  10.70* 11.70* 12.90*   WBC 12.8*  --  12.3* 10.6       Lab Results   Component Value Date/Time    VANCOTROUGH 8.8 10/14/2024 11:22 AM    VANCORANDOM 14.3 10/19/2024 06:25 AM       MRSA Nasal Swab: N/A. Non-respiratory infection    Assessment:  Date:  Dose/Freq Admin Times Level/Time:   10/19 HD 1000 mg x 1 (13) Rd @ 0625 = 14.3                             Plan:  Concentration-guided dosing due to peritoneal dialysis  Patient last received IP vanc on 10/14 and pre-HD level this morning is 14.3  Give vancomycin 1000 mg IV x 1 after HD today  Repeat vancomycin concentrations will be ordered as clinically appropriate  Pharmacy will continue to monitor patient and adjust therapy as indicated    Thank you for the consult,  Angelica Ocasio [facility]

## 2024-10-19 NOTE — CONSULTS
Infectious Diseases Consult    Today's Date: 10/19/2024   Admit Date: 10/10/2024  : 1970    Impression:   ESRD  PD catheter related peritonitis; recrudescent with GPR (recently corynebacterium).  Now also growing CoNS. Per notes from [facility], she recently had corynebacterium peritonitis and was treated with 3 weeks of IP vancomycin and ceftazidime.  I can't tell exactly when this was and she can't elaborate.  Diabetes type 2    Plan:   Start IV vancomycin with plans to continue for 2 weeks following catheter removal; EOT 2024  This will have to be arranged with HD after discharge with nephrology assistance  Stop cefepime  Stop fluconazole now that PD has been stopped and catheter removed      Anti-infectives:   Cefepime IP (10/10/24 - 10/13/24) IV (10/19/2024 - )  Vancomycin IP (10/10/2024 - )  Linezolid (10/10/24 - 10/14/24)  Fluconazole (10/11/2024 - 10/17/2024)(10/19/2024 - )  Cefazolin (10/18/2024 )    Subjective:   Date of Consultation:  2024  Referring Physician: Sae Abel MD for: assistance in management of peritonitis    Patient is a 54 y.o. female on PD who presented to the ED on 10/10/2024 with 2 days of abdominal pain that initially woke her up from sleep. In the ED she had leukocytosis (WBC 16.6), tachycardia, and atrial fibrillation with RVR. A CT abd/pelvis demonstrated moderate volume ascites and cellulitis vs edema of the lower anterior abdominal wall. Initially treated with linezolid and cefepime.  Blood cultures were no growth.   Peritoneal fluid WBC 9906 and grew GPR and CoNS. She has been afebrile throughout the hospitalization. PD catheter was removed yesterday and a tunneled HD catheter was placed. She recently had peritonitis with corynebacterium and received 3 weeks of IP antibiotics with vancomycin and ceftazidime. She is still having very severe abdominal pain and peritoneal signs.  She is walking in the room and getting to the chair and to  Comment:    The specimen is NEGATIVE for SARS-CoV-2, the novel coronavirus associated with COVID-19.  A negative result does not rule out COVID-19.     This test has been authorized by the FDA under an Emergency Use Authorization (EUA) for use by authorized laboratories.      Fact sheet for Healthcare Providers: https://www.fda.gov/media/779780/download  Fact sheet for Patients: https://www.fda.gov/media/823397/download     Methodology: Isothermal Nucleic Acid Amplification         Influenza A/B, Molecular [[id number]] Collected: 10/10/24 2147    Order Status: Canceled Specimen: Nasal     Blood Culture 2 [[id number]] Collected: 10/10/24 1222    Order Status: Completed Specimen: Blood Updated: 10/15/24 0704     Special Requests --        RIGHT  Antecubital       Culture NO GROWTH 5 DAYS       Culture, Body Fluid (with Gram Stain) [[id number]]  (Abnormal) Collected: 10/10/24 1201    Order Status: Completed Specimen: Body Fluid from Ascitic Fluid Updated: 10/15/24 1055     Special Requests NO SPECIAL REQUESTS        Gram Stain MODERATE WBCS SEEN         FEW GRAM POSITIVE RODS        Culture       MODERATE GRAM POSITIVE RODS SENT TO [facility] FOR TESTING 10.14.24                  SCANT STAPHYLOCOCCUS SPECIES, COAGULASE NEGATIVE                  CULTURE IN PROGRESS,FURTHER UPDATES TO FOLLOW          Aerobic Susceptibility Identification [[id number]] Collected: 10/10/24 1201    Order Status: Completed Specimen: MICROBIAL ISOLATE Updated: 10/15/24 1337     Sample Site ASCITIC FLUID        Aerobe ID & Suscept TEST NOT PERFORMED        Comment: (NOTE)  Please refer to the following specimen for additional lab results.  Please refer to spec # [email or web address] for test result.  Daxa Rodriguez notified 10- at 12:45.  Performed At:  [facility]  [address], [place] [id number]  Sivakumar Marsh MD Ph:[phone]  Corrected on 10/15 AT 1337: This is a correction to the medical record of the test results

## 2024-10-19 NOTE — PROGRESS NOTES
Physical Therapy Note:    Attempted to see patient this AM for physical therapy treatment  session, however, pt off the floor at dialysis. Will follow and re-attempt as schedule permits/patient available. Thank you,    DUKE CRUZ, PT     Rehab Caseload Tracker

## 2024-10-19 NOTE — PROGRESS NOTES
Lymphocytes % 7 (L) 13 - 44 %    Monocytes % 9 4.0 - 12.0 %    Eosinophils % 1 0.5 - 7.8 %    Basophils % 1 0.0 - 2.0 %    Immature Granulocytes % 5 0.0 - 5.0 %    Neutrophils Absolute 9.5 (H) 1.7 - 8.2 K/UL    Lymphocytes Absolute 0.9 0.5 - 4.6 K/UL    Monocytes Absolute 1.1 0.1 - 1.3 K/UL    Eosinophils Absolute 0.2 0.0 - 0.8 K/UL    Basophils Absolute 0.1 0.0 - 0.2 K/UL    Immature Granulocytes Absolute 0.6 (H) 0.0 - 0.5 K/UL   Basic Metabolic Panel w/ Reflex to MG    Collection Time: 10/18/24  6:09 AM   Result Value Ref Range    Sodium 128 (L) 136 - 145 mmol/L    Potassium 4.5 3.5 - 5.1 mmol/L    Chloride 88 (L) 98 - 107 mmol/L    CO2 20 20 - 28 mmol/L    Anion Gap 20 (H) 9 - 18 mmol/L    Glucose 180 (H) 70 - 99 mg/dL    BUN 58 (H) 6 - 23 MG/DL    Creatinine 11.70 (H) 0.60 - 1.10 MG/DL    Est, Glom Filt Rate 3 (L) >60 ml/min/1.73m2    Calcium 6.5 (L) 8.8 - 10.2 MG/DL   POCT Glucose    Collection Time: 10/18/24  6:22 AM   Result Value Ref Range    POC Glucose 194 (H) 65 - 100 mg/dL    Performed by: Zack    POCT Glucose    Collection Time: 10/18/24 11:34 AM   Result Value Ref Range    POC Glucose 176 (H) 65 - 100 mg/dL    Performed by: Ya    POCT Glucose    Collection Time: 10/18/24  8:17 PM   Result Value Ref Range    POC Glucose 145 (H) 65 - 100 mg/dL    Performed by: Zack    CBC with Auto Differential    Collection Time: 10/19/24  6:25 AM   Result Value Ref Range    WBC 10.6 4.3 - 11.1 K/uL    RBC 3.16 (L) 4.05 - 5.2 M/uL    Hemoglobin 9.0 (L) 11.7 - 15.4 g/dL    Hematocrit 29.7 (L) 35.8 - 46.3 %    MCV 94.0 82 - 102 FL    MCH 28.5 26.1 - 32.9 PG    MCHC 30.3 (L) 31.4 - 35.0 g/dL    RDW 17.0 (H) 11.9 - 14.6 %    Platelets 262 150 - 450 K/uL    MPV 9.9 9.4 - 12.3 FL    nRBC 0.00 0.0 - 0.2 K/uL    Differential Type AUTOMATED      Neutrophils % 76 43 - 78 %    Lymphocytes % 8 (L) 13 - 44 %    Monocytes % 9 4.0 - 12.0 %    Eosinophils % 1 0.5 - 7.8 %    Basophils % 1 0.0 - 2.0 %

## 2024-10-19 NOTE — ICUWATCH
RRT Clinical Rounding Nurse Progress Report      SUBJECTIVE: Patient assessed secondary to RN/provider concern - firm painful area to lower back around to abdomen and L thigh.      Vitals:    10/19/24 1237 10/19/24 1307 10/19/24 1352 10/19/24 1616   BP:    131/85   Pulse:    99   Resp: 20 18 18    Temp:    98.8 °F (37.1 °C)   TempSrc:    Oral   SpO2:    98%   Weight:       Height:            ASSESSMENT:  Patient sitting up in chair. Reports ongoing low back pain that wraps around to L thigh. Noted firm indurated skin with purple discoloration present. Patient to have CT abdomen pelvis and L femur. Will follow-up results. VS, labs, and progress notes reviewed.    PLAN:  Will follow per RRT Clinical Rounding Program protocol.    Teja Damon RN  [facility]: [phone]  [facility]: [phone]

## 2024-10-20 LAB
ANION GAP SERPL CALC-SCNC: 16 MMOL/L (ref 9–18)
BASOPHILS # BLD: 0.1 K/UL (ref 0–0.2)
BASOPHILS NFR BLD: 1 % (ref 0–2)
BUN SERPL-MCNC: 45 MG/DL (ref 6–23)
CALCIUM SERPL-MCNC: 6.5 MG/DL (ref 8.8–10.2)
CHLORIDE SERPL-SCNC: 94 MMOL/L (ref 98–107)
CO2 SERPL-SCNC: 19 MMOL/L (ref 20–28)
CREAT SERPL-MCNC: 9.63 MG/DL (ref 0.6–1.1)
DIFFERENTIAL METHOD BLD: ABNORMAL
EOSINOPHIL # BLD: 0.1 K/UL (ref 0–0.8)
EOSINOPHIL NFR BLD: 1 % (ref 0.5–7.8)
ERYTHROCYTE [DISTWIDTH] IN BLOOD BY AUTOMATED COUNT: 17 % (ref 11.9–14.6)
GLUCOSE BLD STRIP.AUTO-MCNC: 155 MG/DL (ref 65–100)
GLUCOSE BLD STRIP.AUTO-MCNC: 174 MG/DL (ref 65–100)
GLUCOSE BLD STRIP.AUTO-MCNC: 222 MG/DL (ref 65–100)
GLUCOSE BLD STRIP.AUTO-MCNC: 226 MG/DL (ref 65–100)
GLUCOSE SERPL-MCNC: 200 MG/DL (ref 70–99)
HCT VFR BLD AUTO: 30 % (ref 35.8–46.3)
HGB BLD-MCNC: 9.3 G/DL (ref 11.7–15.4)
IMM GRANULOCYTES # BLD AUTO: 0.7 K/UL (ref 0–0.5)
IMM GRANULOCYTES NFR BLD AUTO: 5 % (ref 0–5)
LYMPHOCYTES # BLD: 0.9 K/UL (ref 0.5–4.6)
LYMPHOCYTES NFR BLD: 6 % (ref 13–44)
MCH RBC QN AUTO: 29 PG (ref 26.1–32.9)
MCHC RBC AUTO-ENTMCNC: 31 G/DL (ref 31.4–35)
MCV RBC AUTO: 93.5 FL (ref 82–102)
MONOCYTES # BLD: 1.3 K/UL (ref 0.1–1.3)
MONOCYTES NFR BLD: 9 % (ref 4–12)
NEUTS SEG # BLD: 11.2 K/UL (ref 1.7–8.2)
NEUTS SEG NFR BLD: 78 % (ref 43–78)
NRBC # BLD: 0 K/UL (ref 0–0.2)
PLATELET # BLD AUTO: 334 K/UL (ref 150–450)
PLATELET COMMENT: ADEQUATE
PMV BLD AUTO: 9.9 FL (ref 9.4–12.3)
POTASSIUM SERPL-SCNC: ABNORMAL MMOL/L (ref 3.5–5.1)
RBC # BLD AUTO: 3.21 M/UL (ref 4.05–5.2)
RBC MORPH BLD: ABNORMAL
SERVICE CMNT-IMP: ABNORMAL
SODIUM SERPL-SCNC: 129 MMOL/L (ref 136–145)
WBC # BLD AUTO: 14.3 K/UL (ref 4.3–11.1)

## 2024-10-20 PROCEDURE — 6370000000 HC RX 637 (ALT 250 FOR IP): Performed by: NURSE PRACTITIONER

## 2024-10-20 PROCEDURE — 82962 GLUCOSE BLOOD TEST: CPT

## 2024-10-20 PROCEDURE — 6370000000 HC RX 637 (ALT 250 FOR IP)

## 2024-10-20 PROCEDURE — 80048 BASIC METABOLIC PNL TOTAL CA: CPT

## 2024-10-20 PROCEDURE — 6360000002 HC RX W HCPCS

## 2024-10-20 PROCEDURE — 1100000000 HC RM PRIVATE

## 2024-10-20 PROCEDURE — 6370000000 HC RX 637 (ALT 250 FOR IP): Performed by: INTERNAL MEDICINE

## 2024-10-20 PROCEDURE — 85025 COMPLETE CBC W/AUTO DIFF WBC: CPT

## 2024-10-20 PROCEDURE — 36415 COLL VENOUS BLD VENIPUNCTURE: CPT

## 2024-10-20 PROCEDURE — 2580000003 HC RX 258: Performed by: INTERNAL MEDICINE

## 2024-10-20 RX ADMIN — INSULIN LISPRO 2 UNITS: 100 INJECTION, SOLUTION INTRAVENOUS; SUBCUTANEOUS at 17:35

## 2024-10-20 RX ADMIN — METOPROLOL TARTRATE 25 MG: 25 TABLET, FILM COATED ORAL at 21:28

## 2024-10-20 RX ADMIN — SEVELAMER CARBONATE 800 MG: 800 TABLET, FILM COATED ORAL at 08:48

## 2024-10-20 RX ADMIN — HYDROCODONE BITARTRATE AND ACETAMINOPHEN 2 TABLET: 5; 325 TABLET ORAL at 06:07

## 2024-10-20 RX ADMIN — SODIUM CHLORIDE, PRESERVATIVE FREE 10 ML: 5 INJECTION INTRAVENOUS at 21:39

## 2024-10-20 RX ADMIN — HYDROCODONE BITARTRATE AND ACETAMINOPHEN 2 TABLET: 5; 325 TABLET ORAL at 11:21

## 2024-10-20 RX ADMIN — HYDROMORPHONE HYDROCHLORIDE 0.5 MG: 1 INJECTION, SOLUTION INTRAMUSCULAR; INTRAVENOUS; SUBCUTANEOUS at 20:19

## 2024-10-20 RX ADMIN — SERTRALINE 100 MG: 100 TABLET, FILM COATED ORAL at 08:48

## 2024-10-20 RX ADMIN — SERTRALINE 100 MG: 100 TABLET, FILM COATED ORAL at 21:28

## 2024-10-20 RX ADMIN — HYDROMORPHONE HYDROCHLORIDE 0.5 MG: 1 INJECTION, SOLUTION INTRAMUSCULAR; INTRAVENOUS; SUBCUTANEOUS at 15:16

## 2024-10-20 RX ADMIN — SODIUM CHLORIDE, PRESERVATIVE FREE 10 ML: 5 INJECTION INTRAVENOUS at 08:50

## 2024-10-20 RX ADMIN — SEVELAMER CARBONATE 800 MG: 800 TABLET, FILM COATED ORAL at 15:37

## 2024-10-20 RX ADMIN — SEVELAMER CARBONATE 800 MG: 800 TABLET, FILM COATED ORAL at 11:21

## 2024-10-20 RX ADMIN — INSULIN LISPRO 2 UNITS: 100 INJECTION, SOLUTION INTRAVENOUS; SUBCUTANEOUS at 08:47

## 2024-10-20 ASSESSMENT — PAIN DESCRIPTION - LOCATION
LOCATION: ABDOMEN;GENERALIZED;HIP
LOCATION: ABDOMEN;HIP;BACK
LOCATION: ABDOMEN;FLANK
LOCATION: HIP;BACK

## 2024-10-20 ASSESSMENT — PAIN SCALES - GENERAL
PAINLEVEL_OUTOF10: 7
PAINLEVEL_OUTOF10: 7
PAINLEVEL_OUTOF10: 1
PAINLEVEL_OUTOF10: 7
PAINLEVEL_OUTOF10: 7

## 2024-10-20 ASSESSMENT — PAIN DESCRIPTION - DESCRIPTORS
DESCRIPTORS: ACHING
DESCRIPTORS: ACHING
DESCRIPTORS: ACHING;BURNING;CRAMPING
DESCRIPTORS: PRESSURE;DISCOMFORT

## 2024-10-20 ASSESSMENT — PAIN DESCRIPTION - ORIENTATION
ORIENTATION: RIGHT;LEFT;POSTERIOR;ANTERIOR
ORIENTATION: ANTERIOR;POSTERIOR

## 2024-10-20 ASSESSMENT — PAIN - FUNCTIONAL ASSESSMENT: PAIN_FUNCTIONAL_ASSESSMENT: PREVENTS OR INTERFERES SOME ACTIVE ACTIVITIES AND ADLS

## 2024-10-20 NOTE — PROGRESS NOTES
Admit Date: 10/10/2024    POD 2 Days Post-Op    Procedure:  Procedure(s):  REMOVAL PD CATHETER    Subjective:     Patient awake in bed. States feels better today. PD cath removed 2 days ago by Dr Alexander. Yesterday pt c/o increased abdominal pain. Abdominal CT obtained with hematoma noted to track of previous dialysis cath site. Hgb 9.3/ stable. Pt tolerating diet. No nausea or vomiting. Has been getting OOB to bathroom without assistance.  at bedside.     10/19/24 CT Abdomen & Pelvis  IMPRESSION:  1. Ascites shows significant improvement.  2. Along the track of recently removed dialysis catheter a hematoma is seen in  subcutaneous plane of the left lower anterior abdominal wall.  3. Another hematoma with active extravasation of contrast is seen in the left  lower anterior abdominal wall muscle layers, site of recently removed dialysis  catheter.    Objective:       Vitals:    10/20/24 0851 10/20/24 1121 10/20/24 1516 10/20/24 1547   BP: 108/68   124/89   Pulse:    (!) 110   Resp:  18 18 19   Temp:    97.8 °F (36.6 °C)   TempSrc:    Oral   SpO2:    95%   Weight:       Height:           Temp (24hrs), Av.4 °F (36.9 °C), Min:97.4 °F (36.3 °C), Max:99.3 °F (37.4 °C)  .  I&O reviewed as documented.    [unfilled]     Physical Exam:   Constitutional: Alert, cooperative, no acute distress  /89   Pulse (!) 110   Temp 97.8 °F (36.6 °C) (Oral)   Resp 19   Ht 1.753 m (5' 9\")   Wt (!) 150.6 kg (332 lb)   SpO2 95%   BMI 49.03 kg/m²   Eyes: Sclera are clear.   ENMT: no external lesions' gross hearing normal; no obvious neck masses, no ear or lip lesions, nares normal  CV: RRR. Normal perfusion  Resp: No JVD.  Breathing is  non-labored; no audible wheezing.    GI: obese, soft and non-distended, ttp, dressing c/d/i     Musculoskeletal: unremarkable with normal function. No embolic signs or cyanosis.   Neuro:  Oriented; moves all 4; no focal deficits  Psychiatric: normal affect and mood, no memory  Platelet Comment ADEQUATE      Differential Type AUTOMATED     POCT Glucose    Collection Time: 10/20/24 11:02 AM   Result Value Ref Range    POC Glucose 155 (H) 65 - 100 mg/dL    Performed by: Abiel        All Data Reviewed    XR Results (most recent):  @BSHSILASTIMGCAT(QWB7184:1)@    Results (most recent):  @BSHSILASTIMGCAT(GBB2983:1)@   Assessment:     Principal Problem:    Sepsis (HCC)  Active Problems:    Obesity, morbid    Anemia    HTN (hypertension)    DM2 (diabetes mellitus, type 2) (HCC)    Atrial fibrillation with RVR (HCC)    Peritonitis (HCC)    End stage chronic kidney disease (HCC)    Spontaneous bacterial peritonitis (HCC)  Resolved Problems:    * No resolved hospital problems. *        Plan/Recommendations/Medical Decision Making:     Care Management per Hospitalist  No additional surgical needs at this time.   General Surgery will sign off. Available if needed. Please call for questions or concerns.     Kimberly A Frommel, NP

## 2024-10-20 NOTE — ICUWATCH
RRT Clinical Rounding Nurse Update    Vitals:    10/19/24 2318 10/20/24 0106 10/20/24 0730 10/20/24 0851   BP:  128/83 108/68 108/68   Pulse:  62 (!) 110    Resp: 20 20 19    Temp:  99.3 °F (37.4 °C) 97.4 °F (36.3 °C)    TempSrc:  Oral Oral    SpO2:  96% 96%    Weight:       Height:            ASSESSMENT:  Previous outreach assessment was reviewed. There have been no significant changes since previous assessment. Patient currently up in restroom. Discussed with primary RN. Patient feeling much better today. AM CBC still pending.    PLAN:  Will follow per RRT Clinical Rounding Program protocol.    Teja Damon RN  [facility]: [phone]  [facility]: [phone]      1206: AM Hgb 9.3. Will discharge from RRT Clinical Rounding Program. Please call if needed.

## 2024-10-20 NOTE — PROGRESS NOTES
1800: Pt denies needs at this time, NAD, pain and skin sensitivity today is less than yesterday. Less firm to the touch. Hgb 9.3 today. Hourly and PRN rounds completed. Bed in lowest and locked position, call light and personal items within reach,  by bedside. Pt medicated with pain meds PRN with effective relief.     BSSR to oncoming nurse.

## 2024-10-20 NOTE — ICUWATCH
RRT Clinical Rounding Nurse Update    Vitals:    10/19/24 2212 10/19/24 2218 10/19/24 2318 10/20/24 0106   BP: 116/66   128/83   Pulse: (!) 115   62   Resp:  20 20 20   Temp:    99.3 °F (37.4 °C)   TempSrc:    Oral   SpO2:    96%   Weight:       Height:            ASSESSMENT:  Previous outreach assessment was reviewed. There have been no significant clinical changes since the completion of the last dated Outreach assessment. No concerns per primary RN.    PLAN:  Will follow per RRT Clinical Rounding Program protocol.    Mj Whitaker RN  [place]: [phone]

## 2024-10-20 NOTE — PROGRESS NOTES
VANCO DAILY FOLLOW UP RENAL INSUFFICIENCY PATIENT   [facility]   Pharmacy Pharmacokinetic Monitoring Service - Vancomycin    Consulting Provider: Dr. Abel   Indication: PD catheter related peritonitis  Target Concentration: Pre-dialysis concentration 15-20 mg/L  Day of Therapy: 2  Additional Antimicrobials: none    Pertinent Laboratory Values:   Wt Readings from Last 1 Encounters:   10/18/24 (!) 150.6 kg (332 lb)     Temp Readings from Last 1 Encounters:   10/20/24 97.4 °F (36.3 °C) (Oral)     Recent Labs     10/18/24  0609 10/19/24  0625 10/20/24  0547   BUN 58* 68* 45*   CREATININE 11.70* 12.90* 9.63*   WBC 12.3* 10.6  --        Lab Results   Component Value Date/Time    VANCOTROUGH 8.8 10/14/2024 11:22 AM    VANCORANDOM 14.3 10/19/2024 06:25 AM       MRSA Nasal Swab: N/A. Non-respiratory infection    Assessment:  Date:  Dose/Freq Admin Times Level/Time:   10/19 HD 1000 mg x 1 [address] Rd @ 0625 = 14.3   10/20                          Plan:  Concentration-guided dosing due to peritoneal dialysis with last IP vanc admin on 10/14  No dose needed today  Repeat vancomycin concentrations will be ordered as clinically appropriate  Pharmacy will continue to monitor patient and adjust therapy as indicated    Thank you for the consult,  Angelica Ocasio [facility]

## 2024-10-20 NOTE — PLAN OF CARE
Problem: Chronic Conditions and Co-morbidities  Goal: Patient's chronic conditions and co-morbidity symptoms are monitored and maintained or improved  10/20/2024 1009 by Mai Gomez RN  Outcome: Progressing  10/20/2024 0248 by Riaz Cooley RN  Outcome: Progressing     Problem: Discharge Planning  Goal: Discharge to home or other facility with appropriate resources  10/20/2024 1009 by Mai Gomez RN  Outcome: Progressing  10/20/2024 0248 by Riaz Cooley RN  Outcome: Progressing     Problem: Safety - Adult  Goal: Free from fall injury  10/20/2024 1009 by Mai Gomez RN  Outcome: Progressing  10/20/2024 0248 by Riaz Cooley RN  Outcome: Progressing     Problem: Skin/Tissue Integrity  Goal: Absence of new skin breakdown  Description: 1.  Monitor for areas of redness and/or skin breakdown  2.  Assess vascular access sites hourly  3.  Every 4-6 hours minimum:  Change oxygen saturation probe site  4.  Every 4-6 hours:  If on nasal continuous positive airway pressure, respiratory therapy assess nares and determine need for appliance change or resting period.  10/20/2024 1009 by Mai Gomez RN  Outcome: Progressing  10/20/2024 0248 by Riaz Cooley RN  Outcome: Progressing     Problem: Pain  Goal: Verbalizes/displays adequate comfort level or baseline comfort level  10/20/2024 1009 by Mai Gomez RN  Outcome: Progressing  10/20/2024 0248 by Riaz Cooley RN  Outcome: Progressing

## 2024-10-20 NOTE — PROGRESS NOTES
Giselle Rosado  Admission Date: 10/10/2024         [facility] Progress Note: 10/20/2024    Follow-up for: ESRD    The patient's chart is reviewed and the patient is discussed with the staff.    Subjective:   Pt seen and examined in room,     ROS:  Gen - no fever, no chills  CV - no chest pain  Lung - no shortness of breath, no cough  Abd - + tenderness, no nausea/vomiting, no diarrhea  Ext - + edema    Current Facility-Administered Medications   Medication Dose Route Frequency    vancomycin (VANCOCIN) intermittent dosing (placeholder)   Other RX Placeholder    HYDROcodone-acetaminophen (NORCO) 5-325 MG per tablet 2 tablet  2 tablet Oral Q6H PRN    HYDROmorphone HCl PF (DILAUDID) injection 0.5 mg  0.5 mg IntraVENous Q4H PRN    metoprolol tartrate (LOPRESSOR) tablet 25 mg  25 mg Oral BID    insulin lispro (HUMALOG,ADMELOG) injection vial 0-8 Units  0-8 Units SubCUTAneous 4x Daily AC & HS    insulin glargine (LANTUS) injection vial 8 Units  8 Units SubCUTAneous Nightly    Delflex SM 1.5% 5000 ml with Vancomycin 5 g   IntraPERitoneal PRN    metoclopramide (REGLAN) injection 10 mg  10 mg IntraVENous Q6H PRN    sodium chloride flush 0.9 % injection 5-40 mL  5-40 mL IntraVENous 2 times per day    sodium chloride flush 0.9 % injection 5-40 mL  5-40 mL IntraVENous PRN    0.9 % sodium chloride infusion   IntraVENous PRN    ondansetron (ZOFRAN-ODT) disintegrating tablet 4 mg  4 mg Oral Q8H PRN    Or    ondansetron (ZOFRAN) injection 4 mg  4 mg IntraVENous Q6H PRN    polyethylene glycol (GLYCOLAX) packet 17 g  17 g Oral Daily PRN    bisacodyl (DULCOLAX) suppository 10 mg  10 mg Rectal Daily PRN    famotidine (PEPCID) tablet 10 mg  10 mg Oral Daily PRN    aluminum & magnesium hydroxide-simethicone (MAALOX) 200-200-20 MG/5ML suspension 30 mL  30 mL Oral Q6H PRN    acetaminophen (TYLENOL) tablet 650 mg  650 mg Oral Q6H PRN    Or    acetaminophen (TYLENOL) suppository 650 mg  650 mg Rectal Q6H PRN

## 2024-10-20 NOTE — PROGRESS NOTES
Hospitalist Progress Note   Admit Date:  10/10/2024  9:15 AM   Name:  Giselle Rosado   Age:  54 y.o.  Sex:  female  :  1970   MRN:  [id number]   Room:  [facility]/    Presenting/Chief Complaint: Abdominal Pain     Reason(s) for Admission: Spontaneous bacterial peritonitis (HCC) [K65.2]  Septicemia (HCC) [A41.9]  Atrial fibrillation with rapid ventricular response (HCC) [I48.91]  Sepsis (HCC) [A41.9]     Hospital Course:   Giselle Rosado is a 54 y.o. female with medical history significant for ESRD on peritoneal dialysis, history of paroxysmal atrial fibrillation no longer on blood thinners, hypertension, depression, irritable bowel syndrome who presents emergency room with 2 days of right upper quadrant abdominal pain.   Patient was admitted with sepsis(leukocytosis and tachycardia) with concern for peritonitis.  In addition, she was noted to be in A-fib RVR and had received IV Cardizem in the ED with improvement.  CT abdomen pelvis with moderate volume ascites with peritoneal dialysis catheter present, splenomegaly, body wall edema versus cellulitis at the lower anterior abdominal wall. Peritoneal cell count with 9900 WBC. General surgery consulted for PD cath removal. PD catheter removed on 10/18 and permacath placed.   CT abd/plv on 10/19 with contrast showing hematoma in subcutaneous plane of lower abdominal wall, and additional small hematoma with active extravasation of contrast. Discussed with IR and surgery. Recommended placing abdominal binder and trending hemoglobin. Eliquis is being held.      Subjective & 24hr Events:   No overnight events reported. Reports abdominal pain and thigh pain is improved this morning. Denied any other complaints.       Assessment & Plan:     Sepsis due to peritonitis  ESRD on PD  Meets criteria for sepsis with tachycardia and leukocytosis  -PD fluid culture with gram-positive rods and Staph Coag neg species  -PD culture still pending  -Dialysis timing per nephro  29.7 (L) 35.8 - 46.3 %    MCV 94.0 82 - 102 FL    MCH 28.5 26.1 - 32.9 PG    MCHC 30.3 (L) 31.4 - 35.0 g/dL    RDW 17.0 (H) 11.9 - 14.6 %    Platelets 262 150 - 450 K/uL    MPV 9.9 9.4 - 12.3 FL    nRBC 0.00 0.0 - 0.2 K/uL    Differential Type AUTOMATED      Neutrophils % 76 43 - 78 %    Lymphocytes % 8 (L) 13 - 44 %    Monocytes % 9 4.0 - 12.0 %    Eosinophils % 1 0.5 - 7.8 %    Basophils % 1 0.0 - 2.0 %    Immature Granulocytes % 5 0.0 - 5.0 %    Neutrophils Absolute 8.0 1.7 - 8.2 K/UL    Lymphocytes Absolute 0.8 0.5 - 4.6 K/UL    Monocytes Absolute 1.0 0.1 - 1.3 K/UL    Eosinophils Absolute 0.1 0.0 - 0.8 K/UL    Basophils Absolute 0.1 0.0 - 0.2 K/UL    Immature Granulocytes Absolute 0.6 (H) 0.0 - 0.5 K/UL   Basic Metabolic Panel w/ Reflex to MG    Collection Time: 10/19/24  6:25 AM   Result Value Ref Range    Sodium 129 (L) 136 - 145 mmol/L    Potassium 5.3 (H) 3.5 - 5.1 mmol/L    Chloride 90 (L) 98 - 107 mmol/L    CO2 16 (L) 20 - 28 mmol/L    Anion Gap 23 (H) 9 - 18 mmol/L    Glucose 156 (H) 70 - 99 mg/dL    BUN 68 (H) 6 - 23 MG/DL    Creatinine 12.90 (H) 0.60 - 1.10 MG/DL    Est, Glom Filt Rate 3 (L) >60 ml/min/1.73m2    Calcium 6.6 (L) 8.8 - 10.2 MG/DL   Vancomycin Level, Random    Collection Time: 10/19/24  6:25 AM   Result Value Ref Range    Vancomycin Rm 14.3 UG/ML   POCT Glucose    Collection Time: 10/19/24 11:02 AM   Result Value Ref Range    POC Glucose 158 (H) 65 - 100 mg/dL    Performed by: Delia    POCT Glucose    Collection Time: 10/19/24  5:33 PM   Result Value Ref Range    POC Glucose 165 (H) 65 - 100 mg/dL    Performed by: Delia    POCT Glucose    Collection Time: 10/19/24  7:34 PM   Result Value Ref Range    POC Glucose 182 (H) 65 - 100 mg/dL    Performed by: Farhat    Basic Metabolic Panel w/ Reflex to MG    Collection Time: 10/20/24  5:47 AM   Result Value Ref Range    Sodium 129 (L) 136 - 145 mmol/L    Potassium HEMOLYZED SPECIMEN 3.5 - 5.1 mmol/L    Chloride 94

## 2024-10-20 NOTE — ICUWATCH
RRT Clinical Rounding Nurse Progress Report      SUBJECTIVE: Called to assess patient secondary to RN/provider concern - firm painful area to lower back around to abdomen and L thigh .      Vitals:    10/19/24 1307 10/19/24 1352 10/19/24 1616 10/19/24 1935   BP:   131/85 123/86   Pulse:   99 (!) 102   Resp: 18 18 16 20   Temp:   98.8 °F (37.1 °C) 99.1 °F (37.3 °C)   TempSrc:   Oral Oral   SpO2:   98% 99%   Weight:       Height:              ASSESSMENT:  On arrival to room, I found patient to be resting in bed quietly. Patient is oriented X 4. Denies major pain or SOB. Respirations even and unlabored. Patient states back pain has been controlled with PRNs. Area of induration with purple discoloration has remained within previously outlined margins. No needs or concerns expressed at this time.    PLAN:  Recent labs/notes/vitals reviewed, and patient discussed with primary RN. No concern per primary RN. Will follow per RRT Clinical Rounding Program protocol. Primary RN to call with concerns. On call provider contacted regarding recommendation of trending hemoglobin by daytime provider. No new orders received.    Mj Whitaker RN  [place]: [phone]

## 2024-10-21 LAB
ANION GAP SERPL CALC-SCNC: 20 MMOL/L (ref 9–18)
BASOPHILS # BLD: 0.1 K/UL (ref 0–0.2)
BASOPHILS NFR BLD: 1 % (ref 0–2)
BUN SERPL-MCNC: 53 MG/DL (ref 6–23)
CALCIUM SERPL-MCNC: 6.3 MG/DL (ref 8.8–10.2)
CHLORIDE SERPL-SCNC: 92 MMOL/L (ref 98–107)
CO2 SERPL-SCNC: 19 MMOL/L (ref 20–28)
CREAT SERPL-MCNC: 11 MG/DL (ref 0.6–1.1)
DIFFERENTIAL METHOD BLD: ABNORMAL
EOSINOPHIL # BLD: 0.1 K/UL (ref 0–0.8)
EOSINOPHIL NFR BLD: 1 % (ref 0.5–7.8)
ERYTHROCYTE [DISTWIDTH] IN BLOOD BY AUTOMATED COUNT: 17.1 % (ref 11.9–14.6)
GLUCOSE BLD STRIP.AUTO-MCNC: 127 MG/DL (ref 65–100)
GLUCOSE BLD STRIP.AUTO-MCNC: 142 MG/DL (ref 65–100)
GLUCOSE BLD STRIP.AUTO-MCNC: 186 MG/DL (ref 65–100)
GLUCOSE BLD STRIP.AUTO-MCNC: 212 MG/DL (ref 65–100)
GLUCOSE SERPL-MCNC: 160 MG/DL (ref 70–99)
HCT VFR BLD AUTO: 25.7 % (ref 35.8–46.3)
HGB BLD-MCNC: 8 G/DL (ref 11.7–15.4)
IMM GRANULOCYTES # BLD AUTO: 0.6 K/UL (ref 0–0.5)
IMM GRANULOCYTES NFR BLD AUTO: 6 % (ref 0–5)
LYMPHOCYTES # BLD: 0.6 K/UL (ref 0.5–4.6)
LYMPHOCYTES NFR BLD: 6 % (ref 13–44)
MCH RBC QN AUTO: 29 PG (ref 26.1–32.9)
MCHC RBC AUTO-ENTMCNC: 31.1 G/DL (ref 31.4–35)
MCV RBC AUTO: 93.1 FL (ref 82–102)
MONOCYTES # BLD: 1 K/UL (ref 0.1–1.3)
MONOCYTES NFR BLD: 9 % (ref 4–12)
NEUTS SEG # BLD: 8.3 K/UL (ref 1.7–8.2)
NEUTS SEG NFR BLD: 77 % (ref 43–78)
NRBC # BLD: 0 K/UL (ref 0–0.2)
PLATELET # BLD AUTO: 268 K/UL (ref 150–450)
PLATELET COMMENT: ADEQUATE
PMV BLD AUTO: 9.7 FL (ref 9.4–12.3)
POTASSIUM SERPL-SCNC: 4.9 MMOL/L (ref 3.5–5.1)
RBC # BLD AUTO: 2.76 M/UL (ref 4.05–5.2)
RBC MORPH BLD: ABNORMAL
RBC MORPH BLD: ABNORMAL
SERVICE CMNT-IMP: ABNORMAL
SODIUM SERPL-SCNC: 131 MMOL/L (ref 136–145)
VANCOMYCIN SERPL-MCNC: 17.8 UG/ML
WBC # BLD AUTO: 10.7 K/UL (ref 4.3–11.1)
WBC MORPH BLD: ABNORMAL

## 2024-10-21 PROCEDURE — 6360000002 HC RX W HCPCS

## 2024-10-21 PROCEDURE — 6370000000 HC RX 637 (ALT 250 FOR IP)

## 2024-10-21 PROCEDURE — 36415 COLL VENOUS BLD VENIPUNCTURE: CPT

## 2024-10-21 PROCEDURE — 80202 ASSAY OF VANCOMYCIN: CPT

## 2024-10-21 PROCEDURE — 90935 HEMODIALYSIS ONE EVALUATION: CPT

## 2024-10-21 PROCEDURE — 82962 GLUCOSE BLOOD TEST: CPT

## 2024-10-21 PROCEDURE — 2580000003 HC RX 258: Performed by: INTERNAL MEDICINE

## 2024-10-21 PROCEDURE — 1100000000 HC RM PRIVATE

## 2024-10-21 PROCEDURE — 6370000000 HC RX 637 (ALT 250 FOR IP): Performed by: NURSE PRACTITIONER

## 2024-10-21 PROCEDURE — 80048 BASIC METABOLIC PNL TOTAL CA: CPT

## 2024-10-21 PROCEDURE — 6370000000 HC RX 637 (ALT 250 FOR IP): Performed by: INTERNAL MEDICINE

## 2024-10-21 PROCEDURE — 85025 COMPLETE CBC W/AUTO DIFF WBC: CPT

## 2024-10-21 PROCEDURE — 2580000003 HC RX 258

## 2024-10-21 RX ADMIN — HYDROCODONE BITARTRATE AND ACETAMINOPHEN 2 TABLET: 5; 325 TABLET ORAL at 17:46

## 2024-10-21 RX ADMIN — SEVELAMER CARBONATE 800 MG: 800 TABLET, FILM COATED ORAL at 16:42

## 2024-10-21 RX ADMIN — ACETAMINOPHEN 650 MG: 325 TABLET ORAL at 15:05

## 2024-10-21 RX ADMIN — METOPROLOL TARTRATE 25 MG: 25 TABLET, FILM COATED ORAL at 09:16

## 2024-10-21 RX ADMIN — HYDROMORPHONE HYDROCHLORIDE 0.5 MG: 1 INJECTION, SOLUTION INTRAMUSCULAR; INTRAVENOUS; SUBCUTANEOUS at 20:59

## 2024-10-21 RX ADMIN — SEVELAMER CARBONATE 800 MG: 800 TABLET, FILM COATED ORAL at 17:08

## 2024-10-21 RX ADMIN — SEVELAMER CARBONATE 800 MG: 800 TABLET, FILM COATED ORAL at 09:16

## 2024-10-21 RX ADMIN — HYDROMORPHONE HYDROCHLORIDE 0.5 MG: 1 INJECTION, SOLUTION INTRAMUSCULAR; INTRAVENOUS; SUBCUTANEOUS at 16:42

## 2024-10-21 RX ADMIN — SERTRALINE 100 MG: 100 TABLET, FILM COATED ORAL at 21:00

## 2024-10-21 RX ADMIN — HYDROMORPHONE HYDROCHLORIDE 0.5 MG: 1 INJECTION, SOLUTION INTRAMUSCULAR; INTRAVENOUS; SUBCUTANEOUS at 06:39

## 2024-10-21 RX ADMIN — HYDROCODONE BITARTRATE AND ACETAMINOPHEN 2 TABLET: 5; 325 TABLET ORAL at 09:15

## 2024-10-21 RX ADMIN — VANCOMYCIN HYDROCHLORIDE 1000 MG: 1 INJECTION, POWDER, LYOPHILIZED, FOR SOLUTION INTRAVENOUS at 16:44

## 2024-10-21 RX ADMIN — SODIUM CHLORIDE, PRESERVATIVE FREE 10 ML: 5 INJECTION INTRAVENOUS at 21:00

## 2024-10-21 RX ADMIN — SERTRALINE 100 MG: 100 TABLET, FILM COATED ORAL at 09:16

## 2024-10-21 ASSESSMENT — PAIN SCALES - GENERAL
PAINLEVEL_OUTOF10: 7
PAINLEVEL_OUTOF10: 7
PAINLEVEL_OUTOF10: 5
PAINLEVEL_OUTOF10: 8
PAINLEVEL_OUTOF10: 8
PAINLEVEL_OUTOF10: 10
PAINLEVEL_OUTOF10: 7

## 2024-10-21 ASSESSMENT — PAIN - FUNCTIONAL ASSESSMENT: PAIN_FUNCTIONAL_ASSESSMENT: PREVENTS OR INTERFERES SOME ACTIVE ACTIVITIES AND ADLS

## 2024-10-21 ASSESSMENT — PAIN DESCRIPTION - ORIENTATION
ORIENTATION: LEFT;MID;LOWER
ORIENTATION: LEFT;MID

## 2024-10-21 ASSESSMENT — PAIN DESCRIPTION - LOCATION
LOCATION: BACK;ABDOMEN;HEAD
LOCATION: BACK;ABDOMEN
LOCATION: ABDOMEN

## 2024-10-21 ASSESSMENT — PAIN DESCRIPTION - DESCRIPTORS: DESCRIPTORS: ACHING

## 2024-10-21 NOTE — PLAN OF CARE
Problem: Chronic Conditions and Co-morbidities  Goal: Patient's chronic conditions and co-morbidity symptoms are monitored and maintained or improved  10/21/2024 1038 by Carolina Fairchild RN  Outcome: Progressing  10/20/2024 2236 by Ted Campbell RN  Outcome: Progressing     Problem: Discharge Planning  Goal: Discharge to home or other facility with appropriate resources  10/21/2024 1038 by Carolina Fairchild RN  Outcome: Progressing  10/20/2024 2236 by Ted Campbell RN  Outcome: Progressing     Problem: Safety - Adult  Goal: Free from fall injury  10/21/2024 1038 by Carolina Fairchild RN  Outcome: Progressing  10/20/2024 2236 by Ted Campbell RN  Outcome: Progressing     Problem: Skin/Tissue Integrity  Goal: Absence of new skin breakdown  Description: 1.  Monitor for areas of redness and/or skin breakdown  2.  Assess vascular access sites hourly  3.  Every 4-6 hours minimum:  Change oxygen saturation probe site  4.  Every 4-6 hours:  If on nasal continuous positive airway pressure, respiratory therapy assess nares and determine need for appliance change or resting period.  10/21/2024 1038 by Carolina Fairchild RN  Outcome: Progressing  10/20/2024 2236 by Ted Campbell RN  Outcome: Progressing     Problem: Pain  Goal: Verbalizes/displays adequate comfort level or baseline comfort level  10/21/2024 1038 by Carolina Fairchild RN  Outcome: Progressing  10/20/2024 2236 by Ted Campbell RN  Outcome: Progressing

## 2024-10-21 NOTE — PLAN OF CARE
Problem: Chronic Conditions and Co-morbidities  Goal: Patient's chronic conditions and co-morbidity symptoms are monitored and maintained or improved  10/20/2024 2236 by Ted Campbell RN  Outcome: Progressing  10/20/2024 1009 by Mai Gomez RN  Outcome: Progressing     Problem: Discharge Planning  Goal: Discharge to home or other facility with appropriate resources  10/20/2024 2236 by Ted Campbell RN  Outcome: Progressing  10/20/2024 1009 by Mai Gomez RN  Outcome: Progressing     Problem: Safety - Adult  Goal: Free from fall injury  10/20/2024 2236 by Ted Campbell RN  Outcome: Progressing  10/20/2024 1009 by Mai Gomez RN  Outcome: Progressing     Problem: Skin/Tissue Integrity  Goal: Absence of new skin breakdown  Description: 1.  Monitor for areas of redness and/or skin breakdown  2.  Assess vascular access sites hourly  3.  Every 4-6 hours minimum:  Change oxygen saturation probe site  4.  Every 4-6 hours:  If on nasal continuous positive airway pressure, respiratory therapy assess nares and determine need for appliance change or resting period.  10/20/2024 2236 by Ted Campbell RN  Outcome: Progressing  10/20/2024 1009 by Mai Gomez RN  Outcome: Progressing     Problem: Pain  Goal: Verbalizes/displays adequate comfort level or baseline comfort level  10/20/2024 2236 by Ted Campbell RN  Outcome: Progressing  10/20/2024 1009 by Mai Gomez RN  Outcome: Progressing

## 2024-10-21 NOTE — DIALYSIS
TRANSFER IN - DIALYSIS    Received patient in dialysis unit  from [facility] (unit) for ordered procedure.    Consent verified for renal replacement therapy. Procedure explained to patient, opportunity for Q&A provided. Call light given.     Patient alert and vital signs stable.  /80   Pulse (!) 108   Temp 97.5 °F (36.4 °C)   Resp 16   Ht 1.753 m (5' 9\")   Wt (!) 150.6 kg (332 lb)   SpO2 92%   BMI 49.03 kg/m²     Hemodialysis initiated using right cvc.  Aspirated and flushed both ports without difficulty. Dressing clean, dry and intact.  Machine settings per MD order.    Heparin 0 unit bolus and 0 units/hr.      Will monitor during treatment.

## 2024-10-21 NOTE — PROGRESS NOTES
Infectious Diseases Progress Note    Today's Date: 10/21/2024   Admit Date: 10/10/2024  : 1970    Impression:   ESRD  PD catheter related peritonitis; recrudescent with GPR (recently corynebacterium).  Now also growing CoNS. Per notes from [facility], she recently had corynebacterium peritonitis and was treated with 3 weeks of IP vancomycin and ceftazidime.  I can't tell exactly when this was and she can't elaborate.  PD cath removed 10/18/24  Diabetes type 2    Plan:   Continue IV vancomycin with plans to continue for 2 weeks following catheter removal; EOT 2024  Have discussed with Luther with nephrology and vancomycin will be arranged with HD after discharge   ID will sign off with plan noted above, please reach out with further questions or concerns.     Anti-infectives:   Cefepime IP (10/10/24 - 10/13/24) IV (10/19/2024 - )  Vancomycin IP (10/10/2024 - )  Linezolid (10/10/24 - 10/14/24)  Fluconazole (10/11/2024 - 10/17/2024)(10/19/2024 - )  Cefazolin (10/18/2024 )    Subjective:   Overnight events: WBC 10.7, afebrile. Discussed antibiotic plan with  - all questions answered    Patient is a 54 y.o. female on PD who presented to the ED on 10/10/2024 with 2 days of abdominal pain that initially woke her up from sleep. In the ED she had leukocytosis (WBC 16.6), tachycardia, and atrial fibrillation with RVR. A CT abd/pelvis demonstrated moderate volume ascites and cellulitis vs edema of the lower anterior abdominal wall. Initially treated with linezolid and cefepime.  Blood cultures were no growth.   Peritoneal fluid WBC 9906 and grew GPR and CoNS. She has been afebrile throughout the hospitalization. PD catheter was removed yesterday and a tunneled HD catheter was placed. She recently had peritonitis with corynebacterium and received 3 weeks of IP antibiotics with vancomycin and ceftazidime. She is still having very severe abdominal pain and peritoneal signs.  She is walking in the room  5 YEARS  10/18/2024    IR TUNNELED CATHETER PLACEMENT GREATER THAN 5 YEARS 10/18/2024 [facility] RADIOLOGY SPECIALS    OTHER SURGICAL HISTORY  12/2021     X 2 groin wound    AZ UNLISTED PROCEDURE ABDOMEN PERITONEUM & OMENTUM  2022    debridement     Family History   Problem Relation Age of Onset    Heart Failure Mother         congestive    Hypertension Mother     Cancer Father         Lung/Mets to Bone and Brain and liver    Diabetes Father     Hypertension Father     Heart Disease Mother          Current medications were reviewed     Allergies   Allergen Reactions    Ace Inhibitors Angioedema     Other reaction(s): Hives/Swelling-Allergy  Swelling of the mouth    Angiotensin Receptor Blockers Angioedema    [place] Hives     Swelling of the mouth  Other reaction(s): Hives/Swelling-Allergy  Patient states she is not allergic    Sulfamethoxazole-Trimethoprim Other (See Comments)     Nausea, vomiting, making her feel like she is going to pass out    Cephalexin Nausea And Vomiting     Can't take oral/can do IV        Objective:     Visit Vitals  /88   Pulse 100   Temp 97.5 °F (36.4 °C)   Resp 16   Ht 1.753 m (5' 9\")   Wt (!) 150.6 kg (332 lb)   SpO2 92%   BMI 49.03 kg/m²     Temp (24hrs), Av.8 °F (36.6 °C), Min:97.5 °F (36.4 °C), Max:98.2 °F (36.8 °C)       Intake/Output Summary (Last 24 hours) at 10/21/2024 1448  Last data filed at 10/21/2024 0445  Gross per 24 hour   Intake 800 ml   Output --   Net 800 ml       Physical Exam:   General:  NAD; awake, alert  HEENT:  NCAT, Sclerae anicteric, PERRL, MMM  CV:   RRR, no M/R/G  Lungs:  No W/R/R. Symmetric expansion  Abdomen:   + abdominal wall edema; + exquisitely tender to light palpation.  Extremities: No cyanosis or clubbing, pitting dependent edema  Skin:   No rashes, normal coloration, warm and dry  Psych:  Normal mood and affect      Lines:     Tunneled Hemodialysis Catheter Left Neck (Active)       Data Review:   I have personally reviewed labs, micro, and

## 2024-10-21 NOTE — PROGRESS NOTES
Physical Therapy Note:    Attempted to see patient this PM for physical therapy treatment  session, however, pt off the floor at dialysis. Will follow and re-attempt as schedule permits/patient available. Thank you,    DUEK CRUZ, PT     Rehab Caseload Tracker

## 2024-10-21 NOTE — PROGRESS NOTES
ADRIANAO DAILY FOLLOW UP RENAL INSUFFICIENCY PATIENT   [facility]   Pharmacy Pharmacokinetic Monitoring Service - Vancomycin    Consulting Provider: Dr. Abel   Indication: PD catheter related peritonitis  Target Concentration: Pre-dialysis concentration 15-20 mg/L  Day of Therapy: 3  Additional Antimicrobials: none    Pertinent Laboratory Values:   Wt Readings from Last 1 Encounters:   10/18/24 (!) 150.6 kg (332 lb)     Temp Readings from Last 1 Encounters:   10/21/24 97.5 °F (36.4 °C) (Oral)     Recent Labs     10/19/24  0625 10/20/24  0547 10/20/24  1040 10/21/24  0403   BUN 68* 45*  --  53*   CREATININE 12.90* 9.63*  --  11.00*   WBC 10.6  --  14.3* 10.7       Lab Results   Component Value Date/Time    VANCOTROUGH 8.8 10/14/2024 11:22 AM    VANCORANDOM 17.8 10/21/2024 04:03 AM       MRSA Nasal Swab: N/A. Non-respiratory infection    Assessment:  Date:  Dose/Freq Admin Times Level/Time:   10/19 HD 1000 mg x 1 1404 Rd @ 0625 = 14.3   10/20      10/21 HD 1000 mg x 1 (1700) Rd @ 0403 = 17.8   10/22   Rd @ (0600)           Plan:  Concentration-guided dosing due to hemodialysis.   Patient receiving HD today. Will give vancomycin IV 1000 mg x1.  Repeat vancomycin concentrations will be ordered as clinically appropriate  Pharmacy will continue to monitor patient and adjust therapy as indicated    Thank you for the consult,  Romina Lynn, PharmD   PGY1 Pharmacy Resident

## 2024-10-21 NOTE — CARE COORDINATION
[facility] admission paperwork completed and faxed to admission for processing. CM awaiting a call from [facility] admission.     Laurita URIAS, ACM  [place]

## 2024-10-21 NOTE — PROGRESS NOTES
EOS     Pt resting in bed at this time. Pt denies any needs or pain. Pt went to dialysis today, see note. Pt c/o pain in her abd, pain meds given PRN. Call light and personal belongings left within reach. Pt encouraged to call with any needs. Hourly rounding completed during this shift. Bed in low/locked position, family at the bedside during this shift.

## 2024-10-21 NOTE — PROGRESS NOTES
Collection Time: 10/21/24  4:03 AM   Result Value Ref Range    Vancomycin Rm 17.8 UG/ML   POCT Glucose    Collection Time: 10/21/24  7:53 AM   Result Value Ref Range    POC Glucose 127 (H) 65 - 100 mg/dL    Performed by: Verónica    POCT Glucose    Collection Time: 10/21/24 11:12 AM   Result Value Ref Range    POC Glucose 212 (H) 65 - 100 mg/dL    Performed by: Verónica        No results for input(s): \"COVID19\" in the last 72 hours.    Current Meds:  Current Facility-Administered Medications   Medication Dose Route Frequency    vancomycin (VANCOCIN) 1,000 mg in sodium chloride 0.9 % 250 mL IVPB ([id number])  1,000 mg IntraVENous Once    vancomycin (VANCOCIN) intermittent dosing (placeholder)   Other RX Placeholder    HYDROcodone-acetaminophen (NORCO) 5-325 MG per tablet 2 tablet  2 tablet Oral Q6H PRN    HYDROmorphone HCl PF (DILAUDID) injection 0.5 mg  0.5 mg IntraVENous Q4H PRN    metoprolol tartrate (LOPRESSOR) tablet 25 mg  25 mg Oral BID    insulin lispro (HUMALOG,ADMELOG) injection vial 0-8 Units  0-8 Units SubCUTAneous 4x Daily AC & HS    insulin glargine (LANTUS) injection vial 8 Units  8 Units SubCUTAneous Nightly    metoclopramide (REGLAN) injection 10 mg  10 mg IntraVENous Q6H PRN    sodium chloride flush 0.9 % injection 5-40 mL  5-40 mL IntraVENous 2 times per day    sodium chloride flush 0.9 % injection 5-40 mL  5-40 mL IntraVENous PRN    0.9 % sodium chloride infusion   IntraVENous PRN    ondansetron (ZOFRAN-ODT) disintegrating tablet 4 mg  4 mg Oral Q8H PRN    Or    ondansetron (ZOFRAN) injection 4 mg  4 mg IntraVENous Q6H PRN    polyethylene glycol (GLYCOLAX) packet 17 g  17 g Oral Daily PRN    bisacodyl (DULCOLAX) suppository 10 mg  10 mg Rectal Daily PRN    famotidine (PEPCID) tablet 10 mg  10 mg Oral Daily PRN    aluminum & magnesium hydroxide-simethicone (MAALOX) 200-200-20 MG/5ML suspension 30 mL  30 mL Oral Q6H PRN    acetaminophen (TYLENOL) tablet 650 mg  650 mg Oral Q6H PRN    Or

## 2024-10-21 NOTE — PROGRESS NOTES
Pt was given some pain medication for the pain in the abdomen. Pt has been resting well. Hourly rounds performed this shift. Bed lowered and locked. Call light within reach. All needs met at this time.

## 2024-10-21 NOTE — PROGRESS NOTES
Occupational Therapy Note:    Attempted to see patient this PM for occupational therapy treatment  session, however, pt off the floor at dialysis. Will follow and re-attempt as schedule permits/patient available. Thank you,    Laxmi Mcfarlane, OT     Rehab Caseload Tracker

## 2024-10-21 NOTE — PROGRESS NOTES
Giselle Rosado  Admission Date: 10/10/2024         [facility] Progress Note: 10/21/2024    Follow-up for: ESRD    The patient's chart is reviewed and the patient is discussed with the staff.    Subjective:   Pt seen and examined on HD, left  Qb, UF 3500 reports abdominal pain much better after PD removal     ROS:  Gen - no fever, no chills  CV - no chest pain  Lung - no shortness of breath, no cough  Abd - tenderness- better, no nausea/vomiting, no diarrhea  Ext - + edema    Current Facility-Administered Medications   Medication Dose Route Frequency    vancomycin (VANCOCIN) intermittent dosing (placeholder)   Other RX Placeholder    HYDROcodone-acetaminophen (NORCO) 5-325 MG per tablet 2 tablet  2 tablet Oral Q6H PRN    HYDROmorphone HCl PF (DILAUDID) injection 0.5 mg  0.5 mg IntraVENous Q4H PRN    metoprolol tartrate (LOPRESSOR) tablet 25 mg  25 mg Oral BID    insulin lispro (HUMALOG,ADMELOG) injection vial 0-8 Units  0-8 Units SubCUTAneous 4x Daily AC & HS    insulin glargine (LANTUS) injection vial 8 Units  8 Units SubCUTAneous Nightly    metoclopramide (REGLAN) injection 10 mg  10 mg IntraVENous Q6H PRN    sodium chloride flush 0.9 % injection 5-40 mL  5-40 mL IntraVENous 2 times per day    sodium chloride flush 0.9 % injection 5-40 mL  5-40 mL IntraVENous PRN    0.9 % sodium chloride infusion   IntraVENous PRN    ondansetron (ZOFRAN-ODT) disintegrating tablet 4 mg  4 mg Oral Q8H PRN    Or    ondansetron (ZOFRAN) injection 4 mg  4 mg IntraVENous Q6H PRN    polyethylene glycol (GLYCOLAX) packet 17 g  17 g Oral Daily PRN    bisacodyl (DULCOLAX) suppository 10 mg  10 mg Rectal Daily PRN    famotidine (PEPCID) tablet 10 mg  10 mg Oral Daily PRN    aluminum & magnesium hydroxide-simethicone (MAALOX) 200-200-20 MG/5ML suspension 30 mL  30 mL Oral Q6H PRN    acetaminophen (TYLENOL) tablet 650 mg  650 mg Oral Q6H PRN    Or    acetaminophen (TYLENOL) suppository 650 mg  650 mg

## 2024-10-21 NOTE — DIALYSIS
TRANSFER OUT- DIALYSIS    Hemodialysis treatment completed. PT ran 3.5 hours, without complications.    Patient alert and VS stable  /73  P 102       2.8 Kg removed.      Flushed both ports with 10 mL of NS.  CVC dressing clean, dry, and intact, tego caps intact, curos caps placed.      Meds given: Tylenol.    RBCs given during dialysis: No    Patient to 631 after dialysis.

## 2024-10-22 VITALS
HEART RATE: 95 BPM | TEMPERATURE: 98.8 F | HEIGHT: 69 IN | RESPIRATION RATE: 18 BRPM | OXYGEN SATURATION: 97 % | WEIGHT: 293 LBS | DIASTOLIC BLOOD PRESSURE: 78 MMHG | SYSTOLIC BLOOD PRESSURE: 118 MMHG | BODY MASS INDEX: 43.4 KG/M2

## 2024-10-22 LAB
ANION GAP SERPL CALC-SCNC: 16 MMOL/L (ref 9–18)
BASOPHILS # BLD: 0.1 K/UL (ref 0–0.2)
BASOPHILS NFR BLD: 1 % (ref 0–2)
BUN SERPL-MCNC: 30 MG/DL (ref 6–23)
CALCIUM SERPL-MCNC: 6.9 MG/DL (ref 8.8–10.2)
CHLORIDE SERPL-SCNC: 97 MMOL/L (ref 98–107)
CO2 SERPL-SCNC: 22 MMOL/L (ref 20–28)
CREAT SERPL-MCNC: 7.39 MG/DL (ref 0.6–1.1)
DIFFERENTIAL METHOD BLD: ABNORMAL
EOSINOPHIL # BLD: 0.1 K/UL (ref 0–0.8)
EOSINOPHIL NFR BLD: 1 % (ref 0.5–7.8)
ERYTHROCYTE [DISTWIDTH] IN BLOOD BY AUTOMATED COUNT: 16.8 % (ref 11.9–14.6)
GLUCOSE BLD STRIP.AUTO-MCNC: 193 MG/DL (ref 65–100)
GLUCOSE BLD STRIP.AUTO-MCNC: 215 MG/DL (ref 65–100)
GLUCOSE SERPL-MCNC: 195 MG/DL (ref 70–99)
HCT VFR BLD AUTO: 26 % (ref 35.8–46.3)
HGB BLD-MCNC: 7.9 G/DL (ref 11.7–15.4)
IMM GRANULOCYTES # BLD AUTO: 0.6 K/UL (ref 0–0.5)
IMM GRANULOCYTES NFR BLD AUTO: 6 % (ref 0–5)
LYMPHOCYTES # BLD: 0.6 K/UL (ref 0.5–4.6)
LYMPHOCYTES NFR BLD: 6 % (ref 13–44)
MCH RBC QN AUTO: 28.8 PG (ref 26.1–32.9)
MCHC RBC AUTO-ENTMCNC: 30.4 G/DL (ref 31.4–35)
MCV RBC AUTO: 94.9 FL (ref 82–102)
MONOCYTES # BLD: 0.8 K/UL (ref 0.1–1.3)
MONOCYTES NFR BLD: 7 % (ref 4–12)
NEUTS SEG # BLD: 8.5 K/UL (ref 1.7–8.2)
NEUTS SEG NFR BLD: 79 % (ref 43–78)
NRBC # BLD: 0 K/UL (ref 0–0.2)
PLATELET # BLD AUTO: 303 K/UL (ref 150–450)
PLATELET COMMENT: ADEQUATE
PMV BLD AUTO: 9.9 FL (ref 9.4–12.3)
POTASSIUM SERPL-SCNC: 4.4 MMOL/L (ref 3.5–5.1)
RBC # BLD AUTO: 2.74 M/UL (ref 4.05–5.2)
RBC MORPH BLD: ABNORMAL
SERVICE CMNT-IMP: ABNORMAL
SERVICE CMNT-IMP: ABNORMAL
SODIUM SERPL-SCNC: 135 MMOL/L (ref 136–145)
VANCOMYCIN SERPL-MCNC: 26 UG/ML
WBC # BLD AUTO: 10.7 K/UL (ref 4.3–11.1)
WBC MORPH BLD: ABNORMAL

## 2024-10-22 PROCEDURE — 6370000000 HC RX 637 (ALT 250 FOR IP)

## 2024-10-22 PROCEDURE — 6370000000 HC RX 637 (ALT 250 FOR IP): Performed by: INTERNAL MEDICINE

## 2024-10-22 PROCEDURE — 85025 COMPLETE CBC W/AUTO DIFF WBC: CPT

## 2024-10-22 PROCEDURE — 6360000002 HC RX W HCPCS: Performed by: INTERNAL MEDICINE

## 2024-10-22 PROCEDURE — 80202 ASSAY OF VANCOMYCIN: CPT

## 2024-10-22 PROCEDURE — 2580000003 HC RX 258: Performed by: INTERNAL MEDICINE

## 2024-10-22 PROCEDURE — 80048 BASIC METABOLIC PNL TOTAL CA: CPT

## 2024-10-22 PROCEDURE — 6360000002 HC RX W HCPCS

## 2024-10-22 PROCEDURE — 6370000000 HC RX 637 (ALT 250 FOR IP): Performed by: NURSE PRACTITIONER

## 2024-10-22 PROCEDURE — 36415 COLL VENOUS BLD VENIPUNCTURE: CPT

## 2024-10-22 PROCEDURE — 82962 GLUCOSE BLOOD TEST: CPT

## 2024-10-22 RX ORDER — METOPROLOL TARTRATE 50 MG
50 TABLET ORAL 2 TIMES DAILY
Qty: 180 TABLET | Refills: 3 | Status: ON HOLD | OUTPATIENT
Start: 2024-10-22

## 2024-10-22 RX ORDER — HYDROCODONE BITARTRATE AND ACETAMINOPHEN 10; 325 MG/1; MG/1
1 TABLET ORAL EVERY 6 HOURS PRN
Qty: 20 TABLET | Refills: 0 | Status: ON HOLD | OUTPATIENT
Start: 2024-10-22 | End: 2024-10-27

## 2024-10-22 RX ORDER — ONDANSETRON 4 MG/1
4 TABLET, ORALLY DISINTEGRATING ORAL EVERY 8 HOURS PRN
Qty: 20 TABLET | Refills: 0 | Status: ON HOLD | OUTPATIENT
Start: 2024-10-22

## 2024-10-22 RX ADMIN — METOPROLOL TARTRATE 25 MG: 25 TABLET, FILM COATED ORAL at 09:03

## 2024-10-22 RX ADMIN — ONDANSETRON 4 MG: 2 INJECTION INTRAMUSCULAR; INTRAVENOUS at 09:06

## 2024-10-22 RX ADMIN — SEVELAMER CARBONATE 800 MG: 800 TABLET, FILM COATED ORAL at 13:16

## 2024-10-22 RX ADMIN — HYDROMORPHONE HYDROCHLORIDE 0.5 MG: 1 INJECTION, SOLUTION INTRAMUSCULAR; INTRAVENOUS; SUBCUTANEOUS at 13:22

## 2024-10-22 RX ADMIN — SEVELAMER CARBONATE 800 MG: 800 TABLET, FILM COATED ORAL at 09:03

## 2024-10-22 RX ADMIN — HYDROCODONE BITARTRATE AND ACETAMINOPHEN 2 TABLET: 5; 325 TABLET ORAL at 09:10

## 2024-10-22 RX ADMIN — INSULIN LISPRO 2 UNITS: 100 INJECTION, SOLUTION INTRAVENOUS; SUBCUTANEOUS at 13:17

## 2024-10-22 RX ADMIN — INSULIN LISPRO 2 UNITS: 100 INJECTION, SOLUTION INTRAVENOUS; SUBCUTANEOUS at 09:02

## 2024-10-22 RX ADMIN — HYDROMORPHONE HYDROCHLORIDE 0.5 MG: 1 INJECTION, SOLUTION INTRAMUSCULAR; INTRAVENOUS; SUBCUTANEOUS at 06:33

## 2024-10-22 RX ADMIN — SODIUM CHLORIDE, PRESERVATIVE FREE 10 ML: 5 INJECTION INTRAVENOUS at 09:07

## 2024-10-22 RX ADMIN — SERTRALINE 100 MG: 100 TABLET, FILM COATED ORAL at 09:03

## 2024-10-22 ASSESSMENT — PAIN SCALES - GENERAL
PAINLEVEL_OUTOF10: 7
PAINLEVEL_OUTOF10: 7
PAINLEVEL_OUTOF10: 4
PAINLEVEL_OUTOF10: 8
PAINLEVEL_OUTOF10: 4

## 2024-10-22 ASSESSMENT — PAIN DESCRIPTION - LOCATION: LOCATION: ABDOMEN;BACK

## 2024-10-22 ASSESSMENT — PAIN DESCRIPTION - ORIENTATION: ORIENTATION: LEFT;MID

## 2024-10-22 ASSESSMENT — PAIN DESCRIPTION - DESCRIPTORS: DESCRIPTORS: DISCOMFORT;PRESSURE;SHARP

## 2024-10-22 ASSESSMENT — PAIN - FUNCTIONAL ASSESSMENT: PAIN_FUNCTIONAL_ASSESSMENT: PREVENTS OR INTERFERES SOME ACTIVE ACTIVITIES AND ADLS

## 2024-10-22 NOTE — CARE COORDINATION
CM has arranged patient an outpatient HD slot at [facility], MWF 6884. CM is awaiting a call to confirm clinic is able to start patient tomorrow with IV antibiotics.     Laurita URIAS, ACM  [place]

## 2024-10-22 NOTE — CARE COORDINATION
CM confirmed with [facility] admission and [facility] clinic, patient has a start date of 10/23/2024 and will receive IV antibiotics at clinic. Patient will be getting HD at [facility] MWF at 1420, patient to arrive at 1400. Attending updated. Patient discharging home today. Spouse at bedside and will provide transportation home. IMM explained and signed.     Laurita URIAS, ACM  [place]

## 2024-10-22 NOTE — PROGRESS NOTES
[facility] DAILY FOLLOW UP RENAL INSUFFICIENCY PATIENT   [facility]   Pharmacy Pharmacokinetic Monitoring Service - Vancomycin    Consulting Provider: Dr. Abel   Indication: PD catheter related peritonitis  Target Concentration: Pre-dialysis concentration 15-20 mg/L  Day of Therapy: 4  Additional Antimicrobials: none    Pertinent Laboratory Values:   Wt Readings from Last 1 Encounters:   10/18/24 (!) 150.6 kg (332 lb)     Temp Readings from Last 1 Encounters:   10/22/24 98.8 °F (37.1 °C) (Oral)     Recent Labs     10/20/24  0547 10/20/24  1040 10/21/24  0403 10/22/24  0355   BUN 45*  --  53* 30*   CREATININE 9.63*  --  11.00* 7.39*   WBC  --  14.3* 10.7 10.7       Lab Results   Component Value Date/Time    VANCOTROUGH 8.8 10/14/2024 11:22 AM    VANCORANDOM 26.0 10/22/2024 03:55 AM       MRSA Nasal Swab: N/A. Non-respiratory infection    Assessment:  Date:  Dose/Freq Admin Times Level/Time:   10/19 HD 1000 mg x 1 1404 Rd @ 0625 = 14.3   10/20      10/21 HD 1000 mg x 1 [address] Rd @ 0403 = 17.8   10/22   Rd @ 0355 = 26           Plan:  Concentration-guided dosing due to hemodialysis.   Will not give vancomycin dose today, due to the patient not receiving HD today.   Repeat vancomycin concentrations will be ordered as clinically appropriate  Pharmacy will continue to monitor patient and adjust therapy as indicated    Thank you for the consult,  Romina Lynn, PharmD   PGY1 Pharmacy Resident

## 2024-10-22 NOTE — PROGRESS NOTES
Giselle Rosado  Admission Date: 10/10/2024         [facility] Progress Note: 10/22/2024    Follow-up for: ESRD    The patient's chart is reviewed and the patient is discussed with the staff.    Subjective:   Pt seen and examined ion room, spouse at bedside, pt reports abdominal pain improving.     ROS:  Gen - no fever, no chills  CV - no chest pain  Lung - no shortness of breath, no cough  Abd - tenderness- better, no nausea/vomiting, no diarrhea  Ext - + edema    Current Facility-Administered Medications   Medication Dose Route Frequency    apixaban (ELIQUIS) tablet 5 mg  5 mg Oral BID    vancomycin (VANCOCIN) intermittent dosing (placeholder)   Other RX Placeholder    HYDROcodone-acetaminophen (NORCO) 5-325 MG per tablet 2 tablet  2 tablet Oral Q6H PRN    HYDROmorphone HCl PF (DILAUDID) injection 0.5 mg  0.5 mg IntraVENous Q4H PRN    metoprolol tartrate (LOPRESSOR) tablet 25 mg  25 mg Oral BID    insulin lispro (HUMALOG,ADMELOG) injection vial 0-8 Units  0-8 Units SubCUTAneous 4x Daily AC & HS    insulin glargine (LANTUS) injection vial 8 Units  8 Units SubCUTAneous Nightly    metoclopramide (REGLAN) injection 10 mg  10 mg IntraVENous Q6H PRN    sodium chloride flush 0.9 % injection 5-40 mL  5-40 mL IntraVENous 2 times per day    sodium chloride flush 0.9 % injection 5-40 mL  5-40 mL IntraVENous PRN    0.9 % sodium chloride infusion   IntraVENous PRN    ondansetron (ZOFRAN-ODT) disintegrating tablet 4 mg  4 mg Oral Q8H PRN    Or    ondansetron (ZOFRAN) injection 4 mg  4 mg IntraVENous Q6H PRN    polyethylene glycol (GLYCOLAX) packet 17 g  17 g Oral Daily PRN    bisacodyl (DULCOLAX) suppository 10 mg  10 mg Rectal Daily PRN    famotidine (PEPCID) tablet 10 mg  10 mg Oral Daily PRN    aluminum & magnesium hydroxide-simethicone (MAALOX) 200-200-20 MG/5ML suspension 30 mL  30 mL Oral Q6H PRN    acetaminophen (TYLENOL) tablet 650 mg  650 mg Oral Q6H PRN    Or    acetaminophen (TYLENOL)  suppository 650 mg  650 mg Rectal Q6H PRN    sertraline (ZOLOFT) tablet 100 mg  100 mg Oral BID    sevelamer (RENVELA) tablet 800 mg  800 mg Oral TID WC    glucose chewable tablet 16 g  4 tablet Oral PRN    dextrose bolus 10% 125 mL  125 mL IntraVENous PRN    Or    dextrose bolus 10% 250 mL  250 mL IntraVENous PRN    Glucagon Emergency KIT 1 mg  1 mg SubCUTAneous PRN    dextrose 10 % infusion   IntraVENous Continuous PRN         Objective:     Vitals:    10/22/24 0129 10/22/24 0633 10/22/24 0808 10/22/24 0940   BP: 117/86  (!) 140/94    Pulse: 75  53    Resp: 18 18 17 16   Temp: 98.4 °F (36.9 °C)  99.1 °F (37.3 °C)    TempSrc: Oral  Oral    SpO2: 97%  98%    Weight:       Height:         Intake and Output:   10/20 1901 - 10/22 0700  In: 1300 [P.O.:800]  Out: 3305   10/22 0701 - 10/22 1900  In: 220.4   Out: -     Physical Exam:   Constitutional:  the patient is well developed and in no acute distress, alert and oriented   HEENT:  Sclera clear, pupils equal, oral mucosa moist  Lungs: clear low lung volumes bilaterally   Cardiovascular:  Regular rate, S1, S2, no Rub   Abd/GI: soft and no tenderness; with positive bowel sounds.  Ext: warm without cyanosis. There is +1 lower leg edema.  Skin:  no jaundice or rashes  Neuro: no gross neuro deficits   Psychiatric: Calm.         LAB  Recent Labs     10/20/24  1040 10/21/24  0403 10/22/24  0355   WBC 14.3* 10.7 10.7   HGB 9.3* 8.0* 7.9*   HCT 30.0* 25.7* 26.0*    268 303     Recent Labs     10/20/24  0547 10/21/24  0403 10/22/24  0355   * 131* 135*   K HEMOLYZED SPECIMEN 4.9 4.4   CL 94* 92* 97*   CO2 19* 19* 22   BUN 45* 53* 30*   CREATININE 9.63* 11.00* 7.39*     No results for input(s): \"PH\", \"PCO2\", \"PO2\", \"HCO3\" in the last 72 hours.      Assessment/Plan:  (Medical Decision Making)   ESRD   [facility] transitioned to HD in setting of #2  Next HD tomorrow outpt  She has an outpt HD slot at [facility] MWF at 14:30         2. Peritonitis  Statement Selected

## 2024-10-22 NOTE — PROGRESS NOTES
Occupational Therapy Note:    Attempted to see patient this AM for occupational therapy treatment  session. Patient refused d/t pain and fatigue despite encouragement. Will follow and re-attempt as schedule permits/patient available. Thank you,    Laxmi Mcfarlane, OT    Rehab [facility] Tr[facility]

## 2024-10-22 NOTE — PROGRESS NOTES
Physical Therapy Note:    Attempted to see patient this PM for physical therapy treatment  session. Patient refused. Will follow and re-attempt as schedule permits/patient available. Thank you,    Gretel Brooks, PT     Rehab [facility]el[facility] Tracker

## 2024-10-22 NOTE — PROGRESS NOTES
Changed HD cath dressing using sterile technique with secondary RN. Pt tolerated dressing change well.

## 2024-10-22 NOTE — DISCHARGE SUMMARY
Diagnostic venography PROCEDURE DETAILS: Pre-procedure Consent:  Informed written and oral consent for the procedure was obtained after explanation of benefits, risks (including, but not limitted to:  hemorrhage, infection, pneumothorax, vascular injury) and alternatives.  The patient's questions were answered to his/her satisfaction.  He/She stated understanding and requested that we proceed. Final verification:  A time-out identifying the patient and planned procedure was performed.  Preparation (MIPS):  Maximal sterile barrier technique (including:  Sterile Ultrasound probe cover, Sterile Ultrasound gel, cap, mask, sterile gown, sterile gloves, sterile drape, hand hygiene, and 2% chlorhexidine cutaneous antisepsis) was used.  Medical reason for site preparation exception (MIPS): Not applicable Anesthesia/sedation Level of anesthesia/sedation:  Moderate sedation (conscious sedation) Anesthesia/sedation administered by:  Independent trained observer under attending supervision with continuous monitoring of the patient?s level of consciousness and physiologic status Total intra-service sedation time (minutes): 59 Access Local anesthesia was administered. A small skin incision was made at the venous access site.  A The vessel was sonographically evaluated and determined to be patent hide in the neck, but tapers inferiorly.  Real time ultrasound was used to visualize needle entry into the vessel and a permanent image was stored. There is no external carotid vein or collateral vein suitable for access in the right neck. Vein accessed (QCDR):  Right  Internal jugular vein Internal jugular vein patency (QCDR):  Patent or otherwise accessible on at least one side Access technique:  Micropuncture set with 21 gauge needle Using fluoroscopy, the 018 wire was advanced but immediately traveled towards the left, instead of inferiorly in the expected position of the internal jugular vein. Therefore, the needle was removed and  Monocytes Absolute 0.8 0.1 - 1.3 K/UL    Eosinophils Absolute 0.1 0.0 - 0.8 K/UL    Basophils Absolute 0.1 0.0 - 0.2 K/UL    Immature Granulocytes Absolute 0.6 (H) 0.0 - 0.5 K/UL    RBC Comment ANISOCYTOSIS      WBC Comment Result Confirmed By Smear      Platelet Comment ADEQUATE      Differential Type AUTOMATED     Basic Metabolic Panel w/ Reflex to MG    Collection Time: 10/22/24  3:55 AM   Result Value Ref Range    Sodium 135 (L) 136 - 145 mmol/L    Potassium 4.4 3.5 - 5.1 mmol/L    Chloride 97 (L) 98 - 107 mmol/L    CO2 22 20 - 28 mmol/L    Anion Gap 16 9 - 18 mmol/L    Glucose 195 (H) 70 - 99 mg/dL    BUN 30 (H) 6 - 23 MG/DL    Creatinine 7.39 (H) 0.60 - 1.10 MG/DL    Est, Glom Filt Rate 6 (L) >60 ml/min/1.73m2    Calcium 6.9 (L) 8.8 - 10.2 MG/DL   Vancomycin Level, Random    Collection Time: 10/22/24  3:55 AM   Result Value Ref Range    Vancomycin Rm 26.0 UG/ML   POCT Glucose    Collection Time: 10/22/24  8:46 AM   Result Value Ref Range    POC Glucose 193 (H) 65 - 100 mg/dL    Performed by: Group 47aniSofiaPCT    POCT Glucose    Collection Time: 10/22/24 11:55 AM   Result Value Ref Range    POC Glucose 215 (H) 65 - 100 mg/dL    Performed by: GiordaniSofiaPCT        No results for input(s): \"COVID19\" in the last 72 hours.    Recent Vital Data:  Patient Vitals for the past 24 hrs:   Temp Pulse Resp BP SpO2   10/22/24 1352 -- -- 18 -- --   10/22/24 1330 98.8 °F (37.1 °C) 95 19 118/78 97 %   10/22/24 0940 -- -- 16 -- --   10/22/24 0808 99.1 °F (37.3 °C) 53 17 (!) 140/94 98 %   10/22/24 0633 -- -- 18 -- --   10/22/24 0129 98.4 °F (36.9 °C) 75 18 117/86 97 %   10/21/24 2129 -- -- 18 -- --   10/21/24 2059 -- -- 18 -- --   10/21/24 2006 98.4 °F (36.9 °C) 61 18 111/71 97 %   10/21/24 1817 97.9 °F (36.6 °C) (!) 103 18 112/70 100 %   10/21/24 1816 -- -- 16 -- --   10/21/24 1712 -- -- 16 -- --   10/21/24 1527 -- (!) 108 -- 123/70 --       Oxygen Therapy  SpO2: 97 %  Pulse via Oximetry: 137 beats per minute  Pulse Oximeter

## 2024-10-23 ENCOUNTER — APPOINTMENT (OUTPATIENT)
Dept: GENERAL RADIOLOGY | Age: 54
End: 2024-10-23
Payer: COMMERCIAL

## 2024-10-23 ENCOUNTER — CARE COORDINATION (OUTPATIENT)
Dept: CARE COORDINATION | Facility: CLINIC | Age: 54
End: 2024-10-23

## 2024-10-23 ENCOUNTER — APPOINTMENT (OUTPATIENT)
Dept: CT IMAGING | Age: 54
End: 2024-10-23
Payer: COMMERCIAL

## 2024-10-23 ENCOUNTER — HOSPITAL ENCOUNTER (INPATIENT)
Age: 54
LOS: 15 days | Discharge: HOME HEALTH | End: 2024-11-08
Attending: EMERGENCY MEDICINE | Admitting: INTERNAL MEDICINE
Payer: COMMERCIAL

## 2024-10-23 DIAGNOSIS — I48.91 ATRIAL FIBRILLATION WITH RVR (HCC): Primary | ICD-10-CM

## 2024-10-23 DIAGNOSIS — Z99.2 ESRD ON HEMODIALYSIS (HCC): ICD-10-CM

## 2024-10-23 DIAGNOSIS — Z78.9 ACTIVE BLEEDING: ICD-10-CM

## 2024-10-23 DIAGNOSIS — I63.30 CEREBROVASCULAR ACCIDENT (CVA) DUE TO THROMBOSIS OF CEREBRAL ARTERY (HCC): ICD-10-CM

## 2024-10-23 DIAGNOSIS — K65.2 SBP (SPONTANEOUS BACTERIAL PERITONITIS) (HCC): ICD-10-CM

## 2024-10-23 DIAGNOSIS — N18.6 ESRD ON HEMODIALYSIS (HCC): ICD-10-CM

## 2024-10-23 LAB
ALBUMIN SERPL-MCNC: 1.7 G/DL (ref 3.5–5)
ALBUMIN/GLOB SERPL: 0.4 (ref 1–1.9)
ALP SERPL-CCNC: 153 U/L (ref 35–104)
ALT SERPL-CCNC: <5 U/L (ref 8–45)
ANION GAP SERPL CALC-SCNC: 26 MMOL/L (ref 9–18)
AST SERPL-CCNC: 19 U/L (ref 15–37)
BASOPHILS # BLD: 0.1 K/UL (ref 0–0.2)
BASOPHILS NFR BLD: 0 % (ref 0–2)
BILIRUB SERPL-MCNC: <0.2 MG/DL (ref 0–1.2)
BUN SERPL-MCNC: 20 MG/DL (ref 6–23)
CALCIUM SERPL-MCNC: 7.5 MG/DL (ref 8.8–10.2)
CHLORIDE SERPL-SCNC: 92 MMOL/L (ref 98–107)
CO2 SERPL-SCNC: 18 MMOL/L (ref 20–28)
CREAT SERPL-MCNC: 5.42 MG/DL (ref 0.6–1.1)
DIFFERENTIAL METHOD BLD: ABNORMAL
EKG ATRIAL RATE: 164 BPM
EKG DIAGNOSIS: NORMAL
EKG Q-T INTERVAL: 340 MS
EKG QRS DURATION: 103 MS
EKG QTC CALCULATION (BAZETT): 493 MS
EKG R AXIS: -22 DEGREES
EKG T AXIS: 102 DEGREES
EKG VENTRICULAR RATE: 133 BPM
EOSINOPHIL # BLD: 0 K/UL (ref 0–0.8)
EOSINOPHIL NFR BLD: 0 % (ref 0.5–7.8)
ERYTHROCYTE [DISTWIDTH] IN BLOOD BY AUTOMATED COUNT: 16.8 % (ref 11.9–14.6)
GLOBULIN SER CALC-MCNC: 4.3 G/DL (ref 2.3–3.5)
GLUCOSE SERPL-MCNC: 143 MG/DL (ref 70–99)
HBV CORE AB SERPL QL IA: NEGATIVE
HCT VFR BLD AUTO: 26.7 % (ref 35.8–46.3)
HGB BLD-MCNC: 8.3 G/DL (ref 11.7–15.4)
IMM GRANULOCYTES # BLD AUTO: 0.5 K/UL (ref 0–0.5)
IMM GRANULOCYTES NFR BLD AUTO: 4 % (ref 0–5)
LACTATE SERPL-SCNC: 2.5 MMOL/L (ref 0.5–2)
LACTATE SERPL-SCNC: 4.5 MMOL/L (ref 0.5–2)
LYMPHOCYTES # BLD: 0.6 K/UL (ref 0.5–4.6)
LYMPHOCYTES NFR BLD: 4 % (ref 13–44)
MAGNESIUM SERPL-MCNC: 1.8 MG/DL (ref 1.8–2.4)
MCH RBC QN AUTO: 28.6 PG (ref 26.1–32.9)
MCHC RBC AUTO-ENTMCNC: 31.1 G/DL (ref 31.4–35)
MCV RBC AUTO: 92.1 FL (ref 82–102)
MONOCYTES # BLD: 0.8 K/UL (ref 0.1–1.3)
MONOCYTES NFR BLD: 5 % (ref 4–12)
NEUTS SEG # BLD: 12.3 K/UL (ref 1.7–8.2)
NEUTS SEG NFR BLD: 87 % (ref 43–78)
NRBC # BLD: 0 K/UL (ref 0–0.2)
NT PRO BNP: ABNORMAL PG/ML (ref 0–125)
PLATELET # BLD AUTO: 375 K/UL (ref 150–450)
PMV BLD AUTO: 9.5 FL (ref 9.4–12.3)
POTASSIUM SERPL-SCNC: 3.7 MMOL/L (ref 3.5–5.1)
PROCALCITONIN SERPL-MCNC: 0.62 NG/ML (ref 0–0.1)
PROT SERPL-MCNC: 6 G/DL (ref 6.3–8.2)
RBC # BLD AUTO: 2.9 M/UL (ref 4.05–5.2)
SODIUM SERPL-SCNC: 135 MMOL/L (ref 136–145)
TROPONIN T SERPL HS-MCNC: 95 NG/L (ref 0–14)
WBC # BLD AUTO: 14.2 K/UL (ref 4.3–11.1)

## 2024-10-23 PROCEDURE — 83735 ASSAY OF MAGNESIUM: CPT

## 2024-10-23 PROCEDURE — 71045 X-RAY EXAM CHEST 1 VIEW: CPT

## 2024-10-23 PROCEDURE — 96374 THER/PROPH/DIAG INJ IV PUSH: CPT

## 2024-10-23 PROCEDURE — 36415 COLL VENOUS BLD VENIPUNCTURE: CPT

## 2024-10-23 PROCEDURE — 84484 ASSAY OF TROPONIN QUANT: CPT

## 2024-10-23 PROCEDURE — 99285 EMERGENCY DEPT VISIT HI MDM: CPT

## 2024-10-23 PROCEDURE — 6360000004 HC RX CONTRAST MEDICATION: Performed by: EMERGENCY MEDICINE

## 2024-10-23 PROCEDURE — 84145 PROCALCITONIN (PCT): CPT

## 2024-10-23 PROCEDURE — 93010 ELECTROCARDIOGRAM REPORT: CPT | Performed by: INTERNAL MEDICINE

## 2024-10-23 PROCEDURE — 83880 ASSAY OF NATRIURETIC PEPTIDE: CPT

## 2024-10-23 PROCEDURE — 83605 ASSAY OF LACTIC ACID: CPT

## 2024-10-23 PROCEDURE — 80053 COMPREHEN METABOLIC PANEL: CPT

## 2024-10-23 PROCEDURE — 96376 TX/PRO/DX INJ SAME DRUG ADON: CPT

## 2024-10-23 PROCEDURE — 2500000003 HC RX 250 WO HCPCS: Performed by: EMERGENCY MEDICINE

## 2024-10-23 PROCEDURE — 74177 CT ABD & PELVIS W/CONTRAST: CPT

## 2024-10-23 PROCEDURE — 85025 COMPLETE CBC W/AUTO DIFF WBC: CPT

## 2024-10-23 PROCEDURE — 96375 TX/PRO/DX INJ NEW DRUG ADDON: CPT

## 2024-10-23 PROCEDURE — 6360000002 HC RX W HCPCS: Performed by: EMERGENCY MEDICINE

## 2024-10-23 PROCEDURE — 87040 BLOOD CULTURE FOR BACTERIA: CPT

## 2024-10-23 PROCEDURE — 2580000003 HC RX 258: Performed by: EMERGENCY MEDICINE

## 2024-10-23 PROCEDURE — 93005 ELECTROCARDIOGRAM TRACING: CPT | Performed by: EMERGENCY MEDICINE

## 2024-10-23 RX ORDER — OXYCODONE HYDROCHLORIDE 5 MG/1
10 TABLET ORAL
Status: COMPLETED | OUTPATIENT
Start: 2024-10-23 | End: 2024-10-24

## 2024-10-23 RX ORDER — DILTIAZEM HYDROCHLORIDE 5 MG/ML
10 INJECTION INTRAVENOUS
Status: COMPLETED | OUTPATIENT
Start: 2024-10-23 | End: 2024-10-23

## 2024-10-23 RX ORDER — IOPAMIDOL 755 MG/ML
100 INJECTION, SOLUTION INTRAVASCULAR
Status: COMPLETED | OUTPATIENT
Start: 2024-10-23 | End: 2024-10-23

## 2024-10-23 RX ADMIN — FENTANYL CITRATE 75 MCG: 50 INJECTION INTRAMUSCULAR; INTRAVENOUS at 22:36

## 2024-10-23 RX ADMIN — DILTIAZEM HYDROCHLORIDE 10 MG: 5 INJECTION, SOLUTION INTRAVENOUS at 20:30

## 2024-10-23 RX ADMIN — IOPAMIDOL 100 ML: 755 INJECTION, SOLUTION INTRAVENOUS at 22:45

## 2024-10-23 RX ADMIN — SODIUM CHLORIDE 5 MG/HR: 900 INJECTION, SOLUTION INTRAVENOUS at 20:33

## 2024-10-23 RX ADMIN — FENTANYL CITRATE 75 MCG: 50 INJECTION INTRAMUSCULAR; INTRAVENOUS at 20:28

## 2024-10-23 ASSESSMENT — PAIN SCALES - GENERAL
PAINLEVEL_OUTOF10: 8
PAINLEVEL_OUTOF10: 9
PAINLEVEL_OUTOF10: 8

## 2024-10-23 ASSESSMENT — PAIN DESCRIPTION - LOCATION
LOCATION: BACK

## 2024-10-23 ASSESSMENT — PAIN - FUNCTIONAL ASSESSMENT: PAIN_FUNCTIONAL_ASSESSMENT: NONE - DENIES PAIN

## 2024-10-23 NOTE — CARE COORDINATION
Care Transitions Note    Initial Call - Call within 2 business days of discharge: Yes    Attempted to reach patient for transitions of care follow up. Unable to reach patient.    Outreach Attempts:   HIPAA compliant voicemail left for patient.     Patient: Giselle Rosado  Patient : 1970   MRN: [id number]    Reason for Admission: Spontaneous bacterial peritonitis   Discharge Date: 10/22/24  RURS: Readmission Risk Score: 19.1    Last Discharge Facility       Date Complaint Diagnosis Description Type Department Provider    10/10/24 Abdominal Pain Spontaneous bacterial peritonitis (HCC) ... ED to Hosp-Admission (Discharged) (ADMITTED) [id number] Josh Smith MD; Alfonzo...          Was this an external facility discharge? No  Plan for follow-up on next business day.      Mary Kate De La Rosa RN

## 2024-10-23 NOTE — ED TRIAGE NOTES
To ED via EMS, coming from , while riding down road had chest pain so stopped at , c/o SOB and chest pain, did have dialysis today but did not pull fluid off due to hypotention, was recently admitted here for infection and was dx Afib, today HR was 130-150s with EMS, FSBS 180

## 2024-10-24 ENCOUNTER — CARE COORDINATION (OUTPATIENT)
Dept: CARE COORDINATION | Facility: CLINIC | Age: 54
End: 2024-10-24

## 2024-10-24 ENCOUNTER — APPOINTMENT (OUTPATIENT)
Dept: NON INVASIVE DIAGNOSTICS | Age: 54
End: 2024-10-24
Attending: INTERNAL MEDICINE
Payer: COMMERCIAL

## 2024-10-24 ENCOUNTER — APPOINTMENT (OUTPATIENT)
Dept: ULTRASOUND IMAGING | Age: 54
End: 2024-10-24
Attending: RADIOLOGY
Payer: COMMERCIAL

## 2024-10-24 PROBLEM — A41.9 SEPSIS ASSOCIATED HYPOTENSION (HCC): Status: ACTIVE | Noted: 2024-10-24

## 2024-10-24 PROBLEM — I95.9 SEPSIS ASSOCIATED HYPOTENSION (HCC): Status: ACTIVE | Noted: 2024-10-24

## 2024-10-24 LAB
ANION GAP SERPL CALC-SCNC: 20 MMOL/L (ref 9–18)
BASOPHILS # BLD: 0.1 K/UL (ref 0–0.2)
BASOPHILS NFR BLD: 1 % (ref 0–2)
BUN SERPL-MCNC: 22 MG/DL (ref 6–23)
CALCIUM SERPL-MCNC: 7.1 MG/DL (ref 8.8–10.2)
CHLORIDE SERPL-SCNC: 92 MMOL/L (ref 98–107)
CHOLEST SERPL-MCNC: 93 MG/DL (ref 0–200)
CO2 SERPL-SCNC: 21 MMOL/L (ref 20–28)
CREAT SERPL-MCNC: 5.83 MG/DL (ref 0.6–1.1)
DIFFERENTIAL METHOD BLD: ABNORMAL
ECHO AO ASC DIAM: 3.3 CM
ECHO AO ASCENDING AORTA INDEX: 1.28 CM/M2
ECHO AO ROOT DIAM: 3 CM
ECHO AO ROOT INDEX: 1.16 CM/M2
ECHO AV AREA PEAK VELOCITY: 1.5 CM2
ECHO AV AREA VTI: 1.8 CM2
ECHO AV AREA/BSA PEAK VELOCITY: 0.6 CM2/M2
ECHO AV AREA/BSA VTI: 0.7 CM2/M2
ECHO AV MEAN GRADIENT: 9 MMHG
ECHO AV MEAN GRADIENT: 9 MMHG
ECHO AV MEAN VELOCITY: 1.4 M/S
ECHO AV PEAK GRADIENT: 17 MMHG
ECHO AV PEAK VELOCITY: 2.1 M/S
ECHO AV VELOCITY RATIO: 0.48
ECHO AV VTI: 38.5 CM
ECHO BSA: 2.72 M2
ECHO EST RA PRESSURE: 8 MMHG
ECHO IVC PROX: 2.5 CM
ECHO LA AREA 2C: 19.5 CM2
ECHO LA AREA 4C: 23.4 CM2
ECHO LA DIAMETER INDEX: 1.51 CM/M2
ECHO LA DIAMETER: 3.9 CM
ECHO LA MAJOR AXIS: 6.7 CM
ECHO LA MINOR AXIS: 5.8 CM
ECHO LA TO AORTIC ROOT RATIO: 1.3
ECHO LA VOL BP: 65 ML (ref 22–52)
ECHO LA VOL MOD A2C: 55 ML (ref 22–52)
ECHO LA VOL MOD A4C: 67 ML (ref 22–52)
ECHO LA VOL/BSA BIPLANE: 25 ML/M2 (ref 16–34)
ECHO LA VOLUME INDEX MOD A2C: 21 ML/M2 (ref 16–34)
ECHO LA VOLUME INDEX MOD A4C: 26 ML/M2 (ref 16–34)
ECHO LV EDV A4C: 108 ML
ECHO LV EDV INDEX A4C: 42 ML/M2
ECHO LV EF PHYSICIAN: 60 %
ECHO LV EJECTION FRACTION A4C: 60 %
ECHO LV ESV A4C: 44 ML
ECHO LV ESV INDEX A4C: 17 ML/M2
ECHO LV FRACTIONAL SHORTENING: 29 % (ref 28–44)
ECHO LV INTERNAL DIMENSION DIASTOLE INDEX: 1.74 CM/M2
ECHO LV INTERNAL DIMENSION DIASTOLIC: 4.5 CM (ref 3.9–5.3)
ECHO LV INTERNAL DIMENSION SYSTOLIC INDEX: 1.24 CM/M2
ECHO LV INTERNAL DIMENSION SYSTOLIC: 3.2 CM
ECHO LV IVSD: 1.2 CM (ref 0.6–0.9)
ECHO LV MASS 2D: 198.1 G (ref 67–162)
ECHO LV MASS INDEX 2D: 76.8 G/M2 (ref 43–95)
ECHO LV POSTERIOR WALL DIASTOLIC: 1.2 CM (ref 0.6–0.9)
ECHO LV RELATIVE WALL THICKNESS RATIO: 0.53
ECHO LVOT AREA: 3.1 CM2
ECHO LVOT AV VTI INDEX: 0.58
ECHO LVOT DIAM: 2 CM
ECHO LVOT MEAN GRADIENT: 2 MMHG
ECHO LVOT MEAN GRADIENT: 2 MMHG
ECHO LVOT PEAK GRADIENT: 4 MMHG
ECHO LVOT PEAK VELOCITY: 1 M/S
ECHO LVOT STROKE VOLUME INDEX: 27.4 ML/M2
ECHO LVOT SV: 70.7 ML
ECHO LVOT VTI: 22.5 CM
ECHO MV AREA VTI: 2.1 CM2
ECHO MV LVOT VTI INDEX: 1.47
ECHO MV MAX VELOCITY: 1.5 M/S
ECHO MV MEAN GRADIENT: 4 MMHG
ECHO MV MEAN VELOCITY: 1 M/S
ECHO MV PEAK GRADIENT: 9 MMHG
ECHO MV VTI: 33.1 CM
ECHO PV ACCELERATION TIME (AT): 85 MS
ECHO PV MAX VELOCITY: 1.2 M/S
ECHO PV PEAK GRADIENT: 6 MMHG
ECHO RIGHT VENTRICULAR SYSTOLIC PRESSURE (RVSP): 30 MMHG
ECHO RV FREE WALL PEAK S': 8.9 CM/S
ECHO RV INTERNAL DIMENSION: 3.2 CM
ECHO RV TAPSE: 1.9 CM (ref 1.7–?)
ECHO TV REGURGITANT MAX VELOCITY: 2.37 M/S
ECHO TV REGURGITANT PEAK GRADIENT: 22 MMHG
EOSINOPHIL # BLD: 0.1 K/UL (ref 0–0.8)
EOSINOPHIL NFR BLD: 1 % (ref 0.5–7.8)
ERYTHROCYTE [DISTWIDTH] IN BLOOD BY AUTOMATED COUNT: 16.7 % (ref 11.9–14.6)
FERRITIN SERPL-MCNC: 1301 NG/ML (ref 8–388)
GLUCOSE SERPL-MCNC: 237 MG/DL (ref 70–99)
HCT VFR BLD AUTO: 25.7 % (ref 35.8–46.3)
HDLC SERPL-MCNC: 26 MG/DL (ref 40–60)
HDLC SERPL: 3.6 (ref 0–5)
HGB BLD-MCNC: 7.9 G/DL (ref 11.7–15.4)
IMM GRANULOCYTES # BLD AUTO: 0.4 K/UL (ref 0–0.5)
IMM GRANULOCYTES NFR BLD AUTO: 4 % (ref 0–5)
LACTATE SERPL-SCNC: 1.4 MMOL/L (ref 0.5–2)
LDLC SERPL CALC-MCNC: 21 MG/DL (ref 0–100)
LYMPHOCYTES # BLD: 0.8 K/UL (ref 0.5–4.6)
LYMPHOCYTES NFR BLD: 7 % (ref 13–44)
MAGNESIUM SERPL-MCNC: 2 MG/DL (ref 1.8–2.4)
MCH RBC QN AUTO: 29.5 PG (ref 26.1–32.9)
MCHC RBC AUTO-ENTMCNC: 30.7 G/DL (ref 31.4–35)
MCV RBC AUTO: 95.9 FL (ref 82–102)
MONOCYTES # BLD: 0.9 K/UL (ref 0.1–1.3)
MONOCYTES NFR BLD: 8 % (ref 4–12)
NEUTS SEG # BLD: 9.4 K/UL (ref 1.7–8.2)
NEUTS SEG NFR BLD: 79 % (ref 43–78)
NRBC # BLD: 0 K/UL (ref 0–0.2)
PLATELET # BLD AUTO: 310 K/UL (ref 150–450)
PMV BLD AUTO: 9.7 FL (ref 9.4–12.3)
POTASSIUM SERPL-SCNC: 3.7 MMOL/L (ref 3.5–5.1)
RBC # BLD AUTO: 2.68 M/UL (ref 4.05–5.2)
SODIUM SERPL-SCNC: 134 MMOL/L (ref 136–145)
T4 FREE SERPL-MCNC: 1.1 NG/DL (ref 0.9–1.7)
TRIGL SERPL-MCNC: 230 MG/DL (ref 0–150)
TSH W FREE THYROID IF ABNORMAL: 7.72 UIU/ML (ref 0.27–4.2)
VLDLC SERPL CALC-MCNC: 46 MG/DL (ref 6–23)
WBC # BLD AUTO: 11.6 K/UL (ref 4.3–11.1)

## 2024-10-24 PROCEDURE — 6360000002 HC RX W HCPCS: Performed by: INTERNAL MEDICINE

## 2024-10-24 PROCEDURE — 6360000004 HC RX CONTRAST MEDICATION: Performed by: INTERNAL MEDICINE

## 2024-10-24 PROCEDURE — 2580000003 HC RX 258: Performed by: INTERNAL MEDICINE

## 2024-10-24 PROCEDURE — 83735 ASSAY OF MAGNESIUM: CPT

## 2024-10-24 PROCEDURE — 2500000003 HC RX 250 WO HCPCS: Performed by: INTERNAL MEDICINE

## 2024-10-24 PROCEDURE — 84443 ASSAY THYROID STIM HORMONE: CPT

## 2024-10-24 PROCEDURE — 36002 PSEUDOANEURYSM INJECTION TRT: CPT | Performed by: RADIOLOGY

## 2024-10-24 PROCEDURE — 6370000000 HC RX 637 (ALT 250 FOR IP): Performed by: HOSPITALIST

## 2024-10-24 PROCEDURE — C8929 TTE W OR WO FOL WCON,DOPPLER: HCPCS

## 2024-10-24 PROCEDURE — 85025 COMPLETE CBC W/AUTO DIFF WBC: CPT

## 2024-10-24 PROCEDURE — 82728 ASSAY OF FERRITIN: CPT

## 2024-10-24 PROCEDURE — 6370000000 HC RX 637 (ALT 250 FOR IP): Performed by: INTERNAL MEDICINE

## 2024-10-24 PROCEDURE — 3E053GC INTRODUCTION OF OTHER THERAPEUTIC SUBSTANCE INTO PERIPHERAL ARTERY, PERCUTANEOUS APPROACH: ICD-10-PCS | Performed by: RADIOLOGY

## 2024-10-24 PROCEDURE — 83605 ASSAY OF LACTIC ACID: CPT

## 2024-10-24 PROCEDURE — 36002 PSEUDOANEURYSM INJECTION TRT: CPT

## 2024-10-24 PROCEDURE — 80061 LIPID PANEL: CPT

## 2024-10-24 PROCEDURE — 2500000003 HC RX 250 WO HCPCS: Performed by: RADIOLOGY

## 2024-10-24 PROCEDURE — 80048 BASIC METABOLIC PNL TOTAL CA: CPT

## 2024-10-24 PROCEDURE — 36415 COLL VENOUS BLD VENIPUNCTURE: CPT

## 2024-10-24 PROCEDURE — 6370000000 HC RX 637 (ALT 250 FOR IP): Performed by: EMERGENCY MEDICINE

## 2024-10-24 PROCEDURE — 1100000003 HC PRIVATE W/ TELEMETRY

## 2024-10-24 PROCEDURE — 93306 TTE W/DOPPLER COMPLETE: CPT | Performed by: INTERNAL MEDICINE

## 2024-10-24 PROCEDURE — 84439 ASSAY OF FREE THYROXINE: CPT

## 2024-10-24 PROCEDURE — 76942 ECHO GUIDE FOR BIOPSY: CPT | Performed by: RADIOLOGY

## 2024-10-24 PROCEDURE — 2140000000 HC CCU INTERMEDIATE R&B

## 2024-10-24 RX ORDER — MAGNESIUM HYDROXIDE/ALUMINUM HYDROXICE/SIMETHICONE 120; 1200; 1200 MG/30ML; MG/30ML; MG/30ML
30 SUSPENSION ORAL EVERY 6 HOURS PRN
Status: DISCONTINUED | OUTPATIENT
Start: 2024-10-24 | End: 2024-11-08 | Stop reason: HOSPADM

## 2024-10-24 RX ORDER — HYDROCODONE BITARTRATE AND ACETAMINOPHEN 10; 325 MG/1; MG/1
1 TABLET ORAL EVERY 6 HOURS PRN
Status: DISCONTINUED | OUTPATIENT
Start: 2024-10-24 | End: 2024-10-24

## 2024-10-24 RX ORDER — METOPROLOL SUCCINATE 100 MG/1
100 TABLET, EXTENDED RELEASE ORAL DAILY
Status: DISCONTINUED | OUTPATIENT
Start: 2024-10-24 | End: 2024-10-24

## 2024-10-24 RX ORDER — SODIUM CHLORIDE 0.9 % (FLUSH) 0.9 %
5-40 SYRINGE (ML) INJECTION PRN
Status: DISCONTINUED | OUTPATIENT
Start: 2024-10-24 | End: 2024-11-08 | Stop reason: HOSPADM

## 2024-10-24 RX ORDER — OXYCODONE HYDROCHLORIDE 5 MG/1
10 TABLET ORAL EVERY 4 HOURS PRN
Status: DISCONTINUED | OUTPATIENT
Start: 2024-10-24 | End: 2024-11-06

## 2024-10-24 RX ORDER — LIDOCAINE HYDROCHLORIDE 10 MG/ML
INJECTION, SOLUTION EPIDURAL; INFILTRATION; INTRACAUDAL; PERINEURAL PRN
Status: COMPLETED | OUTPATIENT
Start: 2024-10-24 | End: 2024-10-24

## 2024-10-24 RX ORDER — FLUTICASONE PROPIONATE 50 MCG
2 SPRAY, SUSPENSION (ML) NASAL NIGHTLY PRN
Status: DISCONTINUED | OUTPATIENT
Start: 2024-10-24 | End: 2024-11-08 | Stop reason: HOSPADM

## 2024-10-24 RX ORDER — SODIUM CHLORIDE 0.9 % (FLUSH) 0.9 %
5-40 SYRINGE (ML) INJECTION EVERY 12 HOURS SCHEDULED
Status: DISCONTINUED | OUTPATIENT
Start: 2024-10-24 | End: 2024-11-08 | Stop reason: HOSPADM

## 2024-10-24 RX ORDER — METOPROLOL TARTRATE 50 MG
50 TABLET ORAL 2 TIMES DAILY
Status: DISCONTINUED | OUTPATIENT
Start: 2024-10-24 | End: 2024-10-24 | Stop reason: SDUPTHER

## 2024-10-24 RX ORDER — BUMETANIDE 0.25 MG/ML
1 INJECTION, SOLUTION INTRAMUSCULAR; INTRAVENOUS 2 TIMES DAILY
Status: DISCONTINUED | OUTPATIENT
Start: 2024-10-24 | End: 2024-10-24

## 2024-10-24 RX ORDER — SODIUM CHLORIDE 0.9 % (FLUSH) 0.9 %
5-40 SYRINGE (ML) INJECTION EVERY 12 HOURS SCHEDULED
Status: DISCONTINUED | OUTPATIENT
Start: 2024-10-24 | End: 2024-10-24 | Stop reason: SDUPTHER

## 2024-10-24 RX ORDER — FAMOTIDINE 20 MG/1
20 TABLET, FILM COATED ORAL DAILY
Status: DISCONTINUED | OUTPATIENT
Start: 2024-10-24 | End: 2024-11-08 | Stop reason: HOSPADM

## 2024-10-24 RX ORDER — FAMOTIDINE 20 MG/1
10 TABLET, FILM COATED ORAL 2 TIMES DAILY PRN
Status: DISCONTINUED | OUTPATIENT
Start: 2024-10-24 | End: 2024-10-24 | Stop reason: SDUPTHER

## 2024-10-24 RX ORDER — VALSARTAN 40 MG/1
40 TABLET ORAL EVERY 12 HOURS SCHEDULED
Status: DISCONTINUED | OUTPATIENT
Start: 2024-10-24 | End: 2024-10-24

## 2024-10-24 RX ORDER — HYDROMORPHONE HYDROCHLORIDE 1 MG/ML
1 INJECTION, SOLUTION INTRAMUSCULAR; INTRAVENOUS; SUBCUTANEOUS EVERY 6 HOURS PRN
Status: DISCONTINUED | OUTPATIENT
Start: 2024-10-24 | End: 2024-10-24

## 2024-10-24 RX ORDER — DICYCLOMINE HYDROCHLORIDE 10 MG/1
10 CAPSULE ORAL 3 TIMES DAILY PRN
Status: DISCONTINUED | OUTPATIENT
Start: 2024-10-24 | End: 2024-11-08 | Stop reason: HOSPADM

## 2024-10-24 RX ORDER — FLUTICASONE PROPIONATE 50 MCG
2 SPRAY, SUSPENSION (ML) NASAL DAILY
Status: DISCONTINUED | OUTPATIENT
Start: 2024-10-24 | End: 2024-10-24

## 2024-10-24 RX ORDER — ACETAMINOPHEN 650 MG/1
650 SUPPOSITORY RECTAL EVERY 6 HOURS PRN
Status: DISCONTINUED | OUTPATIENT
Start: 2024-10-24 | End: 2024-11-06

## 2024-10-24 RX ORDER — ONDANSETRON 4 MG/1
4 TABLET, ORALLY DISINTEGRATING ORAL EVERY 8 HOURS PRN
Status: DISCONTINUED | OUTPATIENT
Start: 2024-10-24 | End: 2024-11-08 | Stop reason: HOSPADM

## 2024-10-24 RX ORDER — SODIUM CHLORIDE 9 MG/ML
INJECTION, SOLUTION INTRAVENOUS PRN
Status: DISCONTINUED | OUTPATIENT
Start: 2024-10-24 | End: 2024-11-08 | Stop reason: HOSPADM

## 2024-10-24 RX ORDER — ONDANSETRON 4 MG/1
4 TABLET, ORALLY DISINTEGRATING ORAL EVERY 8 HOURS PRN
Status: DISCONTINUED | OUTPATIENT
Start: 2024-10-24 | End: 2024-10-24 | Stop reason: SDUPTHER

## 2024-10-24 RX ORDER — SERTRALINE HYDROCHLORIDE 100 MG/1
100 TABLET, FILM COATED ORAL 2 TIMES DAILY
Status: DISCONTINUED | OUTPATIENT
Start: 2024-10-24 | End: 2024-10-24

## 2024-10-24 RX ORDER — LIDOCAINE 4 G/G
4 PATCH TOPICAL DAILY
Status: DISCONTINUED | OUTPATIENT
Start: 2024-10-24 | End: 2024-11-08 | Stop reason: HOSPADM

## 2024-10-24 RX ORDER — BUMETANIDE 0.25 MG/ML
2 INJECTION, SOLUTION INTRAMUSCULAR; INTRAVENOUS DAILY
Status: DISCONTINUED | OUTPATIENT
Start: 2024-10-24 | End: 2024-10-24

## 2024-10-24 RX ORDER — METOPROLOL TARTRATE 50 MG
50 TABLET ORAL 2 TIMES DAILY
Status: DISCONTINUED | OUTPATIENT
Start: 2024-10-25 | End: 2024-11-08 | Stop reason: HOSPADM

## 2024-10-24 RX ORDER — POLYETHYLENE GLYCOL 3350 17 G/17G
17 POWDER, FOR SOLUTION ORAL DAILY PRN
Status: DISCONTINUED | OUTPATIENT
Start: 2024-10-24 | End: 2024-11-08 | Stop reason: HOSPADM

## 2024-10-24 RX ORDER — SODIUM CHLORIDE 0.9 % (FLUSH) 0.9 %
5-40 SYRINGE (ML) INJECTION PRN
Status: DISCONTINUED | OUTPATIENT
Start: 2024-10-24 | End: 2024-10-24 | Stop reason: SDUPTHER

## 2024-10-24 RX ORDER — ACETAMINOPHEN 325 MG/1
650 TABLET ORAL EVERY 6 HOURS PRN
Status: DISCONTINUED | OUTPATIENT
Start: 2024-10-24 | End: 2024-10-24 | Stop reason: SDUPTHER

## 2024-10-24 RX ORDER — ONDANSETRON 2 MG/ML
4 INJECTION INTRAMUSCULAR; INTRAVENOUS EVERY 6 HOURS PRN
Status: DISCONTINUED | OUTPATIENT
Start: 2024-10-24 | End: 2024-11-08 | Stop reason: HOSPADM

## 2024-10-24 RX ORDER — SERTRALINE HYDROCHLORIDE 100 MG/1
100 TABLET, FILM COATED ORAL 2 TIMES DAILY
Status: DISCONTINUED | OUTPATIENT
Start: 2024-10-25 | End: 2024-11-08 | Stop reason: HOSPADM

## 2024-10-24 RX ORDER — SODIUM CHLORIDE 9 MG/ML
INJECTION, SOLUTION INTRAVENOUS PRN
Status: DISCONTINUED | OUTPATIENT
Start: 2024-10-24 | End: 2024-10-24 | Stop reason: SDUPTHER

## 2024-10-24 RX ORDER — POTASSIUM CHLORIDE 7.45 MG/ML
10 INJECTION INTRAVENOUS ONCE
Status: DISCONTINUED | OUTPATIENT
Start: 2024-10-24 | End: 2024-10-24

## 2024-10-24 RX ORDER — MIDODRINE HYDROCHLORIDE 5 MG/1
5 TABLET ORAL
Status: DISCONTINUED | OUTPATIENT
Start: 2024-10-24 | End: 2024-10-31

## 2024-10-24 RX ORDER — ACETAMINOPHEN 650 MG/1
650 SUPPOSITORY RECTAL EVERY 6 HOURS PRN
Status: DISCONTINUED | OUTPATIENT
Start: 2024-10-24 | End: 2024-10-24 | Stop reason: SDUPTHER

## 2024-10-24 RX ORDER — MAGNESIUM SULFATE IN WATER 40 MG/ML
2000 INJECTION, SOLUTION INTRAVENOUS PRN
Status: DISCONTINUED | OUTPATIENT
Start: 2024-10-24 | End: 2024-10-24 | Stop reason: SDUPTHER

## 2024-10-24 RX ORDER — BISACODYL 5 MG/1
5 TABLET, DELAYED RELEASE ORAL DAILY PRN
Status: DISCONTINUED | OUTPATIENT
Start: 2024-10-24 | End: 2024-10-24 | Stop reason: SDUPTHER

## 2024-10-24 RX ORDER — POTASSIUM CHLORIDE 1500 MG/1
40 TABLET, EXTENDED RELEASE ORAL ONCE
Status: DISCONTINUED | OUTPATIENT
Start: 2024-10-24 | End: 2024-10-24

## 2024-10-24 RX ORDER — LINEZOLID 2 MG/ML
600 INJECTION, SOLUTION INTRAVENOUS EVERY 12 HOURS
Status: DISCONTINUED | OUTPATIENT
Start: 2024-10-24 | End: 2024-10-24

## 2024-10-24 RX ORDER — HYDROMORPHONE HYDROCHLORIDE 1 MG/ML
1 INJECTION, SOLUTION INTRAMUSCULAR; INTRAVENOUS; SUBCUTANEOUS EVERY 4 HOURS PRN
Status: DISCONTINUED | OUTPATIENT
Start: 2024-10-24 | End: 2024-11-08 | Stop reason: HOSPADM

## 2024-10-24 RX ORDER — BISACODYL 10 MG
10 SUPPOSITORY, RECTAL RECTAL DAILY PRN
Status: DISCONTINUED | OUTPATIENT
Start: 2024-10-24 | End: 2024-11-08 | Stop reason: HOSPADM

## 2024-10-24 RX ORDER — ACETAMINOPHEN 325 MG/1
650 TABLET ORAL EVERY 6 HOURS PRN
Status: DISCONTINUED | OUTPATIENT
Start: 2024-10-24 | End: 2024-11-06

## 2024-10-24 RX ADMIN — HYDROMORPHONE HYDROCHLORIDE 1 MG: 1 INJECTION, SOLUTION INTRAMUSCULAR; INTRAVENOUS; SUBCUTANEOUS at 22:00

## 2024-10-24 RX ADMIN — WATER 1000 MG: 1 INJECTION INTRAMUSCULAR; INTRAVENOUS; SUBCUTANEOUS at 02:38

## 2024-10-24 RX ADMIN — METOPROLOL SUCCINATE 100 MG: 100 TABLET, EXTENDED RELEASE ORAL at 09:14

## 2024-10-24 RX ADMIN — OXYCODONE 10 MG: 5 TABLET ORAL at 09:21

## 2024-10-24 RX ADMIN — LINEZOLID 600 MG: 600 INJECTION, SOLUTION INTRAVENOUS at 02:43

## 2024-10-24 RX ADMIN — SODIUM CHLORIDE, PRESERVATIVE FREE 10 ML: 5 INJECTION INTRAVENOUS at 09:16

## 2024-10-24 RX ADMIN — HYDROMORPHONE HYDROCHLORIDE 1 MG: 1 INJECTION, SOLUTION INTRAMUSCULAR; INTRAVENOUS; SUBCUTANEOUS at 15:43

## 2024-10-24 RX ADMIN — OXYCODONE 10 MG: 5 TABLET ORAL at 00:19

## 2024-10-24 RX ADMIN — OXYCODONE 10 MG: 5 TABLET ORAL at 17:38

## 2024-10-24 RX ADMIN — LIDOCAINE HYDROCHLORIDE 5 ML: 10 INJECTION, SOLUTION EPIDURAL; INFILTRATION; INTRACAUDAL; PERINEURAL at 15:03

## 2024-10-24 RX ADMIN — SODIUM CHLORIDE 15 MG/HR: 900 INJECTION, SOLUTION INTRAVENOUS at 03:46

## 2024-10-24 RX ADMIN — SODIUM CHLORIDE, PRESERVATIVE FREE 10 ML: 5 INJECTION INTRAVENOUS at 20:15

## 2024-10-24 RX ADMIN — OXYCODONE 10 MG: 5 TABLET ORAL at 04:21

## 2024-10-24 RX ADMIN — MIDODRINE HYDROCHLORIDE 5 MG: 5 TABLET ORAL at 17:36

## 2024-10-24 RX ADMIN — SODIUM CHLORIDE, PRESERVATIVE FREE 0.45 ML: 5 INJECTION INTRAVENOUS at 12:51

## 2024-10-24 RX ADMIN — FAMOTIDINE 20 MG: 20 TABLET, FILM COATED ORAL at 09:14

## 2024-10-24 RX ADMIN — THROMBIN, TOPICAL (BOVINE) 5000 UNITS: KIT at 15:05

## 2024-10-24 ASSESSMENT — PAIN SCALES - GENERAL
PAINLEVEL_OUTOF10: 6
PAINLEVEL_OUTOF10: 1
PAINLEVEL_OUTOF10: 4
PAINLEVEL_OUTOF10: 9
PAINLEVEL_OUTOF10: 5
PAINLEVEL_OUTOF10: 7
PAINLEVEL_OUTOF10: 5
PAINLEVEL_OUTOF10: 1
PAINLEVEL_OUTOF10: 6
PAINLEVEL_OUTOF10: 5
PAINLEVEL_OUTOF10: 7
PAINLEVEL_OUTOF10: 9
PAINLEVEL_OUTOF10: 0

## 2024-10-24 ASSESSMENT — PAIN DESCRIPTION - ONSET
ONSET: GRADUAL
ONSET: ON-GOING
ONSET: ON-GOING

## 2024-10-24 ASSESSMENT — PAIN DESCRIPTION - LOCATION
LOCATION: BACK;HIP;ABDOMEN
LOCATION: ABDOMEN
LOCATION: ABDOMEN;COCCYX
LOCATION: BACK;INCISION;HIP
LOCATION: ABDOMEN;BACK;HIP
LOCATION: BACK
LOCATION: BACK
LOCATION: HIP;BACK;ABDOMEN

## 2024-10-24 ASSESSMENT — PAIN DESCRIPTION - ORIENTATION
ORIENTATION: LEFT;LOWER
ORIENTATION: LEFT;MID;LOWER
ORIENTATION: LEFT;LOWER
ORIENTATION: LEFT;LOWER
ORIENTATION: LEFT

## 2024-10-24 ASSESSMENT — PAIN DESCRIPTION - PAIN TYPE
TYPE: ACUTE PAIN;CHRONIC PAIN
TYPE: ACUTE PAIN;CHRONIC PAIN
TYPE: CHRONIC PAIN
TYPE: ACUTE PAIN;CHRONIC PAIN

## 2024-10-24 ASSESSMENT — PAIN DESCRIPTION - FREQUENCY
FREQUENCY: INTERMITTENT
FREQUENCY: CONTINUOUS

## 2024-10-24 ASSESSMENT — PAIN - FUNCTIONAL ASSESSMENT
PAIN_FUNCTIONAL_ASSESSMENT: PREVENTS OR INTERFERES SOME ACTIVE ACTIVITIES AND ADLS
PAIN_FUNCTIONAL_ASSESSMENT: ACTIVITIES ARE NOT PREVENTED

## 2024-10-24 ASSESSMENT — PAIN DESCRIPTION - DESCRIPTORS
DESCRIPTORS: ACHING;DISCOMFORT;NAGGING
DESCRIPTORS: ACHING;SORE;TENDER
DESCRIPTORS: ACHING;DISCOMFORT
DESCRIPTORS: ACHING;DISCOMFORT;NAGGING
DESCRIPTORS: DISCOMFORT;DULL
DESCRIPTORS: ACHING;DISCOMFORT

## 2024-10-24 NOTE — ED PROVIDER NOTES
Emergency Department Provider Note       PCP: Tanisha Powers, APRN - CNP   Age: 54 y.o.   Sex: female     DISPOSITION Decision To Admit 10/23/2024 11:44:46 PM    ICD-10-CM    1. Atrial fibrillation with RVR ([facility])  I48.91       2. SBP (spontaneous bacterial peritonitis) ([facility])  K65.2       3. Active bleeding  Z78.9       4. ESRD on hemodialysis ([facility])  N18.6     Z99.2           Medical Decision Making     Patient with A-fib and RVR and mild pulmonary edema.  They were unable to pull any fluid due to her blood pressure at dialysis.  She is having increased abdominal pain and now has increased white count and lactic acidosis.  Repeat CT shows continued active extravasation from where her PD catheter was pulled.  Her hemoglobin has not changed significantly.  It is difficult to get her pain controlled and balance her pressure.  She received vancomycin today at dialysis.  This was recommended by ID per the discharge summary.  Additional antibiotics were not given at this time.  CT does not indicate increased infection.  Discussed with hospitalist.     1 or more acute illnesses that pose a threat to life or bodily function.   Prescription drug management performed.  Parental controlled substances given in the ED.  Drug therapy given requiring intensive monitoring for toxicity.  Discussion with external consultants.  Chronic medical problems impacting care include ESRD.  Shared medical decision making was utilized in creating the patients health plan today.  I independently ordered and reviewed each unique test.    I reviewed external records: provider visit note from outside specialist.   The patients assessment required an independent historian: .  The reason they were needed is important historical information not provided by the patient.  ED cardiac monitoring rhythm strip was ordered and interpreted:  atrial fibrillation with rapid ventricular rate  ST Segments:Nonspecific ST segments - NO STEMI   Rate:  differential consideration.  Intraperitoneal dialysate fluid versus ascites. Findings discussed with Dr. Becka Alarcon immediately after exam was evaluated.  2. Hepatic contour abnormality may be related to hepatic cirrhosis.      If providers have any questions about this report, I can be reached on  [employer].      Electronically signed by Logan Pacheco   CBC with Auto Differential   Result Value Ref Range    WBC 14.2 (H) 4.3 - 11.1 K/uL    RBC 2.90 (L) 4.05 - 5.2 M/uL    Hemoglobin 8.3 (L) 11.7 - 15.4 g/dL    Hematocrit 26.7 (L) 35.8 - 46.3 %    MCV 92.1 82 - 102 FL    MCH 28.6 26.1 - 32.9 PG    MCHC 31.1 (L) 31.4 - 35.0 g/dL    RDW 16.8 (H) 11.9 - 14.6 %    Platelets 375 150 - 450 K/uL    MPV 9.5 9.4 - 12.3 FL    nRBC 0.00 0.0 - 0.2 K/uL    Differential Type AUTOMATED      Neutrophils % 87 (H) 43 - 78 %    Lymphocytes % 4 (L) 13 - 44 %    Monocytes % 5 4.0 - 12.0 %    Eosinophils % 0 (L) 0.5 - 7.8 %    Basophils % 0 0.0 - 2.0 %    Immature Granulocytes % 4 0.0 - 5.0 %    Neutrophils Absolute 12.3 (H) 1.7 - 8.2 K/UL    Lymphocytes Absolute 0.6 0.5 - 4.6 K/UL    Monocytes Absolute 0.8 0.1 - 1.3 K/UL    Eosinophils Absolute 0.0 0.0 - 0.8 K/UL    Basophils Absolute 0.1 0.0 - 0.2 K/UL    Immature Granulocytes Absolute 0.5 0.0 - 0.5 K/UL   CMP   Result Value Ref Range    Sodium 135 (L) 136 - 145 mmol/L    Potassium 3.7 3.5 - 5.1 mmol/L    Chloride 92 (L) 98 - 107 mmol/L    CO2 18 (L) 20 - 28 mmol/L    Anion Gap 26 (H) 9 - 18 mmol/L    Glucose 143 (H) 70 - 99 mg/dL    BUN 20 6 - 23 MG/DL    Creatinine 5.42 (H) 0.60 - 1.10 MG/DL    Est, Glom Filt Rate 9 (L) >60 ml/min/1.73m2    Calcium 7.5 (L) 8.8 - 10.2 MG/DL    Total Bilirubin <0.2 0.0 - 1.2 MG/DL    ALT <5 (L) 8 - 45 U/L    AST 19 15 - 37 U/L    Alk Phosphatase 153 (H) 35 - 104 U/L    Total Protein 6.0 (L) 6.3 - 8.2 g/dL    Albumin 1.7 (L) 3.5 - 5.0 g/dL    Globulin 4.3 (H) 2.3 - 3.5 g/dL    Albumin/Globulin Ratio 0.4 (L) 1.0 - 1.9     Magnesium   Result Value

## 2024-10-24 NOTE — PROGRESS NOTES
TRANSFER - IN REPORT:    Verbal report received from DEMIAN Robles on Giselle Rosado  being received from ED for routine progression of patient care      Report consisted of patient's Situation, Background, Assessment and   Recommendations(SBAR).     Information from the following report(s) Nurse Handoff Report, Index, ED Encounter Summary, ED SBAR, MAR, Recent Results, and Cardiac Rhythm A Fib  was reviewed with the receiving nurse.    Opportunity for questions and clarification was provided.      Assessment completed upon patient's arrival to unit and care assumed.

## 2024-10-24 NOTE — H&P
Hospitalist History and Physical   Admit Date:  10/23/2024  7:42 PM   Name:  Giselle Rosado   Age:  54 y.o.  Sex:  female  :  1970   MRN:  [id number]   Room:  [facility]/    Presenting/Chief Complaint: Chest Pain and Shortness of Breath     Reason(s) for Admission: Sepsis associated hypotension (HCC) [A41.9, I95.9]     History of Present Illness:   Giselle Rosado is a 54 y.o. female with medical history of recent SBP, end-stage renal disease on hemodialysis , atrial fibrillation with rapid regular response, GERD, allergic rhinitis, hypertension, as well as depression.  Patient was discharged on a few days ago went to dialysis today.  Blood pressure was low was not able to be dialyzed patient complaining of chest pain as well as shortness of breath did not feel the palpitations even though the initial heart rate when she arrived with 132 patient's lactic acid was elevated at 4.5 with elevated white count of 14.2 proBNP is 62,105.  Hospital service asked to evaluate admit this patient due to underlying problems that have not cleared up.      Assessment & Plan:     Principal Problem:    Sepsis associated hypotension (HCC) recent SBP  Initial lactic acid was 4.5 with hydration and antibiotics it came down to 2.5  Rocephin and vancomycin  Cultures were drawn    Recent subcutaneous hematoma possible extravasation from left of the rectus abdominal muscle as well as inferior epigastric arterial territory may be amenable to embolization need to discuss with IR  Overall slight increase in size currently is at 7.9 cm x 3.4 cm  H&H remained stable.  Subcutaneous air seen on the CAT scan is to be secondary to recent PD catheter removal patient has access placed in the left subclavian  Continue to monitor    Acute respiratory failure/A-fib RVR  Secondary to A-fib RVR need to control that first  On Lopressor 50 mg twice daily and Eliquis 2.5 mg twice daily    Active Problems:    Obesity,  morbid  Needs a dietitian control and weight loss planning        Anemia  H&H remained stable from the last discharge  Transfuse if hemoglobin less than 7      HTN (hypertension)  On Lopressor 50 twice daily      DM2 (diabetes mellitus, type 2) (HCC)  ADA diet and sliding scale with coverage    ESRD  On hemodialysis Monday Wednesday Friday  PD catheter was removed  Left subclavian access placed for outpatient hemodialysis  However they were not able to dialyze today because of hypotension        PT/OT evals ordered?  Defer to primary team  Diet: No diet orders on file  VTE prophylaxis: Heparin  Code status: Prior      Non-peripheral Lines and Tubes (if present):      Tunneled Hemodialysis Catheter Left Neck (Active)           Hospital Problems:  Principal Problem:    Sepsis associated hypotension (HCC)  Active Problems:    Obesity, morbid    Acute respiratory failure    Gout    Anemia    HTN (hypertension)    DM2 (diabetes mellitus, type 2) (HCC)    Atrial fibrillation with RVR (HCC)    Cellulitis of left lower extremity    Sepsis (HCC)    End stage chronic kidney disease (HCC)    Spontaneous bacterial peritonitis (HCC)  Resolved Problems:    * No resolved hospital problems. *        Objective:   Patient Vitals for the past 24 hrs:   Temp Pulse Resp BP SpO2   10/24/24 0030 -- 91 26 (!) 86/72 99 %   10/24/24 0015 -- 100 24 (!) 113/48 100 %   10/24/24 0000 -- 98 24 114/73 99 %   10/23/24 2345 -- (!) 115 30 115/61 100 %   10/23/24 2230 -- (!) 117 24 117/67 98 %   10/23/24 2215 -- (!) 107 26 (!) 108/92 99 %   10/23/24 2200 -- (!) 114 22 90/74 98 %   10/23/24 2115 -- (!) 110 21 (!) 107/90 99 %   10/23/24 2110 -- (!) 115 19 (!) 94/56 99 %   10/23/24 2045 -- (!) 115 23 (!) 111/94 100 %   10/23/24 2030 -- (!) 119 19 94/70 96 %   10/23/24 2000 -- (!) 127 (!) 31 105/77 --   10/23/24 1945 97.9 °F (36.6 °C) (!) 132 27 108/60 100 %       Oxygen Therapy  SpO2: 99 %  Pulse via Oximetry: 97 beats per minute  O2 Device: None (Room

## 2024-10-24 NOTE — CONSULTS
[facility] Consultation    Admission Date:  10/23/2024    Admission Diagnosis:  Sepsis associated hypotension ([facility]) [A41.9, I95.9]  SBP (spontaneous bacterial peritonitis) ([facility]) [K65.2]  Atrial fibrillation with RVR ([facility]) [I48.91]  ESRD on hemodialysis ([facility]) [N18.6, Z99.2]  Active bleeding [Z78.9]  Cerebrovascular accident (CVA) due to thrombosis of cerebral artery ([facility]) [I63.30]    History of Present Illness:  Giselle Rosado is a 54 y.o. female with medical history of recent SBP, end-stage renal disease on hemodialysis Monday Wednesday Friday, atrial fibrillation with rapid regular response, GERD, allergic rhinitis, hypertension, as well as depression.  Patient was discharged two days ago.  She went to dialysis yesterday, had low BP, did dialyzed by her report but not much volume removal.  After HD, she experienced diffuse pain and SOB. On arrival to ED, was in Afib with RVR.    Past Medical History:   Diagnosis Date    Acute kidney injury ([facility])     ARF with sepsis requiring hemodialysis    Anemia     Anxiety     ARF (acute respiratory failure) 12/2021    required vent at [facility]    Atrial fibrillation with RVR ([facility]) 12/22/2021    while hospitalized for sepsis.    Chronic kidney disease     secondary to sepsis 12/2021--- US Renal in TR on M, W, F    Debility     Diabetes ([facility])     type 2. does not check since on dialysis,no meds; no hypo    Dialysis patient ([facility]) 12/2021    Mon, Wed, Fri    GERD (gastroesophageal reflux disease)     OTC daily med    Gout     Gout     History of recent blood transfusion     1 unit 2/17/22     1 unit 2/19/22 per [facility] records    Hypertension     controlled w/med    IBS (irritable bowel syndrome)     Iron deficiency anemia     Necrotizing fasciitis 12/27/2021    left groin pannus and perineum    Personal history of COVID-19     12/31/21 was hospitalized at [facility] when diagnosed---states \"extremely mild cough\"     Sepsis ([facility]) 12/2021     caused CRF, on dialysisi     removal of PD catheter    3) Abdominal pain - evidence of subcutaneous hematoma    4) Hypotension - on Toprol for Afib but will stop Valsartan    5) S/p Afib with RVR - better rate controlled    6) Hyperphosphatemia - on Tenapanor    High Medical Decision Making  -Patient with at least one acute illness that poses a threat to bodily function  -Reviewed 3 labs  -Reviewed external charts

## 2024-10-24 NOTE — OP NOTE
[facility]  Department of Interventional Radiology  [phone]        Interventional Radiology Brief Procedure Note    Patient: Giselle Rosado MRN: [id number]  SSN: [id number]    YOB: 1970  Age: 54 y.o.  Sex: female      Date of Procedure: 10/24/2024    Pre-Procedure Diagnosis: inferior epigastric pseudoaneurysm    Post-Procedure Diagnosis: SAME    Procedure(s): US guided thrombin iinjection    Brief Description of Procedure: as above    Performed By: ROMA NJ MD     Assistants: None    Anesthesia:Lidocaine    Estimated Blood Loss: Less than 10ml    Specimens:  None    Implants:  All Purpose Drain    Findings: PSA off the inferior left epigastric  successful thrombin injection with occlusion    Complications: None    Recommendations: will order psa recheck tomorrow.  May need a repeat injection if it recannulizes     Follow Up: as above    Signed By: ROMA NJ MD     October 24, 2024

## 2024-10-24 NOTE — CARE COORDINATION
Opened chart for second ALLEN outreach attempt and noted on chart review patient returned to the hospital on 10.23.24 and is currently admitted for acute care.  No further outreach is indicated at this time.  Patient will be reassigned pending discharge disposition.

## 2024-10-24 NOTE — PROGRESS NOTES
VANCO DAILY FOLLOW UP RENAL INSUFFICIENCY PATIENT   [facility]   Pharmacy Pharmacokinetic Monitoring Service - Vancomycin    Consulting Provider: Jodi Calderon MD   Indication: Peritonitis (EOT 11/1/24 per ID)   Target Concentration: Pre-dialysis concentration 15-20 mg/L  Day of Therapy: 1 (Day 6 of total therapy)   Additional Antimicrobials: ceftriaxone    Pertinent Laboratory Values:   Wt Readings from Last 1 Encounters:   10/24/24 (!) 152.4 kg (335 lb 15.7 oz)     Temp Readings from Last 1 Encounters:   10/24/24 98.6 °F (37 °C) (Oral)     Recent Labs     10/22/24  0355 10/23/24  2012 10/23/24  2200 10/24/24  0420   BUN 30* 20  --  22   CREATININE 7.39* 5.42*  --  5.83*   WBC 10.7 14.2*  --  11.6*   PROCAL  --  0.62*  --   --    LACTA  --   --   --  1.4   LACTSEPSIS  --  4.5* 2.5*  --        Lab Results   Component Value Date/Time    VANCOTROUGH 8.8 10/14/2024 11:22 AM    VANCORANDOM 26.0 10/22/2024 03:55 AM       MRSA Nasal Swab: N/A. Non-respiratory infection    Assessment:  Date:  Dose/Freq Admin Times Level/Time:   10/24      10/25 HD   Rd @0400 =                        Plan:  Concentration-guided dosing due to intermittent hemodialysis  Per ED provider's note (10/23), patient received vancomycin dose yesterday with dialysis. No dose needed today, will plan to re-dose post-HD tomorrow.   Vancomycin concentrations will be ordered as clinically appropriate  Pharmacy will continue to monitor patient and adjust therapy as indicated    Thank you for the consult,  Noel Fleming [facility]

## 2024-10-24 NOTE — ED NOTES
TRANSFER - OUT REPORT:    Verbal report given to DEMIAN Nguyen on Giselle Rosado  being transferred to room 402 for routine progression of patient care       Report consisted of patient's Situation, Background, Assessment and   Recommendations(SBAR).     Information from the following report(s) Nurse Handoff Report, ED Encounter Summary, ED SBAR, Adult Overview, Intake/Output, MAR, Recent Results, and Event Log was reviewed with the receiving nurse.    [place] Fall Assessment:    Presents to emergency department  because of falls (Syncope, seizure, or loss of consciousness): No  Age > 70: No  Altered Mental Status, Intoxication with alcohol or substance confusion (Disorientation, impaired judgment, poor safety awaremess, or inability to follow instructions): No  Impaired Mobility: Ambulates or transfers with assistive devices or assistance; Unable to ambulate or transer.: Yes  Nursing Judgement: Yes          Lines:   Peripheral IV 10/23/24 Left Forearm (Active)       Tunneled Hemodialysis Catheter Left Neck (Active)        Opportunity for questions and clarification was provided.      Patient transported with:  Registered Nurse           Margaret Alvarez RN  10/24/24 0116

## 2024-10-24 NOTE — CONSULTS
Nutrition Note:   Acknowledge Consult for Education: CHF diet education    Initial education to be completed by CHF Nurse Navigator. Will defer RD intervention at this time. Navigator will reconsult RD if additional diet education intervention needed.    Suzie Morel RD

## 2024-10-24 NOTE — PROGRESS NOTES
[START ON 10/25/2024] metoprolol tartrate (LOPRESSOR) tablet 50 mg  50 mg Oral BID    dilTIAZem 100 mg in sodium chloride 0.9 % 100 mL infusion ([facility])  2.5-15 mg/hr IntraVENous Continuous       Signed:  Jodi Calderon MD    Part of this note may have been written by using a voice dictation software.  The note has been proof read but may still contain some grammatical/other typographical errors.

## 2024-10-24 NOTE — PROGRESS NOTES
4 Eyes Skin Assessment     NAME:  Giselle Rosado  YOB: 1970  MEDICAL RECORD NUMBER:  [id number]    The patient is being assessed for  Admission    I agree that at least one RN has performed a thorough Head to Toe Skin Assessment on the patient. ALL assessment sites listed below have been assessed.      Areas assessed by both nurses:    Head, Face, Ears, Shoulders, Back, Chest, Arms, Elbows, Hands, Sacrum. Buttock, Coccyx, Ischium, Legs. Feet and Heels, and Under Medical Devices      Patient's skin is clean, dry, and intact. Heels and sacrum without any redness, wounds, or open areas. Patient has various places of redness, bruising, and swelling on abdomen, pannus, and bilateral hips that is bordered with black sharpie. BLE with 3+ pitting edema. L subclavian HD tunnel cath. Scattered scars.        Does the Patient have a Wound? No noted wound(s)       [place] Prevention initiated by RN: Yes  Wound Care Orders initiated by RN: No    Pressure Injury (Stage 3,4, Unstageable, DTI, NWPT, and Complex wounds) if present, place Wound referral order by RN under : No    New Ostomies, if present place, Ostomy referral order under : No     Nurse 1 eSignature: Electronically signed by Wendy Joseph RN on 10/24/24 at 2:51 AM EDT    **SHARE this note so that the co-signing nurse can place an eSignature**    Nurse 2 eSignature: Electronically signed by DAPHNE SHAH RN on 10/24/24 at 2:55 AM EDT

## 2024-10-24 NOTE — CARE COORDINATION
Pt presented to the ED after HD with diffuse pain and SOB and in AFib RVR. Admitted for sepsis associated with hypotension. Pt is a readmission, was discharged on 10/22/24. Readmission Assessment completed. Medical hx includes: recent SBP, ESRD, A.Fib RVR, GERD, allergic rhinitis, HTN, and depression. Pt is moderately indep at baseline. A/O x4. She lives with her spouse and helps as needed. Pt drives. On RA. Uses a WC for long distances. Pt was on PD prior to infection at last admission, is now on HD [facility] M[facility]. She receives IV Vanc at the dialysis clinic. PT/OT consulted. PCP established. [place] Medicare verified and able to afford home meds. Await therapy recs to discern ALLEN needs.       10/24/24 1316   Service Assessment   Patient Orientation Alert and Oriented   Cognition Alert   History Provided By Patient   Primary Caregiver Self   Support Systems Spouse/Significant Other;Children;Family Members;[place];Friends/Neighbors   Patient's Healthcare Decision Maker is: Legal Next of Kin   PCP Verified by CM Yes   Last Visit to PCP Within last 3 months   Prior Functional Level Assistance with the following:;Mobility   Current Functional Level Assistance with the following:;Mobility   Can patient return to prior living arrangement Yes   Ability to make needs known: Good   Family able to assist with home care needs: Yes   Would you like for me to discuss the discharge plan with any other family members/significant others, and if so, who? No   Financial Resources Medicare   Community Resources None   Social/Functional History   Lives With Spouse   Type of Home House   Home Layout One level   Bathroom Toilet Standard   Bathroom Accessibility Accessible   Home Equipment Wheelchair - Manual   Receives Help From Family   ADL Assistance Independent   Homemaking Assistance Independent   Ambulation Assistance Independent   Transfer Assistance Independent   Active  Yes   Occupation On disability

## 2024-10-25 ENCOUNTER — APPOINTMENT (OUTPATIENT)
Dept: ULTRASOUND IMAGING | Age: 54
End: 2024-10-25
Payer: COMMERCIAL

## 2024-10-25 LAB
ANION GAP SERPL CALC-SCNC: 15 MMOL/L (ref 9–18)
BASOPHILS # BLD: 0.1 K/UL (ref 0–0.2)
BASOPHILS NFR BLD: 1 % (ref 0–2)
BUN SERPL-MCNC: 27 MG/DL (ref 6–23)
CALCIUM SERPL-MCNC: 7.2 MG/DL (ref 8.8–10.2)
CHLORIDE SERPL-SCNC: 94 MMOL/L (ref 98–107)
CO2 SERPL-SCNC: 24 MMOL/L (ref 20–28)
CREAT SERPL-MCNC: 7.18 MG/DL (ref 0.6–1.1)
DIFFERENTIAL METHOD BLD: ABNORMAL
EOSINOPHIL # BLD: 0.2 K/UL (ref 0–0.8)
EOSINOPHIL NFR BLD: 2 % (ref 0.5–7.8)
ERYTHROCYTE [DISTWIDTH] IN BLOOD BY AUTOMATED COUNT: 16.9 % (ref 11.9–14.6)
GLUCOSE SERPL-MCNC: 191 MG/DL (ref 70–99)
HCT VFR BLD AUTO: 25.6 % (ref 35.8–46.3)
HGB BLD-MCNC: 7.7 G/DL (ref 11.7–15.4)
IMM GRANULOCYTES # BLD AUTO: 0.5 K/UL (ref 0–0.5)
IMM GRANULOCYTES NFR BLD AUTO: 5 % (ref 0–5)
LYMPHOCYTES # BLD: 0.7 K/UL (ref 0.5–4.6)
LYMPHOCYTES NFR BLD: 7 % (ref 13–44)
MAGNESIUM SERPL-MCNC: 2.2 MG/DL (ref 1.8–2.4)
MCH RBC QN AUTO: 28.8 PG (ref 26.1–32.9)
MCHC RBC AUTO-ENTMCNC: 30.1 G/DL (ref 31.4–35)
MCV RBC AUTO: 95.9 FL (ref 82–102)
MONOCYTES # BLD: 0.9 K/UL (ref 0.1–1.3)
MONOCYTES NFR BLD: 8 % (ref 4–12)
NEUTS SEG # BLD: 7.9 K/UL (ref 1.7–8.2)
NEUTS SEG NFR BLD: 77 % (ref 43–78)
NRBC # BLD: 0 K/UL (ref 0–0.2)
PHOSPHATE SERPL-MCNC: 7 MG/DL (ref 2.5–4.5)
PLATELET # BLD AUTO: 333 K/UL (ref 150–450)
PMV BLD AUTO: 9.5 FL (ref 9.4–12.3)
POTASSIUM SERPL-SCNC: 4 MMOL/L (ref 3.5–5.1)
RBC # BLD AUTO: 2.67 M/UL (ref 4.05–5.2)
SODIUM SERPL-SCNC: 133 MMOL/L (ref 136–145)
VANCOMYCIN SERPL-MCNC: 21.6 UG/ML
WBC # BLD AUTO: 10.2 K/UL (ref 4.3–11.1)

## 2024-10-25 PROCEDURE — 6370000000 HC RX 637 (ALT 250 FOR IP): Performed by: INTERNAL MEDICINE

## 2024-10-25 PROCEDURE — 80202 ASSAY OF VANCOMYCIN: CPT

## 2024-10-25 PROCEDURE — 2580000003 HC RX 258: Performed by: INTERNAL MEDICINE

## 2024-10-25 PROCEDURE — 93926 LOWER EXTREMITY STUDY: CPT

## 2024-10-25 PROCEDURE — 84100 ASSAY OF PHOSPHORUS: CPT

## 2024-10-25 PROCEDURE — 1100000003 HC PRIVATE W/ TELEMETRY

## 2024-10-25 PROCEDURE — 6360000002 HC RX W HCPCS: Performed by: INTERNAL MEDICINE

## 2024-10-25 PROCEDURE — 85025 COMPLETE CBC W/AUTO DIFF WBC: CPT

## 2024-10-25 PROCEDURE — 36415 COLL VENOUS BLD VENIPUNCTURE: CPT

## 2024-10-25 PROCEDURE — 93926 LOWER EXTREMITY STUDY: CPT | Performed by: RADIOLOGY

## 2024-10-25 PROCEDURE — 80048 BASIC METABOLIC PNL TOTAL CA: CPT

## 2024-10-25 PROCEDURE — 2140000000 HC CCU INTERMEDIATE R&B

## 2024-10-25 PROCEDURE — 90935 HEMODIALYSIS ONE EVALUATION: CPT

## 2024-10-25 PROCEDURE — 83735 ASSAY OF MAGNESIUM: CPT

## 2024-10-25 PROCEDURE — 6370000000 HC RX 637 (ALT 250 FOR IP): Performed by: HOSPITALIST

## 2024-10-25 RX ADMIN — MIDODRINE HYDROCHLORIDE 5 MG: 5 TABLET ORAL at 17:11

## 2024-10-25 RX ADMIN — MIDODRINE HYDROCHLORIDE 5 MG: 5 TABLET ORAL at 10:16

## 2024-10-25 RX ADMIN — SERTRALINE 100 MG: 100 TABLET, FILM COATED ORAL at 19:17

## 2024-10-25 RX ADMIN — METOPROLOL TARTRATE 50 MG: 50 TABLET, FILM COATED ORAL at 10:16

## 2024-10-25 RX ADMIN — SERTRALINE 100 MG: 100 TABLET, FILM COATED ORAL at 10:16

## 2024-10-25 RX ADMIN — SODIUM CHLORIDE, PRESERVATIVE FREE 10 ML: 5 INJECTION INTRAVENOUS at 10:24

## 2024-10-25 RX ADMIN — ONDANSETRON 4 MG: 2 INJECTION INTRAMUSCULAR; INTRAVENOUS at 10:34

## 2024-10-25 RX ADMIN — HYDROMORPHONE HYDROCHLORIDE 1 MG: 1 INJECTION, SOLUTION INTRAMUSCULAR; INTRAVENOUS; SUBCUTANEOUS at 11:49

## 2024-10-25 RX ADMIN — HYDROMORPHONE HYDROCHLORIDE 1 MG: 1 INJECTION, SOLUTION INTRAMUSCULAR; INTRAVENOUS; SUBCUTANEOUS at 19:17

## 2024-10-25 RX ADMIN — WATER 1000 MG: 1 INJECTION INTRAMUSCULAR; INTRAVENOUS; SUBCUTANEOUS at 02:37

## 2024-10-25 RX ADMIN — MIDODRINE HYDROCHLORIDE 5 MG: 5 TABLET ORAL at 13:44

## 2024-10-25 RX ADMIN — FAMOTIDINE 20 MG: 20 TABLET, FILM COATED ORAL at 10:16

## 2024-10-25 RX ADMIN — SODIUM CHLORIDE, PRESERVATIVE FREE 10 ML: 5 INJECTION INTRAVENOUS at 19:17

## 2024-10-25 RX ADMIN — OXYCODONE 10 MG: 5 TABLET ORAL at 00:14

## 2024-10-25 RX ADMIN — HYDROMORPHONE HYDROCHLORIDE 1 MG: 1 INJECTION, SOLUTION INTRAMUSCULAR; INTRAVENOUS; SUBCUTANEOUS at 07:00

## 2024-10-25 RX ADMIN — OXYCODONE 10 MG: 5 TABLET ORAL at 15:12

## 2024-10-25 RX ADMIN — VANCOMYCIN HYDROCHLORIDE 1000 MG: 1 INJECTION, POWDER, LYOPHILIZED, FOR SOLUTION INTRAVENOUS at 13:49

## 2024-10-25 RX ADMIN — METOPROLOL TARTRATE 50 MG: 50 TABLET, FILM COATED ORAL at 19:17

## 2024-10-25 RX ADMIN — OXYCODONE 10 MG: 5 TABLET ORAL at 23:35

## 2024-10-25 RX ADMIN — OXYCODONE 10 MG: 5 TABLET ORAL at 10:15

## 2024-10-25 ASSESSMENT — PAIN DESCRIPTION - DESCRIPTORS
DESCRIPTORS: ACHING;SORE
DESCRIPTORS: ACHING;DISCOMFORT
DESCRIPTORS: ACHING;DISCOMFORT;TENDER

## 2024-10-25 ASSESSMENT — PAIN DESCRIPTION - PAIN TYPE
TYPE: ACUTE PAIN;CHRONIC PAIN
TYPE: ACUTE PAIN

## 2024-10-25 ASSESSMENT — PAIN SCALES - GENERAL
PAINLEVEL_OUTOF10: 0
PAINLEVEL_OUTOF10: 7
PAINLEVEL_OUTOF10: 10
PAINLEVEL_OUTOF10: 10
PAINLEVEL_OUTOF10: 3
PAINLEVEL_OUTOF10: 7
PAINLEVEL_OUTOF10: 8
PAINLEVEL_OUTOF10: 7
PAINLEVEL_OUTOF10: 4
PAINLEVEL_OUTOF10: 4
PAINLEVEL_OUTOF10: 7
PAINLEVEL_OUTOF10: 4
PAINLEVEL_OUTOF10: 2
PAINLEVEL_OUTOF10: 0

## 2024-10-25 ASSESSMENT — PAIN DESCRIPTION - ORIENTATION
ORIENTATION: LEFT;LOWER;MID
ORIENTATION: LEFT;LOWER
ORIENTATION: LEFT;LOWER

## 2024-10-25 ASSESSMENT — PAIN DESCRIPTION - FREQUENCY: FREQUENCY: INTERMITTENT

## 2024-10-25 ASSESSMENT — PAIN DESCRIPTION - LOCATION
LOCATION: BACK
LOCATION: ABDOMEN;BACK
LOCATION: BACK;HIP
LOCATION: BACK;GENERALIZED
LOCATION: BACK;GENERALIZED
LOCATION: GENERALIZED;BACK
LOCATION: BACK;GENERALIZED

## 2024-10-25 ASSESSMENT — PAIN DESCRIPTION - ONSET
ONSET: SUDDEN
ONSET: GRADUAL

## 2024-10-25 NOTE — DIALYSIS
TRANSFER OUT- DIALYSIS    Hemodialysis treatment completed. PT ran 2.5 hours, with complaint of belly pain cramping?  Pt request to end treatment.  Dr. Snow aware.    Patient alert and VS stable  /82   P 81       1.5 Kg removed.      Flushed both ports with 10 mL of NS.  CVC dressing clean, dry, and intact, tego caps intact, curos caps placed.      Meds given: None.    RBCs given during dialysis: No    Patient to 402 after dialysis.

## 2024-10-25 NOTE — DIALYSIS
TRANSFER IN - DIALYSIS    Received patient in dialysis unit  from [facility] (unit) for ordered procedure.    Consent verified for renal replacement therapy. Procedure explained to patient, opportunity for Q&A provided. Call light given.     Patient A&O and vital signs stable.  /58,  P64  , 0L O2 via room air.    Hemodialysis initiated using r CVC.  Aspirated and flushed both ports without difficulty. Dressing clean, dry and intact.  Machine settings per MD order.    Heparin 0 unit bolus and 0 units/hr.      Will monitor during treatment.

## 2024-10-25 NOTE — CARE COORDINATION
Chart reviewed by RNCM.    CM following for continued stay.    No discharge needs identified by RNCM.    Please consult case management if any needs arise.

## 2024-10-25 NOTE — PROGRESS NOTES
VANCO DAILY FOLLOW UP RENAL INSUFFICIENCY PATIENT   [facility]   Pharmacy Pharmacokinetic Monitoring Service - Vancomycin    Consulting Provider: Jodi Calderon MD   Indication: Peritonitis   Target Concentration: Pre-dialysis concentration 15-20 mg/L  Day of Therapy: 2 (Day 7 of total therapy - EOT 11/1/24 per ID)   Additional Antimicrobials: ceftriaxone    Pertinent Laboratory Values:   Wt Readings from Last 1 Encounters:   10/25/24 (!) 153.8 kg (339 lb)     Temp Readings from Last 1 Encounters:   10/25/24 98.6 °F (37 °C) (Axillary)     Recent Labs     10/23/24  2012 10/23/24  2200 10/24/24  0420 10/25/24  0406   BUN 20  --  22 27*   CREATININE 5.42*  --  5.83* 7.18*   WBC 14.2*  --  11.6* 10.2   PROCAL 0.62*  --   --   --    LACTA  --   --  1.4  --    LACTSEPSIS 4.5* 2.5*  --   --        Lab Results   Component Value Date/Time    VANCOTROUGH 8.8 10/14/2024 11:22 AM    VANCORANDOM 21.6 10/25/2024 04:06 AM       MRSA Nasal Swab: N/A. Non-respiratory infection    Assessment:  Date:  Dose/Freq Admin Times Level/Time:   10/24      10/25 HD 1000 mg x1  (1300)  Rd @0400 = 21.6                       Plan:  Concentration-guided dosing due to intermittent hemodialysis  Will plan to give vancomycin 1000 mg x1 post-HD today. Plan to re-dose after next HD session (HD MWF).   Vancomycin concentrations will be ordered as clinically appropriate  Pharmacy will continue to monitor patient and adjust therapy as indicated    Thank you for the consult,  Noel Fleming formerly [facility]

## 2024-10-25 NOTE — PROGRESS NOTES
[facility] Progress Note    Follow-Up on: ESRD    ROS:  Gen - no fever, no chills, appetite unchanged  CV - no chest pain, no palpitation  Lung - no shortness of breath, no cough  Abd - + tenderness, no nausea/vomiting, no diarrhea  Ext - no edema    Exam:  Vitals:    10/25/24 0800 10/25/24 0830 10/25/24 0900 10/25/24 0930   BP: 110/67 116/70 99/68 130/82   Pulse: 87  (!) 106 81   Resp:       Temp:       TempSrc:       SpO2:       Weight:       Height:             Intake/Output Summary (Last 24 hours) at 10/25/2024 0939  Last data filed at 10/25/2024 0014  Gross per 24 hour   Intake 195 ml   Output 0 ml   Net 195 ml       Wt Readings from Last 3 Encounters:   10/25/24 (!) 153.8 kg (339 lb)   10/18/24 (!) 150.6 kg (332 lb)   10/24/23 (!) 149.7 kg (330 lb)       GEN - in no distress  CV - regular, no murmur, no rub  Lung - clear bilaterally  Abd - soft, +tender  Ext - no edema    Recent Labs     10/23/24  2012 10/24/24  0420 10/25/24  0406   WBC 14.2* 11.6* 10.2   HGB 8.3* 7.9* 7.7*   HCT 26.7* 25.7* 25.6*    310 333        Recent Labs     10/23/24  2012 10/24/24  0420 10/25/24  0406   * 134* 133*   K 3.7 3.7 4.0   CL 92* 92* 94*   CO2 18* 21 24   BUN 20 22 27*   CREATININE 5.42* 5.83* 7.18*   CALCIUM 7.5* 7.1* 7.2*   MG 1.8 2.0 2.2   PHOS  --   --  7.0*   ALBUMIN 1.7*  --   --        Assessment / Plan:  Principal Problem:    Sepsis associated hypotension (HCC)  Active Problems:    Obesity, morbid    Acute respiratory failure    Gout    Anemia    HTN (hypertension)    DM2 (diabetes mellitus, type 2) (HCC)    Atrial fibrillation with RVR (HCC)    Cellulitis of left lower extremity    Sepsis (HCC)    End stage chronic kidney disease (HCC)    Spontaneous bacterial peritonitis (HCC)  Resolved Problems:    * No resolved hospital problems. *    1) ESRD - HD MWF.  Previously on PD  - HD for clearance and UF     2) Peritonitis - recent removal of PD catheter     3) Abdominal pain - evidence of  subcutaneous hematoma     4) Hypotension - on Toprol for Afib, Valsartan stopped     5) S/p Afib with RVR - better rate controlled     6) Hyperphosphatemia - on Tenapanor     High Medical Decision Making  -Patient with at least one acute illness that poses a threat to bodily function  -Reviewed 3 labs  -Reviewed external charts

## 2024-10-25 NOTE — PROGRESS NOTES
Hospitalist Progress Note   Admit Date:  10/23/2024  7:42 PM   Name:  Giselle Rosado   Age:  54 y.o.  Sex:  female  :  1970   MRN:  [id number]   Room:  [facility]/    Presenting/Chief Complaint: Chest Pain and Shortness of Breath     Reason(s) for Admission: Sepsis associated hypotension (HCC) [A41.9, I95.9]  SBP (spontaneous bacterial peritonitis) (HCC) [K65.2]  Atrial fibrillation with RVR (HCC) [I48.91]  ESRD on hemodialysis (HCC) [N18.6, Z99.2]  Active bleeding [Z78.9]  Cerebrovascular accident (CVA) due to thrombosis of cerebral artery (HCC) [I63.30]     Hospital Course:   Giselle Rosado is a 54 y.o. female with medical history of ESRD now on HD(from PD due to peritonitis), ESRD on peritoneal dialysis, history of paroxysmal atrial fibrillation, hypertension, depression, irritable bowel syndrome  who presented with chest pain and hypotension from HD center.    Patient was recently hospitalized on 10/10 - 10/22 for peritonitis with ascites fluid growing [id number] and was recommended IV vanc for 2 weeks post PD catheter removal.    She was noted to be in Afib RVR on arrival. Laboratory workup with leukocytosis, lactic acidosis and elevated pBNP.  CT abdomen pelvis with active extravasation from the left rectus/inferior epigastric arterial territory, increased in size of hematomas measuring 7.9 x 3.4 cm.    Subjective & 24hr Events:   Patient was seen and examined at bedside.  Sitting up in her bed.  Still with lower quadrant abdominal pain as well as back pain wrapping to her hips.   Could not tolerate sitting/laying for HD for more then 2hrs.      Assessment & Plan:   Sepsis associated hypotension  Recent SBP  Hospitalized 10/10-10/22 for SBP and was discharged on IV vanc after HD for 2 weeks.  -Continue with broad-spectrum antibiotics with IV Rocephin and IV vancomycin.  -Follow-up blood cultures. So far neg to date    A-fib RVR  HTN  Echocardiogram done today with EF of 60 to 65%.    Result Value Ref Range    Lactic Acid, Sepsis 4.5 (HH) 0.5 - 2.0 MMOL/L   Blood Culture 1    Collection Time: 10/23/24  8:12 PM    Specimen: Blood   Result Value Ref Range    Special Requests LEFT  FOREARM        Culture NO GROWTH 2 DAYS     Brain Natriuretic Peptide    Collection Time: 10/23/24  8:12 PM   Result Value Ref Range    NT Pro-BNP 62,105 (H) 0 - 125 PG/ML   Procalcitonin    Collection Time: 10/23/24  8:12 PM   Result Value Ref Range    Procalcitonin 0.62 (H) 0.00 - 0.10 ng/mL   Troponin    Collection Time: 10/23/24  8:12 PM   Result Value Ref Range    Troponin T 95.0 (H) 0 - 14 ng/L   Lactate, Sepsis    Collection Time: 10/23/24 10:00 PM   Result Value Ref Range    Lactic Acid, Sepsis 2.5 (H) 0.5 - 2.0 MMOL/L   Blood Culture 2    Collection Time: 10/23/24 10:00 PM    Specimen: Blood   Result Value Ref Range    Special Requests RIGHT  FOREARM        Culture NO GROWTH 2 DAYS     Basic Metabolic Panel    Collection Time: 10/24/24  4:20 AM   Result Value Ref Range    Sodium 134 (L) 136 - 145 mmol/L    Potassium 3.7 3.5 - 5.1 mmol/L    Chloride 92 (L) 98 - 107 mmol/L    CO2 21 20 - 28 mmol/L    Anion Gap 20 (H) 9 - 18 mmol/L    Glucose 237 (H) 70 - 99 mg/dL    BUN 22 6 - 23 MG/DL    Creatinine 5.83 (H) 0.60 - 1.10 MG/DL    Est, Glom Filt Rate 8 (L) >60 ml/min/1.73m2    Calcium 7.1 (L) 8.8 - 10.2 MG/DL   CBC with Auto Differential    Collection Time: 10/24/24  4:20 AM   Result Value Ref Range    WBC 11.6 (H) 4.3 - 11.1 K/uL    RBC 2.68 (L) 4.05 - 5.2 M/uL    Hemoglobin 7.9 (L) 11.7 - 15.4 g/dL    Hematocrit 25.7 (L) 35.8 - 46.3 %    MCV 95.9 82 - 102 FL    MCH 29.5 26.1 - 32.9 PG    MCHC 30.7 (L) 31.4 - 35.0 g/dL    RDW 16.7 (H) 11.9 - 14.6 %    Platelets 310 150 - 450 K/uL    MPV 9.7 9.4 - 12.3 FL    nRBC 0.00 0.0 - 0.2 K/uL    Differential Type AUTOMATED      Neutrophils % 79 (H) 43 - 78 %    Lymphocytes % 7 (L) 13 - 44 %    Monocytes % 8 4.0 - 12.0 %    Eosinophils % 1 0.5 - 7.8 %    Basophils % 1 0.0 -

## 2024-10-25 NOTE — PLAN OF CARE
Problem: Chronic Conditions and Co-morbidities  Goal: Patient's chronic conditions and co-morbidity symptoms are monitored and maintained or improved  Outcome: Progressing  Flowsheets (Taken 10/24/2024 0845 by Margie Arroyo, RN)  Care Plan - Patient's Chronic Conditions and Co-Morbidity Symptoms are Monitored and Maintained or Improved: Monitor and assess patient's chronic conditions and comorbid symptoms for stability, deterioration, or improvement     Problem: Discharge Planning  Goal: Discharge to home or other facility with appropriate resources  Outcome: Progressing  Flowsheets (Taken 10/24/2024 0845 by Margie Arroyo, RN)  Discharge to home or other facility with appropriate resources: Identify barriers to discharge with patient and caregiver     Problem: Pain  Goal: Verbalizes/displays adequate comfort level or baseline comfort level  Outcome: Progressing     Problem: Safety - Adult  Goal: Free from fall injury  Outcome: Progressing     Problem: ABCDS Injury Assessment  Goal: Absence of physical injury  Outcome: Progressing

## 2024-10-26 LAB
ANION GAP SERPL CALC-SCNC: 13 MMOL/L (ref 9–18)
BASOPHILS # BLD: 0.1 K/UL (ref 0–0.2)
BASOPHILS NFR BLD: 1 % (ref 0–2)
BUN SERPL-MCNC: 18 MG/DL (ref 6–23)
CALCIUM SERPL-MCNC: 7.2 MG/DL (ref 8.8–10.2)
CHLORIDE SERPL-SCNC: 97 MMOL/L (ref 98–107)
CO2 SERPL-SCNC: 26 MMOL/L (ref 20–28)
CREAT SERPL-MCNC: 6.1 MG/DL (ref 0.6–1.1)
DIFFERENTIAL METHOD BLD: ABNORMAL
EOSINOPHIL # BLD: 0.2 K/UL (ref 0–0.8)
EOSINOPHIL NFR BLD: 1 % (ref 0.5–7.8)
ERYTHROCYTE [DISTWIDTH] IN BLOOD BY AUTOMATED COUNT: 17 % (ref 11.9–14.6)
GLUCOSE SERPL-MCNC: 204 MG/DL (ref 70–99)
HCT VFR BLD AUTO: 26.3 % (ref 35.8–46.3)
HGB BLD-MCNC: 7.8 G/DL (ref 11.7–15.4)
IMM GRANULOCYTES # BLD AUTO: 0.6 K/UL (ref 0–0.5)
IMM GRANULOCYTES NFR BLD AUTO: 5 % (ref 0–5)
LYMPHOCYTES # BLD: 0.8 K/UL (ref 0.5–4.6)
LYMPHOCYTES NFR BLD: 7 % (ref 13–44)
MCH RBC QN AUTO: 28.7 PG (ref 26.1–32.9)
MCHC RBC AUTO-ENTMCNC: 29.7 G/DL (ref 31.4–35)
MCV RBC AUTO: 96.7 FL (ref 82–102)
MONOCYTES # BLD: 0.9 K/UL (ref 0.1–1.3)
MONOCYTES NFR BLD: 8 % (ref 4–12)
NEUTS SEG # BLD: 9.3 K/UL (ref 1.7–8.2)
NEUTS SEG NFR BLD: 78 % (ref 43–78)
NRBC # BLD: 0 K/UL (ref 0–0.2)
PLATELET # BLD AUTO: 347 K/UL (ref 150–450)
PMV BLD AUTO: 9.7 FL (ref 9.4–12.3)
POTASSIUM SERPL-SCNC: 3.8 MMOL/L (ref 3.5–5.1)
RBC # BLD AUTO: 2.72 M/UL (ref 4.05–5.2)
SODIUM SERPL-SCNC: 136 MMOL/L (ref 136–145)
WBC # BLD AUTO: 11.9 K/UL (ref 4.3–11.1)

## 2024-10-26 PROCEDURE — 6360000002 HC RX W HCPCS: Performed by: INTERNAL MEDICINE

## 2024-10-26 PROCEDURE — 6370000000 HC RX 637 (ALT 250 FOR IP): Performed by: INTERNAL MEDICINE

## 2024-10-26 PROCEDURE — 36415 COLL VENOUS BLD VENIPUNCTURE: CPT

## 2024-10-26 PROCEDURE — 80048 BASIC METABOLIC PNL TOTAL CA: CPT

## 2024-10-26 PROCEDURE — 85025 COMPLETE CBC W/AUTO DIFF WBC: CPT

## 2024-10-26 PROCEDURE — 2140000000 HC CCU INTERMEDIATE R&B

## 2024-10-26 PROCEDURE — 2580000003 HC RX 258: Performed by: INTERNAL MEDICINE

## 2024-10-26 PROCEDURE — 6370000000 HC RX 637 (ALT 250 FOR IP): Performed by: HOSPITALIST

## 2024-10-26 PROCEDURE — 1100000003 HC PRIVATE W/ TELEMETRY

## 2024-10-26 RX ADMIN — FAMOTIDINE 20 MG: 20 TABLET, FILM COATED ORAL at 08:46

## 2024-10-26 RX ADMIN — METOPROLOL TARTRATE 50 MG: 50 TABLET, FILM COATED ORAL at 08:46

## 2024-10-26 RX ADMIN — MIDODRINE HYDROCHLORIDE 5 MG: 5 TABLET ORAL at 08:46

## 2024-10-26 RX ADMIN — OXYCODONE 10 MG: 5 TABLET ORAL at 22:07

## 2024-10-26 RX ADMIN — HYDROMORPHONE HYDROCHLORIDE 1 MG: 1 INJECTION, SOLUTION INTRAMUSCULAR; INTRAVENOUS; SUBCUTANEOUS at 08:46

## 2024-10-26 RX ADMIN — MIDODRINE HYDROCHLORIDE 5 MG: 5 TABLET ORAL at 12:02

## 2024-10-26 RX ADMIN — SERTRALINE 100 MG: 100 TABLET, FILM COATED ORAL at 08:46

## 2024-10-26 RX ADMIN — MIDODRINE HYDROCHLORIDE 5 MG: 5 TABLET ORAL at 16:06

## 2024-10-26 RX ADMIN — WATER 1000 MG: 1 INJECTION INTRAMUSCULAR; INTRAVENOUS; SUBCUTANEOUS at 02:47

## 2024-10-26 RX ADMIN — EPOETIN ALFA-EPBX 10000 UNITS: 10000 INJECTION, SOLUTION INTRAVENOUS; SUBCUTANEOUS at 16:57

## 2024-10-26 RX ADMIN — OXYCODONE 10 MG: 5 TABLET ORAL at 16:06

## 2024-10-26 RX ADMIN — METOPROLOL TARTRATE 50 MG: 50 TABLET, FILM COATED ORAL at 20:28

## 2024-10-26 RX ADMIN — SODIUM CHLORIDE, PRESERVATIVE FREE 10 ML: 5 INJECTION INTRAVENOUS at 09:40

## 2024-10-26 RX ADMIN — HYDROMORPHONE HYDROCHLORIDE 1 MG: 1 INJECTION, SOLUTION INTRAMUSCULAR; INTRAVENOUS; SUBCUTANEOUS at 18:53

## 2024-10-26 RX ADMIN — SERTRALINE 100 MG: 100 TABLET, FILM COATED ORAL at 20:28

## 2024-10-26 RX ADMIN — OXYCODONE 10 MG: 5 TABLET ORAL at 10:44

## 2024-10-26 ASSESSMENT — PAIN SCALES - GENERAL
PAINLEVEL_OUTOF10: 0
PAINLEVEL_OUTOF10: 2
PAINLEVEL_OUTOF10: 6
PAINLEVEL_OUTOF10: 9
PAINLEVEL_OUTOF10: 0
PAINLEVEL_OUTOF10: 4
PAINLEVEL_OUTOF10: 6
PAINLEVEL_OUTOF10: 4
PAINLEVEL_OUTOF10: 6
PAINLEVEL_OUTOF10: 6
PAINLEVEL_OUTOF10: 9

## 2024-10-26 ASSESSMENT — PAIN DESCRIPTION - DESCRIPTORS
DESCRIPTORS: ACHING;DISCOMFORT
DESCRIPTORS: ACHING;DISCOMFORT;PRESSURE
DESCRIPTORS: ACHING;DISCOMFORT

## 2024-10-26 ASSESSMENT — PAIN DESCRIPTION - ORIENTATION
ORIENTATION: LEFT;RIGHT
ORIENTATION: LEFT
ORIENTATION: LEFT;RIGHT
ORIENTATION: RIGHT;LEFT
ORIENTATION: RIGHT;LEFT

## 2024-10-26 ASSESSMENT — PAIN DESCRIPTION - LOCATION
LOCATION: BACK;HIP
LOCATION: ABDOMEN;BACK;HIP
LOCATION: HIP;BACK
LOCATION: BACK;HIP

## 2024-10-26 NOTE — PROGRESS NOTES
[facility] Progress Note    Follow-Up on: ESRD    ROS:  Gen - no fever, no chills, appetite unchanged  CV - no chest pain, no palpitation  Lung - no shortness of breath, no cough  Abd - + tenderness, no nausea/vomiting, no diarrhea  Ext - no edema    Exam:  Vitals:    10/26/24 0022 10/26/24 0442 10/26/24 0916 10/26/24 0940   BP: 129/87 121/72  115/70   Pulse: 90 78  99   Resp: 20 14 18 19   Temp: 97.5 °F (36.4 °C) 97.7 °F (36.5 °C)  98.8 °F (37.1 °C)   TempSrc: Temporal Temporal  Oral   SpO2: 94% 92%  96%   Weight:  (!) 154.2 kg (339 lb 15.2 oz)     Height:           No intake or output data in the 24 hours ending 10/26/24 1023      Wt Readings from Last 3 Encounters:   10/26/24 (!) 154.2 kg (339 lb 15.2 oz)   10/18/24 (!) 150.6 kg (332 lb)   10/24/23 (!) 149.7 kg (330 lb)       GEN - in no distress  CV - regular, no murmur, no rub  Lung - clear bilaterally  Abd - soft, +tender  Ext - no edema    Recent Labs     10/24/24  0420 10/25/24  0406 10/26/24  0333   WBC 11.6* 10.2 11.9*   HGB 7.9* 7.7* 7.8*   HCT 25.7* 25.6* 26.3*    333 347        Recent Labs     10/23/24  2012 10/24/24  0420 10/25/24  0406 10/26/24  0333   * 134* 133* 136   K 3.7 3.7 4.0 3.8   CL 92* 92* 94* 97*   CO2 18* 21 24 26   BUN 20 22 27* 18   CREATININE 5.42* 5.83* 7.18* 6.10*   CALCIUM 7.5* 7.1* 7.2* 7.2*   MG 1.8 2.0 2.2  --    PHOS  --   --  7.0*  --    ALBUMIN 1.7*  --   --   --        Assessment / Plan:  Principal Problem:    Sepsis associated hypotension (HCC)  Active Problems:    Obesity, morbid    Acute respiratory failure    Gout    Anemia    HTN (hypertension)    DM2 (diabetes mellitus, type 2) (HCC)    Atrial fibrillation with RVR (HCC)    Cellulitis of left lower extremity    Sepsis (HCC)    End stage chronic kidney disease (HCC)    Spontaneous bacterial peritonitis (HCC)  Resolved Problems:    * No resolved hospital problems. *    1) ESRD - HD MWF.  Previously on PD     2) Peritonitis - recent removal of PD  catheter     3) Abdominal pain - evidence of subcutaneous hematoma and cellulitis     4) Hypotension - on Toprol for Afib, Valsartan stopped     5) S/p Afib with RVR - better rate controlled     6) Hyperphosphatemia - on Tenapanor    7) Anemia ESRD - resume JOHNNY    High Medical Decision Making  -Patient with at least one acute illness that poses a threat to bodily function  -Reviewed 3 labs  -Reviewed external charts

## 2024-10-26 NOTE — PROGRESS NOTES
Hospitalist Progress Note   Admit Date:  10/23/2024  7:42 PM   Name:  Giselle Rosado   Age:  54 y.o.  Sex:  female  :  1970   MRN:  [id number]   Room:  [facility]/    Presenting/Chief Complaint: Chest Pain and Shortness of Breath     Reason(s) for Admission: Sepsis associated hypotension (HCC) [A41.9, I95.9]  SBP (spontaneous bacterial peritonitis) (HCC) [K65.2]  Atrial fibrillation with RVR (HCC) [I48.91]  ESRD on hemodialysis (HCC) [N18.6, Z99.2]  Active bleeding [Z78.9]  Cerebrovascular accident (CVA) due to thrombosis of cerebral artery (HCC) [I63.30]     Hospital Course:   Giselle Rosado is a 54 y.o. female with medical history of ESRD now on HD(from PD due to peritonitis), ESRD on peritoneal dialysis, history of paroxysmal atrial fibrillation, hypertension, depression, irritable bowel syndrome  who presented with chest pain and hypotension from HD center.    Patient was recently hospitalized on 10/10 - 10/22 for peritonitis with ascites fluid growing [id number] and was recommended IV vanc for 2 weeks post PD catheter removal.    She was noted to be in Afib RVR on arrival. Laboratory workup with leukocytosis, lactic acidosis and elevated pBNP.  CT abdomen pelvis with active extravasation from the left rectus/inferior epigastric arterial territory, increased in size of hematomas measuring 7.9 x 3.4 cm.    Subjective & 24hr Events:   Patient was seen and examined at bedside.  Sitting up on side of the bed.  in the recliner.  Reports doing better. No n/v.  Still with lower abdominal and lower back w/ hip pain. Controlled on pain meds and was able to sleep well.       Assessment & Plan:   Sepsis associated hypotension  Recent SBP  Hospitalized 10/10-10/22 for SBP and was discharged on IV vanc after HD for 2 weeks.  - Bcx neg x 2 days. Will discontinue IV rocephin. Continue with IV vanc as planned for SBP from prior admission.     A-fib RVR  HTN  Echocardiogram done today with EF of 60 to  98 %   10/25/24 1641 97.9 °F (36.6 °C) 94 18 (!) 96/54 95 %   10/25/24 1542 -- -- 18 -- --   10/25/24 1219 -- -- 18 -- --   10/25/24 1149 -- -- 18 -- --   10/25/24 1112 97.7 °F (36.5 °C) (!) 102 19 131/87 99 %   10/25/24 1045 -- -- 19 -- --       Oxygen Therapy  SpO2: 96 %  Pulse via Oximetry: 87 beats per minute  O2 Device: None (Room air)    Estimated body mass index is 50.2 kg/m² as calculated from the following:    Height as of this encounter: 1.753 m (5' 9\").    Weight as of this encounter: 154.2 kg (339 lb 15.2 oz).  No intake or output data in the 24 hours ending 10/26/24 1036        Physical Exam:     General:    Well nourished.    Head:  Normocephalic, atraumatic  Eyes:  Sclerae appear normal.  Pupils equally round.  ENT:  Nares appear normal.  Moist oral mucosa  Neck:  No restricted ROM.  Trachea midline   CV:   RRR.  No m/r/g.  No jugular venous distension.  Lungs:   CTAB.  No wheezing.  Symmetric expansion.  Abdomen:   Soft, tender, distended and areas of firmness . Post-surgical sites c/d/I, bruising noted in abdomen  Extremities: No cyanosis or clubbing.  No edema  Skin:     No rashes.  Normal coloration.   Warm and dry.    Neuro:  CN II-XII grossly intact.    Psych:  Normal mood and affect.      I have personally reviewed labs and tests:  Recent Labs:  Recent Results (from the past 48 hour(s))   Transthoracic echocardiogram (TTE) complete with contrast, bubble, strain, and 3D PRN    Collection Time: 10/24/24 12:16 PM   Result Value Ref Range    LA Minor Axis 5.8 cm    LA Major Axis 6.7 cm    LA Area 2C 19.5 cm2    LA Area 4C 23.4 cm2    LA Volume MOD A2C 55 (A) 22 - 52 mL    LA Volume MOD A4C 67 (A) 22 - 52 mL    LA Volume BP 65 (A) 22 - 52 mL    LA Diameter 3.9 cm    LV EDV A4C 108 mL    LV ESV A4C 44 mL    IVSd 1.2 (A) 0.6 - 0.9 cm    LVIDd 4.5 3.9 - 5.3 cm    LVIDs 3.2 cm    LVOT Diameter 2.0 cm    LVOT Mean Gradient 2 mmHg    LVOT Mean Gradient 2 mmHg    LVOT VTI 22.5 cm    LVOT Peak Velocity 1.0

## 2024-10-26 NOTE — PROGRESS NOTES
VANCO DAILY FOLLOW UP RENAL INSUFFICIENCY PATIENT   [facility]   Pharmacy Pharmacokinetic Monitoring Service - Vancomycin    Consulting Provider: Jodi Calderon MD   Indication: Peritonitis   Target Concentration: Pre-dialysis concentration 15-20 mg/L  Day of Therapy: 3 (Day 7 of total therapy - EOT 11/1/24 per ID)   Additional Antimicrobials: ceftriaxone    Pertinent Laboratory Values:   Wt Readings from Last 1 Encounters:   10/26/24 (!) 154.2 kg (339 lb 15.2 oz)     Temp Readings from Last 1 Encounters:   10/26/24 98.8 °F (37.1 °C) (Oral)     Recent Labs     10/23/24  2012 10/23/24  2200 10/24/24  0420 10/25/24  0406 10/26/24  0333   BUN 20  --  22 27* 18   CREATININE 5.42*  --  5.83* 7.18* 6.10*   WBC 14.2*  --  11.6* 10.2 11.9*   PROCAL 0.62*  --   --   --   --    LACTA  --   --  1.4  --   --    LACTSEPSIS 4.5* 2.5*  --   --   --        Lab Results   Component Value Date/Time    VANCOTROUGH 8.8 10/14/2024 11:22 AM    VANCORANDOM 21.6 10/25/2024 04:06 AM       MRSA Nasal Swab: N/A. Non-respiratory infection    Assessment:  Date:  Dose/Freq Admin Times Level/Time:   10/24      10/25 HD 1000 mg x1  [address] @0400 = 21.6   10/26                    Plan:  Concentration-guided dosing due to intermittent hemodialysis  No dose needed today. Plan to re-dose after next HD session (HD MWF).   Vancomycin concentrations will be ordered as clinically appropriate  Pharmacy will continue to monitor patient and adjust therapy as indicated    Thank you for the consult,  Bo Covarrubias RPH

## 2024-10-27 ENCOUNTER — APPOINTMENT (OUTPATIENT)
Dept: CT IMAGING | Age: 54
End: 2024-10-27
Payer: COMMERCIAL

## 2024-10-27 LAB
ANION GAP SERPL CALC-SCNC: 17 MMOL/L (ref 9–18)
BASOPHILS # BLD: 0.1 K/UL (ref 0–0.2)
BASOPHILS NFR BLD: 1 % (ref 0–2)
BUN SERPL-MCNC: 22 MG/DL (ref 6–23)
CALCIUM SERPL-MCNC: 7.3 MG/DL (ref 8.8–10.2)
CHLORIDE SERPL-SCNC: 94 MMOL/L (ref 98–107)
CO2 SERPL-SCNC: 23 MMOL/L (ref 20–28)
CREAT SERPL-MCNC: 7.53 MG/DL (ref 0.6–1.1)
DIFFERENTIAL METHOD BLD: ABNORMAL
EOSINOPHIL # BLD: 0.3 K/UL (ref 0–0.8)
EOSINOPHIL NFR BLD: 2 % (ref 0.5–7.8)
ERYTHROCYTE [DISTWIDTH] IN BLOOD BY AUTOMATED COUNT: 17.2 % (ref 11.9–14.6)
GLUCOSE SERPL-MCNC: 185 MG/DL (ref 70–99)
HCT VFR BLD AUTO: 26.3 % (ref 35.8–46.3)
HGB BLD-MCNC: 7.9 G/DL (ref 11.7–15.4)
IMM GRANULOCYTES # BLD AUTO: 0.8 K/UL (ref 0–0.5)
IMM GRANULOCYTES NFR BLD AUTO: 6 % (ref 0–5)
LYMPHOCYTES # BLD: 0.9 K/UL (ref 0.5–4.6)
LYMPHOCYTES NFR BLD: 7 % (ref 13–44)
MCH RBC QN AUTO: 28.8 PG (ref 26.1–32.9)
MCHC RBC AUTO-ENTMCNC: 30 G/DL (ref 31.4–35)
MCV RBC AUTO: 96 FL (ref 82–102)
MONOCYTES # BLD: 1.2 K/UL (ref 0.1–1.3)
MONOCYTES NFR BLD: 9 % (ref 4–12)
NEUTS SEG # BLD: 9.8 K/UL (ref 1.7–8.2)
NEUTS SEG NFR BLD: 75 % (ref 43–78)
NRBC # BLD: 0.02 K/UL (ref 0–0.2)
PLATELET # BLD AUTO: 378 K/UL (ref 150–450)
PLATELET COMMENT: ADEQUATE
PMV BLD AUTO: 9.4 FL (ref 9.4–12.3)
POTASSIUM SERPL-SCNC: 3.9 MMOL/L (ref 3.5–5.1)
RBC # BLD AUTO: 2.74 M/UL (ref 4.05–5.2)
RBC MORPH BLD: ABNORMAL
SODIUM SERPL-SCNC: 134 MMOL/L (ref 136–145)
WBC # BLD AUTO: 13.1 K/UL (ref 4.3–11.1)
WBC MORPH BLD: ABNORMAL

## 2024-10-27 PROCEDURE — 6360000004 HC RX CONTRAST MEDICATION: Performed by: INTERNAL MEDICINE

## 2024-10-27 PROCEDURE — 36415 COLL VENOUS BLD VENIPUNCTURE: CPT

## 2024-10-27 PROCEDURE — 6370000000 HC RX 637 (ALT 250 FOR IP): Performed by: INTERNAL MEDICINE

## 2024-10-27 PROCEDURE — 6360000002 HC RX W HCPCS: Performed by: INTERNAL MEDICINE

## 2024-10-27 PROCEDURE — 6370000000 HC RX 637 (ALT 250 FOR IP): Performed by: HOSPITALIST

## 2024-10-27 PROCEDURE — 1100000003 HC PRIVATE W/ TELEMETRY

## 2024-10-27 PROCEDURE — 74177 CT ABD & PELVIS W/CONTRAST: CPT

## 2024-10-27 PROCEDURE — 2580000003 HC RX 258: Performed by: INTERNAL MEDICINE

## 2024-10-27 PROCEDURE — 80048 BASIC METABOLIC PNL TOTAL CA: CPT

## 2024-10-27 PROCEDURE — 2140000000 HC CCU INTERMEDIATE R&B

## 2024-10-27 PROCEDURE — 85025 COMPLETE CBC W/AUTO DIFF WBC: CPT

## 2024-10-27 RX ORDER — CEPHALEXIN 500 MG/1
500 CAPSULE ORAL EVERY 12 HOURS SCHEDULED
Status: DISCONTINUED | OUTPATIENT
Start: 2024-10-27 | End: 2024-10-27

## 2024-10-27 RX ORDER — DIATRIZOATE MEGLUMINE AND DIATRIZOATE SODIUM 660; 100 MG/ML; MG/ML
15 SOLUTION ORAL; RECTAL
Status: DISCONTINUED | OUTPATIENT
Start: 2024-10-27 | End: 2024-11-08 | Stop reason: HOSPADM

## 2024-10-27 RX ORDER — SEVELAMER CARBONATE 800 MG/1
800 TABLET, FILM COATED ORAL
Status: DISCONTINUED | OUTPATIENT
Start: 2024-10-27 | End: 2024-11-08 | Stop reason: HOSPADM

## 2024-10-27 RX ORDER — IOPAMIDOL 755 MG/ML
100 INJECTION, SOLUTION INTRAVASCULAR
Status: COMPLETED | OUTPATIENT
Start: 2024-10-27 | End: 2024-10-27

## 2024-10-27 RX ADMIN — MIDODRINE HYDROCHLORIDE 5 MG: 5 TABLET ORAL at 12:20

## 2024-10-27 RX ADMIN — MIDODRINE HYDROCHLORIDE 5 MG: 5 TABLET ORAL at 08:40

## 2024-10-27 RX ADMIN — WATER 1000 MG: 1 INJECTION INTRAMUSCULAR; INTRAVENOUS; SUBCUTANEOUS at 22:12

## 2024-10-27 RX ADMIN — SEVELAMER CARBONATE 800 MG: 800 TABLET, FILM COATED ORAL at 12:20

## 2024-10-27 RX ADMIN — OXYCODONE 10 MG: 5 TABLET ORAL at 22:12

## 2024-10-27 RX ADMIN — IOPAMIDOL 100 ML: 755 INJECTION, SOLUTION INTRAVENOUS at 12:03

## 2024-10-27 RX ADMIN — METOPROLOL TARTRATE 50 MG: 50 TABLET, FILM COATED ORAL at 08:40

## 2024-10-27 RX ADMIN — HYDROMORPHONE HYDROCHLORIDE 1 MG: 1 INJECTION, SOLUTION INTRAMUSCULAR; INTRAVENOUS; SUBCUTANEOUS at 08:40

## 2024-10-27 RX ADMIN — FAMOTIDINE 20 MG: 20 TABLET, FILM COATED ORAL at 08:40

## 2024-10-27 RX ADMIN — OXYCODONE 10 MG: 5 TABLET ORAL at 09:45

## 2024-10-27 RX ADMIN — SEVELAMER CARBONATE 800 MG: 800 TABLET, FILM COATED ORAL at 16:42

## 2024-10-27 RX ADMIN — DIATRIZOATE MEGLUMINE AND DIATRIZOATE SODIUM 15 ML: 660; 100 LIQUID ORAL; RECTAL at 11:32

## 2024-10-27 RX ADMIN — SERTRALINE 100 MG: 100 TABLET, FILM COATED ORAL at 20:42

## 2024-10-27 RX ADMIN — SODIUM CHLORIDE, PRESERVATIVE FREE 10 ML: 5 INJECTION INTRAVENOUS at 08:40

## 2024-10-27 RX ADMIN — METOPROLOL TARTRATE 50 MG: 50 TABLET, FILM COATED ORAL at 20:42

## 2024-10-27 RX ADMIN — SERTRALINE 100 MG: 100 TABLET, FILM COATED ORAL at 08:40

## 2024-10-27 RX ADMIN — HYDROMORPHONE HYDROCHLORIDE 1 MG: 1 INJECTION, SOLUTION INTRAMUSCULAR; INTRAVENOUS; SUBCUTANEOUS at 20:49

## 2024-10-27 RX ADMIN — SODIUM CHLORIDE, PRESERVATIVE FREE 10 ML: 5 INJECTION INTRAVENOUS at 20:42

## 2024-10-27 RX ADMIN — OXYCODONE 10 MG: 5 TABLET ORAL at 14:10

## 2024-10-27 RX ADMIN — MIDODRINE HYDROCHLORIDE 5 MG: 5 TABLET ORAL at 16:42

## 2024-10-27 RX ADMIN — OXYCODONE 10 MG: 5 TABLET ORAL at 18:14

## 2024-10-27 ASSESSMENT — PAIN SCALES - GENERAL
PAINLEVEL_OUTOF10: 4
PAINLEVEL_OUTOF10: 9
PAINLEVEL_OUTOF10: 4
PAINLEVEL_OUTOF10: 5
PAINLEVEL_OUTOF10: 4
PAINLEVEL_OUTOF10: 7
PAINLEVEL_OUTOF10: 6
PAINLEVEL_OUTOF10: 4
PAINLEVEL_OUTOF10: 6
PAINLEVEL_OUTOF10: 3
PAINLEVEL_OUTOF10: 6

## 2024-10-27 ASSESSMENT — PAIN DESCRIPTION - ORIENTATION
ORIENTATION: RIGHT;LEFT
ORIENTATION: RIGHT;LEFT
ORIENTATION: LEFT;RIGHT
ORIENTATION: RIGHT;LEFT
ORIENTATION: RIGHT;LEFT
ORIENTATION: LEFT;RIGHT

## 2024-10-27 ASSESSMENT — PAIN DESCRIPTION - DESCRIPTORS
DESCRIPTORS: ACHING
DESCRIPTORS: ACHING;DISCOMFORT
DESCRIPTORS: ACHING;SORE
DESCRIPTORS: ACHING
DESCRIPTORS: ACHING
DESCRIPTORS: ACHING;SORE

## 2024-10-27 ASSESSMENT — PAIN DESCRIPTION - LOCATION
LOCATION: BACK;ABDOMEN
LOCATION: BACK;ABDOMEN
LOCATION: ABDOMEN;BACK
LOCATION: BACK;ABDOMEN
LOCATION: ABDOMEN;BACK

## 2024-10-27 ASSESSMENT — PAIN DESCRIPTION - PAIN TYPE
TYPE: ACUTE PAIN

## 2024-10-27 ASSESSMENT — PAIN - FUNCTIONAL ASSESSMENT
PAIN_FUNCTIONAL_ASSESSMENT: ACTIVITIES ARE NOT PREVENTED
PAIN_FUNCTIONAL_ASSESSMENT: PREVENTS OR INTERFERES SOME ACTIVE ACTIVITIES AND ADLS
PAIN_FUNCTIONAL_ASSESSMENT: ACTIVITIES ARE NOT PREVENTED

## 2024-10-27 ASSESSMENT — PAIN DESCRIPTION - FREQUENCY: FREQUENCY: INTERMITTENT

## 2024-10-27 ASSESSMENT — PAIN DESCRIPTION - ONSET: ONSET: GRADUAL

## 2024-10-27 NOTE — PROGRESS NOTES
Hospitalist Progress Note   Admit Date:  10/23/2024  7:42 PM   Name:  Giselle Rosado   Age:  54 y.o.  Sex:  female  :  1970   MRN:  [id number]   Room:  [facility]/    Presenting/Chief Complaint: Chest Pain and Shortness of Breath     Reason(s) for Admission: Sepsis associated hypotension (HCC) [A41.9, I95.9]  SBP (spontaneous bacterial peritonitis) (HCC) [K65.2]  Atrial fibrillation with RVR (HCC) [I48.91]  ESRD on hemodialysis (HCC) [N18.6, Z99.2]  Active bleeding [Z78.9]  Cerebrovascular accident (CVA) due to thrombosis of cerebral artery (HCC) [I63.30]     Hospital Course:   Giselle Rosado is a 54 y.o. female with medical history of ESRD now on HD(from PD due to peritonitis), ESRD on peritoneal dialysis, history of paroxysmal atrial fibrillation, hypertension, depression, irritable bowel syndrome  who presented with chest pain and hypotension from HD center.    Patient was recently hospitalized on 10/10 - 10/22 for peritonitis with ascites fluid growing [id number] and was recommended IV vanc for 2 weeks post PD catheter removal.    She was noted to be in Afib RVR on arrival. Laboratory workup with leukocytosis, lactic acidosis and elevated pBNP.  CT abdomen pelvis with active extravasation from the left rectus/inferior epigastric arterial territory, increased in size of hematomas measuring 7.9 x 3.4 cm.    Interventional radiology was consulted and patient received thrombin injection for the inferior epigastric pseudoaneurysm.  Patient hemoglobin has been stable.    Subjective & 24hr Events:   Patient was seen and examined at bedside.  Patient is sitting up on side of the bed.  Tolerating diet.    Still with abdominal pain as well as b/l lower back pain down to her hips.  Reports having \"knot\" in her lower back as well which she noticed a few days ago. Its tender to palpation.     Assessment & Plan:   Sepsis associated hypotension  Recent SBP  Hospitalized 10/10-10/22 for SBP and was discharged

## 2024-10-27 NOTE — PROGRESS NOTES
Patient's phosphorus noted to be 7.0 on 10/25 and patient not receiving home med in MAR.  Medications discussed with patient, patient takes Renvela at home.  Discussed with Dr. Calderon in person.

## 2024-10-27 NOTE — PROGRESS NOTES
[facility] Progress Note    Follow-Up on: ESRD    ROS:  Gen - no fever, no chills, appetite unchanged  CV - no chest pain, no palpitation  Lung - no shortness of breath, no cough  Abd - + tenderness, no nausea/vomiting, no diarrhea  Ext - no edema    Exam:  Vitals:    10/27/24 0407 10/27/24 0830 10/27/24 0843 10/27/24 0910   BP: 126/71 122/83 122/83    Pulse: 76  79    Resp: 14  22 20   Temp: 97.7 °F (36.5 °C)  97.7 °F (36.5 °C)    TempSrc: Temporal  Temporal    SpO2: 95%  96%    Weight: (!) 155.8 kg (343 lb 7.6 oz)      Height:             Intake/Output Summary (Last 24 hours) at 10/27/2024 0949  Last data filed at 10/26/2024 2028  Gross per 24 hour   Intake 200 ml   Output --   Net 200 ml         Wt Readings from Last 3 Encounters:   10/27/24 (!) 155.8 kg (343 lb 7.6 oz)   10/18/24 (!) 150.6 kg (332 lb)   10/24/23 (!) 149.7 kg (330 lb)       GEN - in no distress  CV - regular, no murmur, no rub  Lung - clear bilaterally  Abd - soft, +tender  Ext - + edema    Recent Labs     10/25/24  0406 10/26/24  0333 10/27/24  0528   WBC 10.2 11.9* 13.1*   HGB 7.7* 7.8* 7.9*   HCT 25.6* 26.3* 26.3*    347 378        Recent Labs     10/25/24  0406 10/26/24  0333 10/27/24  0528   * 136 134*   K 4.0 3.8 3.9   CL 94* 97* 94*   CO2 24 26 23   BUN 27* 18 22   CREATININE 7.18* 6.10* 7.53*   CALCIUM 7.2* 7.2* 7.3*   MG 2.2  --   --    PHOS 7.0*  --   --        Assessment / Plan:  Principal Problem:    Sepsis associated hypotension (HCC)  Active Problems:    Obesity, morbid    Acute respiratory failure    Gout    Anemia    HTN (hypertension)    DM2 (diabetes mellitus, type 2) (HCC)    Atrial fibrillation with RVR (HCC)    Cellulitis of left lower extremity    Sepsis (HCC)    End stage chronic kidney disease (HCC)    Spontaneous bacterial peritonitis (HCC)  Resolved Problems:    * No resolved hospital problems. *    1) ESRD - HD MWF.  Previously on PD     2) Peritonitis - recent removal of PD catheter     3)

## 2024-10-27 NOTE — PROGRESS NOTES
VANCO DAILY FOLLOW UP RENAL INSUFFICIENCY PATIENT   [facility]   Pharmacy Pharmacokinetic Monitoring Service - Vancomycin    Consulting Provider: Jodi Calderon MD   Indication: Peritonitis   Target Concentration: Pre-dialysis concentration 15-20 mg/L  Day of Therapy: 4 (Day 7 of total therapy - EOT 11/1/24 per ID)   Additional Antimicrobials: ceftriaxone    Pertinent Laboratory Values:   Wt Readings from Last 1 Encounters:   10/27/24 (!) 155.8 kg (343 lb 7.6 oz)     Temp Readings from Last 1 Encounters:   10/27/24 97.7 °F (36.5 °C) (Temporal)     Recent Labs     10/25/24  0406 10/26/24  0333 10/27/24  0528   BUN 27* 18 22   CREATININE 7.18* 6.10* 7.53*   WBC 10.2 11.9* 13.1*       Lab Results   Component Value Date/Time    VANCOTROUGH 8.8 10/14/2024 11:22 AM    VANCORANDOM 21.6 10/25/2024 04:06 AM       MRSA Nasal Swab: N/A. Non-respiratory infection    Assessment:  Date:  Dose/Freq Admin Times Level/Time:   10/24      10/25 HD 1000 mg x1  [address] @0400 = 21.6   10/26      10/27      10/28        Plan:  Concentration-guided dosing due to intermittent hemodialysis  No dose needed today. Plan to re-dose after next HD session (HD MWF).   Vancomycin concentrations will be ordered as clinically appropriate  Pharmacy will continue to monitor patient and adjust therapy as indicated    Thank you for the consult,  Bo Covarrubias RPH

## 2024-10-27 NOTE — PROGRESS NOTES
Dr. Calderon contacted for Keflex PO order.  Patient states they are allergic to PO resulting in intractable N/V.

## 2024-10-28 ENCOUNTER — APPOINTMENT (OUTPATIENT)
Dept: CT IMAGING | Age: 54
End: 2024-10-28
Payer: COMMERCIAL

## 2024-10-28 LAB
ANION GAP SERPL CALC-SCNC: 17 MMOL/L (ref 9–18)
BACTERIA SPEC CULT: NORMAL
BACTERIA SPEC CULT: NORMAL
BASOPHILS # BLD: 0.2 K/UL (ref 0–0.2)
BASOPHILS NFR BLD: 1 % (ref 0–2)
BUN SERPL-MCNC: 25 MG/DL (ref 6–23)
CALCIUM SERPL-MCNC: 6.8 MG/DL (ref 8.8–10.2)
CHLORIDE SERPL-SCNC: 92 MMOL/L (ref 98–107)
CO2 SERPL-SCNC: 22 MMOL/L (ref 20–28)
CREAT SERPL-MCNC: 8.74 MG/DL (ref 0.6–1.1)
DIFFERENTIAL METHOD BLD: ABNORMAL
EOSINOPHIL # BLD: 0.3 K/UL (ref 0–0.8)
EOSINOPHIL NFR BLD: 2 % (ref 0.5–7.8)
ERYTHROCYTE [DISTWIDTH] IN BLOOD BY AUTOMATED COUNT: 17.3 % (ref 11.9–14.6)
GLUCOSE SERPL-MCNC: 184 MG/DL (ref 70–99)
HCT VFR BLD AUTO: 25.9 % (ref 35.8–46.3)
HGB BLD-MCNC: 7.8 G/DL (ref 11.7–15.4)
IMM GRANULOCYTES # BLD AUTO: 0.9 K/UL (ref 0–0.5)
IMM GRANULOCYTES NFR BLD AUTO: 6 % (ref 0–5)
LYMPHOCYTES # BLD: 0.9 K/UL (ref 0.5–4.6)
LYMPHOCYTES NFR BLD: 6 % (ref 13–44)
MCH RBC QN AUTO: 28.6 PG (ref 26.1–32.9)
MCHC RBC AUTO-ENTMCNC: 30.1 G/DL (ref 31.4–35)
MCV RBC AUTO: 94.9 FL (ref 82–102)
MONOCYTES # BLD: 1.4 K/UL (ref 0.1–1.3)
MONOCYTES NFR BLD: 9 % (ref 4–12)
NEUTS SEG # BLD: 11.4 K/UL (ref 1.7–8.2)
NEUTS SEG NFR BLD: 76 % (ref 43–78)
NRBC # BLD: 0.04 K/UL (ref 0–0.2)
PLATELET # BLD AUTO: 413 K/UL (ref 150–450)
PLATELET COMMENT: ADEQUATE
PMV BLD AUTO: 9.4 FL (ref 9.4–12.3)
POTASSIUM SERPL-SCNC: 4.7 MMOL/L (ref 3.5–5.1)
RBC # BLD AUTO: 2.73 M/UL (ref 4.05–5.2)
RBC MORPH BLD: ABNORMAL
RBC MORPH BLD: ABNORMAL
SERVICE CMNT-IMP: NORMAL
SERVICE CMNT-IMP: NORMAL
SODIUM SERPL-SCNC: 130 MMOL/L (ref 136–145)
VANCOMYCIN SERPL-MCNC: 21.1 UG/ML
WBC # BLD AUTO: 15.1 K/UL (ref 4.3–11.1)
WBC MORPH BLD: ABNORMAL

## 2024-10-28 PROCEDURE — 6360000004 HC RX CONTRAST MEDICATION: Performed by: INTERNAL MEDICINE

## 2024-10-28 PROCEDURE — 72132 CT LUMBAR SPINE W/DYE: CPT

## 2024-10-28 PROCEDURE — 2580000003 HC RX 258: Performed by: INTERNAL MEDICINE

## 2024-10-28 PROCEDURE — 6370000000 HC RX 637 (ALT 250 FOR IP): Performed by: INTERNAL MEDICINE

## 2024-10-28 PROCEDURE — 6370000000 HC RX 637 (ALT 250 FOR IP): Performed by: HOSPITALIST

## 2024-10-28 PROCEDURE — 2140000000 HC CCU INTERMEDIATE R&B

## 2024-10-28 PROCEDURE — 80202 ASSAY OF VANCOMYCIN: CPT

## 2024-10-28 PROCEDURE — 36415 COLL VENOUS BLD VENIPUNCTURE: CPT

## 2024-10-28 PROCEDURE — 90935 HEMODIALYSIS ONE EVALUATION: CPT

## 2024-10-28 PROCEDURE — 80048 BASIC METABOLIC PNL TOTAL CA: CPT

## 2024-10-28 PROCEDURE — 85025 COMPLETE CBC W/AUTO DIFF WBC: CPT

## 2024-10-28 PROCEDURE — 6360000002 HC RX W HCPCS: Performed by: INTERNAL MEDICINE

## 2024-10-28 PROCEDURE — 1100000003 HC PRIVATE W/ TELEMETRY

## 2024-10-28 RX ORDER — IOPAMIDOL 755 MG/ML
100 INJECTION, SOLUTION INTRAVASCULAR
Status: COMPLETED | OUTPATIENT
Start: 2024-10-28 | End: 2024-10-28

## 2024-10-28 RX ADMIN — METOPROLOL TARTRATE 50 MG: 50 TABLET, FILM COATED ORAL at 20:30

## 2024-10-28 RX ADMIN — HYDROMORPHONE HYDROCHLORIDE 1 MG: 1 INJECTION, SOLUTION INTRAMUSCULAR; INTRAVENOUS; SUBCUTANEOUS at 14:39

## 2024-10-28 RX ADMIN — MIDODRINE HYDROCHLORIDE 5 MG: 5 TABLET ORAL at 15:41

## 2024-10-28 RX ADMIN — FAMOTIDINE 20 MG: 20 TABLET, FILM COATED ORAL at 08:36

## 2024-10-28 RX ADMIN — MIDODRINE HYDROCHLORIDE 5 MG: 5 TABLET ORAL at 08:36

## 2024-10-28 RX ADMIN — SERTRALINE 100 MG: 100 TABLET, FILM COATED ORAL at 08:36

## 2024-10-28 RX ADMIN — OXYCODONE 10 MG: 5 TABLET ORAL at 08:40

## 2024-10-28 RX ADMIN — SODIUM CHLORIDE, PRESERVATIVE FREE 10 ML: 5 INJECTION INTRAVENOUS at 20:31

## 2024-10-28 RX ADMIN — HYDROMORPHONE HYDROCHLORIDE 1 MG: 1 INJECTION, SOLUTION INTRAMUSCULAR; INTRAVENOUS; SUBCUTANEOUS at 20:30

## 2024-10-28 RX ADMIN — VANCOMYCIN HYDROCHLORIDE 1000 MG: 1 INJECTION, POWDER, LYOPHILIZED, FOR SOLUTION INTRAVENOUS at 15:44

## 2024-10-28 RX ADMIN — HYDROMORPHONE HYDROCHLORIDE 1 MG: 1 INJECTION, SOLUTION INTRAMUSCULAR; INTRAVENOUS; SUBCUTANEOUS at 04:03

## 2024-10-28 RX ADMIN — SODIUM CHLORIDE, PRESERVATIVE FREE 10 ML: 5 INJECTION INTRAVENOUS at 09:32

## 2024-10-28 RX ADMIN — SEVELAMER CARBONATE 800 MG: 800 TABLET, FILM COATED ORAL at 08:36

## 2024-10-28 RX ADMIN — ONDANSETRON 4 MG: 4 TABLET, ORALLY DISINTEGRATING ORAL at 08:46

## 2024-10-28 RX ADMIN — OXYCODONE 10 MG: 5 TABLET ORAL at 16:50

## 2024-10-28 RX ADMIN — IOPAMIDOL 100 ML: 755 INJECTION, SOLUTION INTRAVENOUS at 08:17

## 2024-10-28 RX ADMIN — SEVELAMER CARBONATE 800 MG: 800 TABLET, FILM COATED ORAL at 15:41

## 2024-10-28 RX ADMIN — SERTRALINE 100 MG: 100 TABLET, FILM COATED ORAL at 20:30

## 2024-10-28 RX ADMIN — MIDODRINE HYDROCHLORIDE 5 MG: 5 TABLET ORAL at 12:35

## 2024-10-28 RX ADMIN — WATER 1000 MG: 1 INJECTION INTRAMUSCULAR; INTRAVENOUS; SUBCUTANEOUS at 21:53

## 2024-10-28 ASSESSMENT — PAIN DESCRIPTION - DESCRIPTORS
DESCRIPTORS: ACHING;SORE

## 2024-10-28 ASSESSMENT — PAIN SCALES - GENERAL
PAINLEVEL_OUTOF10: 4
PAINLEVEL_OUTOF10: 9
PAINLEVEL_OUTOF10: 7
PAINLEVEL_OUTOF10: 6
PAINLEVEL_OUTOF10: 4
PAINLEVEL_OUTOF10: 7
PAINLEVEL_OUTOF10: 6
PAINLEVEL_OUTOF10: 0
PAINLEVEL_OUTOF10: 4
PAINLEVEL_OUTOF10: 4

## 2024-10-28 ASSESSMENT — PAIN DESCRIPTION - LOCATION
LOCATION: ABDOMEN;BACK

## 2024-10-28 ASSESSMENT — PAIN - FUNCTIONAL ASSESSMENT
PAIN_FUNCTIONAL_ASSESSMENT: ACTIVITIES ARE NOT PREVENTED

## 2024-10-28 ASSESSMENT — PAIN DESCRIPTION - ORIENTATION
ORIENTATION: LEFT;RIGHT
ORIENTATION: RIGHT;LEFT
ORIENTATION: LEFT;RIGHT
ORIENTATION: RIGHT;LEFT

## 2024-10-28 ASSESSMENT — PAIN DESCRIPTION - PAIN TYPE
TYPE: ACUTE PAIN

## 2024-10-28 NOTE — DIALYSIS
TRANSFER OUT- DIALYSIS    Hemodialysis treatment stopped per patient request. PT ran 2.5 hours, without complications.    Patient alert and VS stable  BP (!) 140/32   Pulse 84   Temp 97.9 °F (36.6 °C) (Oral)   Resp 18   Ht 1.753 m (5' 9\")   Wt (!) 156.4 kg (344 lb 12.8 oz)   SpO2 94%   BMI 50.92 kg/m²        1 Kg removed.      Flushed both ports with 10 mL of NS.  CVC dressing clean, dry, and intact, tego caps intact, curos caps placed.      Meds given: see Mar  .    RBCs given during dialysis: 0    Patient to 402 after dialysis.

## 2024-10-28 NOTE — PROGRESS NOTES
VANCO DAILY FOLLOW UP RENAL INSUFFICIENCY PATIENT   [facility]   Pharmacy Pharmacokinetic Monitoring Service - Vancomycin    Consulting Provider: Jodi Calderon MD   Indication: Peritonitis   Target Concentration: Pre-dialysis concentration 15-20 mg/L  Day of Therapy: 5 (EOT 11/1/24 per ID)   Additional Antimicrobials: ceftriaxone    Pertinent Laboratory Values:   Wt Readings from Last 1 Encounters:   10/28/24 (!) 156.4 kg (344 lb 12.8 oz)     Temp Readings from Last 1 Encounters:   10/28/24 97.9 °F (36.6 °C)     Recent Labs     10/26/24  0333 10/27/24  0528 10/28/24  0510   BUN 18 22 25*   CREATININE 6.10* 7.53* 8.74*   WBC 11.9* 13.1* 15.1*       Lab Results   Component Value Date/Time    VANCOTROUGH 8.8 10/14/2024 11:22 AM    VANCORANDOM 21.1 10/28/2024 05:10 AM       MRSA Nasal Swab: N/A. Non-respiratory infection    Assessment:  Date:  Dose/Freq Admin Times Level/Time:   10/24      10/25 HD 1000 mg x1  [address] @0400 = 21.6   10/26      10/27      10/28 HD  1000 mg x1 (1600) Rd @ 0604 = 21.1     Plan:  Concentration-guided dosing due to intermittent hemodialysis  Give vancomycin 1000 mg x1 dose post-HD today. Will plan to re-dose after next HD session (HD MWF).   Vancomycin concentrations will be ordered as clinically appropriate  Pharmacy will continue to monitor patient and adjust therapy as indicated    Thank you for the consult,  Noel Fleming RPH

## 2024-10-28 NOTE — PROGRESS NOTES
Hospitalist Progress Note   Admit Date:  10/23/2024  7:42 PM   Name:  Giselle Rosado   Age:  54 y.o.  Sex:  female  :  1970   MRN:  [id number]   Room:  [facility]/    Presenting/Chief Complaint: Chest Pain and Shortness of Breath     Reason(s) for Admission: Sepsis associated hypotension (HCC) [A41.9, I95.9]  SBP (spontaneous bacterial peritonitis) (HCC) [K65.2]  Atrial fibrillation with RVR (HCC) [I48.91]  ESRD on hemodialysis (HCC) [N18.6, Z99.2]  Active bleeding [Z78.9]  Cerebrovascular accident (CVA) due to thrombosis of cerebral artery (HCC) [I63.30]     Hospital Course:   Giselle Rosado is a 54 y.o. female with medical history of ESRD now on HD(from PD due to peritonitis), ESRD on peritoneal dialysis, history of paroxysmal atrial fibrillation, hypertension, depression, irritable bowel syndrome  who presented with chest pain and hypotension from HD center.    Patient was recently hospitalized on 10/10 - 10/22 for peritonitis with ascites fluid growing [id number] and was recommended IV vanc for 2 weeks post PD catheter removal.    She was noted to be in Afib RVR on arrival. Laboratory workup with leukocytosis, lactic acidosis and elevated pBNP.  CT abdomen pelvis with active extravasation from the left rectus/inferior epigastric arterial territory, increased in size of hematomas measuring 7.9 x 3.4 cm.    Interventional radiology was consulted and patient received thrombin injection for the inferior epigastric pseudoaneurysm.  Patient hemoglobin has been stable.  Repeat CT abdomen pelvis on 10/27/2024 showed improvement of left abdominal wall hematoma, improved thrombosed pseudoaneurysm, improvement with free fluid in the abdomen.    Subjective & 24hr Events:   Patient was seen and examined at bedside.  Patient continues to have improvement in her abdominal pain, back pain and bilateral hip pain but has not resolved.  Patient is sitting up in side of the bed with family at bedside.  Tolerating

## 2024-10-28 NOTE — PROGRESS NOTES
CT of lumbar region with contrast ordered for patient.  Patient received contrast yesterday and has dialysis later this morning.  This RN called CT and requested CT to be before dialysis.      825- CT completed.

## 2024-10-28 NOTE — CARE COORDINATION
CM following. No new discharge needs identified by CM. Is on RA. Receives HD on MWF at [facility].

## 2024-10-28 NOTE — PROGRESS NOTES
[facility] Progress Note    Follow-Up on: ESRD  Seen on HD Qb 350, UF 2500. Left PC functions well.     ROS:  Gen - no fever, no chills, appetite unchanged  CV - no chest pain, no palpitation  Lung - no shortness of breath, no cough  Abd - + tenderness, no nausea/vomiting, no diarrhea  Ext - no edema    Exam:  Vitals:    10/28/24 0746 10/28/24 0910 10/28/24 1114 10/28/24 1200   BP: 122/67  (!) 142/89 101/66   Pulse: 74  74    Resp: 18 18 18    Temp: 97.9 °F (36.6 °C)      TempSrc: Oral      SpO2: 96%  94%    Weight:       Height:             Intake/Output Summary (Last 24 hours) at 10/28/2024 1216  Last data filed at 10/28/2024 0300  Gross per 24 hour   Intake 720 ml   Output --   Net 720 ml         Wt Readings from Last 3 Encounters:   10/28/24 (!) 156.4 kg (344 lb 12.8 oz)   10/18/24 (!) 150.6 kg (332 lb)   10/24/23 (!) 149.7 kg (330 lb)       GEN - in no distress  CV - regular, no murmur, no rub  Lung - clear bilaterally  Abd - soft, +tender  Ext - + edema    Recent Labs     10/26/24  0333 10/27/24  0528 10/28/24  0510   WBC 11.9* 13.1* 15.1*   HGB 7.8* 7.9* 7.8*   HCT 26.3* 26.3* 25.9*    378 413        Recent Labs     10/26/24  0333 10/27/24  0528 10/28/24  0510    134* 130*   K 3.8 3.9 4.7   CL 97* 94* 92*   CO2 26 23 22   BUN 18 22 25*   CREATININE 6.10* 7.53* 8.74*   CALCIUM 7.2* 7.3* 6.8*       Assessment / Plan:  Principal Problem:    Sepsis associated hypotension (HCC)  Active Problems:    Obesity, morbid    Acute respiratory failure    Gout    Anemia    HTN (hypertension)    DM2 (diabetes mellitus, type 2) (HCC)    Atrial fibrillation with RVR (HCC)    Cellulitis of left lower extremity    Sepsis (HCC)    End stage chronic kidney disease (HCC)    Spontaneous bacterial peritonitis (HCC)  Resolved Problems:    * No resolved hospital problems. *    1) ESRD - HD MWF.  Previously on PD     2) Peritonitis - recent removal of PD catheter     3) Abdominal pain - evidence of subcutaneous  hematoma and cellulitis.  CT ab hematoma with improvement. Free fluid shows improvement.      4) Hypotension - on Toprol for Afib, Valsartan stopped     5) S/p Afib with RVR - better rate controlled     6) Hyperphosphatemia - on Tenapanor    7) Anemia ESRD - resume JOHNNY    High Medical Decision Making  -Patient with at least one acute illness that poses a threat to bodily function  -Reviewed 3 labs  -Reviewed external charts

## 2024-10-29 PROCEDURE — 6360000002 HC RX W HCPCS: Performed by: INTERNAL MEDICINE

## 2024-10-29 PROCEDURE — 2580000003 HC RX 258: Performed by: INTERNAL MEDICINE

## 2024-10-29 PROCEDURE — 6370000000 HC RX 637 (ALT 250 FOR IP): Performed by: INTERNAL MEDICINE

## 2024-10-29 PROCEDURE — 6370000000 HC RX 637 (ALT 250 FOR IP): Performed by: HOSPITALIST

## 2024-10-29 PROCEDURE — 2140000000 HC CCU INTERMEDIATE R&B

## 2024-10-29 RX ORDER — GABAPENTIN 100 MG/1
100 CAPSULE ORAL DAILY
Status: DISCONTINUED | OUTPATIENT
Start: 2024-10-29 | End: 2024-11-08 | Stop reason: HOSPADM

## 2024-10-29 RX ADMIN — ONDANSETRON 4 MG: 4 TABLET, ORALLY DISINTEGRATING ORAL at 08:55

## 2024-10-29 RX ADMIN — SEVELAMER CARBONATE 800 MG: 800 TABLET, FILM COATED ORAL at 08:23

## 2024-10-29 RX ADMIN — MIDODRINE HYDROCHLORIDE 5 MG: 5 TABLET ORAL at 17:13

## 2024-10-29 RX ADMIN — MIDODRINE HYDROCHLORIDE 5 MG: 5 TABLET ORAL at 11:12

## 2024-10-29 RX ADMIN — METOPROLOL TARTRATE 50 MG: 50 TABLET, FILM COATED ORAL at 08:23

## 2024-10-29 RX ADMIN — OXYCODONE 10 MG: 5 TABLET ORAL at 17:13

## 2024-10-29 RX ADMIN — HYDROMORPHONE HYDROCHLORIDE 1 MG: 1 INJECTION, SOLUTION INTRAMUSCULAR; INTRAVENOUS; SUBCUTANEOUS at 09:45

## 2024-10-29 RX ADMIN — SEVELAMER CARBONATE 800 MG: 800 TABLET, FILM COATED ORAL at 17:13

## 2024-10-29 RX ADMIN — SERTRALINE 100 MG: 100 TABLET, FILM COATED ORAL at 19:28

## 2024-10-29 RX ADMIN — WATER 1000 MG: 1 INJECTION INTRAMUSCULAR; INTRAVENOUS; SUBCUTANEOUS at 21:05

## 2024-10-29 RX ADMIN — OXYCODONE 10 MG: 5 TABLET ORAL at 04:31

## 2024-10-29 RX ADMIN — GABAPENTIN 100 MG: 100 CAPSULE ORAL at 11:12

## 2024-10-29 RX ADMIN — OXYCODONE 10 MG: 5 TABLET ORAL at 08:30

## 2024-10-29 RX ADMIN — FAMOTIDINE 20 MG: 20 TABLET, FILM COATED ORAL at 08:23

## 2024-10-29 RX ADMIN — HYDROMORPHONE HYDROCHLORIDE 1 MG: 1 INJECTION, SOLUTION INTRAMUSCULAR; INTRAVENOUS; SUBCUTANEOUS at 19:28

## 2024-10-29 RX ADMIN — MIDODRINE HYDROCHLORIDE 5 MG: 5 TABLET ORAL at 08:23

## 2024-10-29 RX ADMIN — OXYCODONE 10 MG: 5 TABLET ORAL at 12:44

## 2024-10-29 RX ADMIN — METOPROLOL TARTRATE 50 MG: 50 TABLET, FILM COATED ORAL at 19:28

## 2024-10-29 RX ADMIN — SEVELAMER CARBONATE 800 MG: 800 TABLET, FILM COATED ORAL at 11:12

## 2024-10-29 RX ADMIN — SODIUM CHLORIDE, PRESERVATIVE FREE 10 ML: 5 INJECTION INTRAVENOUS at 08:26

## 2024-10-29 RX ADMIN — SERTRALINE 100 MG: 100 TABLET, FILM COATED ORAL at 08:23

## 2024-10-29 RX ADMIN — OXYCODONE 10 MG: 5 TABLET ORAL at 23:15

## 2024-10-29 RX ADMIN — SODIUM CHLORIDE, PRESERVATIVE FREE 10 ML: 5 INJECTION INTRAVENOUS at 19:31

## 2024-10-29 ASSESSMENT — PAIN SCALES - WONG BAKER
WONGBAKER_NUMERICALRESPONSE: NO HURT

## 2024-10-29 ASSESSMENT — PAIN DESCRIPTION - ORIENTATION
ORIENTATION: RIGHT;LEFT
ORIENTATION: RIGHT;LEFT;LOWER
ORIENTATION: RIGHT;LEFT

## 2024-10-29 ASSESSMENT — PAIN DESCRIPTION - LOCATION
LOCATION: ABDOMEN;BACK
LOCATION: ABDOMEN;BACK
LOCATION: ABDOMEN;FLANK
LOCATION: ABDOMEN;BACK
LOCATION: ABDOMEN;BACK
LOCATION: ABDOMEN;FLANK;BACK
LOCATION: ABDOMEN;FLANK;BACK
LOCATION: ABDOMEN;BACK
LOCATION: ABDOMEN;BACK;FLANK
LOCATION: ABDOMEN;BACK

## 2024-10-29 ASSESSMENT — PAIN DESCRIPTION - DESCRIPTORS
DESCRIPTORS: ACHING;SORE

## 2024-10-29 ASSESSMENT — PAIN DESCRIPTION - PAIN TYPE
TYPE: ACUTE PAIN

## 2024-10-29 ASSESSMENT — PAIN SCALES - GENERAL
PAINLEVEL_OUTOF10: 4
PAINLEVEL_OUTOF10: 7
PAINLEVEL_OUTOF10: 7
PAINLEVEL_OUTOF10: 0
PAINLEVEL_OUTOF10: 5
PAINLEVEL_OUTOF10: 6
PAINLEVEL_OUTOF10: 4
PAINLEVEL_OUTOF10: 6
PAINLEVEL_OUTOF10: 8
PAINLEVEL_OUTOF10: 6
PAINLEVEL_OUTOF10: 7

## 2024-10-29 ASSESSMENT — PAIN - FUNCTIONAL ASSESSMENT
PAIN_FUNCTIONAL_ASSESSMENT: ACTIVITIES ARE NOT PREVENTED

## 2024-10-29 NOTE — PROGRESS NOTES
Hospitalist Progress Note   Admit Date:  10/23/2024  7:42 PM   Name:  iGselle Rosado   Age:  54 y.o.  Sex:  female  :  1970   MRN:  [id number]   Room:  [facility]/    Presenting/Chief Complaint: Chest Pain and Shortness of Breath     Reason(s) for Admission: Sepsis associated hypotension (HCC) [A41.9, I95.9]  SBP (spontaneous bacterial peritonitis) (HCC) [K65.2]  Atrial fibrillation with RVR (HCC) [I48.91]  ESRD on hemodialysis (HCC) [N18.6, Z99.2]  Active bleeding [Z78.9]  Cerebrovascular accident (CVA) due to thrombosis of cerebral artery (HCC) [I63.30]     Hospital Course:   Giselle Rosado is a 54 y.o. female with medical history of ESRD now on HD(from PD due to peritonitis), ESRD on peritoneal dialysis, history of paroxysmal atrial fibrillation, hypertension, depression, irritable bowel syndrome  who presented with chest pain and hypotension from HD center.    Patient was recently hospitalized on 10/10 - 10/22 for peritonitis with ascites fluid growing [id number]-wfiix and was recommended IV vanc for 2 weeks post PD catheter removal.    She was noted to be in Afib RVR on arrival. Laboratory workup with leukocytosis, lactic acidosis and elevated pBNP.  CT abdomen pelvis with active extravasation from the left rectus/inferior epigastric arterial territory, increased in size of hematomas measuring 7.9 x 3.4 cm.    Interventional radiology was consulted and patient received thrombin injection for the inferior epigastric pseudoaneurysm.  Patient hemoglobin has been stable.  Repeat CT abdomen pelvis on 10/27/2024 showed improvement of left abdominal wall hematoma, improved thrombosed pseudoaneurysm, improvement with free fluid in the abdomen.    Subjective & 24hr Events:   Patient was seen and examined at bedside.  Patient is sitting up on side of her bed.  Abdominal pain has improved but still with soreness.  Still with lower back pain that radiates to bilateral hips down her lateral thighs.  Pain  Lymphocytes % 6 (L) 13 - 44 %    Monocytes % 9 4.0 - 12.0 %    Eosinophils % 2 0.5 - 7.8 %    Basophils % 1 0.0 - 2.0 %    Immature Granulocytes % 6 (H) 0.0 - 5.0 %    Neutrophils Absolute 11.4 (H) 1.7 - 8.2 K/UL    Lymphocytes Absolute 0.9 0.5 - 4.6 K/UL    Monocytes Absolute 1.4 (H) 0.1 - 1.3 K/UL    Eosinophils Absolute 0.3 0.0 - 0.8 K/UL    Basophils Absolute 0.2 0.0 - 0.2 K/UL    Immature Granulocytes Absolute 0.9 (H) 0.0 - 0.5 K/UL    RBC Comment ANISOCYTOSIS + POIKILOCYTOSIS      RBC Comment OCCASIONAL  HYPOCHROMIA        WBC Comment Result Confirmed By Smear      Platelet Comment ADEQUATE      Differential Type AUTOMATED     Vancomycin Level, Random    Collection Time: 10/28/24  5:10 AM   Result Value Ref Range    Vancomycin Rm 21.1 UG/ML       No results for input(s): \"COVID19\" in the last 72 hours.    Current Meds:  Current Facility-Administered Medications   Medication Dose Route Frequency    gabapentin (NEURONTIN) capsule 100 mg  100 mg Oral Daily    sevelamer (RENVELA) tablet 800 mg  800 mg Oral TID WC    diatrizoate meglumine-sodium (GASTROGRAFIN) 66-10 % solution 15 mL  15 mL Oral ONCE PRN    ceFAZolin (ANCEF) 1,000 mg in sterile water 10 mL IV syringe  1,000 mg IntraVENous Q24H    epoetin oralia-epbx (RETACRIT) injection 10,000 Units  10,000 Units SubCUTAneous Weekly    [Held by provider] apixaban (ELIQUIS) tablet 5 mg  5 mg Oral BID    dicyclomine (BENTYL) capsule 10 mg  10 mg Oral TID PRN    famotidine (PEPCID) tablet 20 mg  20 mg Oral Daily    sodium chloride flush 0.9 % injection 5-40 mL  5-40 mL IntraVENous 2 times per day    sodium chloride flush 0.9 % injection 5-40 mL  5-40 mL IntraVENous PRN    0.9 % sodium chloride infusion   IntraVENous PRN    aluminum & magnesium hydroxide-simethicone (MAALOX) 200-200-20 MG/5ML suspension 30 mL  30 mL Oral Q6H PRN    ondansetron (ZOFRAN-ODT) disintegrating tablet 4 mg  4 mg Oral Q8H PRN    Or    ondansetron (ZOFRAN) injection 4 mg  4 mg IntraVENous Q6H PRN

## 2024-10-29 NOTE — PROGRESS NOTES
VANCO DAILY FOLLOW UP RENAL INSUFFICIENCY PATIENT   [facility]   Pharmacy Pharmacokinetic Monitoring Service - Vancomycin    Consulting Provider: Jodi Calderon MD   Indication: Peritonitis   Target Concentration: Pre-dialysis concentration 15-20 mg/L  Day of Therapy: 5 (EOT 11/1/24 per ID)   Additional Antimicrobials: ceftriaxone    Pertinent Laboratory Values:   Wt Readings from Last 1 Encounters:   10/29/24 (!) 156.6 kg (345 lb 3.9 oz)     Temp Readings from Last 1 Encounters:   10/29/24 98.6 °F (37 °C) (Oral)     Recent Labs     10/27/24  0528 10/28/24  0510   BUN 22 25*   CREATININE 7.53* 8.74*   WBC 13.1* 15.1*       Lab Results   Component Value Date/Time    VANCOTROUGH 8.8 10/14/2024 11:22 AM    VANCORANDOM 21.1 10/28/2024 05:10 AM       MRSA Nasal Swab: N/A. Non-respiratory infection    Assessment:  Date:  Dose/Freq Admin Times Level/Time:   10/24      10/25 HD 1000 mg x1  13[address] Rd @0400 = 21.6   10/26      10/27      10/28 HD  1000 mg x1 15[address] Rd @ 0604 = 21.1   10/29      10/30   Rd @(0600) =      Plan:  Concentration-guided dosing due to intermittent hemodialysis  No dose needed today. Will plan to re-dose after next HD session tomorrow (HD MWF).   Vancomycin concentrations will be ordered as clinically appropriate  Pharmacy will continue to monitor patient and adjust therapy as indicated    Thank you for the consult,  Noel Fleming RPH

## 2024-10-30 LAB
ANION GAP SERPL CALC-SCNC: 18 MMOL/L (ref 7–16)
BASOPHILS # BLD: 0.1 K/UL (ref 0–0.2)
BASOPHILS NFR BLD: 1 % (ref 0–2)
BUN SERPL-MCNC: 19 MG/DL (ref 6–23)
CALCIUM SERPL-MCNC: 7.1 MG/DL (ref 8.8–10.2)
CHLORIDE SERPL-SCNC: 90 MMOL/L (ref 98–107)
CO2 SERPL-SCNC: 21 MMOL/L (ref 20–29)
CREAT SERPL-MCNC: 7.85 MG/DL (ref 0.6–1.1)
DIFFERENTIAL METHOD BLD: ABNORMAL
EOSINOPHIL # BLD: 0.2 K/UL (ref 0–0.8)
EOSINOPHIL NFR BLD: 1 % (ref 0.5–7.8)
ERYTHROCYTE [DISTWIDTH] IN BLOOD BY AUTOMATED COUNT: 17.7 % (ref 11.9–14.6)
ERYTHROCYTE [SEDIMENTATION RATE] IN BLOOD: 40 MM/HR (ref 0–30)
GLUCOSE SERPL-MCNC: 160 MG/DL (ref 70–99)
HCT VFR BLD AUTO: 26.8 % (ref 35.8–46.3)
HGB BLD-MCNC: 8.2 G/DL (ref 11.7–15.4)
IMM GRANULOCYTES # BLD AUTO: 0.9 K/UL (ref 0–0.5)
IMM GRANULOCYTES NFR BLD AUTO: 6 % (ref 0–5)
LYMPHOCYTES # BLD: 1 K/UL (ref 0.5–4.6)
LYMPHOCYTES NFR BLD: 7 % (ref 13–44)
MCH RBC QN AUTO: 29.2 PG (ref 26.1–32.9)
MCHC RBC AUTO-ENTMCNC: 30.6 G/DL (ref 31.4–35)
MCV RBC AUTO: 95.4 FL (ref 82–102)
MONOCYTES # BLD: 1.6 K/UL (ref 0.1–1.3)
MONOCYTES NFR BLD: 10 % (ref 4–12)
NEUTS SEG # BLD: 12 K/UL (ref 1.7–8.2)
NEUTS SEG NFR BLD: 76 % (ref 43–78)
NRBC # BLD: 0.04 K/UL (ref 0–0.2)
PLATELET # BLD AUTO: 379 K/UL (ref 150–450)
PMV BLD AUTO: 9.6 FL (ref 9.4–12.3)
POTASSIUM SERPL-SCNC: 4.7 MMOL/L (ref 3.5–5.1)
PROCALCITONIN SERPL-MCNC: 0.75 NG/ML (ref 0–0.1)
RBC # BLD AUTO: 2.81 M/UL (ref 4.05–5.2)
SODIUM SERPL-SCNC: 129 MMOL/L (ref 136–145)
VANCOMYCIN SERPL-MCNC: 24.7 UG/ML
WBC # BLD AUTO: 15.9 K/UL (ref 4.3–11.1)

## 2024-10-30 PROCEDURE — 76937 US GUIDE VASCULAR ACCESS: CPT

## 2024-10-30 PROCEDURE — 6370000000 HC RX 637 (ALT 250 FOR IP): Performed by: INTERNAL MEDICINE

## 2024-10-30 PROCEDURE — 6370000000 HC RX 637 (ALT 250 FOR IP): Performed by: HOSPITALIST

## 2024-10-30 PROCEDURE — 85652 RBC SED RATE AUTOMATED: CPT

## 2024-10-30 PROCEDURE — 2580000003 HC RX 258: Performed by: INTERNAL MEDICINE

## 2024-10-30 PROCEDURE — 6360000002 HC RX W HCPCS: Performed by: INTERNAL MEDICINE

## 2024-10-30 PROCEDURE — 80048 BASIC METABOLIC PNL TOTAL CA: CPT

## 2024-10-30 PROCEDURE — 36415 COLL VENOUS BLD VENIPUNCTURE: CPT

## 2024-10-30 PROCEDURE — 85025 COMPLETE CBC W/AUTO DIFF WBC: CPT

## 2024-10-30 PROCEDURE — 80202 ASSAY OF VANCOMYCIN: CPT

## 2024-10-30 PROCEDURE — 90935 HEMODIALYSIS ONE EVALUATION: CPT

## 2024-10-30 PROCEDURE — 2140000000 HC CCU INTERMEDIATE R&B

## 2024-10-30 PROCEDURE — 86140 C-REACTIVE PROTEIN: CPT

## 2024-10-30 PROCEDURE — 84145 PROCALCITONIN (PCT): CPT

## 2024-10-30 RX ADMIN — SERTRALINE 100 MG: 100 TABLET, FILM COATED ORAL at 10:42

## 2024-10-30 RX ADMIN — VANCOMYCIN HYDROCHLORIDE 750 MG: 750 INJECTION, POWDER, LYOPHILIZED, FOR SOLUTION INTRAVENOUS at 17:55

## 2024-10-30 RX ADMIN — SODIUM CHLORIDE, PRESERVATIVE FREE 10 ML: 5 INJECTION INTRAVENOUS at 19:50

## 2024-10-30 RX ADMIN — OXYCODONE 10 MG: 5 TABLET ORAL at 12:31

## 2024-10-30 RX ADMIN — METOPROLOL TARTRATE 50 MG: 50 TABLET, FILM COATED ORAL at 10:42

## 2024-10-30 RX ADMIN — OXYCODONE 10 MG: 5 TABLET ORAL at 17:45

## 2024-10-30 RX ADMIN — WATER 1000 MG: 1 INJECTION INTRAMUSCULAR; INTRAVENOUS; SUBCUTANEOUS at 21:34

## 2024-10-30 RX ADMIN — SERTRALINE 100 MG: 100 TABLET, FILM COATED ORAL at 19:47

## 2024-10-30 RX ADMIN — SEVELAMER CARBONATE 800 MG: 800 TABLET, FILM COATED ORAL at 12:28

## 2024-10-30 RX ADMIN — HYDROMORPHONE HYDROCHLORIDE 1 MG: 1 INJECTION, SOLUTION INTRAMUSCULAR; INTRAVENOUS; SUBCUTANEOUS at 10:54

## 2024-10-30 RX ADMIN — GABAPENTIN 100 MG: 100 CAPSULE ORAL at 10:42

## 2024-10-30 RX ADMIN — MIDODRINE HYDROCHLORIDE 5 MG: 5 TABLET ORAL at 17:49

## 2024-10-30 RX ADMIN — MIDODRINE HYDROCHLORIDE 5 MG: 5 TABLET ORAL at 12:28

## 2024-10-30 RX ADMIN — SODIUM CHLORIDE, PRESERVATIVE FREE 10 ML: 5 INJECTION INTRAVENOUS at 10:42

## 2024-10-30 RX ADMIN — SEVELAMER CARBONATE 800 MG: 800 TABLET, FILM COATED ORAL at 17:49

## 2024-10-30 RX ADMIN — HYDROMORPHONE HYDROCHLORIDE 1 MG: 1 INJECTION, SOLUTION INTRAMUSCULAR; INTRAVENOUS; SUBCUTANEOUS at 19:47

## 2024-10-30 RX ADMIN — OXYCODONE 10 MG: 5 TABLET ORAL at 05:01

## 2024-10-30 RX ADMIN — METOPROLOL TARTRATE 50 MG: 50 TABLET, FILM COATED ORAL at 19:47

## 2024-10-30 RX ADMIN — FAMOTIDINE 20 MG: 20 TABLET, FILM COATED ORAL at 10:42

## 2024-10-30 RX ADMIN — MIDODRINE HYDROCHLORIDE 5 MG: 5 TABLET ORAL at 08:41

## 2024-10-30 ASSESSMENT — PAIN DESCRIPTION - ORIENTATION
ORIENTATION: RIGHT;LEFT
ORIENTATION: RIGHT;LEFT;MID
ORIENTATION: MID
ORIENTATION: RIGHT;LEFT;MID
ORIENTATION: RIGHT;LEFT

## 2024-10-30 ASSESSMENT — PAIN DESCRIPTION - LOCATION
LOCATION: ABDOMEN;BACK
LOCATION: ABDOMEN;BACK
LOCATION: ABDOMEN;HIP
LOCATION: ABDOMEN;HIP
LOCATION: ABDOMEN

## 2024-10-30 ASSESSMENT — PAIN SCALES - GENERAL
PAINLEVEL_OUTOF10: 5
PAINLEVEL_OUTOF10: 0
PAINLEVEL_OUTOF10: 8
PAINLEVEL_OUTOF10: 0
PAINLEVEL_OUTOF10: 6
PAINLEVEL_OUTOF10: 10
PAINLEVEL_OUTOF10: 7

## 2024-10-30 ASSESSMENT — PAIN - FUNCTIONAL ASSESSMENT: PAIN_FUNCTIONAL_ASSESSMENT: ACTIVITIES ARE NOT PREVENTED

## 2024-10-30 ASSESSMENT — PAIN DESCRIPTION - DESCRIPTORS
DESCRIPTORS: ACHING
DESCRIPTORS: ACHING;SORE
DESCRIPTORS: ACHING;SORE
DESCRIPTORS: ACHING
DESCRIPTORS: ACHING

## 2024-10-30 ASSESSMENT — PAIN DESCRIPTION - PAIN TYPE: TYPE: ACUTE PAIN

## 2024-10-30 ASSESSMENT — PAIN SCALES - WONG BAKER
WONGBAKER_NUMERICALRESPONSE: NO HURT
WONGBAKER_NUMERICALRESPONSE: NO HURT

## 2024-10-30 NOTE — DIALYSIS
TRANSFER OUT- DIALYSIS    Hemodialysis treatment completed. PT ran 3.25 hours, without complications.    Patient alert and VS stable  /59  P 81       1.9 Kg removed.      Flushed both ports with 10 mL of NS.  CVC dressing clean, dry, and intact, tego caps intact, curos caps placed.      Meds given: Midodrine.    RBCs given during dialysis: No    Patient to 402 after dialysis.

## 2024-10-30 NOTE — PROGRESS NOTES
VANCO DAILY FOLLOW UP RENAL INSUFFICIENCY PATIENT   [facility]   Pharmacy Pharmacokinetic Monitoring Service - Vancomycin    Consulting Provider: Jodi Calderon MD   Indication: Peritonitis   Target Concentration: Pre-dialysis concentration 15-20 mg/L  Day of Therapy: 7 (EOT 11/1/24 per ID)   Additional Antimicrobials: cefazolin    Pertinent Laboratory Values:   Wt Readings from Last 1 Encounters:   10/29/24 (!) 156.6 kg (345 lb 3.9 oz)     Temp Readings from Last 1 Encounters:   10/30/24 97.9 °F (36.6 °C) (Oral)     Recent Labs     10/28/24  0510 10/30/24  0454 10/30/24  1015   BUN 25* 19  --    CREATININE 8.74* 7.85*  --    WBC 15.1* 15.9*  --    PROCAL  --   --  0.75*       Lab Results   Component Value Date/Time    VANCOTROUGH 8.8 10/14/2024 11:22 AM    VANCORANDOM 24.7 10/30/2024 04:54 AM       MRSA Nasal Swab: N/A. Non-respiratory infection    Assessment:  Date:  Dose/Freq Admin Times Level/Time:   10/24      10/25 HD 1000 mg x1  13[address] Rd @0400 = 21.6   10/26      10/27      10/28 HD  1000 mg x1 15[address] Rd @0604 = 21.1   10/29      10/30  mg x1  (1600) Rd @0651 = 24.7   10/31      11/1 EOT        Plan:  Concentration-guided dosing due to intermittent hemodialysis  Give vancomycin 1000 mg x1 today. Will plan to re-dose after next HD session (HD MWF). Patient's last day of therapy will be on 11/1/24.    Vancomycin concentrations will be ordered as clinically appropriate  Pharmacy will continue to monitor patient and adjust therapy as indicated    Thank you for the consult,  Noel Fleming [facility]

## 2024-10-30 NOTE — DIALYSIS
TRANSFER IN - DIALYSIS    Received patient in dialysis unit  from [facility] (unit) for ordered procedure.    Consent verified for renal replacement therapy. Procedure explained to patient, opportunity for Q&A provided. Call light given.     Patient alert and vital signs stable.  /66,  P 77  , 0 L O2 via RA.    Hemodialysis initiated using Left CVC.  Aspirated and flushed both ports without difficulty. Dressing clean, dry and intact.  Machine settings per MD order.    Heparin 0 unit bolus and 0 units/hr.      Will monitor during treatment.

## 2024-10-30 NOTE — PROGRESS NOTES
[facility] Progress Note    Follow-Up on: ESRD  Pt seen and examined on HD, left  Qb, UF 1500 1st hour Sequential followed by 3 hours dialysis, pt reports abdominal tender, asymptomatic hypotension due for Midodrine STEVE RN.     ROS:  Gen - no fever, no chills, appetite unchanged  CV - no chest pain  Lung - no shortness of breath, no cough  Abd - + tenderness, no nausea/vomiting, no diarrhea  Ext - + edema    Exam:  Vitals:    10/30/24 0531 10/30/24 0706 10/30/24 0730 10/30/24 0800   BP:  (!) 140/66 (!) 107/58 (!) 126/92   Pulse:       Resp: 18      Temp:       TempSrc:       SpO2:       Weight:       Height:             Intake/Output Summary (Last 24 hours) at 10/30/2024 0820  Last data filed at 10/30/2024 0448  Gross per 24 hour   Intake 240 ml   Output 0 ml   Net 240 ml         Wt Readings from Last 3 Encounters:   10/29/24 (!) 156.6 kg (345 lb 3.9 oz)   10/18/24 (!) 150.6 kg (332 lb)   10/24/23 (!) 149.7 kg (330 lb)       GEN - in no distress, alert and oriented   CV - regular rate, S1, S2, no Rub   Lung - clear bilaterally  Abd - soft, +tender  Ext - + 1 BLE edema    Recent Labs     10/28/24  0510 10/30/24  0454   WBC 15.1* 15.9*   HGB 7.8* 8.2*   HCT 25.9* 26.8*    379        Recent Labs     10/28/24  0510 10/30/24  0454   * 129*   K 4.7 4.7   CL 92* 90*   CO2 22 21   BUN 25* 19   CREATININE 8.74* 7.85*   CALCIUM 6.8* 7.1*       Assessment / Plan:  Principal Problem:    Sepsis associated hypotension (HCC)  Active Problems:    Obesity, morbid    Acute respiratory failure    Gout    Anemia    HTN (hypertension)    DM2 (diabetes mellitus, type 2) (HCC)    Atrial fibrillation with RVR (HCC)    Cellulitis of left lower extremity    Sepsis (HCC)    End stage chronic kidney disease (HCC)    Spontaneous bacterial peritonitis (HCC)  Resolved Problems:    * No resolved hospital problems. *    1) ESRD - HD MWF.  Previously on PD  - seen on HD, UF as tolerated, limiting by hypotension

## 2024-10-30 NOTE — PROGRESS NOTES
Hospitalist Progress Note   Admit Date:  10/23/2024  7:42 PM   Name:  Giselle Rosado   Age:  54 y.o.  Sex:  female  :  1970   MRN:  [id number]   Room:  [facility]/    Presenting/Chief Complaint: Chest Pain and Shortness of Breath     Reason(s) for Admission: Sepsis associated hypotension (HCC) [A41.9, I95.9]  SBP (spontaneous bacterial peritonitis) (HCC) [K65.2]  Atrial fibrillation with RVR (HCC) [I48.91]  ESRD on hemodialysis (HCC) [N18.6, Z99.2]  Active bleeding [Z78.9]  Cerebrovascular accident (CVA) due to thrombosis of cerebral artery (HCC) [I63.30]     Hospital Course:   Giselle Rosado is a 54 y.o. female with medical history of ESRD now on HD(from PD due to peritonitis), ESRD on peritoneal dialysis, history of paroxysmal atrial fibrillation, hypertension, depression, irritable bowel syndrome  who presented with chest pain and hypotension from HD center.    Patient was recently hospitalized on 10/10 - 10/22 for peritonitis with ascites fluid growing [id number] and was recommended IV vanc for 2 weeks post PD catheter removal.    She was noted to be in Afib RVR on arrival. Laboratory workup with leukocytosis, lactic acidosis and elevated pBNP.  CT abdomen pelvis with active extravasation from the left rectus/inferior epigastric arterial territory, increased in size of hematomas measuring 7.9 x 3.4 cm.    Interventional radiology was consulted and patient received thrombin injection for the inferior epigastric pseudoaneurysm.  Patient hemoglobin has been stable.  Repeat CT abdomen pelvis on 10/27/2024 showed improvement of left abdominal wall hematoma, improved thrombosed pseudoaneurysm, improvement with free fluid in the abdomen.    Subjective & 24hr Events:   Patient was seen and examined at bedside.  Patient is sitting up on side of her bed.  Still with left sided hip pain which is now radiating down to her left upper thigh. Feels like \"stabbing\".  Abdominal pain has improved but still with  4.6 K/UL    Monocytes Absolute 1.6 (H) 0.1 - 1.3 K/UL    Eosinophils Absolute 0.2 0.0 - 0.8 K/UL    Basophils Absolute 0.1 0.0 - 0.2 K/UL    Immature Granulocytes Absolute 0.9 (H) 0.0 - 0.5 K/UL   Basic Metabolic Panel w/ Reflex to MG    Collection Time: 10/30/24  4:54 AM   Result Value Ref Range    Sodium 129 (L) 136 - 145 mmol/L    Potassium 4.7 3.5 - 5.1 mmol/L    Chloride 90 (L) 98 - 107 mmol/L    CO2 21 20 - 29 mmol/L    Anion Gap 18 (H) 7 - 16 mmol/L    Glucose 160 (H) 70 - 99 mg/dL    BUN 19 6 - 23 MG/DL    Creatinine 7.85 (H) 0.60 - 1.10 MG/DL    Est, Glom Filt Rate 6 (L) >60 ml/min/1.73m2    Calcium 7.1 (L) 8.8 - 10.2 MG/DL   Sedimentation Rate    Collection Time: 10/30/24 10:15 AM   Result Value Ref Range    Sed Rate, Automated 40 (H) 0 - 30 mm/hr       No results for input(s): \"COVID19\" in the last 72 hours.    Current Meds:  Current Facility-Administered Medications   Medication Dose Route Frequency    gabapentin (NEURONTIN) capsule 100 mg  100 mg Oral Daily    sevelamer (RENVELA) tablet 800 mg  800 mg Oral TID WC    diatrizoate meglumine-sodium (GASTROGRAFIN) 66-10 % solution 15 mL  15 mL Oral ONCE PRN    ceFAZolin (ANCEF) 1,000 mg in sterile water 10 mL IV syringe  1,000 mg IntraVENous Q24H    epoetin oralia-epbx (RETACRIT) injection 10,000 Units  10,000 Units SubCUTAneous Weekly    [Held by provider] apixaban (ELIQUIS) tablet 5 mg  5 mg Oral BID    dicyclomine (BENTYL) capsule 10 mg  10 mg Oral TID PRN    famotidine (PEPCID) tablet 20 mg  20 mg Oral Daily    sodium chloride flush 0.9 % injection 5-40 mL  5-40 mL IntraVENous 2 times per day    sodium chloride flush 0.9 % injection 5-40 mL  5-40 mL IntraVENous PRN    0.9 % sodium chloride infusion   IntraVENous PRN    aluminum & magnesium hydroxide-simethicone (MAALOX) 200-200-20 MG/5ML suspension 30 mL  30 mL Oral Q6H PRN    ondansetron (ZOFRAN-ODT) disintegrating tablet 4 mg  4 mg Oral Q8H PRN    Or    ondansetron (ZOFRAN) injection 4 mg  4 mg

## 2024-10-31 ENCOUNTER — APPOINTMENT (OUTPATIENT)
Dept: CT IMAGING | Age: 54
End: 2024-10-31
Payer: COMMERCIAL

## 2024-10-31 LAB
ANION GAP SERPL CALC-SCNC: 16 MMOL/L (ref 7–16)
BASOPHILS # BLD: 0.2 K/UL (ref 0–0.2)
BASOPHILS NFR BLD: 1 % (ref 0–2)
BUN SERPL-MCNC: 15 MG/DL (ref 6–23)
CALCIUM SERPL-MCNC: 7.1 MG/DL (ref 8.8–10.2)
CHLORIDE SERPL-SCNC: 92 MMOL/L (ref 98–107)
CO2 SERPL-SCNC: 22 MMOL/L (ref 20–29)
CREAT SERPL-MCNC: 6.65 MG/DL (ref 0.6–1.1)
DIFFERENTIAL METHOD BLD: ABNORMAL
EOSINOPHIL # BLD: 0.2 K/UL (ref 0–0.8)
EOSINOPHIL NFR BLD: 1 % (ref 0.5–7.8)
ERYTHROCYTE [DISTWIDTH] IN BLOOD BY AUTOMATED COUNT: 18 % (ref 11.9–14.6)
GLUCOSE SERPL-MCNC: 194 MG/DL (ref 70–99)
HCT VFR BLD AUTO: 26 % (ref 35.8–46.3)
HGB BLD-MCNC: 8 G/DL (ref 11.7–15.4)
IMM GRANULOCYTES # BLD AUTO: 0.9 K/UL (ref 0–0.5)
IMM GRANULOCYTES NFR BLD AUTO: 6 % (ref 0–5)
LYMPHOCYTES # BLD: 1.2 K/UL (ref 0.5–4.6)
LYMPHOCYTES NFR BLD: 8 % (ref 13–44)
Lab: ABNORMAL
MCH RBC QN AUTO: 29.2 PG (ref 26.1–32.9)
MCHC RBC AUTO-ENTMCNC: 30.8 G/DL (ref 31.4–35)
MCV RBC AUTO: 94.9 FL (ref 82–102)
MONOCYTES # BLD: 1.7 K/UL (ref 0.1–1.3)
MONOCYTES NFR BLD: 11 % (ref 4–12)
NEUTS SEG # BLD: 11 K/UL (ref 1.7–8.2)
NEUTS SEG NFR BLD: 73 % (ref 43–78)
NRBC # BLD: 0.04 K/UL (ref 0–0.2)
ORGANISM ID BY SEQUENCING: NORMAL
PLATELET # BLD AUTO: 405 K/UL (ref 150–450)
PLATELET COMMENT: ADEQUATE
PMV BLD AUTO: 9.4 FL (ref 9.4–12.3)
POTASSIUM SERPL-SCNC: 4.4 MMOL/L (ref 3.5–5.1)
RBC # BLD AUTO: 2.74 M/UL (ref 4.05–5.2)
RBC MORPH BLD: ABNORMAL
RBC MORPH BLD: ABNORMAL
SODIUM SERPL-SCNC: 130 MMOL/L (ref 136–145)
SPECIMEN SOURCE: ABNORMAL
SPECIMEN SOURCE: NORMAL
WBC # BLD AUTO: 15.2 K/UL (ref 4.3–11.1)
WBC MORPH BLD: ABNORMAL

## 2024-10-31 PROCEDURE — 6360000002 HC RX W HCPCS: Performed by: INTERNAL MEDICINE

## 2024-10-31 PROCEDURE — 6370000000 HC RX 637 (ALT 250 FOR IP): Performed by: INTERNAL MEDICINE

## 2024-10-31 PROCEDURE — 80048 BASIC METABOLIC PNL TOTAL CA: CPT

## 2024-10-31 PROCEDURE — 2580000003 HC RX 258: Performed by: INTERNAL MEDICINE

## 2024-10-31 PROCEDURE — 6370000000 HC RX 637 (ALT 250 FOR IP): Performed by: HOSPITALIST

## 2024-10-31 PROCEDURE — 85025 COMPLETE CBC W/AUTO DIFF WBC: CPT

## 2024-10-31 PROCEDURE — 2140000000 HC CCU INTERMEDIATE R&B

## 2024-10-31 PROCEDURE — 36415 COLL VENOUS BLD VENIPUNCTURE: CPT

## 2024-10-31 RX ORDER — IOPAMIDOL 755 MG/ML
100 INJECTION, SOLUTION INTRAVASCULAR
Status: DISCONTINUED | OUTPATIENT
Start: 2024-10-31 | End: 2024-11-08 | Stop reason: HOSPADM

## 2024-10-31 RX ORDER — MIDODRINE HYDROCHLORIDE 5 MG/1
10 TABLET ORAL
Status: DISCONTINUED | OUTPATIENT
Start: 2024-10-31 | End: 2024-11-08 | Stop reason: HOSPADM

## 2024-10-31 RX ADMIN — SERTRALINE 100 MG: 100 TABLET, FILM COATED ORAL at 20:01

## 2024-10-31 RX ADMIN — OXYCODONE 10 MG: 5 TABLET ORAL at 15:41

## 2024-10-31 RX ADMIN — METOPROLOL TARTRATE 50 MG: 50 TABLET, FILM COATED ORAL at 08:14

## 2024-10-31 RX ADMIN — SODIUM CHLORIDE, PRESERVATIVE FREE 10 ML: 5 INJECTION INTRAVENOUS at 08:16

## 2024-10-31 RX ADMIN — HYDROMORPHONE HYDROCHLORIDE 1 MG: 1 INJECTION, SOLUTION INTRAMUSCULAR; INTRAVENOUS; SUBCUTANEOUS at 11:59

## 2024-10-31 RX ADMIN — WATER 1000 MG: 1 INJECTION INTRAMUSCULAR; INTRAVENOUS; SUBCUTANEOUS at 20:02

## 2024-10-31 RX ADMIN — MIDODRINE HYDROCHLORIDE 10 MG: 5 TABLET ORAL at 17:06

## 2024-10-31 RX ADMIN — FAMOTIDINE 20 MG: 20 TABLET, FILM COATED ORAL at 08:14

## 2024-10-31 RX ADMIN — MIDODRINE HYDROCHLORIDE 10 MG: 5 TABLET ORAL at 08:13

## 2024-10-31 RX ADMIN — SEVELAMER CARBONATE 800 MG: 800 TABLET, FILM COATED ORAL at 17:06

## 2024-10-31 RX ADMIN — SEVELAMER CARBONATE 800 MG: 800 TABLET, FILM COATED ORAL at 11:59

## 2024-10-31 RX ADMIN — GABAPENTIN 100 MG: 100 CAPSULE ORAL at 08:14

## 2024-10-31 RX ADMIN — SEVELAMER CARBONATE 800 MG: 800 TABLET, FILM COATED ORAL at 08:14

## 2024-10-31 RX ADMIN — SERTRALINE 100 MG: 100 TABLET, FILM COATED ORAL at 08:14

## 2024-10-31 RX ADMIN — OXYCODONE 10 MG: 5 TABLET ORAL at 08:13

## 2024-10-31 RX ADMIN — METOPROLOL TARTRATE 50 MG: 50 TABLET, FILM COATED ORAL at 20:01

## 2024-10-31 RX ADMIN — OXYCODONE 10 MG: 5 TABLET ORAL at 19:12

## 2024-10-31 RX ADMIN — MIDODRINE HYDROCHLORIDE 10 MG: 5 TABLET ORAL at 11:59

## 2024-10-31 RX ADMIN — SODIUM CHLORIDE, PRESERVATIVE FREE 15 ML: 5 INJECTION INTRAVENOUS at 20:01

## 2024-10-31 ASSESSMENT — PAIN SCALES - GENERAL
PAINLEVEL_OUTOF10: 5
PAINLEVEL_OUTOF10: 0
PAINLEVEL_OUTOF10: 6
PAINLEVEL_OUTOF10: 7
PAINLEVEL_OUTOF10: 6
PAINLEVEL_OUTOF10: 7
PAINLEVEL_OUTOF10: 0

## 2024-10-31 ASSESSMENT — PAIN DESCRIPTION - LOCATION
LOCATION: ABDOMEN

## 2024-10-31 ASSESSMENT — PAIN DESCRIPTION - ORIENTATION
ORIENTATION: MID
ORIENTATION: MID;LOWER
ORIENTATION: MID

## 2024-10-31 ASSESSMENT — PAIN DESCRIPTION - PAIN TYPE: TYPE: CHRONIC PAIN

## 2024-10-31 ASSESSMENT — PAIN DESCRIPTION - DESCRIPTORS
DESCRIPTORS: ACHING;SORE
DESCRIPTORS: ACHING

## 2024-10-31 ASSESSMENT — PAIN DESCRIPTION - ONSET: ONSET: ON-GOING

## 2024-10-31 ASSESSMENT — PAIN DESCRIPTION - FREQUENCY: FREQUENCY: CONTINUOUS

## 2024-10-31 NOTE — PROGRESS NOTES
[facility] Progress Note    Follow-Up on: ESRD  Pt seen and examined       ROS:  Gen - no fever, no chills, appetite unchanged  CV - no chest pain  Lung - no shortness of breath, no cough  Abd - + tenderness, no nausea/vomiting, no diarrhea  Ext - + edema    Exam:  Vitals:    10/30/24 2040 10/31/24 0043 10/31/24 0438 10/31/24 0727   BP: (!) 116/52 113/81 108/70 125/79   Pulse: 73 84 65 76   Resp: 18 18 18 18   Temp: 98 °F (36.7 °C) 98 °F (36.7 °C) 97.8 °F (36.6 °C) 97.9 °F (36.6 °C)   TempSrc:    Oral   SpO2: 92% 92% 95% 97%   Weight:       Height:             Intake/Output Summary (Last 24 hours) at 10/31/2024 0912  Last data filed at 10/31/2024 0453  Gross per 24 hour   Intake 1100 ml   Output 2403 ml   Net -1303 ml         Wt Readings from Last 3 Encounters:   10/29/24 (!) 156.6 kg (345 lb 3.9 oz)   10/18/24 (!) 150.6 kg (332 lb)   10/24/23 (!) 149.7 kg (330 lb)       GEN - in no distress, alert and oriented   CV - regular rate, S1, S2, no Rub   Lung - clear bilaterally  Abd - soft, +tender  Ext - + 1 BLE edema    Recent Labs     10/30/24  0454 10/31/24  0317   WBC 15.9* 15.2*   HGB 8.2* 8.0*   HCT 26.8* 26.0*    405        Recent Labs     10/30/24  0454 10/31/24  0317   * 130*   K 4.7 4.4   CL 90* 92*   CO2 21 22   BUN 19 15   CREATININE 7.85* 6.65*   CALCIUM 7.1* 7.1*       Assessment / Plan:  Principal Problem:    Sepsis associated hypotension (HCC)  Active Problems:    Obesity, morbid    Acute respiratory failure    Gout    Anemia    HTN (hypertension)    DM2 (diabetes mellitus, type 2) (HCC)    Atrial fibrillation with RVR (HCC)    Cellulitis of left lower extremity    Sepsis (HCC)    End stage chronic kidney disease (HCC)    Spontaneous bacterial peritonitis (HCC)  Resolved Problems:    * No resolved hospital problems. *    1) ESRD - HD MWF.  Previously on PD  -Ordered HD for tomorrow     2) Peritonitis - PD catheter removed 10/15/24  -Continue vancomycin EOT 12/1/2024      3)  Abdominal pain - evidence of subcutaneous hematoma and cellulitis.   CT ab hematoma with improvement. Free fluid shows improvement.   Unclear whether it is calciphylaxis given that there is a lot of cellulitis and subcutaneous hematoma     4) Hypotension - on Toprol for Afib, Valsartan stopped  -on Midodrine- titrate      5) S/p Afib with RVR - better rate controlled     6) MBD- on Renvela     7) Anemia ESRD -on JOHNNY    High Medical Decision Making  -Patient with at least one acute illness that poses a threat to bodily function  -Reviewed 3 labs  -Reviewed external charts

## 2024-10-31 NOTE — PROGRESS NOTES
Hospitalist Progress Note   Admit Date:  10/23/2024  7:42 PM   Name:  Giselle Rosado   Age:  54 y.o.  Sex:  female  :  1970   MRN:  [id number]   Room:  [facility]/    Presenting/Chief Complaint: Chest Pain and Shortness of Breath     Reason(s) for Admission: Sepsis associated hypotension (HCC) [A41.9, I95.9]  SBP (spontaneous bacterial peritonitis) (HCC) [K65.2]  Atrial fibrillation with RVR (HCC) [I48.91]  ESRD on hemodialysis (HCC) [N18.6, Z99.2]  Active bleeding [Z78.9]  Cerebrovascular accident (CVA) due to thrombosis of cerebral artery (HCC) [I63.30]     Hospital Course:   Giselle Rosado is a 54 y.o. female with medical history of ESRD now on HD(from PD due to peritonitis), ESRD on peritoneal dialysis, history of paroxysmal atrial fibrillation, hypertension, depression, irritable bowel syndrome  who presented with chest pain and hypotension from HD center.    Patient was recently hospitalized on 10/10 - 10/22 for peritonitis with ascites fluid growing [id number] and was recommended IV vanc for 2 weeks post PD catheter removal.    She was noted to be in Afib RVR on arrival. Laboratory workup with leukocytosis, lactic acidosis and elevated pBNP.  CT abdomen pelvis with active extravasation from the left rectus/inferior epigastric arterial territory, increased in size of hematomas measuring 7.9 x 3.4 cm.    Interventional radiology was consulted and patient received thrombin injection for the inferior epigastric pseudoaneurysm.  Patient hemoglobin has been stable.  Repeat CT abdomen pelvis on 10/27/2024 showed improvement of left abdominal wall hematoma, improved thrombosed pseudoaneurysm, improvement with free fluid in the abdomen.    Subjective & 24hr Events:   Patient was seen and examined at bedside.  Patient is sitting in side of the bed.  Emotional this morning as she does not want to go get CT and felt that she is causing difficulties to the staff.    Patient reports that her pain is  650 mg  650 mg Rectal Q6H PRN    oxyCODONE (ROXICODONE) immediate release tablet 10 mg  10 mg Oral Q4H PRN    fluticasone (FLONASE) 50 MCG/ACT nasal spray 2 spray  2 spray Nasal Nightly PRN    lidocaine 4 % external patch 4 patch  4 patch TransDERmal Daily    vancomycin (VANCOCIN) intermittent dosing (placeholder)   Other RX Placeholder    sertraline (ZOLOFT) tablet 100 mg  100 mg Oral BID    metoprolol tartrate (LOPRESSOR) tablet 50 mg  50 mg Oral BID    HYDROmorphone HCl PF (DILAUDID) injection 1 mg  1 mg IntraVENous Q4H PRN       Signed:  Jodi Calderon MD    Part of this note may have been written by using a voice dictation software.  The note has been proof read but may still contain some grammatical/other typographical errors.

## 2024-10-31 NOTE — PROGRESS NOTES
ADRIANAO DAILY FOLLOW UP RENAL INSUFFICIENCY PATIENT   [facility]   Pharmacy Pharmacokinetic Monitoring Service - Vancomycin    Consulting Provider: Dr. Calderon  Indication: Peritonitis   Target Concentration: Pre-dialysis concentration 15-20 mg/L  Day of Therapy: 8 (EOT 11/1/24 per ID)   Additional Antimicrobials: cefazolin    Pertinent Laboratory Values:   Wt Readings from Last 1 Encounters:   10/29/24 (!) 156.6 kg (345 lb 3.9 oz)     Temp Readings from Last 1 Encounters:   10/31/24 97.5 °F (36.4 °C) (Axillary)     Recent Labs     10/30/24  0454 10/30/24  1015 10/31/24  0317   BUN 19  --  15   CREATININE 7.85*  --  6.65*   WBC 15.9*  --  15.2*   PROCAL  --  0.75*  --        Lab Results   Component Value Date/Time    VANCOTROUGH 8.8 10/14/2024 11:22 AM    VANCORANDOM 24.7 10/30/2024 04:54 AM     MRSA Nasal Swab: N/A. Non-respiratory infection    Assessment:  Date:  Dose/Freq Admin Times Level/Time:   10/24      10/25 HD 1000 mg x1  1351 Rd @0400 = 21.6   10/26      10/27      10/28 HD  1000 mg x1 1546 Rd @0604 = 21.1   10/29      10/30  mg x1 1755 Rd @0651 = 24.7   10/31      11/1 EOT        Plan:  Concentration-guided dosing due to intermittent hemodialysis  No dose needed today   Vancomycin concentrations will be ordered as clinically appropriate  Pharmacy will continue to monitor patient and adjust therapy as indicated    Thank you for the consult,  Juana Funk [facility]

## 2024-10-31 NOTE — PROGRESS NOTES
1 0.0 - 2.0 %    Immature Granulocytes % 6 (H) 0.0 - 5.0 %    Neutrophils Absolute 12.0 (H) 1.7 - 8.2 K/UL    Lymphocytes Absolute 1.0 0.5 - 4.6 K/UL    Monocytes Absolute 1.6 (H) 0.1 - 1.3 K/UL    Eosinophils Absolute 0.2 0.0 - 0.8 K/UL    Basophils Absolute 0.1 0.0 - 0.2 K/UL    Immature Granulocytes Absolute 0.9 (H) 0.0 - 0.5 K/UL   Basic Metabolic Panel w/ Reflex to MG    Collection Time: 10/30/24  4:54 AM   Result Value Ref Range    Sodium 129 (L) 136 - 145 mmol/L    Potassium 4.7 3.5 - 5.1 mmol/L    Chloride 90 (L) 98 - 107 mmol/L    CO2 21 20 - 29 mmol/L    Anion Gap 18 (H) 7 - 16 mmol/L    Glucose 160 (H) 70 - 99 mg/dL    BUN 19 6 - 23 MG/DL    Creatinine 7.85 (H) 0.60 - 1.10 MG/DL    Est, Glom Filt Rate 6 (L) >60 ml/min/1.73m2    Calcium 7.1 (L) 8.8 - 10.2 MG/DL   Procalcitonin    Collection Time: 10/30/24 10:15 AM   Result Value Ref Range    Procalcitonin 0.75 (H) 0.00 - 0.10 ng/mL   Sedimentation Rate    Collection Time: 10/30/24 10:15 AM   Result Value Ref Range    Sed Rate, Automated 40 (H) 0 - 30 mm/hr   CBC with Auto Differential    Collection Time: 10/31/24  3:17 AM   Result Value Ref Range    WBC 15.2 (H) 4.3 - 11.1 K/uL    RBC 2.74 (L) 4.05 - 5.2 M/uL    Hemoglobin 8.0 (L) 11.7 - 15.4 g/dL    Hematocrit 26.0 (L) 35.8 - 46.3 %    MCV 94.9 82 - 102 FL    MCH 29.2 26.1 - 32.9 PG    MCHC 30.8 (L) 31.4 - 35.0 g/dL    RDW 18.0 (H) 11.9 - 14.6 %    Platelets 405 150 - 450 K/uL    MPV 9.4 9.4 - 12.3 FL    nRBC 0.04 0.0 - 0.2 K/uL    Neutrophils % 73 43 - 78 %    Lymphocytes % 8 (L) 13 - 44 %    Monocytes % 11 4.0 - 12.0 %    Eosinophils % 1 0.5 - 7.8 %    Basophils % 1 0.0 - 2.0 %    Immature Granulocytes % 6 (H) 0.0 - 5.0 %    Neutrophils Absolute 11.0 (H) 1.7 - 8.2 K/UL    Lymphocytes Absolute 1.2 0.5 - 4.6 K/UL    Monocytes Absolute 1.7 (H) 0.1 - 1.3 K/UL    Eosinophils Absolute 0.2 0.0 - 0.8 K/UL    Basophils Absolute 0.2 0.0 - 0.2 K/UL    Immature Granulocytes Absolute 0.9 (H) 0.0 - 0.5 K/UL    RBC  Comment SLIGHT  POLYCHROMASIA        RBC Comment SLIGHT  ANISOCYTOSIS        WBC Comment Result Confirmed By Smear      Platelet Comment ADEQUATE      Differential Type AUTOMATED     Basic Metabolic Panel w/ Reflex to MG    Collection Time: 10/31/24  3:17 AM   Result Value Ref Range    Sodium 130 (L) 136 - 145 mmol/L    Potassium 4.4 3.5 - 5.1 mmol/L    Chloride 92 (L) 98 - 107 mmol/L    CO2 22 20 - 29 mmol/L    Anion Gap 16 7 - 16 mmol/L    Glucose 194 (H) 70 - 99 mg/dL    BUN 15 6 - 23 MG/DL    Creatinine 6.65 (H) 0.60 - 1.10 MG/DL    Est, Glom Filt Rate 7 (L) >60 ml/min/1.73m2    Calcium 7.1 (L) 8.8 - 10.2 MG/DL       No results for input(s): \"COVID19\" in the last 72 hours.    Current Meds:  Current Facility-Administered Medications   Medication Dose Route Frequency    midodrine (PROAMATINE) tablet 10 mg  10 mg Oral TID WC    iopamidol (ISOVUE-370) 76 % injection 100 mL  100 mL IntraVENous ONCE PRN    [START ON 11/1/2024] apixaban (ELIQUIS) tablet 5 mg  5 mg Oral BID    gabapentin (NEURONTIN) capsule 100 mg  100 mg Oral Daily    sevelamer (RENVELA) tablet 800 mg  800 mg Oral TID WC    diatrizoate meglumine-sodium (GASTROGRAFIN) 66-10 % solution 15 mL  15 mL Oral ONCE PRN    ceFAZolin (ANCEF) 1,000 mg in sterile water 10 mL IV syringe  1,000 mg IntraVENous Q24H    epoetin oralia-epbx (RETACRIT) injection 10,000 Units  10,000 Units SubCUTAneous Weekly    dicyclomine (BENTYL) capsule 10 mg  10 mg Oral TID PRN    famotidine (PEPCID) tablet 20 mg  20 mg Oral Daily    sodium chloride flush 0.9 % injection 5-40 mL  5-40 mL IntraVENous 2 times per day    sodium chloride flush 0.9 % injection 5-40 mL  5-40 mL IntraVENous PRN    0.9 % sodium chloride infusion   IntraVENous PRN    aluminum & magnesium hydroxide-simethicone (MAALOX) 200-200-20 MG/5ML suspension 30 mL  30 mL Oral Q6H PRN    ondansetron (ZOFRAN-ODT) disintegrating tablet 4 mg  4 mg Oral Q8H PRN    Or    ondansetron (ZOFRAN) injection 4 mg  4 mg IntraVENous Q6H

## 2024-11-01 LAB
ANION GAP SERPL CALC-SCNC: 17 MMOL/L (ref 7–16)
BASOPHILS # BLD: 0.1 K/UL (ref 0–0.2)
BASOPHILS NFR BLD: 1 % (ref 0–2)
BUN SERPL-MCNC: 19 MG/DL (ref 6–23)
CALCIUM SERPL-MCNC: 7.2 MG/DL (ref 8.8–10.2)
CHLORIDE SERPL-SCNC: 90 MMOL/L (ref 98–107)
CO2 SERPL-SCNC: 21 MMOL/L (ref 20–29)
CREAT SERPL-MCNC: 7.89 MG/DL (ref 0.6–1.1)
DIFFERENTIAL METHOD BLD: ABNORMAL
EOSINOPHIL # BLD: 0.1 K/UL (ref 0–0.8)
EOSINOPHIL NFR BLD: 1 % (ref 0.5–7.8)
ERYTHROCYTE [DISTWIDTH] IN BLOOD BY AUTOMATED COUNT: 18.5 % (ref 11.9–14.6)
GLUCOSE SERPL-MCNC: 173 MG/DL (ref 70–99)
HCT VFR BLD AUTO: 25.8 % (ref 35.8–46.3)
HGB BLD-MCNC: 7.6 G/DL (ref 11.7–15.4)
IMM GRANULOCYTES # BLD AUTO: 0.9 K/UL (ref 0–0.5)
IMM GRANULOCYTES NFR BLD AUTO: 6 % (ref 0–5)
LYMPHOCYTES # BLD: 1 K/UL (ref 0.5–4.6)
LYMPHOCYTES NFR BLD: 7 % (ref 13–44)
MCH RBC QN AUTO: 28.6 PG (ref 26.1–32.9)
MCHC RBC AUTO-ENTMCNC: 29.5 G/DL (ref 31.4–35)
MCV RBC AUTO: 97 FL (ref 82–102)
MONOCYTES # BLD: 1.8 K/UL (ref 0.1–1.3)
MONOCYTES NFR BLD: 12 % (ref 4–12)
NEUTS SEG # BLD: 10.7 K/UL (ref 1.7–8.2)
NEUTS SEG NFR BLD: 73 % (ref 43–78)
NRBC # BLD: 0.05 K/UL (ref 0–0.2)
PLATELET # BLD AUTO: 342 K/UL (ref 150–450)
PLATELET COMMENT: ADEQUATE
PMV BLD AUTO: 9.1 FL (ref 9.4–12.3)
POTASSIUM SERPL-SCNC: 4.9 MMOL/L (ref 3.5–5.1)
RBC # BLD AUTO: 2.66 M/UL (ref 4.05–5.2)
RBC MORPH BLD: ABNORMAL
RBC MORPH BLD: ABNORMAL
SODIUM SERPL-SCNC: 128 MMOL/L (ref 136–145)
WBC # BLD AUTO: 14.6 K/UL (ref 4.3–11.1)
WBC MORPH BLD: ABNORMAL

## 2024-11-01 PROCEDURE — 80048 BASIC METABOLIC PNL TOTAL CA: CPT

## 2024-11-01 PROCEDURE — 2580000003 HC RX 258: Performed by: INTERNAL MEDICINE

## 2024-11-01 PROCEDURE — 6370000000 HC RX 637 (ALT 250 FOR IP): Performed by: INTERNAL MEDICINE

## 2024-11-01 PROCEDURE — 6360000002 HC RX W HCPCS

## 2024-11-01 PROCEDURE — 36415 COLL VENOUS BLD VENIPUNCTURE: CPT

## 2024-11-01 PROCEDURE — 2140000000 HC CCU INTERMEDIATE R&B

## 2024-11-01 PROCEDURE — 6360000002 HC RX W HCPCS: Performed by: INTERNAL MEDICINE

## 2024-11-01 PROCEDURE — 2580000003 HC RX 258

## 2024-11-01 PROCEDURE — 90935 HEMODIALYSIS ONE EVALUATION: CPT

## 2024-11-01 PROCEDURE — 6370000000 HC RX 637 (ALT 250 FOR IP): Performed by: HOSPITALIST

## 2024-11-01 PROCEDURE — 85025 COMPLETE CBC W/AUTO DIFF WBC: CPT

## 2024-11-01 RX ADMIN — GABAPENTIN 100 MG: 100 CAPSULE ORAL at 11:00

## 2024-11-01 RX ADMIN — APIXABAN 5 MG: 2.5 TABLET, FILM COATED ORAL at 11:00

## 2024-11-01 RX ADMIN — OXYCODONE 10 MG: 5 TABLET ORAL at 20:21

## 2024-11-01 RX ADMIN — SERTRALINE 100 MG: 100 TABLET, FILM COATED ORAL at 20:22

## 2024-11-01 RX ADMIN — MIDODRINE HYDROCHLORIDE 10 MG: 5 TABLET ORAL at 12:54

## 2024-11-01 RX ADMIN — SODIUM CHLORIDE, PRESERVATIVE FREE 10 ML: 5 INJECTION INTRAVENOUS at 11:02

## 2024-11-01 RX ADMIN — WATER 1000 MG: 1 INJECTION INTRAMUSCULAR; INTRAVENOUS; SUBCUTANEOUS at 20:26

## 2024-11-01 RX ADMIN — METOPROLOL TARTRATE 50 MG: 50 TABLET, FILM COATED ORAL at 11:01

## 2024-11-01 RX ADMIN — OXYCODONE 10 MG: 5 TABLET ORAL at 15:55

## 2024-11-01 RX ADMIN — SERTRALINE 100 MG: 100 TABLET, FILM COATED ORAL at 11:00

## 2024-11-01 RX ADMIN — SEVELAMER CARBONATE 800 MG: 800 TABLET, FILM COATED ORAL at 15:55

## 2024-11-01 RX ADMIN — METOPROLOL TARTRATE 50 MG: 50 TABLET, FILM COATED ORAL at 20:22

## 2024-11-01 RX ADMIN — APIXABAN 5 MG: 2.5 TABLET, FILM COATED ORAL at 20:21

## 2024-11-01 RX ADMIN — VANCOMYCIN HYDROCHLORIDE 750 MG: 750 INJECTION, POWDER, LYOPHILIZED, FOR SOLUTION INTRAVENOUS at 12:56

## 2024-11-01 RX ADMIN — MIDODRINE HYDROCHLORIDE 10 MG: 5 TABLET ORAL at 15:55

## 2024-11-01 RX ADMIN — HYDROMORPHONE HYDROCHLORIDE 1 MG: 1 INJECTION, SOLUTION INTRAMUSCULAR; INTRAVENOUS; SUBCUTANEOUS at 07:05

## 2024-11-01 RX ADMIN — OXYCODONE 10 MG: 5 TABLET ORAL at 11:02

## 2024-11-01 RX ADMIN — SODIUM CHLORIDE, PRESERVATIVE FREE 5 ML: 5 INJECTION INTRAVENOUS at 20:22

## 2024-11-01 RX ADMIN — MIDODRINE HYDROCHLORIDE 10 MG: 5 TABLET ORAL at 07:05

## 2024-11-01 RX ADMIN — FAMOTIDINE 20 MG: 20 TABLET, FILM COATED ORAL at 11:01

## 2024-11-01 RX ADMIN — SEVELAMER CARBONATE 800 MG: 800 TABLET, FILM COATED ORAL at 11:01

## 2024-11-01 ASSESSMENT — PAIN DESCRIPTION - LOCATION
LOCATION: ABDOMEN;BACK
LOCATION: ABDOMEN
LOCATION: ABDOMEN
LOCATION: ABDOMEN;BACK
LOCATION: ABDOMEN;BACK

## 2024-11-01 ASSESSMENT — PAIN SCALES - GENERAL
PAINLEVEL_OUTOF10: 3
PAINLEVEL_OUTOF10: 7
PAINLEVEL_OUTOF10: 10
PAINLEVEL_OUTOF10: 6
PAINLEVEL_OUTOF10: 0
PAINLEVEL_OUTOF10: 7
PAINLEVEL_OUTOF10: 9

## 2024-11-01 ASSESSMENT — PAIN DESCRIPTION - DESCRIPTORS
DESCRIPTORS: SHOOTING
DESCRIPTORS: DISCOMFORT;SORE
DESCRIPTORS: SHOOTING

## 2024-11-01 ASSESSMENT — PAIN DESCRIPTION - ORIENTATION: ORIENTATION: MID;LOWER

## 2024-11-01 ASSESSMENT — PAIN - FUNCTIONAL ASSESSMENT: PAIN_FUNCTIONAL_ASSESSMENT: ACTIVITIES ARE NOT PREVENTED

## 2024-11-01 NOTE — PROGRESS NOTES
VANCO DAILY FOLLOW UP RENAL INSUFFICIENCY PATIENT   [facility]   Pharmacy Pharmacokinetic Monitoring Service - Vancomycin    Consulting Provider: Dr. Calderon  Indication: Peritonitis   Target Concentration: Pre-dialysis concentration 15-20 mg/L  EOT: 11/1/24 per ID  Additional Antimicrobials: cefazolin    Pertinent Laboratory Values:   Wt Readings from Last 1 Encounters:   10/29/24 (!) 156.6 kg (345 lb 3.9 oz)     Temp Readings from Last 1 Encounters:   11/01/24 97.9 °F (36.6 °C)     Recent Labs     10/30/24  0454 10/30/24  1015 10/31/24  0317 11/01/24  0520   BUN 19  --  15 19   CREATININE 7.85*  --  6.65* 7.89*   WBC 15.9*  --  15.2* 14.6*   PROCAL  --  0.75*  --   --        Lab Results   Component Value Date/Time    VANCOTROUGH 8.8 10/14/2024 11:22 AM    VANCORANDOM 24.7 10/30/2024 04:54 AM     MRSA Nasal Swab: N/A. Non-respiratory infection    Assessment:  Date:  Dose/Freq Admin Times Level/Time:   10/24      10/25 HD 1000 mg x1  1351 Rd @0400 = 21.6   10/26      10/27      10/28 HD  1000 mg x1 1546 Rd @0604 = 21.1   10/29      10/30  mg x1 1755 Rd @0651 = 24.7   10/31      11/1  mg x 1 (1300)      Plan:  Concentration-guided dosing due to intermittent hemodialysis  Give vancomycin 750 mg IV x 1 after HD today to complete therapy    Thank you for the consult,  Angelica Ocasio, [facility]

## 2024-11-01 NOTE — PLAN OF CARE
Problem: Chronic Conditions and Co-morbidities  Goal: Patient's chronic conditions and co-morbidity symptoms are monitored and maintained or improved  11/1/2024 1121 by Ry Moctezuma RN  Outcome: Progressing  11/1/2024 0315 by Maki Silva RN  Outcome: Progressing     Problem: Discharge Planning  Goal: Discharge to home or other facility with appropriate resources  11/1/2024 1121 by Ry Moctezuma RN  Outcome: Progressing  11/1/2024 0315 by Maki Silva RN  Outcome: Progressing     Problem: Pain  Goal: Verbalizes/displays adequate comfort level or baseline comfort level  11/1/2024 1121 by Ry Moctezuma RN  Outcome: Progressing  11/1/2024 0315 by Maki Silva RN  Outcome: Progressing     Problem: Safety - Adult  Goal: Free from fall injury  11/1/2024 1121 by Ry Moctezuma RN  Outcome: Progressing  11/1/2024 0315 by Maki Silva RN  Outcome: Progressing     Problem: ABCDS Injury Assessment  Goal: Absence of physical injury  11/1/2024 1121 by Ry Moctezuma RN  Outcome: Progressing  11/1/2024 0315 by Maki Silva RN  Outcome: Progressing

## 2024-11-01 NOTE — PROGRESS NOTES
Comprehensive Nutrition Assessment    Type and Reason for Visit: Initial, LOS  Length of Stay     Nutrition Recommendations/Plan:   Meals and Snacks:  Diet: Continue current order  Oral Nutriton Supplement Therapy:   Medical food supplement therapy:  None Pt declined offers      Malnutrition Assessment:  Malnutrition Status: At risk for malnutrition (on HD, variable appetite & intakes)  no dorothy wasting    Nutrition Assessment:  Nutrition History:   Patient reports that at baseline her appetite is extremely good at baseline. She eats 1 good meal/day as she wakes up later in the day. After she wakes up she'll have a snack of cottage cheese or a sandwich, and then have a good dinner at night. Patient watches her K/Phos intake, and has been working on it with the RD at dialysis. Reports an acute decline in her appetite upon her first admit in the beginning of October. Reports going an entire week without eating. States she didn't even want her favorite foods. Denies having issues chewing/swallowing. Denies n/v/c/d.      Do You Have Any Cultural, Adventist, or Ethnic Food Preferences?: No   Weight History:   EMR weight history review from Internal Medicine visit: 330# 10/24/23.   Patient reports that her weight has been stable around 346 lbs. Reports weight changes are associated with fluid fluctuations.   Nutrition Background:       PMH significant for SBP, ESRD on HD, Afib w/ RVR, GERD, HTN, IBS, DM 2, Gout, Anemia, Anxiety. She presents with chest pain and SOB. Is admitted with sepsis associated hypotension.   10/23: CT A/P -  Active extravasation from the left rectus/inferior epigastric arterial  territory. Embolization could be considered. The subcutaneous hematoma has  slightly increased in size since previous evaluation now measuring 7.9 x 3.4 cm  in size. Subcutaneous air is also present from the site of PD catheter removal.  Infectious etiologies would be within differential consideration.  Intraperitoneal

## 2024-11-01 NOTE — DIALYSIS
TRANSFER OUT- DIALYSIS    Hemodialysis treatment completed. PT ran 3.5 hours, without complications.    Patient alert and VS stable  /84  P 83       2.5 Kg removed.      Flushed both ports with 10 mL of NS.  CVC dressing clean, dry, and intact, tego caps intact, curos caps placed.      Meds given: Dilaudid.    RBCs given during dialysis: No    Patient to 402 after dialysis.

## 2024-11-01 NOTE — PROGRESS NOTES
[facility] Progress Note    Follow-Up on: ESRD  Pt seen and examined on HD. Running sequential UF today for extra fluid removal. She has no complaints currently, but is worried about experiencing abdominal pain       ROS:  Gen - no fever, no chills, appetite unchanged  CV - no chest pain  Lung - no shortness of breath, no cough  Abd - + tenderness, no nausea/vomiting, no diarrhea  Ext - + edema    Exam:  Vitals:    11/01/24 0501 11/01/24 0712 11/01/24 0730 11/01/24 0800   BP:  (!) 146/106 (!) 143/38 95/61   Pulse: 79 74 78    Resp:  18     Temp:  97.9 °F (36.6 °C)     TempSrc:       SpO2:       Weight:       Height:             Intake/Output Summary (Last 24 hours) at 11/1/2024 0828  Last data filed at 11/1/2024 0501  Gross per 24 hour   Intake 255 ml   Output --   Net 255 ml         Wt Readings from Last 3 Encounters:   10/29/24 (!) 156.6 kg (345 lb 3.9 oz)   10/18/24 (!) 150.6 kg (332 lb)   10/24/23 (!) 149.7 kg (330 lb)       GEN - in no distress, alert and oriented   CV - regular rate, S1, S2, no Rub   Lung - clear bilaterally  Abd - soft, +tender  Ext - + 1 BLE edema    Recent Labs     10/30/24  0454 10/31/24  0317 11/01/24  0520   WBC 15.9* 15.2* 14.6*   HGB 8.2* 8.0* 7.6*   HCT 26.8* 26.0* 25.8*    405 342        Recent Labs     10/30/24  0454 10/31/24  0317 11/01/24  0520   * 130* 128*   K 4.7 4.4 4.9   CL 90* 92* 90*   CO2 21 22 21   BUN 19 15 19   CREATININE 7.85* 6.65* 7.89*   CALCIUM 7.1* 7.1* 7.2*       Assessment / Plan:  Principal Problem:    Sepsis associated hypotension (HCC)  Active Problems:    Obesity, morbid    Acute respiratory failure    Gout    Anemia    HTN (hypertension)    DM2 (diabetes mellitus, type 2) (HCC)    Atrial fibrillation with RVR (HCC)    Cellulitis of left lower extremity    Sepsis (HCC)    End stage chronic kidney disease (HCC)    Spontaneous bacterial peritonitis (HCC)  Resolved Problems:    * No resolved hospital problems. *    1) ESRD - HD MWF.   DISPLAY PLAN FREE TEXT

## 2024-11-01 NOTE — PROGRESS NOTES
Hospitalist Progress Note   Admit Date:  10/23/2024  7:42 PM   Name:  Giselle Rosado   Age:  54 y.o.  Sex:  female  :  1970   MRN:  [id number]   Room:  [facility]/    Presenting/Chief Complaint: Chest Pain and Shortness of Breath     Reason(s) for Admission: Sepsis associated hypotension (HCC) [A41.9, I95.9]  SBP (spontaneous bacterial peritonitis) (HCC) [K65.2]  Atrial fibrillation with RVR (HCC) [I48.91]  ESRD on hemodialysis (HCC) [N18.6, Z99.2]  Active bleeding [Z78.9]  Cerebrovascular accident (CVA) due to thrombosis of cerebral artery (HCC) [I63.30]     Hospital Course:   Giselle Rosado is a 54 y.o. female with medical history of ESRD now on HD(from PD due to peritonitis), ESRD on peritoneal dialysis, history of paroxysmal atrial fibrillation, hypertension, depression, irritable bowel syndrome  who presented with chest pain and hypotension from HD center.    Patient was recently hospitalized on 10/10 - 10/22 for peritonitis with ascites fluid growing [id number] and was recommended IV vanc for 2 weeks post PD catheter removal.    She was noted to be in Afib RVR on arrival. Laboratory workup with leukocytosis, lactic acidosis and elevated pBNP.  CT abdomen pelvis with active extravasation from the left rectus/inferior epigastric arterial territory, increased in size of hematomas measuring 7.9 x 3.4 cm.    Interventional radiology was consulted and patient received thrombin injection for the inferior epigastric pseudoaneurysm.  Patient hemoglobin has been stable.  Repeat CT abdomen pelvis on 10/27/2024 showed improvement of left abdominal wall hematoma, improved thrombosed pseudoaneurysm, improvement with free fluid in the abdomen.    Subjective & 24hr Events:   Patient was seen and examined at bedside.    Plan for HD today per Nephrology. May need to consider skin biopsy for concerns of calciphylaxis. Otherwise, no acute concerns at the moment.    Tolerating diet.   Discussed possibility of

## 2024-11-01 NOTE — CARE COORDINATION
CM following. No new discharge needs identified by CM. HD today, goes to [facility] on MWF. Receives IV Vanc at dialysis. On RA. Uses a WC. Will remain available.

## 2024-11-01 NOTE — DIALYSIS
TRANSFER IN - DIALYSIS    Received patient in dialysis unit  from [facility] (unit) for ordered procedure.    Consent verified for renal replacement therapy. Procedure explained to patient, opportunity for Q&A provided. Call light given.     Patient alert and vital signs stable. /60   Pulse 79   Temp 97.9 °F (36.6 °C) (Oral)   Resp 18   Ht 1.753 m (5' 9\")   Wt (!) 156.6 kg (345 lb 3.9 oz)   SpO2 93%   BMI 50.98 kg/m²     Hemodialysis initiated using right cvc.  Aspirated and flushed both ports without difficulty. Dressing clean, dry and intact.  Machine settings per MD order.    Heparin 0 unit bolus and 0 units/hr.      Will monitor during treatment.

## 2024-11-01 NOTE — NURSE NAVIGATOR
IP visit with Giselle Rosado and spouse at bedside to discuss LAAO.  Educational information/booklet for Watchman device given to the patient regarding stroke prevention options for patients with Atrial Fibrillation. [facility] CardioSmart tool provided for review as the shared decision making process continues between patient and physicians. The patient has a Watchman consult with Dr. Harris on 11/21/2024 at 1000 am ([facility]).  Nurse Navigator contact ([phone]) information provided.

## 2024-11-02 LAB
ALBUMIN SERPL-MCNC: 1.6 G/DL (ref 3.5–5)
ALBUMIN/GLOB SERPL: 0.4 (ref 1–1.9)
ALP SERPL-CCNC: 164 U/L (ref 35–104)
ALT SERPL-CCNC: 8 U/L (ref 8–45)
ANION GAP SERPL CALC-SCNC: 14 MMOL/L (ref 7–16)
AST SERPL-CCNC: 73 U/L (ref 15–37)
BASOPHILS # BLD: 0.1 K/UL (ref 0–0.2)
BASOPHILS NFR BLD: 1 % (ref 0–2)
BILIRUB SERPL-MCNC: 0.2 MG/DL (ref 0–1.2)
BUN SERPL-MCNC: 16 MG/DL (ref 6–23)
CALCIUM SERPL-MCNC: 7.4 MG/DL (ref 8.8–10.2)
CHLORIDE SERPL-SCNC: 91 MMOL/L (ref 98–107)
CO2 SERPL-SCNC: 22 MMOL/L (ref 20–29)
CREAT SERPL-MCNC: 6.87 MG/DL (ref 0.6–1.1)
CRP SERPL-MCNC: 242 MG/L (ref 0–10)
DIFFERENTIAL METHOD BLD: ABNORMAL
EOSINOPHIL # BLD: 0.1 K/UL (ref 0–0.8)
EOSINOPHIL NFR BLD: 1 % (ref 0.5–7.8)
ERYTHROCYTE [DISTWIDTH] IN BLOOD BY AUTOMATED COUNT: 18.5 % (ref 11.9–14.6)
GLOBULIN SER CALC-MCNC: 4.4 G/DL (ref 2.3–3.5)
GLUCOSE SERPL-MCNC: 174 MG/DL (ref 70–99)
HCT VFR BLD AUTO: 27.9 % (ref 35.8–46.3)
HGB BLD-MCNC: 8.4 G/DL (ref 11.7–15.4)
IMM GRANULOCYTES # BLD AUTO: 0.7 K/UL (ref 0–0.5)
IMM GRANULOCYTES NFR BLD AUTO: 5 % (ref 0–5)
LACTATE SERPL-SCNC: 2.8 MMOL/L (ref 0.5–2)
LYMPHOCYTES # BLD: 0.9 K/UL (ref 0.5–4.6)
LYMPHOCYTES NFR BLD: 6 % (ref 13–44)
MAGNESIUM SERPL-MCNC: 2.2 MG/DL (ref 1.8–2.4)
MCH RBC QN AUTO: 28.9 PG (ref 26.1–32.9)
MCHC RBC AUTO-ENTMCNC: 30.1 G/DL (ref 31.4–35)
MCV RBC AUTO: 95.9 FL (ref 82–102)
MONOCYTES # BLD: 1.6 K/UL (ref 0.1–1.3)
MONOCYTES NFR BLD: 11 % (ref 4–12)
NEUTS SEG # BLD: 11.2 K/UL (ref 1.7–8.2)
NEUTS SEG NFR BLD: 76 % (ref 43–78)
NRBC # BLD: 0.04 K/UL (ref 0–0.2)
PHOSPHATE SERPL-MCNC: 5.8 MG/DL (ref 2.5–4.5)
PLATELET # BLD AUTO: 328 K/UL (ref 150–450)
PMV BLD AUTO: 9.5 FL (ref 9.4–12.3)
POTASSIUM SERPL-SCNC: 4.3 MMOL/L (ref 3.5–5.1)
PROT SERPL-MCNC: 6 G/DL (ref 6.3–8.2)
RBC # BLD AUTO: 2.91 M/UL (ref 4.05–5.2)
SODIUM SERPL-SCNC: 127 MMOL/L (ref 136–145)
WBC # BLD AUTO: 14.7 K/UL (ref 4.3–11.1)

## 2024-11-02 PROCEDURE — 6370000000 HC RX 637 (ALT 250 FOR IP): Performed by: INTERNAL MEDICINE

## 2024-11-02 PROCEDURE — 97530 THERAPEUTIC ACTIVITIES: CPT

## 2024-11-02 PROCEDURE — 6360000002 HC RX W HCPCS: Performed by: INTERNAL MEDICINE

## 2024-11-02 PROCEDURE — 36415 COLL VENOUS BLD VENIPUNCTURE: CPT

## 2024-11-02 PROCEDURE — 2580000003 HC RX 258: Performed by: INTERNAL MEDICINE

## 2024-11-02 PROCEDURE — 2140000000 HC CCU INTERMEDIATE R&B

## 2024-11-02 PROCEDURE — 83605 ASSAY OF LACTIC ACID: CPT

## 2024-11-02 PROCEDURE — 83735 ASSAY OF MAGNESIUM: CPT

## 2024-11-02 PROCEDURE — 97165 OT EVAL LOW COMPLEX 30 MIN: CPT

## 2024-11-02 PROCEDURE — 6370000000 HC RX 637 (ALT 250 FOR IP): Performed by: HOSPITALIST

## 2024-11-02 PROCEDURE — 80053 COMPREHEN METABOLIC PANEL: CPT

## 2024-11-02 PROCEDURE — 84100 ASSAY OF PHOSPHORUS: CPT

## 2024-11-02 PROCEDURE — 85025 COMPLETE CBC W/AUTO DIFF WBC: CPT

## 2024-11-02 RX ADMIN — WATER 1000 MG: 1 INJECTION INTRAMUSCULAR; INTRAVENOUS; SUBCUTANEOUS at 20:26

## 2024-11-02 RX ADMIN — METOPROLOL TARTRATE 50 MG: 50 TABLET, FILM COATED ORAL at 09:08

## 2024-11-02 RX ADMIN — FAMOTIDINE 20 MG: 20 TABLET, FILM COATED ORAL at 09:09

## 2024-11-02 RX ADMIN — APIXABAN 5 MG: 2.5 TABLET, FILM COATED ORAL at 09:09

## 2024-11-02 RX ADMIN — METOPROLOL TARTRATE 50 MG: 50 TABLET, FILM COATED ORAL at 20:08

## 2024-11-02 RX ADMIN — GABAPENTIN 100 MG: 100 CAPSULE ORAL at 09:08

## 2024-11-02 RX ADMIN — HYDROMORPHONE HYDROCHLORIDE 1 MG: 1 INJECTION, SOLUTION INTRAMUSCULAR; INTRAVENOUS; SUBCUTANEOUS at 20:27

## 2024-11-02 RX ADMIN — MIDODRINE HYDROCHLORIDE 10 MG: 5 TABLET ORAL at 12:01

## 2024-11-02 RX ADMIN — OXYCODONE 10 MG: 5 TABLET ORAL at 13:17

## 2024-11-02 RX ADMIN — MIDODRINE HYDROCHLORIDE 10 MG: 5 TABLET ORAL at 09:08

## 2024-11-02 RX ADMIN — SERTRALINE 100 MG: 100 TABLET, FILM COATED ORAL at 20:08

## 2024-11-02 RX ADMIN — APIXABAN 5 MG: 2.5 TABLET, FILM COATED ORAL at 20:07

## 2024-11-02 RX ADMIN — EPOETIN ALFA-EPBX 10000 UNITS: 10000 INJECTION, SOLUTION INTRAVENOUS; SUBCUTANEOUS at 17:50

## 2024-11-02 RX ADMIN — SEVELAMER CARBONATE 800 MG: 800 TABLET, FILM COATED ORAL at 12:02

## 2024-11-02 RX ADMIN — SEVELAMER CARBONATE 800 MG: 800 TABLET, FILM COATED ORAL at 09:08

## 2024-11-02 RX ADMIN — SODIUM CHLORIDE, PRESERVATIVE FREE 10 ML: 5 INJECTION INTRAVENOUS at 09:10

## 2024-11-02 RX ADMIN — MIDODRINE HYDROCHLORIDE 10 MG: 5 TABLET ORAL at 17:30

## 2024-11-02 RX ADMIN — OXYCODONE 10 MG: 5 TABLET ORAL at 17:30

## 2024-11-02 RX ADMIN — SERTRALINE 100 MG: 100 TABLET, FILM COATED ORAL at 09:09

## 2024-11-02 RX ADMIN — SEVELAMER CARBONATE 800 MG: 800 TABLET, FILM COATED ORAL at 17:30

## 2024-11-02 RX ADMIN — OXYCODONE 10 MG: 5 TABLET ORAL at 09:07

## 2024-11-02 RX ADMIN — SODIUM CHLORIDE, PRESERVATIVE FREE 10 ML: 5 INJECTION INTRAVENOUS at 20:11

## 2024-11-02 ASSESSMENT — PAIN SCALES - GENERAL
PAINLEVEL_OUTOF10: 8
PAINLEVEL_OUTOF10: 7
PAINLEVEL_OUTOF10: 6
PAINLEVEL_OUTOF10: 0
PAINLEVEL_OUTOF10: 0
PAINLEVEL_OUTOF10: 7

## 2024-11-02 ASSESSMENT — PAIN DESCRIPTION - LOCATION
LOCATION: ABDOMEN;HIP
LOCATION: ABDOMEN

## 2024-11-02 ASSESSMENT — PAIN DESCRIPTION - DESCRIPTORS
DESCRIPTORS: ACHING;SORE;SHARP
DESCRIPTORS: ACHING;SORE

## 2024-11-02 ASSESSMENT — PAIN DESCRIPTION - ONSET: ONSET: ON-GOING

## 2024-11-02 ASSESSMENT — PAIN DESCRIPTION - ORIENTATION
ORIENTATION: ANTERIOR
ORIENTATION: RIGHT;LEFT;ANTERIOR;LOWER
ORIENTATION: MID;LOWER;RIGHT;LEFT

## 2024-11-02 ASSESSMENT — PAIN DESCRIPTION - PAIN TYPE
TYPE: ACUTE PAIN
TYPE: ACUTE PAIN;CHRONIC PAIN

## 2024-11-02 NOTE — CARE COORDINATION
PT/OT consulted 2/2 weakness. Await therapies recs to discern dcp. Pt with [facility], will require prior auth.

## 2024-11-02 NOTE — PLAN OF CARE
Problem: Safety - Adult  Goal: Free from fall injury  11/1/2024 2208 by Faiza Pete RN  Outcome: Not Progressing  Flowsheets (Taken 11/1/2024 1921)  Free From Fall Injury: Instruct family/caregiver on patient safety  11/1/2024 1121 by Ry Moctezuma RN  Outcome: Progressing     Problem: Chronic Conditions and Co-morbidities  Goal: Patient's chronic conditions and co-morbidity symptoms are monitored and maintained or improved  11/1/2024 2208 by Faiza Pete RN  Outcome: Progressing  Flowsheets (Taken 11/1/2024 1921)  Care Plan - Patient's Chronic Conditions and Co-Morbidity Symptoms are Monitored and Maintained or Improved: Monitor and assess patient's chronic conditions and comorbid symptoms for stability, deterioration, or improvement  11/1/2024 1121 by Ry Moctezuma RN  Outcome: Progressing     Problem: Discharge Planning  Goal: Discharge to home or other facility with appropriate resources  11/1/2024 2208 by Faiza Pete RN  Outcome: Progressing  Flowsheets (Taken 11/1/2024 1921)  Discharge to home or other facility with appropriate resources: Identify barriers to discharge with patient and caregiver  11/1/2024 1121 by Ry Moctezuma RN  Outcome: Progressing     Problem: Pain  Goal: Verbalizes/displays adequate comfort level or baseline comfort level  11/1/2024 2208 by Faiza Pete RN  Outcome: Progressing  Flowsheets (Taken 11/1/2024 1921)  Verbalizes/displays adequate comfort level or baseline comfort level:   Encourage patient to monitor pain and request assistance   Assess pain using appropriate pain scale  11/1/2024 1121 by Ry Moctezuma RN  Outcome: Progressing     Problem: ABCDS Injury Assessment  Goal: Absence of physical injury  11/1/2024 2208 by Faiza Pete RN  Outcome: Progressing  Flowsheets (Taken 11/1/2024 1921)  Absence of Physical Injury: Implement safety measures based on patient assessment  11/1/2024 1121 by Ry Moctezuma RN  Outcome: Progressing

## 2024-11-02 NOTE — PROGRESS NOTES
[facility] Progress Note    Follow-Up on: ESRD  Pt seen and examined on HD. Running sequential UF today for extra fluid removal. She has no complaints currently, but is worried about experiencing abdominal pain       ROS:  Gen - no fever, no chills, appetite unchanged  CV - no chest pain  Lung - no shortness of breath, no cough  Abd - + tenderness, no nausea/vomiting, no diarrhea  Ext - + edema    Exam:  Vitals:    11/01/24 2351 11/02/24 0042 11/02/24 0358 11/02/24 0735   BP: 136/80  124/65 139/73   Pulse: 70 83 76 75   Resp: 20 20 17   Temp: 97.5 °F (36.4 °C)  97 °F (36.1 °C) 97.7 °F (36.5 °C)   TempSrc: Oral  Temporal Oral   SpO2: 94%  95% 95%   Weight:       Height:             Intake/Output Summary (Last 24 hours) at 11/2/2024 0920  Last data filed at 11/2/2024 0430  Gross per 24 hour   Intake 1640 ml   Output 3000 ml   Net -1360 ml         Wt Readings from Last 3 Encounters:   10/29/24 (!) 156.6 kg (345 lb 3.9 oz)   10/18/24 (!) 150.6 kg (332 lb)   10/24/23 (!) 149.7 kg (330 lb)       GEN - in no distress, alert and oriented   CV - regular rate, S1, S2, no Rub   Lung - clear bilaterally  Abd - soft, +tender  Ext - + 1 BLE edema    Recent Labs     10/31/24  0317 11/01/24  0520 11/02/24  0504   WBC 15.2* 14.6* 14.7*   HGB 8.0* 7.6* 8.4*   HCT 26.0* 25.8* 27.9*    342 328        Recent Labs     10/31/24  0317 11/01/24  0520 11/02/24  0504   * 128* 127*   K 4.4 4.9 4.3   CL 92* 90* 91*   CO2 22 21 22   BUN 15 19 16   CREATININE 6.65* 7.89* 6.87*   CALCIUM 7.1* 7.2* 7.4*   MG  --   --  2.2   PHOS  --   --  5.8*   ALBUMIN  --   --  1.6*       Assessment / Plan:  Principal Problem:    Sepsis associated hypotension (HCC)  Active Problems:    Obesity, morbid    Acute respiratory failure    Gout    Anemia    HTN (hypertension)    DM2 (diabetes mellitus, type 2) (HCC)    Atrial fibrillation with RVR (HCC)    Cellulitis of left lower extremity    Sepsis (HCC)    End stage chronic kidney disease

## 2024-11-02 NOTE — PROGRESS NOTES
ACUTE OCCUPATIONAL THERAPY GOALS:   (Developed with and agreed upon by patient and/or caregiver.)  1. Patient will complete lower body bathing and dressing with STAND BY ASSIST and adaptive equipment as needed.     2. Patient will complete toileting with STAND BY ASSIST.  3. Patient will complete grooming ADL in standing with STAND BY ASSIST.  4. Patient will tolerate 25 minutes of OT treatment with 1-2 rest breaks to increase activity tolerance for ADLs.   5. Patient will complete functional transfers with STAND BY ASSIST and adaptive equipment as needed.   6. Patient will tolerate 10 minutes BUE exercises to increase strength for safe, functional transfers.     Timeframe: 7 visits     OCCUPATIONAL THERAPY Initial Assessment and Daily Note       OT Visit Days: 1  Acknowledge Orders  Time  OT Charge Capture  Rehab Caseload Tracker      Giselle Rosado is a 54 y.o. female   PRIMARY DIAGNOSIS: Sepsis associated hypotension (HCC)  Sepsis associated hypotension (HCC) [A41.9, I95.9]  SBP (spontaneous bacterial peritonitis) ([facility]) [K65.2]  Atrial fibrillation with RVR ([facility]) [I48.91]  ESRD on hemodialysis ([facility]) [N18.6, Z99.2]  Active bleeding [Z78.9]  Cerebrovascular accident (CVA) due to thrombosis of cerebral artery (HCC) [I63.30]       Reason for Referral: Generalized Muscle Weakness (M62.81)  Difficulty in walking, Not elsewhere classified (R26.2)  Other abnormalities of gait and mobility (R26.89)  History of falling (Z91.81)  Inpatient: Payor: BCBS / Plan[place] [employer] / Product Type: *No Product type* /     ASSESSMENT:     REHAB RECOMMENDATIONS:   Recommendation to date pending progress:  Setting:  Short-term Rehab    Equipment:    To Be Determined--pt has w/c at home     ASSESSMENT:  Ms. Rosado is a 55 y/o female presents initially on 10/23 with hypotension, chest pain, SOB, found to be in Afib RVR, septic. Pt with hx of ESRD on HD MWF, had a fall in the bathroom yesterday. At baseline pt lives with her , is  [] [] [] [] [] [] [] [x]     Toileting [] [] [] [] [] [] [] [] [] [x]    Upper Body Dressing [] [] [] [x] [] [] [] [] [] [] Donning/doffing gown as robe edge of bed    Lower Body Dressing [] [] [] [] [] [] [] [x] [] [] Donning/doffing socks edge of bed    Feeding [] [] [] [] [] [] [] [] [] [x]    Grooming [] [] [] [] [] [] [] [] [] [x]    Personal Device Care [] [] [] [] [] [] [] [] [] [x]    Functional Mobility [] [] [] [] [] [] [] [x] [] [] Rolling walker, standing trials   I=Independent, Mod I=Modified Independent, S=Supervision/Setup, SBA=Standby Assistance, CGA=Contact Guard Assistance, Min=Minimal Assistance, Mod=Moderate Assistance, Max=Maximal Assistance, Total=Total Assistance, NT=Not Tested    PLAN:   FREQUENCY/DURATION   OT Plan of Care: 3 times/week for duration of hospital stay or until stated goals are met, whichever comes first.    PROBLEM LIST:   (Skilled intervention is medically necessary to address:)  Decreased ADL/Functional Activities  Decreased Activity Tolerance  Decreased Balance  Decreased Gait Ability  Decreased Strength  Decreased Transfer Abilities   INTERVENTIONS PLANNED:  (Benefits and precautions of occupational therapy have been discussed with the patient.)  Self Care Training  Therapeutic Activity  Therapeutic Exercise/HEP  Neuromuscular Re-education  Manual Therapy  Education         TREATMENT:     EVALUATION: LOW COMPLEXITY: (Untimed Charge)  The initial evaluation charge encompasses clinical chart review, objective assessment, interpretation of assessment, and skilled monitoring of the patient's response to treatment in order to develop a plan of care.     TREATMENT:   Therapeutic Activity (25 Minutes): Patient participated in therapeutic activities including bed mobility training, functional transfer training, adaptive equipment training, sitting tolerance activity, and standing tolerance activity with moderate and maximal assistance, verbal cues, tactile cues, education, and

## 2024-11-02 NOTE — PROGRESS NOTES
Hospitalist Progress Note   Admit Date:  10/23/2024  7:42 PM   Name:  Giselle Rosado   Age:  54 y.o.  Sex:  female  :  1970   MRN:  [id number]   Room:  [facility]/    Presenting/Chief Complaint: Chest Pain and Shortness of Breath     Reason(s) for Admission: Sepsis associated hypotension (HCC) [A41.9, I95.9]  SBP (spontaneous bacterial peritonitis) (HCC) [K65.2]  Atrial fibrillation with RVR (HCC) [I48.91]  ESRD on hemodialysis (HCC) [N18.6, Z99.2]  Active bleeding [Z78.9]  Cerebrovascular accident (CVA) due to thrombosis of cerebral artery (HCC) [I63.30]     Hospital Course:   Giselle Rosado is a 54 y.o. female with medical history of ESRD now on HD(from PD due to peritonitis), ESRD on peritoneal dialysis, history of paroxysmal atrial fibrillation, hypertension, depression, irritable bowel syndrome  who presented with chest pain and hypotension from HD center.    Patient was recently hospitalized on 10/10 - 10/22 for peritonitis with ascites fluid growing [id number] and was recommended IV vanc for 2 weeks post PD catheter removal.    She was noted to be in Afib RVR on arrival. Laboratory workup with leukocytosis, lactic acidosis and elevated pBNP.  CT abdomen pelvis with active extravasation from the left rectus/inferior epigastric arterial territory, increased in size of hematomas measuring 7.9 x 3.4 cm.    Interventional radiology was consulted and patient received thrombin injection for the inferior epigastric pseudoaneurysm.  Patient hemoglobin has been stable.  Repeat CT abdomen pelvis on 10/27/2024 showed improvement of left abdominal wall hematoma, improved thrombosed pseudoaneurysm, improvement with free fluid in the abdomen.    Subjective & 24hr Events:   Patient seen and evaluated.  New patient for me today.  Fell last night after going to the bathroom-her  assisted her  She feels as if her legs just gave out from under.  She hit the left side of her abdomen  Requesting PT and OT  1.6 (H) 0.1 - 1.3 K/UL    Eosinophils Absolute 0.1 0.0 - 0.8 K/UL    Basophils Absolute 0.1 0.0 - 0.2 K/UL    Immature Granulocytes Absolute 0.7 (H) 0.0 - 0.5 K/UL   Comprehensive Metabolic Panel    Collection Time: 11/02/24  5:04 AM   Result Value Ref Range    Sodium 127 (L) 136 - 145 mmol/L    Potassium 4.3 3.5 - 5.1 mmol/L    Chloride 91 (L) 98 - 107 mmol/L    CO2 22 20 - 29 mmol/L    Anion Gap 14 7 - 16 mmol/L    Glucose 174 (H) 70 - 99 mg/dL    BUN 16 6 - 23 MG/DL    Creatinine 6.87 (H) 0.60 - 1.10 MG/DL    Est, Glom Filt Rate 7 (L) >60 ml/min/1.73m2    Calcium 7.4 (L) 8.8 - 10.2 MG/DL    Total Bilirubin 0.2 0.0 - 1.2 MG/DL    ALT 8 8 - 45 U/L    AST 73 (H) 15 - 37 U/L    Alk Phosphatase 164 (H) 35 - 104 U/L    Total Protein 6.0 (L) 6.3 - 8.2 g/dL    Albumin 1.6 (L) 3.5 - 5.0 g/dL    Globulin 4.4 (H) 2.3 - 3.5 g/dL    Albumin/Globulin Ratio 0.4 (L) 1.0 - 1.9     Magnesium    Collection Time: 11/02/24  5:04 AM   Result Value Ref Range    Magnesium 2.2 1.8 - 2.4 mg/dL   Phosphorus    Collection Time: 11/02/24  5:04 AM   Result Value Ref Range    Phosphorus 5.8 (H) 2.5 - 4.5 MG/DL       No results for input(s): \"COVID19\" in the last 72 hours.    Current Meds:  Current Facility-Administered Medications   Medication Dose Route Frequency    midodrine (PROAMATINE) tablet 10 mg  10 mg Oral TID     iopamidol (ISOVUE-370) 76 % injection 100 mL  100 mL IntraVENous ONCE PRN    apixaban (ELIQUIS) tablet 5 mg  5 mg Oral BID    gabapentin (NEURONTIN) capsule 100 mg  100 mg Oral Daily    sevelamer (RENVELA) tablet 800 mg  800 mg Oral TID WC    diatrizoate meglumine-sodium (GASTROGRAFIN) 66-10 % solution 15 mL  15 mL Oral ONCE PRN    ceFAZolin (ANCEF) 1,000 mg in sterile water 10 mL IV syringe  1,000 mg IntraVENous Q24H    epoetin oralia-epbx (RETACRIT) injection 10,000 Units  10,000 Units SubCUTAneous Weekly    dicyclomine (BENTYL) capsule 10 mg  10 mg Oral TID PRN    famotidine (PEPCID) tablet 20 mg  20 mg Oral Daily

## 2024-11-02 NOTE — PROGRESS NOTES
1920- primary myself called by [facility] for assistance. Walking into room pt sitting in floor.  at bedside. Pt  states, \"This is the first time this has ever happened, she's been getting up by herself fine.\" Pt  also states \" I helped her to floor, I wasn't strong enough to keep her lifted when her leg went out from under her.\" Pt is deemed a fall risk s/p shift assessment. Since admission pt has been documented \"up ad josephine\" and given ad josephine status during shift change on 11/1/24. Pt denies injury or new pain. Pt assisted back to bed by staff. Bed alarm intact, low, and locked.  and patient have both been educated to use call light before any transfers, due to patients \"new\" weakness. Pt/ot pending eval.

## 2024-11-03 LAB
ALBUMIN SERPL-MCNC: 1.4 G/DL (ref 3.5–5)
ALBUMIN/GLOB SERPL: 0.3 (ref 1–1.9)
ALP SERPL-CCNC: 177 U/L (ref 35–104)
ALT SERPL-CCNC: 9 U/L (ref 8–45)
ANION GAP SERPL CALC-SCNC: 18 MMOL/L (ref 7–16)
AST SERPL-CCNC: 55 U/L (ref 15–37)
BASOPHILS # BLD: 0.1 K/UL (ref 0–0.2)
BASOPHILS NFR BLD: 1 % (ref 0–2)
BILIRUB SERPL-MCNC: <0.2 MG/DL (ref 0–1.2)
BUN SERPL-MCNC: 20 MG/DL (ref 6–23)
CALCIUM SERPL-MCNC: 7.3 MG/DL (ref 8.8–10.2)
CHLORIDE SERPL-SCNC: 90 MMOL/L (ref 98–107)
CO2 SERPL-SCNC: 17 MMOL/L (ref 20–29)
CREAT SERPL-MCNC: 7.82 MG/DL (ref 0.6–1.1)
DIFFERENTIAL METHOD BLD: ABNORMAL
EOSINOPHIL # BLD: 0.3 K/UL (ref 0–0.8)
EOSINOPHIL NFR BLD: 2 % (ref 0.5–7.8)
ERYTHROCYTE [DISTWIDTH] IN BLOOD BY AUTOMATED COUNT: 18.7 % (ref 11.9–14.6)
GLOBULIN SER CALC-MCNC: 4.3 G/DL (ref 2.3–3.5)
GLUCOSE SERPL-MCNC: 146 MG/DL (ref 70–99)
HCT VFR BLD AUTO: 28.3 % (ref 35.8–46.3)
HGB BLD-MCNC: 8.6 G/DL (ref 11.7–15.4)
HIV 1+2 AB+HIV1 P24 AG SERPL QL IA: NONREACTIVE
HIV 1/2 RESULT COMMENT: NORMAL
IMM GRANULOCYTES # BLD AUTO: 0.8 K/UL (ref 0–0.5)
IMM GRANULOCYTES NFR BLD AUTO: 6 % (ref 0–5)
LACTATE SERPL-SCNC: 3.4 MMOL/L (ref 0.5–2)
LYMPHOCYTES # BLD: 0.9 K/UL (ref 0.5–4.6)
LYMPHOCYTES NFR BLD: 7 % (ref 13–44)
MCH RBC QN AUTO: 29.1 PG (ref 26.1–32.9)
MCHC RBC AUTO-ENTMCNC: 30.4 G/DL (ref 31.4–35)
MCV RBC AUTO: 95.6 FL (ref 82–102)
MONOCYTES # BLD: 1.3 K/UL (ref 0.1–1.3)
MONOCYTES NFR BLD: 10 % (ref 4–12)
NEUTS SEG # BLD: 9.4 K/UL (ref 1.7–8.2)
NEUTS SEG NFR BLD: 74 % (ref 43–78)
NRBC # BLD: 0.05 K/UL (ref 0–0.2)
PLATELET # BLD AUTO: 236 K/UL (ref 150–450)
PLATELET COMMENT: ADEQUATE
PMV BLD AUTO: 10.2 FL (ref 9.4–12.3)
POTASSIUM SERPL-SCNC: 5 MMOL/L (ref 3.5–5.1)
PROT SERPL-MCNC: 5.7 G/DL (ref 6.3–8.2)
RBC # BLD AUTO: 2.96 M/UL (ref 4.05–5.2)
RBC MORPH BLD: ABNORMAL
RBC MORPH BLD: ABNORMAL
SODIUM SERPL-SCNC: 125 MMOL/L (ref 136–145)
WBC # BLD AUTO: 12.8 K/UL (ref 4.3–11.1)
WBC MORPH BLD: ABNORMAL

## 2024-11-03 PROCEDURE — 2140000000 HC CCU INTERMEDIATE R&B

## 2024-11-03 PROCEDURE — 6370000000 HC RX 637 (ALT 250 FOR IP): Performed by: INTERNAL MEDICINE

## 2024-11-03 PROCEDURE — 80053 COMPREHEN METABOLIC PANEL: CPT

## 2024-11-03 PROCEDURE — 0152U NFCT DS DNA UNTRGT NGNRJ SEQ: CPT

## 2024-11-03 PROCEDURE — 87389 HIV-1 AG W/HIV-1&-2 AB AG IA: CPT

## 2024-11-03 PROCEDURE — 97530 THERAPEUTIC ACTIVITIES: CPT

## 2024-11-03 PROCEDURE — 6360000002 HC RX W HCPCS: Performed by: INTERNAL MEDICINE

## 2024-11-03 PROCEDURE — 36415 COLL VENOUS BLD VENIPUNCTURE: CPT

## 2024-11-03 PROCEDURE — 83605 ASSAY OF LACTIC ACID: CPT

## 2024-11-03 PROCEDURE — 2580000003 HC RX 258: Performed by: INTERNAL MEDICINE

## 2024-11-03 PROCEDURE — 97162 PT EVAL MOD COMPLEX 30 MIN: CPT

## 2024-11-03 PROCEDURE — 6370000000 HC RX 637 (ALT 250 FOR IP): Performed by: HOSPITALIST

## 2024-11-03 PROCEDURE — 85025 COMPLETE CBC W/AUTO DIFF WBC: CPT

## 2024-11-03 RX ORDER — CIPROFLOXACIN 500 MG/1
500 TABLET, FILM COATED ORAL EVERY 24 HOURS
Status: DISCONTINUED | OUTPATIENT
Start: 2024-11-04 | End: 2024-11-06

## 2024-11-03 RX ORDER — CIPROFLOXACIN 500 MG/1
500 TABLET, FILM COATED ORAL ONCE
Status: COMPLETED | OUTPATIENT
Start: 2024-11-03 | End: 2024-11-03

## 2024-11-03 RX ADMIN — MIDODRINE HYDROCHLORIDE 10 MG: 5 TABLET ORAL at 08:38

## 2024-11-03 RX ADMIN — OXYCODONE 10 MG: 5 TABLET ORAL at 08:37

## 2024-11-03 RX ADMIN — SERTRALINE 100 MG: 100 TABLET, FILM COATED ORAL at 08:36

## 2024-11-03 RX ADMIN — METOPROLOL TARTRATE 50 MG: 50 TABLET, FILM COATED ORAL at 08:38

## 2024-11-03 RX ADMIN — SEVELAMER CARBONATE 800 MG: 800 TABLET, FILM COATED ORAL at 11:59

## 2024-11-03 RX ADMIN — OXYCODONE 10 MG: 5 TABLET ORAL at 20:03

## 2024-11-03 RX ADMIN — SODIUM CHLORIDE, PRESERVATIVE FREE 10 ML: 5 INJECTION INTRAVENOUS at 20:03

## 2024-11-03 RX ADMIN — APIXABAN 5 MG: 2.5 TABLET, FILM COATED ORAL at 20:03

## 2024-11-03 RX ADMIN — SODIUM CHLORIDE, PRESERVATIVE FREE 10 ML: 5 INJECTION INTRAVENOUS at 08:38

## 2024-11-03 RX ADMIN — SEVELAMER CARBONATE 800 MG: 800 TABLET, FILM COATED ORAL at 08:36

## 2024-11-03 RX ADMIN — MIDODRINE HYDROCHLORIDE 10 MG: 5 TABLET ORAL at 16:57

## 2024-11-03 RX ADMIN — METOPROLOL TARTRATE 50 MG: 50 TABLET, FILM COATED ORAL at 20:03

## 2024-11-03 RX ADMIN — SEVELAMER CARBONATE 800 MG: 800 TABLET, FILM COATED ORAL at 16:57

## 2024-11-03 RX ADMIN — MIDODRINE HYDROCHLORIDE 10 MG: 5 TABLET ORAL at 11:59

## 2024-11-03 RX ADMIN — SERTRALINE 100 MG: 100 TABLET, FILM COATED ORAL at 20:03

## 2024-11-03 RX ADMIN — FAMOTIDINE 20 MG: 20 TABLET, FILM COATED ORAL at 08:37

## 2024-11-03 RX ADMIN — GABAPENTIN 100 MG: 100 CAPSULE ORAL at 08:37

## 2024-11-03 RX ADMIN — APIXABAN 5 MG: 2.5 TABLET, FILM COATED ORAL at 08:37

## 2024-11-03 RX ADMIN — CIPROFLOXACIN 500 MG: 500 TABLET, FILM COATED ORAL at 09:58

## 2024-11-03 RX ADMIN — HYDROMORPHONE HYDROCHLORIDE 1 MG: 1 INJECTION, SOLUTION INTRAMUSCULAR; INTRAVENOUS; SUBCUTANEOUS at 12:00

## 2024-11-03 ASSESSMENT — PAIN DESCRIPTION - ORIENTATION
ORIENTATION: MID;POSTERIOR
ORIENTATION: POSTERIOR;RIGHT;LEFT;LOWER
ORIENTATION: POSTERIOR

## 2024-11-03 ASSESSMENT — PAIN - FUNCTIONAL ASSESSMENT: PAIN_FUNCTIONAL_ASSESSMENT: PREVENTS OR INTERFERES SOME ACTIVE ACTIVITIES AND ADLS

## 2024-11-03 ASSESSMENT — PAIN DESCRIPTION - LOCATION
LOCATION: ABDOMEN
LOCATION: BACK
LOCATION: ABDOMEN

## 2024-11-03 ASSESSMENT — PAIN DESCRIPTION - PAIN TYPE
TYPE: ACUTE PAIN
TYPE: ACUTE PAIN;CHRONIC PAIN
TYPE: ACUTE PAIN;CHRONIC PAIN

## 2024-11-03 ASSESSMENT — PAIN SCALES - GENERAL
PAINLEVEL_OUTOF10: 7
PAINLEVEL_OUTOF10: 6
PAINLEVEL_OUTOF10: 5
PAINLEVEL_OUTOF10: 6

## 2024-11-03 ASSESSMENT — PAIN DESCRIPTION - ONSET
ONSET: GRADUAL
ONSET: ON-GOING

## 2024-11-03 ASSESSMENT — PAIN DESCRIPTION - DESCRIPTORS
DESCRIPTORS: ACHING;SORE;BURNING
DESCRIPTORS: ACHING;SORE;BURNING
DESCRIPTORS: ACHING;SORE

## 2024-11-03 NOTE — PROGRESS NOTES
ACUTE PHYSICAL THERAPY GOALS:   (Developed with and agreed upon by patient and/or caregiver.)    (1.) Giselle Rosado  will move from supine to sit and sit to supine , scoot up and down, and roll side to side with CONTACT GUARD ASSIST within 7 treatment day(s).    (2.) Giselle Rosado will transfer from bed to chair and chair to bed with CONTACT GUARD ASSIST using the least restrictive device within 7 treatment day(s).    (3.) Giselle Rosado will ambulate with CONTACT GUARD ASSIST for 50 feet with the least restrictive device within 7 treatment day(s).   (4.) Giselle Rosado will perform standing static and dynamic balance activities x 5 minutes with CONTACT GUARD ASSIST to improve safety within 7 treatment day(s).  (5.) Giselle Rosado will ascend and descend 3 stairs using bilateral hand rail(s) with MODERATE ASSIST to improve functional mobility and safety within 7 treatment day(s).  (6.) Giselle Rosado will perform therapeutic exercises x 15 min for HEP with CONTACT GUARD ASSIST to improve strength, endurance, and functional mobility within 7 treatment day(s).      PHYSICAL THERAPY Initial Assessment and PM  (Link to Caseload Tracking: PT Visit Days : 1  Acknowledge Orders  Time In/Out  PT Charge Capture  Rehab Caseload Tracker    Giselle Rosado is a 54 y.o. female   PRIMARY DIAGNOSIS: Sepsis associated hypotension (HCC)  Sepsis associated hypotension (HCC) [A41.9, I95.9]  SBP (spontaneous bacterial peritonitis) ([facility]) [K65.2]  Atrial fibrillation with RVR ([facility]) [I48.91]  ESRD on hemodialysis ([facility]) [N18.6, Z99.2]  Active bleeding [Z78.9]  Cerebrovascular accident (CVA) due to thrombosis of cerebral artery (HCC) [I63.30]       Reason for Referral: Generalized Muscle Weakness (M62.81)  Inpatient: Payor: BCBS / Plan: [employer] / Product Type: *No Product type* /     ASSESSMENT:     REHAB RECOMMENDATIONS:   Recommendation to date pending progress:  Setting:  Short-term Rehab    Equipment:    Rolling

## 2024-11-03 NOTE — PROGRESS NOTES
[facility] Progress Note    Follow-Up on: ESRD      Pt seen and examined in room.  ROS:  Gen - no fever, no chills, appetite unchanged  CV - no chest pain  Lung - no shortness of breath, no cough  Abd - + tenderness, no nausea/vomiting, no diarrhea  Ext - + edema    Exam:  Vitals:    11/02/24 1948 11/02/24 2348 11/03/24 0339 11/03/24 0752   BP: 138/70 114/75 126/77 (!) 137/99   Pulse: 69 73 66 58   Resp: 12 16 12 18   Temp: 97.7 °F (36.5 °C) 97.9 °F (36.6 °C) 97.3 °F (36.3 °C) 97.3 °F (36.3 °C)   TempSrc: Oral Oral Oral Oral   SpO2: 97% 94% 97% 96%   Weight:       Height:             Intake/Output Summary (Last 24 hours) at 11/3/2024 0841  Last data filed at 11/2/2024 2026  Gross per 24 hour   Intake 2180 ml   Output --   Net 2180 ml         Wt Readings from Last 3 Encounters:   10/29/24 (!) 156.6 kg (345 lb 3.9 oz)   10/18/24 (!) 150.6 kg (332 lb)   10/24/23 (!) 149.7 kg (330 lb)       GEN - in no distress, alert and oriented   CV - regular rate, S1, S2, no Rub   Lung - clear bilaterally  Abd - soft, +tender  Ext - + 1 BLE edema    Recent Labs     11/01/24  0520 11/02/24  0504 11/03/24  0506   WBC 14.6* 14.7* 12.8*   HGB 7.6* 8.4* 8.6*   HCT 25.8* 27.9* 28.3*    328 236        Recent Labs     11/01/24  0520 11/02/24  0504 11/03/24  0506   * 127* 125*   K 4.9 4.3 5.0   CL 90* 91* 90*   CO2 21 22 17*   BUN 19 16 20   CREATININE 7.89* 6.87* 7.82*   CALCIUM 7.2* 7.4* 7.3*   MG  --  2.2  --    PHOS  --  5.8*  --    ALBUMIN  --  1.6* 1.4*       Assessment / Plan:  Principal Problem:    Sepsis associated hypotension (HCC)  Active Problems:    Obesity, morbid    Acute respiratory failure    Gout    Anemia    HTN (hypertension)    DM2 (diabetes mellitus, type 2) (HCC)    Atrial fibrillation with RVR (HCC)    Cellulitis of left lower extremity    Sepsis (HCC)    End stage chronic kidney disease (HCC)    Spontaneous bacterial peritonitis (HCC)  Resolved Problems:    * No resolved hospital problems.

## 2024-11-03 NOTE — CONSULTS
Infectious Diseases Consult    Today's Date: 11/3/2024   Admit Date: 10/23/2024  : 1970    Impression:   Elevated white blood cell count, mildly elevated lactic acid.  All infection a possibility, this low level of white blood cell count would be very possibly consistent with her hematoma.  Recent staphylococcal peritonitis  Isolation of Tsukamurella spp. From PD effluent 10/10/24 --this result has been finalized, chest over the last couple of days  Anasarca  Abdominal induration and edema-cellulitis versus panniculitis versus calciphylaxis.  I would favor noninfectious etiology related to her severe edema and anasarca, but hopefully this is not calciphylaxis and indeed this is not quite as painful as I have seen at previously  Inferior epigastric arterial hematoma, status post embolization  Right lower extremity weakness-plantarflexion and dorsiflexion intact, but has some pain around the hip    Plan:   Continue vancomycin as previously outlined.  This is to be given with dialysis and will and 2024  Is unclear whether the above organism is still present or if that was just something that colonized her catheter.    Obtain Karluke, will treat while awaiting that result  Additionally it is also unclear if patient has cellulitis, however Ancef unlikely to add much more aside from some relatively narrow gram-negative coverage over vancomycin.  It would not be thought to cover the above organism that was cultured (finally) out of her PD fluid.  Start ciprofloxacin 500 mg daily for 21 days.  If Karius results is negative, would stop  If weakness worsens at all, would recommend MRI of lumbar spine and potentially of right hip    Anti-infectives:   Ciprofloxacin 11/3-  Vancomycin 10/10-  Cefazolin 10/27- 11/3/24  Ceftriaxone 10/24 - 10/26    Subjective:   Date of Consultation:  November 3, 2024  Referring Physician: Cierra Bess MD for: assistance in management of elevated white blood cell count,    MCH 28.6 28.9 29.1   MCHC 29.5* 30.1* 30.4*   RDW 18.5* 18.5* 18.7*    328 236   MPV 9.1* 9.5 10.2   NRBC 0.05 0.04 0.05   LYMPHOPCT 7* 6* 7*   MONOPCT 12 11 10   BASOPCT 1 1 1   IMMGRAN 6* 5 6*   LYMPHSABS 1.0 0.9 0.9   EOSABS 0.1 0.1 0.3   MONOSABS 1.8* 1.6* 1.3   BASOSABS 0.1 0.1 0.1   ABSIMMGRAN 0.9* 0.7* 0.8*      LFT Recent Labs     11/02/24  0504 11/03/24  0506   BILITOT 0.2 <0.2   ALKPHOS 164* 177*   AST 73* 55*   ALT 8 9   GLOB 4.4* 4.3*      A1c Lab Results   Component Value Date/Time    LABA1C 7.5 10/24/2023 03:58 PM    LABA1C 6.6 06/21/2023 10:57 AM    LABA1C 5.6 01/16/2023 03:33 PM     10/24/2023 03:58 PM     06/21/2023 10:57 AM     01/16/2023 03:33 PM        Microbiology:     Results       Procedure Component Value Units Date/Time    Blood Culture 2 [[id number]] Collected: 10/23/24 2200    Order Status: Completed Specimen: Blood Updated: 10/28/24 0803     Special Requests --        RIGHT  FOREARM       Culture NO GROWTH 5 DAYS       Blood Culture 1 [[id number]] Collected: 10/23/24 2012    Order Status: Completed Specimen: Blood Updated: 10/28/24 0803     Special Requests --        LEFT  FOREARM       Culture NO GROWTH 5 DAYS               Imaging:     I have personally reviewed imaging studies showing:  No results found.     Echocardiogram:  10/23/24    ECHO (TTE) COMPLETE (PRN CONTRAST/BUBBLE/STRAIN/3D) 10/24/2024  1:57 PM (Final)    Interpretation Summary    Left Ventricle: Normal left ventricular systolic function with a visually estimated EF of 60 - 65%. Left ventricle size is normal. Mildly increased wall thickness. Normal wall motion.    Aortic Valve: Borderline mild stenosis of the aortic valve based on indices; not adequately visualized [DI 0.48; AV area by continuity VTI is 1.8 cm2].    Image quality is suboptimal. Contrast used: Definity. Technically difficult study due to patient's body habitus and procedure performed with the patient in a supine

## 2024-11-04 LAB
ALBUMIN SERPL-MCNC: 1.5 G/DL (ref 3.5–5)
ALBUMIN/GLOB SERPL: 0.3 (ref 1–1.9)
ALP SERPL-CCNC: 191 U/L (ref 35–104)
ALT SERPL-CCNC: 6 U/L (ref 8–45)
AMIKACIN ISLT MIC: ABNORMAL
AMOXICILLIN+CLAV ISLT MIC: ABNORMAL
ANION GAP SERPL CALC-SCNC: 15 MMOL/L (ref 7–16)
AST SERPL-CCNC: 45 U/L (ref 15–37)
BASOPHILS # BLD: 0.1 K/UL (ref 0–0.2)
BASOPHILS NFR BLD: 1 % (ref 0–2)
BILIRUB SERPL-MCNC: <0.2 MG/DL (ref 0–1.2)
BUN SERPL-MCNC: 23 MG/DL (ref 6–23)
CALCIUM SERPL-MCNC: 7 MG/DL (ref 8.8–10.2)
CEFTRIAXONE ISLT MIC: ABNORMAL
CHLORIDE SERPL-SCNC: 87 MMOL/L (ref 98–107)
CIPROFLOXACIN ISLT MIC: ABNORMAL
CLARITHRO ISLT MIC: ABNORMAL
CO2 SERPL-SCNC: 22 MMOL/L (ref 20–29)
CORTIS AM PEAK SERPL-MCNC: 26.6 UG/DL (ref 4.8–19.5)
CREAT SERPL-MCNC: 8.3 MG/DL (ref 0.6–1.1)
DIFFERENTIAL METHOD BLD: ABNORMAL
DOXYCYCLINE: ABNORMAL
EOSINOPHIL # BLD: 0.3 K/UL (ref 0–0.8)
EOSINOPHIL NFR BLD: 2 % (ref 0.5–7.8)
ERYTHROCYTE [DISTWIDTH] IN BLOOD BY AUTOMATED COUNT: 18.6 % (ref 11.9–14.6)
GLOBULIN SER CALC-MCNC: 4.3 G/DL (ref 2.3–3.5)
GLUCOSE BLD STRIP.AUTO-MCNC: 179 MG/DL (ref 65–100)
GLUCOSE SERPL-MCNC: 144 MG/DL (ref 70–99)
HCT VFR BLD AUTO: 27.1 % (ref 35.8–46.3)
HGB BLD-MCNC: 8.1 G/DL (ref 11.7–15.4)
IMIPENEM ISLT MIC: ABNORMAL
IMM GRANULOCYTES # BLD AUTO: 0.6 K/UL (ref 0–0.5)
IMM GRANULOCYTES NFR BLD AUTO: 5 % (ref 0–5)
LINEZOLID ISLT MIC: ABNORMAL
LYMPHOCYTES # BLD: 0.9 K/UL (ref 0.5–4.6)
LYMPHOCYTES NFR BLD: 7 % (ref 13–44)
MCH RBC QN AUTO: 28.7 PG (ref 26.1–32.9)
MCHC RBC AUTO-ENTMCNC: 29.9 G/DL (ref 31.4–35)
MCV RBC AUTO: 96.1 FL (ref 82–102)
MINOCYCLINE ISLT MIC: ABNORMAL
MONOCYTES # BLD: 1.1 K/UL (ref 0.1–1.3)
MONOCYTES NFR BLD: 9 % (ref 4–12)
MOXIFLOXACIN: ABNORMAL
NEUTS SEG # BLD: 9.5 K/UL (ref 1.7–8.2)
NEUTS SEG NFR BLD: 76 % (ref 43–78)
NOCARDIA ISLT: ABNORMAL
NRBC # BLD: 0.08 K/UL (ref 0–0.2)
PLATELET # BLD AUTO: 307 K/UL (ref 150–450)
PLATELET COMMENT: ADEQUATE
PMV BLD AUTO: 9.5 FL (ref 9.4–12.3)
POTASSIUM SERPL-SCNC: ABNORMAL MMOL/L (ref 3.5–5.1)
PROT SERPL-MCNC: 5.8 G/DL (ref 6.3–8.2)
RBC # BLD AUTO: 2.82 M/UL (ref 4.05–5.2)
RBC MORPH BLD: ABNORMAL
SERVICE CMNT-IMP: ABNORMAL
SODIUM SERPL-SCNC: 123 MMOL/L (ref 136–145)
SPECIMEN SOURCE: ABNORMAL
TMP SMX ISLT MIC: ABNORMAL
TOBRAMYCIN ISLT MIC: ABNORMAL
WBC # BLD AUTO: 12.5 K/UL (ref 4.3–11.1)
WBC MORPH BLD: ABNORMAL

## 2024-11-04 PROCEDURE — 6370000000 HC RX 637 (ALT 250 FOR IP): Performed by: INTERNAL MEDICINE

## 2024-11-04 PROCEDURE — 2580000003 HC RX 258: Performed by: INTERNAL MEDICINE

## 2024-11-04 PROCEDURE — 6370000000 HC RX 637 (ALT 250 FOR IP): Performed by: HOSPITALIST

## 2024-11-04 PROCEDURE — 85025 COMPLETE CBC W/AUTO DIFF WBC: CPT

## 2024-11-04 PROCEDURE — 36415 COLL VENOUS BLD VENIPUNCTURE: CPT

## 2024-11-04 PROCEDURE — 6360000002 HC RX W HCPCS: Performed by: INTERNAL MEDICINE

## 2024-11-04 PROCEDURE — 80053 COMPREHEN METABOLIC PANEL: CPT

## 2024-11-04 PROCEDURE — 90935 HEMODIALYSIS ONE EVALUATION: CPT

## 2024-11-04 PROCEDURE — 82533 TOTAL CORTISOL: CPT

## 2024-11-04 PROCEDURE — 2140000000 HC CCU INTERMEDIATE R&B

## 2024-11-04 PROCEDURE — 82962 GLUCOSE BLOOD TEST: CPT

## 2024-11-04 RX ADMIN — OXYCODONE 10 MG: 5 TABLET ORAL at 10:58

## 2024-11-04 RX ADMIN — SERTRALINE 100 MG: 100 TABLET, FILM COATED ORAL at 10:50

## 2024-11-04 RX ADMIN — HYDROMORPHONE HYDROCHLORIDE 1 MG: 1 INJECTION, SOLUTION INTRAMUSCULAR; INTRAVENOUS; SUBCUTANEOUS at 07:40

## 2024-11-04 RX ADMIN — METOPROLOL TARTRATE 50 MG: 50 TABLET, FILM COATED ORAL at 21:06

## 2024-11-04 RX ADMIN — CIPROFLOXACIN 500 MG: 500 TABLET, FILM COATED ORAL at 10:49

## 2024-11-04 RX ADMIN — FAMOTIDINE 20 MG: 20 TABLET, FILM COATED ORAL at 10:50

## 2024-11-04 RX ADMIN — MIDODRINE HYDROCHLORIDE 10 MG: 5 TABLET ORAL at 17:14

## 2024-11-04 RX ADMIN — SEVELAMER CARBONATE 800 MG: 800 TABLET, FILM COATED ORAL at 17:14

## 2024-11-04 RX ADMIN — GABAPENTIN 100 MG: 100 CAPSULE ORAL at 10:50

## 2024-11-04 RX ADMIN — SODIUM CHLORIDE, PRESERVATIVE FREE 10 ML: 5 INJECTION INTRAVENOUS at 10:53

## 2024-11-04 RX ADMIN — APIXABAN 5 MG: 2.5 TABLET, FILM COATED ORAL at 21:05

## 2024-11-04 RX ADMIN — APIXABAN 5 MG: 2.5 TABLET, FILM COATED ORAL at 10:50

## 2024-11-04 RX ADMIN — METOPROLOL TARTRATE 50 MG: 50 TABLET, FILM COATED ORAL at 10:50

## 2024-11-04 RX ADMIN — MIDODRINE HYDROCHLORIDE 10 MG: 5 TABLET ORAL at 12:07

## 2024-11-04 RX ADMIN — SERTRALINE 100 MG: 100 TABLET, FILM COATED ORAL at 21:06

## 2024-11-04 RX ADMIN — MIDODRINE HYDROCHLORIDE 10 MG: 5 TABLET ORAL at 06:56

## 2024-11-04 RX ADMIN — SODIUM CHLORIDE, PRESERVATIVE FREE 10 ML: 5 INJECTION INTRAVENOUS at 21:12

## 2024-11-04 RX ADMIN — VANCOMYCIN HYDROCHLORIDE 750 MG: 750 INJECTION, POWDER, LYOPHILIZED, FOR SOLUTION INTRAVENOUS at 14:07

## 2024-11-04 ASSESSMENT — PAIN SCALES - GENERAL
PAINLEVEL_OUTOF10: 7
PAINLEVEL_OUTOF10: 7
PAINLEVEL_OUTOF10: 0

## 2024-11-04 ASSESSMENT — PAIN - FUNCTIONAL ASSESSMENT: PAIN_FUNCTIONAL_ASSESSMENT: ACTIVITIES ARE NOT PREVENTED

## 2024-11-04 ASSESSMENT — PAIN DESCRIPTION - DESCRIPTORS: DESCRIPTORS: ACHING;DISCOMFORT

## 2024-11-04 ASSESSMENT — PAIN DESCRIPTION - ORIENTATION: ORIENTATION: RIGHT;LEFT;MID

## 2024-11-04 ASSESSMENT — PAIN DESCRIPTION - LOCATION
LOCATION: BACK
LOCATION: HIP;BACK

## 2024-11-04 NOTE — DIALYSIS
TRANSFER OUT- DIALYSIS    Hemodialysis treatment completed. PT ran 3 hours, without complications.    Patient baseline and VS stable  /20  P 67       3.5 Kg removed.      Flushed both ports with 10 mL of NS.  CVC dressing clean, dry, and intact, tego caps intact, curos caps placed.      Meds given: midodrine 10mg, dilaudid 1mg.    RBCs given during dialysis: 0    Patient to room after dialysis.

## 2024-11-04 NOTE — PROGRESS NOTES
was recently seen in the setting of peritoneal dialysis associated peritonitis.  She had a corynebacterium species cultured out initially, and then upon repeat culture and grew a coagulase-negative Staphylococcus as well as Tsukamurella on 10/10/24.   Her PD catheter was removed, and she was set up for about 6 weeks of vancomycin with dialysis.  She was still taking this vancomycin with dialysis, however very presented with ongoing pain, elevated white blood cell count, and inability to dialyze due to hypotension.    He was admitted and continued on her IV antibiotics although Ancef was added.  She is continued with a moderately elevated white blood cell count.  She has been afebrile.  She has severe abdominal pain and does note some weakness to her right lower extremity.  This led to some falls.  She states that her cellulitis pain extends all the way across her abdomen in a bandlike fashion and looped around to the back.     present participates in her history taking help fully.    Past Medical History:   Diagnosis Date    Acute kidney injury (HCC)     ARF with sepsis requiring hemodialysis    Anemia     Anxiety     ARF (acute respiratory failure) 12/2021    required vent at [facility]    Atrial fibrillation with RVR ([facility]) 12/22/2021    while hospitalized for sepsis.    Chronic kidney disease     secondary to sepsis 12/2021--- US Renal in TR on M, W, F    Debility     Diabetes ([facility])     type 2. does not check since on dialysis,no meds; no hypo    Dialysis patient ([facility]) 12/2021    Mon, Wed, Fri    GERD (gastroesophageal reflux disease)     OTC daily med    Gout     Gout     History of recent blood transfusion     1 unit 2/17/22     1 unit 2/19/22 per [facility] records    Hypertension     controlled w/med    IBS (irritable bowel syndrome)     Iron deficiency anemia     Necrotizing fasciitis 12/27/2021    left groin pannus and perineum    Personal history of COVID-19     12/31/21 was hospitalized at [facility] when  software.  The note has been proof read but may still contain some grammatical/other typographical errors.

## 2024-11-04 NOTE — PROGRESS NOTES
offered a visit to patient. Patient said she was looking forward to being home next week, accepted prayer and expressed gratitude.

## 2024-11-04 NOTE — PROGRESS NOTES
[facility] Progress Note    Follow-Up on: ESRD      Pt seen and examined on HD. Left PC functioning well. UF 2000. BP soft-on midodrine  ROS:  Gen - no fever, no chills, appetite unchanged  CV - no chest pain  Lung - no shortness of breath, no cough  Abd - + tenderness, no nausea/vomiting, no diarrhea  Ext - + edema    Exam:  Vitals:    11/03/24 2337 11/04/24 0356 11/04/24 0651 11/04/24 0730   BP: 136/66 102/67 (!) 123/23 (!) 97/45   Pulse: 74 68 60 69   Resp: 17 17     Temp: 98.9 °F (37.2 °C) 97.9 °F (36.6 °C)     TempSrc:  Oral     SpO2: 95% 96%     Weight:       Height:             Intake/Output Summary (Last 24 hours) at 11/4/2024 0759  Last data filed at 11/4/2024 0356  Gross per 24 hour   Intake 200 ml   Output 0 ml   Net 200 ml         Wt Readings from Last 3 Encounters:   10/29/24 (!) 156.6 kg (345 lb 3.9 oz)   10/18/24 (!) 150.6 kg (332 lb)   10/24/23 (!) 149.7 kg (330 lb)       GEN - in no distress, alert and oriented   CV - regular rate, S1, S2, no Rub   Lung - clear bilaterally  Abd - soft, +tender  Ext - + 1 BLE edema    Recent Labs     11/02/24  0504 11/03/24  0506 11/04/24  0418   WBC 14.7* 12.8* 12.5*   HGB 8.4* 8.6* 8.1*   HCT 27.9* 28.3* 27.1*    236 307        Recent Labs     11/02/24  0504 11/03/24  0506 11/04/24  0418   * 125* 123*   K 4.3 5.0 HEMOLYZED SPECIMEN   CL 91* 90* 87*   CO2 22 17* 22   BUN 16 20 23   CREATININE 6.87* 7.82* 8.30*   CALCIUM 7.4* 7.3* 7.0*   MG 2.2  --   --    PHOS 5.8*  --   --    ALBUMIN 1.6* 1.4* 1.5*       Assessment / Plan:  Principal Problem:    Sepsis associated hypotension (HCC)  Active Problems:    Obesity, morbid    Acute respiratory failure    Gout    Anemia    HTN (hypertension)    DM2 (diabetes mellitus, type 2) (HCC)    Atrial fibrillation with RVR (HCC)    Cellulitis of left lower extremity    Sepsis (HCC)    End stage chronic kidney disease (HCC)    Spontaneous bacterial peritonitis (HCC)  Resolved Problems:    * No resolved  hospital problems. *    1) ESRD - HD MWF.  Previously on PD  -Will plan for next dialysis on Monday  -Will plan to do sequential UF for first hour to help with volume     2) Peritonitis - PD catheter removed 10/15/24  -Continue vancomycin EOT 12/1/2024      3) Abdominal pain - evidence of subcutaneous hematoma and cellulitis.   CT ab hematoma with improvement. Free fluid shows improvement.   Unclear whether it is calciphylaxis given that there is a lot of cellulitis and subcutaneous hematoma     4) Hypotension - on Toprol for Afib, Valsartan stopped  -on Midodrine- titrate      5) S/p Afib with RVR - better rate controlled     6) MBD- on Renvela     7) Anemia ESRD -on JOHNNY    High Medical Decision Making  -Patient with at least one acute illness that poses a threat to bodily function  -Reviewed 3 labs  -Reviewed external charts

## 2024-11-04 NOTE — PROGRESS NOTES
VANCO DAILY FOLLOW UP RENAL INSUFFICIENCY PATIENT   [facility]   Pharmacy Pharmacokinetic Monitoring Service - Vancomycin    Consulting Provider: Dr. Toribio  Indication: PD catheter complicated peritonitis with isolation Corynebacterium species, followed by CNS and Tsukamurella species prior to PD cath removal at [facility] recently.   Target Concentration: Pre-dialysis concentration 15-20 mg/L  EOT: 12/1/24 per ID  Additional Antimicrobials: cipro    Pertinent Laboratory Values:   Wt Readings from Last 1 Encounters:   10/29/24 (!) 156.6 kg (345 lb 3.9 oz)     Temp Readings from Last 1 Encounters:   11/04/24 97.9 °F (36.6 °C) (Oral)     Recent Labs     11/02/24  0504 11/02/24  1127 11/03/24  0506 11/03/24  0931 11/04/24  0418   BUN 16  --  20  --  23   CREATININE 6.87*  --  7.82*  --  8.30*   WBC 14.7*  --  12.8*  --  12.5*   LACTA  --  2.8*  --  3.4*  --      Lab Results   Component Value Date/Time    VANCORANDOM 24.7 10/30/2024 04:54 AM     MRSA Nasal Swab: N/A. Non-respiratory infection    Assessment:  Date:  Dose/Freq Admin Times Level/Time:   10/24      10/25 HD 1000 mg x1  [address] @0400 = 21.6   10/26      10/27      10/28 HD  1000 mg x1 [address] @0604 = 21.1   10/29      10/30  mg x1 [address] @0651 = 24.7   10/31      11/1  mg x 1 1256    11/2      11/3      11/4  mg X 1 (1400)    11/5 HD   Rd @ (0400) =      Plan:  Concentration-guided dosing due to intermittent hemodialysis  Give vancomycin 750 mg IV x 1 after HD today.  Will draw vancomycin random level with AM labs on 11/5/24 as it appears based on orders that patient will have HD tomorrow as well to help with volume overload.   Vancomycin concentrations will be ordered as clinically appropriate  Pharmacy will continue to monitor patient and adjust therapy as indicated    Thank you for the consult,  Baljeet Bedoya RPH

## 2024-11-04 NOTE — DIALYSIS
TRANSFER IN - DIALYSIS    Received patient in dialysis unit  from [facility] (unit) for ordered procedure.    Consent verified for renal replacement therapy. Procedure explained to patient, opportunity for Q&A provided. Call light given.     Patient alert and vital signs stable.  /106,  P86  , room air.    Hemodialysis initiated using left chest cathter.  Aspirated and flushed both ports without difficulty. Dressing clean, dry and intact.  Machine settings per MD order.    Heparin 0 unit bolus and 0 units/hr.      Will monitor during treatment.

## 2024-11-04 NOTE — CARE COORDINATION
Therapy recommending STR. Pt at dialysis, however, her spouse was at bedside and said that the pt was agreeable. Provided him a [employer] STR List and explained the referral and transfer process to him. Asked if she could go to [facility], writer asked MD to place the referral. Explained to the pts spouse that she may not be approved and will still need 3-4 facility preferences; reported understanding. Will follow up.

## 2024-11-04 NOTE — PROGRESS NOTES
POIKILOCYTOSIS        RBC Comment OCCASIONAL  POLYCHROMASIA        WBC Comment Result Confirmed By Smear      Platelet Comment ADEQUATE      Differential Type AUTOMATED     Comprehensive Metabolic Panel    Collection Time: 11/03/24  5:06 AM   Result Value Ref Range    Sodium 125 (L) 136 - 145 mmol/L    Potassium 5.0 3.5 - 5.1 mmol/L    Chloride 90 (L) 98 - 107 mmol/L    CO2 17 (L) 20 - 29 mmol/L    Anion Gap 18 (H) 7 - 16 mmol/L    Glucose 146 (H) 70 - 99 mg/dL    BUN 20 6 - 23 MG/DL    Creatinine 7.82 (H) 0.60 - 1.10 MG/DL    Est, Glom Filt Rate 6 (L) >60 ml/min/1.73m2    Calcium 7.3 (L) 8.8 - 10.2 MG/DL    Total Bilirubin <0.2 0.0 - 1.2 MG/DL    ALT 9 8 - 45 U/L    AST 55 (H) 15 - 37 U/L    Alk Phosphatase 177 (H) 35 - 104 U/L    Total Protein 5.7 (L) 6.3 - 8.2 g/dL    Albumin 1.4 (L) 3.5 - 5.0 g/dL    Globulin 4.3 (H) 2.3 - 3.5 g/dL    Albumin/Globulin Ratio 0.3 (L) 1.0 - 1.9     HIV 1/2 Ag/Ab, 4TH Generation,W Rflx Confirm    Collection Time: 11/03/24  9:31 AM   Result Value Ref Range    HIV 1/2 Interp NONREACTIVE NR      HIV 1/2 Result Comment        While this assay is highly sensitive, a non-reactive result for HIV antibodies HIV-1 and HIV-2 and p24 antigen does not preclude the possiblity of exposure to or infection with HIV.   Lactic Acid    Collection Time: 11/03/24  9:31 AM   Result Value Ref Range    Lactic Acid 3.4 (H) 0.5 - 2.0 mmol/L   CBC with Auto Differential    Collection Time: 11/04/24  4:18 AM   Result Value Ref Range    WBC 12.5 (H) 4.3 - 11.1 K/uL    RBC 2.82 (L) 4.05 - 5.2 M/uL    Hemoglobin 8.1 (L) 11.7 - 15.4 g/dL    Hematocrit 27.1 (L) 35.8 - 46.3 %    MCV 96.1 82 - 102 FL    MCH 28.7 26.1 - 32.9 PG    MCHC 29.9 (L) 31.4 - 35.0 g/dL    RDW 18.6 (H) 11.9 - 14.6 %    Platelets 307 150 - 450 K/uL    MPV 9.5 9.4 - 12.3 FL    nRBC 0.08 0.0 - 0.2 K/uL    Neutrophils % 76 43 - 78 %    Lymphocytes % 7 (L) 13 - 44 %    Monocytes % 9 4.0 - 12.0 %    Eosinophils % 2 0.5 - 7.8 %    Basophils % 1 0.0 -  ONCE PRN    epoetin oralia-epbx (RETACRIT) injection 10,000 Units  10,000 Units SubCUTAneous Weekly    dicyclomine (BENTYL) capsule 10 mg  10 mg Oral TID PRN    famotidine (PEPCID) tablet 20 mg  20 mg Oral Daily    sodium chloride flush 0.9 % injection 5-40 mL  5-40 mL IntraVENous 2 times per day    sodium chloride flush 0.9 % injection 5-40 mL  5-40 mL IntraVENous PRN    0.9 % sodium chloride infusion   IntraVENous PRN    aluminum & magnesium hydroxide-simethicone (MAALOX) 200-200-20 MG/5ML suspension 30 mL  30 mL Oral Q6H PRN    ondansetron (ZOFRAN-ODT) disintegrating tablet 4 mg  4 mg Oral Q8H PRN    Or    ondansetron (ZOFRAN) injection 4 mg  4 mg IntraVENous Q6H PRN    polyethylene glycol (GLYCOLAX) packet 17 g  17 g Oral Daily PRN    bisacodyl (DULCOLAX) suppository 10 mg  10 mg Rectal Daily PRN    acetaminophen (TYLENOL) tablet 650 mg  650 mg Oral Q6H PRN    Or    acetaminophen (TYLENOL) suppository 650 mg  650 mg Rectal Q6H PRN    oxyCODONE (ROXICODONE) immediate release tablet 10 mg  10 mg Oral Q4H PRN    fluticasone (FLONASE) 50 MCG/ACT nasal spray 2 spray  2 spray Nasal Nightly PRN    lidocaine 4 % external patch 4 patch  4 patch TransDERmal Daily    sertraline (ZOLOFT) tablet 100 mg  100 mg Oral BID    metoprolol tartrate (LOPRESSOR) tablet 50 mg  50 mg Oral BID    HYDROmorphone HCl PF (DILAUDID) injection 1 mg  1 mg IntraVENous Q4H PRN       Signed:  Cierra Bess MD    Part of this note may have been written by using a voice dictation software.  The note has been proof read but may still contain some grammatical/other typographical errors.

## 2024-11-04 NOTE — PROGRESS NOTES
307 150 - 450 K/uL    MPV 9.5 9.4 - 12.3 FL    nRBC 0.08 0.0 - 0.2 K/uL    Neutrophils % 76 43 - 78 %    Lymphocytes % 7 (L) 13 - 44 %    Monocytes % 9 4.0 - 12.0 %    Eosinophils % 2 0.5 - 7.8 %    Basophils % 1 0.0 - 2.0 %    Immature Granulocytes % 5 0.0 - 5.0 %    Neutrophils Absolute 9.5 (H) 1.7 - 8.2 K/UL    Lymphocytes Absolute 0.9 0.5 - 4.6 K/UL    Monocytes Absolute 1.1 0.1 - 1.3 K/UL    Eosinophils Absolute 0.3 0.0 - 0.8 K/UL    Basophils Absolute 0.1 0.0 - 0.2 K/UL    Immature Granulocytes Absolute 0.6 (H) 0.0 - 0.5 K/UL    RBC Comment OCCASIONAL  POLYCHROMASIA        RBC Comment OCCASIONAL  HYPOCHROMIA        RBC Comment SLIGHT  ANISOCYTOSIS + POIKILOCYTOSIS        RBC Comment OCCASIONAL  MACROCYTOSIS        WBC Comment Result Confirmed By Smear      Platelet Comment ADEQUATE      Differential Type AUTOMATED     Comprehensive Metabolic Panel    Collection Time: 11/04/24  4:18 AM   Result Value Ref Range    Sodium 123 (L) 136 - 145 mmol/L    Potassium HEMOLYZED SPECIMEN 3.5 - 5.1 mmol/L    Chloride 87 (L) 98 - 107 mmol/L    CO2 22 20 - 29 mmol/L    Anion Gap 15 7 - 16 mmol/L    Glucose 144 (H) 70 - 99 mg/dL    BUN 23 6 - 23 MG/DL    Creatinine 8.30 (H) 0.60 - 1.10 MG/DL    Est, Glom Filt Rate 5 (L) >60 ml/min/1.73m2    Calcium 7.0 (L) 8.8 - 10.2 MG/DL    Total Bilirubin <0.2 0.0 - 1.2 MG/DL    ALT 6 (L) 8 - 45 U/L    AST 45 (H) 15 - 37 U/L    Alk Phosphatase 191 (H) 35 - 104 U/L    Total Protein 5.8 (L) 6.3 - 8.2 g/dL    Albumin 1.5 (L) 3.5 - 5.0 g/dL    Globulin 4.3 (H) 2.3 - 3.5 g/dL    Albumin/Globulin Ratio 0.3 (L) 1.0 - 1.9         No results for input(s): \"COVID19\" in the last 72 hours.    Current Meds:  Current Facility-Administered Medications   Medication Dose Route Frequency    ciprofloxacin (CIPRO) tablet 500 mg  500 mg Oral Q24H    midodrine (PROAMATINE) tablet 10 mg  10 mg Oral TID WC    iopamidol (ISOVUE-370) 76 % injection 100 mL  100 mL IntraVENous ONCE PRN    apixaban (ELIQUIS) tablet 5  mg  5 mg Oral BID    gabapentin (NEURONTIN) capsule 100 mg  100 mg Oral Daily    sevelamer (RENVELA) tablet 800 mg  800 mg Oral TID WC    diatrizoate meglumine-sodium (GASTROGRAFIN) 66-10 % solution 15 mL  15 mL Oral ONCE PRN    epoetin oralia-epbx (RETACRIT) injection 10,000 Units  10,000 Units SubCUTAneous Weekly    dicyclomine (BENTYL) capsule 10 mg  10 mg Oral TID PRN    famotidine (PEPCID) tablet 20 mg  20 mg Oral Daily    sodium chloride flush 0.9 % injection 5-40 mL  5-40 mL IntraVENous 2 times per day    sodium chloride flush 0.9 % injection 5-40 mL  5-40 mL IntraVENous PRN    0.9 % sodium chloride infusion   IntraVENous PRN    aluminum & magnesium hydroxide-simethicone (MAALOX) 200-200-20 MG/5ML suspension 30 mL  30 mL Oral Q6H PRN    ondansetron (ZOFRAN-ODT) disintegrating tablet 4 mg  4 mg Oral Q8H PRN    Or    ondansetron (ZOFRAN) injection 4 mg  4 mg IntraVENous Q6H PRN    polyethylene glycol (GLYCOLAX) packet 17 g  17 g Oral Daily PRN    bisacodyl (DULCOLAX) suppository 10 mg  10 mg Rectal Daily PRN    acetaminophen (TYLENOL) tablet 650 mg  650 mg Oral Q6H PRN    Or    acetaminophen (TYLENOL) suppository 650 mg  650 mg Rectal Q6H PRN    oxyCODONE (ROXICODONE) immediate release tablet 10 mg  10 mg Oral Q4H PRN    fluticasone (FLONASE) 50 MCG/ACT nasal spray 2 spray  2 spray Nasal Nightly PRN    lidocaine 4 % external patch 4 patch  4 patch TransDERmal Daily    sertraline (ZOLOFT) tablet 100 mg  100 mg Oral BID    metoprolol tartrate (LOPRESSOR) tablet 50 mg  50 mg Oral BID    HYDROmorphone HCl PF (DILAUDID) injection 1 mg  1 mg IntraVENous Q4H PRN       Signed:  Cierra Bess MD    Part of this note may have been written by using a voice dictation software.  The note has been proof read but may still contain some grammatical/other typographical errors.

## 2024-11-05 LAB
ALBUMIN SERPL-MCNC: 1.6 G/DL (ref 3.5–5)
ALBUMIN/GLOB SERPL: 0.3 (ref 1–1.9)
ALP SERPL-CCNC: 200 U/L (ref 35–104)
ALT SERPL-CCNC: 6 U/L (ref 8–45)
AMMONIA PLAS-SCNC: 11 UMOL/L (ref 11–51)
ANION GAP SERPL CALC-SCNC: 17 MMOL/L (ref 7–16)
ARTERIAL PATENCY WRIST A: POSITIVE
AST SERPL-CCNC: 35 U/L (ref 15–37)
BASE DEFICIT BLD-SCNC: 2.5 MMOL/L
BASOPHILS # BLD: 0.2 K/UL (ref 0–0.2)
BASOPHILS NFR BLD: 1 % (ref 0–2)
BDY SITE: ABNORMAL
BILIRUB SERPL-MCNC: <0.2 MG/DL (ref 0–1.2)
BUN SERPL-MCNC: 18 MG/DL (ref 6–23)
CALCIUM SERPL-MCNC: 7.4 MG/DL (ref 8.8–10.2)
CHLORIDE SERPL-SCNC: 89 MMOL/L (ref 98–107)
CO2 SERPL-SCNC: 20 MMOL/L (ref 20–29)
CREAT SERPL-MCNC: 7.07 MG/DL (ref 0.6–1.1)
DIFFERENTIAL METHOD BLD: ABNORMAL
EOSINOPHIL # BLD: 0.3 K/UL (ref 0–0.8)
EOSINOPHIL NFR BLD: 2 % (ref 0.5–7.8)
ERYTHROCYTE [DISTWIDTH] IN BLOOD BY AUTOMATED COUNT: 18.6 % (ref 11.9–14.6)
GAS FLOW.O2 O2 DELIVERY SYS: ABNORMAL
GLOBULIN SER CALC-MCNC: 4.8 G/DL (ref 2.3–3.5)
GLUCOSE BLD STRIP.AUTO-MCNC: 163 MG/DL (ref 65–100)
GLUCOSE SERPL-MCNC: 133 MG/DL (ref 70–99)
HCO3 BLD-SCNC: 21.9 MMOL/L (ref 22–26)
HCT VFR BLD AUTO: 29.5 % (ref 35.8–46.3)
HGB BLD-MCNC: 9.1 G/DL (ref 11.7–15.4)
IMM GRANULOCYTES # BLD AUTO: 1.2 K/UL (ref 0–0.5)
IMM GRANULOCYTES NFR BLD AUTO: 7 % (ref 0–5)
LYMPHOCYTES # BLD: 0.8 K/UL (ref 0.5–4.6)
LYMPHOCYTES NFR BLD: 5 % (ref 13–44)
MCH RBC QN AUTO: 29.2 PG (ref 26.1–32.9)
MCHC RBC AUTO-ENTMCNC: 30.8 G/DL (ref 31.4–35)
MCV RBC AUTO: 94.6 FL (ref 82–102)
MONOCYTES # BLD: 1.5 K/UL (ref 0.1–1.3)
MONOCYTES NFR BLD: 9 % (ref 4–12)
NEUTS SEG # BLD: 12.5 K/UL (ref 1.7–8.2)
NEUTS SEG NFR BLD: 76 % (ref 43–78)
NRBC # BLD: 0.1 K/UL (ref 0–0.2)
PCO2 BLD: 35.3 MMHG (ref 35–45)
PH BLD: 7.4 (ref 7.35–7.45)
PLATELET # BLD AUTO: 340 K/UL (ref 150–450)
PLATELET COMMENT: ADEQUATE
PMV BLD AUTO: 9.9 FL (ref 9.4–12.3)
PO2 BLD: 73 MMHG (ref 75–100)
POTASSIUM SERPL-SCNC: 4.9 MMOL/L (ref 3.5–5.1)
PROT SERPL-MCNC: 6.4 G/DL (ref 6.3–8.2)
RBC # BLD AUTO: 3.12 M/UL (ref 4.05–5.2)
RBC MORPH BLD: ABNORMAL
RBC MORPH BLD: ABNORMAL
SAO2 % BLD: 94.6 % (ref 95–98)
SERVICE CMNT-IMP: ABNORMAL
SERVICE CMNT-IMP: ABNORMAL
SODIUM SERPL-SCNC: 125 MMOL/L (ref 136–145)
SPECIMEN TYPE: ABNORMAL
VANCOMYCIN SERPL-MCNC: 24.5 UG/ML
WBC # BLD AUTO: 16.5 K/UL (ref 4.3–11.1)
WBC MORPH BLD: ABNORMAL

## 2024-11-05 PROCEDURE — 5A1D70Z PERFORMANCE OF URINARY FILTRATION, INTERMITTENT, LESS THAN 6 HOURS PER DAY: ICD-10-PCS | Performed by: INTERNAL MEDICINE

## 2024-11-05 PROCEDURE — 80202 ASSAY OF VANCOMYCIN: CPT

## 2024-11-05 PROCEDURE — 6370000000 HC RX 637 (ALT 250 FOR IP): Performed by: INTERNAL MEDICINE

## 2024-11-05 PROCEDURE — 2140000000 HC CCU INTERMEDIATE R&B

## 2024-11-05 PROCEDURE — 80053 COMPREHEN METABOLIC PANEL: CPT

## 2024-11-05 PROCEDURE — 36415 COLL VENOUS BLD VENIPUNCTURE: CPT

## 2024-11-05 PROCEDURE — 85025 COMPLETE CBC W/AUTO DIFF WBC: CPT

## 2024-11-05 PROCEDURE — 82962 GLUCOSE BLOOD TEST: CPT

## 2024-11-05 PROCEDURE — 82803 BLOOD GASES ANY COMBINATION: CPT

## 2024-11-05 PROCEDURE — 90935 HEMODIALYSIS ONE EVALUATION: CPT

## 2024-11-05 PROCEDURE — 2580000003 HC RX 258: Performed by: INTERNAL MEDICINE

## 2024-11-05 PROCEDURE — 97535 SELF CARE MNGMENT TRAINING: CPT

## 2024-11-05 PROCEDURE — 97530 THERAPEUTIC ACTIVITIES: CPT

## 2024-11-05 PROCEDURE — 36600 WITHDRAWAL OF ARTERIAL BLOOD: CPT

## 2024-11-05 PROCEDURE — 2580000003 HC RX 258: Performed by: STUDENT IN AN ORGANIZED HEALTH CARE EDUCATION/TRAINING PROGRAM

## 2024-11-05 PROCEDURE — 6370000000 HC RX 637 (ALT 250 FOR IP): Performed by: HOSPITALIST

## 2024-11-05 PROCEDURE — 6360000002 HC RX W HCPCS: Performed by: NURSE PRACTITIONER

## 2024-11-05 PROCEDURE — 82140 ASSAY OF AMMONIA: CPT

## 2024-11-05 PROCEDURE — 6360000002 HC RX W HCPCS: Performed by: STUDENT IN AN ORGANIZED HEALTH CARE EDUCATION/TRAINING PROGRAM

## 2024-11-05 RX ORDER — NALOXONE HYDROCHLORIDE 0.4 MG/ML
0.4 INJECTION, SOLUTION INTRAMUSCULAR; INTRAVENOUS; SUBCUTANEOUS PRN
Status: DISCONTINUED | OUTPATIENT
Start: 2024-11-05 | End: 2024-11-08 | Stop reason: HOSPADM

## 2024-11-05 RX ADMIN — CIPROFLOXACIN 500 MG: 500 TABLET, FILM COATED ORAL at 10:35

## 2024-11-05 RX ADMIN — SERTRALINE 100 MG: 100 TABLET, FILM COATED ORAL at 10:36

## 2024-11-05 RX ADMIN — METOPROLOL TARTRATE 50 MG: 50 TABLET, FILM COATED ORAL at 20:21

## 2024-11-05 RX ADMIN — SODIUM CHLORIDE, PRESERVATIVE FREE 10 ML: 5 INJECTION INTRAVENOUS at 10:37

## 2024-11-05 RX ADMIN — GABAPENTIN 100 MG: 100 CAPSULE ORAL at 10:36

## 2024-11-05 RX ADMIN — FAMOTIDINE 20 MG: 20 TABLET, FILM COATED ORAL at 10:36

## 2024-11-05 RX ADMIN — MIDODRINE HYDROCHLORIDE 10 MG: 5 TABLET ORAL at 10:36

## 2024-11-05 RX ADMIN — APIXABAN 5 MG: 2.5 TABLET, FILM COATED ORAL at 10:36

## 2024-11-05 RX ADMIN — SEVELAMER CARBONATE 800 MG: 800 TABLET, FILM COATED ORAL at 12:51

## 2024-11-05 RX ADMIN — NALOXONE HYDROCHLORIDE 0.4 MG: 0.4 INJECTION, SOLUTION INTRAMUSCULAR; INTRAVENOUS; SUBCUTANEOUS at 20:06

## 2024-11-05 RX ADMIN — SEVELAMER CARBONATE 800 MG: 800 TABLET, FILM COATED ORAL at 18:21

## 2024-11-05 RX ADMIN — OXYCODONE 10 MG: 5 TABLET ORAL at 10:35

## 2024-11-05 RX ADMIN — APIXABAN 5 MG: 2.5 TABLET, FILM COATED ORAL at 20:21

## 2024-11-05 RX ADMIN — VANCOMYCIN HYDROCHLORIDE 500 MG: 500 INJECTION, POWDER, LYOPHILIZED, FOR SOLUTION INTRAVENOUS at 15:14

## 2024-11-05 RX ADMIN — SERTRALINE 100 MG: 100 TABLET, FILM COATED ORAL at 20:21

## 2024-11-05 RX ADMIN — OXYCODONE 10 MG: 5 TABLET ORAL at 15:03

## 2024-11-05 RX ADMIN — SODIUM CHLORIDE, PRESERVATIVE FREE 10 ML: 5 INJECTION INTRAVENOUS at 20:24

## 2024-11-05 RX ADMIN — METOPROLOL TARTRATE 50 MG: 50 TABLET, FILM COATED ORAL at 10:36

## 2024-11-05 RX ADMIN — MIDODRINE HYDROCHLORIDE 10 MG: 5 TABLET ORAL at 18:21

## 2024-11-05 ASSESSMENT — PAIN SCALES - GENERAL
PAINLEVEL_OUTOF10: 3
PAINLEVEL_OUTOF10: 6
PAINLEVEL_OUTOF10: 10
PAINLEVEL_OUTOF10: 0

## 2024-11-05 ASSESSMENT — PAIN DESCRIPTION - PAIN TYPE
TYPE: CHRONIC PAIN
TYPE: ACUTE PAIN

## 2024-11-05 ASSESSMENT — PAIN DESCRIPTION - FREQUENCY
FREQUENCY: INTERMITTENT
FREQUENCY: INTERMITTENT

## 2024-11-05 ASSESSMENT — PAIN DESCRIPTION - ONSET: ONSET: ON-GOING

## 2024-11-05 ASSESSMENT — PAIN DESCRIPTION - LOCATION
LOCATION: HIP
LOCATION: HIP;ABDOMEN

## 2024-11-05 ASSESSMENT — PAIN DESCRIPTION - DESCRIPTORS
DESCRIPTORS: ACHING;SORE
DESCRIPTORS: ACHING;SORE;DISCOMFORT

## 2024-11-05 ASSESSMENT — PAIN DESCRIPTION - ORIENTATION: ORIENTATION: LEFT

## 2024-11-05 NOTE — PROGRESS NOTES
Hospitalist Progress Note   Admit Date:  10/23/2024  7:42 PM   Name:  Giselle Rosado   Age:  54 y.o.  Sex:  female  :  1970   MRN:  [id number]   Room:  [facility]/    Presenting/Chief Complaint: Chest Pain and Shortness of Breath     Reason(s) for Admission: Sepsis associated hypotension (HCC) [A41.9, I95.9]  SBP (spontaneous bacterial peritonitis) (HCC) [K65.2]  Atrial fibrillation with RVR (HCC) [I48.91]  ESRD on hemodialysis (HCC) [N18.6, Z99.2]  Active bleeding [Z78.9]  Cerebrovascular accident (CVA) due to thrombosis of cerebral artery (HCC) [I63.30]     Hospital Course:   Giselle Rosado is a 54 y.o. female with medical history of ESRD now on HD (from PD due to peritonitis), history of paroxysmal atrial fibrillation, hypertension, depression, irritable bowel syndrome  who presented with chest pain and hypotension from HD center.    Patient was recently hospitalized on 10/10 - 10/22 for peritonitis with ascites fluid growing omwi-vut-sulwh and was recommended IV vanc for 2 weeks post PD catheter removal.    She was noted to be in Afib RVR on arrival. Laboratory workup with leukocytosis, lactic acidosis and elevated pBNP.  CT abdomen pelvis with active extravasation from the left rectus/inferior epigastric arterial territory, increased in size of hematomas measuring 7.9 x 3.4 cm.     Interventional radiology was consulted and patient received thrombin injection for the inferior epigastric pseudoaneurysm.  Patient hemoglobin has been stable.  Repeat CT abdomen pelvis on 10/27/2024 showed improvement of left abdominal wall hematoma, improved thrombosed pseudoaneurysm, improvement with free fluid in the abdomen.     Fell the night of 2024 after going to the bathroom-her  assisted her. She feels as if her legs just gave out from under her. She hit the left side of her abdomen.    Nephrology consulted, hemodialysis  with next hemodialysis scheduled .  Infectious disease  g/dL    Albumin/Globulin Ratio 0.3 (L) 1.0 - 1.9     Cortisol AM, Total    Collection Time: 11/04/24 10:27 AM   Result Value Ref Range    Cortisol - AM 26.6 (H) 4.8 - 19.5 ug/dL   POCT Glucose    Collection Time: 11/04/24  9:03 PM   Result Value Ref Range    POC Glucose 179 (H) 65 - 100 mg/dL    Performed by: Essence    CBC with Auto Differential    Collection Time: 11/05/24  3:51 AM   Result Value Ref Range    WBC 16.5 (H) 4.3 - 11.1 K/uL    RBC 3.12 (L) 4.05 - 5.2 M/uL    Hemoglobin 9.1 (L) 11.7 - 15.4 g/dL    Hematocrit 29.5 (L) 35.8 - 46.3 %    MCV 94.6 82 - 102 FL    MCH 29.2 26.1 - 32.9 PG    MCHC 30.8 (L) 31.4 - 35.0 g/dL    RDW 18.6 (H) 11.9 - 14.6 %    Platelets 340 150 - 450 K/uL    MPV 9.9 9.4 - 12.3 FL    nRBC 0.10 0.0 - 0.2 K/uL    Neutrophils % 76 43 - 78 %    Lymphocytes % 5 (L) 13 - 44 %    Monocytes % 9 4.0 - 12.0 %    Eosinophils % 2 0.5 - 7.8 %    Basophils % 1 0.0 - 2.0 %    Immature Granulocytes % 7 (H) 0.0 - 5.0 %    Neutrophils Absolute 12.5 (H) 1.7 - 8.2 K/UL    Lymphocytes Absolute 0.8 0.5 - 4.6 K/UL    Monocytes Absolute 1.5 (H) 0.1 - 1.3 K/UL    Eosinophils Absolute 0.3 0.0 - 0.8 K/UL    Basophils Absolute 0.2 0.0 - 0.2 K/UL    Immature Granulocytes Absolute 1.2 (H) 0.0 - 0.5 K/UL    RBC Comment ANISOCYTOSIS + POIKILOCYTOSIS      RBC Comment POLYCHROMASIA      WBC Comment Result Confirmed By Smear      Platelet Comment ADEQUATE      Differential Type AUTOMATED     Comprehensive Metabolic Panel    Collection Time: 11/05/24  3:51 AM   Result Value Ref Range    Sodium 125 (L) 136 - 145 mmol/L    Potassium 4.9 3.5 - 5.1 mmol/L    Chloride 89 (L) 98 - 107 mmol/L    CO2 20 20 - 29 mmol/L    Anion Gap 17 (H) 7 - 16 mmol/L    Glucose 133 (H) 70 - 99 mg/dL    BUN 18 6 - 23 MG/DL    Creatinine 7.07 (H) 0.60 - 1.10 MG/DL    Est, Glom Filt Rate 6 (L) >60 ml/min/1.73m2    Calcium 7.4 (L) 8.8 - 10.2 MG/DL    Total Bilirubin <0.2 0.0 - 1.2 MG/DL    ALT 6 (L) 8 - 45 U/L    AST 35 15 - 37 U/L

## 2024-11-05 NOTE — CARE COORDINATION
Pt accepted for admission to [facility]. Prior auth started and await determination from [facility]. Pt remains on IV Vanc, which she receives at HD, EOT-12/1/24.

## 2024-11-05 NOTE — DIALYSIS
TRANSFER IN - DIALYSIS    Received patient in dialysis unit  from [facility] (unit) for ordered procedure.    Consent verified for renal replacement therapy. Procedure explained to patient, opportunity for Q&A provided. Call light given.     Patient alert and vital signs stable.  BP (!) 117/59   Pulse 73   Temp 97.4 °F (36.3 °C)   Resp 18   Ht 1.753 m (5' 9.02\")   Wt (!) 156.6 kg (345 lb 3.9 oz)   SpO2 98%   BMI 50.96 kg/m²   Hemodialysis initiated using right cvc.  Aspirated and flushed both ports without difficulty. Dressing clean, dry and intact.  Machine settings per MD order.    Heparin 0 unit bolus and 0 units/hr.      Will monitor during treatment.

## 2024-11-05 NOTE — PROGRESS NOTES
[facility] Progress Note    Follow-Up on: ESRD      Pt seen and examined on HD. Running SEQ today. Left PC functioning well.  UF 4500. BP soft on midodrine-asymptomatic. .     ROS:  Gen - no fever, no chills, appetite unchanged  CV - no chest pain  Lung - no shortness of breath, no cough  Abd - + tenderness, no nausea/vomiting, no diarrhea  Ext - + edema    Exam:  Vitals:    11/04/24 2106 11/05/24 0022 11/05/24 0442 11/05/24 0714   BP: 119/66 (!) 103/44 (!) 117/59 (!) 98/50   Pulse: 81  73 73   Resp:  18 18 18   Temp:  97.6 °F (36.4 °C) 97.4 °F (36.3 °C) 97.4 °F (36.3 °C)   TempSrc:       SpO2:  90% 98% 98%   Weight:       Height:             Intake/Output Summary (Last 24 hours) at 11/5/2024 0759  Last data filed at 11/5/2024 0015  Gross per 24 hour   Intake 650 ml   Output 4000 ml   Net -3350 ml         Wt Readings from Last 3 Encounters:   10/29/24 (!) 156.6 kg (345 lb 3.9 oz)   10/18/24 (!) 150.6 kg (332 lb)   10/24/23 (!) 149.7 kg (330 lb)       GEN - in no distress, alert and oriented   CV - regular rate, S1, S2, no Rub   Lung - clear bilaterally  Abd - soft, +tender  Ext - + 1 BLE edema    Recent Labs     11/03/24  0506 11/04/24  0418 11/05/24  0351   WBC 12.8* 12.5* 16.5*   HGB 8.6* 8.1* 9.1*   HCT 28.3* 27.1* 29.5*    307 340        Recent Labs     11/03/24  0506 11/04/24  0418 11/05/24  0351   * 123* 125*   K 5.0 HEMOLYZED SPECIMEN 4.9   CL 90* 87* 89*   CO2 17* 22 20   BUN 20 23 18   CREATININE 7.82* 8.30* 7.07*   CALCIUM 7.3* 7.0* 7.4*   ALBUMIN 1.4* 1.5* 1.6*       Assessment / Plan:  Principal Problem:    Sepsis associated hypotension (HCC)  Active Problems:    Obesity, morbid    Acute respiratory failure    Gout    Anemia    HTN (hypertension)    DM2 (diabetes mellitus, type 2) (HCC)    Atrial fibrillation with RVR (HCC)    Cellulitis of left lower extremity    Sepsis (HCC)    End stage chronic kidney disease (HCC)    Spontaneous bacterial peritonitis (HCC)  Resolved  Problems:    * No resolved hospital problems. *    1) ESRD - HD MWF.  Previously on PD  -running full SEQ today for extra fluid removal plan for regular HD tomorrow     2) Peritonitis - PD catheter removed 10/15/24  -Continue vancomycin EOT 12/1/2024      3) Abdominal pain - evidence of subcutaneous hematoma and cellulitis.   CT ab hematoma with improvement. Free fluid shows improvement.   Unclear whether it is calciphylaxis given that there is a lot of cellulitis and subcutaneous hematoma     4) Hypotension - on Toprol for Afib, Valsartan stopped  -on Midodrine- titrate      5) S/p Afib with RVR - better rate controlled     6) MBD- on Renvela     7) Anemia ESRD -on JOHNNY    High Medical Decision Making  -Patient with at least one acute illness that poses a threat to bodily function  -Reviewed 3 labs  -Reviewed external charts

## 2024-11-05 NOTE — PLAN OF CARE
Problem: Chronic Conditions and Co-morbidities  Goal: Patient's chronic conditions and co-morbidity symptoms are monitored and maintained or improved  Outcome: Progressing  Flowsheets (Taken 11/4/2024 1030 by Margie Arroyo, RN)  Care Plan - Patient's Chronic Conditions and Co-Morbidity Symptoms are Monitored and Maintained or Improved: Monitor and assess patient's chronic conditions and comorbid symptoms for stability, deterioration, or improvement     Problem: Discharge Planning  Goal: Discharge to home or other facility with appropriate resources  Outcome: Progressing  Flowsheets (Taken 11/4/2024 1030 by Margie Arroyo, RN)  Discharge to home or other facility with appropriate resources: Identify barriers to discharge with patient and caregiver

## 2024-11-05 NOTE — PROGRESS NOTES
ACUTE OCCUPATIONAL THERAPY GOALS:   (Developed with and agreed upon by patient and/or caregiver.)  1. Patient will complete lower body bathing and dressing with STAND BY ASSIST and adaptive equipment as needed.     2. Patient will complete toileting with STAND BY ASSIST.  3. Patient will complete grooming ADL in standing with STAND BY ASSIST.  4. Patient will tolerate 25 minutes of OT treatment with 1-2 rest breaks to increase activity tolerance for ADLs.   5. Patient will complete functional transfers with STAND BY ASSIST and adaptive equipment as needed.   6. Patient will tolerate 10 minutes BUE exercises to increase strength for safe, functional transfers.      Timeframe: 7 visits     OCCUPATIONAL THERAPY: Daily Note PM   OT Visit Days: 2   Time In/Out  OT Charge Capture  Rehab Caseload Tracker  OT Orders    Giselle Rosado is a 54 y.o. female   PRIMARY DIAGNOSIS: Sepsis associated hypotension ([facility])  Sepsis associated hypotension ([facility]) [A41.9, I95.9]  SBP (spontaneous bacterial peritonitis) ([facility]) [K65.2]  Atrial fibrillation with RVR ([facility]) [I48.91]  ESRD on hemodialysis ([facility]) [N18.6, Z99.2]  Active bleeding [Z78.9]  Cerebrovascular accident (CVA) due to thrombosis of cerebral artery ([facility]) [I63.30]       Inpatient: Payor: BCBS / [place] / Product Type: *No Product type* /     ASSESSMENT:     REHAB RECOMMENDATIONS:   Recommendation to date pending progress:  Setting:  Short-term Rehab    Equipment:    To Be Determined     ASSESSMENT:  Ms. Rosado required max-total A x2 for all mobility, total A for LB ADL and set up for grooming. Pt very fearful of falling and fatigued very quickly, stood twice but unable to stand > ~1 min. Legs very weak and giving out, required max A for balance. No progress towards goals and goals will need to be updated if pt does not make progress in next few sessions. Continue POC.        SUBJECTIVE:     Ms. Rosado states, \"I haven't been out of bed in 4 weeks.\"     Social/Functional    Functional Mobility [] [] [] [] [] [] [] [] [x] [] x2   I=Independent, Mod I=Modified Independent, S=Supervision/Setup, SBA=Standby Assistance, CGA=Contact Guard Assistance, Min=Minimal Assistance, Mod=Moderate Assistance, Max=Maximal Assistance, Total=Total Assistance, NT=Not Tested    BALANCE: Good Fair+ Fair Fair- Poor NT Comments   Sitting Static [] [] [] [] [] []    Sitting Dynamic [] [] [] [] [] []              Standing Static [] [] [] [] [x] []    Standing Dynamic [] [] [] [] [x] []        PLAN:     FREQUENCY/DURATION   OT Plan of Care: 3 times/week for duration of hospital stay or until stated goals are met, whichever comes first.    TREATMENT:     TREATMENT:   Co-Treatment PT/OT necessary due to patient's decreased overall endurance/tolerance levels, as well as need for high level skilled assistance to complete functional transfers/mobility and functional tasks  Self Care (29 minutes): Patient participated in upper body bathing, upper body dressing, lower body dressing, and grooming ADLs in unsupported sitting with minimal verbal cueing to increase independence and decrease assistance required. Patient also participated in bed mobility, functional transfer, and compensatory technique training to increase independence. The patient was educated on role of occupational therapy, compensatory technique during ADL , and proper use of assistive device and patient will need reinforcement at next session.     TREATMENT GRID:  N/A    AFTER TREATMENT PRECAUTIONS: Bed, Call light within reach, Needs within reach, RN notified, and Side rails x3    INTERDISCIPLINARY COLLABORATION:  RN/ PCT and PT/ PTA    EDUCATION:       TOTAL TREATMENT DURATION AND TIME:  Time In: 1336  Time Out: 1405  Minutes: 29    Pooja Miller, OT

## 2024-11-05 NOTE — PROGRESS NOTES
Infectious Diseases Progress Note  Today's Date: 2024   Admit Date: 10/23/2024  : 1970    Impression:   Leukocytosis: and mildly elevated lactic acid.  While infection a possibility, this low level of white blood cell count would be very possibly consistent with her lower abdominal wall hematoma.??  Recent PD catheter associated staphylococcal peritonitis  Isolation of Tsukamurella spp. And CNS From PD effluent 10/10/24 This is an obligate aerobic GPR, partially acid fast +; environment, water and soil.   PD cath was removed and set up for 6 weeks Vanc at HD; would cover corynebacterium species and Tsukamurella   Anasarca: has not been tolerating HD well and suspect may be relate to volume overload state.  Her bilateral panus areas hip redness is likely related to edema rather than cellulitis.   Abdominal induration and edema-cellulitis versus panniculitis versus calciphylaxis.  I would favor noninfectious etiology related to her severe edema and anasarca as above.   Inferior epigastric arterial hematoma, status post embolization  Right lower extremity weakness-plantarflexion and dorsiflexion intact, but has some pain around the hip    Plan:   Continue vancomycin as previously outlined.  This is to be given with dialysis as outpatient. Can give daily via IV while here: EOT remains 2024  Jovita sent: follow result  Continue ciprofloxacin 500 mg daily for now: if Karius results is negative, would stop.   If weakness worsens at all, would recommend MRI of lumbar spine and potentially of right hip  ID following    Anti-infectives:   Ciprofloxacin 11/3-  Vancomycin 10/10-  Cefazolin 10/27- 11/3/24  Ceftriaxone 10/24 - 10/26    Subjective:   Interval History:   WBC 16.5. Afebrile. Patient resting in bed, just back from dialysis. Reports some ongoing abdominal discomfort. No other complaints     Patient is a 54 y.o. female with past medical history of end-stage renal disease receiving dialysis via a

## 2024-11-05 NOTE — PROGRESS NOTES
VANCO DAILY FOLLOW UP RENAL INSUFFICIENCY PATIENT   [facility]   Pharmacy Pharmacokinetic Monitoring Service - Vancomycin    Consulting Provider: Dr. Toribio  Indication: PD catheter complicated peritonitis   Target Concentration: Pre-dialysis concentration 15-20 mg/L  EOT: 12/1/24 per ID  Additional Antimicrobials: ciprofloxacin    Pertinent Laboratory Values:   Wt Readings from Last 1 Encounters:   10/29/24 (!) 156.6 kg (345 lb 3.9 oz)     Temp Readings from Last 1 Encounters:   11/05/24 98.6 °F (37 °C)     Recent Labs     11/03/24  0506 11/03/24  0931 11/04/24  0418 11/05/24  0351   BUN 20  --  23 18   CREATININE 7.82*  --  8.30* 7.07*   WBC 12.8*  --  12.5* 16.5*   LACTA  --  3.4*  --   --      Lab Results   Component Value Date/Time    VANCORANDOM 24.5 11/05/2024 03:51 AM     MRSA Nasal Swab: N/A. Non-respiratory infection    Assessment:  Date:  Dose/Freq Admin Times Level/Time:   10/24      10/25 HD 1000 mg x1  [address] @0400 = 21.6   10/26      10/27      10/28 HD  1000 mg x1 [address] @0604 = 21.1   10/29      10/30  mg x1 [address] @0651 = 24.7   10/31      11/1  mg x 1 1256    11/2      11/3      11/4  mg x 1 1407    11/5  mg x 1 (15) Rd @ 0351 = 24.1   11/6 HD   Rd @ (04)     Plan:  Concentration-guided dosing due to intermittent hemodialysis  Give vancomycin 500 mg IV x 1 today  Repeat vancomycin concentration ordered for 11/6 @ 0400 prior to next dialysis session  Pharmacy will continue to monitor patient and adjust therapy as indicated    Thank you for the consult,  Juana Funk [facility]

## 2024-11-05 NOTE — WOUND CARE
Consulted for L buttock/hip. Pt s/p fall 11/1 going to bathroom and struck L side of body; Pt states did not stay on ground for prolonged time. Pt would not allow me to assist in turning her in bed to assess areas; stated \"My  only went to get a drink and he'll be right back. You'll just have to wait on him.\" Unsure if Pt allows staff to reposition/turn Pt while in bed.    L buttock (more likely ecchymosis but cannot rule out DTI/calciphylaxis) 10x5cm purple/maroon, skin intact, surrounding blanchable erythema, no drainage/odor. Recommend preventative sacrum/L buttock pink silicone border foam dressing every other day/PRN. Continue to frequently turn/reposition as Pt allows. IF true DTI/calciphylaxis, can expect purple/maroon areas to eventually eschar/slough open (calciphylaxis will eschar much quicker than DTI).      L hip with purple/maroon, intact skin. Okay to leave SHASHANK.      Abd s/p 10/18 PD cath removal incision sites w/ surgical glue, ecchymosis. Okay to leave SHASHANK.

## 2024-11-05 NOTE — PROGRESS NOTES
ACUTE PHYSICAL THERAPY GOALS:   (Developed with and agreed upon by patient and/or caregiver.)  (1.) Giselle Rosado  will move from supine to sit and sit to supine , scoot up and down, and roll side to side with CONTACT GUARD ASSIST within 7 treatment day(s).    (2.) Giselle Rosado will transfer from bed to chair and chair to bed with CONTACT GUARD ASSIST using the least restrictive device within 7 treatment day(s).    (3.) Giselle Rosado will ambulate with CONTACT GUARD ASSIST for 50 feet with the least restrictive device within 7 treatment day(s).   (4.) Giselle Rosado will perform standing static and dynamic balance activities x 5 minutes with CONTACT GUARD ASSIST to improve safety within 7 treatment day(s).  (5.) Giselle Rosado will ascend and descend 3 stairs using bilateral hand rail(s) with MODERATE ASSIST to improve functional mobility and safety within 7 treatment day(s).  (6.) Giselle Rosado will perform therapeutic exercises x 15 min for HEP with CONTACT GUARD ASSIST to improve strength, endurance, and functional mobility within 7 treatment day(s).    PHYSICAL THERAPY: Daily Note PM   (Link to Caseload Tracking: PT Visit Days : 2  Time In/Out PT Charge Capture  Rehab Caseload Tracker  Orders    Giselle Rosado is a 54 y.o. female   PRIMARY DIAGNOSIS: Sepsis associated hypotension (HCC)  Sepsis associated hypotension ([facility]) [A41.9, I95.9]  SBP (spontaneous bacterial peritonitis) ([facility]) [K65.2]  Atrial fibrillation with RVR ([facility]) [I48.91]  ESRD on hemodialysis ([facility]) [N18.6, Z99.2]  Active bleeding [Z78.9]  Cerebrovascular accident (CVA) due to thrombosis of cerebral artery ([facility]) [I63.30]       Inpatient: Payor: BCBS / [place] / Product Type: *No Product type* /     ASSESSMENT:     REHAB RECOMMENDATIONS:   Recommendation to date pending progress:  Setting:  Short-term Rehab    Equipment:    Rolling Walker  bariatric     ASSESSMENT:  Ms. Rosado was supine in bed on presentation.  She  to Sit [] [] [] [] [] [] [] [] [x] [] [x]     [] [] [] [] [] [] [] [] [] [] []    I=Independent, Mod I=Modified Independent, S=Supervision, SBA=Standby Assistance, CGA=Contact Guard Assistance,   Min=Minimal Assistance, Mod=Moderate Assistance, Max=Maximal Assistance, Total=Total Assistance, NT=Not Tested    BALANCE: Good Fair+ Fair Fair- Poor NT Comments   Sitting Static [] [] [] [x] [] []    Sitting Dynamic [] [] [] [] [x] []              Standing Static [] [] [] [] [x] []    Standing Dynamic [] [] [] [] [] []      GAIT: I Mod I S SBA CGA Min Mod Max Total  NT x2 Comments:   Level of Assistance [] [] [] [] [] [] [] [] [] [x] []    Distance   feet    DME N/A    Gait Quality N/A    Weightbearing Status      Stairs      I=Independent, Mod I=Modified Independent, S=Supervision, SBA=Standby Assistance, CGA=Contact Guard Assistance,   Min=Minimal Assistance, Mod=Moderate Assistance, Max=Maximal Assistance, Total=Total Assistance, NT=Not Tested    PLAN:   FREQUENCY AND DURATION: 3 times/week for duration of hospital stay or until stated goals are met, whichever comes first.    TREATMENT:   TREATMENT:   Co-Treatment PT/OT necessary due to patient's decreased overall endurance/tolerance levels, as well as need for high level skilled assistance to complete functional transfers/mobility and functional tasks  Therapeutic Activity (25 Minutes): Therapeutic activity included Rolling, Supine to Sit, Sit to Supine, Scooting, Transfer Training, Sitting balance , and Standing balance to improve functional Activity tolerance, Balance, Mobility, and Strength.    TREATMENT GRID:  N/A    AFTER TREATMENT PRECAUTIONS: Alarm Activated, Bed, Bed/Chair Locked, Call light within reach, Needs within reach, and RN notified    INTERDISCIPLINARY COLLABORATION:  RN/ PCT, PT/ PTA, and OT/ OVALLES    EDUCATION:      TIME IN/OUT:  Time In: 1335  Time Out: 1405  Minutes: 30    A Radha Saenz, PT

## 2024-11-06 LAB
ALBUMIN SERPL-MCNC: 1.4 G/DL (ref 3.5–5)
ALBUMIN/GLOB SERPL: 0.4 (ref 1–1.9)
ALP SERPL-CCNC: 190 U/L (ref 35–104)
ALT SERPL-CCNC: 7 U/L (ref 8–45)
ANION GAP SERPL CALC-SCNC: 14 MMOL/L (ref 7–16)
AST SERPL-CCNC: 26 U/L (ref 15–37)
BASOPHILS # BLD: 0 K/UL (ref 0–0.2)
BASOPHILS NFR BLD: 0 % (ref 0–2)
BILIRUB SERPL-MCNC: <0.2 MG/DL (ref 0–1.2)
BUN SERPL-MCNC: 24 MG/DL (ref 6–23)
CALCIUM SERPL-MCNC: 7.2 MG/DL (ref 8.8–10.2)
CHLORIDE SERPL-SCNC: 88 MMOL/L (ref 98–107)
CO2 SERPL-SCNC: 21 MMOL/L (ref 20–29)
CREAT SERPL-MCNC: 7.94 MG/DL (ref 0.6–1.1)
DIFFERENTIAL METHOD BLD: ABNORMAL
EOSINOPHIL # BLD: 0.1 K/UL (ref 0–0.8)
EOSINOPHIL NFR BLD: 1 % (ref 0.5–7.8)
ERYTHROCYTE [DISTWIDTH] IN BLOOD BY AUTOMATED COUNT: 18.7 % (ref 11.9–14.6)
GLOBULIN SER CALC-MCNC: 3.8 G/DL (ref 2.3–3.5)
GLUCOSE BLD STRIP.AUTO-MCNC: 185 MG/DL (ref 65–100)
GLUCOSE SERPL-MCNC: 153 MG/DL (ref 70–99)
HCT VFR BLD AUTO: 28 % (ref 35.8–46.3)
HGB BLD-MCNC: 8.5 G/DL (ref 11.7–15.4)
IMM GRANULOCYTES # BLD AUTO: 1 K/UL (ref 0–0.5)
IMM GRANULOCYTES NFR BLD AUTO: 7 % (ref 0–5)
LYMPHOCYTES # BLD: 0.6 K/UL (ref 0.5–4.6)
LYMPHOCYTES NFR BLD: 4 % (ref 13–44)
MCH RBC QN AUTO: 28.8 PG (ref 26.1–32.9)
MCHC RBC AUTO-ENTMCNC: 30.4 G/DL (ref 31.4–35)
MCV RBC AUTO: 94.9 FL (ref 82–102)
MONOCYTES # BLD: 1.3 K/UL (ref 0.1–1.3)
MONOCYTES NFR BLD: 9 % (ref 4–12)
NEUTS SEG # BLD: 11.4 K/UL (ref 1.7–8.2)
NEUTS SEG NFR BLD: 79 % (ref 43–78)
NRBC # BLD: 0.04 K/UL (ref 0–0.2)
PLATELET # BLD AUTO: 251 K/UL (ref 150–450)
PLATELET COMMENT: ADEQUATE
PMV BLD AUTO: 9.7 FL (ref 9.4–12.3)
POTASSIUM SERPL-SCNC: 5.1 MMOL/L (ref 3.5–5.1)
PROT SERPL-MCNC: 5.1 G/DL (ref 6.3–8.2)
RBC # BLD AUTO: 2.95 M/UL (ref 4.05–5.2)
RBC MORPH BLD: ABNORMAL
SERVICE CMNT-IMP: ABNORMAL
SODIUM SERPL-SCNC: 123 MMOL/L (ref 136–145)
VANCOMYCIN SERPL-MCNC: 21.3 UG/ML
WBC # BLD AUTO: 14.4 K/UL (ref 4.3–11.1)
WBC MORPH BLD: ABNORMAL

## 2024-11-06 PROCEDURE — 6370000000 HC RX 637 (ALT 250 FOR IP): Performed by: INTERNAL MEDICINE

## 2024-11-06 PROCEDURE — 6370000000 HC RX 637 (ALT 250 FOR IP): Performed by: STUDENT IN AN ORGANIZED HEALTH CARE EDUCATION/TRAINING PROGRAM

## 2024-11-06 PROCEDURE — 6360000002 HC RX W HCPCS: Performed by: STUDENT IN AN ORGANIZED HEALTH CARE EDUCATION/TRAINING PROGRAM

## 2024-11-06 PROCEDURE — 2580000003 HC RX 258: Performed by: STUDENT IN AN ORGANIZED HEALTH CARE EDUCATION/TRAINING PROGRAM

## 2024-11-06 PROCEDURE — 36415 COLL VENOUS BLD VENIPUNCTURE: CPT

## 2024-11-06 PROCEDURE — 80053 COMPREHEN METABOLIC PANEL: CPT

## 2024-11-06 PROCEDURE — 90935 HEMODIALYSIS ONE EVALUATION: CPT

## 2024-11-06 PROCEDURE — 80202 ASSAY OF VANCOMYCIN: CPT

## 2024-11-06 PROCEDURE — 97530 THERAPEUTIC ACTIVITIES: CPT

## 2024-11-06 PROCEDURE — 2140000000 HC CCU INTERMEDIATE R&B

## 2024-11-06 PROCEDURE — 85025 COMPLETE CBC W/AUTO DIFF WBC: CPT

## 2024-11-06 PROCEDURE — 82962 GLUCOSE BLOOD TEST: CPT

## 2024-11-06 PROCEDURE — 2580000003 HC RX 258: Performed by: INTERNAL MEDICINE

## 2024-11-06 RX ORDER — ACETAMINOPHEN 650 MG/1
650 SUPPOSITORY RECTAL EVERY 6 HOURS PRN
Status: DISCONTINUED | OUTPATIENT
Start: 2024-11-06 | End: 2024-11-08 | Stop reason: HOSPADM

## 2024-11-06 RX ORDER — HYDROMORPHONE HYDROCHLORIDE 1 MG/ML
0.5 INJECTION, SOLUTION INTRAMUSCULAR; INTRAVENOUS; SUBCUTANEOUS EVERY 4 HOURS PRN
Status: DISCONTINUED | OUTPATIENT
Start: 2024-11-06 | End: 2024-11-08 | Stop reason: HOSPADM

## 2024-11-06 RX ORDER — ACETAMINOPHEN 325 MG/1
650 TABLET ORAL EVERY 4 HOURS PRN
Status: DISCONTINUED | OUTPATIENT
Start: 2024-11-06 | End: 2024-11-08 | Stop reason: HOSPADM

## 2024-11-06 RX ADMIN — ACETAMINOPHEN 650 MG: 325 TABLET ORAL at 16:35

## 2024-11-06 RX ADMIN — MIDODRINE HYDROCHLORIDE 10 MG: 5 TABLET ORAL at 11:34

## 2024-11-06 RX ADMIN — APIXABAN 5 MG: 2.5 TABLET, FILM COATED ORAL at 10:48

## 2024-11-06 RX ADMIN — APIXABAN 5 MG: 2.5 TABLET, FILM COATED ORAL at 19:42

## 2024-11-06 RX ADMIN — SERTRALINE 100 MG: 100 TABLET, FILM COATED ORAL at 10:48

## 2024-11-06 RX ADMIN — SODIUM CHLORIDE, PRESERVATIVE FREE 10 ML: 5 INJECTION INTRAVENOUS at 10:50

## 2024-11-06 RX ADMIN — VANCOMYCIN HYDROCHLORIDE 500 MG: 500 INJECTION, POWDER, LYOPHILIZED, FOR SOLUTION INTRAVENOUS at 16:31

## 2024-11-06 RX ADMIN — ACETAMINOPHEN 650 MG: 325 TABLET ORAL at 10:12

## 2024-11-06 RX ADMIN — SEVELAMER CARBONATE 800 MG: 800 TABLET, FILM COATED ORAL at 18:20

## 2024-11-06 RX ADMIN — SEVELAMER CARBONATE 800 MG: 800 TABLET, FILM COATED ORAL at 11:34

## 2024-11-06 RX ADMIN — FAMOTIDINE 20 MG: 20 TABLET, FILM COATED ORAL at 10:48

## 2024-11-06 RX ADMIN — METOPROLOL TARTRATE 50 MG: 50 TABLET, FILM COATED ORAL at 10:48

## 2024-11-06 RX ADMIN — MIDODRINE HYDROCHLORIDE 10 MG: 5 TABLET ORAL at 07:14

## 2024-11-06 RX ADMIN — SERTRALINE 100 MG: 100 TABLET, FILM COATED ORAL at 19:42

## 2024-11-06 RX ADMIN — ACETAMINOPHEN 650 MG: 325 TABLET ORAL at 21:26

## 2024-11-06 RX ADMIN — SODIUM CHLORIDE, PRESERVATIVE FREE 10 ML: 5 INJECTION INTRAVENOUS at 20:02

## 2024-11-06 RX ADMIN — MIDODRINE HYDROCHLORIDE 10 MG: 5 TABLET ORAL at 18:20

## 2024-11-06 ASSESSMENT — PAIN SCALES - GENERAL
PAINLEVEL_OUTOF10: 8
PAINLEVEL_OUTOF10: 3
PAINLEVEL_OUTOF10: 0
PAINLEVEL_OUTOF10: 3
PAINLEVEL_OUTOF10: 3
PAINLEVEL_OUTOF10: 2
PAINLEVEL_OUTOF10: 6

## 2024-11-06 ASSESSMENT — PAIN - FUNCTIONAL ASSESSMENT: PAIN_FUNCTIONAL_ASSESSMENT: ACTIVITIES ARE NOT PREVENTED

## 2024-11-06 ASSESSMENT — PAIN DESCRIPTION - ORIENTATION
ORIENTATION: LOWER
ORIENTATION: LOWER
ORIENTATION: RIGHT;LEFT
ORIENTATION: RIGHT;LEFT
ORIENTATION: LEFT

## 2024-11-06 ASSESSMENT — PAIN DESCRIPTION - LOCATION
LOCATION: ABDOMEN;HIP
LOCATION: HIP
LOCATION: BACK
LOCATION: HIP

## 2024-11-06 ASSESSMENT — PAIN DESCRIPTION - PAIN TYPE: TYPE: CHRONIC PAIN

## 2024-11-06 ASSESSMENT — PAIN DESCRIPTION - DESCRIPTORS
DESCRIPTORS: HEAVINESS;DISCOMFORT
DESCRIPTORS: ACHING
DESCRIPTORS: ACHING
DESCRIPTORS: ACHING;SORE
DESCRIPTORS: ACHING;SORE
DESCRIPTORS: ACHING

## 2024-11-06 ASSESSMENT — PAIN DESCRIPTION - ONSET
ONSET: ON-GOING

## 2024-11-06 ASSESSMENT — PAIN DESCRIPTION - FREQUENCY
FREQUENCY: INTERMITTENT

## 2024-11-06 NOTE — PROGRESS NOTES
Comprehensive Nutrition Assessment    Type and Reason for Visit: Reassess  Length of Stay     Nutrition Recommendations/Plan:   Meals and Snacks:  Diet: Add gravy to meats  Oral Nutriton Supplement Therapy:   Medical food supplement therapy:  Initiate Nepro with Carbsteady (renal oral supplement) 420 calories, 19 grams protein per 8 ounce serving      Malnutrition Assessment:  Malnutrition Status: At risk for malnutrition (on HD, variable appetite & intakes)  no dorothy wasting    Nutrition Assessment:  Nutrition History:   Patient reports that at baseline her appetite is extremely good at baseline. She eats 1 good meal/day as she wakes up later in the day. After she wakes up she'll have a snack of cottage cheese or a sandwich, and then have a good dinner at night. Patient watches her K/Phos intake, and has been working on it with the RD at dialysis. Reports an acute decline in her appetite upon her first admit in the beginning of October. Reports going an entire week without eating. States she didn't even want her favorite foods. Denies having issues chewing/swallowing. Denies n/v/c/d.      Do You Have Any Cultural, Yazidism, or Ethnic Food Preferences?: No   Weight History:   EMR weight history review from Internal Medicine visit: 330# 10/24/23.   Patient reports that her weight has been stable around 346 lbs. Reports weight changes are associated with fluid fluctuations.   Nutrition Background:       PMH significant for SBP, ESRD on HD, Afib w/ RVR, GERD, HTN, IBS, DM 2, Gout, Anemia, Anxiety. She presents with chest pain and SOB. Is admitted with sepsis associated hypotension.   10/23: CT A/P -  Active extravasation from the left rectus/inferior epigastric arterial  territory. Embolization could be considered. The subcutaneous hematoma has  slightly increased in size since previous evaluation now measuring 7.9 x 3.4 cm  in size. Subcutaneous air is also present from the site of PD catheter removal.  Infectious

## 2024-11-06 NOTE — DIALYSIS
TRANSFER OUT- DIALYSIS    Hemodialysis treatment completed. PT ran 3.5 hours, without complications.    Patient alert and VS stable /70   Pulse 66   Temp 98.1 °F (36.7 °C)   Resp 18   Ht 1.753 m (5' 9.02\")   Wt (!) 156.6 kg (345 lb 3.9 oz)   SpO2 100%   BMI 50.96 kg/m²        3.5 Kg removed.      Flushed both ports with 10 mL of NS.  CVC dressing clean, dry, and intact, tego caps intact, curos caps placed.      Meds given: 0.    RBCs given during dialysis: 0    Patient to 402 after dialysis.

## 2024-11-06 NOTE — PROGRESS NOTES
Physical Therapy Note:    Attempted to see patient this AM for physical therapy treatment  session. Patient off the floor at HD. Will follow and re-attempt as schedule permits/patient available. Thank you,    A Radha Saenz, PT     Rehab Caseload Tracker

## 2024-11-06 NOTE — DIALYSIS
TRANSFER IN - DIALYSIS    Received patient in dialysis unit  from [facility] (unit) for ordered procedure.    Consent verified for renal replacement therapy. Procedure explained to patient, opportunity for Q&A provided. Call light given.     Patient baseline and vital signs stable.  /73,  P65  , 0L O2 via 0.    Hemodialysis initiated using cvc.  Aspirated and flushed both ports without difficulty. Dressing clean, dry and intact.  Machine settings per MD order.    Heparin 0 unit bolus and 0 units/hr.      Will monitor during treatment.

## 2024-11-06 NOTE — PROGRESS NOTES
ACUTE PHYSICAL THERAPY GOALS:   (Developed with and agreed upon by patient and/or caregiver.)  (1.) Giselle Rosado  will move from supine to sit and sit to supine , scoot up and down, and roll side to side with CONTACT GUARD ASSIST within 7 treatment day(s).    (2.) Giselle Rosado will transfer from bed to chair and chair to bed with CONTACT GUARD ASSIST using the least restrictive device within 7 treatment day(s).    (3.) Giselle oRsado will ambulate with CONTACT GUARD ASSIST for 50 feet with the least restrictive device within 7 treatment day(s).   (4.) Giselle Rosado will perform standing static and dynamic balance activities x 5 minutes with CONTACT GUARD ASSIST to improve safety within 7 treatment day(s).  (5.) Giselle Rosado will ascend and descend 3 stairs using bilateral hand rail(s) with MODERATE ASSIST to improve functional mobility and safety within 7 treatment day(s).  (6.) Giselle Rosado will perform therapeutic exercises x 15 min for HEP with CONTACT GUARD ASSIST to improve strength, endurance, and functional mobility within 7 treatment day(s).    PHYSICAL THERAPY: Daily Note PM   (Link to Caseload Tracking: PT Visit Days : 3  Time In/Out PT Charge Capture  Rehab Caseload Tracker  Orders    Giselle Rosado is a 54 y.o. female   PRIMARY DIAGNOSIS: Sepsis associated hypotension (HCC)  Sepsis associated hypotension (HCC) [A41.9, I95.9]  SBP (spontaneous bacterial peritonitis) ([facility]) [K65.2]  Atrial fibrillation with RVR ([facility]) [I48.91]  ESRD on hemodialysis ([facility]) [N18.6, Z99.2]  Active bleeding [Z78.9]  Cerebrovascular accident (CVA) due to thrombosis of cerebral artery (HCC) [I63.30]       Inpatient: Payor: BCBS / [place] / Product Type: *No Product type* /     ASSESSMENT:     REHAB RECOMMENDATIONS:   Recommendation to date pending progress:  Setting:  Short-term Rehab    Equipment:    Rolling Walker  bariatric     ASSESSMENT:  Ms. Rosado was supine in bed on presentation with

## 2024-11-06 NOTE — PROGRESS NOTES
1930: Upon assessment, pt lethargic, difficult to arouse & pale. Vital signs obtain & blood sugar obtained by primary RN. Eleanor, NP notified. Received orders for STAT ABG, Ammonia & Narcan 0.4mg IV x 1. Continuous pulse ox placed on pt.      2003: Narcan given, Eleanor NP at bedside. Pt alert, agitated & improvement in skin color.

## 2024-11-06 NOTE — PROGRESS NOTES
Infectious Diseases Progress Note  Today's Date: 2024   Admit Date: 10/23/2024  : 1970    Impression:   Leukocytosis: Has remained labile and elevated without clear infectious cause.     Recent PD catheter associated staphylococcal peritonitis  Isolation of Tsukamurella spp. And CNS From PD effluent 10/10/24 This is an obligate aerobic GPR, partially acid fast +; environment, water and soil.   PD cath was removed and set up for 6 weeks Vanc at HD; would cover corynebacterium species and Tsukamurella   Anasarca: had not been tolerating HD well and suspect may be relate to volume overload state.  Her bilateral panus areas hip redness is likely related to edema rather than cellulitis. She is down 3.6 L since admission; got HD  and . Today would be a usual HD day for her, MWF.   Abdominal induration and edema.  I think this has been related to anasarca. Appears improved as getting volume off with added HD.    Inferior epigastric arterial hematoma, status post embolization  Right lower extremity weakness-plantarflexion and dorsiflexion intact, but has some pain around the hip    Plan:   Continue vancomycin as previously outlined.    This is to be given with dialysis as outpatient. Can give daily via IV while here: EOT remains 2024  Jovita sent: follow result: not back yet  Stop Cipro, not clear what treating  ID following     Anti-infectives:   Ciprofloxacin 11/3-  Vancomycin 10/10-  Cefazolin 10/27- 11/3/24  Ceftriaxone 10/24 - 10/26    Subjective:   Interval History:   Afebrile, WBC down today, 14.4 but highly labile.   Given narcan last night for AMS and appears she improved.  Taking narcotic for bilateral lower abdominal wall pain.     Patient is a 54 y.o. female with past medical history of end-stage renal disease receiving dialysis via a tunneled chest wall HD line.  She was recently seen in the setting of peritoneal dialysis associated peritonitis.  She had a corynebacterium species      Tobacco Use    Smoking status: Never    Smokeless tobacco: Never   Substance Use Topics    Alcohol use: Not Currently      Past Surgical History:   Procedure Laterality Date    DIALYSIS CATHETER REMOVAL N/A 10/18/2024    REMOVAL PD CATHETER performed by Umair Alexander MD at [facility] MAIN OR    DILATION AND CURETTAGE OF UTERUS  07/2018    IR NONTUNNELED VASCULAR CATHETER  12/23/2021    IR NONTUNNELED VASCULAR CATHETER  12/23/2021    IR NONTUNNELED VASCULAR CATHETER 12/23/2021 [facility] RADIOLOGY SPECIALS    IR TUNNELED CATHETER PLACEMENT GREATER THAN 5 YEARS  1/21/2022    IR TUNNELED CATHETER PLACEMENT GREATER THAN 5 YEARS  1/21/2022    IR TUNNELED CATHETER PLACEMENT GREATER THAN 5 YEARS 1/21/2022 [facility] RADIOLOGY SPECIALS    IR TUNNELED CATHETER PLACEMENT GREATER THAN 5 YEARS  10/18/2024    IR TUNNELED CATHETER PLACEMENT GREATER THAN 5 YEARS 10/18/2024 [facility] RADIOLOGY SPECIALS    OTHER SURGICAL HISTORY  12/2021     X 2 groin wound    CO UNLISTED PROCEDURE ABDOMEN PERITONEUM & OMENTUM  02/2022    debridement     Family History   Problem Relation Age of Onset    Heart Failure Mother         congestive    Hypertension Mother     Cancer Father         Lung/Mets to Bone and Brain and liver    Diabetes Father     Hypertension Father     Heart Disease Mother          Current medications were reviewed     Allergies   Allergen Reactions    Ace Inhibitors Angioedema     Other reaction(s): Hives/Swelling-Allergy  Swelling of the mouth    Angiotensin Receptor Blockers Angioedema    [place] Hives     Swelling of the mouth  Other reaction(s): Hives/Swelling-Allergy  Patient states she is not allergic    Sulfamethoxazole-Trimethoprim Other (See Comments)     Nausea, vomiting, making her feel like she is going to pass out    Cephalexin Nausea And Vomiting     Can't take oral/can do IV        Objective:     Visit Vitals  /73   Pulse 66   Temp 98.1 °F (36.7 °C)   Resp 18   Ht 1.753 m (5' 9.02\")   Wt (!) 156.6 kg (345 lb 3.9 oz)

## 2024-11-06 NOTE — PROGRESS NOTES
[facility] Progress Note    Follow-Up on: ESRD      Pt seen and examined on HD.  Left PC functioning well.  UF 4000. /71, HR 81.     ROS:  Gen - no fever, no chills, appetite unchanged  CV - no chest pain  Lung - no shortness of breath, no cough  Abd - + tenderness, no nausea/vomiting, no diarrhea  Ext - + edema improving    Exam:  Vitals:    11/06/24 0018 11/06/24 0315 11/06/24 0418 11/06/24 0656   BP: (!) 122/96 131/77 112/60 103/73   Pulse: 71 68 66    Resp: 20  20 18   Temp: 98.1 °F (36.7 °C)  98.1 °F (36.7 °C)    TempSrc:       SpO2: 100% 100% 100%    Weight:       Height:             Intake/Output Summary (Last 24 hours) at 11/6/2024 0755  Last data filed at 11/6/2024 0400  Gross per 24 hour   Intake 480 ml   Output 0 ml   Net 480 ml         Wt Readings from Last 3 Encounters:   10/29/24 (!) 156.6 kg (345 lb 3.9 oz)   10/18/24 (!) 150.6 kg (332 lb)   10/24/23 (!) 149.7 kg (330 lb)       GEN - in no distress, alert and oriented   CV - regular rate, S1, S2, no Rub   Lung - clear bilaterally  Abd - soft, +tender  Ext - + 1 BLE edema    Recent Labs     11/04/24  0418 11/05/24  0351 11/06/24  0519   WBC 12.5* 16.5* 14.4*   HGB 8.1* 9.1* 8.5*   HCT 27.1* 29.5* 28.0*    340 251        Recent Labs     11/04/24  0418 11/05/24  0351 11/06/24  0519   * 125* 123*   K HEMOLYZED SPECIMEN 4.9 5.1   CL 87* 89* 88*   CO2 22 20 21   BUN 23 18 24*   CREATININE 8.30* 7.07* 7.94*   CALCIUM 7.0* 7.4* 7.2*   ALBUMIN 1.5* 1.6* 1.4*       Assessment / Plan:  Principal Problem:    Sepsis associated hypotension (HCC)  Active Problems:    Obesity, morbid    Acute respiratory failure    Gout    Anemia    HTN (hypertension)    DM2 (diabetes mellitus, type 2) (HCC)    Atrial fibrillation with RVR (HCC)    Cellulitis of left lower extremity    Sepsis (HCC)    End stage chronic kidney disease (HCC)    Spontaneous bacterial peritonitis (HCC)  Resolved Problems:    * No resolved hospital problems. *    1) ESRD -  HD MWF.  Previously on PD  -seen on HD next HD planned for Friday  I have asked her primary nurse to get a standing weight today when she is working with PT if possible     2) Peritonitis - PD catheter removed 10/15/24  -Continue vancomycin EOT 12/1/2024      3) Abdominal pain - evidence of subcutaneous hematoma and cellulitis.   CT ab hematoma with improvement. Free fluid shows improvement.   Unclear whether it is calciphylaxis given that there is a lot of cellulitis and subcutaneous hematoma     4) Hypotension - on Toprol for Afib, Valsartan stopped  -on Midodrine     5) S/p Afib with RVR - better rate controlled     6) MBD- on Renvela     7) Anemia ESRD -on JOHNNY    High Medical Decision Making  -Patient with at least one acute illness that poses a threat to bodily function  -Reviewed 3 labs  -Reviewed external charts

## 2024-11-06 NOTE — PROGRESS NOTES
Hospitalist Progress Note   Admit Date:  10/23/2024  7:42 PM   Name:  Giselle Rosado   Age:  54 y.o.  Sex:  female  :  1970   MRN:  [id number]   Room:  [facility]/    Presenting/Chief Complaint: Chest Pain and Shortness of Breath     Reason(s) for Admission: Sepsis associated hypotension (HCC) [A41.9, I95.9]  SBP (spontaneous bacterial peritonitis) (HCC) [K65.2]  Atrial fibrillation with RVR (HCC) [I48.91]  ESRD on hemodialysis (HCC) [N18.6, Z99.2]  Active bleeding [Z78.9]  Cerebrovascular accident (CVA) due to thrombosis of cerebral artery (HCC) [I63.30]     Hospital Course:   Giselle Rosado is a 54 y.o. female with medical history of ESRD now on HD (from PD due to peritonitis), history of paroxysmal atrial fibrillation, hypertension, depression, irritable bowel syndrome  who presented with chest pain and hypotension from HD center.  Patient was recently hospitalized on 10/10 - 10/22 for peritonitis with ascites fluid growing [id number] and was recommended IV vanc for 2 weeks post PD catheter removal.  She was noted to be in Afib RVR on arrival. Laboratory workup with leukocytosis, lactic acidosis and elevated pBNP.  CT abdomen pelvis with active extravasation from the left rectus/inferior epigastric arterial territory, increased in size of hematomas measuring 7.9 x 3.4 cm.     Interventional radiology was consulted and patient received thrombin injection for the inferior epigastric pseudoaneurysm.  Patient hemoglobin has been stable.  Repeat CT abdomen pelvis on 10/27/2024 showed improvement of left abdominal wall hematoma, improved thrombosed pseudoaneurysm, improvement with free fluid in the abdomen.     Fell the night of 2024 after going to the bathroom-her  assisted her. She feels as if her legs just gave out from under her. She hit the left side of her abdomen.    Nephrology consulted, hemodialysis  with next hemodialysis scheduled .  Infectious disease consulted,

## 2024-11-06 NOTE — PROGRESS NOTES
VANCO DAILY FOLLOW UP RENAL INSUFFICIENCY PATIENT   [facility]   Pharmacy Pharmacokinetic Monitoring Service - Vancomycin    Consulting Provider: Dr. Toribio  Indication: PD catheter complicated peritonitis   Target Concentration: Pre-dialysis concentration 15-20 mg/L  EOT: 12/1/24 per ID  Additional Antimicrobials: ciprofloxacin    Pertinent Laboratory Values:   Wt Readings from Last 1 Encounters:   10/29/24 (!) 156.6 kg (345 lb 3.9 oz)     Temp Readings from Last 1 Encounters:   11/06/24 98.1 °F (36.7 °C)     Recent Labs     11/04/24  0418 11/05/24  0351 11/06/24  0519   BUN 23 18 24*   CREATININE 8.30* 7.07* 7.94*   WBC 12.5* 16.5* 14.4*     Lab Results   Component Value Date/Time    VANCORANDOM 21.3 11/06/2024 05:19 AM     MRSA Nasal Swab: N/A. Non-respiratory infection    Assessment:  Date:  Dose/Freq Admin Times Level/Time:   10/24      10/25 HD 1000 mg x1  [address] @0400 = 21.6   10/26      10/27      10/28 HD  1000 mg x1 [address] @0604 = 21.1   10/29      10/30  mg x1 [address] @0651 = 24.7   10/31      11/1  mg x 1 1256    11/2      11/3      11/4  mg x 1 1407    11/5  mg x 1 [address] @ 0351 = 24.1   11/6  mg x 1 (15) Rd @ 0519 = 21.3     Plan:  Concentration-guided dosing due to intermittent hemodialysis  Give vancomycin 500 mg IV x 1 today  Repeat vancomycin concentration ordered for 11/8 @ 0400 prior to next dialysis session  Pharmacy will continue to monitor patient and adjust therapy as indicated    Thank you for the consult,  Bo Covarrubias [facility]

## 2024-11-07 LAB
ANION GAP SERPL CALC-SCNC: 16 MMOL/L (ref 7–16)
BACTERIA SPEC CULT: ABNORMAL
BASOPHILS # BLD: 0.1 K/UL (ref 0–0.2)
BASOPHILS NFR BLD: 1 % (ref 0–2)
BUN SERPL-MCNC: 18 MG/DL (ref 6–23)
CALCIUM SERPL-MCNC: 7.5 MG/DL (ref 8.8–10.2)
CHLORIDE SERPL-SCNC: 90 MMOL/L (ref 98–107)
CO2 SERPL-SCNC: 21 MMOL/L (ref 20–29)
CREAT SERPL-MCNC: 5.75 MG/DL (ref 0.6–1.1)
DIFFERENTIAL METHOD BLD: ABNORMAL
EOSINOPHIL # BLD: 0.3 K/UL (ref 0–0.8)
EOSINOPHIL NFR BLD: 2 % (ref 0.5–7.8)
ERYTHROCYTE [DISTWIDTH] IN BLOOD BY AUTOMATED COUNT: 19 % (ref 11.9–14.6)
GLUCOSE SERPL-MCNC: 133 MG/DL (ref 70–99)
GRAM STN SPEC: ABNORMAL
GRAM STN SPEC: ABNORMAL
HCT VFR BLD AUTO: 29.1 % (ref 35.8–46.3)
HGB BLD-MCNC: 8.7 G/DL (ref 11.7–15.4)
IMM GRANULOCYTES # BLD AUTO: 0.9 K/UL (ref 0–0.5)
IMM GRANULOCYTES NFR BLD AUTO: 6 % (ref 0–5)
LYMPHOCYTES # BLD: 0.9 K/UL (ref 0.5–4.6)
LYMPHOCYTES NFR BLD: 6 % (ref 13–44)
Lab: NORMAL
Lab: NORMAL
MCH RBC QN AUTO: 28.6 PG (ref 26.1–32.9)
MCHC RBC AUTO-ENTMCNC: 29.9 G/DL (ref 31.4–35)
MCV RBC AUTO: 95.7 FL (ref 82–102)
MONOCYTES # BLD: 1.4 K/UL (ref 0.1–1.3)
MONOCYTES NFR BLD: 10 % (ref 4–12)
NEUTS SEG # BLD: 10.8 K/UL (ref 1.7–8.2)
NEUTS SEG NFR BLD: 75 % (ref 43–78)
NRBC # BLD: 0.07 K/UL (ref 0–0.2)
PLATELET # BLD AUTO: 256 K/UL (ref 150–450)
PLATELET COMMENT: ADEQUATE
PMV BLD AUTO: 9.9 FL (ref 9.4–12.3)
POTASSIUM SERPL-SCNC: 4.6 MMOL/L (ref 3.5–5.1)
RBC # BLD AUTO: 3.04 M/UL (ref 4.05–5.2)
RBC MORPH BLD: ABNORMAL
REFERENCE LAB: NORMAL
SERVICE CMNT-IMP: ABNORMAL
SODIUM SERPL-SCNC: 127 MMOL/L (ref 136–145)
WBC # BLD AUTO: 14.4 K/UL (ref 4.3–11.1)
WBC MORPH BLD: ABNORMAL

## 2024-11-07 PROCEDURE — 6370000000 HC RX 637 (ALT 250 FOR IP): Performed by: INTERNAL MEDICINE

## 2024-11-07 PROCEDURE — 2580000003 HC RX 258: Performed by: INTERNAL MEDICINE

## 2024-11-07 PROCEDURE — 36415 COLL VENOUS BLD VENIPUNCTURE: CPT

## 2024-11-07 PROCEDURE — 90935 HEMODIALYSIS ONE EVALUATION: CPT

## 2024-11-07 PROCEDURE — 2140000000 HC CCU INTERMEDIATE R&B

## 2024-11-07 PROCEDURE — 85025 COMPLETE CBC W/AUTO DIFF WBC: CPT

## 2024-11-07 PROCEDURE — 80048 BASIC METABOLIC PNL TOTAL CA: CPT

## 2024-11-07 PROCEDURE — 2580000003 HC RX 258: Performed by: STUDENT IN AN ORGANIZED HEALTH CARE EDUCATION/TRAINING PROGRAM

## 2024-11-07 PROCEDURE — 6360000002 HC RX W HCPCS: Performed by: STUDENT IN AN ORGANIZED HEALTH CARE EDUCATION/TRAINING PROGRAM

## 2024-11-07 PROCEDURE — 6370000000 HC RX 637 (ALT 250 FOR IP): Performed by: STUDENT IN AN ORGANIZED HEALTH CARE EDUCATION/TRAINING PROGRAM

## 2024-11-07 RX ADMIN — SODIUM CHLORIDE, PRESERVATIVE FREE 10 ML: 5 INJECTION INTRAVENOUS at 11:43

## 2024-11-07 RX ADMIN — METOPROLOL TARTRATE 50 MG: 50 TABLET, FILM COATED ORAL at 20:17

## 2024-11-07 RX ADMIN — METOPROLOL TARTRATE 50 MG: 50 TABLET, FILM COATED ORAL at 11:38

## 2024-11-07 RX ADMIN — FAMOTIDINE 20 MG: 20 TABLET, FILM COATED ORAL at 11:40

## 2024-11-07 RX ADMIN — VANCOMYCIN HYDROCHLORIDE 500 MG: 500 INJECTION, POWDER, LYOPHILIZED, FOR SOLUTION INTRAVENOUS at 12:37

## 2024-11-07 RX ADMIN — MIDODRINE HYDROCHLORIDE 10 MG: 5 TABLET ORAL at 17:24

## 2024-11-07 RX ADMIN — ACETAMINOPHEN 650 MG: 325 TABLET ORAL at 18:12

## 2024-11-07 RX ADMIN — SODIUM CHLORIDE, PRESERVATIVE FREE 10 ML: 5 INJECTION INTRAVENOUS at 20:18

## 2024-11-07 RX ADMIN — SEVELAMER CARBONATE 800 MG: 800 TABLET, FILM COATED ORAL at 11:40

## 2024-11-07 RX ADMIN — APIXABAN 5 MG: 2.5 TABLET, FILM COATED ORAL at 20:18

## 2024-11-07 RX ADMIN — ACETAMINOPHEN 650 MG: 325 TABLET ORAL at 11:39

## 2024-11-07 RX ADMIN — APIXABAN 5 MG: 2.5 TABLET, FILM COATED ORAL at 11:38

## 2024-11-07 RX ADMIN — MIDODRINE HYDROCHLORIDE 10 MG: 5 TABLET ORAL at 11:40

## 2024-11-07 RX ADMIN — SERTRALINE 100 MG: 100 TABLET, FILM COATED ORAL at 11:38

## 2024-11-07 RX ADMIN — MIDODRINE HYDROCHLORIDE 10 MG: 5 TABLET ORAL at 06:58

## 2024-11-07 RX ADMIN — SEVELAMER CARBONATE 800 MG: 800 TABLET, FILM COATED ORAL at 17:24

## 2024-11-07 RX ADMIN — SERTRALINE 100 MG: 100 TABLET, FILM COATED ORAL at 20:17

## 2024-11-07 ASSESSMENT — PAIN DESCRIPTION - DESCRIPTORS
DESCRIPTORS: ACHING
DESCRIPTORS: ACHING

## 2024-11-07 ASSESSMENT — PAIN SCALES - GENERAL
PAINLEVEL_OUTOF10: 3
PAINLEVEL_OUTOF10: 3
PAINLEVEL_OUTOF10: 0

## 2024-11-07 ASSESSMENT — PAIN DESCRIPTION - ORIENTATION
ORIENTATION: POSTERIOR;LOWER
ORIENTATION: POSTERIOR;LOWER

## 2024-11-07 ASSESSMENT — PAIN DESCRIPTION - LOCATION
LOCATION: BACK
LOCATION: BACK

## 2024-11-07 NOTE — PROGRESS NOTES
Hospitalist Progress Note   Admit Date:  10/23/2024  7:42 PM   Name:  Giselle Rosado   Age:  54 y.o.  Sex:  female  :  1970   MRN:  [id number]   Room:  [facility]/    Presenting/Chief Complaint: Chest Pain and Shortness of Breath     Reason(s) for Admission: Sepsis associated hypotension (HCC) [A41.9, I95.9]  SBP (spontaneous bacterial peritonitis) (HCC) [K65.2]  Atrial fibrillation with RVR (HCC) [I48.91]  ESRD on hemodialysis (HCC) [N18.6, Z99.2]  Active bleeding [Z78.9]  Cerebrovascular accident (CVA) due to thrombosis of cerebral artery (HCC) [I63.30]     Hospital Course:   Giselle Rosado is a 54 y.o. female with medical history of ESRD now on HD (from PD due to peritonitis), history of paroxysmal atrial fibrillation, hypertension, depression, irritable bowel syndrome  who presented with chest pain and hypotension from HD center.  Patient was recently hospitalized on 10/10 - 10/22 for peritonitis with ascites fluid growing [id number] and was recommended IV vanc for 2 weeks post PD catheter removal.  She was noted to be in Afib RVR on arrival. Laboratory workup with leukocytosis, lactic acidosis and elevated pBNP.  CT abdomen pelvis with active extravasation from the left rectus/inferior epigastric arterial territory, increased in size of hematomas measuring 7.9 x 3.4 cm.     Interventional radiology was consulted and patient received thrombin injection for the inferior epigastric pseudoaneurysm.  Patient hemoglobin has been stable.  Repeat CT abdomen pelvis on 10/27/2024 showed improvement of left abdominal wall hematoma, improved thrombosed pseudoaneurysm, improvement with free fluid in the abdomen.     Fell the night of 2024 after going to the bathroom-her  assisted her. She feels as if her legs just gave out from under her. She hit the left side of her abdomen.    Nephrology consulted, hemodialysis  with next hemodialysis scheduled .  Infectious disease consulted,  Eosinophils Absolute 0.3 0.0 - 0.8 K/UL    Basophils Absolute 0.1 0.0 - 0.2 K/UL    Immature Granulocytes Absolute 0.9 (H) 0.0 - 0.5 K/UL    RBC Comment SLIGHT  ANISOCYTOSIS + POIKILOCYTOSIS        WBC Comment Result Confirmed By Smear      Platelet Comment ADEQUATE      Differential Type AUTOMATED     Basic Metabolic Panel w/ Reflex to MG    Collection Time: 11/07/24  5:41 AM   Result Value Ref Range    Sodium 127 (L) 136 - 145 mmol/L    Potassium 4.6 3.5 - 5.1 mmol/L    Chloride 90 (L) 98 - 107 mmol/L    CO2 21 20 - 29 mmol/L    Anion Gap 16 7 - 16 mmol/L    Glucose 133 (H) 70 - 99 mg/dL    BUN 18 6 - 23 MG/DL    Creatinine 5.75 (H) 0.60 - 1.10 MG/DL    Est, Glom Filt Rate 8 (L) >60 ml/min/1.73m2    Calcium 7.5 (L) 8.8 - 10.2 MG/DL       No results for input(s): \"COVID19\" in the last 72 hours.    Current Meds:  Current Facility-Administered Medications   Medication Dose Route Frequency    vancomycin (VANCOCIN) 500 mg in sodium chloride 0.9 % 100 mL IVPB (mini-bag)  500 mg IntraVENous Once    HYDROmorphone HCl PF (DILAUDID) injection 0.5 mg  0.5 mg IntraVENous Q4H PRN    acetaminophen (TYLENOL) tablet 650 mg  650 mg Oral Q4H PRN    Or    acetaminophen (TYLENOL) suppository 650 mg  650 mg Rectal Q6H PRN    naloxone (NARCAN) injection 0.4 mg  0.4 mg IntraVENous PRN    vancomycin (VANCOCIN) intermittent dosing (placeholder)   Other RX Placeholder    midodrine (PROAMATINE) tablet 10 mg  10 mg Oral TID WC    iopamidol (ISOVUE-370) 76 % injection 100 mL  100 mL IntraVENous ONCE PRN    apixaban (ELIQUIS) tablet 5 mg  5 mg Oral BID    [Held by provider] gabapentin (NEURONTIN) capsule 100 mg  100 mg Oral Daily    sevelamer (RENVELA) tablet 800 mg  800 mg Oral TID WC    diatrizoate meglumine-sodium (GASTROGRAFIN) 66-10 % solution 15 mL  15 mL Oral ONCE PRN    epoetin oralia-epbx (RETACRIT) injection 10,000 Units  10,000 Units SubCUTAneous Weekly    dicyclomine (BENTYL) capsule 10 mg  10 mg Oral TID PRN    famotidine (PEPCID)

## 2024-11-07 NOTE — PLAN OF CARE
Problem: Chronic Conditions and Co-morbidities  Goal: Patient's chronic conditions and co-morbidity symptoms are monitored and maintained or improved  Outcome: Progressing  Flowsheets (Taken 11/6/2024 1040 by Margie Arroyo, RN)  Care Plan - Patient's Chronic Conditions and Co-Morbidity Symptoms are Monitored and Maintained or Improved: Monitor and assess patient's chronic conditions and comorbid symptoms for stability, deterioration, or improvement     Problem: Discharge Planning  Goal: Discharge to home or other facility with appropriate resources  Outcome: Progressing  Flowsheets (Taken 11/6/2024 1040 by Margie Arroyo, RN)  Discharge to home or other facility with appropriate resources: Identify barriers to discharge with patient and caregiver     Problem: Safety - Adult  Goal: Free from fall injury  Outcome: Progressing

## 2024-11-07 NOTE — PROGRESS NOTES
(36.8 °C) (Oral)   Resp 18   Ht 1.753 m (5' 9.02\")   Wt (!) 156.2 kg (344 lb 6.4 oz)   SpO2 97%   BMI 50.84 kg/m²     Temp (24hrs), Av.1 °F (36.7 °C), Min:97.7 °F (36.5 °C), Max:98.6 °F (37 °C)       Intake/Output Summary (Last 24 hours) at 2024 1011  Last data filed at 2024 0947  Gross per 24 hour   Intake 1240 ml   Output 9211 ml   Net -7971 ml       Physical Exam:   General:  NAD, appears chronically unwell  HEENT:  NCAT, Sclerae anicteric, PERRL, MMM  CV:   RRR, no M/R/G  Lungs:  No W/R/R. Symmetric expansion.  Abdomen:  Moderately indurated pannus in center without focal redness; well healed surgical site from removal of PD cath is clean dry and intact with some bruising; redness over panus above both hip areas/flanks of abdomen; appears receeding redness from outlines  Extremities: No cyanosis or clubbing, no edema  Skin:   No rashes, normal coloration, warm and dry  Psych:  Normal mood and affect  Neuro:  Weakness with flexion at right hip.  Alert and oriented x 4    Lines:     Tunneled Hemodialysis Catheter Left Neck (Active)       Data Review:   I have personally reviewed labs, micro, and other relevant tests:    Labs: Results:   BMP, Mg, Phos Recent Labs     24  0541   * 123* 127*   K 4.9 5.1 4.6   CL 89* 88* 90*   CO2 20 21 21   ANIONGAP 17* 14 16   BUN 18 24* 18   CREATININE 7.07* 7.94* 5.75*   LABGLOM 6* 6* 8*   CALCIUM 7.4* 7.2* 7.5*   GLUCOSE 133* 153* 133*      CBC w/ diff Recent Labs     24  03524  0541   WBC 16.5* 14.4* 14.4*   RBC 3.12* 2.95* 3.04*   HGB 9.1* 8.5* 8.7*   HCT 29.5* 28.0* 29.1*   MCV 94.6 94.9 95.7   MCH 29.2 28.8 28.6   MCHC 30.8* 30.4* 29.9*   RDW 18.6* 18.7* 19.0*    251 256   MPV 9.9 9.7 9.9   NRBC 0.10 0.04 0.07   LYMPHOPCT 5* 4* 6*   MONOPCT 9 9 10   BASOPCT 1 0 1   IMMGRAN 7* 7* 6*   LYMPHSABS 0.8 0.6 0.9   EOSABS 0.3 0.1 0.3   MONOSABS 1.5* 1.3 1.4*   BASOSABS 0.2 0.0 0.1   ABSIMMGRAN

## 2024-11-07 NOTE — DIALYSIS
TRANSFER IN - DIALYSIS    Received patient in dialysis unit  from [facility] (unit) for ordered procedure.    Consent verified for renal replacement therapy. Procedure explained to patient, opportunity for Q&A provided. Call light given.     Patient baseline and vital signs stable.  /95,  P71  , 0L O2 via 0.    Hemodialysis initiated using cvc.  Aspirated and flushed both ports without difficulty. Dressing clean, dry and intact.  Machine settings per MD order.    Heparin 0 unit bolus and 0 units/hr.      Will monitor during treatment.    Running sequential dialysis for fluid removal.

## 2024-11-07 NOTE — PROGRESS NOTES
Attempted to see patient this AM for occupational therapy treatment  session. Patient off floor HD. Will follow and re-attempt as schedule permits/patient available. Thank you,    Pooja Miller, OT    Rehab Caseload Tracker

## 2024-11-07 NOTE — PLAN OF CARE
Problem: Chronic Conditions and Co-morbidities  Goal: Patient's chronic conditions and co-morbidity symptoms are monitored and maintained or improved  Outcome: Progressing     Problem: Discharge Planning  Goal: Discharge to home or other facility with appropriate resources  Outcome: Progressing     Problem: Pain  Goal: Verbalizes/displays adequate comfort level or baseline comfort level  Outcome: Progressing     Problem: Safety - Adult  Goal: Free from fall injury  Outcome: Progressing     Problem: ABCDS Injury Assessment  Goal: Absence of physical injury  Outcome: Progressing     Problem: Skin/Tissue Integrity  Goal: Absence of new skin breakdown  Description: 1.  Monitor for areas of redness and/or skin breakdown  2.  Assess vascular access sites hourly  3.  Every 4-6 hours minimum:  Change oxygen saturation probe site  4.  Every 4-6 hours:  If on nasal continuous positive airway pressure, respiratory therapy assess nares and determine need for appliance change or resting period.  Outcome: Progressing     Problem: Cardiovascular - Adult  Goal: Absence of cardiac dysrhythmias or at baseline  Outcome: Progressing     Problem: Skin/Tissue Integrity - Adult  Goal: Incisions, wounds, or drain sites healing without S/S of infection  Outcome: Progressing     Problem: Musculoskeletal - Adult  Goal: Return mobility to safest level of function  Outcome: Progressing  Goal: Return ADL status to a safe level of function  Outcome: Progressing     Problem: Gastrointestinal - Adult  Goal: Maintains adequate nutritional intake  Outcome: Progressing     Problem: Genitourinary - Adult  Goal: Absence of urinary retention  Outcome: Progressing

## 2024-11-07 NOTE — CARE COORDINATION
Pt on sepsis list. Still awaiting auth from [facility] per [place]. Will receive IV Vanc at dialysis post discharge. Is HD dependant at [facility] on MWF.     1556-Pt APPROVED by Rober BACON for IRC. Will discharge and transfer up tomorrow following HD. MD aware. Call [phone] for report and room assignment.

## 2024-11-07 NOTE — PROGRESS NOTES
[facility] Progress Note    Follow-Up on: ESRD      Pt seen and examined on HD.  Left TCC functioning well.  UF only today 4000ml. Has a cup of water at bedside     ROS:  Gen - no fever, no chills, appetite unchanged  CV - no chest pain  Lung - no shortness of breath, no cough  Abd - + tenderness, no nausea/vomiting, no diarrhea  Ext - + edema improving    Exam:  Vitals:    11/07/24 0655 11/07/24 0730 11/07/24 0800 11/07/24 0830   BP: (!) 117/95 (!) 143/23 (!) 93/34 (!) 121/99   Pulse:  73 70 83   Resp: 18      Temp:       TempSrc:       SpO2:       Weight:       Height:             Intake/Output Summary (Last 24 hours) at 11/7/2024 0842  Last data filed at 11/6/2024 1514  Gross per 24 hour   Intake 740 ml   Output 5111 ml   Net -4371 ml         Wt Readings from Last 3 Encounters:   11/06/24 (!) 156.2 kg (344 lb 6.4 oz)   10/18/24 (!) 150.6 kg (332 lb)   10/24/23 (!) 149.7 kg (330 lb)       GEN - in no distress, alert and oriented   CV - regular rate, S1, S2, no Rub   Lung - clear bilaterally  Abd - soft, +tender  Ext - + 1 BLE edema    Recent Labs     11/05/24  0351 11/06/24  0519 11/07/24  0541   WBC 16.5* 14.4* 14.4*   HGB 9.1* 8.5* 8.7*   HCT 29.5* 28.0* 29.1*    251 256        Recent Labs     11/05/24  0351 11/06/24  0519 11/07/24  0541   * 123* 127*   K 4.9 5.1 4.6   CL 89* 88* 90*   CO2 20 21 21   BUN 18 24* 18   CREATININE 7.07* 7.94* 5.75*   CALCIUM 7.4* 7.2* 7.5*   ALBUMIN 1.6* 1.4*  --        Assessment / Plan:  Principal Problem:    Sepsis associated hypotension (HCC)  Active Problems:    Obesity, morbid    Acute respiratory failure    Gout    Anemia    HTN (hypertension)    DM2 (diabetes mellitus, type 2) (HCC)    Atrial fibrillation with RVR (HCC)    Cellulitis of left lower extremity    Sepsis (HCC)    End stage chronic kidney disease (HCC)    Spontaneous bacterial peritonitis (HCC)  Resolved Problems:    * No resolved hospital problems. *    1) ESRD - HD MWF.  Previously on

## 2024-11-07 NOTE — DIALYSIS
TRANSFER OUT- DIALYSIS    Hemodialysis treatment completed. PT ran 3.5 hours, without complications.    Patient a/ox3 and VS stable  /66  P 86       3.6 Kg removed.      Flushed both ports with 10 mL of NS.  CVC dressing clean, dry, and intact, tego caps intact, curos caps placed.      Meds given: 0.    RBCs given during dialysis: 0    Patient to 402 after dialysis.

## 2024-11-07 NOTE — PROGRESS NOTES
PT Daily Note:  Attempted to see patient for physical therapy this morning but patient was off the floor in dialysis. Will check back on patient at a later date/time if schedule permits.  Thank you,  Nicole Cuellar, PTA

## 2024-11-07 NOTE — PROGRESS NOTES
VANCO DAILY FOLLOW UP RENAL INSUFFICIENCY PATIENT   [facility]   Pharmacy Pharmacokinetic Monitoring Service - Vancomycin    Consulting Provider: Dr. Toribio  Indication: PD catheter complicated peritonitis   Target Concentration: Pre-dialysis concentration 15-20 mg/L  EOT: 12/1/24 per ID  Additional Antimicrobials: ciprofloxacin    Pertinent Laboratory Values:   Wt Readings from Last 1 Encounters:   11/06/24 (!) 156.2 kg (344 lb 6.4 oz)     Temp Readings from Last 1 Encounters:   11/07/24 98.2 °F (36.8 °C) (Oral)     Recent Labs     11/05/24  0351 11/06/24  0519 11/07/24  0541   BUN 18 24* 18   CREATININE 7.07* 7.94* 5.75*   WBC 16.5* 14.4* 14.4*     Lab Results   Component Value Date/Time    VANCORANDOM 21.3 11/06/2024 05:19 AM     MRSA Nasal Swab: N/A. Non-respiratory infection    Assessment:  Date:  Dose/Freq Admin Times Level/Time:   10/24      10/25 HD 1000 mg x1  13[address] Rd @0400 = 21.6   10/26      10/27      10/28 HD  1000 mg x1 15[address] Rd @0604 = 21.1   10/29      10/30  mg x1 17[address] @0651 = 24.7   10/31      11/1  mg x 1 1256    11/2      11/3      11/4  mg x 1 1407    11/5  mg x 1 1515 Rd @ 0351 = 24.1   11/6  mg x 1 16[address] Rd @ 0519 = 21.3   11/7  mg x 1 (12)    11/8 HD   Rd @ (04)     Plan:  Concentration-guided dosing due to intermittent hemodialysis  Give vancomycin 500 mg IV x 1 today post HD  Repeat vancomycin concentration ordered for 11/8 @ 0400 prior to next dialysis session  Pharmacy will continue to monitor patient and adjust therapy as indicated    Thank you for the consult,  Bo Covarrubias [facility]

## 2024-11-07 NOTE — CARE COORDINATION
CM made contact with [facility] and spoke with Karthik SWEENEY and patient has been approved for Acute Inpatient rehab as 11/8/24.      CM made contact with CM Predunce and made her aware that patient has been approved and can be admitted to Acute Inpatient Rehab on 11/8/24.      CM will continue to follow.

## 2024-11-08 ENCOUNTER — HOSPITAL ENCOUNTER (INPATIENT)
Age: 54
DRG: 871 | End: 2024-11-08
Attending: PHYSICAL MEDICINE & REHABILITATION | Admitting: PHYSICAL MEDICINE & REHABILITATION
Payer: MEDICARE

## 2024-11-08 VITALS
DIASTOLIC BLOOD PRESSURE: 89 MMHG | WEIGHT: 293 LBS | RESPIRATION RATE: 16 BRPM | OXYGEN SATURATION: 97 % | TEMPERATURE: 97.5 F | HEART RATE: 75 BPM | HEIGHT: 69 IN | BODY MASS INDEX: 43.4 KG/M2 | SYSTOLIC BLOOD PRESSURE: 125 MMHG

## 2024-11-08 DIAGNOSIS — D64.9 ANEMIA, UNSPECIFIED TYPE: ICD-10-CM

## 2024-11-08 DIAGNOSIS — M10.9 GOUT, UNSPECIFIED CAUSE, UNSPECIFIED CHRONICITY, UNSPECIFIED SITE: ICD-10-CM

## 2024-11-08 DIAGNOSIS — K65.2 SPONTANEOUS BACTERIAL PERITONITIS (HCC): ICD-10-CM

## 2024-11-08 DIAGNOSIS — E66.01 OBESITY, MORBID: ICD-10-CM

## 2024-11-08 DIAGNOSIS — N18.6 END STAGE CHRONIC KIDNEY DISEASE (HCC): Primary | ICD-10-CM

## 2024-11-08 DIAGNOSIS — Z99.2 DIALYSIS PATIENT (HCC): ICD-10-CM

## 2024-11-08 DIAGNOSIS — J96.00 ACUTE RESPIRATORY FAILURE, UNSPECIFIED WHETHER WITH HYPOXIA OR HYPERCAPNIA: ICD-10-CM

## 2024-11-08 LAB
ANION GAP SERPL CALC-SCNC: 15 MMOL/L (ref 7–16)
BASOPHILS # BLD: 0 K/UL (ref 0–0.2)
BASOPHILS NFR BLD: 0 % (ref 0–2)
BUN SERPL-MCNC: 24 MG/DL (ref 6–23)
CALCIUM SERPL-MCNC: 7.3 MG/DL (ref 8.8–10.2)
CHLORIDE SERPL-SCNC: 89 MMOL/L (ref 98–107)
CO2 SERPL-SCNC: 22 MMOL/L (ref 20–29)
CREAT SERPL-MCNC: 6.42 MG/DL (ref 0.6–1.1)
DIFFERENTIAL METHOD BLD: ABNORMAL
EOSINOPHIL # BLD: 0.4 K/UL (ref 0–0.8)
EOSINOPHIL NFR BLD: 3 % (ref 0.5–7.8)
ERYTHROCYTE [DISTWIDTH] IN BLOOD BY AUTOMATED COUNT: 18.7 % (ref 11.9–14.6)
GLUCOSE SERPL-MCNC: 109 MG/DL (ref 70–99)
HCT VFR BLD AUTO: 29.1 % (ref 35.8–46.3)
HGB BLD-MCNC: 8.7 G/DL (ref 11.7–15.4)
IMM GRANULOCYTES # BLD AUTO: 1 K/UL (ref 0–0.5)
IMM GRANULOCYTES NFR BLD AUTO: 7 % (ref 0–5)
LYMPHOCYTES # BLD: 1 K/UL (ref 0.5–4.6)
LYMPHOCYTES NFR BLD: 7 % (ref 13–44)
MCH RBC QN AUTO: 28.4 PG (ref 26.1–32.9)
MCHC RBC AUTO-ENTMCNC: 29.9 G/DL (ref 31.4–35)
MCV RBC AUTO: 95.1 FL (ref 82–102)
MONOCYTES # BLD: 1.3 K/UL (ref 0.1–1.3)
MONOCYTES NFR BLD: 9 % (ref 4–12)
NEUTS SEG # BLD: 11.1 K/UL (ref 1.7–8.2)
NEUTS SEG NFR BLD: 74 % (ref 43–78)
NRBC # BLD: 0.07 K/UL (ref 0–0.2)
PLATELET # BLD AUTO: 304 K/UL (ref 150–450)
PLATELET COMMENT: ADEQUATE
PMV BLD AUTO: 9.4 FL (ref 9.4–12.3)
POTASSIUM SERPL-SCNC: 4.9 MMOL/L (ref 3.5–5.1)
RBC # BLD AUTO: 3.06 M/UL (ref 4.05–5.2)
RBC MORPH BLD: ABNORMAL
RBC MORPH BLD: ABNORMAL
SODIUM SERPL-SCNC: 127 MMOL/L (ref 136–145)
VANCOMYCIN SERPL-MCNC: 20.3 UG/ML
WBC # BLD AUTO: 14.8 K/UL (ref 4.3–11.1)
WBC MORPH BLD: ABNORMAL

## 2024-11-08 PROCEDURE — 6370000000 HC RX 637 (ALT 250 FOR IP): Performed by: STUDENT IN AN ORGANIZED HEALTH CARE EDUCATION/TRAINING PROGRAM

## 2024-11-08 PROCEDURE — 85025 COMPLETE CBC W/AUTO DIFF WBC: CPT

## 2024-11-08 PROCEDURE — 6370000000 HC RX 637 (ALT 250 FOR IP): Performed by: INTERNAL MEDICINE

## 2024-11-08 PROCEDURE — 2580000003 HC RX 258: Performed by: INTERNAL MEDICINE

## 2024-11-08 PROCEDURE — 36415 COLL VENOUS BLD VENIPUNCTURE: CPT

## 2024-11-08 PROCEDURE — 2580000003 HC RX 258: Performed by: PHYSICAL MEDICINE & REHABILITATION

## 2024-11-08 PROCEDURE — 5A1D70Z PERFORMANCE OF URINARY FILTRATION, INTERMITTENT, LESS THAN 6 HOURS PER DAY: ICD-10-PCS | Performed by: PHYSICAL MEDICINE & REHABILITATION

## 2024-11-08 PROCEDURE — 90935 HEMODIALYSIS ONE EVALUATION: CPT

## 2024-11-08 PROCEDURE — 6360000002 HC RX W HCPCS: Performed by: PHYSICAL MEDICINE & REHABILITATION

## 2024-11-08 PROCEDURE — 1180000000 HC REHAB R&B

## 2024-11-08 PROCEDURE — 6370000000 HC RX 637 (ALT 250 FOR IP): Performed by: PHYSICAL MEDICINE & REHABILITATION

## 2024-11-08 PROCEDURE — 99223 1ST HOSP IP/OBS HIGH 75: CPT | Performed by: PHYSICAL MEDICINE & REHABILITATION

## 2024-11-08 PROCEDURE — 80202 ASSAY OF VANCOMYCIN: CPT

## 2024-11-08 PROCEDURE — 80048 BASIC METABOLIC PNL TOTAL CA: CPT

## 2024-11-08 RX ORDER — POLYETHYLENE GLYCOL 3350 17 G/17G
17 POWDER, FOR SOLUTION ORAL DAILY PRN
Status: CANCELLED | OUTPATIENT
Start: 2024-11-08

## 2024-11-08 RX ORDER — MIDODRINE HYDROCHLORIDE 5 MG/1
10 TABLET ORAL
Status: DISCONTINUED | OUTPATIENT
Start: 2024-11-08 | End: 2024-11-11

## 2024-11-08 RX ORDER — METOPROLOL TARTRATE 50 MG
50 TABLET ORAL 2 TIMES DAILY
Status: CANCELLED | OUTPATIENT
Start: 2024-11-08

## 2024-11-08 RX ORDER — HYDROCODONE BITARTRATE AND ACETAMINOPHEN 5; 325 MG/1; MG/1
1 TABLET ORAL EVERY 6 HOURS PRN
Status: DISPENSED | OUTPATIENT
Start: 2024-11-08

## 2024-11-08 RX ORDER — MIDODRINE HYDROCHLORIDE 10 MG/1
10 TABLET ORAL
Qty: 90 TABLET | Refills: 3 | Status: ON HOLD | OUTPATIENT
Start: 2024-11-08

## 2024-11-08 RX ORDER — LIDOCAINE 4 G/G
4 PATCH TOPICAL DAILY
Status: DISCONTINUED | OUTPATIENT
Start: 2024-11-09 | End: 2024-11-22

## 2024-11-08 RX ORDER — POLYETHYLENE GLYCOL 3350 17 G/17G
17 POWDER, FOR SOLUTION ORAL DAILY PRN
Status: ACTIVE | OUTPATIENT
Start: 2024-11-08

## 2024-11-08 RX ORDER — ACETAMINOPHEN 325 MG/1
650 TABLET ORAL EVERY 4 HOURS PRN
Status: CANCELLED | OUTPATIENT
Start: 2024-11-08

## 2024-11-08 RX ORDER — FLUTICASONE PROPIONATE 50 MCG
2 SPRAY, SUSPENSION (ML) NASAL NIGHTLY PRN
Status: CANCELLED | OUTPATIENT
Start: 2024-11-08

## 2024-11-08 RX ORDER — MAGNESIUM HYDROXIDE/ALUMINUM HYDROXICE/SIMETHICONE 120; 1200; 1200 MG/30ML; MG/30ML; MG/30ML
30 SUSPENSION ORAL EVERY 6 HOURS PRN
Status: ACTIVE | OUTPATIENT
Start: 2024-11-08

## 2024-11-08 RX ORDER — BISACODYL 10 MG
10 SUPPOSITORY, RECTAL RECTAL DAILY PRN
Status: ACTIVE | OUTPATIENT
Start: 2024-11-08

## 2024-11-08 RX ORDER — MIDODRINE HYDROCHLORIDE 5 MG/1
10 TABLET ORAL
Status: CANCELLED | OUTPATIENT
Start: 2024-11-08

## 2024-11-08 RX ORDER — SEVELAMER CARBONATE 800 MG/1
800 TABLET, FILM COATED ORAL
Status: CANCELLED | OUTPATIENT
Start: 2024-11-08

## 2024-11-08 RX ORDER — SERTRALINE HYDROCHLORIDE 100 MG/1
100 TABLET, FILM COATED ORAL 2 TIMES DAILY
Status: CANCELLED | OUTPATIENT
Start: 2024-11-08

## 2024-11-08 RX ORDER — BISACODYL 10 MG
10 SUPPOSITORY, RECTAL RECTAL DAILY PRN
Status: CANCELLED | OUTPATIENT
Start: 2024-11-08

## 2024-11-08 RX ORDER — ACETAMINOPHEN 650 MG/1
650 SUPPOSITORY RECTAL EVERY 6 HOURS PRN
Status: CANCELLED | OUTPATIENT
Start: 2024-11-08

## 2024-11-08 RX ORDER — SEVELAMER CARBONATE 800 MG/1
800 TABLET, FILM COATED ORAL
Status: DISPENSED | OUTPATIENT
Start: 2024-11-08

## 2024-11-08 RX ORDER — DICYCLOMINE HYDROCHLORIDE 10 MG/1
10 CAPSULE ORAL 3 TIMES DAILY PRN
Status: CANCELLED | OUTPATIENT
Start: 2024-11-08

## 2024-11-08 RX ORDER — SODIUM CHLORIDE 9 MG/ML
INJECTION, SOLUTION INTRAVENOUS PRN
Status: ACTIVE | OUTPATIENT
Start: 2024-11-08

## 2024-11-08 RX ORDER — SEVELAMER CARBONATE 800 MG/1
800 TABLET, FILM COATED ORAL
Qty: 90 TABLET | Refills: 3 | Status: ON HOLD | OUTPATIENT
Start: 2024-11-08

## 2024-11-08 RX ORDER — TRAZODONE HYDROCHLORIDE 50 MG/1
50 TABLET, FILM COATED ORAL NIGHTLY PRN
Status: DISPENSED | OUTPATIENT
Start: 2024-11-08

## 2024-11-08 RX ORDER — ACETAMINOPHEN 325 MG/1
650 TABLET ORAL EVERY 4 HOURS PRN
Status: DISCONTINUED | OUTPATIENT
Start: 2024-11-08 | End: 2024-11-11

## 2024-11-08 RX ORDER — FAMOTIDINE 20 MG/1
20 TABLET, FILM COATED ORAL DAILY
Status: CANCELLED | OUTPATIENT
Start: 2024-11-09

## 2024-11-08 RX ORDER — MAGNESIUM HYDROXIDE/ALUMINUM HYDROXICE/SIMETHICONE 120; 1200; 1200 MG/30ML; MG/30ML; MG/30ML
30 SUSPENSION ORAL EVERY 6 HOURS PRN
Status: CANCELLED | OUTPATIENT
Start: 2024-11-08

## 2024-11-08 RX ORDER — METOPROLOL TARTRATE 50 MG
50 TABLET ORAL 2 TIMES DAILY
Status: DISPENSED | OUTPATIENT
Start: 2024-11-08

## 2024-11-08 RX ORDER — SERTRALINE HYDROCHLORIDE 100 MG/1
100 TABLET, FILM COATED ORAL 2 TIMES DAILY
Status: DISPENSED | OUTPATIENT
Start: 2024-11-08

## 2024-11-08 RX ORDER — ONDANSETRON 2 MG/ML
4 INJECTION INTRAMUSCULAR; INTRAVENOUS EVERY 6 HOURS PRN
Status: CANCELLED | OUTPATIENT
Start: 2024-11-08

## 2024-11-08 RX ORDER — FLUTICASONE PROPIONATE 50 MCG
2 SPRAY, SUSPENSION (ML) NASAL NIGHTLY PRN
Status: DISPENSED | OUTPATIENT
Start: 2024-11-08

## 2024-11-08 RX ORDER — DICYCLOMINE HYDROCHLORIDE 10 MG/1
10 CAPSULE ORAL 3 TIMES DAILY PRN
Status: DISPENSED | OUTPATIENT
Start: 2024-11-08

## 2024-11-08 RX ORDER — ACETAMINOPHEN 650 MG/1
650 SUPPOSITORY RECTAL EVERY 6 HOURS PRN
Status: DISCONTINUED | OUTPATIENT
Start: 2024-11-08 | End: 2024-11-11

## 2024-11-08 RX ORDER — ONDANSETRON 2 MG/ML
4 INJECTION INTRAMUSCULAR; INTRAVENOUS EVERY 6 HOURS PRN
Status: ACTIVE | OUTPATIENT
Start: 2024-11-08

## 2024-11-08 RX ORDER — SODIUM CHLORIDE 0.9 % (FLUSH) 0.9 %
5-40 SYRINGE (ML) INJECTION EVERY 12 HOURS SCHEDULED
Status: ACTIVE | OUTPATIENT
Start: 2024-11-08

## 2024-11-08 RX ORDER — ONDANSETRON 4 MG/1
4 TABLET, ORALLY DISINTEGRATING ORAL EVERY 8 HOURS PRN
Status: CANCELLED | OUTPATIENT
Start: 2024-11-08

## 2024-11-08 RX ORDER — SODIUM CHLORIDE 9 MG/ML
INJECTION, SOLUTION INTRAVENOUS PRN
Status: CANCELLED | OUTPATIENT
Start: 2024-11-08

## 2024-11-08 RX ORDER — FAMOTIDINE 20 MG/1
20 TABLET, FILM COATED ORAL DAILY
Status: DISPENSED | OUTPATIENT
Start: 2024-11-09

## 2024-11-08 RX ORDER — LIDOCAINE 4 G/G
4 PATCH TOPICAL DAILY
Status: CANCELLED | OUTPATIENT
Start: 2024-11-09

## 2024-11-08 RX ORDER — ONDANSETRON 4 MG/1
4 TABLET, ORALLY DISINTEGRATING ORAL EVERY 8 HOURS PRN
Status: DISPENSED | OUTPATIENT
Start: 2024-11-08

## 2024-11-08 RX ORDER — SODIUM CHLORIDE 0.9 % (FLUSH) 0.9 %
5-40 SYRINGE (ML) INJECTION EVERY 12 HOURS SCHEDULED
Status: CANCELLED | OUTPATIENT
Start: 2024-11-08

## 2024-11-08 RX ADMIN — MIDODRINE HYDROCHLORIDE 10 MG: 5 TABLET ORAL at 17:20

## 2024-11-08 RX ADMIN — APIXABAN 5 MG: 2.5 TABLET, FILM COATED ORAL at 08:09

## 2024-11-08 RX ADMIN — SODIUM CHLORIDE, PRESERVATIVE FREE 10 ML: 5 INJECTION INTRAVENOUS at 08:11

## 2024-11-08 RX ADMIN — SEVELAMER CARBONATE 800 MG: 800 TABLET, FILM COATED ORAL at 17:21

## 2024-11-08 RX ADMIN — SODIUM CHLORIDE, PRESERVATIVE FREE 10 ML: 5 INJECTION INTRAVENOUS at 21:20

## 2024-11-08 RX ADMIN — ACETAMINOPHEN 650 MG: 325 TABLET ORAL at 13:18

## 2024-11-08 RX ADMIN — ACETAMINOPHEN 650 MG: 325 TABLET ORAL at 21:19

## 2024-11-08 RX ADMIN — SERTRALINE 100 MG: 100 TABLET, FILM COATED ORAL at 21:19

## 2024-11-08 RX ADMIN — MIDODRINE HYDROCHLORIDE 10 MG: 5 TABLET ORAL at 08:09

## 2024-11-08 RX ADMIN — VANCOMYCIN HYDROCHLORIDE 500 MG: 500 INJECTION, POWDER, LYOPHILIZED, FOR SOLUTION INTRAVENOUS at 17:47

## 2024-11-08 RX ADMIN — METOPROLOL TARTRATE 50 MG: 50 TABLET, FILM COATED ORAL at 21:19

## 2024-11-08 RX ADMIN — ACETAMINOPHEN 650 MG: 325 TABLET ORAL at 09:50

## 2024-11-08 RX ADMIN — SEVELAMER CARBONATE 800 MG: 800 TABLET, FILM COATED ORAL at 08:09

## 2024-11-08 RX ADMIN — APIXABAN 5 MG: 5 TABLET, FILM COATED ORAL at 21:19

## 2024-11-08 RX ADMIN — HYDROCODONE BITARTRATE AND ACETAMINOPHEN 1 TABLET: 5; 325 TABLET ORAL at 17:20

## 2024-11-08 RX ADMIN — SERTRALINE 100 MG: 100 TABLET, FILM COATED ORAL at 08:09

## 2024-11-08 RX ADMIN — FAMOTIDINE 20 MG: 20 TABLET, FILM COATED ORAL at 08:09

## 2024-11-08 ASSESSMENT — PAIN - FUNCTIONAL ASSESSMENT: PAIN_FUNCTIONAL_ASSESSMENT: PREVENTS OR INTERFERES SOME ACTIVE ACTIVITIES AND ADLS

## 2024-11-08 ASSESSMENT — PAIN SCALES - GENERAL
PAINLEVEL_OUTOF10: 3
PAINLEVEL_OUTOF10: 3
PAINLEVEL_OUTOF10: 0
PAINLEVEL_OUTOF10: 8
PAINLEVEL_OUTOF10: 3
PAINLEVEL_OUTOF10: 0
PAINLEVEL_OUTOF10: 0

## 2024-11-08 ASSESSMENT — PAIN DESCRIPTION - ORIENTATION
ORIENTATION: POSTERIOR
ORIENTATION: POSTERIOR;LOWER

## 2024-11-08 ASSESSMENT — PAIN DESCRIPTION - DESCRIPTORS
DESCRIPTORS: ACHING
DESCRIPTORS: ACHING

## 2024-11-08 ASSESSMENT — PAIN DESCRIPTION - LOCATION
LOCATION: BACK
LOCATION: GENERALIZED
LOCATION: ABDOMEN;BACK
LOCATION: ABDOMEN;BACK;HIP

## 2024-11-08 NOTE — CARE COORDINATION
Patient with discharge orders for today. Patient discharging to  9th floor inpatient rehab center. Call [phone] for report and room assignment. Patient has met all treatment goals and milestones for discharge. Patient/family in agreement with discharge plan. Please consult case management if any new needs arise.      11/08/24 5075   Services At/After Discharge   Transition of Care Consult (CM Consult) Discharge Planning   Services At/After Discharge Inpatient rehab   [place] Resource Information Provided? No   Mode of Transport at Discharge Other (see comment)  (hospital staff)   Confirm Follow Up Transport Self   Condition of Participation: Discharge Planning   The Plan for Transition of Care is related to the following treatment goals: Return to baseline   The Patient and/or Patient Representative was provided with a Choice of Provider? Patient   The Patient and/Or Patient Representative agree with the Discharge Plan? Yes   Freedom of Choice list was provided with basic dialogue that supports the patient's individualized plan of care/goals, treatment preferences, and shares the quality data associated with the providers?  Yes

## 2024-11-08 NOTE — PROGRESS NOTES
TRANSFER - IN REPORT:    Verbal report received from 4th floor on Giselle Rosado  being received from dialysis for routine progression of patient care      Report consisted of patient's Situation, Background, Assessment and   Recommendations(SBAR).     Information from the following report(s) Nurse Handoff Report, MAR, and Recent Results was reviewed with the receiving nurse.    Opportunity for questions and clarification was provided.      Assessment completed upon patient's arrival to unit and care assumed.

## 2024-11-08 NOTE — PROGRESS NOTES
VANCO DAILY FOLLOW UP RENAL INSUFFICIENCY PATIENT   [facility]   Pharmacy Pharmacokinetic Monitoring Service - Vancomycin    Consulting Provider: Dr. Toribio  Indication: PD catheter complicated peritonitis   Target Concentration: Pre-dialysis concentration 15-20 mg/L  EOT: 12/1/24 per ID  Additional Antimicrobials: None    Pertinent Laboratory Values:   Wt Readings from Last 1 Encounters:   11/06/24 (!) 156.2 kg (344 lb 6.4 oz)     Temp Readings from Last 1 Encounters:   11/08/24 97.5 °F (36.4 °C) (Oral)     Recent Labs     11/06/24  0519 11/07/24  0541 11/08/24  0344   BUN 24* 18 24*   CREATININE 7.94* 5.75* 6.42*   WBC 14.4* 14.4* 14.8*     Lab Results   Component Value Date/Time    VANCORANDOM 20.3 11/08/2024 03:44 AM     MRSA Nasal Swab: N/A. Non-respiratory infection    Assessment:  Date:  Dose/Freq Admin Times Level/Time:   10/24      10/25 HD 1000 mg x1  1351 Rd @0400 = 21.6   10/26      10/27      10/28 HD  1000 mg x1 1546 Rd @0604 = 21.1   10/29      10/30  mg x1 1755 Rd @0651 = 24.7   10/31      11/1  mg x 1 1256    11/2      11/3      11/4  mg x 1 1407    11/5  mg x 1 1515 Rd @ 0351 = 24.1   11/6  mg x 1 1631 Rd @ 0519 = 21.3   11/7  mg x 1 1237    11/8  mg x 1 (1415) Rd @ 0344 = 20.3     Plan:  Concentration-guided dosing due to intermittent hemodialysis  Level is therapeutic. Will give vancomycin 500 mg IV x 1 today post HD  Repeat vancomycin concentration will be ordered as clinically appropriate - tentative discharge today  Pharmacy will continue to monitor patient and adjust therapy as indicated    Thank you for the consult,  Abilio Jiménez RPH

## 2024-11-08 NOTE — PRE-CERTIFICATION NOTE
[facility]  Pre-admission Assessment    Facility Information: [facility]  Patient Name: Giselle Rosado        MRN: [id number]    : 1970 (54 y.o.)  Gender: female   Ethnicity:Not of , /a, or Kinyarwanda origin  Race:White    COVERAGE INFORMATION  Active Insurance as of 10/23/2024       Primary Coverage       Payor Plan Insurance Group Employer/Plan Group    [id number]       Payor Plan Address Payor Plan Phone Number Payor Plan Fax Number Effective Dates    [address]   2023 - None Entered    [facility] [id number]         Subscriber Name Subscriber Birth Date Member ID       GISELLE ROSADO 1970 [id number]               Secondary Coverage       Payor Plan Insurance Group Employer/Plan Group    MEDICARE MEDICARE PART A AND B        Payor Address Payor Phone Number Payor Fax Number Effective Dates    [address] [address] [phone]  2022 - None Entered    [facility] [id number]         Subscriber Name Subscriber Birth Date Member ID       GISELLE ROSADO 1970 [id number]                   Update Due:Approved for 7 days   Next Review due 11/15/2024  [facility] approved from 11/3/24 - 11/15/24    PHYSICIAN/REFERRAL INFORMATION  Attending Physician: Mark Anthony Ascencio,*   Admitted From: [facility]  Date of Admission to the Hospital: 10/23/2024  7:42 PM  Date Patient Eligible for Admission: 2024    PRIOR LIVING SITUATION/LEVEL OF FUNCTION:  Social/Functional History  Lives With: Spouse  Type of Home: House  Home Layout: One level  Bathroom Toilet: Standard  Bathroom Accessibility: Accessible  Home Equipment: Wheelchair - Manual  Receives Help From: Family  ADL Assistance: Independent  Homemaking Assistance: Independent  Ambulation Assistance: Independent  Transfer Assistance: Independent  Active : Yes  Occupation: On disability   Has lack of transportation kept you from medical appointments, meetings, work, or

## 2024-11-08 NOTE — CARE COORDINATION
Patient has been accepted and approved for IR.  Patient insurance ([facility]) has approved patient to be admitted on 11/8/24.  CM made CM aware of patient being accepted for today.  CM will continue to follow.

## 2024-11-08 NOTE — DISCHARGE SUMMARY
system; but in reality patient or family reported already being off these meds; defer to outpatient/prescribing providers.      Procedures done this admission:  * No surgery found *    Consults this admission:  IP CONSULT TO HEART FAILURE NURSE/COORDINATOR  IP CONSULT TO DIETITIAN  IP CONSULT TO NEPHROLOGY  IP CONSULT TO PHARMACY  IP CONSULT TO INTERVENTIONAL RADIOLOGY  IP CONSULT TO PHARMACY  IP CONSULT TO INFECTIOUS DISEASES  IP CONSULT TO PHYSICAL MEDICINE REHAB  IP CONSULT TO PHARMACY  IP WOUND CARE NURSE CONSULT TO EVAL  IP CONSULT TO PHARMACY    Consultants will arrange their own follow ups, if applicable.    Echocardiogram results:  10/23/24    ECHO (TTE) COMPLETE (PRN CONTRAST/BUBBLE/STRAIN/3D) 10/24/2024  1:57 PM (Final)    Interpretation Summary    Left Ventricle: Normal left ventricular systolic function with a visually estimated EF of 60 - 65%. Left ventricle size is normal. Mildly increased wall thickness. Normal wall motion.    Aortic Valve: Borderline mild stenosis of the aortic valve based on indices; not adequately visualized [DI 0.48; AV area by continuity VTI is 1.8 cm2].    Image quality is suboptimal. Contrast used: Definity. Technically difficult study due to patient's body habitus and procedure performed with the patient in a supine position.    Signed by: Sven Bui MD on 10/24/2024  1:57 PM      Diagnostic Imaging/Tests:   CT LUMBAR SPINE W CONTRAST    Result Date: 10/28/2024  Multilevel degenerative changes resulting in central stenosis at the T9-10 and T10-11 levels as well as neural foraminal narrowing at the T10-11, T11-12, L4-5 and L5-S1 levels. Electronically signed by Abilio Jimenez    CT ABDOMEN PELVIS W IV CONTRAST Additional Contrast? Radiologist Recommendation    Result Date: 10/27/2024  1. Left abdominal wall hematoma shows improvement. 2. Rounded opacity in the left lower abdominal muscle wall representing thrombosed pseudoaneurysm shows improvement. 3. Colonic  bandage was applied. The left neck and chest were then prepped and draped in the standard fashion. Access Local anesthesia was administered. A small skin incision was made at the venous access site.  A The vessel was sonographically evaluated and determined to be patent, but narrowed at the level of the clavicle.  Real time ultrasound was used to visualize needle entry into the vessel and a permanent image was stored. Vein accessed (QCDR): Left internal jugular vein Internal jugular vein patency (QCDR):  Patent or otherwise accessible on at least one side Access technique:  Micropuncture set with 21 gauge needle Using fluoroscopy, the Nitrex 018 wire was advanced through the needle, immediately turned inferiorly as expected within the internal jugular vein, traversed horizontally as can be seen with a flattened brachiocephalic vein, but then turned cephalad up the right side of the neck. The wire could not be redirected into the superior vena cava. Therefore, the needle was removed and the micropuncture catheter advanced over the microwire. The wire was removed and a venogram performed. Venography Catheter tip position for venography: Left internal jugular vein just below the clavicle Findings: Patent left internal jugular vein terminus. Patent, horizontally positioned, large diameter left brachiocephalic vein. 90 degree angle communication of the brachiocephalic and superior vena cava. Patent, normal caliber, superior vena cava. Using this venogram as a guide, the J-tip wire was advanced through the micropuncture catheter, but also turned cephalad into the right brachiocephalic vein. The micropuncture catheter was removed. Using fluoroscopy, a 4 Albanian, 40 cm, Kumpe catheter was advanced over the wire and was used to redirect the J-wire into the superior vena cava. Unfortunately, the J-wire could not be advanced through the right atrium. Therefore, the J-wire was removed and an 035 Glidewire was utilized to

## 2024-11-08 NOTE — CARE COORDINATION
Case Management Assessment  Initial Evaluation    Date/Time of Evaluation: 11/8/2024 4:47 PM  Assessment Completed by: MICAELA BUSTAMANTE    If patient is discharged prior to next notation, then this note serves as note for discharge by case management.    Patient Name: Giselle Rosado                   YOB: 1970  Diagnosis:   Sepsis (HCC) [A41.9]                   Date / Time: 11/8/2024  4:23 PM    Patient Admission Status: REHAB IP   Readmission Risk (Low < 19, Mod (19-27), High > 27): Readmission Risk Score: 24.4      Current PCP: Tanisha Powers, CHRIS - CNP  PCP verified by CM? (P) Yes    Chart Reviewed: Yes      History Provided by: (P) Patient  Patient Orientation: (P) Alert and Oriented    Patient Cognition: (P) Alert    Hospitalization in the last 30 days (Readmission):  No    If yes, Readmission Assessment in CM Navigator will be completed.    Advance Directives:      Code Status: Full Code   Patient's Primary Decision Maker is: (P) Legal Next of Kin    Primary Decision Maker: Vinicio Rosado - Spouse - [phone]    Discharge Planning:    Patient lives with: (P) Spouse/Significant Other Type of Home: (P) Other (Comment) (pending)  Primary Care Giver: (P) Self  Patient Support Systems include: (P) Spouse/Significant Other, Children   Current Financial resources: (P) Medicare, Other (Comment) (Medicare and [employer])  Current community resources: (P) None  Current services prior to admission: (P) Durable Medical Equipment            Current DME: (P) Shower Chair, Wheelchair            Type of Home Care services:       ADLS  Prior functional level: (P) Independent in ADLs/IADLs  Current functional level: (P) Assistance with the following:, Bathing, Dressing, Toileting, Cooking, Housework, Shopping, Mobility    PT AM-PAC:   /24  OT AM-PAC:   /24    Family can provide assistance at DC: (P) Yes  Would you like Case Management to discuss the discharge plan with any other family

## 2024-11-08 NOTE — DIALYSIS
TRANSFER OUT- DIALYSIS    Hemodialysis treatment completed. PT ran 3.5 hours, without complications.    Patient alert and VS stable  /89  P 75       3.5 Kg removed.      Flushed both ports with 10 mL of NS.  CVC dressing clean, dry, and intact, tego caps intact, curos caps placed.      Meds given: 0.    RBCs given during dialysis: 0    Patient to 902 after dialysis.

## 2024-11-08 NOTE — PROGRESS NOTES
4 Eyes Skin Assessment     NAME:  Giselle Rosado  YOB: 1970  MEDICAL RECORD NUMBER:  [id number]    The patient is being assessed for  Admission    I agree that at least one RN has performed a thorough Head to Toe Skin Assessment on the patient. ALL assessment sites listed below have been assessed.      Areas assessed by both nurses:    Head, Face, Ears, Shoulders, Back, Chest, Arms, Elbows, Hands, Sacrum. Buttock, Coccyx, Ischium, and Legs. Feet and Heels        Does the Patient have a Wound? Yes wound(s) were present on assessment. LDA wound assessment was Initiated and completed by RN       [place] Prevention initiated by RN: Yes  Wound Care Orders initiated by RN: Yes    Pressure Injury (Stage 3,4, Unstageable, DTI, NWPT, and Complex wounds) if present, place Wound referral order by RN under : Yes    New Ostomies, if present place, Ostomy referral order under : No     Nurse 1 eSignature: Electronically signed by Xochitl Torres RN on 11/8/24 at 6:45 PM EST    **SHARE this note so that the co-signing nurse can place an eSignature**    Nurse 2 eSignature: Electronically signed by Summer Celis RN on 11/12/24 at 11:12 AM EST

## 2024-11-08 NOTE — H&P
Physical Medicine and Rehabilitation History and Physical       Admit Date: 11/8/2024  Primary Care Physician: Tanisha Powers, APRN - CNP  Chief Complaint: Abdominal / Lumbar pain  Admission Diagnosis: Sepsis  Sepsis associated hypotension   Spontaneous bacterial peritonitis  Cellulitis of left lower extremity  Atrial fibrillation with RVR   Active arterial bleeding  ESRD on hemodialysis   Obesity, morbid (BMI- 50.8 )  Acute respiratory failure  Gout  Anemia  HTN   DM II   Depression  Irritable bowel syndrome  Pain  DVT prophylaxis    Rehabilitation Diagnosis:  Mobility Impairments  Self Care Impairments    History of Present Illness/ Hospital course:   This is a 54 year old female with medical history of ESRD now on HD (from recent hospitalization 10/10-10/22 due to peritonitis while on PD), atrial fibrillation, HTN, depression, irritable bowel syndrome who was admitted on 10/23 septic and in Afib with RVR. CT abdomen pelvis positive for active extravasation from the left rectus/inferior epigastric arterial territory, increased in size of hematomas measuring 7.9 x 3.4 cm. Interventional radiology was consulted, s/p thrombin injection for the inferior epigastric pseudoaneurysm.  .  Repeat CT abdomen pelvis on 10/27/2024 showed improvement of left abdominal wall hematoma and thrombosed pseudoaneurysm. Nephrology consulted for HD.   ID consulted with continuing Vanco until 12/1/2024. Karius ordered but appears may not have been sent. Acute therapies were initiated and the patient was starting to mobilize. She has significant mobility and self care impairments. It was felt she would benefit from comprehensive acute inpatient rehabilitation, continued close medical supervision and management prior to returning home.  The medical stability and these recent medical events are not expected to interfere with the patient's ability to tolerate the intensity of services in acute rehab.    Current Level of Function:  (DULCOLAX) suppository 10 mg  10 mg Rectal Daily PRN    dicyclomine (BENTYL) capsule 10 mg  10 mg Oral TID PRN    [START ON 11/9/2024] epoetin oralia-epbx (RETACRIT) injection 10,000 Units  10,000 Units SubCUTAneous Weekly    [START ON 11/9/2024] famotidine (PEPCID) tablet 20 mg  20 mg Oral Daily    fluticasone (FLONASE) 50 MCG/ACT nasal spray 2 spray  2 spray Nasal Nightly PRN    [START ON 11/9/2024] lidocaine 4 % external patch 4 patch  4 patch TransDERmal Daily    midodrine (PROAMATINE) tablet 10 mg  10 mg Oral TID WC    metoprolol tartrate (LOPRESSOR) tablet 50 mg  50 mg Oral BID    ondansetron (ZOFRAN-ODT) disintegrating tablet 4 mg  4 mg Oral Q8H PRN    Or    ondansetron (ZOFRAN) injection 4 mg  4 mg IntraVENous Q6H PRN    polyethylene glycol (GLYCOLAX) packet 17 g  17 g Oral Daily PRN    sertraline (ZOLOFT) tablet 100 mg  100 mg Oral BID    sevelamer (RENVELA) tablet 800 mg  800 mg Oral TID WC    sodium chloride flush 0.9 % injection 5-40 mL  5-40 mL IntraVENous 2 times per day    vancomycin (VANCOCIN) intermittent dosing (placeholder)   Other RX Placeholder       Objective:     There were no vitals taken for this visit.   Intake and Output:  No intake/output data recorded.    Physical Exam:   Psych: Patient was oriented to person, place, and time.    General:   Alert, appears stated age, No acute distress, morbidly obeses   HEENT:  Normocephalic, EOMI, facial symmetry  Intact. Oral mucosa pink and moist. No nasal discharge.   Lungs:  CTA Bilaterally,Respiration even and unlabored   No apparent use of accessory muscles for respiration.     Heart:  Regular rate and rhythm, S1, S2, No murmur   Genitourinary:  deferred   Abdomen:  Firm, well healed dermabond incision mid abdomen  tender to palpation, Anasarca, woody texture. Bowel sounds present.     Neuromuscular:  Speech fluent, responds appropriately, no resting tremors, CN II-XII intact, light touch sensation intact, bilateral F-N intact, MMT:  proximal >

## 2024-11-08 NOTE — PLAN OF CARE
Problem: Chronic Conditions and Co-morbidities  Goal: Patient's chronic conditions and co-morbidity symptoms are monitored and maintained or improved  11/7/2024 1933 by Daylin Jang RN  Outcome: Progressing  11/7/2024 1653 by Mitzi Gauthier RN  Outcome: Progressing     Problem: Discharge Planning  Goal: Discharge to home or other facility with appropriate resources  11/7/2024 1933 by Daylin Jang RN  Outcome: Progressing  11/7/2024 1653 by Mitzi Gauthier RN  Outcome: Progressing     Problem: Pain  Goal: Verbalizes/displays adequate comfort level or baseline comfort level  11/7/2024 1933 by Daylin Jang RN  Outcome: Progressing  11/7/2024 1653 by Mitzi Gauthier RN  Outcome: Progressing     Problem: Safety - Adult  Goal: Free from fall injury  11/7/2024 1933 by Daylin Jang RN  Outcome: Progressing  11/7/2024 1653 by Mitzi Gauthier RN  Outcome: Progressing     Problem: ABCDS Injury Assessment  Goal: Absence of physical injury  11/7/2024 1933 by Daylin Jang RN  Outcome: Progressing  11/7/2024 1653 by Mitzi Gauthier RN  Outcome: Progressing     Problem: Skin/Tissue Integrity  Goal: Absence of new skin breakdown  Description: 1.  Monitor for areas of redness and/or skin breakdown  2.  Assess vascular access sites hourly  3.  Every 4-6 hours minimum:  Change oxygen saturation probe site  4.  Every 4-6 hours:  If on nasal continuous positive airway pressure, respiratory therapy assess nares and determine need for appliance change or resting period.  11/7/2024 1933 by Daylin Jang RN  Outcome: Progressing  11/7/2024 1653 by Mitzi Gauthier RN  Outcome: Progressing     Problem: Cardiovascular - Adult  Goal: Absence of cardiac dysrhythmias or at baseline  11/7/2024 1933 by Daylin Jang RN  Outcome: Progressing  11/7/2024 1653 by Mitzi Gauthier RN  Outcome: Progressing     Problem: Skin/Tissue Integrity - Adult  Goal: Incisions, wounds, or drain sites healing without S/S of infection  11/7/2024 1933 by  Link, Daylin, RN  Outcome: Progressing  11/7/2024 1653 by Mitzi Gauthier RN  Outcome: Progressing     Problem: Musculoskeletal - Adult  Goal: Return mobility to safest level of function  11/7/2024 1653 by Mitzi Gauthier RN  Outcome: Progressing  Goal: Return ADL status to a safe level of function  11/7/2024 1933 by Daylin Jang RN  Outcome: Progressing  11/7/2024 1653 by Mitzi Gauthier RN  Outcome: Progressing     Problem: Gastrointestinal - Adult  Goal: Maintains adequate nutritional intake  11/7/2024 1933 by Daylin Jang RN  Outcome: Progressing  11/7/2024 1653 by Mitzi Gauthier RN  Outcome: Progressing     Problem: Genitourinary - Adult  Goal: Absence of urinary retention  11/7/2024 1933 by Daylin Jang RN  Outcome: Progressing  11/7/2024 1653 by Mitzi Gauthier RN  Outcome: Progressing

## 2024-11-08 NOTE — DIALYSIS
TRANSFER IN - DIALYSIS    Received patient in dialysis unit  from [facility] (unit) for ordered procedure.    Consent verified for renal replacement therapy. Procedure explained to patient, opportunity for Q&A provided. Call light given.     Patient alert and vital signs stable.  /74,  P72  , room air.    Hemodialysis initiated using left tunneled catheter.  Aspirated and flushed both ports without difficulty. Dressing clean, dry and intact.  Machine settings per MD order.    Heparin 0 unit bolus and 0 units/hr.      Will monitor during treatment.

## 2024-11-08 NOTE — ACP (ADVANCE CARE PLANNING)
Advance Care Planning     Advance Care Planning Activator (Inpatient)  Conversation Note        ACP Activator: MICAELA BUSTAMANTE    {When Decision Maker makes decisions on behalf of the incapacitated patient: Decision Maker is asked to consider and make decisions based on patient values, known preferences, or best interests.     Health Care Decision Maker: No LW/HCPOA on file, patient aware legal next of kin remain decision maker until document provided.           Current Designated Health Care Decision Maker:     Primary Decision Maker: Vinicio Rosado - [facility] - [phone]    Care Preferences Full code per MD order

## 2024-11-08 NOTE — PROGRESS NOTES
[facility] Progress Note    Follow-Up on: ESRD      Pt seen and examined on HD.  Left TCC functioning well.  UF only today 4000ml.   Pt concerned about not knowing she needed to be on fluid restriction     ROS:  Gen - no fever, no chills, appetite unchanged  CV - no chest pain  Lung - no shortness of breath, no cough  Abd - + tenderness, no nausea/vomiting, no diarrhea  Ext - + edema improving    Exam:  Vitals:    11/08/24 0448 11/08/24 0801 11/08/24 1137 11/08/24 1200   BP: 129/79 130/85 (!) 154/74 (!) 151/92   Pulse: 69 70 72 72   Resp: 20 16     Temp: 97.5 °F (36.4 °C) 97.5 °F (36.4 °C)     TempSrc:  Oral     SpO2: 97% 97%     Weight:       Height:             Intake/Output Summary (Last 24 hours) at 11/8/2024 1222  Last data filed at 11/8/2024 0719  Gross per 24 hour   Intake 520 ml   Output 0 ml   Net 520 ml         Wt Readings from Last 3 Encounters:   11/06/24 (!) 156.2 kg (344 lb 6.4 oz)   10/18/24 (!) 150.6 kg (332 lb)   10/24/23 (!) 149.7 kg (330 lb)       GEN - in no distress, alert and oriented   CV - regular rate, S1, S2, no Rub   Lung - clear bilaterally  Abd - soft, +tender  Ext - + 1 BLE edema    Recent Labs     11/06/24  0519 11/07/24  0541 11/08/24  0344   WBC 14.4* 14.4* 14.8*   HGB 8.5* 8.7* 8.7*   HCT 28.0* 29.1* 29.1*    256 304        Recent Labs     11/06/24  0519 11/07/24  0541 11/08/24  0344   * 127* 127*   K 5.1 4.6 4.9   CL 88* 90* 89*   CO2 21 21 22   BUN 24* 18 24*   CREATININE 7.94* 5.75* 6.42*   CALCIUM 7.2* 7.5* 7.3*   ALBUMIN 1.4*  --   --        Assessment / Plan:  Principal Problem:    Sepsis associated hypotension (HCC)  Active Problems:    Obesity, morbid    Acute respiratory failure    Gout    Anemia    HTN (hypertension)    DM2 (diabetes mellitus, type 2) (HCC)    Atrial fibrillation with RVR (HCC)    Cellulitis of left lower extremity    Sepsis (HCC)    End stage chronic kidney disease (HCC)    Spontaneous bacterial peritonitis (HCC)  Resolved

## 2024-11-08 NOTE — PROGRESS NOTES
TRANSFER - OUT REPORT:    Verbal report given to DEMIAN Kendall on Giselle Rosado  being transferred to 9th floor [facility] room 904 for routine progression of patient care       Report consisted of patient’s Situation, Background, Assessment and Recommendations(SBAR).     Information from the following report(s) SBAR, Kardex, Procedure Summary, Intake/Output, MAR, Accordion, Recent Results, and Cardiac Rhythm chronic Afib  was reviewed with the receiving nurse.    Opportunity for questions and clarification was provided.      Pt has went to dialysis before being admitted to [facility]. Pt retained PIV access for IV ABX and LSC HD tunneled cath.  has been notified of room number and to return when pt admits to .

## 2024-11-09 PROCEDURE — 97530 THERAPEUTIC ACTIVITIES: CPT

## 2024-11-09 PROCEDURE — 97110 THERAPEUTIC EXERCISES: CPT

## 2024-11-09 PROCEDURE — 1180000000 HC REHAB R&B

## 2024-11-09 PROCEDURE — 97535 SELF CARE MNGMENT TRAINING: CPT

## 2024-11-09 PROCEDURE — 97166 OT EVAL MOD COMPLEX 45 MIN: CPT

## 2024-11-09 PROCEDURE — 2580000003 HC RX 258: Performed by: PHYSICAL MEDICINE & REHABILITATION

## 2024-11-09 PROCEDURE — 90935 HEMODIALYSIS ONE EVALUATION: CPT

## 2024-11-09 PROCEDURE — 97162 PT EVAL MOD COMPLEX 30 MIN: CPT

## 2024-11-09 PROCEDURE — 6360000002 HC RX W HCPCS: Performed by: PHYSICAL MEDICINE & REHABILITATION

## 2024-11-09 PROCEDURE — 6370000000 HC RX 637 (ALT 250 FOR IP): Performed by: PHYSICAL MEDICINE & REHABILITATION

## 2024-11-09 RX ADMIN — HYDROCODONE BITARTRATE AND ACETAMINOPHEN 1 TABLET: 5; 325 TABLET ORAL at 01:25

## 2024-11-09 RX ADMIN — SEVELAMER CARBONATE 800 MG: 800 TABLET, FILM COATED ORAL at 16:54

## 2024-11-09 RX ADMIN — SERTRALINE 100 MG: 100 TABLET, FILM COATED ORAL at 20:13

## 2024-11-09 RX ADMIN — VANCOMYCIN HYDROCHLORIDE 500 MG: 500 INJECTION, POWDER, LYOPHILIZED, FOR SOLUTION INTRAVENOUS at 13:55

## 2024-11-09 RX ADMIN — MIDODRINE HYDROCHLORIDE 10 MG: 5 TABLET ORAL at 12:22

## 2024-11-09 RX ADMIN — APIXABAN 5 MG: 5 TABLET, FILM COATED ORAL at 12:21

## 2024-11-09 RX ADMIN — ACETAMINOPHEN 650 MG: 325 TABLET ORAL at 10:36

## 2024-11-09 RX ADMIN — SODIUM CHLORIDE, PRESERVATIVE FREE 10 ML: 5 INJECTION INTRAVENOUS at 20:13

## 2024-11-09 RX ADMIN — EPOETIN ALFA-EPBX 10000 UNITS: 10000 INJECTION, SOLUTION INTRAVENOUS; SUBCUTANEOUS at 16:56

## 2024-11-09 RX ADMIN — HYDROCODONE BITARTRATE AND ACETAMINOPHEN 1 TABLET: 5; 325 TABLET ORAL at 12:28

## 2024-11-09 RX ADMIN — MIDODRINE HYDROCHLORIDE 10 MG: 5 TABLET ORAL at 16:55

## 2024-11-09 RX ADMIN — METOPROLOL TARTRATE 50 MG: 50 TABLET, FILM COATED ORAL at 12:21

## 2024-11-09 RX ADMIN — MIDODRINE HYDROCHLORIDE 10 MG: 5 TABLET ORAL at 08:25

## 2024-11-09 RX ADMIN — SEVELAMER CARBONATE 800 MG: 800 TABLET, FILM COATED ORAL at 12:23

## 2024-11-09 RX ADMIN — FAMOTIDINE 20 MG: 20 TABLET, FILM COATED ORAL at 12:22

## 2024-11-09 RX ADMIN — APIXABAN 5 MG: 5 TABLET, FILM COATED ORAL at 20:13

## 2024-11-09 RX ADMIN — SERTRALINE 100 MG: 100 TABLET, FILM COATED ORAL at 12:21

## 2024-11-09 RX ADMIN — METOPROLOL TARTRATE 50 MG: 50 TABLET, FILM COATED ORAL at 20:13

## 2024-11-09 RX ADMIN — HYDROCODONE BITARTRATE AND ACETAMINOPHEN 1 TABLET: 5; 325 TABLET ORAL at 20:13

## 2024-11-09 ASSESSMENT — PAIN SCALES - GENERAL
PAINLEVEL_OUTOF10: 6
PAINLEVEL_OUTOF10: 6
PAINLEVEL_OUTOF10: 0
PAINLEVEL_OUTOF10: 7
PAINLEVEL_OUTOF10: 0
PAINLEVEL_OUTOF10: 0
PAINLEVEL_OUTOF10: 7
PAINLEVEL_OUTOF10: 0
PAINLEVEL_OUTOF10: 5

## 2024-11-09 ASSESSMENT — PAIN DESCRIPTION - LOCATION
LOCATION: GENERALIZED
LOCATION: HIP
LOCATION: HIP
LOCATION: GENERALIZED

## 2024-11-09 ASSESSMENT — PAIN DESCRIPTION - DESCRIPTORS: DESCRIPTORS: ACHING;THROBBING

## 2024-11-09 NOTE — PROGRESS NOTES
[facility] Progress Note    Follow-Up on: ESRD      Pt seen and examined on HD.  Left TCC functioning well.  UF goal 4000ml.   Pt concerned about not knowing she needed to be on fluid restriction     ROS:  Gen - no fever, no chills, appetite unchanged  CV - no chest pain  Lung - no shortness of breath, no cough  Abd - + tenderness, no nausea/vomiting, no diarrhea  Ext - + edema improving    Exam:  Vitals:    11/09/24 1002 11/09/24 1032 11/09/24 1108 11/09/24 1228   BP: (!) 134/116 (!) 144/101 (!) 145/89    Pulse: 60 77 85    Resp:    18   Temp:       TempSrc:       SpO2:       Weight:       Height:             Intake/Output Summary (Last 24 hours) at 11/9/2024 1314  Last data filed at 11/9/2024 1108  Gross per 24 hour   Intake 500 ml   Output 4000 ml   Net -3500 ml         Wt Readings from Last 3 Encounters:   11/09/24 (!) 159.7 kg (352 lb)   11/06/24 (!) 156.2 kg (344 lb 6.4 oz)   10/18/24 (!) 150.6 kg (332 lb)       GEN - in no distress, alert and oriented   CV - regular rate, S1, S2, no Rub   Lung - clear bilaterally  Abd - soft, +tender  Ext - + 1 BLE edema    Recent Labs     11/07/24  0541 11/08/24  0344   WBC 14.4* 14.8*   HGB 8.7* 8.7*   HCT 29.1* 29.1*    304        Recent Labs     11/07/24  0541 11/08/24  0344   * 127*   K 4.6 4.9   CL 90* 89*   CO2 21 22   BUN 18 24*   CREATININE 5.75* 6.42*   CALCIUM 7.5* 7.3*       Assessment / Plan:  Principal Problem:    Sepsis (HCC)  Resolved Problems:    * No resolved hospital problems. *    1) ESRD - HD MWF.  Previously on PD  -seen on HD   -discussed her fluid gains with her today and the need for strict fluid restriction, orders changed to 1200ml fluid restriction  -today is day 6 of aggressive UF  and there are NO weights documented daily in chart     2) Peritonitis - PD catheter removed 10/15/24  -Continue vancomycin EOT 12/1/2024      3) Abdominal pain - evidence of subcutaneous hematoma and cellulitis.   CT ab hematoma with

## 2024-11-09 NOTE — PROGRESS NOTES
Occupational Therapy    [facility] OCCUPATIONAL THERAPY INITIAL EVALUATION  OT Individual Minutes  Time In: 1228  Time Out: 1313  Minutes: 45               HPI (per MD report): \"This is a 54 year old female with medical history of ESRD now on HD (from recent hospitalization 10/10-10/22 due to peritonitis while on PD), atrial fibrillation, HTN, depression, irritable bowel syndrome who was admitted on 10/23 septic and in Afib with RVR. CT abdomen pelvis positive for active extravasation from the left rectus/inferior epigastric arterial territory, increased in size of hematomas measuring 7.9 x 3.4 cm. Interventional radiology was consulted, s/p thrombin injection for the inferior epigastric pseudoaneurysm.  .  Repeat CT abdomen pelvis on 10/27/2024 showed improvement of left abdominal wall hematoma and thrombosed pseudoaneurysm. Nephrology consulted for HD.   ID consulted with continuing Vanco until 12/1/2024. Karius ordered but appears may not have been sent. Acute therapies were initiated and the patient was starting to mobilize. She has significant mobility and self care impairments. It was felt she would benefit from comprehensive acute inpatient rehabilitation, continued close medical supervision and management prior to returning home.  The medical stability and these recent medical events are not expected to interfere with the patient's ability to tolerate the intensity of services in acute rehab.\"  Past Medical History:   Diagnosis Date    Acute kidney injury (HCC)     ARF with sepsis requiring hemodialysis    Anemia     Anxiety     ARF (acute respiratory failure) 12/2021    required vent at [facility]    Atrial fibrillation with RVR ([facility]) 12/22/2021    while hospitalized for sepsis.    Chronic kidney disease     secondary to sepsis 12/2021--- US Renal in TR on M, W, F    Debility     Diabetes (HCC)     type 2. does not check since on dialysis,no meds; no hypo    Dialysis patient (HCC) 12/2021    Mon, Wed, Fri    GERD  Intact     Hearing/Speech/Vision:       Vision/Perception: No deficits noted  Problem List: Activity Tolerance, Safety Awareness, Strength, Pain, Sitting Balance, and Standing Balance   Functional Limitations: ADL, Functional Transfers, and Functional Mobility   Session: Pt agreeable to OT evaluation. Pt completed the following with details recorded above: MMT/ROM, ADLs, BIMS, and informal OT interview.   Interdisciplinary Communication: Collaborated with PT regarding patient's current level of function, plan of care, and safety measures.   Patient/Family Education: Patient educated On the role of OT, On POC, and On IRC expectations.    GOALS:  Short Term Goals:  Time Frame for Short Term Goals: 7 days   STG 1: Patient will dress UB with Setup Assistance using AE/DME PRN.  STG 2: Patient will dress LB with Partial/Moderate Assistance using AE/DME PRN.  STG 3: Patient will don footwear with Partial/Moderate Assistance using AE/DME PRN.  STG 4: Patient will bathe with Partial/Moderate Assistance using AE/DME PRN.  STG 5: Patient will toilet with Partial/Moderate Assistance using AE/DME PRN.    Long Term Goals:  Time Frame for Long Term Goals: 14 days   LTG 1: Patient will dress UB with Ralls using AE/DME PRN.   LTG 2: Patient will dress LB with Ralls using AE/DME PRN.   LTG 3: Patient will don footwear with Ralls using AE/DME PRN.   LTG 4: Patient will bathe with Ralls using AE/DME PRN.   LTG 5: Patient will toilet with Ralls using AE/DME PRN.   LTG 6: Patient/caregiver will verbalize understanding of OT recommendations regarding ADLs, functional transfers, home safety, AE/DME, energy conservation, safety awareness, activity tolerance, and follow-up therapy to increase safety with functional tasks upon discharge.    MD order for OT received and chart reviewed. Patient will benefit from skilled OT services to address deficits to maximize functional performance with self-care tasks and  functional mobility. Treatment interventions may include Co-Treatment PT/OT necessary due to patient's decreased overall endurance/tolerance levels, as well as need for high level skilled assistance to complete functional transfers/mobility and functional tasks, Self Care and/or Home Management Training , Therapeutic Activity , Therapeutic Exercise , Wheelchair Management Training , Manual Therapy , and Family education and/or Caregiver training. Patient to be seen at least 5 times per week for at least 1.5 hours of OT per day as appropriate. Patient ended session supine in bed with call bell and needs within reach.    Melani Chew, OT   11/9/2024

## 2024-11-09 NOTE — DIALYSIS
TRANSFER IN - DIALYSIS    Received patient in dialysis unit  from [facility] (unit) for ordered procedure.    Consent verified for renal replacement therapy. Procedure explained to patient, opportunity for Q&A provided. Call light given.     Patient baseline and vital signs stable.  /85,  P72  , 0L O2 via 0.    Hemodialysis initiated using cvc.  Aspirated and flushed both ports without difficulty. Dressing clean, dry and intact.  Machine settings per MD order.    Heparin 0 unit bolus and 0 units/hr.      Will monitor during treatment.

## 2024-11-09 NOTE — DIALYSIS
TRANSFER OUT- DIALYSIS    Hemodialysis treatment completed. PT ran 3.5 hours, without complications.    Patient alert and VS stable  /89  P 85       3.5 Kg removed.      Flushed both ports with 10 mL of NS.  CVC dressing clean, dry, and intact, tego caps intact, curos caps placed.      Meds given: Tylenol.    RBCs given during dialysis: No    Patient to 902 after dialysis.

## 2024-11-09 NOTE — PROGRESS NOTES
PHYSICAL THERAPY EXAMINATION    PT Individual Minutes  Time In: 1327  Time Out: 1405  Minutes: 38                   Total Treatment Time:  38 Minutes         HPI:  (per MD report): \"This is a 54 year old female with medical history of ESRD now on HD (from recent hospitalization 10/10-10/22 due to peritonitis while on PD), atrial fibrillation, HTN, depression, irritable bowel syndrome who was admitted on 10/23 septic and in Afib with RVR. CT abdomen pelvis positive for active extravasation from the left rectus/inferior epigastric arterial territory, increased in size of hematomas measuring 7.9 x 3.4 cm. Interventional radiology was consulted, s/p thrombin injection for the inferior epigastric pseudoaneurysm.  .  Repeat CT abdomen pelvis on 10/27/2024 showed improvement of left abdominal wall hematoma and thrombosed pseudoaneurysm. Nephrology consulted for HD.   ID consulted with continuing Vanco until 12/1/2024. Karius ordered but appears may not have been sent. Acute therapies were initiated and the patient was starting to mobilize. She has significant mobility and self care impairments. It was felt she would benefit from comprehensive acute inpatient rehabilitation, continued close medical supervision and management prior to returning home.  The medical stability and these recent medical events are not expected to interfere with the patient's ability to tolerate the intensity of services in acute rehab.\"    Past Medical History:   Past Medical History:   Diagnosis Date    Acute kidney injury (HCC)     ARF with sepsis requiring hemodialysis    Anemia     Anxiety     ARF (acute respiratory failure) 12/2021    required vent at [facility]    Atrial fibrillation with RVR ([facility]) 12/22/2021    while hospitalized for sepsis.    Chronic kidney disease     secondary to sepsis 12/2021--- US Renal in TR on M, W, F    Debility     Diabetes (HCC)     type 2. does not check since on dialysis,no meds; no hypo    Dialysis patient ([facility])             Objective     Pain level: 0 to 4 out of 10  Pain Location:  low back  Pain Interventions: Medication per order, rest, positioning,relaxation,      Vital Signs:  BP (!) 145/89   Pulse 85   Temp 97.2 °F (36.2 °C) (Oral)   Resp 18   Ht 1.753 m (5' 9\")   Wt (!) 159.7 kg (352 lb)   SpO2 100%   BMI 51.98 kg/m²       Education:    Education Given To: Patient;Family  Education Provided: Safety;Role of Therapy;Plan of Care;Transfer Training;Energy Conservation;DME/Home Modifications;Fall Prevention Strategies;Precautions;Mobility Training  Education Method: Demonstration;Verbal  Barriers to Learning: None  Education Outcome: Verbalized understanding;Demonstrated understanding;Continued education needed    Interdisciplinary Communication: spoke with RN regarding functional status       Cognition:   Overall Orientation Status: Within Normal Limits  Overall Cognitive Status: WNL    Lower Extremity Function:     LLE RLE   ROM Hip decreased 50%, knee 10%, ankle WFL Hip decreased 50%, knee 10%, ankle WFL   Fine Motor Coordination Impaired:    Impaired:      Tone Normal Normal   Sensation Light Touch Intact and Proprioception Intact Light Touch Intact and Proprioception Intact   Strength Hip -2/5 to -3/5, knee 4/5 to 5/5, ankll DF 5/5, PF +2/5 Hip -2/5 to -3/5, knee 4/5 to 5/5, ankll DF 5/5, PF +2/5         PRIMARY MODE OF LOCOMOTION: Household Ambulation Only    Functional Assessment:    Balance  Comments   Static Sitting Normal:  Pt. able to maintain balance w/o UE support    Dynamic Sitting Good - accepts moderate challenge;  can maintain balance while picking object off the floor    Static Standing Fair:  Pt. requires UE support;  may need occasional min A With RW and CGA from PT and RN   Dynamic Standing NT        BED MOBILITY & TRANSFERS Quality Indicator Score Assistance Required  Comments, Increased time and effort to complete with cues for body mechanics     Rolling 2    Rolling to Left: Moderate  therapy per day for 2 weeks.  Thank you for the referral.    Goals:    Short Term Goals:  Pt will demonstrate roll left & right & transition supine<>sit with Mod A in 1 week   2.   Pt. will transfer from bed to/from chair with Mod A using the least restrictive device in 1 week    3.   Pt. will ambulate with 20 feet with RW or LRAD and Mod A in 1  week  4.   Pt. Will ambulate up/down 4 steps with hand rails and Mod A in 1 week  5.   Pt. Will ambulate up/down 1 step with RW and with Mod A in 1 week    Long Term Goals:  Pt will demonstrate roll left & right & transition supine<>sit with Min A in 2 weeks  2.   Pt. will transfer from bed to/from chair with Min A using the least restrictive device in 2 weeks   3.   Pt. will ambulate 50 feet with RW or LRAD and CGA in 2  weeks  4.   Pt. Will ambulate up/down 4 steps with bilateral hand rails and CGA in 2 weeks  5.   Pt. Will ambulate up/down 1 step with RW and with CGA in 2 weeks      Pt would benefit from skilled physical therapy in order to improve independent functional mobility within the home. Interventions may include range of motion (AROM, PROM B LE/trunk), motor function (B LE/trunk strengthening/coordination), activity tolerance (vitals, oxygen saturation levels), bed mobility training, balance activities, gait training (progressive ambulation program), and functional transfer training.     Please see IRC; Interdisciplinary Eval, Care Plan, and Patient Education for further information regarding physical therapy examination and plan of care.       Joesph Jones, PT  11/9/2024

## 2024-11-09 NOTE — PLAN OF CARE
Problem: Chronic Conditions and Co-morbidities  Goal: Patient's chronic conditions and co-morbidity symptoms are monitored and maintained or improved  11/8/2024 2130 by Alberta Noe RN  Outcome: Progressing  11/8/2024 1839 by Xochitl Torres RN  Outcome: Progressing     Problem: Pain  Goal: Verbalizes/displays adequate comfort level or baseline comfort level  11/8/2024 2130 by Alberta Noe RN  Outcome: Progressing  11/8/2024 1839 by Xochitl Torres RN  Outcome: Progressing     Problem: Safety - Adult  Goal: Free from fall injury  11/8/2024 2130 by Alberta Noe RN  Outcome: Progressing  11/8/2024 1839 by oXchitl Torres RN  Outcome: Progressing     Problem: Skin/Tissue Integrity  Goal: Absence of new skin breakdown  Description: 1.  Monitor for areas of redness and/or skin breakdown  2.  Assess vascular access sites hourly  3.  Every 4-6 hours minimum:  Change oxygen saturation probe site  4.  Every 4-6 hours:  If on nasal continuous positive airway pressure, respiratory therapy assess nares and determine need for appliance change or resting period.  Outcome: Progressing

## 2024-11-09 NOTE — PLAN OF CARE
Problem: Safety - Adult  Goal: Free from fall injury  11/9/2024 0734 by Everett Zurita, RN  Outcome: Progressing  11/8/2024 2130 by Alberta Noe, RN  Outcome: Progressing  11/8/2024 1839 by Xochitl Torres, RN  Outcome: Progressing

## 2024-11-09 NOTE — PROGRESS NOTES
VANCO DAILY FOLLOW UP RENAL INSUFFICIENCY PATIENT   [facility]   Pharmacy Pharmacokinetic Monitoring Service - Vancomycin    Consulting Provider: Dr. Toribio  Indication: PD catheter complicated peritonitis   Target Concentration: Pre-dialysis concentration 15-20 mg/L  EOT: 12/1/24 per ID  Additional Antimicrobials: None    Pertinent Laboratory Values:   Wt Readings from Last 1 Encounters:   11/06/24 (!) 156.2 kg (344 lb 6.4 oz)     Temp Readings from Last 1 Encounters:   11/08/24 97.5 °F (36.4 °C) (Oral)     Recent Labs     11/06/24  0519 11/07/24  0541 11/08/24  0344   BUN 24* 18 24*   CREATININE 7.94* 5.75* 6.42*   WBC 14.4* 14.4* 14.8*     Lab Results   Component Value Date/Time    VANCORANDOM 20.3 11/08/2024 03:44 AM     MRSA Nasal Swab: N/A. Non-respiratory infection    Assessment:  Date:  Dose/Freq Admin Times Level/Time:   10/24      10/25 HD 1000 mg x1  [address] @0400 = 21.6   10/26      10/27      10/28 HD  1000 mg x1 [address] @0604 = 21.1   10/29      10/30  mg x1 [address] @0651 = 24.7   10/31      11/1  mg x 1 1256    11/2      11/3      11/4  mg x 1 1407    11/5  mg x 1 [address] @ 0351 = 24.1   11/6  mg x 1 [address] Rd @ 0519 = 21.3   11/7  mg x 1 1237    11/8  mg x 1 [address] @ 0344 = 20.3   11/9  mg x 1 (1300)      Plan:  Concentration-guided dosing due to intermittent hemodialysis  Will give vancomycin 500 mg IV x 1 today post HD  Repeat vancomycin concentration will be ordered as clinically appropriate - tentative discharge today  Pharmacy will continue to monitor patient and adjust therapy as indicated    Thank you for the consult,  Bo Covarrubias RPH

## 2024-11-10 LAB — VANCOMYCIN SERPL-MCNC: 19.5 UG/ML

## 2024-11-10 PROCEDURE — 97116 GAIT TRAINING THERAPY: CPT

## 2024-11-10 PROCEDURE — 6370000000 HC RX 637 (ALT 250 FOR IP): Performed by: PHYSICAL MEDICINE & REHABILITATION

## 2024-11-10 PROCEDURE — 97530 THERAPEUTIC ACTIVITIES: CPT

## 2024-11-10 PROCEDURE — 36415 COLL VENOUS BLD VENIPUNCTURE: CPT

## 2024-11-10 PROCEDURE — 2580000003 HC RX 258: Performed by: PHYSICAL MEDICINE & REHABILITATION

## 2024-11-10 PROCEDURE — 1180000000 HC REHAB R&B

## 2024-11-10 PROCEDURE — 80202 ASSAY OF VANCOMYCIN: CPT

## 2024-11-10 PROCEDURE — 97110 THERAPEUTIC EXERCISES: CPT

## 2024-11-10 RX ADMIN — SERTRALINE 100 MG: 100 TABLET, FILM COATED ORAL at 20:43

## 2024-11-10 RX ADMIN — SEVELAMER CARBONATE 800 MG: 800 TABLET, FILM COATED ORAL at 15:58

## 2024-11-10 RX ADMIN — SERTRALINE 100 MG: 100 TABLET, FILM COATED ORAL at 08:17

## 2024-11-10 RX ADMIN — ACETAMINOPHEN 650 MG: 325 TABLET ORAL at 10:53

## 2024-11-10 RX ADMIN — HYDROCODONE BITARTRATE AND ACETAMINOPHEN 1 TABLET: 5; 325 TABLET ORAL at 20:43

## 2024-11-10 RX ADMIN — FAMOTIDINE 20 MG: 20 TABLET, FILM COATED ORAL at 08:17

## 2024-11-10 RX ADMIN — SEVELAMER CARBONATE 800 MG: 800 TABLET, FILM COATED ORAL at 08:17

## 2024-11-10 RX ADMIN — SODIUM CHLORIDE, PRESERVATIVE FREE 10 ML: 5 INJECTION INTRAVENOUS at 20:43

## 2024-11-10 RX ADMIN — SEVELAMER CARBONATE 800 MG: 800 TABLET, FILM COATED ORAL at 12:12

## 2024-11-10 RX ADMIN — MIDODRINE HYDROCHLORIDE 10 MG: 5 TABLET ORAL at 12:12

## 2024-11-10 RX ADMIN — SODIUM CHLORIDE, PRESERVATIVE FREE 10 ML: 5 INJECTION INTRAVENOUS at 08:18

## 2024-11-10 RX ADMIN — APIXABAN 5 MG: 5 TABLET, FILM COATED ORAL at 08:17

## 2024-11-10 RX ADMIN — HYDROCODONE BITARTRATE AND ACETAMINOPHEN 1 TABLET: 5; 325 TABLET ORAL at 12:12

## 2024-11-10 RX ADMIN — MIDODRINE HYDROCHLORIDE 10 MG: 5 TABLET ORAL at 15:58

## 2024-11-10 RX ADMIN — MIDODRINE HYDROCHLORIDE 10 MG: 5 TABLET ORAL at 08:17

## 2024-11-10 RX ADMIN — METOPROLOL TARTRATE 50 MG: 50 TABLET, FILM COATED ORAL at 08:17

## 2024-11-10 RX ADMIN — HYDROCODONE BITARTRATE AND ACETAMINOPHEN 1 TABLET: 5; 325 TABLET ORAL at 06:00

## 2024-11-10 RX ADMIN — APIXABAN 5 MG: 5 TABLET, FILM COATED ORAL at 20:43

## 2024-11-10 RX ADMIN — METOPROLOL TARTRATE 50 MG: 50 TABLET, FILM COATED ORAL at 20:43

## 2024-11-10 ASSESSMENT — PAIN SCALES - GENERAL
PAINLEVEL_OUTOF10: 6
PAINLEVEL_OUTOF10: 0
PAINLEVEL_OUTOF10: 0
PAINLEVEL_OUTOF10: 6
PAINLEVEL_OUTOF10: 6

## 2024-11-10 ASSESSMENT — PAIN DESCRIPTION - LOCATION
LOCATION: GENERALIZED
LOCATION: GENERALIZED

## 2024-11-10 ASSESSMENT — PAIN DESCRIPTION - DESCRIPTORS: DESCRIPTORS: ACHING;THROBBING

## 2024-11-10 NOTE — PROGRESS NOTES
PHYSICAL THERAPY DAILY NOTE    PT Individual Minutes  Time In: 0914  Time Out: 1017  Minutes: 63                 Total Treatment Time:  63 Minutes    Pt. Seen for: AM, Gait Training, Patient Education, Therapeutic Exercise, Transfer Training, and Other     Subjective: Patient reporting she feels OK. Reports she wants to do therapy today. Reports she gets tired easily.          Objective:  Restrictions/Precautions: Bed Alarm, Fall Risk, Other (comment)  Required Braces or Orthoses?: No         GROSS ASSESSMENT   Patient resting in bed at beginning of treatment        COGNITION Daily Assessment    Overall Orientation Status: Within Normal Limits   Overall Cognitive Status: WNL        BED/MAT MOBILITY Daily Assessment     Bridging: Moderate assistance  Rolling to Left: Moderate assistance (using bed rail)  Rolling to Right: Moderate assistance (using bed rail)  Supine to Sit: Maximum assistance (using bed rail)  Sit to Supine:  (NT)  Scooting: Moderate assistance  Bed Mobility Comments: Increased time and effort to complete with altered body mechanics        TRANSFERS Daily Assessment    Sit to Stand: 2 Person Assistance;Moderate Assistance (Moderate assist from PT and RN with RW and gait belt)  Stand to Sit: Moderate Assistance;2 Person Assistance (Moderate assist from PT and RN with RW and gait belt)  Stand Pivot Transfers: 2 Person Assistance;Moderate Assistance (with RW, Mod assist from RN and PT)  Squat Pivot Transfers: Unable to assess  Lateral Transfers: Unable to assess  Car Transfer: Unable to assess  Comment: Increased time and effort to complete with height of bed elevated to 26 inch height to complete safely.          GAIT Daily Assessment    Surface: Level tile  Device: Rolling Walker (gait belt)  Other Apparatus: Wheelchair follow  Assistance: 2 Person assistance;Minimal assistance (Min assist from PT and RN)  Quality of Gait: slow start/stop step to gait pattern with flexed posture  Gait Deviations:

## 2024-11-10 NOTE — PLAN OF CARE
Problem: Chronic Conditions and Co-morbidities  Goal: Patient's chronic conditions and co-morbidity symptoms are monitored and maintained or improved  Outcome: Progressing     Problem: Pain  Goal: Verbalizes/displays adequate comfort level or baseline comfort level  Outcome: Progressing     Problem: Safety - Adult  Goal: Free from fall injury  11/9/2024 2113 by Alberta Noe, RN  Outcome: Progressing  11/9/2024 0734 by Everett Zurita RN  Outcome: Progressing     Problem: Skin/Tissue Integrity  Goal: Absence of new skin breakdown  Description: 1.  Monitor for areas of redness and/or skin breakdown  2.  Assess vascular access sites hourly  3.  Every 4-6 hours minimum:  Change oxygen saturation probe site  4.  Every 4-6 hours:  If on nasal continuous positive airway pressure, respiratory therapy assess nares and determine need for appliance change or resting period.  Outcome: Progressing     Problem: ABCDS Injury Assessment  Goal: Absence of physical injury  Outcome: Progressing

## 2024-11-10 NOTE — PROGRESS NOTES
VANCO DAILY FOLLOW UP RENAL INSUFFICIENCY PATIENT   Jluis [facility]   Pharmacy Pharmacokinetic Monitoring Service - Vancomycin    Consulting Provider: Dr. Toribio  Indication: PD catheter complicated peritonitis   Target Concentration: Pre-dialysis concentration 15-20 mg/L  EOT: 12/1/24 per ID  Additional Antimicrobials: None    Pertinent Laboratory Values:   Wt Readings from Last 1 Encounters:   11/10/24 (!) 158.3 kg (349 lb)     Temp Readings from Last 1 Encounters:   11/10/24 97.3 °F (36.3 °C) (Oral)     Recent Labs     11/08/24  0344   BUN 24*   CREATININE 6.42*   WBC 14.8*       Lab Results   Component Value Date/Time    VANCOTROUGH 8.8 10/14/2024 11:22 AM    VANCORANDOM 19.5 11/10/2024 06:08 AM     Assessment:  Date:  Dose/Freq Admin Times Level/Time:   10/24      10/25 HD 1000 mg x1  [address] @0400 = 21.6   10/26      10/27      10/28 HD  1000 mg x1 [address] Rd @0604 = 21.1   10/29      10/30  mg x1 [address] @0651 = 24.7   10/31      11/1  mg x 1 1256    11/2      11/3      11/4  mg x 1 1407    11/5  mg x 1 1515 Rd @ 0351 = 24.1   11/6  mg x 1 1631 Rd @ 0519 = 21.3   11/7  mg x 1 1237    11/8  mg x 1 [address] Rd @ 0344 = 20.3   11/9  mg x 1 1355    11/10   Rd @ 0608 = 19.5   11/11        Plan:  Concentration-guided dosing due to intermittent hemodialysis  Redose after next HD  Repeat vancomycin concentration will be ordered as clinically appropriate - tentative discharge today  Pharmacy will continue to monitor patient and adjust therapy as indicated    Thank you for the consult,  David Jiménez, PharmD, BCOP  Clinical Pharmacist  Contact [employer]

## 2024-11-10 NOTE — PLAN OF CARE
Problem: Safety - Adult  Goal: Free from fall injury  11/10/2024 0707 by Everett Zurita, RN  Outcome: Progressing  11/9/2024 2113 by Alberta Noe, RN  Outcome: Progressing

## 2024-11-11 PROCEDURE — 1180000000 HC REHAB R&B

## 2024-11-11 PROCEDURE — 99232 SBSQ HOSP IP/OBS MODERATE 35: CPT | Performed by: PHYSICAL MEDICINE & REHABILITATION

## 2024-11-11 PROCEDURE — 97110 THERAPEUTIC EXERCISES: CPT

## 2024-11-11 PROCEDURE — 97116 GAIT TRAINING THERAPY: CPT

## 2024-11-11 PROCEDURE — 6370000000 HC RX 637 (ALT 250 FOR IP): Performed by: PHYSICAL MEDICINE & REHABILITATION

## 2024-11-11 PROCEDURE — 2580000003 HC RX 258: Performed by: PHYSICAL MEDICINE & REHABILITATION

## 2024-11-11 PROCEDURE — 97535 SELF CARE MNGMENT TRAINING: CPT

## 2024-11-11 PROCEDURE — 97530 THERAPEUTIC ACTIVITIES: CPT

## 2024-11-11 RX ORDER — MIDODRINE HYDROCHLORIDE 5 MG/1
10 TABLET ORAL
Status: DISCONTINUED | OUTPATIENT
Start: 2024-11-13 | End: 2024-11-13

## 2024-11-11 RX ORDER — ACETAMINOPHEN 650 MG/1
650 SUPPOSITORY RECTAL EVERY 6 HOURS PRN
Status: DISPENSED | OUTPATIENT
Start: 2024-11-11

## 2024-11-11 RX ORDER — MIDODRINE HYDROCHLORIDE 5 MG/1
10 TABLET ORAL
Status: DISCONTINUED | OUTPATIENT
Start: 2024-11-12 | End: 2024-11-13

## 2024-11-11 RX ORDER — ACETAMINOPHEN 500 MG
1000 TABLET ORAL EVERY 6 HOURS PRN
Status: DISPENSED | OUTPATIENT
Start: 2024-11-11

## 2024-11-11 RX ADMIN — MIDODRINE HYDROCHLORIDE 10 MG: 5 TABLET ORAL at 11:47

## 2024-11-11 RX ADMIN — APIXABAN 5 MG: 5 TABLET, FILM COATED ORAL at 09:43

## 2024-11-11 RX ADMIN — APIXABAN 5 MG: 5 TABLET, FILM COATED ORAL at 19:51

## 2024-11-11 RX ADMIN — SEVELAMER CARBONATE 800 MG: 800 TABLET, FILM COATED ORAL at 09:43

## 2024-11-11 RX ADMIN — METOPROLOL TARTRATE 50 MG: 50 TABLET, FILM COATED ORAL at 19:51

## 2024-11-11 RX ADMIN — ACETAMINOPHEN 1000 MG: 500 TABLET, FILM COATED ORAL at 10:37

## 2024-11-11 RX ADMIN — SEVELAMER CARBONATE 800 MG: 800 TABLET, FILM COATED ORAL at 16:56

## 2024-11-11 RX ADMIN — HYDROCODONE BITARTRATE AND ACETAMINOPHEN 1 TABLET: 5; 325 TABLET ORAL at 13:39

## 2024-11-11 RX ADMIN — SEVELAMER CARBONATE 800 MG: 800 TABLET, FILM COATED ORAL at 11:47

## 2024-11-11 RX ADMIN — MIDODRINE HYDROCHLORIDE 10 MG: 5 TABLET ORAL at 09:43

## 2024-11-11 RX ADMIN — HYDROCODONE BITARTRATE AND ACETAMINOPHEN 1 TABLET: 5; 325 TABLET ORAL at 05:56

## 2024-11-11 RX ADMIN — SODIUM CHLORIDE, PRESERVATIVE FREE 10 ML: 5 INJECTION INTRAVENOUS at 19:52

## 2024-11-11 RX ADMIN — SERTRALINE 100 MG: 100 TABLET, FILM COATED ORAL at 09:43

## 2024-11-11 RX ADMIN — SODIUM CHLORIDE, PRESERVATIVE FREE 10 ML: 5 INJECTION INTRAVENOUS at 09:44

## 2024-11-11 RX ADMIN — HYDROCODONE BITARTRATE AND ACETAMINOPHEN 1 TABLET: 5; 325 TABLET ORAL at 19:51

## 2024-11-11 RX ADMIN — FAMOTIDINE 20 MG: 20 TABLET, FILM COATED ORAL at 09:43

## 2024-11-11 RX ADMIN — METOPROLOL TARTRATE 50 MG: 50 TABLET, FILM COATED ORAL at 09:43

## 2024-11-11 RX ADMIN — SERTRALINE 100 MG: 100 TABLET, FILM COATED ORAL at 19:51

## 2024-11-11 ASSESSMENT — PAIN SCALES - GENERAL
PAINLEVEL_OUTOF10: 7
PAINLEVEL_OUTOF10: 6
PAINLEVEL_OUTOF10: 0
PAINLEVEL_OUTOF10: 0
PAINLEVEL_OUTOF10: 5
PAINLEVEL_OUTOF10: 6
PAINLEVEL_OUTOF10: 0

## 2024-11-11 ASSESSMENT — PAIN DESCRIPTION - LOCATION
LOCATION: ABDOMEN;GENERALIZED
LOCATION: HIP
LOCATION: GENERALIZED;ABDOMEN
LOCATION: ABDOMEN;GENERALIZED

## 2024-11-11 ASSESSMENT — PAIN DESCRIPTION - DESCRIPTORS
DESCRIPTORS: ACHING
DESCRIPTORS: ACHING

## 2024-11-11 ASSESSMENT — PAIN DESCRIPTION - PAIN TYPE
TYPE: ACUTE PAIN;CHRONIC PAIN
TYPE: CHRONIC PAIN

## 2024-11-11 NOTE — PLAN OF CARE
[facility]  Rehab Physician Plan of Care Review     Patient Name:  Giselle Rosado          Expected LOS: 7-14 days  Rehabilitation IGC: Sepsis (HCC) [A41.9]    Primary Rehabilitation (etiologic) diagnosis: Peritonitis      Medical/Functional Prognosis: Good     Anticipated functional outcomes/goals and interventions: Performs mobility and self care activities at the highest level of function for planned discharge setting. Advance to the least restrictive diet without signs or symptoms of aspiration and demonstrate communication skills at the highest level of function for planned discharge setting.     Patient's/family's anticipated outcomes/personal goals: to improve strength, endurance, and independence    Physical therapy functional outcome/goal:  Bed mobility  Bridging: Moderate assistance  Rolling to Left: Moderate assistance (using bed rail)  Rolling to Right: Moderate assistance (using bed rail)  Supine to Sit: Maximum assistance (using bed rail)  Sit to Supine:  (NT)  Scooting: Moderate assistance  Bed Mobility Comments: Increased time and effort to complete with altered body mechanics   Transfers  Sit to Stand: 2 Person Assistance, Moderate Assistance (Moderate assist from PT and RN with RW and gait belt)  Stand to Sit: Moderate Assistance, 2 Person Assistance (Moderate assist from PT and RN with RW and gait belt)  Stand Pivot Transfers: 2 Person Assistance, Moderate Assistance (with RW, Mod assist from RN and PT)  Squat Pivot Transfers: Unable to assess  Lateral Transfers: Unable to assess  Car Transfer: Unable to assess  Comment: Increased time and effort to complete with height of bed elevated to 26 inch height to complete safely.   Ambulation  Surface: Level tile  Device: Rolling Walker (gait belt)  Other Apparatus: Wheelchair follow  Assistance: 2 Person assistance, Minimal assistance (Min assist from PT and RN)  Quality of Gait: slow start/stop step to gait pattern with flexed posture  Gait  Deviations: Decreased step length, Decreased arm swing, Decreased step height, Slow Shirin, Increased ALEE  Distance: 6 ft  Comments: Unable to tolerate gait assessment due to fatigue from dialysis and OT eval  More Ambulation?: No               Wheelchair Activities  Wheelchair Size: 24 x 18  Wheelchair Type: Standard  Wheelchair Cushion: Standard  Pressure Relief Type:  (sit<>stand with RW)  Level of Assistance for pressure relief activities: 2 Person assistance, Moderate assistance  Wheelchair Parts Management: No  Propulsion: No          Occupational therapy functional outcome/goal:   Eating  Assistance Needed: Independent  CARE Score: 6   Oral Hygiene  Assistance Needed: Supervision or touching assistance  CARE Score: 4  Shower/Bathe Self  Assistance Needed: Substantial/maximal assistance  CARE Score: 2  Upper Body Dressing  Assistance Needed: Supervision or touching assistance  CARE Score: 4  Lower Body Dressing  Assistance Needed: Substantial/maximal assistance  CARE Score: 2  Putting On/Taking Off Footwear  Assistance Needed: Dependent  CARE Score: 1        Rehab Nursing: bowel and bladder program, routine skin care and prevention of pressure sores, education on medications effects and side effects, pain control as appropriate, DVT prevention, TEDs/SCDs as appropriate, mobilize, carry over learned skills in therapies, counseling.      Physician's anticipated interventions and functional outcomes: Sepsis / peritonitis - on vanco through 12/1  ESRD on HD - nephrology following, on T, Th, S schedule   Obesity, morbid (BMI- 50.8 ). Anemia - on epoetin subq weekly. Hypo/HTN / A. fib - on midodrine, on metoprolol.  Anticoagulation mgmt /DVT prophylaxis- on eliquis bid.  Irritable bowel syndrome - on prn bentyl. Pain mgmt - prn norco    Patient requires continued close monitoring of the following systems: CV: Monitor blood pressure and heart rate.  Adjust medications as needed; Endocrine: Monitor glycemia and  potential for recurrent constipation given mobility limitations, current hospitalization, and medication use. Will need to continue routine monitoring of bowel function with appropriate adjustment in bowel regimen as indicated to ensure adequate bowel management; Bladder management: Monitor urinary output and urinary function/dysfunction. Does have the potential for underlying bladder dysfunction given immobility. Therefore, requires continued routine monitoring with appropriate bladder management as indicated to avoid urinary tract complications.    Required Therapy (Check all that apply)  PT/OT 15 hours/per week     Any updates/changes to the Required Therapies section in Rehab POC Review document, as compared to the Pre-Admission Screening document, are due to the assessment completed by the interdisciplinary team members. These changes/updates are reviewed and approved by the Physical Medicine & Rehabilitation physician.      Anticipated Discharge Plan  Community Setting:Home Care     Anticipated discharge date: 7-14 days after admission      Physician:  Abilio Currie MD  Date: 11/11/2024  Time:  10:56 AM

## 2024-11-11 NOTE — PLAN OF CARE
Problem: Chronic Conditions and Co-morbidities  Goal: Patient's chronic conditions and co-morbidity symptoms are monitored and maintained or improved  11/11/2024 1026 by Mariana Chacon RN  Outcome: Progressing  11/10/2024 2046 by Alberta Noe RN  Outcome: Progressing     Problem: Pain  Goal: Verbalizes/displays adequate comfort level or baseline comfort level  11/11/2024 1026 by Mariana Chacon RN  Outcome: Progressing  11/10/2024 2046 by Alberta Noe RN  Outcome: Progressing     Problem: Safety - Adult  Goal: Free from fall injury  11/11/2024 1026 by Mariana Chacon RN  Outcome: Progressing  11/10/2024 2046 by Alberta Noe RN  Outcome: Progressing     Problem: Skin/Tissue Integrity  Goal: Absence of new skin breakdown  Description: 1.  Monitor for areas of redness and/or skin breakdown  2.  Assess vascular access sites hourly  3.  Every 4-6 hours minimum:  Change oxygen saturation probe site  4.  Every 4-6 hours:  If on nasal continuous positive airway pressure, respiratory therapy assess nares and determine need for appliance change or resting period.  11/11/2024 1026 by Mariana Chacon, RN  Outcome: Progressing  11/10/2024 2046 by Alberta Noe RN  Outcome: Progressing     Problem: ABCDS Injury Assessment  Goal: Absence of physical injury  11/11/2024 1026 by Mariana Chacon RN  Outcome: Progressing  11/10/2024 2046 by Alberta Noe, RN  Outcome: Progressing

## 2024-11-11 NOTE — PROGRESS NOTES
[facility] Progress Note    Follow-Up on: ESRD      Pt seen and examined, cup of ice and large water at bedside. Sitting on the side of the bed     ROS:  Gen - no fever, no chills, appetite unchanged  CV - no chest pain  Lung - no shortness of breath, no cough  Abd - + tenderness, no nausea/vomiting, no diarrhea  Ext - + edema improving    Exam:  Vitals:    11/10/24 2113 11/11/24 0556 11/11/24 0600 11/11/24 0838   BP:    131/89   Pulse:    83   Resp: 18 18 18   Temp:    97.9 °F (36.6 °C)   TempSrc:    Oral   SpO2:    100%   Weight:   (!) 154.8 kg (341 lb 3.2 oz)    Height:           No intake or output data in the 24 hours ending 11/11/24 0901        Wt Readings from Last 3 Encounters:   11/11/24 (!) 154.8 kg (341 lb 3.2 oz)   11/06/24 (!) 156.2 kg (344 lb 6.4 oz)   10/18/24 (!) 150.6 kg (332 lb)       GEN - in no distress, alert and oriented   CV - regular rate, S1, S2, no Rub   Lung - clear bilaterally  Abd - soft, +tender  Ext - + 1 BLE edema  Access: Right TCC intact    No results for input(s): \"WBC\", \"HGB\", \"HCT\", \"PLT\", \"INR\" in the last 72 hours.       No results for input(s): \"NA\", \"K\", \"CL\", \"CO2\", \"BUN\", \"CREATININE\", \"GLU\", \"CALCIUM\", \"MG\", \"PHOS\", \"ALBUMIN\" in the last 72 hours.    Invalid input(s): \"CA\"      Assessment / Plan:  Principal Problem:    Sepsis (HCC)  Resolved Problems:    * No resolved hospital problems. *    1) ESRD - HD MWF.  Previously on PD. Changing to TTS for rehab schedule. HD tomorrow  -discussed fluid gains with her again today and need for fluid restriction  -has gotten HD almost every day last week for fluid removal  -please document daily weights, down around 5kg over the weekend     2) Peritonitis - PD catheter removed 10/15/24  -Continue vancomycin EOT 12/1/2024      3) Abdominal pain - evidence of subcutaneous hematoma and cellulitis.   CT ab hematoma with improvement. Free fluid shows improvement.   Unclear whether it is calciphylaxis given that there is a lot  of cellulitis and subcutaneous hematoma     4) Hypotension - on Toprol for Afib, Valsartan stopped  -on Midodrine     5) S/p Afib with RVR - better rate controlled     6) MBD- on Renvela     7) Anemia ESRD -on JOHNNY    8) Debility moving to 9th floor for rehab. Starting HD TTS this week.    High Medical Decision Making  -Patient with at least one acute illness that poses a threat to bodily function  -Reviewed 3 labs  -Reviewed external charts

## 2024-11-11 NOTE — PROGRESS NOTES
Spoke with Dr. Currie concerning wounds to buttocks and bilateral hips. Orders received for wound consult and to see if patient can get a bariatric specialty bed.

## 2024-11-11 NOTE — PROGRESS NOTES
PHYSICAL THERAPY DAILY NOTE      PT Co-Treatment Minutes  Time In: 1030  Time Out: 1115  Minutes: 45               Total Treatment Time:  45 Minutes    Pt. Seen for: AM, Balance Training, Patient Education, Transfer Training, and Other     Subjective: Patient reporting she would like to take a shower. Reports she has not had a shower in 6 weeks. Reports she has a tub shower at home.  reporting limited space in bathroom for a tub bench         Objective:  Restrictions/Precautions: Bed Alarm, Fall Risk  Required Braces or Orthoses?: No            GROSS ASSESSMENT   Patient resting in w/c at beginning of treatment. Co treat with OT for transfer training, ADL training, balance training, and patient education        COGNITION Daily Assessment    Overall Orientation Status: Within Normal Limits   Overall Cognitive Status: WNL        BED/MAT MOBILITY Daily Assessment     Bed Mobility Comments: NT this AM        TRANSFERS Daily Assessment    Sit to Stand: Moderate Assistance (with RW and with grab bars)  Stand to Sit: Moderate Assistance (with grab bars and RW)  Stand Pivot Transfers: Moderate Assistance (SPT w/c<>tub bench using grab bar)  Comment: Increased time and effort to complete with cues for body mechanics          GAIT Daily Assessment    Comments: NT this AM              STEPS/STAIRS Daily Assessment          NT         BALANCE Daily Assessment   Static sitting balance activities during ADL training on tub transfer bench with supervision.  Dynamic sitting balance activities during ADL training on tub bench with SBA to supervision.  Static standing balance activities during ADL training with hand support on grab bars with CGA to min assist and cues.  Static Sitting: Normal:  Pt. able to maintain balance w/o UE support  Dynamic Sitting: Good - accepts moderate challenge;  can maintain balance while picking object off the floor  Static Standing: Fair:  Pt. requires UE support;  may need occasional min

## 2024-11-11 NOTE — PROGRESS NOTES
Education with patient and  on 1200 ml fluid restriction within 24 hour period. Patient was given a visual cup with markings to show how much to count for. Fluid intake sheet posted in patient's room, instructed patient and  to participate in documenting with intake. Both verbalizes understanding.

## 2024-11-11 NOTE — CARE COORDINATION
RN CM at bedside, family at bedside. Patient is declining HH RN, PT, OT due to large dogs that are protective of her. States no way to put them up that they do not have the potential of getting out. States that even outside they can get back in and she worries about them hurting someone. After discussing options for dogs patient continues to decline HH RN, PT, PT. States she is close to [place] and would be OK with having outpatient PT set up if that is needed. Patient also states she will be back on HD due to ESRD for the next several weeks and states previous she was going to [facility] [place]. RN spoke to Jasper with [facility] and she states patient has a spot M/W/F at 2pm. RN CM to follow.

## 2024-11-11 NOTE — PROGRESS NOTES
PHYSICAL THERAPY DAILY NOTE    PT Individual Minutes  Time In: 1350  Time Out: 1445  Minutes: 55 PT Co-Treatment Minutes  Time In: 1030  Time Out: 1115  Minutes: 45               Total Treatment Time:  55 Minutes    Pt. Seen for: PM, Gait Training, Patient Education, Therapeutic Exercise, Transfer Training, and Other     Subjective: Patient reporting she is tired but is ready for more exercise. Reports she would like to rest in the bed after PM therapy         Objective:  Restrictions/Precautions: Bed Alarm, Fall Risk  Required Braces or Orthoses?: No            GROSS ASSESSMENT   Patient resting in w/c at beginning of treatment        COGNITION Daily Assessment    Overall Orientation Status: Within Normal Limits   Overall Cognitive Status: WNL        BED/MAT MOBILITY Daily Assessment     Bridging: Moderate assistance  Rolling to Left: Moderate assistance (using bed rail)  Rolling to Right: Moderate assistance (using bed rail)  Supine to Sit:  (NT)  Sit to Supine: Moderate assistance (NT)  Scooting: Moderate assistance  Bed Mobility Comments: Increased time and effort to complete with cues for body mechanics, using bed rail        TRANSFERS Daily Assessment    Sit to Stand: Moderate Assistance (with RW and with grab bars)  Stand to Sit: Moderate Assistance (with grab bars and RW)  Stand Pivot Transfers: Moderate Assistance (SPT w/c<>tub bench using grab bar)  Comment: Increased time and effort to complete with cues for body mechanics          GAIT Daily Assessment    Surface: Level tile  Device: Rolling Walker (gait belt)  Other Apparatus: Wheelchair follow  Assistance: Minimal assistance  Quality of Gait: slow start/stop step to gait pattern with flexed posture  Gait Deviations: Decreased step length;Decreased arm swing;Decreased step height;Slow Shirin;Increased ALEE  Distance: 6 ft  Comments: NT this AM  More Ambulation?: No              STEPS/STAIRS Daily Assessment    Stairs?: No              BALANCE Daily

## 2024-11-11 NOTE — PROGRESS NOTES
Occupational Therapy  OT Daily Note  Time In 1350   Time Out 1445     Subjective: \"How am I doing?\"   Pain: No complaint of pain.   Education: benefits of rehab, transfer training   Interdisciplinary Communication: co-treat with PT    Mobility   Score Comments   Rolling       Supine to Sit       Sit to Supine  Max A x 2   PT assist with feet, OT assist with trunk   Sit to Stand  Mod A x 2      Transfer Assist  Mod A x 2   SPT from WC to bed x approx 10 feet with mod A x 2 for sit to stand, close wheelchair follow     Activity Tolerance   Patient engaged in UB and LB activity tolerance tasks using UBE and jennifer med bike. Completed UBE x 6 minutes x light resistance with 1 rest break after 3 minutes. Completed jennifer med per PT recommendations. During jennifer med exercise, provided written [employer] Techniques education and explained techniques at length with patient. Patient verbalized understanding of all education.   Patient stood again after transfer to edge of bed in order to change sheet to [place] sheet. Required mod A and elevated bed for sit to stand. Required max A x 2 for sit to supine transfer.      Assessment: Continues to require increased time for tasks and co-treat remains beneficial to advance patient. Patient tolerated session well and was left supine in bed with call light/all needs in reach.   Plan: Continue OT POC with focus on ongoing deficits.     Maddie Denton OTR/LIN   11/11/2024

## 2024-11-11 NOTE — PROGRESS NOTES
Occupational Therapy  OT DAILY NOTE    Time In: 1030  Time Out: 1115     Score Comments   Rolling       Supine to Sit       Sit to Supine       Sit to Stand       Transfer Assist  Mod A x 2   Mod A x 2 for sit to stand from WC to tub transer bench        Bathing Current Functional Level   Score Dependent   Comments two person assist for standing; otherwise, bathed self with mod A for LB     Upper Body   Dressing Current Functional Level   Score Supervision or touching assistance   Comments assist with dress down back       Summary of Session: Patient agreeable to OT; completed ADL co-treat session with PT. Patient required increased assistance with LB ADLs and increased rest breaks due to fatigue. Nurse in to address wounds before and following shower. Co-treat remains appropriate at this time due to functional status and medical co-morbidities.   Patient tolerated session well with no complaint of pain and was left seated up in WC with call light/all needs in reach. Continue OT POC with focus on ongoing deficits.     Maddie Denton, OTR/L  11/11/2024

## 2024-11-11 NOTE — PROGRESS NOTES
KIT DAILY FOLLOW UP RENAL INSUFFICIENCY PATIENT   [facility]   Pharmacy Pharmacokinetic Monitoring Service - Vancomycin    Consulting Provider: Dr. Toribio  Indication: PD catheter complicated peritonitis   Target Concentration: Pre-dialysis concentration 15-20 mg/L  EOT: 12/1/24 per ID  Additional Antimicrobials: None    Pertinent Laboratory Values:   Wt Readings from Last 1 Encounters:   11/11/24 (!) 154.8 kg (341 lb 3.2 oz)     Temp Readings from Last 1 Encounters:   11/10/24 99.1 °F (37.3 °C) (Oral)     No results for input(s): \"BUN\", \"CREATININE\", \"WBC\", \"PROCAL\", \"LACACIDPL\", \"LACTA\", \"LCAD\", \"LACTSEPSIS\" in the last 72 hours.    Invalid input(s): \"PROCAT\", \"PCT\", \"LAC\", \"LACT\", \"LACPOC\"      Lab Results   Component Value Date/Time    ABIGAIL 8.8 10/14/2024 11:22 AM    VANCORANDOM 19.5 11/10/2024 06:08 AM     Assessment:  Date:  Dose/Freq Admin Times Level/Time:   10/24      10/25 HD 1000 mg x1  [address] @0400 = 21.6   10/26      10/27      10/28 HD  1000 mg x1 [address] @0604 = 21.1   10/29      10/30  mg x1 [address] @0651 = 24.7   10/31      11/1  mg x 1 1256    11/2      11/3      11/4  mg x 1 1407    11/5  mg x 1 [address] @ 0351 = 24.1   11/6  mg x 1 [address] @ 0519 = 21.3   11/7  mg x 1 1237    11/8  mg x 1 [address] @ 0344 = 20.3   11/9  mg x 1 1355    11/10   Rd @ 0608 = 19.5   11/11        Plan:  Concentration-guided dosing due to intermittent hemodialysis  Plan to re-dose after next HD  Repeat vancomycin concentrations will be ordered as clinically appropriate  Pharmacy will continue to monitor patient and adjust therapy as indicated    Thank you for the consult,  Angelica Ocasio [facility]

## 2024-11-11 NOTE — PLAN OF CARE
Problem: Chronic Conditions and Co-morbidities  Goal: Patient's chronic conditions and co-morbidity symptoms are monitored and maintained or improved  Outcome: Progressing     Problem: Pain  Goal: Verbalizes/displays adequate comfort level or baseline comfort level  Outcome: Progressing     Problem: Safety - Adult  Goal: Free from fall injury  11/10/2024 2046 by Alberta Noe, RN  Outcome: Progressing  11/10/2024 0707 by Everett Zurita RN  Outcome: Progressing     Problem: Skin/Tissue Integrity  Goal: Absence of new skin breakdown  Description: 1.  Monitor for areas of redness and/or skin breakdown  2.  Assess vascular access sites hourly  3.  Every 4-6 hours minimum:  Change oxygen saturation probe site  4.  Every 4-6 hours:  If on nasal continuous positive airway pressure, respiratory therapy assess nares and determine need for appliance change or resting period.  Outcome: Progressing     Problem: ABCDS Injury Assessment  Goal: Absence of physical injury  Outcome: Progressing

## 2024-11-12 ENCOUNTER — CARE COORDINATION (OUTPATIENT)
Dept: CARE COORDINATION | Facility: CLINIC | Age: 54
End: 2024-11-12

## 2024-11-12 PROCEDURE — 2580000003 HC RX 258: Performed by: PHYSICAL MEDICINE & REHABILITATION

## 2024-11-12 PROCEDURE — 6370000000 HC RX 637 (ALT 250 FOR IP): Performed by: PHYSICAL MEDICINE & REHABILITATION

## 2024-11-12 PROCEDURE — 97530 THERAPEUTIC ACTIVITIES: CPT

## 2024-11-12 PROCEDURE — 6370000000 HC RX 637 (ALT 250 FOR IP): Performed by: INTERNAL MEDICINE

## 2024-11-12 PROCEDURE — 97535 SELF CARE MNGMENT TRAINING: CPT

## 2024-11-12 PROCEDURE — 97110 THERAPEUTIC EXERCISES: CPT

## 2024-11-12 PROCEDURE — 6360000002 HC RX W HCPCS: Performed by: PHYSICAL MEDICINE & REHABILITATION

## 2024-11-12 PROCEDURE — 97116 GAIT TRAINING THERAPY: CPT

## 2024-11-12 PROCEDURE — 90935 HEMODIALYSIS ONE EVALUATION: CPT

## 2024-11-12 PROCEDURE — 1180000000 HC REHAB R&B

## 2024-11-12 RX ORDER — MIDODRINE HYDROCHLORIDE 5 MG/1
10 TABLET ORAL AS NEEDED
Status: DISPENSED | OUTPATIENT
Start: 2024-11-12

## 2024-11-12 RX ADMIN — ACETAMINOPHEN 1000 MG: 500 TABLET, FILM COATED ORAL at 14:51

## 2024-11-12 RX ADMIN — METOPROLOL TARTRATE 50 MG: 50 TABLET, FILM COATED ORAL at 20:42

## 2024-11-12 RX ADMIN — FAMOTIDINE 20 MG: 20 TABLET, FILM COATED ORAL at 12:32

## 2024-11-12 RX ADMIN — SERTRALINE 100 MG: 100 TABLET, FILM COATED ORAL at 20:42

## 2024-11-12 RX ADMIN — SODIUM CHLORIDE, PRESERVATIVE FREE 10 ML: 5 INJECTION INTRAVENOUS at 20:42

## 2024-11-12 RX ADMIN — VANCOMYCIN HYDROCHLORIDE 500 MG: 500 INJECTION, POWDER, LYOPHILIZED, FOR SOLUTION INTRAVENOUS at 13:05

## 2024-11-12 RX ADMIN — METOPROLOL TARTRATE 50 MG: 50 TABLET, FILM COATED ORAL at 12:32

## 2024-11-12 RX ADMIN — SEVELAMER CARBONATE 800 MG: 800 TABLET, FILM COATED ORAL at 12:32

## 2024-11-12 RX ADMIN — SODIUM CHLORIDE, PRESERVATIVE FREE 10 ML: 5 INJECTION INTRAVENOUS at 12:31

## 2024-11-12 RX ADMIN — SEVELAMER CARBONATE 800 MG: 800 TABLET, FILM COATED ORAL at 16:59

## 2024-11-12 RX ADMIN — HYDROCODONE BITARTRATE AND ACETAMINOPHEN 1 TABLET: 5; 325 TABLET ORAL at 13:04

## 2024-11-12 RX ADMIN — HYDROCODONE BITARTRATE AND ACETAMINOPHEN 1 TABLET: 5; 325 TABLET ORAL at 05:53

## 2024-11-12 RX ADMIN — APIXABAN 5 MG: 5 TABLET, FILM COATED ORAL at 20:42

## 2024-11-12 RX ADMIN — SERTRALINE 100 MG: 100 TABLET, FILM COATED ORAL at 12:32

## 2024-11-12 RX ADMIN — ACETAMINOPHEN 1000 MG: 500 TABLET, FILM COATED ORAL at 20:44

## 2024-11-12 RX ADMIN — HYDROCODONE BITARTRATE AND ACETAMINOPHEN 1 TABLET: 5; 325 TABLET ORAL at 18:32

## 2024-11-12 RX ADMIN — APIXABAN 5 MG: 5 TABLET, FILM COATED ORAL at 12:32

## 2024-11-12 RX ADMIN — MIDODRINE HYDROCHLORIDE 10 MG: 5 TABLET ORAL at 07:00

## 2024-11-12 ASSESSMENT — PAIN SCALES - GENERAL
PAINLEVEL_OUTOF10: 0
PAINLEVEL_OUTOF10: 4
PAINLEVEL_OUTOF10: 0
PAINLEVEL_OUTOF10: 9
PAINLEVEL_OUTOF10: 6
PAINLEVEL_OUTOF10: 7
PAINLEVEL_OUTOF10: 3
PAINLEVEL_OUTOF10: 3
PAINLEVEL_OUTOF10: 2

## 2024-11-12 ASSESSMENT — PAIN DESCRIPTION - DESCRIPTORS
DESCRIPTORS: ACHING;BURNING
DESCRIPTORS: ACHING;BURNING
DESCRIPTORS: BURNING
DESCRIPTORS: ACHING;BURNING

## 2024-11-12 ASSESSMENT — PAIN DESCRIPTION - LOCATION
LOCATION: HIP
LOCATION: HIP
LOCATION: GENERALIZED
LOCATION: HIP
LOCATION: HIP

## 2024-11-12 ASSESSMENT — PAIN DESCRIPTION - ORIENTATION
ORIENTATION: RIGHT;LEFT
ORIENTATION: RIGHT;LEFT
ORIENTATION: LEFT
ORIENTATION: RIGHT;LEFT

## 2024-11-12 NOTE — PROGRESS NOTES
KIT DAILY FOLLOW UP RENAL INSUFFICIENCY PATIENT   [facility]   Pharmacy Pharmacokinetic Monitoring Service - Vancomycin    Consulting Provider: Dr. Toribio  Indication: PD catheter complicated peritonitis   Target Concentration: Pre-dialysis concentration 15-20 mg/L  EOT: 12/1/24 per ID  Additional Antimicrobials: None    Pertinent Laboratory Values:   Wt Readings from Last 1 Encounters:   11/12/24 (!) 155.7 kg (343 lb 3.2 oz)     Temp Readings from Last 1 Encounters:   11/12/24 97.7 °F (36.5 °C) (Oral)     No results for input(s): \"BUN\", \"CREATININE\", \"WBC\", \"PROCAL\", \"LACACIDPL\", \"LACTA\", \"LCAD\", \"LACTSEPSIS\" in the last 72 hours.    Invalid input(s): \"PROCAT\", \"PCT\", \"LAC\", \"LACT\", \"LACPOC\"      Lab Results   Component Value Date/Time    ABIGAIL 8.8 10/14/2024 11:22 AM    VANCORANDOM 19.5 11/10/2024 06:08 AM     Assessment:  Date:  Dose/Freq Admin Times Level/Time:   10/24      10/25 HD 1000 mg x1  [address] @0400 = 21.6   10/26      10/27      10/28 HD  1000 mg x1 [address] @0604 = 21.1   10/29      10/30  mg x1 [address] @0651 = 24.7   10/31      11/1  mg x 1 1256    11/2      11/3      11/4  mg x 1 1407    11/5  mg x 1 [address] @ 0351 = 24.1   11/6  mg x 1 [address] @ 0519 = 21.3   11/7  mg x 1 1237    11/8  mg x 1 [address] @ 0344 = 20.3   11/9  mg x 1 1355    11/10   Rd @ 0608 = 19.5   11/11 11/12 500 mg x 1 (13)      Plan:  Concentration-guided dosing due to intermittent hemodialysis  Give vancomycin 500 mg IV x 1 after HD today  Repeat vancomycin concentrations will be ordered as clinically appropriate  Pharmacy will continue to monitor patient and adjust therapy as indicated    Thank you for the consult,  Angelica Ocasio [facility]

## 2024-11-12 NOTE — DIALYSIS
TRANSFER IN - DIALYSIS    Received patient in dialysis unit  [facility] for ordered procedure.    Consent verified for renal replacement therapy. Procedure explained to patient, opportunity for Q&A provided. Call light given.     Patient alert and vital signs stable.  BP (!) 151/92   Pulse 95   Temp 97.7 °F (36.5 °C) (Oral)   Resp 18   Ht 1.753 m (5' 9\")   Wt (!) 155.7 kg (343 lb 3.2 oz)   SpO2 96%   BMI 50.68 kg/m²     Hemodialysis initiated using right cvc.  Aspirated and flushed both ports without difficulty. Dressing clean, dry and intact.  Machine settings per MD order.    Heparin 0 unit bolus and 0 units/hr.      Will monitor during treatment.

## 2024-11-12 NOTE — PLAN OF CARE
Problem: Chronic Conditions and Co-morbidities  Goal: Patient's chronic conditions and co-morbidity symptoms are monitored and maintained or improved  11/11/2024 2107 by Alberta Noe RN  Outcome: Progressing  11/11/2024 1026 by Mariana Chacon RN  Outcome: Progressing     Problem: Pain  Goal: Verbalizes/displays adequate comfort level or baseline comfort level  11/11/2024 2107 by Alberta Noe RN  Outcome: Progressing  11/11/2024 1026 by Mariana Chacon RN  Outcome: Progressing     Problem: Safety - Adult  Goal: Free from fall injury  11/11/2024 2107 by Alberta Noe RN  Outcome: Progressing  11/11/2024 1026 by Mariana Chacon RN  Outcome: Progressing     Problem: Skin/Tissue Integrity  Goal: Absence of new skin breakdown  Description: 1.  Monitor for areas of redness and/or skin breakdown  2.  Assess vascular access sites hourly  3.  Every 4-6 hours minimum:  Change oxygen saturation probe site  4.  Every 4-6 hours:  If on nasal continuous positive airway pressure, respiratory therapy assess nares and determine need for appliance change or resting period.  11/11/2024 2107 by Alberta Noe RN  Outcome: Progressing  11/11/2024 1026 by Mariana Chacon RN  Outcome: Progressing     Problem: ABCDS Injury Assessment  Goal: Absence of physical injury  11/11/2024 2107 by Alberta Noe RN  Outcome: Progressing  11/11/2024 1026 by Mariana Chacon, RN  Outcome: Progressing

## 2024-11-12 NOTE — PROGRESS NOTES
[facility] OCCUPATIONAL THERAPY DAILY NOTE  OT Individual Minutes  Time In: 0700  Time Out: 0750  Minutes: 50               Subjective: Pt agreeable to treatment.   Pain: No pain expressed.  Interdisciplinary Communication: Collaborated with RN regarding pt status, pt performance, and handoff communication.   Precautions: Falls, [place] Alarm, and dialysis port, HD dialysis       FUNCTIONAL MOBILITY:       Bed Mobility Jt MIN Asup>sit with HOB elevated, MIN A sit>sup with RLE management; pt able to roll R>L with MIN A use of bed rail, roll L>R with bed rail touching A with flat bed.   Sit to Stand NT    Transfer  NT  Transfer Type: NA  Equipment: NA Pt seated EOB for ADL's with good trunk stability and Upright endurance with no LOB.   Ambulation NT  Equipment: NA      ACTIVITIES OF DAILY LIVING:       Eating Independent Pt seated EOB with good trunk stability IND to self feed/manage containers, good upright endurance EOB   Oral Hygiene Setup or clean-up assistance S/U A while seated EOB good trunk stability for brushing teeth   Shower/Bathe Partial/moderate assistance Pt participated in sponge bath seated EOB with good trunk stability, A for washing Lower calves/feet B, A for posterior cares.   Upper Body  Dressing  (Pt preference of keeping gown on this date from previous date)     Lower Body Dressing  (Pt limited by pain in abdomen, declined pants/brief)     Donning/[place] Footwear Substantial/maximal assistance Pt required MAX A for donning/doffing socks-introduced AE of sock aid/reacher/dressing stick and pt demonstrated on RLE-will continue to follow based on pt preference/goals   Education Adaptive ADL Techniques, AE/DME Training, Benefits of OT, Energy Conservation, Pacing, Functional Transfer Training, and Safety Awareness     Assessment: Pt demonstrated good participation in OT treatment. Pt continues to benefit from skilled OT services to address remaining deficits and progress toward baseline level of

## 2024-11-12 NOTE — WOUND CARE
Re-consulted for Buttock wound & Bilat hips. Wound team following, last seen 11/5. Pt was s/p Fall 11/1; 11/5 unable to r/o superficial ecchymosis vs DTI at that time. Pt requesting for a larger bed d/t inability to reposition/turn w/ adequate room. Will have Pt switch to Bariatric bed already placed at back of elevator. Pt/ confirm Pt has been repositioning/turning while in bed as best as possible, and has been sitting in recliner more frequently. Educated importance of continuing to offload weight/pressure on wounds.    L buttock 7x4x0.2cm, pink/red, slight granulation, subcutaneous, slough, moist eschar, small serosanguinous, no odor. Continue current pink silicone border foam dressing every other day/PRN. Continue frequent turning/repositioning. Chair foam cushion when OOB.    (11/5...)      L hip 7x6x0.1cm, pink/red, slight granulation, scant serosanguinous, no odor. Continue pink silicone border foam dressing every other day/PRN.    (11/5...)      R hip with ruptured blister that has already epithelialized. Okay to place preventative pink silicone border foam dressing every other day/PRN.

## 2024-11-12 NOTE — PROGRESS NOTES
[facility] Progress Note    Follow-Up on: ESRD      Pt seen and examined on HD. UF goal 4000ml. Working on following the fluid restriction     ROS:  Gen - no fever, no chills, appetite unchanged  CV - no chest pain  Lung - no shortness of breath, no cough  Abd - + tenderness, no nausea/vomiting, no diarrhea  Ext - + edema improving    Exam:  Vitals:    11/12/24 0600 11/12/24 0705 11/12/24 0800 11/12/24 0830   BP:  (!) 151/92 105/84 (!) 144/76   Pulse:  95 83 83   Resp:  18     Temp:  97.7 °F (36.5 °C)     TempSrc:  Oral     SpO2:  96%     Weight: (!) 155.7 kg (343 lb 3.2 oz)      Height:           No intake or output data in the 24 hours ending 11/12/24 0840        Wt Readings from Last 3 Encounters:   11/12/24 (!) 155.7 kg (343 lb 3.2 oz)   11/06/24 (!) 156.2 kg (344 lb 6.4 oz)   10/18/24 (!) 150.6 kg (332 lb)       GEN - in no distress, alert and oriented   CV - regular rate, S1, S2, no Rub   Lung - clear bilaterally  Abd - soft, +tender  Ext - + 1 BLE edema  Access: Right TCC intact    No results for input(s): \"WBC\", \"HGB\", \"HCT\", \"PLT\", \"INR\" in the last 72 hours.       No results for input(s): \"NA\", \"K\", \"CL\", \"CO2\", \"BUN\", \"CREATININE\", \"GLU\", \"CALCIUM\", \"MG\", \"PHOS\", \"ALBUMIN\" in the last 72 hours.    Invalid input(s): \"CA\"      Assessment / Plan:  Principal Problem:    Sepsis (HCC)  Resolved Problems:    * No resolved hospital problems. *    1) ESRD - HD MWF.  Previously on PD. Changing to TTS for rehab schedule. HD today  -discussed fluid gains with her again today and need for fluid restriction  -has gotten HD almost every day last week for fluid removal  -daily weights     2) Peritonitis - PD catheter removed 10/15/24  -Continue vancomycin EOT 12/1/2024      3) Abdominal pain - evidence of subcutaneous hematoma and cellulitis.   CT ab hematoma with improvement. Free fluid shows improvement.   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

## 2024-11-12 NOTE — DIALYSIS
TRANSFER OUT- DIALYSIS    Hemodialysis treatment completed. PT ran 4 hours, without complications.    Patient alert and VS stable  BP (!) 151/99   Pulse 83   Temp 97.7 °F (36.5 °C)   Resp 18   Ht 1.753 m (5' 9\")   Wt (!) 155.7 kg (343 lb 3.2 oz)   SpO2 96%   BMI 50.68 kg/m²        4 Kg removed.      Flushed both ports with 10 mL of NS.  CVC dressing clean, dry, and intact, tego caps intact, curos caps placed.      Meds given: 0.    RBCs given during dialysis: 0    Patient to 902 after dialysis.

## 2024-11-12 NOTE — PROGRESS NOTES
PHYSICAL THERAPY DAILY NOTE    PT Individual Minutes  Time In: 0931  Time Out: 1000  Minutes: 29                 Total Treatment Time:  29 Minutes    Pt. Seen for: AM, Patient Education, and Therapeutic Exercise     Subjective: Patient reporting she is OK. Reports she is bored and ready to exercise. Reports she is able to mobilize in the bed a little more but the bed is too small         Objective:  Restrictions/Precautions: Bed Alarm, Fall Risk  Required Braces or Orthoses?: No            GROSS ASSESSMENT   Patient resting in bed in dialysis. Patient seen for LE therapeutic exercises while in dialysis        COGNITION Daily Assessment    Overall Orientation Status: Within Normal Limits   Overall Cognitive Status: WNL        BED/MAT MOBILITY Daily Assessment     Bed Mobility Comments: NT this AM        TRANSFERS Daily Assessment    Comment: NT this AM          GAIT Daily Assessment    Comments: NT this AM              STEPS/STAIRS Daily Assessment      NT             BALANCE Daily Assessment    NT       WHEELCHAIR MOBILITY Daily Assessment          NT                LOWER EXTREMITY EXERCISES   SUPINE EXERCISES for both LEs Sets Reps Comments   Ankle Pumps 3 10    Glut Sets 3 10    Heel Slides 3 10    Hip Abduction 3 10    Short Arc Quad 3 10    Straight Leg Raise 3 10    Increased time and effort to complete with multiple and frequent rest breaks. Cues for correct form         Pain level: 0 to 5 out of 10  Pain Location:  low back, lateral hips, sacral area  Pain Interventions: Medication per order, rest, positioning,relaxation, body mechanics,     Vital Signs:  BP (!) 155/93   Pulse 82   Temp 97.7 °F (36.5 °C) (Oral)   Resp 18   Ht 1.753 m (5' 9\")   Wt (!) 155.7 kg (343 lb 3.2 oz)   SpO2 96%   BMI 50.68 kg/m²       Education:    Education Given To: Patient  Education Provided: Energy Conservation;Home Exercise Program  Education Method: Demonstration;Verbal  Barriers to Learning: None  Education Outcome:

## 2024-11-12 NOTE — PROGRESS NOTES
[facility] OCCUPATIONAL THERAPY DAILY NOTE  OT Individual Minutes  Time In: 1311  Time Out: 1355  Minutes: 44               Subjective: Pt agreeable to treatment. \"I need to use the restroom\".  Pain: RN notified  and Pain L hip, tolerable for therapy .  Interdisciplinary Communication: Collaborated with PT and RN regarding pt status and pt performance.   Precautions: Falls, [place] Alarm, and dialysis port, HD dialysis     Vitals: /82 seated EOB, HR 97, SpO2 %      FUNCTIONAL MOBILITY:       Bed Mobility Jt-MOD A  LE assist from sit >supine   Sit to Stand ModA    Transfer  ModA  Transfer Type: SPT  Equipment: Grab Bars and RW    Ambulation NT  Equipment: NA       - Self-Care   Pt required to use restroom upon OT arrival. Pt performed SPT from EOB >w/c with FWW with MOD A X1 with second person for safety. Pt performed SPT from w/c<> toilet with use of GB MOD A X1. Pt performed toileting with MIN A For sergio cares management for posterior care d/t IV placement. Pt wearing dress, would be able to assist with pants management if wearing. Pt performed hand hygiene seated in w/c with MOD I.     Pt post session request to return back to bed with MIN/MOD A For bed mobility BLE assist. Pt performed SPT from w/c>EOB with FWW MOD A X1.  ADL's increased time d/t fatigue following dialysis/not feeling good. L hip pain complaints.       - Range of Motion - Strengthening   Pt completed 5 minutes on the upper body ergometer,forward  with light and moderate resistance to increase upper body strength and activity tolerance for integration into functional tasks. Pt required rest break in between d/t fatigue.       Education   AE/DME Training, Benefits of OT, Energy Conservation, Pacing, Functional Transfer Training, Rolling Walker Management, and Safety Awareness    Handout on toileting DME provided this date and went over for home.      Assessment:Pt demonstrated good participation in OT treatment. Pt continues to benefit from

## 2024-11-12 NOTE — PATIENT CARE CONFERENCE
[facility] [place], [place]  INPATIENT REHABILITATION  TEAM CONFERENCE NOTE    Conference Date: 2024  Admit Date: 2024  4:23 PM  Patient Name: Giselle Rosado    MRN: [id number]    : 1970 (54 y.o.)  Rehabilitation Admitting Diagnosis: Sepsis (HCC) [A41.9]           Team Members Present at Conference:  :  Donna Martinez   Nurse:  Kristan Bang RN  Occupational Therapist: NICKIE James/LIN  Physical Therapist: Joesph Jones PT  Speech Therapist: CHAYITO      ---------------------------------------------------------------------------------------------------    Case Management:  Patient's PLOF: Independent with IADLs, Independent with ADLs, and Independent with mobility - last drove 3 months ago secondary to recent illness  Patient's Prior Living Situation:  lives with  and daughter  Baseline Supervision/Assistance: None  Current issues/needs regarding patient and family discharge status: needing more assistance than baseline    ---------------------------------------------------------------------------------------------------    Functional Assessment:  Most Recent Mobility Scores Per Physical Therapy:   Bed/Mat Mobility Bridging: Moderate assistance  Rolling to Left: Moderate assistance (using bed rail)  Rolling to Right: Moderate assistance (using bed rail)  Supine to Sit:  (NT)  Sit to Supine: Moderate assistance (NT)  Scooting: Moderate assistance  Bed Mobility Comments: Increased time and effort to complete with cues for body mechanics, using bed rail   Transfers Sit to Stand: Moderate Assistance (with RW and with grab bars)  Stand to Sit: Moderate Assistance (with grab bars and RW)  Stand Pivot Transfers: Moderate Assistance (SPT w/c<>tub bench using grab bar)  Squat Pivot Transfers: Unable to assess  Lateral Transfers: Unable to assess  Car Transfer: Unable to assess  Comment: Increased time and effort to complete with cues for body mechanics    Gait Surface: Level  no  Nutrition: Weight - Scale: (!) 155.7 kg (343 lb 3.2 oz) / Body mass index is 50.68 kg/m².    Current diet: ADULT DIET; Regular; 1200 ml  ADULT ORAL NUTRITION SUPPLEMENT; Breakfast, Lunch, Dinner; Renal Oral Supplement    Deficiencies: dialysis, infection, skin concerns    ---------------------------------------------------------------------------------------------------  Vitals:    11/12/24 0600 11/12/24 0705 11/12/24 0800 11/12/24 0830   BP:  (!) 151/92 105/84 (!) 144/76   Pulse:  95 83 83   Resp:  18     Temp:  97.7 °F (36.5 °C)     TempSrc:  Oral     SpO2:  96%     Weight: (!) 155.7 kg (343 lb 3.2 oz)      Height:                  Medical Impediments: Medication/medical management ongoing for: wound care, renal/dialysis. Fluid control, pain management     Patient requires continued close monitoring of the following systems: CV: Monitor blood pressure and heart rate.  Adjust medications as needed; Endocrine: Monitor glycemia and adjust pharmacologic regimen as needed; Respiratory: Incentive spirometer every 2 hours while awake, as needed.  To be followed by respiratory therapist as needed; Sleep: Monitor sleep cycles and improve sleep quality as necessary to ensure adequate rest to be able to participate in daily therapy sessions; Skin: Monitor incisions/wounds for adequate healing.  Wound care coordinator consulted as needed.  Frequent turning while in bed and repositioning in chair.  Monitor for skin breakdown or erythema; Psychiatric: Monitor for signs and symptoms of depression.  Monitor appetite.  Monitor for depression anxiety as the patient is adjusting to disability.  Monitor and adjust medications as needed; Nutrition: Continue current diet and adjust as needed and as tolerated.  Consult dietitian for nutritional assessment and recommendations; ID: Patient will be monitored closely for any signs or symptoms of infection, such as elevated white blood cell count, increased temperature, signs of UTI;  11/26/2024  Discharge Destination: Home - declining HH due to having large dogs   Services at Discharge: To be determined closer to pt d/c  Equipment at Discharge: To be determined closer to pt d/c  Barriers to the achievement of predicted outcomes: Pain, Decreased endurance, and Decreased strength    ---------------------------------------------------------------------------------------------------        Scribed by: LINDSAY NEVES OT on 11/12/2024 at 9:06 AM    I approve the established interdisciplinary plan of care as documented within the medical record of Giselle Rosado. I was present and led the interdisciplinary care conference.    Dr. Abilio Currie

## 2024-11-12 NOTE — PROGRESS NOTES
PHYSICAL THERAPY DAILY NOTE    PT Individual Minutes  Time In: 1529  Time Out: 1634  Minutes: 65                 Total Treatment Time:  29 Minutes    Pt. Seen for: PM, Gait Training, Patient Education, Therapeutic Exercise, Transfer Training, and Other     Subjective: Patient reporting she is tired from dialysis and from OT treatment. Reports they took more fluid off today and it makes her feel weak.         Objective:  Restrictions/Precautions: Bed Alarm, Fall Risk  Required Braces or Orthoses?: No            GROSS ASSESSMENT   Patient resting in bed at beginning of treatment        COGNITION Daily Assessment    Overall Orientation Status: Within Normal Limits   Overall Cognitive Status: WNL        BED/MAT MOBILITY Daily Assessment   Instructed patient and patient's family member on how to assist patient with bed mobility in bariatric air bed.   Made recommendations for asking for nursing assistance when transitioning from supine <>sit    Bridging: Moderate assistance  Rolling to Left: Moderate assistance (using bed rail)  Rolling to Right: Moderate assistance (using bed rail)  Sit to Supine: Moderate assistance (NT)  Scooting: Moderate assistance  Bed Mobility Comments: Increased time and effort to complete with cues for body mechanics. Completed on air bed        TRANSFERS Daily Assessment    Sit to Stand: Moderate Assistance (with RW and with grab bars)  Stand to Sit: Moderate Assistance (with grab bars and RW)  Stand Pivot Transfers: Moderate Assistance (SPT w/c<>tub bench using grab bar)  Comment: Increased time and effort to complete with cues for body mechanics          GAIT Daily Assessment    Surface: Level tile  Device: Rolling Walker (gait belt)  Other Apparatus: Wheelchair follow  Assistance: Minimal assistance  Quality of Gait: slow start/stop step to gait pattern with flexed posture  Gait Deviations: Decreased step length;Decreased arm swing;Decreased step height;Slow Shirin;Increased ALEE  Distance:  6 ft x 1  More Ambulation?: No              STEPS/STAIRS Daily Assessment    Stairs?: No              BALANCE Daily Assessment    Static Sitting: Normal:  Pt. able to maintain balance w/o UE support  Dynamic Sitting: Good - accepts moderate challenge;  can maintain balance while picking object off the floor  Static Standing: Fair:  Pt. requires UE support;  may need occasional min A with RW  Dynamic Standing: Poor - unable to accept challenge or move without LOB        WHEELCHAIR MOBILITY Daily Assessment    Wheelchair Size: 24 x 18  Wheelchair Type: Standard  Wheelchair Cushion: Standard  Pressure Relief Type:  (sit<>stand with RW)  Level of Assistance for pressure relief activities: Moderate assistance  Wheelchair Parts Management: No  Propulsion: No                     LOWER EXTREMITY EXERCISES   LE motomed x 10 mins at level 1    SEATED EXERCISES Sets Reps Comments   Ankle PF 3 10    Ankle DF 3 10    Long Arc Quads 3 10    Hip Abduction with red Theraband 3 10    Hamstring Curls with red Theraband 3 10    Hip Extension with green Theraband 3 10    Increased time and effort to complete with multiple and frequent rest breaks. Min assist with Cues for correct form         Pain level: 0 to 5 out of 10  Pain Location:  low back, lateral hips, sacral area  Pain Interventions: Medication per order, rest, positioning,relaxation, body mechanics,     Vital Signs:  /60   Pulse 84   Temp 97.7 °F (36.5 °C)   Resp 18   Ht 1.753 m (5' 9\")   Wt (!) 155.7 kg (343 lb 3.2 oz)   SpO2 98%   BMI 50.68 kg/m²       Education:    Education Given To: Patient  Education Provided: Energy Conservation;Home Exercise Program;Transfer Training;Safety;Mobility Training;Precautions;Fall Prevention Strategies  Education Method: Demonstration;Verbal  Barriers to Learning: None  Education Outcome: Verbalized understanding;Demonstrated understanding;Continued education needed     Interdisciplinary Communication: spoke with RN regarding  vital signs     Pt. Left in bed call bell in reach needs in reach family at bedside         Assessment: Improved positioning, pain control  and bed mobility in bariatric air bed. Functional endurance and strength improving daily         Plan of Care: Continue with POC and progress as tolerated.     Joesph Jones, PT  11/12/2024

## 2024-11-12 NOTE — CARE COORDINATION
Transition of care outreach postponed due to patient's discharge to 9th floor inpatient rehab facility.

## 2024-11-13 PROCEDURE — 6370000000 HC RX 637 (ALT 250 FOR IP): Performed by: PHYSICAL MEDICINE & REHABILITATION

## 2024-11-13 PROCEDURE — 99232 SBSQ HOSP IP/OBS MODERATE 35: CPT | Performed by: PHYSICAL MEDICINE & REHABILITATION

## 2024-11-13 PROCEDURE — 97110 THERAPEUTIC EXERCISES: CPT

## 2024-11-13 PROCEDURE — 97530 THERAPEUTIC ACTIVITIES: CPT

## 2024-11-13 PROCEDURE — 97535 SELF CARE MNGMENT TRAINING: CPT

## 2024-11-13 PROCEDURE — 97116 GAIT TRAINING THERAPY: CPT

## 2024-11-13 PROCEDURE — 1180000000 HC REHAB R&B

## 2024-11-13 PROCEDURE — 2580000003 HC RX 258: Performed by: PHYSICAL MEDICINE & REHABILITATION

## 2024-11-13 RX ORDER — MIDODRINE HYDROCHLORIDE 5 MG/1
5 TABLET ORAL
Status: DISCONTINUED | OUTPATIENT
Start: 2024-11-14 | End: 2024-11-13

## 2024-11-13 RX ORDER — MIDODRINE HYDROCHLORIDE 5 MG/1
5 TABLET ORAL
Status: DISCONTINUED | OUTPATIENT
Start: 2024-11-15 | End: 2024-11-20

## 2024-11-13 RX ADMIN — HYDROCODONE BITARTRATE AND ACETAMINOPHEN 1 TABLET: 5; 325 TABLET ORAL at 07:41

## 2024-11-13 RX ADMIN — MIDODRINE HYDROCHLORIDE 5 MG: 5 TABLET ORAL at 13:32

## 2024-11-13 RX ADMIN — SEVELAMER CARBONATE 800 MG: 800 TABLET, FILM COATED ORAL at 12:01

## 2024-11-13 RX ADMIN — HYDROCODONE BITARTRATE AND ACETAMINOPHEN 1 TABLET: 5; 325 TABLET ORAL at 13:33

## 2024-11-13 RX ADMIN — METOPROLOL TARTRATE 50 MG: 50 TABLET, FILM COATED ORAL at 21:16

## 2024-11-13 RX ADMIN — HYDROCODONE BITARTRATE AND ACETAMINOPHEN 1 TABLET: 5; 325 TABLET ORAL at 21:16

## 2024-11-13 RX ADMIN — SODIUM CHLORIDE, PRESERVATIVE FREE 10 ML: 5 INJECTION INTRAVENOUS at 21:18

## 2024-11-13 RX ADMIN — APIXABAN 5 MG: 5 TABLET, FILM COATED ORAL at 07:41

## 2024-11-13 RX ADMIN — APIXABAN 5 MG: 5 TABLET, FILM COATED ORAL at 21:16

## 2024-11-13 RX ADMIN — SEVELAMER CARBONATE 800 MG: 800 TABLET, FILM COATED ORAL at 07:41

## 2024-11-13 RX ADMIN — SEVELAMER CARBONATE 800 MG: 800 TABLET, FILM COATED ORAL at 17:06

## 2024-11-13 RX ADMIN — ACETAMINOPHEN 1000 MG: 500 TABLET, FILM COATED ORAL at 12:01

## 2024-11-13 RX ADMIN — FAMOTIDINE 20 MG: 20 TABLET, FILM COATED ORAL at 07:41

## 2024-11-13 RX ADMIN — MIDODRINE HYDROCHLORIDE 10 MG: 5 TABLET ORAL at 06:37

## 2024-11-13 RX ADMIN — METOPROLOL TARTRATE 50 MG: 50 TABLET, FILM COATED ORAL at 07:41

## 2024-11-13 RX ADMIN — SERTRALINE 100 MG: 100 TABLET, FILM COATED ORAL at 07:41

## 2024-11-13 RX ADMIN — SERTRALINE 100 MG: 100 TABLET, FILM COATED ORAL at 21:16

## 2024-11-13 RX ADMIN — ONDANSETRON 4 MG: 4 TABLET, ORALLY DISINTEGRATING ORAL at 10:25

## 2024-11-13 ASSESSMENT — PAIN DESCRIPTION - ORIENTATION
ORIENTATION: RIGHT;LEFT
ORIENTATION: POSTERIOR;RIGHT;LEFT
ORIENTATION: LEFT
ORIENTATION: LEFT;RIGHT
ORIENTATION: RIGHT;LEFT;POSTERIOR

## 2024-11-13 ASSESSMENT — PAIN SCALES - GENERAL
PAINLEVEL_OUTOF10: 0
PAINLEVEL_OUTOF10: 0
PAINLEVEL_OUTOF10: 3
PAINLEVEL_OUTOF10: 6
PAINLEVEL_OUTOF10: 4
PAINLEVEL_OUTOF10: 8
PAINLEVEL_OUTOF10: 5
PAINLEVEL_OUTOF10: 2
PAINLEVEL_OUTOF10: 5

## 2024-11-13 ASSESSMENT — PAIN DESCRIPTION - DESCRIPTORS
DESCRIPTORS: ACHING;BURNING
DESCRIPTORS: ACHING

## 2024-11-13 ASSESSMENT — PAIN DESCRIPTION - LOCATION
LOCATION: HIP
LOCATION: BUTTOCKS;HIP
LOCATION: HIP;BUTTOCKS

## 2024-11-13 ASSESSMENT — PAIN - FUNCTIONAL ASSESSMENT: PAIN_FUNCTIONAL_ASSESSMENT: ACTIVITIES ARE NOT PREVENTED

## 2024-11-13 ASSESSMENT — PAIN SCALES - WONG BAKER: WONGBAKER_NUMERICALRESPONSE: NO HURT

## 2024-11-13 NOTE — PROGRESS NOTES
PHYSICAL THERAPY DAILY NOTE    PT Individual Minutes  Time In: 1516  Time Out: 1546  Minutes: 30                 Total Treatment Time:  30 Minutes    Pt. Seen for: PM and Patient Education, bed mobility, transfer training    Subjective: Patient reporting she is ready to get out of bed . Reports she would like to get out of bed. Reports she has not been outside for over a month.          Objective:  Restrictions/Precautions: Fall Risk, Skin  Required Braces or Orthoses?: No      Other position/activity restrictions: bariatric air bed for pressure relief         GROSS ASSESSMENT   Patient resting in bed at beginning of treatment        COGNITION Daily Assessment    Overall Orientation Status: Within Normal Limits   Overall Cognitive Status: WNL        BED/MAT MOBILITY Daily Assessment   Instructed patient and patient's family member on how to assist patient with managing bariatric air bed controls  Made recommendations for asking nursing for assistance with managing air bed controls    Bridging: Minimal assistance  Rolling to Left: Moderate assistance  Rolling to Right: Moderate assistance  Sit to Supine: Moderate assistance  Scooting: Minimal assistance  Bed Mobility Comments: Increased time and effort to complete with cues for body mechanics        TRANSFERS Daily Assessment    Sit to Stand: Minimal Assistance (with RW)  Stand to Sit: Minimal Assistance  Stand Pivot Transfers: Minimal Assistance (with RW)  Comment: Increased time and effort to complete with cues for body mechanics          GAIT Daily Assessment    NT             STEPS/STAIRS Daily Assessment    Stairs?: No              BALANCE Daily Assessment    Static Sitting: Normal:  Pt. able to maintain balance w/o UE support  Dynamic Sitting: Good - accepts moderate challenge;  can maintain balance while picking object off the floor  Static Standing: Fair:  Pt. requires UE support;  may need occasional min A  Dynamic Standing: Poor - unable to accept challenge

## 2024-11-13 NOTE — PROGRESS NOTES
PHYSICAL THERAPY DAILY NOTE    PT Individual Minutes  Time In: 1030  Time Out: 1115  Minutes: 45                 Total Treatment Time:  45 Minutes    Pt. Seen for: AM, Gait Training, Patient Education, Therapeutic Exercise, Transfer Training, and Other     Subjective: Patient reporting she feels better today. Reports she slept better in bariatric air bed last night         Objective:  Restrictions/Precautions: Bed Alarm, Fall Risk, Skin  Required Braces or Orthoses?: No            GROSS ASSESSMENT   Patient resting in bed at beginning of treatment        COGNITION Daily Assessment    Overall Orientation Status: Within Normal Limits   Overall Cognitive Status: WNL        BED/MAT MOBILITY Daily Assessment        Bed Mobility Comments: NT this AM        TRANSFERS Daily Assessment    Sit to Stand: Minimal Assistance (with RW)  Stand to Sit: Minimal Assistance (with RW)  Stand Pivot Transfers: Minimal Assistance (with RW)  Comment: Increased time and effort to complete with cues for body mechanics          GAIT Daily Assessment    Surface: Level tile  Device: Rolling Walker (gait belt)  Other Apparatus: Wheelchair follow  Assistance: Minimal assistance  Quality of Gait: slow start/stop step to gait pattern to slow cont partial step through gait pattern  Gait Deviations: Decreased step length;Decreased arm swing;Decreased step height;Slow Shirin;Increased ALEE  Distance: 15 ft x 1  20 ft x 2  Comments: 5 min seated rest breaks between attempts  More Ambulation?: No              STEPS/STAIRS Daily Assessment    Stairs?: No              BALANCE Daily Assessment    Static Sitting: Normal:  Pt. able to maintain balance w/o UE support  Dynamic Sitting: Good - accepts moderate challenge;  can maintain balance while picking object off the floor  Static Standing: Fair:  Pt. requires UE support;  may need occasional min A with RW  Dynamic Standing: Poor - unable to accept challenge or move without LOB        WHEELCHAIR MOBILITY  Daily Assessment    Wheelchair Size: 24 x 18  Wheelchair Type: Standard  Wheelchair Cushion: Standard  Pressure Relief Type:  (sit<>stand with RW)  Level of Assistance for pressure relief activities: Moderate assistance  Wheelchair Parts Management: No  Propulsion: No                     LOWER EXTREMITY EXERCISES   LE motomed x 10 mins at level 1       Pain level: 0 to 5 out of 10  Pain Location:  low back, lateral hips, sacral area  Pain Interventions: Medication per order, rest, positioning,relaxation, body mechanics,     Vital Signs:  BP (!) 154/88   Pulse 83   Temp 98.2 °F (36.8 °C)   Resp 18   Ht 1.753 m (5' 9\")   Wt (!) 155.7 kg (343 lb 3.2 oz)   SpO2 100%   BMI 50.68 kg/m²       Education:    Education Given To: Patient  Education Provided: Energy Conservation;Transfer Training;Safety;Mobility Training;Precautions;Fall Prevention Strategies  Education Method: Demonstration;Verbal  Barriers to Learning: None  Education Outcome: Verbalized understanding;Demonstrated understanding;Continued education needed     Interdisciplinary Communication: spoke with OT regarding functional status    Pt. to OT at end of treatment         Assessment: Progressing towards goals. Functional strength and endurance improving.         Plan of Care: Continue with POC and progress as tolerated.     Joesph Jones, PT  11/13/2024

## 2024-11-13 NOTE — PROGRESS NOTES
PHYSICAL THERAPY DAILY NOTE    PT Individual Minutes  Time In: 1303  Time Out: 1345  Minutes: 42                 Total Treatment Time:  42 Minutes    Pt. Seen for: PM, Gait Training, Patient Education, Therapeutic Exercise, Transfer Training, and Other     Subjective: Patient reporting she is having a good day today         Objective:  Restrictions/Precautions: Fall Risk, Skin  Required Braces or Orthoses?: No            GROSS ASSESSMENT   Patient resting in w/c at beginning of treatment        COGNITION Daily Assessment    Overall Orientation Status: Within Normal Limits   Overall Cognitive Status: WNL        BED/MAT MOBILITY Daily Assessment     Bridging: Minimal assistance  Rolling to Left: Moderate assistance  Rolling to Right: Moderate assistance  Sit to Supine: Moderate assistance  Scooting: Minimal assistance  Bed Mobility Comments: Increased time and effort to complete with cues for body mechanics        TRANSFERS Daily Assessment    Sit to Stand: Minimal Assistance (with RW)  Stand to Sit: Minimal Assistance  Stand Pivot Transfers: Minimal Assistance (with RW)  Comment: Increased time and effort to complete with cues for body mechanics          GAIT Daily Assessment    Surface: Level tile  Device: Rolling Walker (gait belt)  Other Apparatus: Wheelchair follow  Assistance: Minimal assistance  Quality of Gait: slow start/stop step to gait pattern to slow cont partial step through gait pattern  Gait Deviations: Decreased step length;Decreased arm swing;Decreased step height;Slow Shirin;Increased ALEE  Distance: 30 ft x 1  Comments: 5 min seated rest breaks between attempts  More Ambulation?: No              STEPS/STAIRS Daily Assessment    Stairs?: No              BALANCE Daily Assessment    Static Sitting: Normal:  Pt. able to maintain balance w/o UE support  Dynamic Sitting: Good - accepts moderate challenge;  can maintain balance while picking object off the floor  Static Standing: Fair:  Pt. requires UE  support;  may need occasional min A with RW  Dynamic Standing: Poor - unable to accept challenge or move without LOB        WHEELCHAIR MOBILITY Daily Assessment    Wheelchair Size: 24 x 18  Wheelchair Type: Standard  Wheelchair Cushion: Pressure Relieving (assessing for pressure relieving ROHO cushion)  Pressure Relief Type:  (sit<>stand with RW)  Level of Assistance for pressure relief activities: Minimal assistance  Wheelchair Parts Management: No  Propulsion: No                     LOWER EXTREMITY EXERCISES       SEATED EXERCISES Sets Reps Comments   Ankle PF 3 10    Ankle DF 3 10    Long Arc Quads 3 10    Hip Abduction with red Theraband 3 10    Hamstring Curls with red Theraband 3 10    Hip Extension with green Theraband 3 10    Increased time and effort to complete with multiple and frequent rest breaks. Min assist with Cues for correct form         Pain level: 0 to 5 out of 10  Pain Location:  low back, lateral hips, sacral area  Pain Interventions: Medication per order, rest, positioning,relaxation, body mechanics,     Vital Signs:  BP (!) 146/81   Pulse 82   Temp 97.7 °F (36.5 °C) (Oral)   Resp 19   Ht 1.753 m (5' 9\")   Wt (!) 155.7 kg (343 lb 3.2 oz)   SpO2 97%   BMI 50.68 kg/m²       Education:    Education Given To: Patient  Education Provided: Energy Conservation;Transfer Training;Safety;Mobility Training;Precautions;Fall Prevention Strategies  Education Method: Demonstration;Verbal  Barriers to Learning: None  Education Outcome: Verbalized understanding;Demonstrated understanding;Continued education needed     Interdisciplinary Communication: spoke with RN regarding ROHO cushion    Pt. Left in bed call bell in reach needs in reach family at bedside         Assessment: Improved positioning, pressure relief and pain control with ROHO cushion. Gait distance improving on non dialysis days         Plan of Care: Continue with POC and progress as tolerated.     Joesph Jones, PT  11/13/2024

## 2024-11-13 NOTE — PROGRESS NOTES
[place] [address]  Tel: [phone]     Physical Medicine and Rehabilitation Progress Note      Giselle Rosado  Admit Date: 11/8/2024  Admit Diagnosis: Sepsis (HCC) [A41.9]    Subjective     Patient seen and examined. Denies lightheadedness, SOB or nausea. Reports improved sacral/coccyx pain overnight. Participating in therapy.    Objective:     Current Facility-Administered Medications   Medication Dose Route Frequency    [START ON 11/14/2024] midodrine (PROAMATINE) tablet 5 mg  5 mg Oral Once per day on Tuesday Thursday Saturday    midodrine (PROAMATINE) tablet 10 mg  10 mg Oral PRN    acetaminophen (TYLENOL) tablet 1,000 mg  1,000 mg Oral Q6H PRN    Or    acetaminophen (TYLENOL) suppository 650 mg  650 mg Rectal Q6H PRN    midodrine (PROAMATINE) tablet 10 mg  10 mg Oral 2 times per day on Sunday Monday Wednesday Friday    0.9 % sodium chloride infusion   IntraVENous PRN    aluminum & magnesium hydroxide-simethicone (MAALOX) 200-200-20 MG/5ML suspension 30 mL  30 mL Oral Q6H PRN    apixaban (ELIQUIS) tablet 5 mg  5 mg Oral BID    bisacodyl (DULCOLAX) suppository 10 mg  10 mg Rectal Daily PRN    dicyclomine (BENTYL) capsule 10 mg  10 mg Oral TID PRN    epoetin oralia-epbx (RETACRIT) injection 10,000 Units  10,000 Units SubCUTAneous Weekly    famotidine (PEPCID) tablet 20 mg  20 mg Oral Daily    fluticasone (FLONASE) 50 MCG/ACT nasal spray 2 spray  2 spray Nasal Nightly PRN    lidocaine 4 % external patch 4 patch  4 patch TransDERmal Daily    metoprolol tartrate (LOPRESSOR) tablet 50 mg  50 mg Oral BID    ondansetron (ZOFRAN-ODT) disintegrating tablet 4 mg  4 mg Oral Q8H PRN    Or    ondansetron (ZOFRAN) injection 4 mg  4 mg IntraVENous Q6H PRN    polyethylene glycol (GLYCOLAX) packet 17 g  17 g Oral Daily PRN    sertraline (ZOLOFT) tablet 100 mg  100 mg Oral BID    sevelamer (RENVELA) tablet 800 mg  800 mg Oral TID WC    sodium chloride flush 0.9 %  from the L rectus/inferior epigastric arterial territory, increased in size of hematomas measuring 7.9 x 3.4 cm. IR was consulted, s/p thrombin injection. Repeat CT showed improvement of L abdominal wall hematoma and thrombosed pseudoaneurysm. She continues with mobility and self care impairments..     Rehabilitation Plan:  Continue PT for a minimum of 1.5 hours a day, at least 5 out of 7 days per week to address bed mobility, transfers, ambulation, strengthening, balance, and endurance.   Continue OT for a minimum of 1.5 hours a day, at least 5 out of 7 days per week to address ADL (feeding, grooming, bathing, UE and LE dressing, toileting); instrumental ADLs; cognitive function; safety; energy conservations; community reintegration; and adaptive equipment as needed.     Continue 24-hour skilled rehabilitation nursing for bowel and bladder management, skin care for decubitus ulcer prevention, pain management and ongoing medication administration     Plan / Recommendations / Medical Decision Making:     Below are the active medical comorbidities / hospital conditions which will affect rehab course with plan for mitigation. Continue daily physician / PA medical management:    Sepsis / peritonitis - on vanco through 12/1    ESRD on HD - nephrology following, on T, Th, S schedule   Obesity, morbid (BMI- 50.8 ) weight 343 lb, on 1200ml fluid restriction    Anemia - Hgb - 8.7 > 8.7 on 11/8, low, yet stable, on epoetin subq weekly    Hypo/HTN / A. fib - elevated sitting and supine BPs, midodrine tapered to 5mg bid prior to therapies, on metoprolol    Anticoagulation mgmt /DVT prophylaxis- on eliquis bid    Irritable bowel syndrome - on prn bentyl    Pain mgmt - prn norco    Buttock decubitus ulcer - WOCN appreciated, bariatric specialty mattress begun    The patient is making good functional gains with mobility and ADLs.Anticipate d/c home on 11/26.    Time spent was 35 minutes with over 1/2 in direct patient care /  examination, consultation and coordination of care.    Signed By: Abilio Currie MD     November 13, 2024

## 2024-11-13 NOTE — CARE COORDINATION
RN CM in room, patient sitting up in chair, eating lunch, alert and oriented, pleasant. Made aware of 2 pm appt for dialysis at [place]. Patient with no other questions at this time and is aware RN CM to follow for any needs.

## 2024-11-13 NOTE — PROGRESS NOTES
[facility] OCCUPATIONAL THERAPY DAILY NOTE  OT Individual Minutes  Time In: 1115  Time Out: 1200  Minutes: 45               Subjective: Pt agreeable to treatment. \"It felt so good to walk today.\" ( In regards to previous PT session).  Pain: No pain expressed.  Interdisciplinary Communication: Collaborated with PT regarding handoff communication.   Precautions: Falls, [place] Alarm, and dialysis port, HD dialysis     FUNCTIONAL MOBILITY:       Bed Mobility NT    Sit to Stand CGA and Jt  Pt stood 2x with touching A-min A from w/c for therapeutic activity    Transfer  NT  Transfer Type: NA  Equipment: NA    Ambulation NT  Equipment: NA       - Activity Tolerance - Strengthening   Pt completed 10 minutes on the upper body ergometer, ½ forward and ½ backward, with light and moderate resistance to increase upper body strength and activity tolerance for integration into functional tasks. Pt required rest break in between. Pt then participated in w/c push ups for BUE strengthening with difficulties completing hip clearance during/pushing through arm with strengthening of total body weight with completion of 3 sets of 5 reps with required rest breaks in between.     Pt then participated in standing tolerance/endurance/strengthening with w/c placed behind pt to improve ADL/IADL performance. Pt participated in one handed grabbing various bottle sizes placing into a cart and stood with close SBA/w.c behind pt with no LOB with use of unilateral UE for support/leaning against table. Pt stood for 40 seconds with required rest break following. Pt then participated in same standing task with 1 handed task with resistive clips from heavy-light resistance of ~15 clips for 50 seconds standing tolerance with required rest break following. Increased time for tasks.    Therapist and pt discussed DME for home, therapist demonstrated tub t/f bench in ADL training room. All questions and concerns addressed. Discussed DME recommendations and POC.

## 2024-11-13 NOTE — PROGRESS NOTES
KIT DAILY FOLLOW UP RENAL INSUFFICIENCY PATIENT   [facility]   Pharmacy Pharmacokinetic Monitoring Service - Vancomycin    Consulting Provider: Dr. Toribio  Indication: PD catheter complicated peritonitis   Target Concentration: Pre-dialysis concentration 15-20 mg/L  EOT: 12/1/24 per ID  Additional Antimicrobials: None    Pertinent Laboratory Values:   Wt Readings from Last 1 Encounters:   11/12/24 (!) 155.7 kg (343 lb 3.2 oz)     Temp Readings from Last 1 Encounters:   11/13/24 98.2 °F (36.8 °C)     No results for input(s): \"BUN\", \"CREATININE\", \"WBC\", \"PROCAL\", \"LACACIDPL\", \"LACTA\", \"LCAD\", \"LACTSEPSIS\" in the last 72 hours.    Invalid input(s): \"PROCAT\", \"PCT\", \"LAC\", \"LACT\", \"LACPOC\"      Lab Results   Component Value Date/Time    ABIGAIL 8.8 10/14/2024 11:22 AM    ADRIANAORANDOM 19.5 11/10/2024 06:08 AM     Assessment:  Date:  Dose/Freq Admin Times Level/Time:   10/24      10/25 HD 1000 mg x1  [address] @0400 = 21.6   10/26      10/27      10/28 HD  1000 mg x1 [address] @0604 = 21.1   10/29      10/30  mg x1 [address] @0651 = 24.7   10/31      11/1  mg x 1 1256    11/2      11/3      11/4  mg x 1 1407    11/5  mg x 1 [address] @ 0351 = 24.1   11/6  mg x 1 [address] @ 0519 = 21.3   11/7  mg x 1 1237    11/8  mg x 1 [address] @ 0344 = 20.3   11/9  mg x 1 1355    11/10   Rd @ 0608 = 19.5   11/11 11/12  mg x 1 1305    11/13        Plan:  Concentration-guided dosing due to intermittent hemodialysis  No dose needed today  Repeat vancomycin concentrations will be ordered as clinically appropriate  Pharmacy will continue to monitor patient and adjust therapy as indicated    Thank you for the consult,  Angelica Ocasio RPH

## 2024-11-13 NOTE — PROGRESS NOTES
[facility] Progress Note    Follow-Up on: ESRD      Pt seen and examined. Complains of feeling poorly after dialysis yesterday and asks if less fluid can be removed     ROS:  Gen - no fever, no chills, appetite unchanged  CV - no chest pain  Lung - no shortness of breath, no cough  Abd - + tenderness, no nausea/vomiting, no diarrhea  Ext - + edema improving    Exam:  Vitals:    11/12/24 1528 11/12/24 1832 11/12/24 2021 11/13/24 0740   BP: 117/60  122/73 (!) 174/82   Pulse: 84  84 72   Resp: 18 16 19 18   Temp:   99.1 °F (37.3 °C) 98.2 °F (36.8 °C)   TempSrc: Oral  Oral    SpO2: 98%  99% 100%   Weight:       Height:             Intake/Output Summary (Last 24 hours) at 11/13/2024 0913  Last data filed at 11/12/2024 1700  Gross per 24 hour   Intake 1420.37 ml   Output 4000 ml   Net -2579.63 ml           Wt Readings from Last 3 Encounters:   11/12/24 (!) 155.7 kg (343 lb 3.2 oz)   11/06/24 (!) 156.2 kg (344 lb 6.4 oz)   10/18/24 (!) 150.6 kg (332 lb)       GEN - in no distress, alert and oriented   CV - regular rate, S1, S2, no Rub   Lung - clear bilaterally  Abd - soft, +tender  Ext - + 1 BLE edema  Access: Right TCC intact    No results for input(s): \"WBC\", \"HGB\", \"HCT\", \"PLT\", \"INR\" in the last 72 hours.       No results for input(s): \"NA\", \"K\", \"CL\", \"CO2\", \"BUN\", \"CREATININE\", \"GLU\", \"CALCIUM\", \"MG\", \"PHOS\", \"ALBUMIN\" in the last 72 hours.    Invalid input(s): \"CA\"      Assessment / Plan:  Principal Problem:    Sepsis (HCC)  Resolved Problems:    * No resolved hospital problems. *    1) ESRD - HD MWF.  Previously on PD. On TTS for rehab schedule. HD tomorrow  -discussed fluid gains with her again today and need for fluid restriction  -daily weights     2) Peritonitis - PD catheter removed 10/15/24  -Continue vancomycin EOT 12/1/2024      3) Abdominal pain - evidence of subcutaneous hematoma and cellulitis.   CT ab hematoma with improvement. Free fluid shows improvement.   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

## 2024-11-13 NOTE — PROGRESS NOTES
[facility] OCCUPATIONAL THERAPY DAILY NOTE  OT Individual Minutes  Time In: 0917  Time Out: 1006  Minutes: 49               Subjective: Pt agreeable to treatment. \"I slept like a log\"  Pain: No pain expressed.  Interdisciplinary Communication: Collaborated with PT and RN regarding pt status and pt performance.   Precautions:   Falls, [place] Alarm, and dialysis port, HD dialysis     FUNCTIONAL MOBILITY:       Bed Mobility Jt HOB elevated, use of bed rail   Sit to Stand CGA and tJ    Transfer  CGA and Jt  Transfer Type: SPT  Equipment: RW Pt performed SPT from EOB>w/c MIN A with FWW, pt performed SPT from w/c<>toilet with GB touching A with increased time; pt performed few STS's during ADL's with FWW touching A-MIN A.   Ambulation NT  Equipment: NA      ACTIVITIES OF DAILY LIVING:  Oral Hygiene Setup or clean-up assistance S/U A seated in w/c sinkside, pt requires to sit d/t decreased standing tolerance/endurance   Shower/Bathe Partial/moderate assistance Pt required overall MIN A for sponge bath while seated in w/c, pt required A for B feet, A for standing posterior cares/pt able to participate in steps with use of FWW   Upper Body  Dressing Setup or clean-up assistance S/U A while seated in w/c   Lower Body Dressing  (Pt prefers not to use pants/brief d/t swelling in abdomen)     Toileting Hygiene Supervision or touching assistance Pt required SPV seated toileting, standing touching A for gown management with FWW   Toilet Transfer Supervision or touching assistance Touching A For toilet t/f pivot w/c<>toilet with use of GB   Education Adaptive ADL Techniques, AE/DME Training, Benefits of OT, Energy Conservation, Pacing, Functional Transfer Training, Rolling Walker Management, and Safety Awareness  Education provided on home DME recommendations, home ADL/IADL energy conservation techniques/pacing, skin integrity/off loading pressure relief education, AE training for ADL's with toileting/showering     Assessment: Pt  demonstrated good participation in OT treatment. Pt continues to benefit from skilled OT services to address remaining deficits and progress toward baseline level of independence and safety. Patient ended session seated in w/c with call bell and needs within reach and with family member at bedside.   Plan: Continue OT POC.     JAYJAY PARISI OT   11/13/2024

## 2024-11-14 PROCEDURE — 97535 SELF CARE MNGMENT TRAINING: CPT

## 2024-11-14 PROCEDURE — 90935 HEMODIALYSIS ONE EVALUATION: CPT

## 2024-11-14 PROCEDURE — 97530 THERAPEUTIC ACTIVITIES: CPT

## 2024-11-14 PROCEDURE — 6360000002 HC RX W HCPCS: Performed by: PHYSICAL MEDICINE & REHABILITATION

## 2024-11-14 PROCEDURE — 2580000003 HC RX 258: Performed by: PHYSICAL MEDICINE & REHABILITATION

## 2024-11-14 PROCEDURE — 99231 SBSQ HOSP IP/OBS SF/LOW 25: CPT | Performed by: PHYSICAL MEDICINE & REHABILITATION

## 2024-11-14 PROCEDURE — 97110 THERAPEUTIC EXERCISES: CPT

## 2024-11-14 PROCEDURE — 6370000000 HC RX 637 (ALT 250 FOR IP): Performed by: PHYSICAL MEDICINE & REHABILITATION

## 2024-11-14 PROCEDURE — 1180000000 HC REHAB R&B

## 2024-11-14 PROCEDURE — 97116 GAIT TRAINING THERAPY: CPT

## 2024-11-14 RX ADMIN — APIXABAN 5 MG: 5 TABLET, FILM COATED ORAL at 13:10

## 2024-11-14 RX ADMIN — SERTRALINE 100 MG: 100 TABLET, FILM COATED ORAL at 13:10

## 2024-11-14 RX ADMIN — ACETAMINOPHEN 1000 MG: 500 TABLET, FILM COATED ORAL at 16:04

## 2024-11-14 RX ADMIN — SEVELAMER CARBONATE 800 MG: 800 TABLET, FILM COATED ORAL at 13:10

## 2024-11-14 RX ADMIN — VANCOMYCIN HYDROCHLORIDE 500 MG: 500 INJECTION, POWDER, LYOPHILIZED, FOR SOLUTION INTRAVENOUS at 11:36

## 2024-11-14 RX ADMIN — HYDROCODONE BITARTRATE AND ACETAMINOPHEN 1 TABLET: 5; 325 TABLET ORAL at 20:07

## 2024-11-14 RX ADMIN — SEVELAMER CARBONATE 800 MG: 800 TABLET, FILM COATED ORAL at 16:04

## 2024-11-14 RX ADMIN — HYDROCODONE BITARTRATE AND ACETAMINOPHEN 1 TABLET: 5; 325 TABLET ORAL at 13:16

## 2024-11-14 RX ADMIN — APIXABAN 5 MG: 5 TABLET, FILM COATED ORAL at 20:07

## 2024-11-14 RX ADMIN — SERTRALINE 100 MG: 100 TABLET, FILM COATED ORAL at 20:07

## 2024-11-14 RX ADMIN — METOPROLOL TARTRATE 50 MG: 50 TABLET, FILM COATED ORAL at 13:09

## 2024-11-14 RX ADMIN — HYDROCODONE BITARTRATE AND ACETAMINOPHEN 1 TABLET: 5; 325 TABLET ORAL at 05:30

## 2024-11-14 RX ADMIN — FAMOTIDINE 20 MG: 20 TABLET, FILM COATED ORAL at 13:10

## 2024-11-14 ASSESSMENT — PAIN DESCRIPTION - LOCATION
LOCATION: HIP

## 2024-11-14 ASSESSMENT — PAIN SCALES - GENERAL
PAINLEVEL_OUTOF10: 7
PAINLEVEL_OUTOF10: 6
PAINLEVEL_OUTOF10: 8
PAINLEVEL_OUTOF10: 6

## 2024-11-14 ASSESSMENT — PAIN SCALES - WONG BAKER
WONGBAKER_NUMERICALRESPONSE: NO HURT
WONGBAKER_NUMERICALRESPONSE: NO HURT

## 2024-11-14 ASSESSMENT — PAIN DESCRIPTION - FREQUENCY: FREQUENCY: CONTINUOUS

## 2024-11-14 ASSESSMENT — PAIN DESCRIPTION - DESCRIPTORS
DESCRIPTORS: ACHING

## 2024-11-14 ASSESSMENT — PAIN DESCRIPTION - PAIN TYPE: TYPE: ACUTE PAIN

## 2024-11-14 ASSESSMENT — PAIN DESCRIPTION - ORIENTATION
ORIENTATION: LEFT

## 2024-11-14 ASSESSMENT — PAIN - FUNCTIONAL ASSESSMENT: PAIN_FUNCTIONAL_ASSESSMENT: ACTIVITIES ARE NOT PREVENTED

## 2024-11-14 ASSESSMENT — PAIN DESCRIPTION - ONSET: ONSET: ON-GOING

## 2024-11-14 NOTE — DIALYSIS
TRANSFER OUT- DIALYSIS    Hemodialysis treatment completed. PT ran 4 hours, without complications.    Patient A&O and VS stable  /90  P 90       2.8 Kg removed.      Flushed both ports with 10 mL of NS.  CVC dressing clean, dry, and intact, tego caps intact, curos caps placed.      Meds given: VancoMycin.        Patient to 902 after dialysis.

## 2024-11-14 NOTE — PROGRESS NOTES
[facility] Progress Note    Follow-Up on: ESRD      Pt seen and examined. No new complaints.      ROS:  Gen - no fever, no chills, appetite unchanged  CV - no chest pain  Lung - no shortness of breath, no cough  Abd - + tenderness, no nausea/vomiting, no diarrhea  Ext - + edema improving    Exam:  Vitals:    11/13/24 2146 11/14/24 0600 11/14/24 0628 11/14/24 0801   BP:    (!) 160/90   Pulse:    83   Resp: 18 18 18   Temp:    97.5 °F (36.4 °C)   TempSrc:    Oral   SpO2:    100%   Weight:   (!) 157.4 kg (347 lb 1.6 oz)    Height:             Intake/Output Summary (Last 24 hours) at 11/14/2024 0827  Last data filed at 11/13/2024 2200  Gross per 24 hour   Intake 1115 ml   Output 1 ml   Net 1114 ml           Wt Readings from Last 3 Encounters:   11/14/24 (!) 157.4 kg (347 lb 1.6 oz)   11/06/24 (!) 156.2 kg (344 lb 6.4 oz)   10/18/24 (!) 150.6 kg (332 lb)       GEN - in no distress, alert and oriented   CV - regular rate, S1, S2, no Rub   Lung - clear bilaterally  Abd - soft, +tender  Ext - + 1 BLE edema  Access: Right TCC intact    No results for input(s): \"WBC\", \"HGB\", \"HCT\", \"PLT\", \"INR\" in the last 72 hours.       No results for input(s): \"NA\", \"K\", \"CL\", \"CO2\", \"BUN\", \"CREATININE\", \"GLU\", \"CALCIUM\", \"MG\", \"PHOS\", \"ALBUMIN\" in the last 72 hours.    Invalid input(s): \"CA\"      Assessment / Plan:  Principal Problem:    Sepsis (HCC)  Resolved Problems:    * No resolved hospital problems. *    1) ESRD - HD MWF.  Previously on PD. On TTS for rehab schedule. HD today  -discussed fluid gains with her again today and need for fluid restriction  -daily weights     2) Peritonitis - PD catheter removed 10/15/24  -Continue vancomycin EOT 12/1/2024      3) Abdominal pain - evidence of subcutaneous hematoma and cellulitis.   CT ab hematoma with improvement. Free fluid shows improvement.   Unclear whether it is calciphylaxis given that there is a lot of cellulitis and subcutaneous hematoma     4) Hypotension - on

## 2024-11-14 NOTE — DIALYSIS
TRANSFER IN - DIALYSIS    Received patient in dialysis unit  from [facility] (unit) for ordered procedure.    Consent verified for renal replacement therapy. Procedure explained to patient, opportunity for Q&A provided. Call light given.     Patient alert and vital signs stable.  /79,  P 69.    Hemodialysis initiated using LCVC.  Aspirated and flushed both ports without difficulty. Dressing clean, dry and intact.  Machine settings per MD order.    Heparin 0 unit bolus and 0 units/hr.      Will monitor during treatment.

## 2024-11-14 NOTE — PROGRESS NOTES
[facility] OCCUPATIONAL THERAPY DAILY NOTE  OT Individual Minutes  Time In: 1339  Time Out: 1425  Minutes: 46               Subjective: Pt agreeable to treatment. \"I'm exhausted.\" Following dialysis  Pain: RN notified  and pain/discomfort in abdomen area, B hips L>R hip .  Interdisciplinary Communication: Collaborated with PT and RN regarding pt status, pt performance, and handoff communication.   Precautions: Falls, Malick Alarm, and dialysis port, HD dialysis     FUNCTIONAL MOBILITY:       Bed Mobility NT    Sit to Stand CGA and Jt    Transfer  STS from w/c during therapy     Ambulation NT  Equipment: NA       - Activity Tolerance - Range of Motion - Strengthening   Pt engaged with medium soft Theraputty and beads to address hand strengthening, fine motor control, in-hand manipulation, and bimanual coordination for integration into functional tasks. Pt retrieved 10 beads from putty. Pt participated in moderate resistance .    Pt completed the following exercises with 5 lb lissy to promote UB strength, activity tolerance, and shoulder stabilization for integration into functional tasks:  Exercise Repetitions Comments   [x] Protraction/Retraction anterior glide 3 sets of 10 reps  Rest breaks in between required    [x]              Vs 3 sets of 10 reps  Rest breaks in between required     Pt performed 3 STS's for pressure relief at B hips for 30-45 seconds bouts each time. Requried rest break following with CGA-MIN A for t/f sit<>stand, pt stood with touching A. Increased time for tasks and rest breaks required in between d/t fatigue from dialysis and pain.       Education   Benefits of OT     Assessment: Patient required rest breaks throughout d/t following dialysis this date and pain-see note above for more detail. Pt demonstrated good participation in OT treatment. Pt continues to benefit from skilled OT services to address remaining deficits and progress toward baseline level of independence and safety. Patient ended

## 2024-11-14 NOTE — PLAN OF CARE
Problem: Safety - Adult  Goal: Free from fall injury  11/14/2024 0732 by Everett Zurita, RN  Outcome: Progressing  11/13/2024 2015 by Angelica Agudelo, RN  Outcome: Progressing

## 2024-11-14 NOTE — PROGRESS NOTES
KIT DAILY FOLLOW UP RENAL INSUFFICIENCY PATIENT   [facility]   Pharmacy Pharmacokinetic Monitoring Service - Vancomycin    Consulting Provider: Dr. Toribio  Indication: PD catheter complicated peritonitis   Target Concentration: Pre-dialysis concentration 15-20 mg/L  EOT: 12/1/24 per ID  Additional Antimicrobials: None    Pertinent Laboratory Values:   Wt Readings from Last 1 Encounters:   11/14/24 (!) 157.4 kg (347 lb 1.6 oz)     Temp Readings from Last 1 Encounters:   11/14/24 97.5 °F (36.4 °C) (Oral)     No results for input(s): \"BUN\", \"CREATININE\", \"WBC\", \"PROCAL\", \"LACACIDPL\", \"LACTA\", \"LCAD\", \"LACTSEPSIS\" in the last 72 hours.    Invalid input(s): \"PROCAT\", \"PCT\", \"LAC\", \"LACT\", \"LACPOC\"      Lab Results   Component Value Date/Time    ADRIANANICKIEMARINA 8.8 10/14/2024 11:22 AM    VANCORANDOM 19.5 11/10/2024 06:08 AM     Assessment:  Date:  Dose/Freq Admin Times Level/Time:   10/24      10/25 HD 1000 mg x1  [address] @0400 = 21.6   10/26      10/27      10/28 HD  1000 mg x1 [address] @0604 = 21.1   10/29      10/30  mg x1 [address] @0651 = 24.7   10/31      11/1  mg x 1 1256    11/2      11/3      11/4  mg x 1 1407    11/5  mg x 1 [address] @ 0351 = 24.1   11/6  mg x 1 [address] @ 0519 = 21.3   11/7  mg x 1 1237    11/8  mg x 1 [address] @ 0344 = 20.3   11/9  mg x 1 1355    11/10   Rd @ 0608 = 19.5   11/11 11/12  mg x 1 1305    11/13 11/14  mg x 1 (14)      Plan:  Concentration-guided dosing due to intermittent hemodialysis  Give vancomycin 500 mg IV x 1 following HD today  Repeat vancomycin concentrations will be ordered as clinically appropriate  Pharmacy will continue to monitor patient and adjust therapy as indicated    Thank you for the consult,  Angelica Ocasio RPH

## 2024-11-14 NOTE — PROGRESS NOTES
[place] [address]  Tel: [phone]     Physical Medicine and Rehabilitation Progress Note      Giselle Rosado  Admit Date: 11/8/2024  Admit Diagnosis: Sepsis (HCC) [A41.9]    Subjective     Patient seen and examined. C/o asymmetric abdominal fullness. Denies lightheadedness, SOB, nausea or constipation. Participating in therapy.    Objective:     Current Facility-Administered Medications   Medication Dose Route Frequency    [START ON 11/15/2024] midodrine (PROAMATINE) tablet 5 mg  5 mg Oral 2 times per day on Sunday Monday Wednesday Friday    midodrine (PROAMATINE) tablet 10 mg  10 mg Oral PRN    acetaminophen (TYLENOL) tablet 1,000 mg  1,000 mg Oral Q6H PRN    Or    acetaminophen (TYLENOL) suppository 650 mg  650 mg Rectal Q6H PRN    0.9 % sodium chloride infusion   IntraVENous PRN    aluminum & magnesium hydroxide-simethicone (MAALOX) 200-200-20 MG/5ML suspension 30 mL  30 mL Oral Q6H PRN    apixaban (ELIQUIS) tablet 5 mg  5 mg Oral BID    bisacodyl (DULCOLAX) suppository 10 mg  10 mg Rectal Daily PRN    dicyclomine (BENTYL) capsule 10 mg  10 mg Oral TID PRN    epoetin oralia-epbx (RETACRIT) injection 10,000 Units  10,000 Units SubCUTAneous Weekly    famotidine (PEPCID) tablet 20 mg  20 mg Oral Daily    fluticasone (FLONASE) 50 MCG/ACT nasal spray 2 spray  2 spray Nasal Nightly PRN    lidocaine 4 % external patch 4 patch  4 patch TransDERmal Daily    metoprolol tartrate (LOPRESSOR) tablet 50 mg  50 mg Oral BID    ondansetron (ZOFRAN-ODT) disintegrating tablet 4 mg  4 mg Oral Q8H PRN    Or    ondansetron (ZOFRAN) injection 4 mg  4 mg IntraVENous Q6H PRN    polyethylene glycol (GLYCOLAX) packet 17 g  17 g Oral Daily PRN    sertraline (ZOLOFT) tablet 100 mg  100 mg Oral BID    sevelamer (RENVELA) tablet 800 mg  800 mg Oral TID WC    sodium chloride flush 0.9 % injection 5-40 mL  5-40 mL IntraVENous 2 times per day    vancomycin (VANCOCIN)

## 2024-11-14 NOTE — PROGRESS NOTES
PHYSICAL THERAPY DAILY NOTE    PT Individual Minutes  Time In: 0931  Time Out: 1000  Minutes: 29                 Total Treatment Time:  29 Minutes    Pt. Seen for: AM, Patient Education, and Therapeutic Exercise     Subjective: Patient reporting she is OK. Reports she is sleepy better in the air bed.         Objective:  Restrictions/Precautions: Fall Risk, Skin  Required Braces or Orthoses?: No            GROSS ASSESSMENT   Patient resting in bed in dialysis. Patient seen for LE therapeutic exercises while in dialysis        COGNITION Daily Assessment    Overall Orientation Status: Within Normal Limits   Overall Cognitive Status: WNL        BED/MAT MOBILITY Daily Assessment     Bed Mobility Comments: Dependent assist with supine scooting towards HOB        TRANSFERS Daily Assessment    Comment: NT this AM          GAIT Daily Assessment    Comments: NT this AM              STEPS/STAIRS Daily Assessment      NT             BALANCE Daily Assessment    NT       WHEELCHAIR MOBILITY Daily Assessment          NT                LOWER EXTREMITY EXERCISES   SUPINE EXERCISES for both LEs Sets Reps Comments   Ankle Pumps 3 10    Glut Sets 3 10    Heel Slides 3 10    Hip Abduction 3 10    Short Arc Quad 3 10    Straight Leg Raise 3 10    Increased time and effort to complete with multiple and frequent rest breaks. Cues for correct form         Pain level: 0 to 4 out of 10  Pain Location:  low back, lateral hips, sacral area  Pain Interventions: Medication per order, rest, positioning,relaxation, body mechanics,     Vital Signs:  BP (!) 158/83   Pulse 85   Temp 97.5 °F (36.4 °C) (Oral)   Resp 18   Ht 1.753 m (5' 9\")   Wt (!) 157.4 kg (347 lb 1.6 oz)   SpO2 100%   BMI 51.26 kg/m²       Education:    Education Given To: Patient  Education Provided: Home Exercise Program;Energy Conservation  Education Method: Demonstration;Verbal  Barriers to Learning: None  Education Outcome: Verbalized understanding;Demonstrated

## 2024-11-14 NOTE — PROGRESS NOTES
[facility] OCCUPATIONAL THERAPY DAILY NOTE  OT Individual Minutes  Time In: 0702  Time Out: 0746  Minutes: 44               Subjective: Pt agreeable to treatment. \"I'm having a hard time getting going today.\" (After waking up)  Pain: No pain expressed.  Interdisciplinary Communication: Collaborated with RN regarding pt status and pt performance.   Precautions: Falls, Malick Alarm, and dialysis port, HD dialysis       FUNCTIONAL MOBILITY:       Bed Mobility Jt HOB elevated, use of bed rails   Sit to Stand CGA and Jt Initially MIN A and progressed to CGA   Transfer  CGA and Jt  Transfer Type: SPT  Equipment: Grab Bars and RW Pt performed SPT initially with MIN A with elevated bed surface/FWW from EOB>w/c, pt performed SPT from w/c<>toilet with GB and elevated surface with touching A, CGA with w/c>EOB with FWW pt requires increased time for t/f and rocking method and able to complete   Ambulation NT  Equipment: NA      ACTIVITIES OF DAILY LIVING:       Eating   Pt seated EOB with good trunk stability IND to self feed/manage containers, good upright endurance EOB   Oral Hygiene Setup or clean-up assistance S/U A seated in w/c sinkside for g/h tasks and combing hair   Shower/Bathe  (pt request to shower next ADL session) Pt required overall MIN A for sponge bath while seated in w/c, pt required A for B feet, A for standing posterior cares/pt able to participate in steps with use of FWW   Upper Body  Dressing  (pt request to keep gown on from previous date) S/U A while seated in w/c   Lower Body Dressing  (Pt prefers not to use pants/brief d/t swelling in abdomen)     Toileting Hygiene Supervision or touching assistance Pt performed sergio care hygiene anterior/posterior cares seated SPV, standing touching A for management of gown   Toilet Transfer Supervision or touching assistance Touching A with GB for SPT from w/c<>toilet   Education Adaptive ADL Techniques, Benefits of OT, Energy Conservation, Pacing, Functional Transfer

## 2024-11-14 NOTE — PROGRESS NOTES
PHYSICAL THERAPY DAILY NOTE    PT Individual Minutes  Time In: 1427  Time Out: 1507  Minutes: 40                 Total Treatment Time:  40 Minutes    Pt. Seen for: PM, Gait Training, Patient Education, Therapeutic Exercise, Transfer Training, and Other     Subjective: Patient reporting she is very tired this PM. Reports dialysis really made her feel weak today even though they took less fluid off today. Reports she is used to taking a nap after dialysis. Reports her hips are hurting more today. Reports she gets very stiff and sore when she has to stay in bed over night and then all morning on dialysis days.         Objective:  Restrictions/Precautions: Fall Risk, Skin (lateral hip and sacral wounds, abdominal incision)  Required Braces or Orthoses?: No              Other position/activity restrictions: bariatric air bed for pressure relief         GROSS ASSESSMENT   Patient resting in w/c at beginning of treatment        COGNITION Daily Assessment    Overall Orientation Status: Within Normal Limits   Overall Cognitive Status: WNL        BED/MAT MOBILITY Daily Assessment     Bed Mobility Comments: NT this PM        TRANSFERS Daily Assessment   Sit<>Stand with standing weight scale and min assist. Able to stand x 30 seconds on scale. Weight 337.8 lbs Sit to Stand: Minimal Assistance (with RW  and with bars on standing weight scale)  Stand to Sit: Minimal Assistance  Stand Pivot Transfers: Minimal Assistance (with RW)  Comment: Increased time and effort with cues for body mechanics          GAIT Daily Assessment    Surface: Level tile  Device: Rolling Walker (gait belt)  Other Apparatus: Wheelchair follow  Assistance: Contact guard assistance  Quality of Gait: slow start/stop step to gait pattern to slow cont partial step through gait pattern  Gait Deviations: Decreased step length;Decreased arm swing;Decreased step height;Slow Shirin;Increased ALEE  Distance: 20 ft x 1  Comments: Only able to tolerate 1 attempt at

## 2024-11-15 PROCEDURE — 1180000000 HC REHAB R&B

## 2024-11-15 PROCEDURE — 97116 GAIT TRAINING THERAPY: CPT

## 2024-11-15 PROCEDURE — 99231 SBSQ HOSP IP/OBS SF/LOW 25: CPT | Performed by: PHYSICAL MEDICINE & REHABILITATION

## 2024-11-15 PROCEDURE — 97535 SELF CARE MNGMENT TRAINING: CPT

## 2024-11-15 PROCEDURE — 97110 THERAPEUTIC EXERCISES: CPT

## 2024-11-15 PROCEDURE — 97530 THERAPEUTIC ACTIVITIES: CPT

## 2024-11-15 PROCEDURE — 6370000000 HC RX 637 (ALT 250 FOR IP): Performed by: PHYSICAL MEDICINE & REHABILITATION

## 2024-11-15 RX ADMIN — ONDANSETRON 4 MG: 4 TABLET, ORALLY DISINTEGRATING ORAL at 09:39

## 2024-11-15 RX ADMIN — METOPROLOL TARTRATE 50 MG: 50 TABLET, FILM COATED ORAL at 08:47

## 2024-11-15 RX ADMIN — SERTRALINE 100 MG: 100 TABLET, FILM COATED ORAL at 08:47

## 2024-11-15 RX ADMIN — SEVELAMER CARBONATE 800 MG: 800 TABLET, FILM COATED ORAL at 08:47

## 2024-11-15 RX ADMIN — APIXABAN 5 MG: 5 TABLET, FILM COATED ORAL at 08:47

## 2024-11-15 RX ADMIN — SEVELAMER CARBONATE 800 MG: 800 TABLET, FILM COATED ORAL at 17:02

## 2024-11-15 RX ADMIN — FAMOTIDINE 20 MG: 20 TABLET, FILM COATED ORAL at 08:47

## 2024-11-15 RX ADMIN — SERTRALINE 100 MG: 100 TABLET, FILM COATED ORAL at 20:36

## 2024-11-15 RX ADMIN — METOPROLOL TARTRATE 50 MG: 50 TABLET, FILM COATED ORAL at 20:36

## 2024-11-15 RX ADMIN — APIXABAN 5 MG: 5 TABLET, FILM COATED ORAL at 20:36

## 2024-11-15 RX ADMIN — HYDROCODONE BITARTRATE AND ACETAMINOPHEN 1 TABLET: 5; 325 TABLET ORAL at 05:58

## 2024-11-15 RX ADMIN — ACETAMINOPHEN 1000 MG: 500 TABLET, FILM COATED ORAL at 08:47

## 2024-11-15 RX ADMIN — SEVELAMER CARBONATE 800 MG: 800 TABLET, FILM COATED ORAL at 12:54

## 2024-11-15 RX ADMIN — HYDROCODONE BITARTRATE AND ACETAMINOPHEN 1 TABLET: 5; 325 TABLET ORAL at 20:36

## 2024-11-15 ASSESSMENT — PAIN SCALES - GENERAL
PAINLEVEL_OUTOF10: 3
PAINLEVEL_OUTOF10: 3
PAINLEVEL_OUTOF10: 6
PAINLEVEL_OUTOF10: 6

## 2024-11-15 ASSESSMENT — PAIN DESCRIPTION - LOCATION
LOCATION: HIP
LOCATION: HIP
LOCATION: HIP;BUTTOCKS

## 2024-11-15 ASSESSMENT — PAIN DESCRIPTION - ORIENTATION
ORIENTATION: LEFT
ORIENTATION: LEFT
ORIENTATION: LEFT;RIGHT;POSTERIOR

## 2024-11-15 ASSESSMENT — PAIN SCALES - WONG BAKER: WONGBAKER_NUMERICALRESPONSE: NO HURT

## 2024-11-15 ASSESSMENT — PAIN DESCRIPTION - DESCRIPTORS
DESCRIPTORS: ACHING;SORE
DESCRIPTORS: ACHING

## 2024-11-15 ASSESSMENT — PAIN DESCRIPTION - PAIN TYPE: TYPE: ACUTE PAIN

## 2024-11-15 NOTE — CARE COORDINATION
CM reviewed / screen patient medical chart for continue stay.  Patient needs are continue to be followed by Dr. Abilio Currie.  Patient has a discharge date / plan at this time scheduled for 11/26/24.  Patient is declining HH RN, PT, OT due to large dogs that are protective of her. RN spoke to Jasper with [facility] and she states patient has a spot M/W/F at 2pm. CM fax updated clinicals to [facility] on 11/15/24.   CM will continue to follow / monitor for any needs, concerns or questions that may arise.

## 2024-11-15 NOTE — PROGRESS NOTES
PHYSICAL WEEKLY PROGRESS NOTE      PT Individual Minutes  Time In: 0922  Time Out: 1000  Minutes: 38                     Total Treatment Time: 38 Minutes    Subjective: Feeling nauseous and tired.  Took all meds at once on empty stomach.  Hips hurting from pressure on sores.               Objective:     Precautions:      Restrictions/Precautions: Fall Risk, Skin  Required Braces or Orthoses?: No           Other position/activity restrictions: bariatric air bed for pressure relief; may try an abdominal for abdominal pain    Vital Signs:BP (!) 138/59   Pulse 77   Temp 98.4 °F (36.9 °C) (Oral)   Resp 18   Ht 1.753 m (5' 9\")   Wt (!) 157.4 kg (347 lb 1.6 oz)   SpO2 98%   BMI 51.26 kg/m²       Pain level: 4  Pain location: both hips  Pain interventions:  has had meds.  Standing and pressure relief.     Patient education: Reviewed goals set and progress        Interdisciplinary Communication: OT, RN, Dr. Currie.     Cognition:           LLE RLE   ROM Hip decreased 50%, knee 10%, ankle WFL Hip decreased 50%, knee 10%, ankle WFL   Fine Motor Coordination Impaired:    Impaired:      Tone Normal Normal   Sensation Light Touch Intact and Proprioception Intact Light Touch Intact and Proprioception Intact   Strength Hip  -3/5, knee 4/5 to 5/5, ankll DF 5/5, PF +2/5 Hip  -3/5, knee 4/5 to 5/5, ankll DF 5/5, PF +2/5      PRIMARY MODE OF LOCOMOTION: Household Ambulation Only     Functional Assessment:     Balance   Comments   Static Sitting Normal:  Pt. able to maintain balance w/o UE support     Dynamic Sitting Good - accepts moderate challenge;  can maintain balance while picking object off the floor     Static Standing Fair:  Pt. requires UE support;  supervision With rolling walker   Dynamic Standing NT                   BED MOBILITY &TRANSFERS Initial Assessment   Weekly Assessment Comments   Rolling  Rolling to Left: Moderate assistance (using bed rail)   Rolling to Right: Moderate assistance (using bed rail)  Rolling to Left: Moderate assistance  Rolling to Right: Minimal assistance      Supine to Sit Maximum assistance (using bed rail)          Sit to Supine Moderate assistance   Minimal assistance      Sit to/from Stand Sit to Stand: 2 Person Assistance, Moderate Assistance (Moderate assist from PT and RN with RW and gait belt)  Stand to Sit: Moderate Assistance, 2 Person Assistance (Moderate assist from PT and RN with RW and gait belt)   Sit to Stand: Contact guard assistance  Stand to Sit: Stand by assistance      Bed To/From Chair Bed to Chair: Contact guard assistance  Stand Pivot Transfers: Unable to assess  Squat Pivot Transfers: Unable to assess Bed to Chair: Contact guard assistance  Stand Pivot Transfers: Contact guard assistance       Car Transfer Unable to assess                WHEELCHAIR MOBILITY/MANAGEMENT Initial Assessment Weekly Assessment Comments   Propulsion            Set-up & Management Wheelchair Size: Unable to assess  Wheelchair Type: Standard  Wheelchair Cushion: Standard  Pressure Relief Type:  (sit<>stand with RW)  Level of Assistance for pressure relief activities: 2 Person assistance, Moderate assistance  Wheelchair Parts Management: No  Propulsion: No               AMBULATION Initial Assessment Weekly Assessment Comments    Surface: Level tile  Device: Rolling Walker (gait belt)  Other Apparatus: Wheelchair follow  Assistance: 2 Person assistance, Minimal assistance (Min assist from PT and RN)  Quality of Gait: slow start/stop step to gait pattern with flexed posture  Gait Deviations: Decreased step length, Decreased arm swing, Decreased step height, Slow Shirin, Increased ALEE  Distance: 0  Comments: Unable to tolerate gait assessment due to fatigue from dialysis and OT eval  More Ambulation?: No   Surface: Level tile  Device: Rolling Walker  Other Apparatus: Wheelchair follow  Assistance: Contact guard assistance  Quality of Gait: slow start/stop step to gait pattern to slow cont partial

## 2024-11-15 NOTE — PROGRESS NOTES
[place] [address]  Tel: [phone]     Physical Medicine and Rehabilitation Progress Note      Giselle Rosado  Admit Date: 11/8/2024  Admit Diagnosis: Sepsis (HCC) [A41.9]    Subjective     Patient seen and examined. Supine /98 with midodrine held. Patient up in parallel bars, asymptomatic.  C/o nausea after am meds. Denies lightheadedness or SOB,  Participating in therapy.    Objective:     Current Facility-Administered Medications   Medication Dose Route Frequency    midodrine (PROAMATINE) tablet 5 mg  5 mg Oral 2 times per day on Sunday Monday Wednesday Friday    midodrine (PROAMATINE) tablet 10 mg  10 mg Oral PRN    acetaminophen (TYLENOL) tablet 1,000 mg  1,000 mg Oral Q6H PRN    Or    acetaminophen (TYLENOL) suppository 650 mg  650 mg Rectal Q6H PRN    0.9 % sodium chloride infusion   IntraVENous PRN    aluminum & magnesium hydroxide-simethicone (MAALOX) 200-200-20 MG/5ML suspension 30 mL  30 mL Oral Q6H PRN    apixaban (ELIQUIS) tablet 5 mg  5 mg Oral BID    bisacodyl (DULCOLAX) suppository 10 mg  10 mg Rectal Daily PRN    dicyclomine (BENTYL) capsule 10 mg  10 mg Oral TID PRN    epoetin oralia-epbx (RETACRIT) injection 10,000 Units  10,000 Units SubCUTAneous Weekly    famotidine (PEPCID) tablet 20 mg  20 mg Oral Daily    fluticasone (FLONASE) 50 MCG/ACT nasal spray 2 spray  2 spray Nasal Nightly PRN    lidocaine 4 % external patch 4 patch  4 patch TransDERmal Daily    metoprolol tartrate (LOPRESSOR) tablet 50 mg  50 mg Oral BID    ondansetron (ZOFRAN-ODT) disintegrating tablet 4 mg  4 mg Oral Q8H PRN    Or    ondansetron (ZOFRAN) injection 4 mg  4 mg IntraVENous Q6H PRN    polyethylene glycol (GLYCOLAX) packet 17 g  17 g Oral Daily PRN    sertraline (ZOLOFT) tablet 100 mg  100 mg Oral BID    sevelamer (RENVELA) tablet 800 mg  800 mg Oral TID WC    sodium chloride flush 0.9 % injection 5-40 mL  5-40 mL IntraVENous 2 times per day     vancomycin (VANCOCIN) intermittent dosing (placeholder)   Other RX Placeholder    HYDROcodone-acetaminophen (NORCO) 5-325 MG per tablet 1 tablet  1 tablet Oral Q6H PRN    traZODone (DESYREL) tablet 50 mg  50 mg Oral Nightly PRN     Allergies   Allergen Reactions    Ace Inhibitors Angioedema     Other reaction(s): Hives/Swelling-Allergy  Swelling of the mouth    Angiotensin Receptor Blockers Angioedema    Cayenne Hives     Swelling of the mouth  Other reaction(s): Hives/Swelling-Allergy  Patient states she is not allergic    Sulfamethoxazole-Trimethoprim Other (See Comments)     Nausea, vomiting, making her feel like she is going to pass out    Cephalexin Nausea And Vomiting     Can't take oral/can do IV       Visit Vitals  BP (!) 156/98   Pulse 91   Temp 97.9 °F (36.6 °C) (Oral)   Resp 18   Ht 1.753 m (5' 9\")   Wt (!) 157.4 kg (347 lb 1.6 oz)   SpO2 98%   BMI 51.26 kg/m²      Physical Exam:   General:   Alert, NAD, morbidly obese, up in wc    HEENT:  Normocephalic, EOMI, facial symmetry  Intact. Oral mucosa pink and moist. No nasal discharge.   Lungs:  CTA Bilaterally, respiration even and unlabored     Heart:  Regular rate and rhythm, no murmur       Abdomen:  Firm, well healed dermabond incision, lateral L abdominal wall protruding, non tender, anasarca, woody texture. Bowel sounds present.     Neuromuscular:  no resting tremors, light touch sensation intact, bilateral F-N intact, MMT:  proximal > distal weakness, 4+ - 5-/5 in all extremities    Skin:  dry, LE 1+pedal edema, toenails thickened:   Spine:   L lumbar spine hematoma       Functional Assessment:  Per PT:     TRANSFERS Daily Assessment     Sit to Stand: Minimal Assistance (with RW)  Stand to Sit: Minimal Assistance (with RW)  Stand Pivot Transfers: Minimal Assistance (with RW)  Comment: Increased time and effort to complete with cues for body mechanics             GAIT Daily Assessment     Surface: Level tile  Device: Rolling Walker (gait belt)  Other  Apparatus: Wheelchair follow  Assistance: Minimal assistance  Quality of Gait: slow start/stop step to gait pattern to slow cont partial step through gait pattern  Gait Deviations: Decreased step length;Decreased arm swing;Decreased step height;Slow Shirin;Increased ALEE  Distance: 15 ft x 1  20 ft x 2  Comments: 5 min seated rest breaks between attempts  More Ambulation?: No        Per OT:   ACTIVITIES OF DAILY LIVING:  Oral Hygiene Setup or clean-up assistance S/U A seated in w/c sinkside, pt requires to sit d/t decreased standing tolerance/endurance   Shower/Bathe Partial/moderate assistance Pt required overall MIN A for sponge bath while seated in w/c, pt required A for B feet, A for standing posterior cares/pt able to participate in steps with use of FWW   Upper Body  Dressing Setup or clean-up assistance S/U A while seated in w/c   Lower Body Dressing  (Pt prefers not to use pants/brief d/t swelling in abdomen)    Toileting Hygiene Supervision or touching assistance Pt required SPV seated toileting, standing touching A for gown management with FWW   Toilet Transfer Supervision or touching assistance Touching A For toilet t/f pivot w/c<>toilet with use of GB   Education Adaptive ADL Techniques, AE/DME Training, Benefits of OT, Energy Conservation, Pacing, Functional Transfer Training, Rolling Walker Management, and Safety Awareness  Education provided on home DME recommendations, home ADL/IADL energy conservation techniques/pacing, skin integrity/off loading pressure relief education, AE training for ADL's with toileting/showering      Labs/Studies:  No results found for this or any previous visit (from the past 72 hour(s)).    Assessment:   This is a 53 YO with h/o ESRD now on HD (recent hospitalization 10/10-10/22 due to peritonitis while on PD), atrial fibrillation, HTN admitted on 10/23 septic and in Afib with RVR. CT abdomen pelvis + for active bleeding from the L rectus/inferior epigastric arterial

## 2024-11-15 NOTE — PROGRESS NOTES
[facility] Progress Note    Follow-Up on: ESRD      Pt seen and examined. Tolerated HD yesterday, complains of more fluid around her abdomen     ROS:  Gen - no fever, no chills, appetite unchanged  CV - no chest pain  Lung - no shortness of breath, no cough  Abd - + tenderness, no nausea/vomiting, no diarrhea  Ext - + edema improving    Exam:  Vitals:    11/14/24 2037 11/15/24 0558 11/15/24 0628 11/15/24 0703   BP:    (!) 156/98   Pulse:    91   Resp: 18 18 18 18   Temp:    97.9 °F (36.6 °C)   TempSrc:    Oral   SpO2:    98%   Weight:       Height:             Intake/Output Summary (Last 24 hours) at 11/15/2024 0924  Last data filed at 11/14/2024 1234  Gross per 24 hour   Intake 500 ml   Output 3300 ml   Net -2800 ml           Wt Readings from Last 3 Encounters:   11/14/24 (!) 157.4 kg (347 lb 1.6 oz)   11/06/24 (!) 156.2 kg (344 lb 6.4 oz)   10/18/24 (!) 150.6 kg (332 lb)       GEN - in no distress, alert and oriented   CV - regular rate, S1, S2, no Rub   Lung - clear bilaterally  Abd - soft, +tender  Ext - + 1 BLE edema  Access: Right TCC intact    No results for input(s): \"WBC\", \"HGB\", \"HCT\", \"PLT\", \"INR\" in the last 72 hours.       No results for input(s): \"NA\", \"K\", \"CL\", \"CO2\", \"BUN\", \"CREATININE\", \"GLU\", \"CALCIUM\", \"MG\", \"PHOS\", \"ALBUMIN\" in the last 72 hours.    Invalid input(s): \"CA\"      Assessment / Plan:  Principal Problem:    Sepsis (HCC)  Resolved Problems:    * No resolved hospital problems. *    1) ESRD - HD MWF.  Previously on PD. On TTS for rehab schedule. HD tomorrow, continue fluid restriction and daily weights    2) Peritonitis - PD catheter removed 10/15/24  -Continue vancomycin EOT 12/1/2024      3) Abdominal pain - evidence of subcutaneous hematoma and cellulitis.   CT ab hematoma with improvement. Free fluid shows improvement.   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

## 2024-11-15 NOTE — PROGRESS NOTES
KIT DAILY FOLLOW UP RENAL INSUFFICIENCY PATIENT   [facility]   Pharmacy Pharmacokinetic Monitoring Service - Vancomycin    Consulting Provider: Dr. Toribio  Indication: PD catheter complicated peritonitis   Target Concentration: Pre-dialysis concentration 15-20 mg/L  EOT: 12/1/24 per ID  Additional Antimicrobials: None    Pertinent Laboratory Values:   Wt Readings from Last 1 Encounters:   11/14/24 (!) 157.4 kg (347 lb 1.6 oz)     Temp Readings from Last 1 Encounters:   11/15/24 97.9 °F (36.6 °C) (Oral)     No results for input(s): \"BUN\", \"CREATININE\", \"WBC\", \"PROCAL\", \"LACACIDPL\", \"LACTA\", \"LCAD\", \"LACTSEPSIS\" in the last 72 hours.    Invalid input(s): \"PROCAT\", \"PCT\", \"LAC\", \"LACT\", \"LACPOC\"      Lab Results   Component Value Date/Time    ADRIANARUTH 8.8 10/14/2024 11:22 AM    VANCORANDOM 19.5 11/10/2024 06:08 AM     Assessment:  Date:  Dose/Freq Admin Times Level/Time:   10/24      10/25 HD 1000 mg x1  [address] @0400 = 21.6   10/26      10/27      10/28 HD  1000 mg x1 [address] @0604 = 21.1   10/29      10/30  mg x1 [address] @0651 = 24.7   10/31      11/1  mg x 1 1256    11/2      11/3      11/4  mg x 1 1407    11/5  mg x 1 [address] Rd @ 0351 = 24.1   11/6  mg x 1 [address] Rd @ 0519 = 21.3   11/7  mg x 1 1237    11/8  mg x 1 [address] Rd @ 0344 = 20.3   11/9  mg x 1 1355    11/10   Rd @ 0608 = 19.5   11/11 11/12  mg x 1 1305    11/13 11/14  mg x 1 1136    11/15      11/16   Rd @ (04)   11/17        Plan:  Concentration-guided dosing due to intermittent hemodialysis  No dose needed today  Repeat vancomycin concentrations will be ordered as clinically appropriate  Pharmacy will continue to monitor patient and adjust therapy as indicated    Thank you for the consult,  Bo Covarrubias RPH

## 2024-11-15 NOTE — CARE COORDINATION
[place] [employer] [place]  Policy #:  [id number]   Secondary Ins: Medicare   Policy #: [id number]   Auth # [id number]  Admission Date: 11/8/24  Approved Dates: 7 more days   Discharge Date: 11/27/24  LOS: 7 days   Contact Name: Tamy Goff  Contact #  Fax #: [phone]  Updated Clinicals:      Updated Clinicals due today.  Patient has been approved for 7 more days.  Patient has been approved until 11/19/24 and CM will fax in update clinicals.  CM will continue to follow.

## 2024-11-15 NOTE — PROGRESS NOTES
PHYSICAL THERAPY DAILY NOTE    PT Individual Minutes  Time In: 1036  Time Out: 1117  Minutes: 41                 Total Treatment Time:  41 Minutes    Pt. Seen for: PM, Family Training, Therapeutic Exercise, and Transfer Training     Subjective: so my  usually helps me when I get into bed.           Objective:  Restrictions/Precautions: Fall Risk, Skin  Required Braces or Orthoses?: No              Other position/activity restrictions: bariatric air bed for pressure relief; may try an abdominal for abdominal pain     GROSS ASSESSMENT           COGNITION Daily Assessment                 BED/MAT MOBILITY Daily Assessment   Spouse demonstrated good and safe ability assisting pt onto the bed.    Rolling side to side pressure relief x several minutes each side  Bridging: Minimal assistance  Rolling to Left: Moderate assistance  Rolling to Right: Minimal assistance  Sit to Supine: Minimal assistance  Scooting: Modified independent        TRANSFERS Daily Assessment    Sit to Stand: Contact guard assistance  Stand to Sit: Stand by assistance  Bed to Chair: Contact guard assistance  Stand Pivot Transfers: Contact guard assistance          GAIT Daily Assessment   None in PM Surface: Level tile  Device: Rolling Walker  Other Apparatus: Wheelchair follow  Assistance: Contact guard assistance  Quality of Gait: slow start/stop step to gait pattern to slow cont partial step through gait pattern  Gait Deviations: Decreased step length;Decreased arm swing;Decreased step height;Slow Shirin;Increased ALEE  Distance: 20 ft x 1  Comments: Only able to tolerate 1 attempt at gait training due to nausea and fatigue.  More Ambulation?: No              STEPS/STAIRS Daily Assessment   In // bars  Step up on 4'' step  3 x each foot forward.  Fatigued and weak after 3 reps. Stairs?: No              BALANCE Daily Assessment    Static Sitting: Normal:  Pt. able to maintain balance w/o UE support  Dynamic Sitting: Good - accepts moderate

## 2024-11-15 NOTE — PROGRESS NOTES
pt performed SPT from EOB>w/c>toilet>w/c>shower<>w/c-initially requires NIRMAL for STS t/f with few cues on technique   Ambulation NT  Equipment: NA      ACTIVITIES OF DAILY LIVING:    Initial Score Current Score   Eating Independent  Pt seated EOB with good trunk stability IND to self feed/manage containers, good upright endurance EOB Independent  MOD I seated in w/c   Oral Hygiene Supervision or touching assistance  S/U A while seated EOB good trunk stability for brushing teeth Setup or clean-up assistance  S/U A while seated in w/c sinkside   Shower/Bathe Substantial/maximal assistance  two person assist for standing; otherwise, bathed self with mod A for LB Partial/moderate assistance  MIN A required for thoroughness with washing buttocks area in standing; pt performed shower with tub t/f bench/GB/HHSH/long handled sponge; pt performed 2 STS's during shower with CGA-NIRMAL for t/f this date   Upper Body  Dressing Supervision or touching assistance  assist with dress down back Setup or clean-up assistance  S/U A to don/doff overhead gown seated   Lower Body Dressing Substantial/maximal assistance  With use of reacher could manage pants over hips and thread with reacher  Supervision or touching assistance (pt prefers not to wear pants/underwear d/t abdominal swelling/pain)  With use of reacher could manage pants over hips and thread with reacher    Donning/ [place] Footwear Dependent   Pt required MAX A for donning/doffing socks-introduced AE of sock aid/reacher/dressing stick and pt demonstrated on RLE-will continue to follow based on pt preference/goals Substantial/maximal assistance  Pt declined use of AE, will have spouse complete at home. Pt required A for B socks to don.   Toileting Hygiene Supervision or touching assistance  Pt required SPV seated toileting, standing touching A for gown management with FWW Supervision or touching assistance  Pt able to manage gown and sergio cares seated and standing with  touching A standing-SPV seated on bariatric BSC placed over toilet and use of GB   Toilet Transfer Supervision or touching assistance  Touching A For toilet t/f pivot w/c<>toilet with use of GB Partial/moderate assistance  CGA-JT for t/f SPT from w/c<>toilet with bariatric BSC placed over toilet   Initial Score: Patient's lowest score within first 72 hrs of inpatient rehab stay.   Current Score: Patient's average performance level over the last 48 hours.     Pt required rest breaks throughout therapy session during ADL's.     Discharge Location: Home with Family Support  Recommended Therapy/Supervision at Discharge: HHOT and assist from spouse as needed  Recommended Equipment: RW, bariatric BSC, and tub t/f bench  Safety/Functional Level: Pt completes ADLs with Setup/Clean-up Assist and Jt (MAX A socks-pt request to have spouse assist at home/not a meaningful goal upon d/c) using Rolling Walker, Wheelchair , Long Handled Sponge, BSC, Grab Bars, and Tub Transfer Bench. Pt requires CGA and Jt for mobility related ADLs. Pt will require  assist  with IADLs such as home management, cooking, and cleaning. Pt able to complete at seated level.  Family Training: To be completed closer to discharge and ongoing education provided as spouse present intermittently during therapy sessions .  Residual Deficits: Decreased strength, activity tolerance, balance, functional mobility, safety awareness, ADL performance , and IADL performance.    Summary of Progress: Patient demonstrates good progress with strength, activity tolerance, functional mobility, safety awareness, AE/DME use, and ADL performance , see above for details. Patient continues to require skilled OT services to address deficits and maximize pt safety and independence.   Summary of Session:   Focus of session was on morning ADL routine and progress assessment.   Education: Adaptive ADL Techniques, AE/DME Training, Benefits of OT, Energy Conservation, Pacing,  Functional Transfer Training, Rolling Walker Management, and Safety Awareness    Collaborated with PT and RN regarding pt's progress toward goals.  Plan: Continue OT POC to address remaining deficits and maximize safety and independence.    Patient ended session seated in gym with PT.     JAYJAY PARISI OT  11/15/2024

## 2024-11-15 NOTE — PLAN OF CARE
Problem: Chronic Conditions and Co-morbidities  Goal: Patient's chronic conditions and co-morbidity symptoms are monitored and maintained or improved  11/15/2024 0952 by Mariana Chacon RN  Outcome: Progressing  11/15/2024 0331 by Radha Dhillon RN  Outcome: Progressing     Problem: Pain  Goal: Verbalizes/displays adequate comfort level or baseline comfort level  11/15/2024 0952 by Mariana Chacon RN  Outcome: Progressing  11/15/2024 0331 by Radha Dhillon RN  Outcome: Progressing     Problem: Safety - Adult  Goal: Free from fall injury  11/15/2024 0952 by Mariana Chacon RN  Outcome: Progressing  11/15/2024 0331 by Radha Dhillon RN  Outcome: Progressing     Problem: Skin/Tissue Integrity  Goal: Absence of new skin breakdown  Description: 1.  Monitor for areas of redness and/or skin breakdown  2.  Assess vascular access sites hourly  3.  Every 4-6 hours minimum:  Change oxygen saturation probe site  4.  Every 4-6 hours:  If on nasal continuous positive airway pressure, respiratory therapy assess nares and determine need for appliance change or resting period.  11/15/2024 0952 by Mariana Chacon RN  Outcome: Progressing  11/15/2024 0331 by Radha Dhillon RN  Outcome: Progressing     Problem: ABCDS Injury Assessment  Goal: Absence of physical injury  11/15/2024 0952 by Mariana Chacon RN  Outcome: Progressing  11/15/2024 0331 by Radha Dhillon RN  Outcome: Progressing

## 2024-11-16 LAB
HBV SURFACE AG SER QL: NONREACTIVE
VANCOMYCIN SERPL-MCNC: 12.6 UG/ML

## 2024-11-16 PROCEDURE — 76937 US GUIDE VASCULAR ACCESS: CPT

## 2024-11-16 PROCEDURE — 80202 ASSAY OF VANCOMYCIN: CPT

## 2024-11-16 PROCEDURE — 87340 HEPATITIS B SURFACE AG IA: CPT

## 2024-11-16 PROCEDURE — 2580000003 HC RX 258: Performed by: PHYSICAL MEDICINE & REHABILITATION

## 2024-11-16 PROCEDURE — 90935 HEMODIALYSIS ONE EVALUATION: CPT

## 2024-11-16 PROCEDURE — 6360000002 HC RX W HCPCS: Performed by: PHYSICAL MEDICINE & REHABILITATION

## 2024-11-16 PROCEDURE — 1180000000 HC REHAB R&B

## 2024-11-16 PROCEDURE — 6370000000 HC RX 637 (ALT 250 FOR IP): Performed by: PHYSICAL MEDICINE & REHABILITATION

## 2024-11-16 PROCEDURE — 36415 COLL VENOUS BLD VENIPUNCTURE: CPT

## 2024-11-16 RX ADMIN — FAMOTIDINE 20 MG: 20 TABLET, FILM COATED ORAL at 12:23

## 2024-11-16 RX ADMIN — EPOETIN ALFA-EPBX 10000 UNITS: 10000 INJECTION, SOLUTION INTRAVENOUS; SUBCUTANEOUS at 17:39

## 2024-11-16 RX ADMIN — SEVELAMER CARBONATE 800 MG: 800 TABLET, FILM COATED ORAL at 17:39

## 2024-11-16 RX ADMIN — SERTRALINE 100 MG: 100 TABLET, FILM COATED ORAL at 12:23

## 2024-11-16 RX ADMIN — SERTRALINE 100 MG: 100 TABLET, FILM COATED ORAL at 20:54

## 2024-11-16 RX ADMIN — VANCOMYCIN HYDROCHLORIDE 750 MG: 750 INJECTION, POWDER, LYOPHILIZED, FOR SOLUTION INTRAVENOUS at 16:28

## 2024-11-16 RX ADMIN — APIXABAN 5 MG: 5 TABLET, FILM COATED ORAL at 12:23

## 2024-11-16 RX ADMIN — HYDROCODONE BITARTRATE AND ACETAMINOPHEN 1 TABLET: 5; 325 TABLET ORAL at 18:27

## 2024-11-16 RX ADMIN — ACETAMINOPHEN 1000 MG: 500 TABLET, FILM COATED ORAL at 20:54

## 2024-11-16 RX ADMIN — HYDROCODONE BITARTRATE AND ACETAMINOPHEN 1 TABLET: 5; 325 TABLET ORAL at 12:13

## 2024-11-16 RX ADMIN — METOPROLOL TARTRATE 50 MG: 50 TABLET, FILM COATED ORAL at 20:54

## 2024-11-16 RX ADMIN — SODIUM CHLORIDE, PRESERVATIVE FREE 10 ML: 5 INJECTION INTRAVENOUS at 20:56

## 2024-11-16 RX ADMIN — APIXABAN 5 MG: 5 TABLET, FILM COATED ORAL at 20:54

## 2024-11-16 RX ADMIN — ACETAMINOPHEN 1000 MG: 500 TABLET, FILM COATED ORAL at 14:21

## 2024-11-16 RX ADMIN — METOPROLOL TARTRATE 50 MG: 50 TABLET, FILM COATED ORAL at 12:23

## 2024-11-16 RX ADMIN — SEVELAMER CARBONATE 800 MG: 800 TABLET, FILM COATED ORAL at 12:22

## 2024-11-16 ASSESSMENT — PAIN SCALES - GENERAL
PAINLEVEL_OUTOF10: 3
PAINLEVEL_OUTOF10: 8
PAINLEVEL_OUTOF10: 7
PAINLEVEL_OUTOF10: 4
PAINLEVEL_OUTOF10: 6
PAINLEVEL_OUTOF10: 4

## 2024-11-16 ASSESSMENT — PAIN DESCRIPTION - DESCRIPTORS
DESCRIPTORS: SHARP;BURNING
DESCRIPTORS: BURNING;STABBING
DESCRIPTORS: ACHING
DESCRIPTORS: BURNING;STABBING

## 2024-11-16 ASSESSMENT — PAIN DESCRIPTION - LOCATION
LOCATION: HIP
LOCATION: HIP;ABDOMEN
LOCATION: HIP
LOCATION: BACK

## 2024-11-16 ASSESSMENT — PAIN DESCRIPTION - ORIENTATION
ORIENTATION: RIGHT;LEFT
ORIENTATION: RIGHT;LEFT
ORIENTATION: LEFT

## 2024-11-16 NOTE — DIALYSIS
TRANSFER OUT- DIALYSIS    Hemodialysis treatment completed. PT ran 3.5 hours, without complications.    Patient baseline and VS stable  /133  P 82       2.9 Kg removed.      Flushed both ports with 10 mL of NS.  CVC dressing clean, dry, and intact, tego caps intact, curos caps placed.      Meds given: 0.    RBCs given during dialysis: 0    Patient to room after dialysis.

## 2024-11-16 NOTE — DIALYSIS
TRANSFER IN - DIALYSIS    Received patient in dialysis unit  from  (unit) for ordered procedure.    Consent verified for renal replacement therapy. Procedure explained to patient, opportunity for Q&A provided. Call light given.     Patient alert and vital signs stable.  BP (!) 160/83   Pulse 86   Temp 97.9 °F (36.6 °C) (Oral)   Resp 18   Ht 1.753 m (5' 9\")   Wt (!) 157.4 kg (347 lb 1.6 oz)   SpO2 98%   BMI 51.26 kg/m²     Hemodialysis initiated using right cvc.  Aspirated and flushed both ports without difficulty. Dressing clean, dry and intact.  Machine settings per MD order.    Heparin 0 unit bolus and 0 units/hr.      Will monitor during treatment.

## 2024-11-16 NOTE — PLAN OF CARE
Problem: Chronic Conditions and Co-morbidities  Goal: Patient's chronic conditions and co-morbidity symptoms are monitored and maintained or improved  Outcome: Progressing     Problem: Pain  Goal: Verbalizes/displays adequate comfort level or baseline comfort level  Outcome: Progressing     Problem: Safety - Adult  Goal: Free from fall injury  Outcome: Progressing     Problem: Skin/Tissue Integrity  Goal: Absence of new skin breakdown  Description: 1.  Monitor for areas of redness and/or skin breakdown  2.  Assess vascular access sites hourly  3.  Every 4-6 hours minimum:  Change oxygen saturation probe site  4.  Every 4-6 hours:  If on nasal continuous positive airway pressure, respiratory therapy assess nares and determine need for appliance change or resting period.  Outcome: Progressing     Problem: ABCDS Injury Assessment  Goal: Absence of physical injury  Outcome: Progressing

## 2024-11-16 NOTE — PROGRESS NOTES
KIT DAILY FOLLOW UP RENAL INSUFFICIENCY PATIENT   [facility]   Pharmacy Pharmacokinetic Monitoring Service - Vancomycin    Consulting Provider: Dr. Toribio  Indication: PD catheter complicated peritonitis   Target Concentration: Pre-dialysis concentration 15-20 mg/L  EOT: 12/1/24 per ID  Additional Antimicrobials: None    Pertinent Laboratory Values:   Wt Readings from Last 1 Encounters:   11/14/24 (!) 157.4 kg (347 lb 1.6 oz)     Temp Readings from Last 1 Encounters:   11/15/24 97.9 °F (36.6 °C) (Oral)     No results for input(s): \"BUN\", \"CREATININE\", \"WBC\", \"PROCAL\", \"LACACIDPL\", \"LACTA\", \"LCAD\", \"LACTSEPSIS\" in the last 72 hours.    Invalid input(s): \"PROCAT\", \"PCT\", \"LAC\", \"LACT\", \"LACPOC\"      Lab Results   Component Value Date/Time    ABIGAIL 8.8 10/14/2024 11:22 AM    VANCORANDOM 12.6 11/16/2024 05:49 AM     Assessment:  Date:  Dose/Freq Admin Times Level/Time:   10/24      10/25 HD 1000 mg x1  [address] @0400 = 21.6   10/26      10/27      10/28 HD  1000 mg x1 15[address] Rd @0604 = 21.1   10/29      10/30  mg x1 [address] @0651 = 24.7   10/31      11/1  mg x 1 1256    11/2      11/3      11/4  mg x 1 1407    11/5  mg x 1 1515 Rd @ 0351 = 24.1   11/6  mg x 1 1631 Rd @ 0519 = 21.3   11/7  mg x 1 1237    11/8  mg x 1 1747 Rd @ 0344 = 20.3   11/9  mg x 1 1355    11/10   Rd @ 0608 = 19.5   11/11 11/12  mg x 1 1305    11/13 11/14  mg x 1 1136    11/15      11/16  mg x 1 (13) Rd @ 0549 = 12.6   11/17        Plan:  Concentration-guided dosing due to intermittent hemodialysis  Will give 750 mg x 1 post HD today  Repeat vancomycin concentrations will be ordered as clinically appropriate  Pharmacy will continue to monitor patient and adjust therapy as indicated    Thank you for the consult,  Bo Covarrubias RPH

## 2024-11-16 NOTE — PLAN OF CARE
Problem: Chronic Conditions and Co-morbidities  Goal: Patient's chronic conditions and co-morbidity symptoms are monitored and maintained or improved  11/15/2024 0952 by Mariana Chacon RN  Outcome: Progressing     Problem: Pain  Goal: Verbalizes/displays adequate comfort level or baseline comfort level  11/15/2024 2204 by Dottie Silva RN  Outcome: Progressing  11/15/2024 0952 by Mariana Chacon RN  Outcome: Progressing     Problem: Safety - Adult  Goal: Free from fall injury  11/15/2024 2204 by Dottie Silva RN  Outcome: Progressing  11/15/2024 0952 by Mariana Chacon RN  Outcome: Progressing     Problem: Skin/Tissue Integrity  Goal: Absence of new skin breakdown  Description: 1.  Monitor for areas of redness and/or skin breakdown  2.  Assess vascular access sites hourly  3.  Every 4-6 hours minimum:  Change oxygen saturation probe site  4.  Every 4-6 hours:  If on nasal continuous positive airway pressure, respiratory therapy assess nares and determine need for appliance change or resting period.  11/15/2024 0952 by Mariana Chacon, RN  Outcome: Progressing     Problem: ABCDS Injury Assessment  Goal: Absence of physical injury  11/15/2024 0952 by Mariana Chacon, RN  Outcome: Progressing

## 2024-11-16 NOTE — PROGRESS NOTES
[facility] Progress Note    Follow-Up on: ESRD      Pt seen and examined on hd. BP 160s/90s. Breathing stable. Still feels \"bloated\"     ROS:  Gen - no fever, no chills, appetite unchanged  CV - no chest pain  Lung - no shortness of breath, no cough  Abd - + tenderness, no nausea/vomiting, no diarrhea  Ext - + edema improving    Exam:  Vitals:    11/15/24 1259 11/15/24 1502 11/15/24 1953 11/16/24 0830   BP: (!) 138/59 (!) 157/75 (!) 160/83 (!) 160/90   Pulse: 77 78 86 90   Resp: 18 17 18    Temp: 98.4 °F (36.9 °C) 97.7 °F (36.5 °C) 97.9 °F (36.6 °C)    TempSrc: Oral Oral Oral    SpO2:  96% 98%    Weight:       Height:           No intake or output data in the 24 hours ending 11/16/24 0852          Wt Readings from Last 3 Encounters:   11/14/24 (!) 157.4 kg (347 lb 1.6 oz)   11/06/24 (!) 156.2 kg (344 lb 6.4 oz)   10/18/24 (!) 150.6 kg (332 lb)       GEN - in no distress, alert and oriented   CV - regular rate, S1, S2, no Rub   Lung - clear bilaterally  Abd - soft, +tender  Ext - + 1 BLE edema  Access: Right TCC intact    No results for input(s): \"WBC\", \"HGB\", \"HCT\", \"PLT\", \"INR\" in the last 72 hours.       No results for input(s): \"NA\", \"K\", \"CL\", \"CO2\", \"BUN\", \"CREATININE\", \"GLU\", \"CALCIUM\", \"MG\", \"PHOS\", \"ALBUMIN\" in the last 72 hours.    Invalid input(s): \"CA\"      Assessment / Plan:  Principal Problem:    Sepsis (HCC)  Resolved Problems:    * No resolved hospital problems. *    1) ESRD - HD MWF.  Previously on PD. On TTS for rehab schedule.   -Seen on HD today    2) Peritonitis , culture grew Tsukamurella sp and CNS.  PD catheter removed 10/15/24  -Continue vancomycin EOT 12/1/2024 per ID for 6 wks today     3) Abdominal pain - evidence of subcutaneous hematoma and cellulitis.   CT ab hematoma with improvement. Free fluid shows improvement.   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

## 2024-11-17 VITALS
BODY MASS INDEX: 43.4 KG/M2 | HEART RATE: 93 BPM | TEMPERATURE: 98.3 F | SYSTOLIC BLOOD PRESSURE: 155 MMHG | OXYGEN SATURATION: 100 % | DIASTOLIC BLOOD PRESSURE: 82 MMHG | HEIGHT: 69 IN | WEIGHT: 293 LBS | RESPIRATION RATE: 18 BRPM

## 2024-11-17 PROCEDURE — 2580000003 HC RX 258: Performed by: PHYSICAL MEDICINE & REHABILITATION

## 2024-11-17 PROCEDURE — 97116 GAIT TRAINING THERAPY: CPT

## 2024-11-17 PROCEDURE — 1180000000 HC REHAB R&B

## 2024-11-17 PROCEDURE — 6370000000 HC RX 637 (ALT 250 FOR IP): Performed by: PHYSICAL MEDICINE & REHABILITATION

## 2024-11-17 PROCEDURE — 97110 THERAPEUTIC EXERCISES: CPT

## 2024-11-17 PROCEDURE — 97530 THERAPEUTIC ACTIVITIES: CPT

## 2024-11-17 RX ADMIN — METOPROLOL TARTRATE 50 MG: 50 TABLET, FILM COATED ORAL at 20:34

## 2024-11-17 RX ADMIN — MIDODRINE HYDROCHLORIDE 5 MG: 5 TABLET ORAL at 12:45

## 2024-11-17 RX ADMIN — SEVELAMER CARBONATE 800 MG: 800 TABLET, FILM COATED ORAL at 16:56

## 2024-11-17 RX ADMIN — SODIUM CHLORIDE, PRESERVATIVE FREE 10 ML: 5 INJECTION INTRAVENOUS at 20:36

## 2024-11-17 RX ADMIN — SERTRALINE 100 MG: 100 TABLET, FILM COATED ORAL at 08:22

## 2024-11-17 RX ADMIN — HYDROCODONE BITARTRATE AND ACETAMINOPHEN 1 TABLET: 5; 325 TABLET ORAL at 16:56

## 2024-11-17 RX ADMIN — METOPROLOL TARTRATE 50 MG: 50 TABLET, FILM COATED ORAL at 08:22

## 2024-11-17 RX ADMIN — APIXABAN 5 MG: 5 TABLET, FILM COATED ORAL at 08:22

## 2024-11-17 RX ADMIN — SEVELAMER CARBONATE 800 MG: 800 TABLET, FILM COATED ORAL at 08:22

## 2024-11-17 RX ADMIN — SERTRALINE 100 MG: 100 TABLET, FILM COATED ORAL at 20:35

## 2024-11-17 RX ADMIN — FAMOTIDINE 20 MG: 20 TABLET, FILM COATED ORAL at 08:21

## 2024-11-17 RX ADMIN — HYDROCODONE BITARTRATE AND ACETAMINOPHEN 1 TABLET: 5; 325 TABLET ORAL at 08:22

## 2024-11-17 RX ADMIN — ACETAMINOPHEN 1000 MG: 500 TABLET, FILM COATED ORAL at 20:34

## 2024-11-17 RX ADMIN — ACETAMINOPHEN 1000 MG: 500 TABLET, FILM COATED ORAL at 12:05

## 2024-11-17 RX ADMIN — APIXABAN 5 MG: 5 TABLET, FILM COATED ORAL at 20:34

## 2024-11-17 RX ADMIN — SEVELAMER CARBONATE 800 MG: 800 TABLET, FILM COATED ORAL at 12:05

## 2024-11-17 ASSESSMENT — PAIN DESCRIPTION - LOCATION
LOCATION: HIP

## 2024-11-17 ASSESSMENT — PAIN SCALES - GENERAL
PAINLEVEL_OUTOF10: 7
PAINLEVEL_OUTOF10: 7
PAINLEVEL_OUTOF10: 4
PAINLEVEL_OUTOF10: 4
PAINLEVEL_OUTOF10: 6
PAINLEVEL_OUTOF10: 6
PAINLEVEL_OUTOF10: 3

## 2024-11-17 ASSESSMENT — PAIN DESCRIPTION - DESCRIPTORS
DESCRIPTORS: BURNING;STABBING
DESCRIPTORS: BURNING;STABBING
DESCRIPTORS: ACHING
DESCRIPTORS: BURNING;STABBING

## 2024-11-17 ASSESSMENT — PAIN DESCRIPTION - ORIENTATION
ORIENTATION: LEFT;RIGHT
ORIENTATION: RIGHT;LEFT
ORIENTATION: RIGHT;LEFT
ORIENTATION: LEFT;RIGHT

## 2024-11-17 ASSESSMENT — PAIN SCALES - WONG BAKER: WONGBAKER_NUMERICALRESPONSE: NO HURT

## 2024-11-17 NOTE — PLAN OF CARE
Problem: Chronic Conditions and Co-morbidities  Goal: Patient's chronic conditions and co-morbidity symptoms are monitored and maintained or improved  11/16/2024 1328 by Soha Lazo RN  Outcome: Progressing     Problem: Pain  Goal: Verbalizes/displays adequate comfort level or baseline comfort level  11/16/2024 2028 by Dottie Silva RN  Outcome: Progressing  11/16/2024 1328 by Soha Lazo RN  Outcome: Progressing     Problem: Safety - Adult  Goal: Free from fall injury  11/16/2024 2028 by Dottie Silva RN  Outcome: Progressing  11/16/2024 1328 by Soha Lazo RN  Outcome: Progressing     Problem: Skin/Tissue Integrity  Goal: Absence of new skin breakdown  Description: 1.  Monitor for areas of redness and/or skin breakdown  2.  Assess vascular access sites hourly  3.  Every 4-6 hours minimum:  Change oxygen saturation probe site  4.  Every 4-6 hours:  If on nasal continuous positive airway pressure, respiratory therapy assess nares and determine need for appliance change or resting period.  11/16/2024 2028 by Dottie Silva RN  Outcome: Progressing  11/16/2024 1328 by Soha Lazo RN  Outcome: Progressing     Problem: ABCDS Injury Assessment  Goal: Absence of physical injury  11/16/2024 1328 by Soha Lazo RN  Outcome: Progressing

## 2024-11-17 NOTE — PROGRESS NOTES
[facility] Progress Note    Follow-Up on: ESRD      No issues. Just worked with PT. BP high     ROS:  Gen - no fever, no chills, appetite unchanged  CV - no chest pain  Lung - no shortness of breath, no cough  Abd - + tenderness, no nausea/vomiting, no diarrhea  Ext - + edema improving    Exam:  Vitals:    11/17/24 0651 11/17/24 0718 11/17/24 0822 11/17/24 1015   BP: (!) 167/91 (!) 171/99     Pulse: 89 88     Resp: 17 16 18    Temp:  97.5 °F (36.4 °C)     TempSrc:  Oral     SpO2: 98% 97%     Weight: (!) 154.7 kg (341 lb)   (!) 155.8 kg (343 lb 8 oz)   Height:             Intake/Output Summary (Last 24 hours) at 11/17/2024 1225  Last data filed at 11/16/2024 1424  Gross per 24 hour   Intake 100 ml   Output --   Net 100 ml             Wt Readings from Last 3 Encounters:   11/17/24 (!) 155.8 kg (343 lb 8 oz)   11/06/24 (!) 156.2 kg (344 lb 6.4 oz)   10/18/24 (!) 150.6 kg (332 lb)       GEN - in no distress, alert and oriented   CV - regular rate, S1, S2, no Rub   Lung - clear bilaterally  Abd - soft, +tender  Ext - + 1 BLE edema  Access: Right TCC intact    No results for input(s): \"WBC\", \"HGB\", \"HCT\", \"PLT\", \"INR\" in the last 72 hours.       No results for input(s): \"NA\", \"K\", \"CL\", \"CO2\", \"BUN\", \"CREATININE\", \"GLU\", \"CALCIUM\", \"MG\", \"PHOS\", \"ALBUMIN\" in the last 72 hours.    Invalid input(s): \"CA\"      Assessment / Plan:  Principal Problem:    Sepsis (HCC)  Resolved Problems:    * No resolved hospital problems. *    1) ESRD - HD MWF.  Previously on PD. On TTS for rehab schedule.   -HD Tuesday. She asks we pull no more than 3.5L per session as this causes fatigue prohibiting physical therapy    2) Peritonitis , culture grew Tsukamurella sp and CNS.  PD catheter removed 10/15/24  -Continue vancomycin EOT 12/1/2024 per ID for 6 wks     3) Abdominal pain - evidence of subcutaneous hematoma and cellulitis.   CT ab hematoma with improvement. Free fluid shows improvement.   Unclear whether it is calciphylaxis

## 2024-11-17 NOTE — PROGRESS NOTES
KIT DAILY FOLLOW UP RENAL INSUFFICIENCY PATIENT   [facility]   Pharmacy Pharmacokinetic Monitoring Service - Vancomycin    Consulting Provider: Dr. Toribio  Indication: PD catheter complicated peritonitis   Target Concentration: Pre-dialysis concentration 15-20 mg/L  EOT: 12/1/24 per ID  Additional Antimicrobials: None    Pertinent Laboratory Values:   Wt Readings from Last 1 Encounters:   11/17/24 (!) 154.7 kg (341 lb)     Temp Readings from Last 1 Encounters:   11/17/24 97.5 °F (36.4 °C) (Oral)     No results for input(s): \"BUN\", \"CREATININE\", \"WBC\", \"PROCAL\", \"LACACIDPL\", \"LACTA\", \"LCAD\", \"LACTSEPSIS\" in the last 72 hours.    Invalid input(s): \"PROCAT\", \"PCT\", \"LAC\", \"LACT\", \"LACPOC\"      Lab Results   Component Value Date/Time    ABIGAIL 8.8 10/14/2024 11:22 AM    [facility] 11/16/2024 05:49 AM     Assessment:  Date:  Dose/Freq Admin Times Level/Time:   10/24      10/25 HD 1000 mg x1  [address] @0400 = 21.6   10/26      10/27      10/28 HD  1000 mg x1 15[address] @0604 = 21.1   10/29      10/30  mg x1 [address] @0651 = 24.7   10/31      11/1  mg x 1 1256    11/2      11/3      11/4  mg x 1 1407    11/5  mg x 1 1515 Rd @ 0351 = 24.1   11/6  mg x 1 1631 Rd @ 0519 = 21.3   11/7  mg x 1 1237    11/8  mg x 1 1747 Rd @ 0344 = 20.3   11/9  mg x 1 1355    11/10   Rd @ 0608 = 19.5   11/11 11/12  mg x 1 1305    11/13 11/14  mg x 1 1136    11/15      11/16  mg x 1 1629 Rd @ 0549 = 12.6   11/17        Plan:  Concentration-guided dosing due to intermittent hemodialysis  No dose needed today, will plan to redose after the next HD session  Repeat vancomycin concentrations will be ordered as clinically appropriate  Pharmacy will continue to monitor patient and adjust therapy as indicated    Thank you for the consult,  Bo Covarrubias RPH

## 2024-11-17 NOTE — PROGRESS NOTES
PHYSICAL THERAPY DAILY NOTE    PT Individual Minutes  Time In: 0907  Time Out: 1025  Minutes: 78                 Total Treatment Time:  78 Minutes    Pt. Seen for: AM, Gait Training, Patient Education, Therapeutic Exercise, Transfer Training, and Other     Subjective: Patient reporting she feels OK. Reports she is sleeping better on air bed with less sacral and hip pain. Reports she can move better in air bed. Reports she has to be able to go up/down 3 steps to get in and out of her home. Reports her steps have hand rails but one is covered up with a plant.  reporting he can trim plant away from railing if needed.          Objective:  Restrictions/Precautions: Fall Risk, Skin  Required Braces or Orthoses?: No         GROSS ASSESSMENT   Patient resting in bed at beginning of treatment        COGNITION Daily Assessment    Overall Orientation Status: Within Normal Limits   Overall Cognitive Status: WNL        BED/MAT MOBILITY Daily Assessment   Instructed patient's family member on how to assist patient with bed mobility on air mattress including managing air bed controls.  Issued information on where to purchase a bed rail for bed at home. Patient reporting she has a bed wedge at home to elevate her head and shoulders    Bridging: Minimal assistance  Rolling to Left: Minimal assistance  Rolling to Right: Minimal assistance  Supine to Sit: Moderate assistance  Sit to Supine: Minimal assistance  Scooting: Modified independent  Bed Mobility Comments: Increased time and effort to complete using bed rail and HOB elevation at 10 degrees.        TRANSFERS Daily Assessment   Instructed patient's family member on how to assist patient with SPT with RW     Sit to Stand: Stand by assistance  Stand to Sit: Stand by assistance  Stand Pivot Transfers: Contact guard assistance (with RW)  Comment: Increased time and effort demonstrating improved body mechanics. performed from w/c with seat to floor height of 21 inches and bed

## 2024-11-18 ENCOUNTER — CARE COORDINATION (OUTPATIENT)
Dept: CARE COORDINATION | Facility: CLINIC | Age: 54
End: 2024-11-18

## 2024-11-18 LAB
ALBUMIN SERPL-MCNC: 2.1 G/DL (ref 3.5–5)
ANION GAP SERPL CALC-SCNC: 14 MMOL/L (ref 7–16)
BUN SERPL-MCNC: 37 MG/DL (ref 6–23)
CALCIUM SERPL-MCNC: 8.1 MG/DL (ref 8.8–10.2)
CHLORIDE SERPL-SCNC: 98 MMOL/L (ref 98–107)
CO2 SERPL-SCNC: 25 MMOL/L (ref 20–29)
CREAT SERPL-MCNC: 5.62 MG/DL (ref 0.6–1.1)
ERYTHROCYTE [DISTWIDTH] IN BLOOD BY AUTOMATED COUNT: 20.8 % (ref 11.9–14.6)
GLUCOSE SERPL-MCNC: 221 MG/DL (ref 70–99)
HBV SURFACE AG SER QL: NONREACTIVE
HCT VFR BLD AUTO: 25 % (ref 35.8–46.3)
HGB BLD-MCNC: 7.4 G/DL (ref 11.7–15.4)
MCH RBC QN AUTO: 30 PG (ref 26.1–32.9)
MCHC RBC AUTO-ENTMCNC: 29.6 G/DL (ref 31.4–35)
MCV RBC AUTO: 101.2 FL (ref 82–102)
NRBC # BLD: 0.06 K/UL (ref 0–0.2)
PHOSPHATE SERPL-MCNC: 3.6 MG/DL (ref 2.5–4.5)
PLATELET # BLD AUTO: 282 K/UL (ref 150–450)
PMV BLD AUTO: 9.7 FL (ref 9.4–12.3)
POTASSIUM SERPL-SCNC: 4.8 MMOL/L (ref 3.5–5.1)
RBC # BLD AUTO: 2.47 M/UL (ref 4.05–5.2)
SODIUM SERPL-SCNC: 137 MMOL/L (ref 136–145)
WBC # BLD AUTO: 11.7 K/UL (ref 4.3–11.1)

## 2024-11-18 PROCEDURE — 97110 THERAPEUTIC EXERCISES: CPT

## 2024-11-18 PROCEDURE — 90935 HEMODIALYSIS ONE EVALUATION: CPT

## 2024-11-18 PROCEDURE — 97530 THERAPEUTIC ACTIVITIES: CPT

## 2024-11-18 PROCEDURE — 6370000000 HC RX 637 (ALT 250 FOR IP): Performed by: PHYSICAL MEDICINE & REHABILITATION

## 2024-11-18 PROCEDURE — 99232 SBSQ HOSP IP/OBS MODERATE 35: CPT | Performed by: STUDENT IN AN ORGANIZED HEALTH CARE EDUCATION/TRAINING PROGRAM

## 2024-11-18 PROCEDURE — 85027 COMPLETE CBC AUTOMATED: CPT

## 2024-11-18 PROCEDURE — 97535 SELF CARE MNGMENT TRAINING: CPT

## 2024-11-18 PROCEDURE — 97116 GAIT TRAINING THERAPY: CPT

## 2024-11-18 PROCEDURE — 1180000000 HC REHAB R&B

## 2024-11-18 PROCEDURE — 6370000000 HC RX 637 (ALT 250 FOR IP): Performed by: STUDENT IN AN ORGANIZED HEALTH CARE EDUCATION/TRAINING PROGRAM

## 2024-11-18 PROCEDURE — 80069 RENAL FUNCTION PANEL: CPT

## 2024-11-18 PROCEDURE — 87340 HEPATITIS B SURFACE AG IA: CPT

## 2024-11-18 PROCEDURE — 2580000003 HC RX 258: Performed by: PHYSICAL MEDICINE & REHABILITATION

## 2024-11-18 PROCEDURE — 76937 US GUIDE VASCULAR ACCESS: CPT

## 2024-11-18 RX ORDER — SALIVA STIMULANT COMB. NO.3
SPRAY, NON-AEROSOL (ML) MUCOUS MEMBRANE EVERY 6 HOURS
Status: DISCONTINUED | OUTPATIENT
Start: 2024-11-18 | End: 2024-11-22

## 2024-11-18 RX ORDER — CLONIDINE HYDROCHLORIDE 0.1 MG/1
0.1 TABLET ORAL EVERY 6 HOURS PRN
Status: DISPENSED | OUTPATIENT
Start: 2024-11-18

## 2024-11-18 RX ADMIN — ACETAMINOPHEN 1000 MG: 500 TABLET, FILM COATED ORAL at 10:27

## 2024-11-18 RX ADMIN — SEVELAMER CARBONATE 800 MG: 800 TABLET, FILM COATED ORAL at 16:58

## 2024-11-18 RX ADMIN — SERTRALINE 100 MG: 100 TABLET, FILM COATED ORAL at 07:56

## 2024-11-18 RX ADMIN — FAMOTIDINE 20 MG: 20 TABLET, FILM COATED ORAL at 07:57

## 2024-11-18 RX ADMIN — APIXABAN 5 MG: 5 TABLET, FILM COATED ORAL at 07:56

## 2024-11-18 RX ADMIN — SEVELAMER CARBONATE 800 MG: 800 TABLET, FILM COATED ORAL at 12:07

## 2024-11-18 RX ADMIN — SODIUM CHLORIDE, PRESERVATIVE FREE 10 ML: 5 INJECTION INTRAVENOUS at 08:27

## 2024-11-18 RX ADMIN — Medication: at 09:01

## 2024-11-18 RX ADMIN — METOPROLOL TARTRATE 50 MG: 50 TABLET, FILM COATED ORAL at 07:56

## 2024-11-18 RX ADMIN — APIXABAN 5 MG: 5 TABLET, FILM COATED ORAL at 19:52

## 2024-11-18 RX ADMIN — HYDROCODONE BITARTRATE AND ACETAMINOPHEN 1 TABLET: 5; 325 TABLET ORAL at 19:52

## 2024-11-18 RX ADMIN — SEVELAMER CARBONATE 800 MG: 800 TABLET, FILM COATED ORAL at 07:57

## 2024-11-18 RX ADMIN — SERTRALINE 100 MG: 100 TABLET, FILM COATED ORAL at 19:52

## 2024-11-18 RX ADMIN — HYDROCODONE BITARTRATE AND ACETAMINOPHEN 1 TABLET: 5; 325 TABLET ORAL at 08:25

## 2024-11-18 RX ADMIN — METOPROLOL TARTRATE 50 MG: 50 TABLET, FILM COATED ORAL at 19:53

## 2024-11-18 ASSESSMENT — PAIN DESCRIPTION - LOCATION
LOCATION: HIP
LOCATION: HIP

## 2024-11-18 ASSESSMENT — PAIN SCALES - GENERAL
PAINLEVEL_OUTOF10: 0
PAINLEVEL_OUTOF10: 0
PAINLEVEL_OUTOF10: 8
PAINLEVEL_OUTOF10: 7

## 2024-11-18 ASSESSMENT — PAIN DESCRIPTION - ORIENTATION
ORIENTATION: LEFT;RIGHT
ORIENTATION: LEFT;RIGHT

## 2024-11-18 ASSESSMENT — PAIN - FUNCTIONAL ASSESSMENT: PAIN_FUNCTIONAL_ASSESSMENT: ACTIVITIES ARE NOT PREVENTED

## 2024-11-18 ASSESSMENT — PAIN DESCRIPTION - DESCRIPTORS: DESCRIPTORS: ACHING;THROBBING

## 2024-11-18 NOTE — PLAN OF CARE
Problem: Safety - Adult  Goal: Free from fall injury  11/18/2024 0726 by Everett Zurita, RN  Outcome: Progressing  11/17/2024 2018 by Dottie Silva, RN  Outcome: Progressing

## 2024-11-18 NOTE — PROGRESS NOTES
[place] [address]  Tel: [phone]     Physical Medicine and Rehabilitation Progress Note      Giselle Rosado  Admit Date: 11/8/2024  Admit Diagnosis: Sepsis (HCC) [A41.9]    Subjective     Patient seen and examined between therapies. Patient states her abdomen seems jason today,  concurs. We discussed this with patient and Nephrology NP, who ordered fluid extraction today and will get labs. Participating in therapy. All questions and concerns were addressed at this time.     Objective:     Current Facility-Administered Medications   Medication Dose Route Frequency    saliva substitute (BIOTENE/MOUTH KOTE) liquid   Oral Q6H    midodrine (PROAMATINE) tablet 5 mg  5 mg Oral 2 times per day on Sunday Monday Wednesday Friday    midodrine (PROAMATINE) tablet 10 mg  10 mg Oral PRN    acetaminophen (TYLENOL) tablet 1,000 mg  1,000 mg Oral Q6H PRN    Or    acetaminophen (TYLENOL) suppository 650 mg  650 mg Rectal Q6H PRN    0.9 % sodium chloride infusion   IntraVENous PRN    aluminum & magnesium hydroxide-simethicone (MAALOX) 200-200-20 MG/5ML suspension 30 mL  30 mL Oral Q6H PRN    apixaban (ELIQUIS) tablet 5 mg  5 mg Oral BID    bisacodyl (DULCOLAX) suppository 10 mg  10 mg Rectal Daily PRN    dicyclomine (BENTYL) capsule 10 mg  10 mg Oral TID PRN    epoetin oralia-epbx (RETACRIT) injection 10,000 Units  10,000 Units SubCUTAneous Weekly    famotidine (PEPCID) tablet 20 mg  20 mg Oral Daily    fluticasone (FLONASE) 50 MCG/ACT nasal spray 2 spray  2 spray Nasal Nightly PRN    lidocaine 4 % external patch 4 patch  4 patch TransDERmal Daily    metoprolol tartrate (LOPRESSOR) tablet 50 mg  50 mg Oral BID    ondansetron (ZOFRAN-ODT) disintegrating tablet 4 mg  4 mg Oral Q8H PRN    Or    ondansetron (ZOFRAN) injection 4 mg  4 mg IntraVENous Q6H PRN    polyethylene glycol (GLYCOLAX) packet 17 g  17 g Oral Daily PRN    sertraline (ZOLOFT) tablet 100 mg   assistance  Stand to Sit: Stand by assistance  Stand Pivot Transfers: Contact guard assistance (with RW)  Comment: Increased time and effort demonstrating correct body mechanics             GAIT Daily Assessment     Surface: Level tile  Device: Rolling Walker (gait belt)  Other Apparatus: Wheelchair follow  Assistance: Contact guard assistance  Quality of Gait: slow cont partial step through gait pattern with mild flexed posture  Gait Deviations: Decreased step length;Decreased step height;Slow Shirin;Increased ALEE  Distance: 20 ft x 3 with 5 min seated rest break between attempts  Comments: Patient with SOB during gait training. 02 sat 99% to 100 % and HR 97 on room air  More Ambulation?: No         Per OT:   ACTIVITIES OF DAILY LIVING:          Oral Hygiene  (Pt already completed prior to therapist encounter)     Shower/Bathe  (Pt declined sponge bath this date, request for next ADL session in AM)        Upper Body  Dressing Supervision or touching assistance Pt seated able to don/doff overhead gown seated, pt standing with touching A with FWW for adjusting gown   Lower Body Dressing  (pt prefers no pants/underwear d/t abdominal swelling and pain)     Donning/[place] Footwear Substantial/maximal assistance MAX A required to don B socks   Toileting Hygiene  (pt already completed toileting upon OT arrival)     Education AE/DME Training, Energy Conservation, Pacing, Functional Transfer Training, Rolling Walker Management, and Safety Awareness           Labs/Studies:  Recent Results (from the past 72 hour(s))   Vancomycin Level, Random    Collection Time: 11/16/24  5:49 AM   Result Value Ref Range    Vancomycin Rm 12.6 UG/ML   Hepatitis B Surface Antigen    Collection Time: 11/16/24  5:49 AM   Result Value Ref Range    Hepatitis B Surface Ag NONREACTIVE NR         Assessment:   This is a 53 YO with h/o ESRD now on HD (recent hospitalization 10/10-10/22 due to peritonitis while on PD), atrial fibrillation, HTN  admitted on 10/23 septic and in Afib with RVR. CT abdomen pelvis + for active bleeding from the L rectus/inferior epigastric arterial territory, increased in size of hematomas measuring 7.9 x 3.4 cm. IR was consulted, s/p thrombin injection. Repeat CT showed improvement of L abdominal wall hematoma and thrombosed pseudoaneurysm. She continues with mobility and self care impairments..     Rehabilitation Plan:  Continue PT for a minimum of 1.5 hours a day, at least 5 out of 7 days per week to address bed mobility, transfers, ambulation, strengthening, balance, and endurance.   Continue OT for a minimum of 1.5 hours a day, at least 5 out of 7 days per week to address ADL (feeding, grooming, bathing, UE and LE dressing, toileting); instrumental ADLs; cognitive function; safety; energy conservations; community reintegration; and adaptive equipment as needed.     Continue 24-hour skilled rehabilitation nursing for bowel and bladder management, skin care for decubitus ulcer prevention, pain management and ongoing medication administration     Plan / Recommendations / Medical Decision Making:   CBC, BMP in HD / fluid extraction later today (per Nephrology)    Below are the active medical comorbidities / hospital conditions which will affect rehab course with plan for mitigation. Continue daily physician / PA medical management:    //Abdominal fullness, 11/18  -Patient believes abdomen is bigger today, spouse concurs  -Known left abd wall hematoma, low concern for worsening with robust BP this AM (163/102) but will check CBC, BMP  -Standing weight today 347lbs, Weight change: 1.134 kg (2 lb 8 oz); standing weights ranging 343-352lbs since IRF admission 11/8  -Discussed with [facility] NP: they will run 2h sequential HD today after therapy for fluid extraction then regular HD tomorrow    Sepsis / peritonitis - on vanco through 12/1    ESRD on HD - nephrology following, on TTa schedule     Obesity, morbid (BMI- 50.8

## 2024-11-18 NOTE — PROGRESS NOTES
PHYSICAL THERAPY DAILY NOTE    PT Individual Minutes  Time In: 1300  Time Out: 1345  Minutes: 45                 Total Treatment Time:  45 Minutes    Pt. Seen for: PM, Gait Training, Patient Education, Therapeutic Exercise, Transfer Training, and Other     Subjective: Patient reporting she is very tired and feels bad today. Reports she feels weaker and SOB and times. Reports it is more difficult to move today. Reports she wants to do the rehab but she does not have the energy for it         Objective:  Restrictions/Precautions: Fall Risk, Skin  Required Braces or Orthoses?: No              Other position/activity restrictions: bariatric air bed for pressure relief         GROSS ASSESSMENT   Patient resting in w/c at beginning of treatment        COGNITION Daily Assessment    Overall Orientation Status: Within Normal Limits   Overall Cognitive Status: WNL        BED/MAT MOBILITY Daily Assessment     Rolling to Left: Minimal assistance  Rolling to Right: Minimal assistance  Supine to Sit: Moderate assistance  Sit to Supine: Minimal assistance  Scooting: Modified independent  Bed Mobility Comments: Increased time and effort to complete using bed rail and air bed on maxi inflate        TRANSFERS Daily Assessment    Sit to Stand: Stand by assistance  Stand to Sit: Stand by assistance  Stand Pivot Transfers: Contact guard assistance (with RW)  Comment: Increased time and effort demonstrating correct body mechanics          GAIT Daily Assessment   Limited gait distance due to fatigue with patient c/o SOB and knee weakness after 5 ft. Surface: Level tile  Device: Rolling Walker (gait belt)  Other Apparatus: Wheelchair follow  Assistance: Contact guard assistance  Quality of Gait: slow cont partial step through gait pattern with mild flexed posture  Gait Deviations: Decreased step length;Decreased step height;Slow Shirin;Increased ALEE  Distance: 5 ft x 1  Comments: Patient with SOB during gait training. 02 sat 97% on room  air  More Ambulation?: No              STEPS/STAIRS Daily Assessment    Stairs?: No             BALANCE Daily Assessment    Static Sitting: Normal:  Pt. able to maintain balance w/o UE support  Dynamic Sitting: Good - accepts moderate challenge;  can maintain balance while picking object off the floor  Static Standing: Fair:  Pt. requires UE support;  may need occasional min A  Dynamic Standing: Poor - unable to accept challenge or move without LOB        WHEELCHAIR MOBILITY Daily Assessment    Wheelchair Size: 24 x 18  Wheelchair Type: Standard  Wheelchair Cushion: Pressure Relieving (ROHO)  Pressure Relief Type:  (sit<>stand with RW)  Level of Assistance for pressure relief activities: Stand by assistance  Wheelchair Parts Management: No  Propulsion: No                     LOWER EXTREMITY EXERCISES   SEATED EXERCISES Sets Reps Comments   Ankle PF 3 10    Ankle DF 3 10    Long Arc Quads 3 10    Hip Abduction with red Theraband 3 10    Hamstring Curls with red Theraband 3 10    Hip Extension with green Theraband 3 10    Increased time and effort to complete with multiple and frequent rest breaks. Min assist with Cues for correct form  Required increased resting itme between each set and each exercise due to SOB. 02 sat 95 to 98% on room air         Pain level: 0 to 5 out of 10  Pain Location:  low back, lateral hip area, abdominal area  Pain Interventions: Medication per order, rest, positioning,relaxation,     Vital Signs:  BP (!) 179/108   Pulse 86   Temp 97.5 °F (36.4 °C) (Oral)   Resp 17   Ht 1.753 m (5' 9\")   Wt (!) 157.4 kg (347 lb)   SpO2 97%   BMI 51.24 kg/m²       Education:    Education Given To: Patient  Education Provided: Energy Conservation;Transfer Training;Safety;Fall Prevention Strategies;Mobility Training;Precautions  Education Method: Demonstration;Verbal  Barriers to Learning: None  Education Outcome: Verbalized understanding;Demonstrated understanding;Continued education needed      Interdisciplinary Communication: spoke with RN regarding increase in fatigue and SOB during PT treatment    Pt. Left in bed call bell in reach needs in reach         Assessment: Decreased tolerance to treatment due to increased fatigue and SOB during seated exercises and functional mobility training. Patient going for dialysis this PM and tomorrow AM         Plan of Care: Continue with POC and progress as tolerated.     Joesph Jones, PT  11/18/2024

## 2024-11-18 NOTE — PROGRESS NOTES
[facility] OCCUPATIONAL THERAPY DAILY NOTE  OT Individual Minutes  Time In: 1118  Time Out: 1201  Minutes: 43               Subjective: Pt agreeable to treatment. \"My arms just feel heavy.\" Pt did report \"I just feel more shortness of breath today.\" SPO2 100% on RA, HR 80's-90's seated/following UB exercises. Rest breaks required throughout therapy session.  Pain: RN notified .  Interdisciplinary Communication: Collaborated with PT and RN regarding pt status and pt performance.   Precautions: Falls, [place] Alarm, and dialysis port, HD dialysis     FUNCTIONAL MOBILITY:       Bed Mobility NT    Sit to Stand CGA    Transfer  CGA  Transfer Type: STS from w/c<>stand at table      Ambulation NT  Equipment: NA       - Activity Tolerance - Balance - Strengthening   Pt engaged in Sequence board game while standing to address standing balance/tolerance, strengthening and activity tolerance for integration into functional tasks.  Pt stood for 2 bouts with initially 1:02 minutes with required rest break following and then stood for 37 seconds with required rest break following. Pt performed STS's with touching A.    Pt completed 10 minutes on the upper body ergometer, ½ forward and ½ backward, with light and moderate resistance to increase upper body strength and activity tolerance for integration into functional tasks. Rest break required in between.    Pt then participated in BUE strengthening for ADL/IADL tasks and functional t/f's with completion of 3 sets of 5 reps of w/c push ups with noted more clearance of hips/buttocks during with SPV, rest breaks required in between sets.      Education   Benefits of OT and Energy Conservation, Pacing; OT and pt discussed establishing healthy routines/habits at home to enhance occupational performance in ADL/IADL tasks with functional activities/exercises.     Assessment: Pt demonstrated good participation in OT treatment. Pt continues to benefit from skilled OT services to address remaining  deficits and progress toward baseline level of independence and safety. Patient ended session seated in w/c with call bell and needs within reach and with family member at bedside.   Plan: Continue OT POC.     JAYJAY PARISI OT   11/18/2024

## 2024-11-18 NOTE — PLAN OF CARE
Problem: Chronic Conditions and Co-morbidities  Goal: Patient's chronic conditions and co-morbidity symptoms are monitored and maintained or improved  11/17/2024 0949 by Soha Lazo RN  Outcome: Progressing  11/17/2024 0947 by Soha Lazo RN  Outcome: Progressing     Problem: Pain  Goal: Verbalizes/displays adequate comfort level or baseline comfort level  11/17/2024 2018 by Dottie Silva RN  Outcome: Progressing  11/17/2024 0949 by Soha Lazo RN  Outcome: Progressing  11/17/2024 0947 by Soha Lazo RN  Outcome: Progressing     Problem: Safety - Adult  Goal: Free from fall injury  11/17/2024 2018 by Dottie Silva RN  Outcome: Progressing  11/17/2024 0949 by Soha Lazo RN  Outcome: Progressing  11/17/2024 0947 by Soha Lazo RN  Outcome: Progressing     Problem: Skin/Tissue Integrity  Goal: Absence of new skin breakdown  Description: 1.  Monitor for areas of redness and/or skin breakdown  2.  Assess vascular access sites hourly  3.  Every 4-6 hours minimum:  Change oxygen saturation probe site  4.  Every 4-6 hours:  If on nasal continuous positive airway pressure, respiratory therapy assess nares and determine need for appliance change or resting period.  11/17/2024 0949 by Soha Lazo RN  Outcome: Progressing  11/17/2024 0947 by Soha Lazo RN  Outcome: Progressing     Problem: ABCDS Injury Assessment  Goal: Absence of physical injury  11/17/2024 0949 by Soha Lazo RN  Outcome: Progressing  11/17/2024 0947 by Soha Lazo RN  Outcome: Progressing

## 2024-11-18 NOTE — PROGRESS NOTES
PHYSICAL THERAPY DAILY NOTE    PT Individual Minutes  Time In: 1033  Time Out: 1115  Minutes: 42                 Total Treatment Time:  42 Minutes    Pt. Seen for: AM, Gait Training, Patient Education, Therapeutic Exercise, Transfer Training, and Other     Subjective: Patient reporting she does not feel as good today. Reports her weight has increased and she is having a harder breathing in the bed when the HOB is flat. Reports she feels weaker today. Discussed with patient and her spouse on options on where to purchase a ramp         Objective:  Restrictions/Precautions: Fall Risk, Skin  Required Braces or Orthoses?: No              Other position/activity restrictions: bariatric air bed for pressure relief         GROSS ASSESSMENT   Patient resting in w/c at beginning of treatment        COGNITION Daily Assessment    Overall Orientation Status: Within Normal Limits   Overall Cognitive Status: WNL        BED/MAT MOBILITY Daily Assessment     Bed Mobility Comments: NT this AM        TRANSFERS Daily Assessment    Sit to Stand: Stand by assistance  Stand to Sit: Stand by assistance  Stand Pivot Transfers: Contact guard assistance (with RW)  Comment: Increased time and effort demonstrating correct body mechanics          GAIT Daily Assessment    Surface: Level tile  Device: Rolling Walker (gait belt)  Other Apparatus: Wheelchair follow  Assistance: Contact guard assistance  Quality of Gait: slow cont partial step through gait pattern with mild flexed posture  Gait Deviations: Decreased step length;Decreased step height;Slow Shirin;Increased ALEE  Distance: 20 ft x 3 with 5 min seated rest break between attempts  Comments: Patient with SOB during gait training. 02 sat 99% to 100 % and HR 97 on room air  More Ambulation?: No              STEPS/STAIRS Daily Assessment    Stairs?: Yes   # Steps : 1  Stairs Height: 6\"  Rails: Bilateral  Assistance: 2 Person assistance;Moderate assistance  Comment: up/down 4 inch step in II  Left in wheelchair with OT         Assessment: Difficulty progressing gait distance with RW due to fatigue. Unable to safely ambulate up/down 4 steps this AM due to LE weakness and decreased endurance. Made recommendation to patient and her spouse regarding ramp at entrance of home due to increased risk for falling when attempting to go up/down steps.         Plan of Care: Continue with POC and progress as tolerated.     Joesph Jones, PT  11/18/2024

## 2024-11-18 NOTE — PROGRESS NOTES
KIT DAILY FOLLOW UP RENAL INSUFFICIENCY PATIENT   [facility]   Pharmacy Pharmacokinetic Monitoring Service - Vancomycin    Consulting Provider: Dr. Toribio  Indication: PD catheter complicated peritonitis   Target Concentration: Pre-dialysis concentration 15-20 mg/L  EOT: 12/1/24 per ID  Additional Antimicrobials: None    Pertinent Laboratory Values:   Wt Readings from Last 1 Encounters:   11/18/24 (!) 154.7 kg (341 lb)     Temp Readings from Last 1 Encounters:   11/18/24 97.5 °F (36.4 °C) (Oral)     No results for input(s): \"BUN\", \"CREATININE\", \"WBC\", \"PROCAL\", \"LACACIDPL\", \"LACTA\", \"LCAD\", \"LACTSEPSIS\" in the last 72 hours.    Invalid input(s): \"PROCAT\", \"PCT\", \"LAC\", \"LACT\", \"LACPOC\"      Lab Results   Component Value Date/Time    ABIGAIL 8.8 10/14/2024 11:22 AM    [facility] 12.6 11/16/2024 05:49 AM     Assessment:  Date:  Dose/Freq Admin Times Level/Time:   10/24      10/25 HD 1000 mg x1  [address] Rd @0400 = 21.6   10/26      10/27      10/28 HD  1000 mg x1 1546 Rd @0604 = 21.1   10/29      10/30  mg x1 [address] @0651 = 24.7   10/31      11/1  mg x 1 1256    11/2      11/3      11/4  mg x 1 1407    11/5  mg x 1 1515 Rd @ 0351 = 24.1   11/6  mg x 1 1631 Rd @ 0519 = 21.3   11/7  mg x 1 1237    11/8  mg x 1 1747 Rd @ 0344 = 20.3   11/9  mg x 1 1355    11/10   Rd @ 0608 = 19.5   11/11 11/12  mg x 1 1305    11/13 11/14  mg x 1 1136    11/15      11/16  mg x 1 1629 Rd @ 0549 = 12.6   11/17 11/18  mg x 1 (21)    11/19   Rd @ (04)     Plan:  Concentration-guided dosing due to intermittent hemodialysis  Extra HD today. UF x 2 hours. Will give vancomycin 500 mg IV x 1 after dialysis today  Repeat vancomycin concentrations will be ordered as clinically appropriate  Pharmacy will continue to monitor patient and adjust therapy as indicated    Thank you for the consult,  Angelica Ocasio [facility]

## 2024-11-18 NOTE — CARE COORDINATION
Transition of care outreach postponed due to patient's continued stay [facility] inpatient rehab for STR.

## 2024-11-18 NOTE — PROGRESS NOTES
[facility] Progress Note    Follow-Up on: ESRD  Pt seen and examined in PT room pt sitting up in wheelchair, complaints of worsening generalized edema     ROS:  Gen - no fever, no chills, appetite unchanged  CV - no chest pain  Lung - no shortness of breath, no cough  Abd - + tenderness, no nausea/vomiting, no diarrhea  Ext - + edema- worse     Exam:  Vitals:    11/18/24 0601 11/18/24 0728 11/18/24 0825 11/18/24 0959   BP: (!) 164/92 (!) 163/102     Pulse: 82 83     Resp:  18 18    Temp:  97.5 °F (36.4 °C)     TempSrc:  Oral     SpO2: 98% 97%     Weight: (!) 154.7 kg (341 lb)   (!) 157.4 kg (347 lb)   Height:             Intake/Output Summary (Last 24 hours) at 11/18/2024 1035  Last data filed at 11/17/2024 1729  Gross per 24 hour   Intake 480 ml   Output --   Net 480 ml             Wt Readings from Last 3 Encounters:   11/18/24 (!) 157.4 kg (347 lb)   11/06/24 (!) 156.2 kg (344 lb 6.4 oz)   10/18/24 (!) 150.6 kg (332 lb)       GEN - in no distress, alert and oriented   CV - regular rate, S1, S2, no Rub   Lung - clear with low lung volumes bilaterally  Abd - soft, +tender  Ext - + 3 BLE edema/anasarca  Access: left TCC intact    No results for input(s): \"WBC\", \"HGB\", \"HCT\", \"PLT\", \"INR\" in the last 72 hours.       No results for input(s): \"NA\", \"K\", \"CL\", \"CO2\", \"BUN\", \"CREATININE\", \"GLU\", \"CALCIUM\", \"MG\", \"PHOS\", \"ALBUMIN\" in the last 72 hours.    Invalid input(s): \"CA\"      Assessment / Plan:  Principal Problem:    Sepsis (HCC)  Resolved Problems:    * No resolved hospital problems. *    1) ESRD - HD MWF.  Previously on PD. On TTS for rehab schedule.   -sequential dialysis today for additional volume removal   - HD tomorrow     2) Peritonitis , culture grew Tsukamurella sp and CNS.  PD catheter removed 10/15/24  -Continue vancomycin EOT 12/1/2024 per ID for 6 wks     3) Abdominal pain - evidence of subcutaneous hematoma and cellulitis.   CT ab hematoma with improvement. Free fluid shows improvement.    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 Traore

## 2024-11-18 NOTE — PROGRESS NOTES
[facility] OCCUPATIONAL THERAPY DAILY NOTE  OT Individual Minutes  Time In: 0921  Time Out: 1000  Minutes: 39               Subjective: Pt agreeable to treatment. \"I just can seem to get going this morning.\"  Pain: RN notified .  Interdisciplinary Communication: Collaborated with PT and RN regarding pt status, pt performance, and handoff communication.   Precautions: Falls, [place] Alarm, and dialysis port, HD dialysis       Lines:IV    FUNCTIONAL MOBILITY:       Bed Mobility NT    Sit to Stand CGA    Transfer  CGA  Transfer Type:  w/c t/f  Equipment: RW CGA for t/f's with FWW   Ambulation NT  Equipment: NA      ACTIVITIES OF DAILY LIVING:       Oral Hygiene  (Pt already completed prior to therapist encounter)    Shower/Bathe  (Pt declined sponge bath this date, request for next ADL session in AM)      Upper Body  Dressing Supervision or touching assistance Pt seated able to don/doff overhead gown seated, pt standing with touching A with FWW for adjusting gown   Lower Body Dressing  (pt prefers no pants/underwear d/t abdominal swelling and pain)    Donning/Bulger Footwear Substantial/maximal assistance MAX A required to don B socks   Toileting Hygiene  (pt already completed toileting upon OT arrival)    Education AE/DME Training, Energy Conservation, Pacing, Functional Transfer Training, Rolling Walker Management, and Safety Awareness     Pt performed 2 STS's during ADL's with FWW, w/c placed behind pt to ensure pt safety with completion of 1 minute to 1:30 seconds with required rest break in between and increased time for. CGA with t/f with FWW. Pt then participated in standing weight with MIN A for t/f with weight scale. Notified RN of pt's standing weight of 347lbs.    Pt then participated in 5 w/c push ups while seated in w/c for BUE strengthening and endurance for ADL/IADL tasks. Task completed with SPV     Assessment: Patient towards end of session with pt's discussion with NP nephrology emotional and provided  emotional support for pt. Pt demonstrated good participation in OT treatment. Pt continues to benefit from skilled OT services to address remaining deficits and progress toward baseline level of independence and safety. Patient ended session seated in w/c with call bell and needs within reach and with family member at bedside.   Plan: Continue OT POC.     JAYJAY PARISI OT   11/18/2024

## 2024-11-18 NOTE — DIALYSIS
TRANSFER IN - DIALYSIS    Received patient in dialysis unit  [facility] for ordered procedure.    Consent verified for renal replacement therapy. Procedure explained to patient, opportunity for Q&A provided. Call light given.     Patient baseline and vital signs stable.  /66,  P55  , 0L O2 via 0.    Hemodialysis initiated using cvc.  Aspirated and flushed both ports without difficulty. Dressing clean, dry and intact.  Machine settings per MD order.    Heparin 0 unit bolus and 0 units/hr.      Will monitor during treatment.

## 2024-11-19 LAB — VANCOMYCIN SERPL-MCNC: 14.4 UG/ML

## 2024-11-19 PROCEDURE — 6370000000 HC RX 637 (ALT 250 FOR IP): Performed by: PHYSICAL MEDICINE & REHABILITATION

## 2024-11-19 PROCEDURE — 2580000003 HC RX 258: Performed by: PHYSICAL MEDICINE & REHABILITATION

## 2024-11-19 PROCEDURE — 97530 THERAPEUTIC ACTIVITIES: CPT

## 2024-11-19 PROCEDURE — 99232 SBSQ HOSP IP/OBS MODERATE 35: CPT | Performed by: STUDENT IN AN ORGANIZED HEALTH CARE EDUCATION/TRAINING PROGRAM

## 2024-11-19 PROCEDURE — 6370000000 HC RX 637 (ALT 250 FOR IP): Performed by: INTERNAL MEDICINE

## 2024-11-19 PROCEDURE — 97110 THERAPEUTIC EXERCISES: CPT

## 2024-11-19 PROCEDURE — 2580000003 HC RX 258: Performed by: STUDENT IN AN ORGANIZED HEALTH CARE EDUCATION/TRAINING PROGRAM

## 2024-11-19 PROCEDURE — 97116 GAIT TRAINING THERAPY: CPT

## 2024-11-19 PROCEDURE — 97535 SELF CARE MNGMENT TRAINING: CPT

## 2024-11-19 PROCEDURE — 6360000002 HC RX W HCPCS: Performed by: STUDENT IN AN ORGANIZED HEALTH CARE EDUCATION/TRAINING PROGRAM

## 2024-11-19 PROCEDURE — 80202 ASSAY OF VANCOMYCIN: CPT

## 2024-11-19 PROCEDURE — 1180000000 HC REHAB R&B

## 2024-11-19 PROCEDURE — 90935 HEMODIALYSIS ONE EVALUATION: CPT

## 2024-11-19 PROCEDURE — 36415 COLL VENOUS BLD VENIPUNCTURE: CPT

## 2024-11-19 RX ORDER — ALPRAZOLAM 0.5 MG
0.5 TABLET ORAL DAILY PRN
Status: DISPENSED | OUTPATIENT
Start: 2024-11-19

## 2024-11-19 RX ADMIN — ALPRAZOLAM 0.5 MG: 0.5 TABLET ORAL at 11:40

## 2024-11-19 RX ADMIN — VANCOMYCIN HYDROCHLORIDE 750 MG: 750 INJECTION, POWDER, LYOPHILIZED, FOR SOLUTION INTRAVENOUS at 21:04

## 2024-11-19 RX ADMIN — METOPROLOL TARTRATE 50 MG: 50 TABLET, FILM COATED ORAL at 20:58

## 2024-11-19 RX ADMIN — ACETAMINOPHEN 1000 MG: 500 TABLET, FILM COATED ORAL at 21:11

## 2024-11-19 RX ADMIN — VANCOMYCIN HYDROCHLORIDE 500 MG: 500 INJECTION, POWDER, LYOPHILIZED, FOR SOLUTION INTRAVENOUS at 12:07

## 2024-11-19 RX ADMIN — SERTRALINE 100 MG: 100 TABLET, FILM COATED ORAL at 20:58

## 2024-11-19 RX ADMIN — SEVELAMER CARBONATE 800 MG: 800 TABLET, FILM COATED ORAL at 16:41

## 2024-11-19 RX ADMIN — HYDROCODONE BITARTRATE AND ACETAMINOPHEN 1 TABLET: 5; 325 TABLET ORAL at 22:58

## 2024-11-19 RX ADMIN — Medication: at 04:47

## 2024-11-19 RX ADMIN — APIXABAN 5 MG: 5 TABLET, FILM COATED ORAL at 20:58

## 2024-11-19 RX ADMIN — APIXABAN 5 MG: 5 TABLET, FILM COATED ORAL at 14:07

## 2024-11-19 RX ADMIN — METOPROLOL TARTRATE 50 MG: 50 TABLET, FILM COATED ORAL at 14:07

## 2024-11-19 RX ADMIN — SEVELAMER CARBONATE 800 MG: 800 TABLET, FILM COATED ORAL at 11:38

## 2024-11-19 RX ADMIN — SERTRALINE 100 MG: 100 TABLET, FILM COATED ORAL at 07:19

## 2024-11-19 RX ADMIN — SODIUM CHLORIDE, PRESERVATIVE FREE 10 ML: 5 INJECTION INTRAVENOUS at 21:14

## 2024-11-19 RX ADMIN — FAMOTIDINE 20 MG: 20 TABLET, FILM COATED ORAL at 07:19

## 2024-11-19 RX ADMIN — SEVELAMER CARBONATE 800 MG: 800 TABLET, FILM COATED ORAL at 07:19

## 2024-11-19 RX ADMIN — Medication: at 21:13

## 2024-11-19 RX ADMIN — HYDROCODONE BITARTRATE AND ACETAMINOPHEN 1 TABLET: 5; 325 TABLET ORAL at 14:11

## 2024-11-19 ASSESSMENT — PAIN SCALES - GENERAL
PAINLEVEL_OUTOF10: 0
PAINLEVEL_OUTOF10: 3
PAINLEVEL_OUTOF10: 7
PAINLEVEL_OUTOF10: 7

## 2024-11-19 ASSESSMENT — PAIN DESCRIPTION - DESCRIPTORS
DESCRIPTORS: ACHING
DESCRIPTORS: ACHING
DESCRIPTORS: ACHING;THROBBING

## 2024-11-19 ASSESSMENT — PAIN DESCRIPTION - LOCATION
LOCATION: HIP
LOCATION: HIP;ABDOMEN

## 2024-11-19 ASSESSMENT — PAIN DESCRIPTION - ORIENTATION
ORIENTATION: RIGHT;LEFT
ORIENTATION: LEFT;RIGHT

## 2024-11-19 ASSESSMENT — PAIN - FUNCTIONAL ASSESSMENT: PAIN_FUNCTIONAL_ASSESSMENT: ACTIVITIES ARE NOT PREVENTED

## 2024-11-19 NOTE — PROGRESS NOTES
[facility] Progress Note    Follow-Up on: ESRD  Pt seen and examined on HD, left  Qb, UF 3200 reports ongoing generalized edema.   S/p sequential dialysis with 3L net UF 11/18     ROS:  Gen - no fever, no chills, appetite unchanged  CV - no chest pain  Lung - no shortness of breath, no cough  Abd - + tenderness, no nausea/vomiting, no diarrhea  Ext - + edema    Exam:  Vitals:    11/19/24 0744 11/19/24 0858 11/19/24 0900 11/19/24 0930   BP: (!) 143/98 121/67 (!) 154/97 110/62   Pulse: 99 68 92 66   Resp: 19 18     Temp: 97.9 °F (36.6 °C)      TempSrc: Oral      SpO2: 100%      Weight:       Height:             Intake/Output Summary (Last 24 hours) at 11/19/2024 0947  Last data filed at 11/18/2024 1745  Gross per 24 hour   Intake 500 ml   Output 3700 ml   Net -3200 ml             Wt Readings from Last 3 Encounters:   11/18/24 (!) 157.4 kg (347 lb)   11/06/24 (!) 156.2 kg (344 lb 6.4 oz)   10/18/24 (!) 150.6 kg (332 lb)       GEN - in no distress, alert and oriented   CV - regular rate, S1, S2, no Rub   Lung - clear with low lung volumes bilaterally  Abd - soft, +tender  Ext - + 2 BLE edema/anasarca  Access: left TCC intact, accessed     Recent Labs     11/18/24  1541   WBC 11.7*   HGB 7.4*   HCT 25.0*             Recent Labs     11/18/24  1541      K 4.8   CL 98   CO2 25   BUN 37*   CREATININE 5.62*   CALCIUM 8.1*   PHOS 3.6   ALBUMIN 2.1*         Assessment / Plan:  Principal Problem:    Sepsis (HCC)  Resolved Problems:    * No resolved hospital problems. *    1) ESRD - HD MWF.  Previously on PD. On TTS for rehab schedule.   -sequential dialysis 11/18 with 3L UF net 1/18  - seen on HD, UF as tolerated, vanc today last hour of dialysis   -plan for regular HD Thursday, sequential again Friday    2) Peritonitis , culture grew Tsukamurella sp and CNS.  PD catheter removed 10/15/24  -Continue vancomycin EOT 12/1/2024 per ID for 6 wks     3) Abdominal pain - evidence of subcutaneous  hematoma and cellulitis.   CT ab hematoma with improvement. Free fluid shows improvement.   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 Traore

## 2024-11-19 NOTE — PROGRESS NOTES
[facility] OCCUPATIONAL THERAPY DAILY NOTE  OT Individual Minutes  Time In: 0702  Time Out: 0745  Minutes: 43               Subjective: Pt agreeable to treatment. \"I feel so much better after getting that fluid off yesterday.\"  Pain: RN notified .  Interdisciplinary Communication: Collaborated with RN regarding pt status and pt performance.   Precautions: Falls, [place] Alarm, and dialysis port, HD dialysis, IV      FUNCTIONAL MOBILITY:       Bed Mobility Jt LE assist with use of bed rail/flat bed   Sit to Stand SBA Close SBA with increased time   Transfer  SBA-touching A  Transfer Type: SPT  Equipment: Grab Bars and RW Close SBA with SPT from w/c<> toilet with bariatric BSC placed over toilet with use of GB ; pt then performed SPT with from w/c>EOB with touching A   Ambulation NT  Equipment: NA      ACTIVITIES OF DAILY LIVING:       Eating Independent MOD I seated in w/c   Oral Hygiene Setup or clean-up assistance S/U A while seated in w/c for oral hygiene   Shower/Bathe Partial/moderate assistance MIN A sponge bath request, next ADL session plan for shower with tub shower room; pt participated in sponge bath with required MIN A For B feet washing-if in shower has long handled sponge   Upper Body  Dressing  (Pt request to keep on previous gown from day)    Lower Body Dressing  (Pt prefers no pants/underwear d/t abdominal swelling/pain and discomfort)    Toileting Hygiene Supervision or touching assistance SPV for toileting seated, standing for gown management close SBA with no LOB/use of GB   Toilet Transfer Supervision or touching assistance Close SBA for toilet t/f with SPT from W/C<>toilet with BSC bariatric placed over and GB use   Education AE/DME Training, Benefits of OT, Energy Conservation, Pacing, Functional Transfer Training, Rolling Walker Management, and Safety Awareness     Assessment: Patient with improved upright tolerance and improved pain in abdomen this date. Plan for shower in tub shower room next ADL  session. Pt demonstrated good participation in OT treatment. Pt continues to benefit from skilled OT services to address remaining deficits and progress toward baseline level of independence and safety. Patient ended session supine in bed with call bell and needs within reach, with family member at bedside, with RN/CNA, and with MD/PA.   Plan: Continue OT POC.     JAYJAY PARISI OT   11/19/2024

## 2024-11-19 NOTE — PROGRESS NOTES
PHYSICAL THERAPY DAILY NOTE    PT Individual Minutes  Time In: 1118  Time Out: 1149  Minutes: 31                 Total Treatment Time:  31 Minutes    Pt. Seen for: AM, Patient Education, and Therapeutic Exercise     Subjective: Patient reporting she feels a little better today         Objective:  Restrictions/Precautions: Fall Risk, Skin  Required Braces or Orthoses?: No            GROSS ASSESSMENT   Patient resting in bed in dialysis. Patient seen for LE therapeutic exercises while in dialysis        COGNITION Daily Assessment    Overall Orientation Status: Within Normal Limits   Overall Cognitive Status: WNL        BED/MAT MOBILITY Daily Assessment     Bed Mobility Comments: NT        TRANSFERS Daily Assessment    Comment: NT          GAIT Daily Assessment    Comments: NT              STEPS/STAIRS Daily Assessment      NT             BALANCE Daily Assessment    NT       WHEELCHAIR MOBILITY Daily Assessment          NT                LOWER EXTREMITY EXERCISES   SUPINE EXERCISES for both LEs Sets Reps Comments   Ankle Pumps 3 10    Glut Sets 3 10    Heel Slides 3 10    Hip Abduction 3 10    Short Arc Quad 3 10    Straight Leg Raise 3 10    Increased time and effort to complete with multiple and frequent rest breaks. Cues for correct form         Pain level: 0 to 4 out of 10  Pain Location:  low back, lateral hips, sacral area  Pain Interventions: Medication per order, rest, positioning,relaxation, body mechanics,     Vital Signs:  BP (!) 175/49   Pulse 77   Temp 97.9 °F (36.6 °C) (Oral)   Resp 18   Ht 1.753 m (5' 9\")   Wt (!) 157.4 kg (347 lb)   SpO2 100%   BMI 51.24 kg/m²       Education:    Education Provided: Home Exercise Program;Energy Conservation  Education Method: Demonstration;Verbal  Barriers to Learning: None  Education Outcome: Verbalized understanding;Demonstrated understanding;Continued education needed     Interdisciplinary Communication: spoke with dialysis RN regarding PT treatment    Pt.

## 2024-11-19 NOTE — PROGRESS NOTES
[facility] DAILY FOLLOW UP RENAL INSUFFICIENCY PATIENT   [facility]   Pharmacy Pharmacokinetic Monitoring Service - Vancomycin    Consulting Provider: Dr. Toribio  Indication: PD catheter complicated peritonitis   Target Concentration: Pre-dialysis concentration 15-20 mg/L  EOT: 12/1/24 per ID  Additional Antimicrobials: None    Pertinent Laboratory Values:   Wt Readings from Last 1 Encounters:   11/18/24 (!) 157.4 kg (347 lb)     Temp Readings from Last 1 Encounters:   11/19/24 97.9 °F (36.6 °C) (Oral)     Recent Labs     11/18/24  1541   BUN 37*   CREATININE 5.62*   WBC 11.7*         Lab Results   Component Value Date/Time    VANCOTROUGH 8.8 10/14/2024 11:22 AM    VANCORANDOM 14.4 11/19/2024 06:22 AM     Assessment:  Date:  Dose/Freq Admin Times Level/Time:   10/24      10/25 HD 1000 mg x1  [address] @0400 = 21.6   10/26      10/27      10/28 HD  1000 mg x1 [address] @0604 = 21.1   10/29      10/30  mg x1 [address] @0651 = 24.7   10/31      11/1  mg x 1 1256    11/2      11/3      11/4  mg x 1 1407    11/5  mg x 1 [address] @ 0351 = 24.1   11/6  mg x 1 [address] @ 0519 = 21.3   11/7  mg x 1 1237    11/8  mg x 1 [address] @ 0344 = 20.3   11/9  mg x 1 1355    11/10   Rd @ 0608 = 19.5   11/11 11/12  mg x 1 1305    11/13 11/14  mg x 1 1136    11/15      11/16  mg x 1 [address] @ 0549 = 12.6   11/17 11/18 HD Pt refused dose    11/19  mg x 1  750 mg x 1 1207  (14) Rd @ 0622 = 14.4     Plan:  Concentration-guided dosing due to intermittent hemodialysis  Level is below goal this morning after patient refused the ordered dose after extra HD session yesterday  Give vancomycin 1000 mg IV x 1 after regular HD today   Repeat vancomycin concentrations will be ordered as clinically appropriate  Pharmacy will continue to monitor patient and adjust therapy as indicated    Thank you for the consult,  Angelica Ocasio, [facility]     Addendum: Vancomycin  500 mg IV x 1 order that was intended to be given yesterday was given during the last hour of HD today. Due to level today, pt will also need supplemental dose after HD today. Will give 750 mg IV x 1 once pt is back on the floor.

## 2024-11-19 NOTE — PROGRESS NOTES
Patient refused vancomycin . Patient stated. I don't think I 'm supposed to get this . Informed  dose ordered for tonight. Patient stated if I  get it tonight ,the dialysis will pull the medicine. Of me. Explained that would not happen. Patient still reluctant to received. Patient declined. Informed patient would make MD aware of her concerns and clarify concerns about taking her IV vancomycin.

## 2024-11-19 NOTE — PROGRESS NOTES
[facility] OCCUPATIONAL THERAPY DAILY NOTE  OT Individual Minutes  Time In: 1410  Time Out: 1458  Minutes: 48               Subjective: Pt agreeable to treatment. Attempt at 1350-pt and family having discussion and request to come back.  Pain: RN notified  and fatigue following dialysis/not feeling well .  Interdisciplinary Communication: Collaborated with PT and RN regarding pt status and pt performance.   Precautions: Falls, [place] Alarm, and dialysis port, HD dialysis, IV     FUNCTIONAL MOBILITY:       Bed Mobility NT    Sit to Stand SBA    Transfer  SBA  Transfer Type: STS's  Equipment: RW    Ambulation NT  Equipment: NA       - Activity Tolerance - Balance - Energy Conservation/Pacing  - Range of Motion - Strengthening     Pt stood for a minute for wound care change with FWW for change out of bandage with standing tolerance/endurance and strength for ADL/IADL tasks. Pt SBA with FWW.    Pt participated in BUE strengthening with w/c push ups with full hip clearance 3 sets of 5 reps, rest breaks required in between. Pt participated in strengthening for endurance/strength for ADL/IADL participation.    Pt then participated in off loading pressure relief with wound on sacrum and R hip. Pt stood for 2 bouts of standing with completion of 43 seconds, followed by 51 seconds with required rest break in between. Pt close SBA with no LOB.     Pt completed the following exercises with 5 lb lissy to promote UB strength, activity tolerance, and shoulder stabilization for integration into functional tasks:  Exercise Repetitions Comments   [x] Protraction/Retraction  2 sets of 15 reps Rest break required in between        [x]              Vs 2 Sets of 15 reps   Rest break required in between          Education   Benefits of OT, Energy Conservation, Pacing, Rolling Walker Management, and Safety Awareness     Assessment: Patient limited by not feeling well following dialysis this date with increased time required for task and rest

## 2024-11-19 NOTE — PROGRESS NOTES
PHYSICAL THERAPY DAILY NOTE    PT Individual Minutes  Time In: 1525  Time Out: 1610  Minutes: 45                 Total Treatment Time:  45 Minutes    Pt. Seen for: PM, Gait Training, Patient Education, Therapeutic Exercise, Transfer Training, and Other     Subjective: patient reporting she is exhausted due to dialysis and PM OT. Reports they took almost 4 liters of fluid off today and 2 liters yesterday. Reports she wants to try and do therapy this afternoon but does not think she can walk very far         Objective:  Restrictions/Precautions: Fall Risk, Skin  Required Braces or Orthoses?: No              Other position/activity restrictions: bariatric air bed for pressure relief         GROSS ASSESSMENT   Patient resting in w/c at beginning of treatment        COGNITION Daily Assessment    Overall Orientation Status: Within Normal Limits   Overall Cognitive Status: WNL        BED/MAT MOBILITY Daily Assessment     Bed Mobility Comments: NT        TRANSFERS Daily Assessment    Sit to Stand: Stand by assistance (with RW)  Stand to Sit: Stand by assistance (with RW)  Stand Pivot Transfers: Contact guard assistance (with RW)  Comment: Increased time and effort to complete demonstrating correct body mechanics. flexed posture          GAIT Daily Assessment    Surface: Level tile  Device: Rolling Walker (gait belt)  Other Apparatus: Wheelchair follow  Assistance: Contact guard assistance  Quality of Gait: slow cont partial step through gait pattern with mild flexed posture  Gait Deviations: Decreased step length;Decreased step height;Slow Shirin;Increased ALEE  Distance: 10 ft x 3 with 5 min seated rest break between attempts  Comments: gait distance limited due to fatigue  More Ambulation?: No              STEPS/STAIRS Daily Assessment    Stairs?: No              BALANCE Daily Assessment    Static Sitting: Normal:  Pt. able to maintain balance w/o UE support  Dynamic Sitting: Good - accepts moderate challenge;  can  maintain balance while picking object off the floor  Static Standing: Fair:  Pt. requires UE support;  may need occasional min A  Dynamic Standing: Poor - unable to accept challenge or move without LOB        WHEELCHAIR MOBILITY Daily Assessment    Wheelchair Type: Standard  Wheelchair Cushion: Pressure Relieving (ROHO)  Pressure Relief Type:  (sit<>stand with RW)  Level of Assistance for pressure relief activities: Stand by assistance  Wheelchair Parts Management: No  Propulsion: No                     LOWER EXTREMITY EXERCISES   LE motomed x 10 mins total with 2min intervals followed by 2 min rest breaks      Pain level: 0 to 4 out of 10  Pain Location:  low back, lateral hips, sacral area  Pain Interventions: Medication per order, rest, positioning,relaxation, body mechanics,     Vital Signs:  BP (!) 154/88   Pulse 88   Temp 97.6 °F (36.4 °C) (Oral)   Resp 16   Ht 1.753 m (5' 9\")   Wt (!) 157.4 kg (347 lb)   SpO2 100%   BMI 51.24 kg/m²       Education:    Education Given To: Patient  Education Provided: Transfer Training;Safety;Energy Conservation;Fall Prevention Strategies;Mobility Training;Precautions  Education Method: Demonstration;Verbal  Barriers to Learning: None  Education Outcome: Verbalized understanding;Demonstrated understanding;Continued education needed     Interdisciplinary Communication: Discussed fatigue level with RN    Pt. Left in wheelchair call bell in reach needs in reach family at bedside         Assessment: Difficult to progress to functional status on dialysis days due to fatigue.Ongoing fluid retention making functional mobility difficult. Requires multiple and frequent rest breaks during treatment due to fatigue level         Plan of Care: Continue with POC and progress as tolerated.     Joesph Jones, PT  11/19/2024

## 2024-11-19 NOTE — PROGRESS NOTES
IP Consult to Vascular Access Team  Consult performed by: Rupert Mehta RN  Consult ordered by: Alma Traore DO       US Guided PIV access-    Ultrasound was used to find the vein which was compressible and without any ultrasound features of an artery or nerve bundle. Skin was cleaned and disinfected prior to IV puncture.  Under real-time ultrasound guidance peripheral access was obtained in the left forearm using 22 G 1.75\" Peripheral IV catheter after 1 attempt(s). Blood return was present and IV flushed without difficulty with no clinical signs of infiltration. IV dressing applied and no immediate complications noted. Patient tolerated the procedure well.

## 2024-11-19 NOTE — PLAN OF CARE
Problem: Safety - Adult  Goal: Free from fall injury  11/19/2024 0729 by Everett Zurita, RN  Outcome: Progressing  11/19/2024 0713 by Everett Zurita, RN  Outcome: Progressing

## 2024-11-19 NOTE — PROGRESS NOTES
[place] [address]  Tel: [phone]     Physical Medicine and Rehabilitation Progress Note      Giselle Rosado  Admit Date: 11/8/2024  Admit Diagnosis: Sepsis (HCC) [A41.9]    Subjective     Patient seen and examined between therapies and again during team conference. She reports good progress in therapy. Confusion over last night's dose vancomycin clarified with pharmacy, RN and patient; Pharmacy to continue managing. All questions and concerns were addressed at this time.     Team conference held and attended today.    Objective:     Current Facility-Administered Medications   Medication Dose Route Frequency    ALPRAZolam (XANAX) tablet 0.5 mg  0.5 mg Oral Daily PRN    vancomycin (VANCOCIN) 750 mg in sodium chloride 0.9 % 250 mL IVPB (Upvl6Dde)  750 mg IntraVENous Once    saliva substitute (BIOTENE/MOUTH KOTE) liquid   Oral Q6H    cloNIDine (CATAPRES) tablet 0.1 mg  0.1 mg Oral Q6H PRN    vancomycin (VANCOCIN) 500 mg in sodium chloride 0.9 % 100 mL IVPB (mini-bag)  500 mg IntraVENous Once    midodrine (PROAMATINE) tablet 5 mg  5 mg Oral 2 times per day on Sunday Monday Wednesday Friday    midodrine (PROAMATINE) tablet 10 mg  10 mg Oral PRN    acetaminophen (TYLENOL) tablet 1,000 mg  1,000 mg Oral Q6H PRN    Or    acetaminophen (TYLENOL) suppository 650 mg  650 mg Rectal Q6H PRN    0.9 % sodium chloride infusion   IntraVENous PRN    aluminum & magnesium hydroxide-simethicone (MAALOX) 200-200-20 MG/5ML suspension 30 mL  30 mL Oral Q6H PRN    apixaban (ELIQUIS) tablet 5 mg  5 mg Oral BID    bisacodyl (DULCOLAX) suppository 10 mg  10 mg Rectal Daily PRN    dicyclomine (BENTYL) capsule 10 mg  10 mg Oral TID PRN    epoetin oralia-epbx (RETACRIT) injection 10,000 Units  10,000 Units SubCUTAneous Weekly    famotidine (PEPCID) tablet 20 mg  20 mg Oral Daily    fluticasone (FLONASE) 50 MCG/ACT nasal spray 2 spray  2 spray Nasal Nightly PRN    lidocaine 4 %  extremities.   Skin:  Dry, 1+ pedal edema, toenails thickened. Left buttock wound and right hip blister noted.            Bowel / Bladder Program:  Last BM (including prior to admit): 11/16/24    Urine Assessment  Urinary Status: Oliguria  Urinary Incontinence: Absent        Functional Assessment:  Per PT:   TRANSFERS Daily Assessment     Sit to Stand: Stand by assistance  Stand to Sit: Stand by assistance  Stand Pivot Transfers: Contact guard assistance (with RW)  Comment: Increased time and effort demonstrating correct body mechanics          GAIT Daily Assessment   Limited gait distance due to fatigue with patient c/o SOB and knee weakness after 5 ft.  Surface: Level tile  Device: Rolling Walker (gait belt)  Other Apparatus: Wheelchair follow  Assistance: Contact guard assistance  Quality of Gait: slow cont partial step through gait pattern with mild flexed posture  Gait Deviations: Decreased step length;Decreased step height;Slow Shirin;Increased ALEE  Distance: 5 ft x 1  Comments: Patient with SOB during gait training. 02 sat 97% on room air  More Ambulation?: No         Per OT:   ACTIVITIES OF DAILY LIVING:          Oral Hygiene (Pt already completed prior to therapist encounter)     Shower/Bathe (Pt declined sponge bath this date, request for next ADL session in AM)        Upper Body  Dressing Supervision or touching assistance Pt seated able to don/doff overhead gown seated, pt standing with touching A with FWW for adjusting gown   Lower Body Dressing (pt prefers no pants / underwear d/t abdominal swelling and pain)     Donning/[place] Footwear Substantial/maximal assistance MAX A required to don B socks   Toileting Hygiene (pt already completed toileting upon OT arrival)     Education AE/DME Training, Energy Conservation, Pacing, Functional Transfer Training, Rolling Walker Management, and Safety Awareness           Labs/Studies:  Recent Results (from the past 72 hour(s))   Hepatitis B Surface Antigen

## 2024-11-19 NOTE — DIALYSIS
TRANSFER OUT- DIALYSIS    Hemodialysis treatment completed. PT ran 4 hours, without complications.    Patient alert and VS stable  BP (!) 142/50   Pulse 77   Temp 97.9 °F (36.6 °C)   Resp 18   Ht 1.753 m (5' 9\")   Wt (!) 157.4 kg (347 lb)   SpO2 100%   BMI 51.24 kg/m²        3700 Kg removed.      Flushed both ports with 10 mL of NS.  CVC dressing clean, dry, and intact, tego caps intact, curos caps placed.      Meds given: 0.    RBCs given during dialysis: 0    Patient to 902 after dialysis.

## 2024-11-19 NOTE — DIALYSIS
TRANSFER IN - DIALYSIS    Received patient in dialysis unit  from [facility] (unit) for ordered procedure.    Consent verified for renal replacement therapy. Procedure explained to patient, opportunity for Q&A provided. Call light given.     Patient baseline and vital signs stable.  /97,  P92  , 0L O2 via 0.    Hemodialysis initiated using cvc.  Aspirated and flushed both ports without difficulty. Dressing clean, dry and intact.  Machine settings per MD order.    Heparin 0 unit bolus and 0 units/hr.      Will monitor during treatment.

## 2024-11-19 NOTE — CARE COORDINATION
RN CM in room for bedside rounds. Patient is in dialysis but spouse is in the room. They continue to decline the need for HH due to large dogs and continue to request OP therapy PT. They have in the home a BSC and they are ordering a tub transfer bench and possibly a ramp. They will need CM assistance with getting bariatric walker prior to DC. No other CM need brought up today. RN CM to follow.     0042 Update - order for Bariatric rolling walker sent to [facility] via fax to be delivered on 11/25 prior to 11/26 NH

## 2024-11-19 NOTE — PATIENT CARE CONFERENCE
[facility] [place], [place]  INPATIENT REHABILITATION  TEAM CONFERENCE NOTE    Conference Date: 2024  Admit Date: 2024  4:23 PM  Patient Name: Giselle Rosado    MRN: [id number]    : 1970 (54 y.o.)  Rehabilitation Admitting Diagnosis: Sepsis (HCC) [A41.9]           Team Members Present at Conference:  :  Donna Martinez  Nurse: Everett Zurita RN  Occupational Therapist: Loli Null OTR/L  Physical Therapist: Joesph Jones PT  Speech Therapist: CHAYITO     present for conference   ---------------------------------------------------------------------------------------------------    Case Management:  Patient's PLOF: Independent with IADLs, Independent with ADLs, and Independent with mobility  Patient's Prior Living Situation:  lives with  and daughter  Baseline Supervision/Assistance: None  Current issues/needs regarding patient and family discharge status: medical issues     ---------------------------------------------------------------------------------------------------    Functional Assessment:  Most Recent Mobility Scores Per Physical Therapy:   Bed/Mat Mobility Bridging: Minimal assistance  Rolling to Left: Minimal assistance  Rolling to Right: Minimal assistance  Supine to Sit: Moderate assistance  Sit to Supine: Minimal assistance  Scooting: Modified independent  Bed Mobility Comments: Increased time and effort to complete using bed rail and air bed on maxi inflate   Transfers Sit to Stand: Stand by assistance  Stand to Sit: Stand by assistance  Stand Pivot Transfers: Contact guard assistance (with RW)  Comment: Increased time and effort demonstrating correct body mechanics    Gait Surface: Level tile  Device: Rolling Walker (gait belt)  Other Apparatus: Wheelchair follow  Assistance: Contact guard assistance  Quality of Gait: slow cont partial step through gait pattern with mild flexed posture  Gait Deviations: Decreased step length;Decreased step  sergio cares seated and standing with touching A standing-SPV seated on bariatric BSC placed over toilet and use of GB   Toilet Transfer Partial/moderate assistance CGA-NIRMAL for t/f SPT from w/c<>toilet with bariatric BSC placed over toilet     Per Speech Therapy:         Therapy Impediments/Barriers: fluid, activity tolerance, strength    ---------------------------------------------------------------------------------------------------    NURSING:    Continent of Bowel: yes  Continent of Bladder: yes - producing very little urine (dialysis pt)  Pain: managed with current regimen  Signs and Symptoms of Infection: on Vanc until Dec 1  Signs and Symptoms of Skin Breakdown: yes - sacral and left hip skin breakdown, wound care following  Fall Risk: Yes - continue to use alarms PRN and encourage patient to call for assist   Injury and/or Falls during Inpatient Rehabilitation Admission: no  Anticoagulants: SCDs, Mobility with PT/OT  Diabetic: no  Oxygen while on IP Rehab: no      Home oxygen: no  Nutrition: Weight - Scale: (!) 157.4 kg (347 lb) / Body mass index is 51.24 kg/m².    Current diet: ADULT DIET; Regular; 1200 ml  ADULT ORAL NUTRITION SUPPLEMENT; Breakfast, Lunch, Dinner; Renal Oral Supplement    Deficiencies: fatigue, fluid overload, more frequent dialysis needed    ---------------------------------------------------------------------------------------------------  Vitals:    11/18/24 1730 11/18/24 1745 11/18/24 1945 11/19/24 0744   BP: (!) 147/84 (!) 159/111 102/60 (!) 143/98   Pulse: 81 80 93 99   Resp:  18 18 19   Temp:   98.1 °F (36.7 °C) 97.9 °F (36.6 °C)   TempSrc:   Oral Oral   SpO2:   95% 100%   Weight:       Height:                  Medical Impediments: Medication/medical management ongoing for: dialysis, renal function, fluid management.  Nephrology is considering a biopsy given fluid accumulation     Patient requires continued close monitoring of the following systems: CV: Monitor blood pressure and

## 2024-11-20 PROCEDURE — 1180000000 HC REHAB R&B

## 2024-11-20 PROCEDURE — 97530 THERAPEUTIC ACTIVITIES: CPT

## 2024-11-20 PROCEDURE — 99232 SBSQ HOSP IP/OBS MODERATE 35: CPT | Performed by: STUDENT IN AN ORGANIZED HEALTH CARE EDUCATION/TRAINING PROGRAM

## 2024-11-20 PROCEDURE — 6370000000 HC RX 637 (ALT 250 FOR IP): Performed by: PHYSICIAN ASSISTANT

## 2024-11-20 PROCEDURE — 97116 GAIT TRAINING THERAPY: CPT

## 2024-11-20 PROCEDURE — 6370000000 HC RX 637 (ALT 250 FOR IP): Performed by: PHYSICAL MEDICINE & REHABILITATION

## 2024-11-20 PROCEDURE — 2580000003 HC RX 258: Performed by: PHYSICAL MEDICINE & REHABILITATION

## 2024-11-20 PROCEDURE — 97535 SELF CARE MNGMENT TRAINING: CPT

## 2024-11-20 PROCEDURE — 97110 THERAPEUTIC EXERCISES: CPT

## 2024-11-20 RX ORDER — AMLODIPINE BESYLATE 5 MG/1
5 TABLET ORAL DAILY
Status: DISCONTINUED | OUTPATIENT
Start: 2024-11-20 | End: 2024-11-22

## 2024-11-20 RX ADMIN — AMLODIPINE BESYLATE 5 MG: 5 TABLET ORAL at 12:10

## 2024-11-20 RX ADMIN — SODIUM CHLORIDE, PRESERVATIVE FREE 10 ML: 5 INJECTION INTRAVENOUS at 09:19

## 2024-11-20 RX ADMIN — SODIUM CHLORIDE, PRESERVATIVE FREE 10 ML: 5 INJECTION INTRAVENOUS at 20:09

## 2024-11-20 RX ADMIN — HYDROCODONE BITARTRATE AND ACETAMINOPHEN 1 TABLET: 5; 325 TABLET ORAL at 23:15

## 2024-11-20 RX ADMIN — FAMOTIDINE 20 MG: 20 TABLET, FILM COATED ORAL at 09:18

## 2024-11-20 RX ADMIN — METOPROLOL TARTRATE 50 MG: 50 TABLET, FILM COATED ORAL at 09:18

## 2024-11-20 RX ADMIN — HYDROCODONE BITARTRATE AND ACETAMINOPHEN 1 TABLET: 5; 325 TABLET ORAL at 17:34

## 2024-11-20 RX ADMIN — HYDROCODONE BITARTRATE AND ACETAMINOPHEN 1 TABLET: 5; 325 TABLET ORAL at 10:44

## 2024-11-20 RX ADMIN — APIXABAN 5 MG: 5 TABLET, FILM COATED ORAL at 20:08

## 2024-11-20 RX ADMIN — APIXABAN 5 MG: 5 TABLET, FILM COATED ORAL at 09:18

## 2024-11-20 RX ADMIN — SEVELAMER CARBONATE 800 MG: 800 TABLET, FILM COATED ORAL at 17:31

## 2024-11-20 RX ADMIN — SERTRALINE 100 MG: 100 TABLET, FILM COATED ORAL at 09:18

## 2024-11-20 RX ADMIN — Medication: at 20:09

## 2024-11-20 RX ADMIN — SERTRALINE 100 MG: 100 TABLET, FILM COATED ORAL at 20:08

## 2024-11-20 RX ADMIN — SEVELAMER CARBONATE 800 MG: 800 TABLET, FILM COATED ORAL at 12:08

## 2024-11-20 RX ADMIN — METOPROLOL TARTRATE 50 MG: 50 TABLET, FILM COATED ORAL at 20:08

## 2024-11-20 RX ADMIN — SEVELAMER CARBONATE 800 MG: 800 TABLET, FILM COATED ORAL at 09:18

## 2024-11-20 RX ADMIN — Medication: at 09:19

## 2024-11-20 ASSESSMENT — PAIN DESCRIPTION - DESCRIPTORS
DESCRIPTORS: ACHING;SORE
DESCRIPTORS: ACHING

## 2024-11-20 ASSESSMENT — PAIN DESCRIPTION - LOCATION
LOCATION: BUTTOCKS
LOCATION: ABDOMEN;HIP
LOCATION: HIP;BUTTOCKS

## 2024-11-20 ASSESSMENT — PAIN SCALES - GENERAL
PAINLEVEL_OUTOF10: 0
PAINLEVEL_OUTOF10: 0
PAINLEVEL_OUTOF10: 6
PAINLEVEL_OUTOF10: 7

## 2024-11-20 ASSESSMENT — PAIN SCALES - WONG BAKER: WONGBAKER_NUMERICALRESPONSE: NO HURT

## 2024-11-20 ASSESSMENT — PAIN DESCRIPTION - PAIN TYPE: TYPE: CHRONIC PAIN

## 2024-11-20 ASSESSMENT — PAIN DESCRIPTION - ORIENTATION
ORIENTATION: RIGHT;LEFT
ORIENTATION: POSTERIOR;LEFT

## 2024-11-20 NOTE — CARE COORDINATION
RN CM spoke with PT and OT regarding patients choice for OP therapy with [facility] and the fact that they only have PT. OT states PT only will be fine and referral placed for OP PT with [facility]. DEMIAN DILLARD to follow.

## 2024-11-20 NOTE — PROGRESS NOTES
[facility] Progress Note    Follow-Up on: ESRD  Pt seen and examined in room, spouse visiting, pt reports   Ongoing swelling with some improvement with additional UF. She c/o 4 hrs of dialysis is mentally taxing and wound agree to 3.5 hrs with sequential dialysis as needed in between txs.   Restricting PO fluids      ROS:  Gen - no fever, no chills, appetite unchanged  CV - no chest pain  Lung - no shortness of breath, no cough  Abd - + tenderness, no nausea/vomiting, no diarrhea  Ext - + edema    Exam:  Vitals:    11/19/24 2045 11/20/24 0700 11/20/24 0729 11/20/24 1044   BP: (!) 152/81 (!) 140/104 (!) 182/91    Pulse: 88 84 75    Resp: 16 18 19 18   Temp: 97.9 °F (36.6 °C) 97.5 °F (36.4 °C) 97.7 °F (36.5 °C)    TempSrc: Oral Oral Oral    SpO2: 96% 98% 100%    Weight:       Height:             Intake/Output Summary (Last 24 hours) at 11/20/2024 1106  Last data filed at 11/20/2024 0920  Gross per 24 hour   Intake 620 ml   Output 3701 ml   Net -3081 ml             Wt Readings from Last 3 Encounters:   11/18/24 (!) 157.4 kg (347 lb)   11/06/24 (!) 156.2 kg (344 lb 6.4 oz)   10/18/24 (!) 150.6 kg (332 lb)       GEN - in no distress, alert and oriented   CV - regular rate, S1, S2, no Rub   Lung - clear with low lung volumes bilaterally  Abd - soft   Ext - + 2 BLE edema/anasarca  Access: left TCC intact    Recent Labs     11/18/24  1541   WBC 11.7*   HGB 7.4*   HCT 25.0*             Recent Labs     11/18/24  1541      K 4.8   CL 98   CO2 25   BUN 37*   CREATININE 5.62*   CALCIUM 8.1*   PHOS 3.6   ALBUMIN 2.1*         Assessment / Plan:  Principal Problem:    Sepsis (HCC)  Resolved Problems:    * No resolved hospital problems. *    1) ESRD - HD MWF.  Previously on PD. On TTS for rehab schedule.   -sequential dialysis 11/18 with 3L UF net 1/18 and 3L UF net 1/19  - She c/o 4 hrs of dialysis is mentally taxing and wound agree to 3.5 hrs with sequential dialysis as needed in between txs.   HD  tomorrow, plan for sequential dialysis Friday 11/22    2) Peritonitis , culture grew Tsukamurella sp and CNS.  PD catheter removed 10/15/24  -Continue vancomycin EOT 12/1/2024 per ID for 6 wks     3) Abdominal pain - evidence of subcutaneous hematoma and cellulitis.   CT ab hematoma with improvement. Free fluid shows improvement.   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

## 2024-11-20 NOTE — PROGRESS NOTES
[facility] OCCUPATIONAL THERAPY DAILY NOTE  OT Individual Minutes  Time In: 1041  Time Out: 1116  Minutes: 35               Subjective: Pt agreeable to treatment. \"I want to try walking to the toilet\" (in regards to what pt wants to work on during therapy.) Spouse present initially for ongoing education provided.   Pain: RN notified  and Pain L hip-RN administered pain meds .  Interdisciplinary Communication: Collaborated with PT and RN regarding pt status and pt performance.   Precautions:Falls, [place] Alarm, and dialysis port, HD dialysis, IV     FUNCTIONAL MOBILITY:       Bed Mobility NT    Sit to Stand SBA    Transfer  SBA  Transfer Type:  w/c  Equipment: RW    Ambulation CGA  Equipment: RW       - Activity Tolerance - Balance - Range of Motion - Self-Care - Strengthening    Pt requesting to brush teeth this date. Task graded up to work on standing tolerance/endurance/strengthening while brushing teeth with w/c placed behind pt with no LOB and to ensure pt safety. Pt performed brushing teeth with completion of 1:03 minutes and required to sit following and completed seated.     Pt then wanting to work on ambulation in bathroom as pt's w/c at home doesn't fit into bathroom and has to ambulate short distance to toilet/shower. Pt participated in functional ambulation ~4-5 feet with FWW, pt ambulated with touching A, required w/c follow to ensure pt safety. Pt required to sit following with rest break. Pt performs t/f's with SBA with FWW.     Pt participate in laundry folding task while sitting to address BUEstrengthening/ tolerance, and coordination. Pt folded ~12 pieces of laundry with 1 required rest break in between. Pt then participated in standing endurance/strengthening and offloading wounds while standing with completion of 1:27 minutes with w/c behind for safety with close SBA with no LOB. Pt required rest break following task. Increased time for task.       Education   Adaptive ADL Techniques, Benefits of OT,  Energy Conservation, Pacing, Functional Transfer Training, Rolling Walker Management, and Safety Awareness     Assessment:  Pt demonstrated good participation in OT treatment. Pt continues to benefit from skilled OT services to address remaining deficits and progress toward baseline level of independence and safety. Patient ended session seated in w/c with PT.   Plan: Continue OT POC.     JAYJAY PARISI OT   11/20/2024

## 2024-11-20 NOTE — PROGRESS NOTES
PHYSICAL THERAPY DAILY NOTE    PT Individual Minutes  Time In: 1300  Time Out: 1335  Minutes: 35                 Total Treatment Time:  35 Minutes    Pt. Seen for: PM, Gait Training, Patient Education, Therapeutic Exercise, Transfer Training, and Other     Subjective: Patient reporting she is tired from AM therapy. Reports she would like to rest in the bed after PM PT         Objective:  Restrictions/Precautions: Fall Risk, Skin  Required Braces or Orthoses?: No              Other position/activity restrictions: bariatric air bed for pressure relief         GROSS ASSESSMENT   Patient resting in w/c at beginning of treatment        COGNITION Daily Assessment    Overall Orientation Status: Within Normal Limits   Overall Cognitive Status: WNL        BED/MAT MOBILITY Daily Assessment     Bridging: Minimal assistance  Rolling to Left: Minimal assistance  Rolling to Right: Minimal assistance  Supine to Sit: Minimal assistance  Sit to Supine: Minimal assistance  Scooting: Modified independent  Bed Mobility Comments: Increased time and effort to complete on and off mat using RW as simulated bed rail. Made recommendation for orderting bed rail for bed at home        TRANSFERS Daily Assessment    Sit to Stand: Stand by assistance  Stand to Sit: Stand by assistance  Stand Pivot Transfers: Contact guard assistance (SPT with RW)  Car Transfer: Moderate Assistance (SPT with RW in and out of rehab car with seat height at 19 inches)  Comment: Increased time and effort to complete with cues for body mechanics.          GAIT Daily Assessment    Surface: Level tile  Device: Rolling Walker (gait belt)  Other Apparatus: Wheelchair follow  Assistance: Contact guard assistance  Quality of Gait: slow cont partial step through gait pattern with mild flexed posture  Gait Deviations: Decreased step length;Decreased step height;Slow Shirin;Increased ALEE  Distance: 20 ft x 2 with 5 min seated rest break between attempts  Comments: gait  distance improved this PM         Plan of Care: Continue with POC and progress as tolerated.     Joesph Jones, PT  11/20/2024

## 2024-11-20 NOTE — PROGRESS NOTES
[facility] OCCUPATIONAL THERAPY DAILY NOTE  OT Individual Minutes  Time In: 0920  Time Out: 1016  Minutes: 56               Subjective: Pt agreeable to treatment. \"I'm ready.\" (In regards to showering this date.)  Pain: No pain expressed.  Interdisciplinary Communication: Collaborated with RN regarding pt status and pt performance.   Precautions: Falls, [place] Alarm, and dialysis port, HD dialysis, IV     FUNCTIONAL MOBILITY:       Bed Mobility NT    Sit to Stand SBA    Transfer  SBA  Transfer Type: SPT  Equipment: Grab Bars and RW Pt performed SPT from toilet> w/c with close SBA with GB and no LOB; pt performed few STS's during ADL's with close SBA with no LOB with FWW    Pt stood for wound care management with SBA with no LOB with FWW/w/c behind pt to ensure pt safety with 1:45 minutes with required rest break following.   Ambulation NT  Equipment: NA      ACTIVITIES OF DAILY LIVING:       Shower/Bathe Partial/moderate assistance Pt required MIN A For showering for washing buttocks region, pt able to dry B feet with technique provided/dry buttocks. Pt stood once with close SBA with GB. Pt participated in shower with tub t/f bench/HHSH/GB with increased time. OT offered and provided education/encouragement for simulated tub shower room to home environment with tub t/f bench to complete shower. Pt politely declined and request to have shower in room.    Upper Body  Dressing Setup or clean-up assistance S/U A to don/doff overhead gown seated   Lower Body Dressing  (pt prefers to not wear pants/underwear d/t abdominal swelling)    Toileting Hygiene Supervision or touching assistance SPV seated toileting, standing for gown management close SBA   Toilet Transfer Supervision or touching assistance Close SBA with GB for SPT from w/c<>toilet   Education Adaptive ADL Techniques, AE/DME Training, Benefits of OT, Functional Transfer Training, Rolling Walker Management, and Safety Awareness     Assessment: Pt required rest breaks

## 2024-11-20 NOTE — PROGRESS NOTES
PHYSICAL THERAPY DAILY NOTE    PT Individual Minutes  Time In: 1117  Time Out: 1200  Minutes: 43                 Total Treatment Time:  43 Minutes    Pt. Seen for: AM, Gait Training, Patient Education, Therapeutic Exercise, Transfer Training, and Other     Subjective: Patient reporting she is tired from AM ADLs. Reports she is limiting her fluid intake. Reports she does not want to use transport to/from dialysis. Reports she wants to go in her car         Objective:  Restrictions/Precautions: Fall Risk, Skin  Required Braces or Orthoses?: No              Other position/activity restrictions: bariatric air bed for pressure relief         GROSS ASSESSMENT   Patient resting in w/c at beginning of treatment        COGNITION Daily Assessment    Overall Orientation Status: Within Normal Limits   Overall Cognitive Status: WNL        BED/MAT MOBILITY Daily Assessment     Bridging: Minimal assistance  Rolling to Left: Minimal assistance  Rolling to Right: Minimal assistance  Supine to Sit: Minimal assistance  Sit to Supine: Minimal assistance  Scooting: Modified independent  Bed Mobility Comments: Increased time and effort to complete on and off mat using RW as simulated bed rail. Made recommendation for orderting bed rail for bed at home        TRANSFERS Daily Assessment    Sit to Stand: Stand by assistance  Stand to Sit: Stand by assistance  Stand Pivot Transfers: Contact guard assistance (SPT with RW)  Car Transfer: Moderate Assistance (SPT with RW in and out of rehab car with seat height at 19 inches)  Comment: Increased time and effort to complete with cues for body mechanics.          GAIT Daily Assessment    Surface: Level tile  Device: Rolling Walker (gait belt)  Other Apparatus: Wheelchair follow  Assistance: Contact guard assistance  Quality of Gait: slow cont partial step through gait pattern with mild flexed posture  Gait Deviations: Decreased step length;Decreased step height;Slow Shirin;Increased  ALEE  Distance: 25 ft x 1  Comments: gait distance limited due to fatigue  More Ambulation?: No              STEPS/STAIRS Daily Assessment    Stairs?: No              BALANCE Daily Assessment    Static Sitting: Normal:  Pt. able to maintain balance w/o UE support  Dynamic Sitting: Good - accepts moderate challenge;  can maintain balance while picking object off the floor  Static Standing: Fair:  Pt. requires UE support;  may need occasional min A  Dynamic Standing: Poor - unable to accept challenge or move without LOB        WHEELCHAIR MOBILITY Daily Assessment    Wheelchair Size: 24 x 18  Wheelchair Type: Standard  Wheelchair Cushion: Pressure Relieving (ROHO)  Pressure Relief Type:  (sit<>stand with RW)  Level of Assistance for pressure relief activities: Stand by assistance  Wheelchair Parts Management: No  Propulsion: No                     LOWER EXTREMITY EXERCISES   NA       Pain level: 0 to 4 out of 10  Pain Location:  low back, lateral hips, sacral area  Pain Interventions: Medication per order, rest, positioning,relaxation, body mechanics,     Vital Signs:  Visit Vitals  BP (!) 182/91   Pulse 75   Temp 97.7 °F (36.5 °C) (Oral)   Resp 18   Ht 1.753 m (5' 9\")   Wt 109.9 kg (242 lb 3.2 oz)   SpO2 100%   BMI 35.77 kg/m²         Education:    Education Given To: Patient  Education Provided: Transfer Training;Safety;Energy Conservation;Fall Prevention Strategies;Mobility Training;Precautions  Education Method: Demonstration;Verbal  Barriers to Learning: None  Education Outcome: Verbalized understanding;Demonstrated understanding;Continued education needed     Interdisciplinary Communication: spoke with RN regarding patient weight of 342.2 on standing scale.    Pt. Left in wheelchair call bell in reach needs in reach family at bedside         Assessment: Patient able to progress to bed mobility on mat in rehab gym and car transfers as noted above. Overall functional endurance and tolerance to treatment improved

## 2024-11-20 NOTE — PLAN OF CARE
Problem: Safety - Adult  Goal: Free from fall injury  11/19/2024 1927 by Nata Silva, RN  Outcome: Progressing  11/19/2024 0729 by Everett Zurita, RN  Outcome: Progressing  11/19/2024 0713 by Everett Zurita, RN  Outcome: Progressing

## 2024-11-20 NOTE — PROGRESS NOTES
[facility] DAILY FOLLOW UP RENAL INSUFFICIENCY PATIENT   [facility]   Pharmacy Pharmacokinetic Monitoring Service - Vancomycin    Consulting Provider: Dr. Toribio  Indication: PD catheter complicated peritonitis   Target Concentration: Pre-dialysis concentration 15-20 mg/L  EOT: 12/1/24 per ID  Additional Antimicrobials: None    Pertinent Laboratory Values:   Wt Readings from Last 1 Encounters:   11/18/24 (!) 157.4 kg (347 lb)     Temp Readings from Last 1 Encounters:   11/20/24 97.7 °F (36.5 °C) (Oral)     Recent Labs     11/18/24  1541   BUN 37*   CREATININE 5.62*   WBC 11.7*         Lab Results   Component Value Date/Time    VANCOTROUGH 8.8 10/14/2024 11:22 AM    VANCORANDOM 14.4 11/19/2024 06:22 AM     Assessment:  Date:  Dose/Freq Admin Times Level/Time:   10/24      10/25 HD 1000 mg x1  [address] @0400 = 21.6   10/26      10/27      10/28 HD  1000 mg x1 15[address] @0604 = 21.1   10/29      10/30  mg x1 [address] @0651 = 24.7   10/31      11/1  mg x 1 1256    11/2      11/3      11/4  mg x 1 1407    11/5  mg x 1 1515 Rd @ 0351 = 24.1   11/6  mg x 1 1631 Rd @ 0519 = 21.3   11/7  mg x 1 1237    11/8  mg x 1 1747 Rd @ 0344 = 20.3   11/9  mg x 1 1355    11/10   Rd @ 0608 = 19.5   11/11 11/12  mg x 1 1305    11/13 11/14  mg x 1 1136    11/15      11/16  mg x 1 1629 Rd @ 0549 = 12.6   11/17 11/18 HD Pt refused dose    11/19  mg x 1  750 mg x 1 1207  [address] @ 0622 = 14.4   11/20 11/21   Rd @ (04)     Plan:  Concentration-guided dosing due to intermittent hemodialysis  No dose needed today. Will check pre-HD level tomorrow  Repeat vancomycin concentrations will be ordered as clinically appropriate  Pharmacy will continue to monitor patient and adjust therapy as indicated    Thank you for the consult,  Angelica Ocasio RPH

## 2024-11-20 NOTE — PROGRESS NOTES
tablet 100 mg  100 mg Oral BID    sevelamer (RENVELA) tablet 800 mg  800 mg Oral TID WC    sodium chloride flush 0.9 % injection 5-40 mL  5-40 mL IntraVENous 2 times per day    vancomycin (VANCOCIN) intermittent dosing (placeholder)   Other RX Placeholder    HYDROcodone-acetaminophen (NORCO) 5-325 MG per tablet 1 tablet  1 tablet Oral Q6H PRN    traZODone (DESYREL) tablet 50 mg  50 mg Oral Nightly PRN     Allergies   Allergen Reactions    Ace Inhibitors Angioedema     Other reaction(s): Hives/Swelling-Allergy  Swelling of the mouth    Angiotensin Receptor Blockers Angioedema    Cayenne Hives     Swelling of the mouth  Other reaction(s): Hives/Swelling-Allergy  Patient states she is not allergic    Sulfamethoxazole-Trimethoprim Other (See Comments)     Nausea, vomiting, making her feel like she is going to pass out    Cephalexin Nausea And Vomiting     Can't take oral/can do IV       Visit Vitals  BP (!) 182/91   Pulse 75   Temp 97.7 °F (36.5 °C) (Oral)   Resp 18   Ht 1.753 m (5' 9\")   Wt 109.9 kg (242 lb 3.2 oz)   SpO2 100%   BMI 35.77 kg/m²      Physical Exam:   General:  NAD. Morbidly obese.   HEENT:  Normocephalic, EOMI, facial symmetry noted. Oral mucosa pink and moist. No nasal discharge.   Lungs:  Clear breath sounds bilaterally, respirations even and unlabored.   Heart:  Regular rate and rhythm.   Abdomen:  Firm, well healed incision from PD cath removal, left lateral abdominal wall protruding, non-tender anasarca, woody texture. Bowel sounds present.   Neuromuscular:  No resting tremors, light touch sensation intact.  MMT:  proximal > distal weakness, 4+ - 5-/5 in extremities.   Skin:  Dry, 1+ pedal edema, toenails thickened. Left buttock wound and right hip blister noted.            Bowel / Bladder Program:  Last BM (including prior to admit): 11/20/24    Urine Assessment  Urinary Status: Oliguria, Voiding  Urinary Incontinence: Absent        Functional Assessment:  Per PT:   TRANSFERS Daily Assessment      Sit to Stand: Stand by assistance  Stand to Sit: Stand by assistance  Stand Pivot Transfers: Contact guard assistance (with RW)  Comment: Increased time and effort demonstrating correct body mechanics          GAIT Daily Assessment   Limited gait distance due to fatigue with patient c/o SOB and knee weakness after 5 ft.  Surface: Level tile  Device: Rolling Walker (gait belt)  Other Apparatus: Wheelchair follow  Assistance: Contact guard assistance  Quality of Gait: slow cont partial step through gait pattern with mild flexed posture  Gait Deviations: Decreased step length;Decreased step height;Slow Shirin;Increased ALEE  Distance: 5 ft x 1  Comments: Patient with SOB during gait training. 02 sat 97% on room air  More Ambulation?: No         Per OT:   ACTIVITIES OF DAILY LIVING:          Shower/Bathe Partial/moderate assistance Pt required MIN A For showering for washing buttocks region, pt able to dry B feet with technique provided/dry buttocks. Pt stood once with close SBA with GB. Pt participated in shower with tub t/f bench/HHSH/GB with increased time.   Upper Body  Dressing Setup or clean-up assistance S/U A to don/doff overhead gown seated   Lower Body Dressing  (pt prefers to not wear pants/underwear d/t abdominal swelling)     Toileting Hygiene Supervision or touching assistance SPV seated toileting, standing for gown management close SBA   Toilet Transfer Supervision or touching assistance Close SBA with GB for SPT from w/c<>toilet   Education Adaptive ADL Techniques, AE/DME Training, Benefits of OT, Functional Transfer Training, Rolling Walker Management, and Safety Awareness              Labs/Studies:  Recent Results (from the past 72 hour(s))   Hepatitis B Surface Antigen    Collection Time: 11/18/24  3:41 PM   Result Value Ref Range    Hepatitis B Surface Ag NONREACTIVE NR     Renal Function Panel    Collection Time: 11/18/24  3:41 PM   Result Value Ref Range    Sodium 137 136 - 145 mmol/L    Potassium  (BMI- 50.8 )  -11/15  weight 347 lb on 11/14, 4 lb weight gain recorded in last 24 hours, 2.8 kg removed during HD on 11/14, on 1200ml fluid restriction    Anticoagulation mgmt / DVT prophylaxis- on Eliquis bid    Irritable bowel syndrome - on prn bentyl    Pain mgmt - prn Norco    Buttock decubitus ulcer - WOC RN following, bariatric specialty mattress     Abdominal asymmetric fullness - abdominal binder ordered, renal note appreciated      The patient is maintaining functional gains with mobility and ADLs. Anticipate d/c home on 11/26.    Greater than 35 minutes were spent in direct patient care discussing current medications, medical conditions, discharge planning and continuation of therapy in the community. More than half of total time was spent in counseling and coordination of care with patient, nursing staff, therapy team and case management.      Part of this note may have been written by using a voice dictation software.  The note has been proof read but may still contain some grammatical/other typographical errors.      Alma Traore D.O. PM&R  Inpatient Rehabilitation Medical Director  11/20/2024

## 2024-11-20 NOTE — PROGRESS NOTES
PHYSICAL THERAPY DAILY NOTE    PT Individual Minutes  Time In: 1600  Time Out: 1636  Minutes: 36                 Total Treatment Time:  36 Minutes    Pt. Seen for: PM, Step training, Patient Education, Therapeutic Exercise, Transfer Training, and Other     Subjective: Patient reporting she is tired but will do more therapy. Patient and spouse reporting  a family member is going to purchase temporary ramp for patient.         Objective:  Restrictions/Precautions: Fall Risk, Skin  Required Braces or Orthoses?: No              Other position/activity restrictions: bariatric air bed for pressure relief         GROSS ASSESSMENT   Patient resting in w/c at beginning of treatment        COGNITION Daily Assessment    Overall Orientation Status: Within Normal Limits   Overall Cognitive Status: WNL        BED/MAT MOBILITY Daily Assessment     Bridging: Minimal assistance  Rolling to Left: Minimal assistance  Rolling to Right: Minimal assistance  Supine to Sit: Minimal assistance  Sit to Supine: Minimal assistance  Scooting: Modified independent  Bed Mobility Comments: Increased time and effort to complete with cues for body mechanics. using bed rail and HOB elevation to complete        TRANSFERS Daily Assessment    Sit to Stand: Stand by assistance  Stand to Sit: Stand by assistance  Stand Pivot Transfers: Contact guard assistance (with RW)  Car Transfer: Moderate Assistance (SPT with RW in and out of rehab car with seat height at 19 inches)  Comment: Increased time and effort to complete with cues for body mechanics.          GAIT Daily Assessment     NT             STEPS/STAIRS Daily Assessment    Stairs?: Yes   # Steps : 1  Stairs Height: 6\" (and 4 inch step)  Rails:  (in II bars)  Assistance: Contact guard assistance;Minimal assistance  Comment: up/down 4 inch step x 1 in II bars, up down 6 inch step  x 3 in II bars with 2 to 3 min seated rest break between attempts          BALANCE Daily Assessment    Static Sitting:

## 2024-11-20 NOTE — PLAN OF CARE
Problem: Chronic Conditions and Co-morbidities  Goal: Patient's chronic conditions and co-morbidity symptoms are monitored and maintained or improved  Outcome: Progressing     Problem: Pain  Goal: Verbalizes/displays adequate comfort level or baseline comfort level  Outcome: Progressing     Problem: Safety - Adult  Goal: Free from fall injury  11/20/2024 0907 by Mariana Chacon, RN  Outcome: Progressing  11/19/2024 1927 by Nata Silva, RN  Outcome: Progressing     Problem: Skin/Tissue Integrity  Goal: Absence of new skin breakdown  Description: 1.  Monitor for areas of redness and/or skin breakdown  2.  Assess vascular access sites hourly  3.  Every 4-6 hours minimum:  Change oxygen saturation probe site  4.  Every 4-6 hours:  If on nasal continuous positive airway pressure, respiratory therapy assess nares and determine need for appliance change or resting period.  Outcome: Progressing     Problem: ABCDS Injury Assessment  Goal: Absence of physical injury  Outcome: Progressing

## 2024-11-21 LAB — VANCOMYCIN SERPL-MCNC: 16.7 UG/ML

## 2024-11-21 PROCEDURE — 36415 COLL VENOUS BLD VENIPUNCTURE: CPT

## 2024-11-21 PROCEDURE — 6360000002 HC RX W HCPCS: Performed by: STUDENT IN AN ORGANIZED HEALTH CARE EDUCATION/TRAINING PROGRAM

## 2024-11-21 PROCEDURE — 90935 HEMODIALYSIS ONE EVALUATION: CPT

## 2024-11-21 PROCEDURE — 2580000003 HC RX 258: Performed by: STUDENT IN AN ORGANIZED HEALTH CARE EDUCATION/TRAINING PROGRAM

## 2024-11-21 PROCEDURE — 1180000000 HC REHAB R&B

## 2024-11-21 PROCEDURE — 6370000000 HC RX 637 (ALT 250 FOR IP): Performed by: PHYSICAL MEDICINE & REHABILITATION

## 2024-11-21 PROCEDURE — 97530 THERAPEUTIC ACTIVITIES: CPT

## 2024-11-21 PROCEDURE — 97110 THERAPEUTIC EXERCISES: CPT

## 2024-11-21 PROCEDURE — 2580000003 HC RX 258: Performed by: PHYSICAL MEDICINE & REHABILITATION

## 2024-11-21 PROCEDURE — 97535 SELF CARE MNGMENT TRAINING: CPT

## 2024-11-21 PROCEDURE — 80202 ASSAY OF VANCOMYCIN: CPT

## 2024-11-21 PROCEDURE — 99232 SBSQ HOSP IP/OBS MODERATE 35: CPT | Performed by: STUDENT IN AN ORGANIZED HEALTH CARE EDUCATION/TRAINING PROGRAM

## 2024-11-21 RX ADMIN — HYDROCODONE BITARTRATE AND ACETAMINOPHEN 1 TABLET: 5; 325 TABLET ORAL at 06:19

## 2024-11-21 RX ADMIN — FAMOTIDINE 20 MG: 20 TABLET, FILM COATED ORAL at 11:46

## 2024-11-21 RX ADMIN — METOPROLOL TARTRATE 50 MG: 50 TABLET, FILM COATED ORAL at 20:04

## 2024-11-21 RX ADMIN — HYDROCODONE BITARTRATE AND ACETAMINOPHEN 1 TABLET: 5; 325 TABLET ORAL at 20:04

## 2024-11-21 RX ADMIN — METOPROLOL TARTRATE 50 MG: 50 TABLET, FILM COATED ORAL at 11:46

## 2024-11-21 RX ADMIN — SODIUM CHLORIDE, PRESERVATIVE FREE 10 ML: 5 INJECTION INTRAVENOUS at 11:46

## 2024-11-21 RX ADMIN — HYDROCODONE BITARTRATE AND ACETAMINOPHEN 1 TABLET: 5; 325 TABLET ORAL at 12:21

## 2024-11-21 RX ADMIN — SEVELAMER CARBONATE 800 MG: 800 TABLET, FILM COATED ORAL at 16:54

## 2024-11-21 RX ADMIN — SERTRALINE 100 MG: 100 TABLET, FILM COATED ORAL at 20:03

## 2024-11-21 RX ADMIN — VANCOMYCIN HYDROCHLORIDE 750 MG: 750 INJECTION, POWDER, LYOPHILIZED, FOR SOLUTION INTRAVENOUS at 15:28

## 2024-11-21 RX ADMIN — APIXABAN 5 MG: 5 TABLET, FILM COATED ORAL at 20:04

## 2024-11-21 RX ADMIN — SEVELAMER CARBONATE 800 MG: 800 TABLET, FILM COATED ORAL at 11:46

## 2024-11-21 RX ADMIN — SODIUM CHLORIDE, PRESERVATIVE FREE 5 ML: 5 INJECTION INTRAVENOUS at 20:08

## 2024-11-21 RX ADMIN — APIXABAN 5 MG: 5 TABLET, FILM COATED ORAL at 11:46

## 2024-11-21 RX ADMIN — SERTRALINE 100 MG: 100 TABLET, FILM COATED ORAL at 11:46

## 2024-11-21 ASSESSMENT — PAIN SCALES - GENERAL
PAINLEVEL_OUTOF10: 7
PAINLEVEL_OUTOF10: 5
PAINLEVEL_OUTOF10: 1
PAINLEVEL_OUTOF10: 5

## 2024-11-21 ASSESSMENT — PAIN DESCRIPTION - PAIN TYPE: TYPE: ACUTE PAIN;CHRONIC PAIN

## 2024-11-21 ASSESSMENT — PAIN DESCRIPTION - LOCATION
LOCATION: HIP
LOCATION: GENERALIZED
LOCATION: ABDOMEN;HIP

## 2024-11-21 ASSESSMENT — PAIN SCALES - WONG BAKER: WONGBAKER_NUMERICALRESPONSE: NO HURT

## 2024-11-21 ASSESSMENT — PAIN DESCRIPTION - ORIENTATION
ORIENTATION: LEFT
ORIENTATION: LEFT;RIGHT

## 2024-11-21 ASSESSMENT — PAIN - FUNCTIONAL ASSESSMENT: PAIN_FUNCTIONAL_ASSESSMENT: PREVENTS OR INTERFERES SOME ACTIVE ACTIVITIES AND ADLS

## 2024-11-21 ASSESSMENT — PAIN DESCRIPTION - FREQUENCY: FREQUENCY: CONTINUOUS

## 2024-11-21 ASSESSMENT — PAIN DESCRIPTION - DESCRIPTORS
DESCRIPTORS: ACHING;BURNING
DESCRIPTORS: ACHING;DISCOMFORT;SORE
DESCRIPTORS: ACHING

## 2024-11-21 ASSESSMENT — PAIN DESCRIPTION - ONSET: ONSET: GRADUAL

## 2024-11-21 NOTE — PROGRESS NOTES
[facility] Progress Note    Follow-Up on: ESRD  Pt seen and examined on HD, left  Qb, UF 3500 ongoing LE pitting edema improving with UF. PT going well on 9th floor.      ROS:  Gen - no fever, no chills, appetite unchanged  CV - no chest pain  Lung - no shortness of breath, no cough  Abd - + tenderness, no nausea/vomiting, no diarrhea  Ext - + edema    Exam:  Vitals:    11/20/24 2345 11/21/24 0619 11/21/24 0649 11/21/24 0800   BP:    (!) 162/79   Pulse:    89   Resp: 18 18 18 18   Temp:    97.7 °F (36.5 °C)   TempSrc:       SpO2:    100%   Weight:       Height:             Intake/Output Summary (Last 24 hours) at 11/21/2024 0815  Last data filed at 11/21/2024 0703  Gross per 24 hour   Intake 120 ml   Output 2 ml   Net 118 ml             Wt Readings from Last 3 Encounters:   11/20/24 (!) 155.2 kg (342 lb 3.2 oz)   11/06/24 (!) 156.2 kg (344 lb 6.4 oz)   10/18/24 (!) 150.6 kg (332 lb)       GEN - in no distress, alert and oriented   CV - regular rate, S1, S2, no Rub   Lung - clear with low lung volumes bilaterally  Abd - soft   Ext - + 2 BLE edema/anasarca  Access: left TCC intact, accessed    Recent Labs     11/18/24  1541   WBC 11.7*   HGB 7.4*   HCT 25.0*             Recent Labs     11/18/24  1541      K 4.8   CL 98   CO2 25   BUN 37*   CREATININE 5.62*   CALCIUM 8.1*   PHOS 3.6   ALBUMIN 2.1*         Assessment / Plan:  Principal Problem:    Sepsis (HCC)  Resolved Problems:    * No resolved hospital problems. *    1) ESRD - HD MWF.  Previously on PD. On TTS for rehab schedule.   -sequential dialysis 11/18 with 3L UF net 1/18 and 3L UF net 1/19  - She c/o 4 hrs of dialysis is mentally taxing and wound agree to 3.5 hrs with sequential dialysis as needed in between txs.   Seen on HD, UF as tolerated,   - ordered sequential dialysis Friday 11/22    2) Peritonitis , culture grew Tsukamurella sp and CNS.  PD catheter removed 10/15/24  -Continue vancomycin EOT 12/1/2024 per ID for 6 wks

## 2024-11-21 NOTE — DIALYSIS
TRANSFER IN - DIALYSIS    Received patient in dialysis unit  from [facility] (unit) for ordered procedure.    Consent verified for renal replacement therapy. Procedure explained to patient, opportunity for Q&A provided. Call light given.     Patient alert and vital signs stable.  BP (!) 162/79   Pulse 89   Temp 97.7 °F (36.5 °C)   Resp 18   Ht 1.753 m (5' 9\")   Wt (!) 155.2 kg (342 lb 3.2 oz)   SpO2 100%   BMI 50.53 kg/m²     Hemodialysis initiated using right cvc.  Aspirated and flushed both ports without difficulty. Dressing clean, dry and intact.  Machine settings per MD order.    Heparin 0 unit bolus and 0 units/hr.      Will monitor during treatment.

## 2024-11-21 NOTE — DIALYSIS
TRANSFER OUT- DIALYSIS    Hemodialysis treatment completed. PT ran 3.5 hours, without complications.    Patient alert and VS stable  /98  P 81       3 Kg removed.      Flushed both ports with 10 mL of NS.  CVC dressing clean, dry, and intact, tego caps intact, curos caps placed.      Meds given: 0.    RBCs given during dialysis: 0    Patient to 902 after dialysis.

## 2024-11-21 NOTE — PROGRESS NOTES
[facility] OCCUPATIONAL THERAPY DAILY NOTE  OT Individual Minutes  Time In: 0703  Time Out: 0739  Minutes: 36               Subjective: Pt agreeable to treatment. \"I'm doing 3.5 hours of dialysis today and few hours tomorrow.\"  Pain: No pain expressed.  Interdisciplinary Communication: Collaborated with RN regarding pt status and pt performance.   Precautions: Falls, Malick Alarm, and dialysis port, HD dialysis, IV     Vitals  O2 Device: RA    FUNCTIONAL MOBILITY:       Bed Mobility Jt A sup>sit with HOB elevated with A for trunk/UB support; pt required BLE assist BTB sit>supine with pt assisting with BLE BTB.   Sit to Stand SBA and Jt Pt initially required MIN A with bed elevated for STS, then progressed to close SBA with FWW   Transfer  SBA and CGA  Transfer Type: SPT  Equipment: RW Pt ranges from CGA-close SBA with t/f's with FWW   Ambulation CGA  Equipment: RW Therapy graded up to ambulating short distances as simulated to home bathroom as pt's w/c does not fit into bathroom. Pt ambulated ~4-5 feet with FWW to toilet with no LOB CGA; pt then performed SPT from toilet>w/c d/t dialysis transport present.      ACTIVITIES OF DAILY LIVING:       Oral Hygiene Supervision or touching assistance Close SBA standing sinkside for g/h tasks for 1:21 minutes with w/c placed behind pt to ensure pt safety, no LOB; completed rest of hygiene tasks seated in w/c   Upper Body  Dressing  (pt preferred to keep gown on) S/U A to don/doff overhead gown seated   Toileting Hygiene Supervision or touching assistance SPV seated on toilet, standing for gown management SBA with no LOB with GB use, toileting hygiene leaning L<>R anterior sergio cares with SPV seated   Toilet Transfer Supervision or touching assistance Close SBA t/f with GB/FWW and w/c SPT post toileting   Education Adaptive ADL Techniques, Benefits of OT, Energy Conservation, Pacing, Functional Transfer Training, Rolling Walker Management, and Safety Awareness     Assessment: End  of session limited by dialysis transport. Pt demonstrated good participation in OT treatment. Pt continues to benefit from skilled OT services to address remaining deficits and progress toward baseline level of independence and safety. Patient ended session supine in bed  dialysis transport and all needs met .   Plan: Continue OT POC.     JAYJAY PARISI OT   11/21/2024

## 2024-11-21 NOTE — PROGRESS NOTES
[facility] OCCUPATIONAL THERAPY DAILY NOTE  OT Individual Minutes  Time In: 1302  Time Out: 1350  Minutes: 48               Subjective: Pt agreeable to treatment. \"I'm exhausted\". Pt endorsed feeling more fatigue following dialysis this date.   Pain: RN notified .  Interdisciplinary Communication: Collaborated with PT and RN regarding pt status, pt performance, and handoff communication.   Precautions: Falls, dialysis port, IV, HD    Vitals  O2 Device: RA, RA >90% with noted dyspnea    FUNCTIONAL MOBILITY:       Bed Mobility NT    Sit to Stand CGA    Transfer  CGA  Transfer Type: STS's while seated EOB  Equipment: RW    Ambulation NT  Equipment: NA       - Activity Tolerance - Balance - Range of Motion - Strengthening - standing tolerance/endurance    Pt completed 8 minutes on the upper body ergometer, ½ forward and ½ backward, with light resistance to increase upper body strength and activity tolerance for integration into functional tasks. Pt required 2 rest breaks during d/t fatigue. Pt completed while seated with increased time for completion.     Pt participated in pegboard task while standing to address standing tolerance/endurance/strengthening, coordination, and functional reach. Pt utilized resistive pegboard and large, multi-colored pegs to copy simple to moderately complex visual designs. Pt stood for 36 seconds followed by required rest break. Pt then stood for 48 seconds followed by required rest break. Increased time for tasks d/t fatigue. Pt completed STS's with touching A and no LOB, close SBA while standing for task with w/c placed behind pt to ensure pt safety.     Pt then participated in 1 sets of 5 reps of w/c pushups with body weight with required rest break.         Education   Benefits of OT, Energy Conservation, Pacing, and Safety Awareness     Assessment: Patient required increased time this date for tasks with frequent rest breaks d/t decreased endurance following dialysis. Pt demonstrated good

## 2024-11-21 NOTE — PROGRESS NOTES
[place] [address]  Tel: [phone]     Physical Medicine and Rehabilitation Progress Note      Giselle Rosado  Admit Date: 11/8/2024  Admit Diagnosis: Sepsis (HCC) [A41.9]    Subjective     Patient seen and examined between therapies. Patient working hard in therapy despite fatigue after HD today; she states she ambulated with RW and PT from // bars to room (which mimics distance from vehicle to front door at home). All questions and concerns were addressed at this time.     Objective:     Current Facility-Administered Medications   Medication Dose Route Frequency    vancomycin (VANCOCIN) 750 mg in sodium chloride 0.9 % 250 mL IVPB ([id number])  750 mg IntraVENous Once    amLODIPine (NORVASC) tablet 5 mg  5 mg Oral Daily    ALPRAZolam (XANAX) tablet 0.5 mg  0.5 mg Oral Daily PRN    saliva substitute (BIOTENE/MOUTH KOTE) liquid   Oral Q6H    cloNIDine (CATAPRES) tablet 0.1 mg  0.1 mg Oral Q6H PRN    midodrine (PROAMATINE) tablet 10 mg  10 mg Oral PRN    acetaminophen (TYLENOL) tablet 1,000 mg  1,000 mg Oral Q6H PRN    Or    acetaminophen (TYLENOL) suppository 650 mg  650 mg Rectal Q6H PRN    0.9 % sodium chloride infusion   IntraVENous PRN    aluminum & magnesium hydroxide-simethicone (MAALOX) 200-200-20 MG/5ML suspension 30 mL  30 mL Oral Q6H PRN    apixaban (ELIQUIS) tablet 5 mg  5 mg Oral BID    bisacodyl (DULCOLAX) suppository 10 mg  10 mg Rectal Daily PRN    dicyclomine (BENTYL) capsule 10 mg  10 mg Oral TID PRN    epoetin oralia-epbx (RETACRIT) injection 10,000 Units  10,000 Units SubCUTAneous Weekly    famotidine (PEPCID) tablet 20 mg  20 mg Oral Daily    fluticasone (FLONASE) 50 MCG/ACT nasal spray 2 spray  2 spray Nasal Nightly PRN    lidocaine 4 % external patch 4 patch  4 patch TransDERmal Daily    metoprolol tartrate (LOPRESSOR) tablet 50 mg  50 mg Oral BID    ondansetron (ZOFRAN-ODT) disintegrating tablet 4 mg  4 mg Oral Q8H PRN    Or     amlodipine on 11/21 morning so more fluid could be dialyzed without blood pressure complications. Will place medication on hold at this time    ESRD on HD - nephrology following, on TTa schedule     Obesity, morbid (BMI- 50.8 )  -11/15  weight 347 lb on 11/14, 4 lb weight gain recorded in last 24 hours, 2.8 kg removed during HD on 11/14, on 1200ml fluid restriction    Anticoagulation mgmt / DVT prophylaxis- on Eliquis bid    Irritable bowel syndrome - on prn bentyl    Pain mgmt - prn Norco    Buttock decubitus ulcer - WOC RN following, bariatric specialty mattress     Abdominal asymmetric fullness - abdominal binder ordered, renal note appreciated      The patient is maintaining functional gains with mobility and ADLs. Anticipate d/c home on 11/26.    Greater than 35 minutes were spent in direct patient care discussing current medications, medical conditions, discharge planning and continuation of therapy in the community. More than half of total time was spent in counseling and coordination of care with patient, nursing staff, therapy team and case management.      Part of this note may have been written by using a voice dictation software. The note has been proof read but may still contain some grammatical/other typographical errors.      Alma Traore D.O. PM&R  Inpatient Rehabilitation Medical Director  11/21/2024

## 2024-11-21 NOTE — PROGRESS NOTES
PHYSICAL THERAPY DAILY NOTE    PT Individual Minutes  Time In: 0920  Time Out: 0953  Minutes: 33                 Total Treatment Time:  33 Minutes    Pt. Seen for: AM and Therapeutic Exercise     Subjective: Pt. Reports her family is working on adding a ramp to the home before she discharges to improve easy of entry.         Objective:  Restrictions/Precautions: Fall Risk, Skin  Required Braces or Orthoses?: No              Other position/activity restrictions: bariatric air bed for pressure relief         GROSS ASSESSMENT   Pt. Cont. To exhibit increased edema B LEs R>L        COGNITION Daily Assessment    Overall Orientation Status: Within Normal Limits   Overall Cognitive Status: WNL        BED/MAT MOBILITY Daily Assessment    NT secondary to HD       TRANSFERS Daily Assessment     NT         GAIT Daily Assessment     NT             STEPS/STAIRS Daily Assessment     NT             BALANCE Daily Assessment    Static Sitting: NT  Dynamic Sitting: NT  Static Standing: NT  Dynamic Standing: NT       WHEELCHAIR MOBILITY Daily Assessment     NT              LOWER EXTREMITY EXERCISES   Pt. Performed supine LE exercises to increase strength & ROM.  Performed as follows:    SUPINE EXERCISES Sets Reps Comments   Ankle Pumps 1 15    Isometric hip adduction 1 15    Glut Sets 1 15    Heel Slides 1 15 AAROM   Hip Abduction 1 15 AAROM   Short Arc Quad 1 15    Hip IR 1 15    Straight Leg Raise 1 15 AAROM         Pain level: no c/o pain    Vital Signs:  BP (!) 145/85   Pulse 92   Temp 97.7 °F (36.5 °C)   Resp 18   Ht 1.753 m (5' 9\")   Wt (!) 155.2 kg (342 lb 3.2 oz)   SpO2 100%   BMI 50.53 kg/m²      Education:    Education Given To: Patient  Education Provided: Role of Therapy  Education Provided Comments: Pt. educated on benefits of ROM exercises w/ edema  Education Method: Demonstration;Verbal  Barriers to Learning: None  Education Outcome: Verbalized understanding;Demonstrated understanding     Interdisciplinary

## 2024-11-21 NOTE — PROGRESS NOTES
PHYSICAL THERAPY DAILY NOTE    PT Individual Minutes  Time In: 1351  Time Out: 1430  Minutes: 39                 Total Treatment Time:  39 Minutes    Pt. Seen for: PM, Gait Training, Patient Education, Therapeutic Exercise, and Transfer Training     Subjective: Gosh I hate that you only see me on dialysis days.  I am so tired.  I am not usually a whiner.  Feels like it might fall off (dressing)         Objective:  Restrictions/Precautions: Fall Risk, Skin  Required Braces or Orthoses?: No              Other position/activity restrictions: bariatric air bed for pressure relief         GROSS ASSESSMENT           COGNITION Daily Assessment    Overall Orientation Status: Within Normal Limits   Overall Cognitive Status: WNL        BED/MAT MOBILITY Daily Assessment              TRANSFERS Daily Assessment   Standing x 3 minutes for dressing change per RN. Sit to Stand: Stand by assistance  Stand to Sit: Stand by assistance  Bed to Chair: Contact guard assistance  Stand Pivot Transfers: Contact guard assistance          GAIT Daily Assessment    Surface: Level tile  Device: Rolling Walker  Other Apparatus: Wheelchair follow  Assistance: Contact guard assistance  Distance: 30  More Ambulation?: No              STEPS/STAIRS Daily Assessment                   BALANCE Daily Assessment           WHEELCHAIR MOBILITY Daily Assessment                          LOWER EXTREMITY EXERCISES   Seated HEP:  Foot on and off 6'' step:  x15  SAQ: x 15  Heel toe raises x 20  Ankle inv/ev x 20  Adductor squeezes x 20     Pain level: occ pains with movement - not rated  Pain Location:  hips  Pain Interventions: rest    Vital Signs:  BP (!) 166/90   Pulse 88   Temp 98.7 °F (37.1 °C) (Oral)   Resp 18   Ht 1.753 m (5' 9\")   Wt (!) 155.2 kg (342 lb 3.2 oz)   SpO2 100%   BMI 50.53 kg/m²       Education:    Education Given To: Patient  Education Provided Comments: Pt. educated on benefits of ROM exercises w/ edema  Education Method:

## 2024-11-21 NOTE — PROGRESS NOTES
[facility] DAILY FOLLOW UP RENAL INSUFFICIENCY PATIENT   [facility]   Pharmacy Pharmacokinetic Monitoring Service - Vancomycin    Consulting Provider: Dr. Toribio  Indication: PD catheter complicated peritonitis   Target Concentration: Pre-dialysis concentration 15-20 mg/L  EOT: 12/1/24 per ID  Additional Antimicrobials: None    Pertinent Laboratory Values:   Wt Readings from Last 1 Encounters:   11/20/24 (!) 155.2 kg (342 lb 3.2 oz)     Temp Readings from Last 1 Encounters:   11/21/24 97.7 °F (36.5 °C)     Recent Labs     11/18/24  1541   BUN 37*   CREATININE 5.62*   WBC 11.7*         Lab Results   Component Value Date/Time    VANCOTROUGH 8.8 10/14/2024 11:22 AM    VANCORANDOM 16.7 11/21/2024 05:24 AM     Assessment:  Date:  Dose/Freq Admin Times Level/Time:   10/24      10/25 HD 1000 mg x1  [address] Rd @0400 = 21.6   10/26      10/27      10/28 HD  1000 mg x1 15[address] Rd @0604 = 21.1   10/29      10/30  mg x1 17[address] Rd @0651 = 24.7   10/31      11/1  mg x 1 1256    11/2      11/3      11/4  mg x 1 1407    11/5  mg x 1 1515 Rd @ 0351 = 24.1   11/6  mg x 1 1631 Rd @ 0519 = 21.3   11/7  mg x 1 1237    11/8  mg x 1 1747 Rd @ 0344 = 20.3   11/9  mg x 1 1355    11/10   Rd @ 0608 = 19.5   11/11 11/12  mg x 1 1305    11/13 11/14  mg x 1 1136    11/15      11/16  mg x 1 1629 Rd @ 0549 = 12.6   11/17 11/18 HD Pt refused dose    11/19  mg x 1  750 mg x 1 1207  [address] @ 0622 = 14.4   11/20 11/21  mg x 1 (15) Rd @ 0524 = 16.7   11/22 HD        Plan:  Concentration-guided dosing due to intermittent hemodialysis  Level is therapeutic today. Will re-dose with vancomycin 750 mg IV x 1 after HD today  Another HD is planned for tomorrow. Plan to re-dose after  Repeat vancomycin concentrations will be ordered as clinically appropriate  Pharmacy will continue to monitor patient and adjust therapy as indicated    Thank you for the

## 2024-11-22 ENCOUNTER — CARE COORDINATION (OUTPATIENT)
Dept: CARE COORDINATION | Facility: CLINIC | Age: 54
End: 2024-11-22

## 2024-11-22 PROCEDURE — 6370000000 HC RX 637 (ALT 250 FOR IP): Performed by: PHYSICAL MEDICINE & REHABILITATION

## 2024-11-22 PROCEDURE — 6360000002 HC RX W HCPCS: Performed by: STUDENT IN AN ORGANIZED HEALTH CARE EDUCATION/TRAINING PROGRAM

## 2024-11-22 PROCEDURE — 97535 SELF CARE MNGMENT TRAINING: CPT

## 2024-11-22 PROCEDURE — 90935 HEMODIALYSIS ONE EVALUATION: CPT

## 2024-11-22 PROCEDURE — 2580000003 HC RX 258: Performed by: PHYSICAL MEDICINE & REHABILITATION

## 2024-11-22 PROCEDURE — 99232 SBSQ HOSP IP/OBS MODERATE 35: CPT | Performed by: STUDENT IN AN ORGANIZED HEALTH CARE EDUCATION/TRAINING PROGRAM

## 2024-11-22 PROCEDURE — 97110 THERAPEUTIC EXERCISES: CPT

## 2024-11-22 PROCEDURE — 97530 THERAPEUTIC ACTIVITIES: CPT

## 2024-11-22 PROCEDURE — 2580000003 HC RX 258: Performed by: STUDENT IN AN ORGANIZED HEALTH CARE EDUCATION/TRAINING PROGRAM

## 2024-11-22 PROCEDURE — 97116 GAIT TRAINING THERAPY: CPT

## 2024-11-22 PROCEDURE — 1180000000 HC REHAB R&B

## 2024-11-22 RX ORDER — SALIVA STIMULANT COMB. NO.3
SPRAY, NON-AEROSOL (ML) MUCOUS MEMBRANE EVERY 6 HOURS PRN
Status: ACTIVE | OUTPATIENT
Start: 2024-11-22

## 2024-11-22 RX ORDER — AMLODIPINE BESYLATE 5 MG/1
5 TABLET ORAL
Status: DISPENSED | OUTPATIENT
Start: 2024-11-23

## 2024-11-22 RX ADMIN — FAMOTIDINE 20 MG: 20 TABLET, FILM COATED ORAL at 08:29

## 2024-11-22 RX ADMIN — APIXABAN 5 MG: 5 TABLET, FILM COATED ORAL at 20:15

## 2024-11-22 RX ADMIN — HYDROCODONE BITARTRATE AND ACETAMINOPHEN 1 TABLET: 5; 325 TABLET ORAL at 09:57

## 2024-11-22 RX ADMIN — VANCOMYCIN HYDROCHLORIDE 500 MG: 500 INJECTION, POWDER, LYOPHILIZED, FOR SOLUTION INTRAVENOUS at 18:04

## 2024-11-22 RX ADMIN — Medication: at 08:32

## 2024-11-22 RX ADMIN — ACETAMINOPHEN 1000 MG: 500 TABLET, FILM COATED ORAL at 17:58

## 2024-11-22 RX ADMIN — METOPROLOL TARTRATE 50 MG: 50 TABLET, FILM COATED ORAL at 20:15

## 2024-11-22 RX ADMIN — SEVELAMER CARBONATE 800 MG: 800 TABLET, FILM COATED ORAL at 08:29

## 2024-11-22 RX ADMIN — TRAZODONE HYDROCHLORIDE 50 MG: 50 TABLET ORAL at 22:08

## 2024-11-22 RX ADMIN — APIXABAN 5 MG: 5 TABLET, FILM COATED ORAL at 08:29

## 2024-11-22 RX ADMIN — HYDROCODONE BITARTRATE AND ACETAMINOPHEN 1 TABLET: 5; 325 TABLET ORAL at 20:14

## 2024-11-22 RX ADMIN — SERTRALINE 100 MG: 100 TABLET, FILM COATED ORAL at 08:29

## 2024-11-22 RX ADMIN — SEVELAMER CARBONATE 800 MG: 800 TABLET, FILM COATED ORAL at 17:57

## 2024-11-22 RX ADMIN — SODIUM CHLORIDE, PRESERVATIVE FREE 10 ML: 5 INJECTION INTRAVENOUS at 08:38

## 2024-11-22 RX ADMIN — SERTRALINE 100 MG: 100 TABLET, FILM COATED ORAL at 20:14

## 2024-11-22 ASSESSMENT — PAIN SCALES - GENERAL
PAINLEVEL_OUTOF10: 6
PAINLEVEL_OUTOF10: 0
PAINLEVEL_OUTOF10: 8
PAINLEVEL_OUTOF10: 6
PAINLEVEL_OUTOF10: 0

## 2024-11-22 ASSESSMENT — PAIN DESCRIPTION - ORIENTATION
ORIENTATION: LEFT
ORIENTATION: LOWER

## 2024-11-22 ASSESSMENT — PAIN DESCRIPTION - DESCRIPTORS
DESCRIPTORS: SHARP;SORE
DESCRIPTORS: ACHING;SORE

## 2024-11-22 ASSESSMENT — PAIN DESCRIPTION - LOCATION
LOCATION: HIP
LOCATION: BACK
LOCATION: GENERALIZED

## 2024-11-22 NOTE — PROGRESS NOTES
PHYSICAL WEEKLY PROGRESS NOTE                            Total Treatment Time: 39 Minutes    Subjective: I couldn't get my right leg up (with step trial last week)                Objective:     Precautions:      Restrictions/Precautions: Fall Risk, Skin  Required Braces or Orthoses?: No           Other position/activity restrictions: bariatric air bed for pressure relief    Vital Signs:BP (!) 141/79   Pulse 82   Temp 97.5 °F (36.4 °C) (Oral)   Resp 18   Ht 1.753 m (5' 9\")   Wt (!) 155.2 kg (342 lb 3.2 oz)   SpO2 97%   BMI 50.53 kg/m²       Pain level: no number given  Pain location: legs when walking  Pain interventions: rest     Patient education:   Education Given To: Patient;Family  Education Provided: Equipment;DME/Home Modifications  Education Provided Comments: working through strategy to get up the steps  Education Method: Demonstration;Verbal  Barriers to Learning: None  Skilled Clinical Factors: pt cannot get up the steps. Rail too wide to assist.occ goes up sideways.left leg is stronger.  Needs to go up the right rail.  they have to cut back the juniper so pt can use that rail.  Ramp being delivered for discharge     Interdisciplinary Communication: PT, OT, RN     Cognition:   Overall Orientation Status: Within Normal Limits  Overall Cognitive Status: WNL       Lower Extremity Function:       LLE RLE   ROM Hip decreased 50%, knee and ankle WFL - swelling both feet Hip decreased 50%, knee and ankle WFL- swelling in feet   Fine Motor Coordination Impaired:    Impaired:      Tone Normal Normal   Sensation Light Touch Intact and Proprioception Intact Light Touch Intact and Proprioception Intact   Strength Hip  -3/5, knee 4/5 to 5/5, ankle DF 5/5, PF +2/5 Hip  -3/5, knee 4/5 to 5/5, ankll DF 5/5, PF +2/5       Functional Assessment:    Balance  Comments   Static Sitting Normal:  Pt. able to maintain balance w/o UE support    Dynamic Sitting Good - accepts moderate challenge;  can maintain balance while  Decreased step height, Slow Shirin, Increased ALEE  Distance: 0  Comments: Unable to tolerate gait assessment due to fatigue from dialysis and OT eval  More Ambulation?: No   Surface: Level tile  Device: Rolling Walker  Other Apparatus: Wheelchair follow  Assistance: Stand by assistance  Quality of Gait: flexed posture.  small steps  Distance: 30'x 2  Comments: gait distance limited due to fatigue                STEPS/STAIRS Initial Assessment Weekly Assessment Comments    Stairs  # Steps : 1  Stairs Height: 6\"  Rails: Bilateral  Assistance: 2 Person assistance, Moderate assistance  Comment: up/down 4 inch step in II bars with min assist x 1. up 6 inch step forwards and down 6 inch steps backwards leading up with LLE and down with RLE. Increased time and effort to complete with flexed posture. Patient up 1 step with mod assist x 2 then requested to step down step and return to w/c due to fatigue and fear of LE's buckling. Stairs?: No  Stairs  # Steps : 1  Stairs Height: 6\"  Rails: Bilateral  Assistance: Unable to assess, Dependent/Total  Comment: pt cannot get up the steps. Rail too wide to offer UR support and assist. Pt states that at home she goes up sideways.Left leg is stronger. Needs to go up the right rail. They have to cut back the juniper so pt can use that rail. Ramp being delivered for discharge Pt is working on single step ups in // bars with 4'' and 6'' step    Today unable to get up 6'' step.  Successful with 4'' step.        Treatment Interventions:   Transfer Training  , Gait Training  , and Stair Training      Pt. left in wheelchair needs in reach family at bedside           Assessment: Attempted to go up 4 steps with bilateral railing.  Pt is unable to get up 1 6'' at the steps.  Pt can go up and down a 4'' and a 6'' step in the // bars but they are closer together to get more UE support.  Pt occasionally goes up the steps with one rail but uses her left rail because the right rail has a juniper

## 2024-11-22 NOTE — DIALYSIS
TRANSFER IN - DIALYSIS    Received patient in dialysis unit  from [facility] (unit) for ordered procedure.    Consent verified for renal replacement therapy. Procedure explained to patient, opportunity for Q&A provided. Call light given.     Patient A&O and vital signs stable.  /82,  P85  , ) L O2 via room air.    Hemodialysis initiated using L CVC.  Aspirated and flushed both ports without difficulty. Dressing clean, dry and intact.  Machine settings per MD order.    Heparin 0 unit bolus and 0 units/hr.      Will monitor during treatment.

## 2024-11-22 NOTE — WOUND CARE
Follow up on Wounds. Wounds are slowly improving from DTIs as expected with gradual shedding of slough/eschar to reveal more pink/red, granulation tissue.    L buttocks 8x3x0.3cm, pink/red, slough, dry eschar, scant serosanguinous, no odor. Continue pink silicone border foam dressing every other day/PRN.    (11/12)      L hip 9x4x0.2cm, pink/red, purple/maroon, slough, large serosanguinous, no odor. Recommend to add Opticell Ag to wound bed w/ increased drainage, cover with pink silicone border foam dressing every other day/PRN.          R hip ruptured blister (not pictured), pink/red, slough; covered with small pink silicone border foam dressing. Change every other day/PRN.

## 2024-11-22 NOTE — PROGRESS NOTES
[place] [address]  Tel: [phone]     Physical Medicine and Rehabilitation Progress Note      Giselle Rosado  Admit Date: 11/8/2024  Admit Diagnosis: Sepsis (HCC) [A41.9]    Subjective     Patient seen and examined between therapies. Patient was able to complete 2 therapies before going for additional fluid extraction today. We will change daily amlodipine administration time to lunch (with parameters) to prevent hypotension or HTN from missed doses. All questions and concerns were addressed at this time.     Objective:     Current Facility-Administered Medications   Medication Dose Route Frequency    [START ON 11/23/2024] amLODIPine (NORVASC) tablet 5 mg  5 mg Oral Lunch    ALPRAZolam (XANAX) tablet 0.5 mg  0.5 mg Oral Daily PRN    saliva substitute (BIOTENE/MOUTH KOTE) liquid   Oral Q6H    cloNIDine (CATAPRES) tablet 0.1 mg  0.1 mg Oral Q6H PRN    midodrine (PROAMATINE) tablet 10 mg  10 mg Oral PRN    acetaminophen (TYLENOL) tablet 1,000 mg  1,000 mg Oral Q6H PRN    Or    acetaminophen (TYLENOL) suppository 650 mg  650 mg Rectal Q6H PRN    0.9 % sodium chloride infusion   IntraVENous PRN    aluminum & magnesium hydroxide-simethicone (MAALOX) 200-200-20 MG/5ML suspension 30 mL  30 mL Oral Q6H PRN    apixaban (ELIQUIS) tablet 5 mg  5 mg Oral BID    bisacodyl (DULCOLAX) suppository 10 mg  10 mg Rectal Daily PRN    dicyclomine (BENTYL) capsule 10 mg  10 mg Oral TID PRN    epoetin oralia-epbx (RETACRIT) injection 10,000 Units  10,000 Units SubCUTAneous Weekly    famotidine (PEPCID) tablet 20 mg  20 mg Oral Daily    fluticasone (FLONASE) 50 MCG/ACT nasal spray 2 spray  2 spray Nasal Nightly PRN    metoprolol tartrate (LOPRESSOR) tablet 50 mg  50 mg Oral BID    ondansetron (ZOFRAN-ODT) disintegrating tablet 4 mg  4 mg Oral Q8H PRN    Or    ondansetron (ZOFRAN) injection 4 mg  4 mg IntraVENous Q6H PRN    polyethylene glycol (GLYCOLAX) packet 17 g  17 g

## 2024-11-22 NOTE — PROGRESS NOTES
[facility] Progress Note    Follow-Up on: ESRD  Pt seen and examined. Working with therapy. She has been tired after getting dialysis. Planning on sequential UF today and regular HD tomorrow      ROS:  Gen - no fever, no chills, appetite unchanged  CV - no chest pain  Lung - no shortness of breath, no cough  Abd - + tenderness, no nausea/vomiting, no diarrhea  Ext - + edema    Exam:  Vitals:    11/21/24 1142 11/21/24 1221 11/21/24 1930 11/22/24 0706   BP: (!) 166/90  131/81 (!) 141/79   Pulse: 88  85 82   Resp: 18 18 17 18   Temp: 98.7 °F (37.1 °C)  98.6 °F (37 °C) 97.5 °F (36.4 °C)   TempSrc: Oral  Oral Oral   SpO2: 100%  100% 97%   Weight:       Height:             Intake/Output Summary (Last 24 hours) at 11/22/2024 0912  Last data filed at 11/22/2024 0701  Gross per 24 hour   Intake 1230 ml   Output 3501 ml   Net -2271 ml             Wt Readings from Last 3 Encounters:   11/20/24 (!) 155.2 kg (342 lb 3.2 oz)   11/06/24 (!) 156.2 kg (344 lb 6.4 oz)   10/18/24 (!) 150.6 kg (332 lb)       GEN - in no distress, alert and oriented   CV - regular rate, S1, S2, no Rub   Lung - clear with low lung volumes bilaterally  Abd - soft   Ext - + 2 BLE edema/anasarca  Access: left TCC intact, accessed    No results for input(s): \"WBC\", \"HGB\", \"HCT\", \"PLT\", \"INR\" in the last 72 hours.         No results for input(s): \"NA\", \"K\", \"CL\", \"CO2\", \"BUN\", \"CREATININE\", \"GLU\", \"CALCIUM\", \"MG\", \"PHOS\", \"ALBUMIN\" in the last 72 hours.    Invalid input(s): \"CA\"        Assessment / Plan:  Principal Problem:    Sepsis (HCC)  Resolved Problems:    * No resolved hospital problems. *    1) ESRD - HD MWF.  Previously on PD. On TTS for rehab schedule.   -sequential dialysis 11/18 with 3L UF net 1/18 and 3L UF net 1/19  - She c/o 4 hrs of dialysis is mentally taxing and wound agree to 3.5 hrs with sequential dialysis as needed in between txs.   Seen on HD, UF as tolerated,   - sequential dialysis Friday 11/22  -regular HD tomorrow    2)

## 2024-11-22 NOTE — PROGRESS NOTES
PHYSICAL THERAPY DAILY NOTE    PT Individual Minutes  Time In: 1430  Time Out: 1449  Minutes: 19                 Total Treatment Time:  19 Minutes    Pt. Seen for: PM and Gait Training     Subjective: I just feel more SOB today after this dialysis         Objective:  Restrictions/Precautions: Fall Risk, Skin  Required Braces or Orthoses?: No              Other position/activity restrictions: bariatric air bed for pressure relief         GROSS ASSESSMENT           COGNITION Daily Assessment    Overall Orientation Status: Within Normal Limits   Overall Cognitive Status: WNL        BED/MAT MOBILITY Daily Assessment     Bridging: Minimal assistance  Rolling to Left: Modified independent  Rolling to Right: Minimal assistance;Modified independent  Supine to Sit: Minimal assistance  Sit to Supine: Minimal assistance  Bed Mobility Comments: spouse assists pt in room and states he helps with bracing only.        TRANSFERS Daily Assessment    Sit to Stand: Supervision  Stand to Sit: Supervision  Bed to Chair: Stand by assistance          GAIT Daily Assessment    Surface: Level tile  Device: Rolling Walker  Other Apparatus: Wheelchair follow  Assistance: Stand by assistance  Quality of Gait: flexed posture.  small steps  Distance: 30', 34'  Comments: more upright posture with walking this pm              STEPS/STAIRS Daily Assessment   Not in the pm Stairs?: No   Stairs Height: 6\"  Rails: Bilateral  Assistance: Unable to assess;Dependent/Total  Comment: pt cannot get up the steps. Rail too wide to offer UR support and assist. Pt states that at home she goes up sideways.Left leg is stronger. Needs to go up the right rail. They have to cut back the juniper so pt can use that rail. Ramp being delivered for discharge          BALANCE Daily Assessment           WHEELCHAIR MOBILITY Daily Assessment                          LOWER EXTREMITY EXERCISES        Pain level: 0  Pain Location:  NA  Pain Interventions: NA    Vital Signs:  BP

## 2024-11-22 NOTE — PROGRESS NOTES
[facility] DAILY FOLLOW UP RENAL INSUFFICIENCY PATIENT   [facility]   Pharmacy Pharmacokinetic Monitoring Service - Vancomycin    Consulting Provider: Dr. Toribio  Indication: PD catheter complicated peritonitis   Target Concentration: Pre-dialysis concentration 15-20 mg/L  EOT: 12/1/24 per ID  Additional Antimicrobials: None    Pertinent Laboratory Values:   Wt Readings from Last 1 Encounters:   11/20/24 (!) 155.2 kg (342 lb 3.2 oz)     Temp Readings from Last 1 Encounters:   11/22/24 97.5 °F (36.4 °C) (Oral)     No results for input(s): \"BUN\", \"CREATININE\", \"WBC\", \"PROCAL\", \"LACACIDPL\", \"LACTA\", \"LCAD\", \"LACTSEPSIS\" in the last 72 hours.    Invalid input(s): \"PROCAT\", \"PCT\", \"LAC\", \"LACT\", \"LACPOC\"        Lab Results   Component Value Date/Time    ABIGAIL 8.8 10/14/2024 11:22 AM    VANCORANDOM 16.7 11/21/2024 05:24 AM     Assessment:  Date:  Dose/Freq Admin Times Level/Time:   10/24      10/25 HD 1000 mg x1  1351 Rd @0400 = 21.6   10/26      10/27      10/28 HD  1000 mg x1 1546 Rd @0604 = 21.1   10/29      10/30  mg x1 1755 Rd @0651 = 24.7   10/31      11/1  mg x 1 1256    11/2      11/3      11/4  mg x 1 1407    11/5  mg x 1 1515 Rd @ 0351 = 24.1   11/6  mg x 1 1631 Rd @ 0519 = 21.3   11/7  mg x 1 1237    11/8  mg x 1 1747 Rd @ 0344 = 20.3   11/9  mg x 1 1355    11/10   Rd @ 0608 = 19.5   11/11 11/12  mg x 1 1305    11/13 11/14  mg x 1 1136    11/15      11/16  mg x 1 1629 Rd @ 0549 = 12.6   11/17 11/18 HD Pt refused dose    11/19  mg x 1  750 mg x 1 1207  2[address] Rd @ 0622 = 14.4   11/20 11/21  mg x 1 1528 Rd @ 0524 = 16.7   11/22  mg x 1 (18)    11/23 HD   Rd @ (04)     Plan:  Concentration-guided dosing due to intermittent hemodialysis  Extra HD today. Give vancomycin 500 mg IV x 1 after  Repeat vancomycin concentrations will be ordered as clinically appropriate  Pharmacy will continue to monitor  patient and adjust therapy as indicated    Thank you for the consult,  Angelica Ocasio, [facility]

## 2024-11-22 NOTE — CARE COORDINATION
Transition of care outreach postponed due to patient's continued stay for short term rehab at [facility] facility. (9th floor)

## 2024-11-22 NOTE — PROGRESS NOTES
Assisted with ADL by  who stays at bedside.  Patient slept well.  Prn pain pill x 1 at bedtime.  Call light in reach.

## 2024-11-22 NOTE — CARE COORDINATION
CM reviewed / screen patient medical chart for continue stay.  Patient needs are continue to be followed by Dr. Alma Moody.  Patient has discharge date / plan at this time scheduled for 11/26/24.  Therapy has recommended outpatient therapy (PT) only.  Referral was faxed to [facility] by previous CM.  Previous CM completed the DME referral to [facility] for (Bariatric Rolling Walker).  Will be delivered to patient room # 902.  CM will continue to follow / monitor for any needs, concerns or questions that may arise.

## 2024-11-22 NOTE — PROGRESS NOTES
[facility] OCCUPATIONAL THERAPY DAILY NOTE  OT Individual Minutes  Time In: 1348  Time Out: 1357  Minutes: 9         OT Group Minutes  Time In: 1357  Time Out: 1431  Minutes: 34     Subjective: Pt agreeable to treatment. Pt endorsed feeling fatigue following dialysis this date.  Pain: RN notified  and pt feeling dyspnea following dialysis  .  Interdisciplinary Communication: Collaborated with PT and RN regarding pt status, pt performance, and handoff communication.   Precautions: Falls, and dialysis port, HD dialysis Pt's SPO2 on RA >90%, HR with activity 80's-90's.    FUNCTIONAL MOBILITY:       Bed Mobility Jt  Trunk A with HOB elevated/use of bed rail   Sit to Stand CGA and Jt     Transfer  CGA and Jt  Transfer Type: SPT  Equipment: RW  Pt performed SPT from EOB with elevated heightened level with MIN-CGA with FWW    Ambulation NT  Equipment: NA       - Activity Tolerance - Range of Motion - Strengthening    Pt completed 10 minutes on the upper body ergometer 10 minutes with light and moderate resistance to increase upper body strength and activity tolerance for integration into functional tasks. Pt required 4-5 rest breaks during task d/t endurance.     Pt then participated in 3 sets of 5 reps of TB w/c push ups with required rest breaks in between. Task to improved functional t/f's for ADL/IADL tasks and strengthening/endurance.     Pt completed BUE therapeutic exercises while sitting to address AROM, strength, coordination, and activity tolerance to promote safe return to maximal independence with I/ADLs. Pt required few rest breaks throughout.   Exercise   [x] Chest Press   Pt completed 3 sets of 10 repetitions utilizing 3 lb. dowel sebastien.     Increased time required for tasks this date.        Education   Benefits of OT and Energy Conservation, Pacing     Assessment: Pt demonstrated good participation in OT treatment. Pt continues to benefit from skilled OT services to address remaining deficits and progress  toward baseline level of independence and safety. Patient ended session seated in w/c with PT.   Plan: Continue OT POC.     JAYJAY PARISI OT   11/22/2024

## 2024-11-22 NOTE — PROGRESS NOTES
[facility] OCCUPATIONAL THERAPY PROGRESS NOTE  OT Individual Minutes  Time In: 0701  Time Out: 0746  Minutes: 45               GOALS:  GOALS:  Short Term Goals:  Time Frame for Short Term Goals: 7 days   STG 1: Patient will dress UB with Setup Assistance using AE/DME PRN.Goal met 11/15/2024  STG 2: Patient will dress LB with Partial/Moderate Assistance using AE/DME PRN.Goal met 11/15/2024  Pt endorsed this is not a meaningful goal for home as pt's spouse will assist with this.   STG 4: Patient will bathe with Partial/Moderate Assistance using AE/DME PRN.Goal met 11/15/2024  STG 5: Patient will toilet with Partial/Moderate Assistance using AE/DME PRN.Goal met 11/15/2024     Long Term Goals:  Time Frame for Long Term Goals: 14 days   LTG 1: Patient will dress UB with  S/U A using AE/DME PRN. Progressing 11/22/24.   Discontinued d/t pt preference on not wearing LB dressing d/t discomfort with abdominal swelling. Not a meaningful goal for pt.   Pt endorsed this is not a meaningful goal for home as pt's spouse will assist with this. Will discontinue this goal  LTG 4: Patient will bathe withSPV using AE/DME PRN. Progressing 11/22/24.  LTG 5: Patient will toilet with SPV using AE/DME PRN. Progressing 11/22/24, partially met .  LTG 6: Patient/caregiver will verbalize understanding of OT recommendations regarding ADLs, functional transfers, home safety, AE/DME, energy conservation, safety awareness, activity tolerance, and follow-up therapy to increase safety with functional tasks upon discharge. Goal met 11/22/24.    Subjective: Patient agreeable to therapy. \"I'm not so worried about the stairs getting home now that we have the ramp.\"  Pain: No pain expressed.  Precautions: Falls, and dialysis port, HD dialysis       FUNCTIONAL MOBILITY:        Bed Mobility Jt With HOB elevated/use of bed rails   Sit to Stand SBA and Jt Pt initially required MIN A first getting up in AM; bed elevated with heightened surface with FWW   Transfer

## 2024-11-23 LAB
ANION GAP SERPL CALC-SCNC: 20 MMOL/L (ref 7–16)
BUN SERPL-MCNC: 30 MG/DL (ref 6–23)
CALCIUM SERPL-MCNC: 7.8 MG/DL (ref 8.8–10.2)
CHLORIDE SERPL-SCNC: 96 MMOL/L (ref 98–107)
CO2 SERPL-SCNC: 20 MMOL/L (ref 20–29)
CREAT SERPL-MCNC: 4.97 MG/DL (ref 0.6–1.1)
ERYTHROCYTE [DISTWIDTH] IN BLOOD BY AUTOMATED COUNT: 23.2 % (ref 11.9–14.6)
GLUCOSE SERPL-MCNC: 192 MG/DL (ref 70–99)
HCT VFR BLD AUTO: 23.6 % (ref 35.8–46.3)
HGB BLD-MCNC: 6.8 G/DL (ref 11.7–15.4)
HISTORY CHECK: NORMAL
MCH RBC QN AUTO: 30.5 PG (ref 26.1–32.9)
MCHC RBC AUTO-ENTMCNC: 28.8 G/DL (ref 31.4–35)
MCV RBC AUTO: 105.8 FL (ref 82–102)
NRBC # BLD: 0 K/UL (ref 0–0.2)
PLATELET # BLD AUTO: 223 K/UL (ref 150–450)
PMV BLD AUTO: 9.9 FL (ref 9.4–12.3)
POTASSIUM SERPL-SCNC: 4.6 MMOL/L (ref 3.5–5.1)
RBC # BLD AUTO: 2.23 M/UL (ref 4.05–5.2)
SODIUM SERPL-SCNC: 136 MMOL/L (ref 136–145)
VANCOMYCIN SERPL-MCNC: 20.3 UG/ML
WBC # BLD AUTO: 6.5 K/UL (ref 4.3–11.1)

## 2024-11-23 PROCEDURE — 80202 ASSAY OF VANCOMYCIN: CPT

## 2024-11-23 PROCEDURE — 6370000000 HC RX 637 (ALT 250 FOR IP): Performed by: PHYSICAL MEDICINE & REHABILITATION

## 2024-11-23 PROCEDURE — 1180000000 HC REHAB R&B

## 2024-11-23 PROCEDURE — 86901 BLOOD TYPING SEROLOGIC RH(D): CPT

## 2024-11-23 PROCEDURE — 6370000000 HC RX 637 (ALT 250 FOR IP): Performed by: HOSPITALIST

## 2024-11-23 PROCEDURE — 85027 COMPLETE CBC AUTOMATED: CPT

## 2024-11-23 PROCEDURE — 86923 COMPATIBILITY TEST ELECTRIC: CPT

## 2024-11-23 PROCEDURE — 36430 TRANSFUSION BLD/BLD COMPNT: CPT

## 2024-11-23 PROCEDURE — 6360000002 HC RX W HCPCS: Performed by: PHYSICAL MEDICINE & REHABILITATION

## 2024-11-23 PROCEDURE — 86850 RBC ANTIBODY SCREEN: CPT

## 2024-11-23 PROCEDURE — 36415 COLL VENOUS BLD VENIPUNCTURE: CPT

## 2024-11-23 PROCEDURE — 6360000002 HC RX W HCPCS: Performed by: STUDENT IN AN ORGANIZED HEALTH CARE EDUCATION/TRAINING PROGRAM

## 2024-11-23 PROCEDURE — 6370000000 HC RX 637 (ALT 250 FOR IP): Performed by: STUDENT IN AN ORGANIZED HEALTH CARE EDUCATION/TRAINING PROGRAM

## 2024-11-23 PROCEDURE — 86900 BLOOD TYPING SEROLOGIC ABO: CPT

## 2024-11-23 PROCEDURE — P9016 RBC LEUKOCYTES REDUCED: HCPCS

## 2024-11-23 PROCEDURE — 2580000003 HC RX 258: Performed by: STUDENT IN AN ORGANIZED HEALTH CARE EDUCATION/TRAINING PROGRAM

## 2024-11-23 PROCEDURE — 2580000003 HC RX 258: Performed by: PHYSICAL MEDICINE & REHABILITATION

## 2024-11-23 PROCEDURE — 90935 HEMODIALYSIS ONE EVALUATION: CPT

## 2024-11-23 PROCEDURE — 80048 BASIC METABOLIC PNL TOTAL CA: CPT

## 2024-11-23 RX ORDER — ALPRAZOLAM 0.5 MG
0.5 TABLET ORAL ONCE
Status: COMPLETED | OUTPATIENT
Start: 2024-11-23 | End: 2024-11-23

## 2024-11-23 RX ORDER — SODIUM CHLORIDE 9 MG/ML
INJECTION, SOLUTION INTRAVENOUS PRN
Status: ACTIVE | OUTPATIENT
Start: 2024-11-23

## 2024-11-23 RX ADMIN — HYDROCODONE BITARTRATE AND ACETAMINOPHEN 1 TABLET: 5; 325 TABLET ORAL at 12:24

## 2024-11-23 RX ADMIN — FLUTICASONE PROPIONATE 2 SPRAY: 50 SPRAY, METERED NASAL at 02:50

## 2024-11-23 RX ADMIN — METOPROLOL TARTRATE 50 MG: 50 TABLET, FILM COATED ORAL at 20:10

## 2024-11-23 RX ADMIN — ALPRAZOLAM 0.5 MG: 0.5 TABLET ORAL at 03:40

## 2024-11-23 RX ADMIN — HYDROCODONE BITARTRATE AND ACETAMINOPHEN 1 TABLET: 5; 325 TABLET ORAL at 20:10

## 2024-11-23 RX ADMIN — EPOETIN ALFA-EPBX 10000 UNITS: 10000 INJECTION, SOLUTION INTRAVENOUS; SUBCUTANEOUS at 18:27

## 2024-11-23 RX ADMIN — TRAZODONE HYDROCHLORIDE 50 MG: 50 TABLET ORAL at 20:10

## 2024-11-23 RX ADMIN — AMLODIPINE BESYLATE 5 MG: 5 TABLET ORAL at 12:17

## 2024-11-23 RX ADMIN — SODIUM CHLORIDE, PRESERVATIVE FREE 10 ML: 5 INJECTION INTRAVENOUS at 20:13

## 2024-11-23 RX ADMIN — SERTRALINE 100 MG: 100 TABLET, FILM COATED ORAL at 20:10

## 2024-11-23 RX ADMIN — APIXABAN 5 MG: 5 TABLET, FILM COATED ORAL at 20:10

## 2024-11-23 RX ADMIN — SERTRALINE 100 MG: 100 TABLET, FILM COATED ORAL at 12:17

## 2024-11-23 RX ADMIN — APIXABAN 5 MG: 5 TABLET, FILM COATED ORAL at 12:17

## 2024-11-23 RX ADMIN — VANCOMYCIN HYDROCHLORIDE 500 MG: 500 INJECTION, POWDER, LYOPHILIZED, FOR SOLUTION INTRAVENOUS at 18:26

## 2024-11-23 RX ADMIN — FAMOTIDINE 20 MG: 20 TABLET, FILM COATED ORAL at 12:17

## 2024-11-23 ASSESSMENT — PAIN SCALES - GENERAL
PAINLEVEL_OUTOF10: 6
PAINLEVEL_OUTOF10: 7
PAINLEVEL_OUTOF10: 0

## 2024-11-23 ASSESSMENT — PAIN DESCRIPTION - LOCATION
LOCATION: BACK
LOCATION: GENERALIZED

## 2024-11-23 ASSESSMENT — PAIN DESCRIPTION - DESCRIPTORS: DESCRIPTORS: ACHING;SORE

## 2024-11-23 ASSESSMENT — PAIN DESCRIPTION - ORIENTATION: ORIENTATION: LOWER

## 2024-11-23 NOTE — DIALYSIS
TRANSFER OUT- DIALYSIS    Hemodialysis treatment completed. PT ran 3.5 hours, without complications.    Patient alert and VS stable  /92  P 89       3 Kg removed.      Flushed both ports with 10 mL of NS.  CVC dressing clean, dry, and intact, tego caps intact, curos caps placed.      Meds given: None.    RBCs given during dialysis: 1 unit    Patient to 902 after dialysis.

## 2024-11-23 NOTE — PROGRESS NOTES
Nutrition Assessment  Assessment Type: Initial, LOS  Reason for visit:  Length of Stay  Malnutrition Screening Tool Score: 0    Nutrition Intervention:   Food and/or Nutrient Delivery:   Meals and Snacks:  Diet: Continue current order  Medical Food Supplements:   Medical food supplement therapy:  Decrease Nepro with Carbsteady (renal oral supplement) 420 calories, 19 grams protein per 8 ounce serving     Malnutrition Assessment:  Malnutrition Status: At risk for malnutrition (on HD, variable appetite and intake, prolonged admission)     Nutrition Focused Physical Exam: Deferred due to off floor in HD  Nutrition Assessment:  Food/Nutrition Related History: per RD assessment during inpatient admission 10/19: \"Patient reports that at baseline her appetite is extremely good at baseline. She eats 1 good meal/day as she wakes up later in the day. After she wakes up she'll have a snack of cottage cheese or a sandwich, and then have a good dinner at night. Patient watches her K/Phos intake, and has been working on it with the RD at dialysis. Reports an acute decline in her appetite upon her first admit in the beginning of October. Reports going an entire week without eating. States she didn't even want her favorite foods.\"     Do You Have Any Cultural, Church, or Ethnic Food Preferences?: No   Weight History: IM office visit 10/24/23 330#  Nutrition Background:       PMH significant for SBP, ESRD on HD, Afib w/ RVR, GERD, HTN, IBS, DM 2, Gout, Anemia, Anxiety. She presents with chest pain and SOB. Is admitted with sepsis associated hypotension.   10/23: CT A/P -  Active extravasation from the left rectus/inferior epigastric arterial  territory. Embolization could be considered. The subcutaneous hematoma has  slightly increased in size since previous evaluation now measuring 7.9 x 3.4 cm  in size. Subcutaneous air is also present from the site of PD catheter removal.  Infectious etiologies would be within differential

## 2024-11-23 NOTE — PROGRESS NOTES
Pt with nosebleed about 9pm which was minimal. Pt awoke and is complaining because nose is trickling blood. Pt stated she feels like she cant breathe while laying back and feels like it is in her throat also feels like she is wheezing. There is a very small amount of blood noted. Pt presenting anxious. MD notified of c/o. See orders.

## 2024-11-23 NOTE — PROGRESS NOTES
[facility] Progress Note    Follow-Up on: ESRD  Pt seen and examined during dialysis  ROS:  Gen - no fever, no chills, appetite unchanged  CV - no chest pain  Lung - no shortness of breath, no cough  Abd - + tenderness, no nausea/vomiting, no diarrhea  Ext - + edema    Exam:  Vitals:    11/22/24 2044 11/23/24 0535 11/23/24 0711 11/23/24 0803   BP:   (!) 148/81 (!) 146/73   Pulse:   80 82   Resp: 18 19 18   Temp:   97.7 °F (36.5 °C)    TempSrc:   Oral    SpO2:   99%    Weight:  (!) 156.1 kg (344 lb 3.2 oz)     Height:           No intake or output data in the 24 hours ending 11/23/24 0830            Wt Readings from Last 3 Encounters:   11/23/24 (!) 156.1 kg (344 lb 3.2 oz)   11/06/24 (!) 156.2 kg (344 lb 6.4 oz)   10/18/24 (!) 150.6 kg (332 lb)       GEN - in no distress, alert and oriented   CV - regular rate, S1, S2, no Rub   Lung - clear with low lung volumes bilaterally  Abd - soft   Ext - + 2 BLE edema/anasarca  Access: left TCC intact, accessed    Recent Labs     11/23/24  0516   WBC 6.5   HGB 6.8*   HCT 23.6*               Recent Labs     11/23/24  0516      K 4.6   CL 96*   CO2 20   BUN 30*   CREATININE 4.97*   CALCIUM 7.8*           Assessment / Plan:  Principal Problem:    Sepsis (HCC)  Resolved Problems:    * No resolved hospital problems. *    1) ESRD - HD MWF.  Previously on PD. On TTS for rehab schedule.   -sequential dialysis 11/18 with 3L UF net 1/18 and 3L UF net 1/19  - She c/o 4 hrs of dialysis is mentally taxing and wound agree to 3.5 hrs with sequential dialysis as needed in between txs.   -On regular HD today  -Will do next dialysis on Monday    2) Peritonitis , culture grew Tsukamurella sp and CNS.  PD catheter removed 10/15/24  -Continue vancomycin EOT 12/1/2024 per ID for 6 wks     3) Abdominal pain - evidence of subcutaneous hematoma and cellulitis.   CT ab hematoma with improvement. Free fluid shows improvement.   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  Will do 1 unit packed RBC transfusion with dialysis today

## 2024-11-23 NOTE — PROGRESS NOTES
[facility] DAILY FOLLOW UP RENAL INSUFFICIENCY PATIENT   [facility]   Pharmacy Pharmacokinetic Monitoring Service - Vancomycin    Consulting Provider: Dr. Toribio  Indication: PD catheter complicated peritonitis   Target Concentration: Pre-dialysis concentration 15-20 mg/L  EOT: 12/1/24 per ID  Additional Antimicrobials: None    Pertinent Laboratory Values:   Wt Readings from Last 1 Encounters:   11/23/24 (!) 156.1 kg (344 lb 3.2 oz)     Temp Readings from Last 1 Encounters:   11/23/24 97.7 °F (36.5 °C) (Oral)     Recent Labs     11/23/24  0516   BUN 30*   CREATININE 4.97*   WBC 6.5     Lab Results   Component Value Date/Time    VANCORANDOM 20.3 11/23/2024 05:16 AM         Assessment:  Date:  Dose/Freq Admin Times Level/Time:   10/24      10/25 HD 1000 mg x1  [address] @0400 = 21.6   10/26      10/27      10/28 HD  1000 mg x1 [address] @0604 = 21.1   10/29      10/30  mg x1 [address] @0651 = 24.7   10/31      11/1  mg x 1 1256    11/2      11/3      11/4  mg x 1 1407    11/5  mg x 1 [address] @ 0351 = 24.1   11/6  mg x 1 [address] Rd @ 0519 = 21.3   11/7  mg x 1 1237    11/8  mg x 1 [address] Rd @ 0344 = 20.3   11/9  mg x 1 1355    11/10   Rd @ 0608 = 19.5   11/11 11/12  mg x 1 1305    11/13 11/14  mg x 1 1136    11/15      11/16  mg x 1 [address] Rd @ 0549 = 12.6   11/17 11/18 HD Pt refused dose    11/19  mg x 1  750 mg x 1 1207  [address] @ 0622 = 14.4   11/20 11/21  mg x 1 [address] Rd @ 0524 = 16.7   11/22  mg x 1 1804    11/23  mg X 1 (1700) Rd @ 0516 = 20.3   11/24 11/25        Plan:  Concentration-guided dosing due to intermittent hemodialysis  Vancomycin random level this morning resulted at 20.3. Nephrology planning regular HD session today. Will re-dose post HD with vancomycin 500 mg X 1.   Repeat vancomycin concentrations will be ordered as clinically appropriate  Pharmacy will continue to monitor patient and  adjust therapy as indicated    Thank you for the consult,  Baljeet Bedoya, [facility]

## 2024-11-23 NOTE — PLAN OF CARE
Problem: Chronic Conditions and Co-morbidities  Goal: Patient's chronic conditions and co-morbidity symptoms are monitored and maintained or improved  11/22/2024 2153 by Alberta Noe RN  Outcome: Progressing  11/22/2024 1108 by Xochitl Torres RN  Outcome: Progressing     Problem: Pain  Goal: Verbalizes/displays adequate comfort level or baseline comfort level  11/22/2024 2153 by Alberta Noe RN  Outcome: Progressing  11/22/2024 1108 by Xochitl Torres RN  Outcome: Progressing     Problem: Safety - Adult  Goal: Free from fall injury  11/22/2024 2153 by Alberta Noe RN  Outcome: Progressing  11/22/2024 1108 by Xochitl Torres RN  Outcome: Progressing     Problem: Skin/Tissue Integrity  Goal: Absence of new skin breakdown  Description: 1.  Monitor for areas of redness and/or skin breakdown  2.  Assess vascular access sites hourly  3.  Every 4-6 hours minimum:  Change oxygen saturation probe site  4.  Every 4-6 hours:  If on nasal continuous positive airway pressure, respiratory therapy assess nares and determine need for appliance change or resting period.  11/22/2024 2153 by Alberta Noe RN  Outcome: Progressing  11/22/2024 1108 by Xochitl Torres RN  Outcome: Progressing     Problem: ABCDS Injury Assessment  Goal: Absence of physical injury  11/22/2024 2153 by Alberta Noe RN  Outcome: Progressing  11/22/2024 1108 by Xochitl Torres RN  Outcome: Progressing

## 2024-11-23 NOTE — DIALYSIS
[address]  [facility] [address]  Phone: [phone]  Primary Provider: Fanny Streeter  Pre-op Performing Provider: FANNY STREETER    PREOPERATIVE EVALUATION:  Today's date: 9/14/2022    Milena Hamilton is a 79 year old female who presents for a preoperative evaluation.    Surgical Information:  Surgery/Procedure:  shunt placement  Surgery Location: TBD  Surgeon: Dr. Pillai  Surgery Date: TBD  Time of Surgery: TBD  Where patient plans to recover: At home with family  Fax number for surgical facility: Note does not need to be faxed, will be available electronically in Epic.    Type of Anesthesia Anticipated: General    Assessment & Plan     The proposed surgical procedure is considered INTERMEDIATE risk.      ICD-10-CM    1. Pre-op exam  Z01.818    2. NPH (normal pressure hydrocephalus) (H)  G91.2    3. Recurrent falls  R29.6    4. LBBB (left bundle branch block)  I44.7    5. Chronic systolic heart failure (H)  I50.22    6. Gastroesophageal reflux disease, unspecified whether esophagitis present  K21.9 omeprazole (PRILOSEC) 20 MG DR capsule   7. Diarrhea, unspecified type  R19.7        A few ED visits over the last month due to abdominal pain and diarrhea. She was treated for diverticulitis with improved symptoms although diarrhea persisted so she completed recent stool studies which were normal and lipase returned to normal. She still has 1 soft stool per day but no further diarrhea. Continue to monitor and stay well hydrated.    She is cleared for  shunt placement when this is scheduled.    Will restart omeprazole for acid reflux, can try every other day.    She is cleared from a cardiology standpoint for surgery, see cardiology note from 9/8/22.    Medication Instructions:  Hold ibuprofen 1 week prior to surgery    RECOMMENDATION:  APPROVAL GIVEN to proceed with proposed procedure, without further diagnostic evaluation.    Subjective  TRANSFER IN - DIALYSIS    Received patient in dialysis unit  from [facility] (unit) for ordered procedure.    Consent verified for renal replacement therapy. Procedure explained to patient, opportunity for Q&A provided. Call light given.     Patient baseline and vital signs stable.  /74,  P82  , 0L O2 via 0.    Hemodialysis initiated using cvc.  Aspirated and flushed both ports without difficulty. Dressing clean, dry and intact.  Machine settings per MD order.    Heparin 0 unit bolus and 0 units/hr.      Will monitor during treatment.       HPI related to upcoming procedure: She has been experiencing increasing imbalance and urinary incontinence with more frequent falls over the past year with brain MRI findings concerning for normal pressure hydrocephalus. She had a recent large volume lumbar puncture with significant improvement in her symptoms so will be seeing a neurosurgeon next week to discuss  shunt placement which will hopefully be scheduled within the next month.       Preop Questions 8/27/2021   1. Have you ever had a heart attack or stroke? No   2. Have you ever had surgery on your heart or blood vessels, such as a stent placement, a coronary artery bypass, or surgery on an artery in your head, neck, heart, or legs? No   3. Do you have chest pain with activity? No   4. Do you have a history of  heart failure? Asymptomatic left bundle branch block   5. Do you currently have a cold, bronchitis or symptoms of other infection? No   6. Do you have a cough, shortness of breath, or wheezing? No   7. Do you or anyone in your family have previous history of blood clots? No   8. Do you or does anyone in your family have a serious bleeding problem such as prolonged bleeding following surgeries or cuts? No   9. Have you ever had problems with anemia or been told to take iron pills? No   10. Have you had any abnormal blood loss such as black, tarry or bloody stools, or abnormal vaginal bleeding? No   11. Have you ever had a blood transfusion? Yes- after back surgery last year   12. Are you willing to have a blood transfusion if it is medically needed before, during, or after your surgery? Yes   13. Have you or any of your relatives ever had problems with anesthesia? No   14. Do you have sleep apnea, excessive snoring or daytime drowsiness? No   15. Do you have any artifical heart valves or other implanted medical devices like a pacemaker, defibrillator, or continuous glucose monitor? No   16. Do you have artificial joints? No   17. Are you allergic  to latex? No   18. Is there any chance that you may be pregnant? -     Health Care Directive:  Patient does not have a Health Care Directive or Living Will: Patient states has Advance Directive and will bring in a copy to clinic.    Status of Chronic Conditions:  See problem list for active medical problems.  Problems all longstanding and stable, except as noted/documented.  See ROS for pertinent symptoms related to these conditions.    ED/UC Followup:     Facility:  [facility]  Date of visit: 8/31/22, 8/24/22  Reason for visit: Diarrhea, loss of balance/fall  Current Status: diarrhea improved      She is now off lisinopril and on losartan instead. Diarrhea has improved over the past few weeks and now she only has 1 soft stool per day. No further abdominal pain after the antibiotic treatment. She is still falling fairly frequently as she feels her left leg gives out at times.       ED Summary 8/31/22  Assessments & Plan (with Medical Decision Making)    79 year old female who presents for evaluation of diarrhea and abdominal pain. Loose stools have been intermittent for 2 months, but worse over the last week. Stool is dark brown, but no black or bloody stools. No fevers or chills. Epigastric and left sided abdominal pain. Reports 5 loose stools for the last 24 hours. Patient has known diverticulosis. Seen here on 8/24 for this with no leukocytosis, normal hgb. She was treated for Suspect Diverticulitis with Cefdinir and Metronidazole (she has intolerance to augmentin and cipro)  Prior abdominal surgeries: appendectomy, cholecystectomy, hysterectomy, left ascending hemicolectomy due to diverticulitis.     On exam patient is alert and oriented. NAD. Afebrile. Normotensive. No tachycardia. Thick white patch on tongue, no lesions or white patches on buccal mucosa or palate.  TTP to the epigastric, LUQ and LLQ. Abdomen is otherwise soft and nondistended.      No leukocytosis.  Normal hgb.  Normal kidney function. Normal  electrolytes.  Lipase elevated at 1,105  Normal transaminase and normal bilirubin.  Glucose 101.  UA with 8 WBC and trace leuk est. Negative nitrite.     Abd/pelvis CT is negative for acute pathology. Specifically no bowel obstruction or mass, no diverticulitis, no pancreatitis, no bile duct dilation or bile duct stone/obstruction.     Unclear cause for her diarrhea. We will plan to send stool containers home with patient to collect for stool culture and c-diff.  Unclear cause for her elevated Lipase. She does not drink alcohol. She has findings on CT to suggest bile duct stone and her gallbladder is removed.   Patient does have evidence of oral thrush likely from being on 2 different antibiotics this past week. Patient will be treated with Nystatin.     Patient needs close follow-up and I recommend recheck of Lipase in 2 days.  I have placed orders for this and I will message her PCP.     Plan:  Clear liquid diet for the next 24 hours, then bland foods.   --avoid fatty foods (dairy, butter, cheese, fried foods)  Collect stool specimens.  Lipase (lab) needs to be rechecked on Friday.  Call [facility] tomorrow to set up time for recheck of your Lipase. (You should be able to drop off stool specimen then too)  I will message your primary care provider (ITALIA Bang) to let him know about your visit here and needing labs rechecked.  For oral Thrush:   --Nystatin 5 ml swish and spit four times a day for 14 days.     Return to the emergency department for fevers, vomiting, increased pain or any new/worsening symptoms.      ED Summary 8/24/22    Assessments & Plan (with Medical Decision Making)  79-year-old female.  Diarrhea for last 2 months.  Noted examination of localized pain left lower quadrant.  Previous colonoscopies documented the presence of diverticulum throughout the sigmoid colon.  Exam notes nonsurgical abdomen patient is currently afebrile with a temp of 98.1.  WBC = 4.0.  Hemoglobin = 12.4  Plan:  Patient has intolerance to ciprofloxacin and Augmentin.  Discussed with pharmacy.  We use a third-generation cephalosporin (cefdinir) and metronidazole.    Review of Systems  CONSTITUTIONAL: NEGATIVE for fever, chills, change in weight  INTEGUMENTARY/SKIN: NEGATIVE for worrisome rashes, moles or lesions  EYES: NEGATIVE for vision changes or irritation  ENT/MOUTH: NEGATIVE for ear, mouth and throat problems  RESP: NEGATIVE for significant cough or SOB  CV: NEGATIVE for chest pain, palpitations or peripheral edema  GI: +Improving diarrhea. NEGATIVE for nausea, abdominal pain, heartburn, or change in bowel habits  : +Intermittent incontinence. NEGATIVE for frequency, dysuria, or hematuria  MUSCULOSKELETAL: +Low back pain.  NEURO: +Imbalance, headaches. NEGATIVE for weakness, dizziness or paresthesias  ENDOCRINE: NEGATIVE for temperature intolerance, skin/hair changes  HEME: NEGATIVE for bleeding problems  PSYCHIATRIC: NEGATIVE for changes in mood or affect    Patient Active Problem List    Diagnosis Date Noted     Chronic systolic heart failure (H) 09/14/2022     Priority: Medium     LBBB (left bundle branch block) 09/14/2022     Priority: Medium     Dysuria 11/24/2021     Priority: Medium     Basal cell carcinoma of nose 09/08/2021     Priority: Medium     History of malignant neoplasm of skin 09/08/2021     Priority: Medium     Skin changes due to chronic exposure to nonionizing radiation 09/08/2021     Priority: Medium     Renal insufficiency syndrome 09/04/2021     Priority: Medium     Formatting of this note might be different from the original.  Creatinine clearance 49       Lumbar radiculopathy 12/28/2018     Priority: Medium     Scoliosis of lumbar spine, unspecified scoliosis type 12/12/2018     Priority: Medium     Chronic right-sided low back pain with right-sided sciatica 05/30/2018     Priority: Medium     Acquired hypothyroidism 04/28/2017     Priority: Medium     Gastroesophageal reflux disease,  esophagitis presence not specified 04/28/2017     Priority: Medium     IMO Regulatory Load OCT 2020       Premature atrial beats 11/30/2016     Priority: Medium     HTN (hypertension) 07/10/2014     Priority: Medium     Raynaud's disease 06/01/2014     Priority: Medium     Health Care Home 12/30/2013     Priority: Medium     Status:  Closed  Care Coordinator:  Tasha Vasquez, RN    See Letters for [facility] Care Plan             History of diverticulitis - s/p left hemicolectomy 11/30/2013     Priority: Medium     Diarrhea 11/30/2013     Priority: Medium     Chronic constipation 06/18/2013     Priority: Medium     Sacroiliac dysfunction 10/09/2012     Priority: Medium     Advanced directives, counseling/discussion 10/03/2011     Priority: Medium     Advance Directive Problem List Overview:   Name Relationship Phone    Primary Health Care Agent            Alternative Health Care Agent          Discussed advance care planning with patient; information given to patient to review. 10/3/2011          DDD (degenerative disc disease), cervical 08/10/2011     Priority: Medium     Anxiety 10/24/2010     Priority: Medium     Hyperlipidemia 09/29/2010     Priority: Medium     Osteopenia 04/01/2010     Priority: Medium     recheck DEXA in 2 years       Gastritis 03/15/2010     Priority: Medium     Panic disorder without agoraphobia 10/08/2004     Priority: Medium     Tinnitus 09/13/2004     Priority: Medium     Problem list name updated by automated process. Provider to review       Family history of diabetes mellitus 09/09/2002     Priority: Medium     Lichen planus      Priority: Medium      Past Medical History:   Diagnosis Date     Lichen planus     vulvar     Osteopenia 4/2010    recheck DEXA in 2 years     Panic disorder without agoraphobia      Pure hypercholesterolemia     low LDLs     Raynaud's disease 6/2014     Unspecified essential hypertension      Past Surgical History:   Procedure Laterality Date     COLONOSCOPY   10/17/2007    normal, recheck in 10 years     COLONOSCOPY N/A 08/27/2014    Procedure: COLONOSCOPY;  Surgeon: Raffi Montiel MD;  Location: [facility]     COMBINED CYSTOSCOPY, INSERT CATHETER URETER Bilateral 10/13/2014    Procedure: COMBINED CYSTOSCOPY, INSERT CATHETER URETER;  Surgeon: Abdoulaye Odell MD;  Location: [facility]     EYE SURGERY  08/01/2001    right eye muscle repair     HC REMOVAL GALLBLADDER  02/09/2010    lap     HC REMOVAL OF OVARY/TUBE(S)      Salpingo-Oophorectomy, bilateral     HYSTERECTOMY, PAP NO LONGER INDICATED       INJECT EPIDURAL LUMBAR Right 04/10/2017    Procedure: INJECT EPIDURAL LUMBAR;  Surgeon: Raffi Wright MD;  Location: [facility]     INJECT EPIDURAL LUMBAR Right 02/27/2020    Procedure: Right Lumbar 4- Sacral 1 Epidural Steroid Injection;  Surgeon: Antonio Dasilva MD;  Location: [facility]     INJECT EPIDURAL LUMBAR Right 01/29/2021    Procedure: INJECTION, SPINE,  sacral 1 EPIDURAL;  Surgeon: Antonio Dasilva MD;  [facility]: [facility]     INJECT EPIDURAL TRANSFORAMINAL Right 06/09/2016    Procedure: INJECT EPIDURAL TRANSFORAMINAL;  Surgeon: Antonio Dasilva MD;  [facility]     INJECT EPIDURAL TRANSFORAMINAL Right 10/13/2016    Procedure: INJECT EPIDURAL TRANSFORAMINAL;  Surgeon: Antonio Dasilva MD;  [facility]     INJECT EPIDURAL TRANSFORAMINAL Right 06/18/2021    Procedure: Right L4-5 and right L5-S1 Transforaminal Epidural Steroid Injections with fluoroscopic guidance and contrast.;  Surgeon: Antonio Dasilva MD;  Location: [facility]     INJECT JOINT SACROILIAC Right 04/14/2022    Procedure: Fluoroscopically-guided injection of the Bilateral sacroiliac joints with Bilateral pastor Sacroiliac joint ligaments infiltration over the sacrum;  Surgeon: Antonio Dasilva MD;  [facility]: [facility]     LAPAROSCOPIC ASSISTED COLECTOMY LEFT (DESCENDING) N/A 10/13/2014    Procedure: LAPAROSCOPIC ASSISTED COLECTOMY LEFT (DESCENDING);  Surgeon: Raffi Montiel MD;  [facility]: [facility]     LUMBAR SPINE  SURGERY  09/2021    L2-Pelvis fusion with rods and screws     ZZC APPENDECTOMY       ZZC NONSPECIFIC PROCEDURE      Right inferior oblique recession, 14 mm     ZZC VAG HYST,RMV TUBE/OVARY  01/01/1984    cervix removed     Current Outpatient Medications   Medication Sig Dispense Refill     ALPRAZolam (XANAX) 0.25 MG tablet Take 1 tablet (0.25 mg) by mouth nightly as needed for anxiety 30 tablet 2     cholecalciferol 25 MCG (1000 UT) TABS Take 1,000 Units by mouth daily (with dinner)        cyclobenzaprine (FLEXERIL) 5 MG tablet Take 1 tablet (5 mg) by mouth 3 times daily as needed for muscle spasms 60 tablet 2     ibuprofen (ADVIL/MOTRIN) 600 MG tablet TAKE 1 TABLET (600 MG) BY MOUTH EVERY 6 HOURS AS NEEDED FOR MODERATE PAIN 100 tablet 3     levothyroxine (SYNTHROID/LEVOTHROID) 50 MCG tablet TAKE ONE TABLET BY MOUTH once daily 90 tablet 3     losartan (COZAAR) 25 MG tablet Take 1 tablet (25 mg) by mouth daily 90 tablet 3     metoprolol succinate ER (TOPROL XL) 25 MG 24 hr tablet Take 1 tablet (25 mg) by mouth daily 90 tablet 3     omeprazole (PRILOSEC) 20 MG DR capsule TAKE ONE CAPSULE BY MOUTH ONCE DAILY 90 capsule 3     simvastatin (ZOCOR) 20 MG tablet TAKE 1/2 TABLET BY MOUTH EVERY NIGHT (Patient taking differently: Take 10 mg by mouth At Bedtime) 135 tablet 3     vitamin E (TOCOPHEROL) 400 units (180 mg) capsule Take 400 Units by mouth daily (with dinner)        acetaminophen (TYLENOL) 500 MG tablet Take 500-1,000 mg by mouth every 6 hours as needed for mild pain       hydrOXYzine (ATARAX) 10 MG tablet Take 5-10 mg by mouth every 6 hours as needed (Patient not taking: No sig reported)         Allergies   Allergen Reactions     Azithromycin Diarrhea     Ciprofloxacin Other (See Comments)     Cipro, dizziness and abdominal pain     Gabapentin Dizziness and Visual Disturbance     Penicillins      stomach upset, diarrhea     Raspberry Hives     Sulfa Drugs Hives     hives     Augmentin Rash        Social History      Tobacco Use     Smoking status: Never Smoker     Smokeless tobacco: Never Used   Substance Use Topics     Alcohol use: No     History   Drug Use No         Objective     /64   Pulse 87   Temp 97.6  F (36.4  C) (Temporal)   Resp 17   Wt 57.6 kg (127 lb)   LMP  (LMP Unknown)   SpO2 94%   BMI 24.00 kg/m      Physical Exam    GENERAL APPEARANCE: healthy, alert and no distress     EYES: EOMI, PERRL     HENT: ear canals and TM's normal and nose and mouth without ulcers or lesions     NECK: no adenopathy, no asymmetry, masses, or scars and thyroid normal to palpation     RESP: lungs clear to auscultation - no rales, rhonchi or wheezes     CV: regular rate and rhythm, normal S1 S2, no S3 or S4 and no murmur, click or rub     ABDOMEN:  soft, nontender, no HSM or masses and bowel sounds normal     MS: extremities normal- no gross deformities noted, no evidence of inflammation in joints, FROM in all extremities.     SKIN: no suspicious lesions or rashes     NEURO: Normal strength and tone, sensory exam grossly normal, mentation intact and speech normal. Gait is slowed with use of cane.      PSYCH: mentation appears normal. and affect normal/bright     LYMPHATICS: No cervical adenopathy    Recent Labs   Lab Test 08/31/22  2151 08/24/22  1219 06/26/22  2236 04/06/22  1819   HGB 12.7 12.4   < > 13.3    191   < > 236   INR  --   --   --  0.88    142   < > 140   POTASSIUM 3.8 3.7   < > 3.8   CR 0.76 0.68   < > 0.66    < > = values in this interval not displayed.        Diagnostics:  No labs were ordered during this visit.   EKG 6/26/22: Normal sinus rhythm, left bundle branch block, no ST/T wave changes concerning for ischemia. Unchanged from 4/6/22 EKG.    Echocardiogram 9/1/22    Interpretation Summary     The left ventricle is normal in size.  There is normal left ventricular wall thickness.  The visual ejection fraction is 50-55%.  Septal motion is consistent with conduction abnormality.  No  significant valve issues.  Compared to prior study, there is no significant change.      Revised Cardiac Risk Index (RCRI):  The patient has the following serious cardiovascular risks for perioperative complications:   - Congestive Heart Failure (pulmonary edema, PND, s3 warren, CXR with pulmonary congestion, basilar rales) = 1 point     RCRI Interpretation: 1 point: Class II (low risk - 0.9% complication rate)         Signed Electronically by: Chuck Streeter PA-C  Copy of this evaluation report is provided to requesting physician.

## 2024-11-24 VITALS
SYSTOLIC BLOOD PRESSURE: 144 MMHG | BODY MASS INDEX: 43.4 KG/M2 | WEIGHT: 293 LBS | TEMPERATURE: 98 F | HEIGHT: 69 IN | RESPIRATION RATE: 18 BRPM | OXYGEN SATURATION: 95 % | DIASTOLIC BLOOD PRESSURE: 91 MMHG | HEART RATE: 94 BPM

## 2024-11-24 LAB
ABO + RH BLD: NORMAL
BLD PROD TYP BPU: NORMAL
BLOOD BANK BLOOD PRODUCT EXPIRATION DATE: NORMAL
BLOOD BANK DISPENSE STATUS: NORMAL
BLOOD BANK ISBT PRODUCT BLOOD TYPE: 6200
BLOOD BANK PRODUCT CODE: NORMAL
BLOOD BANK UNIT TYPE AND RH: NORMAL
BLOOD GROUP ANTIBODIES SERPL: NORMAL
BPU ID: NORMAL
CROSSMATCH RESULT: NORMAL
SPECIMEN EXP DATE BLD: NORMAL
UNIT DIVISION: 0
UNIT ISSUE DATE/TIME: NORMAL

## 2024-11-24 PROCEDURE — 6370000000 HC RX 637 (ALT 250 FOR IP): Performed by: PHYSICAL MEDICINE & REHABILITATION

## 2024-11-24 PROCEDURE — 97116 GAIT TRAINING THERAPY: CPT

## 2024-11-24 PROCEDURE — 2580000003 HC RX 258: Performed by: PHYSICAL MEDICINE & REHABILITATION

## 2024-11-24 PROCEDURE — 30233N1 TRANSFUSION OF NONAUTOLOGOUS RED BLOOD CELLS INTO PERIPHERAL VEIN, PERCUTANEOUS APPROACH: ICD-10-PCS | Performed by: INTERNAL MEDICINE

## 2024-11-24 PROCEDURE — 97110 THERAPEUTIC EXERCISES: CPT

## 2024-11-24 PROCEDURE — 6370000000 HC RX 637 (ALT 250 FOR IP): Performed by: STUDENT IN AN ORGANIZED HEALTH CARE EDUCATION/TRAINING PROGRAM

## 2024-11-24 PROCEDURE — 1180000000 HC REHAB R&B

## 2024-11-24 RX ADMIN — SEVELAMER CARBONATE 800 MG: 800 TABLET, FILM COATED ORAL at 16:40

## 2024-11-24 RX ADMIN — TRAZODONE HYDROCHLORIDE 50 MG: 50 TABLET ORAL at 20:00

## 2024-11-24 RX ADMIN — APIXABAN 5 MG: 5 TABLET, FILM COATED ORAL at 08:18

## 2024-11-24 RX ADMIN — METOPROLOL TARTRATE 50 MG: 50 TABLET, FILM COATED ORAL at 20:00

## 2024-11-24 RX ADMIN — HYDROCODONE BITARTRATE AND ACETAMINOPHEN 1 TABLET: 5; 325 TABLET ORAL at 20:00

## 2024-11-24 RX ADMIN — SODIUM CHLORIDE, PRESERVATIVE FREE 10 ML: 5 INJECTION INTRAVENOUS at 20:02

## 2024-11-24 RX ADMIN — SERTRALINE 100 MG: 100 TABLET, FILM COATED ORAL at 08:18

## 2024-11-24 RX ADMIN — SEVELAMER CARBONATE 800 MG: 800 TABLET, FILM COATED ORAL at 08:18

## 2024-11-24 RX ADMIN — METOPROLOL TARTRATE 50 MG: 50 TABLET, FILM COATED ORAL at 08:18

## 2024-11-24 RX ADMIN — HYDROCODONE BITARTRATE AND ACETAMINOPHEN 1 TABLET: 5; 325 TABLET ORAL at 08:23

## 2024-11-24 RX ADMIN — SEVELAMER CARBONATE 800 MG: 800 TABLET, FILM COATED ORAL at 11:38

## 2024-11-24 RX ADMIN — AMLODIPINE BESYLATE 5 MG: 5 TABLET ORAL at 11:38

## 2024-11-24 RX ADMIN — SERTRALINE 100 MG: 100 TABLET, FILM COATED ORAL at 20:00

## 2024-11-24 RX ADMIN — FAMOTIDINE 20 MG: 20 TABLET, FILM COATED ORAL at 08:18

## 2024-11-24 RX ADMIN — SODIUM CHLORIDE, PRESERVATIVE FREE 10 ML: 5 INJECTION INTRAVENOUS at 08:25

## 2024-11-24 RX ADMIN — APIXABAN 5 MG: 5 TABLET, FILM COATED ORAL at 20:00

## 2024-11-24 ASSESSMENT — PAIN SCALES - GENERAL
PAINLEVEL_OUTOF10: 0
PAINLEVEL_OUTOF10: 6
PAINLEVEL_OUTOF10: 4

## 2024-11-24 ASSESSMENT — PAIN DESCRIPTION - LOCATION
LOCATION: BACK
LOCATION: GENERALIZED

## 2024-11-24 ASSESSMENT — PAIN DESCRIPTION - ORIENTATION: ORIENTATION: LOWER

## 2024-11-24 ASSESSMENT — PAIN DESCRIPTION - DESCRIPTORS: DESCRIPTORS: ACHING;SORE

## 2024-11-24 NOTE — PLAN OF CARE
Problem: Chronic Conditions and Co-morbidities  Goal: Patient's chronic conditions and co-morbidity symptoms are monitored and maintained or improved  11/23/2024 2137 by Alberta Noe RN  Outcome: Progressing  11/23/2024 1024 by Xochitl Torres RN  Outcome: Progressing  11/23/2024 1023 by Xochitl Torres RN  Outcome: Progressing     Problem: Pain  Goal: Verbalizes/displays adequate comfort level or baseline comfort level  11/23/2024 2137 by Alberta Noe RN  Outcome: Progressing  11/23/2024 1024 by Xochitl Torres RN  Outcome: Progressing  11/23/2024 1023 by Xochitl Torres RN  Outcome: Progressing     Problem: Safety - Adult  Goal: Free from fall injury  11/23/2024 2137 by Alberta Noe RN  Outcome: Progressing  11/23/2024 1024 by Xochitl Torres RN  Outcome: Progressing  11/23/2024 1023 by Xochitl Torres RN  Outcome: Progressing     Problem: Skin/Tissue Integrity  Goal: Absence of new skin breakdown  Description: 1.  Monitor for areas of redness and/or skin breakdown  2.  Assess vascular access sites hourly  3.  Every 4-6 hours minimum:  Change oxygen saturation probe site  4.  Every 4-6 hours:  If on nasal continuous positive airway pressure, respiratory therapy assess nares and determine need for appliance change or resting period.  11/23/2024 2137 by Alberta Noe RN  Outcome: Progressing  11/23/2024 1024 by Xochitl Torres RN  Outcome: Progressing  11/23/2024 1023 by Xochitl Torres RN  Outcome: Progressing     Problem: ABCDS Injury Assessment  Goal: Absence of physical injury  11/23/2024 2137 by Alberta Noe RN  Outcome: Progressing  11/23/2024 1024 by Xochitl Torres RN  Outcome: Progressing  11/23/2024 1023 by Xochitl Torres RN  Outcome: Progressing

## 2024-11-24 NOTE — PROGRESS NOTES
Physical Therapy  PHYSICAL THERAPY DAILY NOTE    PT Individual Minutes  Time In: 0856  Time Out: 0941  Minutes: 45                 Total Treatment Time:  45 Minutes    Pt. Seen for: AM, Gait Training, Patient Education, Therapeutic Exercise, and Transfer Training     Subjective: \"I feel much better today thanks for seeing me\"         Objective:  Restrictions/Precautions: Fall Risk, Skin  Required Braces or Orthoses?: No              Other position/activity restrictions: bariatric air bed for pressure relief         GROSS ASSESSMENT           COGNITION Daily Assessment    Overall Orientation Status: Within Normal Limits  Orientation Level: Oriented X4   Overall Cognitive Status: WNL        BED/MAT MOBILITY Daily Assessment              TRANSFERS Daily Assessment    Sit to Stand: Contact guard assistance  Stand to Sit: Contact guard assistance  Stand Pivot Transfers: Contact guard assistance          GAIT Daily Assessment   Additional 40ft x2 trials FWW CGA Surface: Level tile  Device: Rolling Walker  Assistance: Contact guard assistance  Gait Deviations: Decreased step height  Distance: 30              STEPS/STAIRS Daily Assessment    Stairs?: No     Pt declined today due to fatigue         BALANCE Daily Assessment    Static Sitting: Normal:  Pt. able to maintain balance w/o UE support  Dynamic Sitting: Good - accepts moderate challenge;  can maintain balance while picking object off the floor  Static Standing: Fair:  Pt. requires UE support;  may need occasional min A  Dynamic Standing: Poor - unable to accept challenge or move without LOB        WHEELCHAIR MOBILITY Daily Assessment                          LOWER EXTREMITY EXERCISES   Motomed resistance 2 speed 3-4 x5 min 2 trials. 2-3 min rest in between     Pain level: 2/10  Pain Location:  RLE  Pain Interventions: rest    Vital Signs:  WNL    Education:    Education Given To: Patient  Education Provided: Role of Therapy;Transfer Training;Mobility Training;Fall

## 2024-11-24 NOTE — PROGRESS NOTES
[facility] Progress Note    Follow-Up on: ESRD  Pt seen and examined   ROS:  Gen - no fever, no chills, appetite unchanged  CV - no chest pain  Lung - no shortness of breath, no cough  Abd - + tenderness, no nausea/vomiting, no diarrhea  Ext - + edema    Exam:  Vitals:    11/23/24 2010 11/23/24 2040 11/24/24 0600 11/24/24 0658   BP:    (!) 140/64   Pulse:    89   Resp: 18 18 19   Temp:    97.9 °F (36.6 °C)   TempSrc:    Oral   SpO2:    100%   Weight:   (!) 155.2 kg (342 lb 3.2 oz)    Height:             Intake/Output Summary (Last 24 hours) at 11/24/2024 0932  Last data filed at 11/23/2024 1128  Gross per 24 hour   Intake 1000 ml   Output 3500 ml   Net -2500 ml               Wt Readings from Last 3 Encounters:   11/24/24 (!) 155.2 kg (342 lb 3.2 oz)   11/06/24 (!) 156.2 kg (344 lb 6.4 oz)   10/18/24 (!) 150.6 kg (332 lb)       GEN - in no distress, alert and oriented   CV - regular rate, S1, S2, no Rub   Lung - clear with low lung volumes bilaterally  Abd - soft   Ext - + 2 BLE edema/anasarca  Access: left TCC intact, accessed    Recent Labs     11/23/24  0516   WBC 6.5   HGB 6.8*   HCT 23.6*               Recent Labs     11/23/24  0516      K 4.6   CL 96*   CO2 20   BUN 30*   CREATININE 4.97*   CALCIUM 7.8*           Assessment / Plan:  Principal Problem:    Sepsis (HCC)  Resolved Problems:    * No resolved hospital problems. *    1) ESRD - HD MWF.  Previously on PD. On TTS for rehab schedule.   -sequential dialysis 11/18 with 3L UF net 1/18 and 3L UF net 1/19  - She c/o 4 hrs of dialysis is mentally taxing and wound agree to 3.5 hrs with sequential dialysis as needed in between txs.   -Ordered  dialysis for tomorrow    2) Peritonitis , culture grew Tsukamurella sp and CNS.  PD catheter removed 10/15/24  -Continue vancomycin EOT 12/1/2024 per ID for 6 wks     3) Abdominal pain - evidence of subcutaneous hematoma and cellulitis.   CT ab hematoma with improvement. Free fluid shows  improvement.   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  Will do 1 unit packed RBC transfusion with dialysis today

## 2024-11-25 LAB
ANION GAP SERPL CALC-SCNC: 19 MMOL/L (ref 7–16)
BASOPHILS # BLD: 0.1 K/UL (ref 0–0.2)
BASOPHILS NFR BLD: 1 % (ref 0–2)
BUN SERPL-MCNC: 31 MG/DL (ref 6–23)
CALCIUM SERPL-MCNC: 8.2 MG/DL (ref 8.8–10.2)
CHLORIDE SERPL-SCNC: 96 MMOL/L (ref 98–107)
CO2 SERPL-SCNC: 20 MMOL/L (ref 20–29)
CREAT SERPL-MCNC: 5.25 MG/DL (ref 0.6–1.1)
DIFFERENTIAL METHOD BLD: ABNORMAL
EOSINOPHIL # BLD: 1.2 K/UL (ref 0–0.8)
EOSINOPHIL NFR BLD: 19 % (ref 0.5–7.8)
ERYTHROCYTE [DISTWIDTH] IN BLOOD BY AUTOMATED COUNT: 22.2 % (ref 11.9–14.6)
GLUCOSE SERPL-MCNC: 185 MG/DL (ref 70–99)
HCT VFR BLD AUTO: 27.7 % (ref 35.8–46.3)
HGB BLD-MCNC: 8.3 G/DL (ref 11.7–15.4)
IMM GRANULOCYTES # BLD AUTO: 0.1 K/UL (ref 0–0.5)
IMM GRANULOCYTES NFR BLD AUTO: 2 % (ref 0–5)
LYMPHOCYTES # BLD: 0.6 K/UL (ref 0.5–4.6)
LYMPHOCYTES NFR BLD: 10 % (ref 13–44)
MAGNESIUM SERPL-MCNC: 2.3 MG/DL (ref 1.8–2.4)
MCH RBC QN AUTO: 30.3 PG (ref 26.1–32.9)
MCHC RBC AUTO-ENTMCNC: 30 G/DL (ref 31.4–35)
MCV RBC AUTO: 101.1 FL (ref 82–102)
MONOCYTES # BLD: 0.5 K/UL (ref 0.1–1.3)
MONOCYTES NFR BLD: 8 % (ref 4–12)
NEUTS SEG # BLD: 3.8 K/UL (ref 1.7–8.2)
NEUTS SEG NFR BLD: 61 % (ref 43–78)
NRBC # BLD: 0 K/UL (ref 0–0.2)
PLATELET # BLD AUTO: 249 K/UL (ref 150–450)
PMV BLD AUTO: 9.7 FL (ref 9.4–12.3)
POTASSIUM SERPL-SCNC: 5 MMOL/L (ref 3.5–5.1)
RBC # BLD AUTO: 2.74 M/UL (ref 4.05–5.2)
SODIUM SERPL-SCNC: 135 MMOL/L (ref 136–145)
VANCOMYCIN SERPL-MCNC: 18.8 UG/ML
WBC # BLD AUTO: 6.3 K/UL (ref 4.3–11.1)

## 2024-11-25 PROCEDURE — 6360000002 HC RX W HCPCS: Performed by: STUDENT IN AN ORGANIZED HEALTH CARE EDUCATION/TRAINING PROGRAM

## 2024-11-25 PROCEDURE — 6370000000 HC RX 637 (ALT 250 FOR IP): Performed by: PHYSICAL MEDICINE & REHABILITATION

## 2024-11-25 PROCEDURE — 97530 THERAPEUTIC ACTIVITIES: CPT

## 2024-11-25 PROCEDURE — 99232 SBSQ HOSP IP/OBS MODERATE 35: CPT | Performed by: STUDENT IN AN ORGANIZED HEALTH CARE EDUCATION/TRAINING PROGRAM

## 2024-11-25 PROCEDURE — 90935 HEMODIALYSIS ONE EVALUATION: CPT

## 2024-11-25 PROCEDURE — 36415 COLL VENOUS BLD VENIPUNCTURE: CPT

## 2024-11-25 PROCEDURE — 97535 SELF CARE MNGMENT TRAINING: CPT

## 2024-11-25 PROCEDURE — 85025 COMPLETE CBC W/AUTO DIFF WBC: CPT

## 2024-11-25 PROCEDURE — 97110 THERAPEUTIC EXERCISES: CPT

## 2024-11-25 PROCEDURE — 80048 BASIC METABOLIC PNL TOTAL CA: CPT

## 2024-11-25 PROCEDURE — 1180000000 HC REHAB R&B

## 2024-11-25 PROCEDURE — 83735 ASSAY OF MAGNESIUM: CPT

## 2024-11-25 PROCEDURE — 2580000003 HC RX 258: Performed by: STUDENT IN AN ORGANIZED HEALTH CARE EDUCATION/TRAINING PROGRAM

## 2024-11-25 PROCEDURE — 80202 ASSAY OF VANCOMYCIN: CPT

## 2024-11-25 PROCEDURE — 6370000000 HC RX 637 (ALT 250 FOR IP): Performed by: STUDENT IN AN ORGANIZED HEALTH CARE EDUCATION/TRAINING PROGRAM

## 2024-11-25 PROCEDURE — 2580000003 HC RX 258: Performed by: PHYSICAL MEDICINE & REHABILITATION

## 2024-11-25 PROCEDURE — 6370000000 HC RX 637 (ALT 250 FOR IP): Performed by: NURSE PRACTITIONER

## 2024-11-25 RX ORDER — BUMETANIDE 1 MG/1
2 TABLET ORAL 2 TIMES DAILY
Status: DISCONTINUED | OUTPATIENT
Start: 2024-11-25 | End: 2024-12-03 | Stop reason: HOSPADM

## 2024-11-25 RX ADMIN — SEVELAMER CARBONATE 800 MG: 800 TABLET, FILM COATED ORAL at 11:55

## 2024-11-25 RX ADMIN — VANCOMYCIN HYDROCHLORIDE 500 MG: 500 INJECTION, POWDER, LYOPHILIZED, FOR SOLUTION INTRAVENOUS at 16:23

## 2024-11-25 RX ADMIN — SODIUM CHLORIDE, PRESERVATIVE FREE 10 ML: 5 INJECTION INTRAVENOUS at 21:05

## 2024-11-25 RX ADMIN — BUMETANIDE 2 MG: 1 TABLET ORAL at 11:54

## 2024-11-25 RX ADMIN — SERTRALINE 100 MG: 100 TABLET, FILM COATED ORAL at 20:57

## 2024-11-25 RX ADMIN — APIXABAN 5 MG: 5 TABLET, FILM COATED ORAL at 11:55

## 2024-11-25 RX ADMIN — FAMOTIDINE 20 MG: 20 TABLET, FILM COATED ORAL at 11:55

## 2024-11-25 RX ADMIN — HYDROCODONE BITARTRATE AND ACETAMINOPHEN 1 TABLET: 5; 325 TABLET ORAL at 16:16

## 2024-11-25 RX ADMIN — METOPROLOL TARTRATE 50 MG: 50 TABLET, FILM COATED ORAL at 20:57

## 2024-11-25 RX ADMIN — SERTRALINE 100 MG: 100 TABLET, FILM COATED ORAL at 11:55

## 2024-11-25 RX ADMIN — TRAZODONE HYDROCHLORIDE 50 MG: 50 TABLET ORAL at 20:57

## 2024-11-25 RX ADMIN — SEVELAMER CARBONATE 800 MG: 800 TABLET, FILM COATED ORAL at 16:16

## 2024-11-25 RX ADMIN — HYDROCODONE BITARTRATE AND ACETAMINOPHEN 1 TABLET: 5; 325 TABLET ORAL at 07:13

## 2024-11-25 RX ADMIN — APIXABAN 5 MG: 5 TABLET, FILM COATED ORAL at 20:57

## 2024-11-25 RX ADMIN — BUMETANIDE 2 MG: 1 TABLET ORAL at 16:16

## 2024-11-25 RX ADMIN — AMLODIPINE BESYLATE 5 MG: 5 TABLET ORAL at 11:54

## 2024-11-25 RX ADMIN — METOPROLOL TARTRATE 50 MG: 50 TABLET, FILM COATED ORAL at 11:55

## 2024-11-25 ASSESSMENT — PAIN DESCRIPTION - LOCATION
LOCATION: SACRUM;HIP
LOCATION: SACRUM
LOCATION: BACK;SACRUM

## 2024-11-25 ASSESSMENT — PAIN SCALES - GENERAL
PAINLEVEL_OUTOF10: 6
PAINLEVEL_OUTOF10: 6
PAINLEVEL_OUTOF10: 3
PAINLEVEL_OUTOF10: 3

## 2024-11-25 ASSESSMENT — PAIN DESCRIPTION - DESCRIPTORS
DESCRIPTORS: ACHING
DESCRIPTORS: ACHING;THROBBING
DESCRIPTORS: ACHING;THROBBING

## 2024-11-25 ASSESSMENT — PAIN SCALES - WONG BAKER: WONGBAKER_NUMERICALRESPONSE: NO HURT

## 2024-11-25 ASSESSMENT — PAIN DESCRIPTION - ORIENTATION
ORIENTATION: MID
ORIENTATION: RIGHT;LEFT;MID

## 2024-11-25 NOTE — PROGRESS NOTES
PHYSICAL THERAPY DAILY NOTE    PT Individual Minutes  Time In: 1516  Time Out: 1601  Minutes: 45                 Total Treatment Time:  45 Minutes    Pt. Seen for: PM, Therapeutic Exercise, and Transfer Training     Subjective: Pt. Reports feeling tired this afternoon.  \"I was glad I laid down & took a nap\"         Objective:  Restrictions/Precautions: Fall Risk, Skin  Required Braces or Orthoses?: No              Other position/activity restrictions: bariatric air bed for pressure relief         GROSS ASSESSMENT   Pt. Supine upon arrival.        COGNITION Daily Assessment    Overall Orientation Status: Within Normal Limits   Overall Cognitive Status: WNL        BED/MAT MOBILITY Daily Assessment     Supine to Sit: Minimal assistance        TRANSFERS Daily Assessment   Toilet transfer w/ grab bar min a for balance Sit to Stand: Minimal Assistance  Stand to Sit: Minimal Assistance  Bed to Chair: Minimal assistance          GAIT Daily Assessment     NT       STEPS/STAIRS Daily Assessment     NT         BALANCE Daily Assessment   Standing balance during toileting hygiene w/ B UE support CGA Static Sitting: Good:  Pt. able to maintain balance w/o UE support;  exhibits some postural sway  Dynamic Sitting: Good - accepts moderate challenge;  can maintain balance while picking object off the floor  Static Standing: Fair:  Pt. requires UE support;  may need occasional min A  Dynamic Standing: Fair - accepts minimal challenge;  can maintain balance while turning head/trunk       WHEELCHAIR MOBILITY Daily Assessment     NT              LOWER EXTREMITY EXERCISES   Pt. Performed Motomed @ resistance level 1 x10 minutes w/ 3 rest breaks incorporated     Pain level: no c/o pain    Vital Signs:  BP (!) 162/87   Pulse 88   Temp 98.6 °F (37 °C) (Oral)   Resp 18   Ht 1.753 m (5' 9\")   Wt (!) 155.8 kg (343 lb 8 oz)   SpO2 100%   BMI 50.73 kg/m²      Education:    Education Given To: Patient  Education Provided: Role of  Therapy;Transfer Training;Mobility Training;Fall Prevention Strategies  Education Provided Comments: Reviewed form & technique of LE exercises  Education Method: Demonstration;Verbal  Barriers to Learning: None  Education Outcome: Verbalized understanding;Demonstrated understanding     Interdisciplinary Communication: Collaborated w/ OT regarding pt's progress.     Pt. Left in wheelchair call bell in reach needs in reach         Assessment: Pt. Requiring increased assistance for mobility needs this afternoon secondary to fatigue after HD.  Pt cont. To mobilize below her baseline & benefits from cont. PT to address.         Plan of Care: Continue with POC and progress as tolerated.      Drea Heard, PT  11/25/2024

## 2024-11-25 NOTE — PROGRESS NOTES
[facility] Progress Note    Follow-Up on: ESRD  Pt seen and examined on HD, left  Qb, UF 3500, tolerating dialysis, + LE edema and exertional SOB      ROS:  Gen - no fever, no chills, appetite unchanged  CV - no chest pain  Lung - + shortness of breath, no cough  Abd - + tenderness, no nausea/vomiting, no diarrhea  Ext - + edema    Exam:  Vitals:    11/24/24 2030 11/25/24 0559 11/25/24 0740 11/25/24 0800   BP:   (!) 147/94 123/79   Pulse:   92 93   Resp: 18      Temp:       TempSrc:       SpO2:       Weight:  (!) 155.8 kg (343 lb 8 oz)     Height:           No intake or output data in the 24 hours ending 11/25/24 0833              Wt Readings from Last 3 Encounters:   11/25/24 (!) 155.8 kg (343 lb 8 oz)   11/06/24 (!) 156.2 kg (344 lb 6.4 oz)   10/18/24 (!) 150.6 kg (332 lb)       GEN - in no distress, alert and oriented   CV - regular rate, S1, S2, no Rub   Lung - Coarse lung sounds bilaterally  Abd - soft   Ext - + 2 BLE edema/anasarca  Access: left TCC intact, accessed    Recent Labs     11/23/24  0516 11/25/24  0625   WBC 6.5 6.3   HGB 6.8* 8.3*   HCT 23.6* 27.7*    249            Recent Labs     11/23/24  0516 11/25/24  0625    135*   K 4.6 5.0   CL 96* 96*   CO2 20 20   BUN 30* 31*   CREATININE 4.97* 5.25*   CALCIUM 7.8* 8.2*   MG  --  2.3           Assessment / Plan:  Principal Problem:    Sepsis (HCC)  Resolved Problems:    * No resolved hospital problems. *    1) ESRD - HD MWF.  Previously on PD. On TTS for rehab schedule.   - She c/o 4 hrs of dialysis is mentally taxing and would agree to 3.5 hrs with sequential dialysis as needed in between txs.   - seen on HD, UF as tolerated  - sequential dialysis tomorrow for additional fluids removal   - added bumex 2 mg BID    2) Peritonitis , culture grew Tsukamurella sp and CNS.  PD catheter removed 10/15/24  -Continue vancomycin EOT 12/1/2024 per ID for 6 wks     3) Abdominal pain - evidence of subcutaneous hematoma and cellulitis.    CT ab hematoma with improvement. Free fluid shows improvement.   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

## 2024-11-25 NOTE — DIALYSIS
TRANSFER OUT- DIALYSIS    Hemodialysis treatment completed. PT ran 3.5 hours, without complications.    Patient alert and VS stable  /89  P 70       3.5 Kg removed.      Flushed both ports with 10 mL of NS.  CVC dressing clean, dry, and intact, tego caps intact, curos caps placed.      Meds given: 0.    RBCs given during dialysis: 0    Patient to 902 after dialysis.

## 2024-11-25 NOTE — PLAN OF CARE
Problem: Chronic Conditions and Co-morbidities  Goal: Patient's chronic conditions and co-morbidity symptoms are monitored and maintained or improved  11/25/2024 0806 by Yulissa Escobar RN  Outcome: Progressing  Flowsheets (Taken 11/25/2024 0740)  Care Plan - Patient's Chronic Conditions and Co-Morbidity Symptoms are Monitored and Maintained or Improved: Monitor and assess patient's chronic conditions and comorbid symptoms for stability, deterioration, or improvement  11/24/2024 1918 by Alberta Noe RN  Outcome: Progressing     Problem: Pain  Goal: Verbalizes/displays adequate comfort level or baseline comfort level  11/25/2024 0806 by Yulissa Escobar RN  Outcome: Progressing  11/24/2024 1918 by Alberta Noe RN  Outcome: Progressing     Problem: Safety - Adult  Goal: Free from fall injury  11/25/2024 0806 by Yulissa Escobar RN  Outcome: Progressing  11/24/2024 1918 by Alberta Noe RN  Outcome: Progressing     Problem: Skin/Tissue Integrity  Goal: Absence of new skin breakdown  Description: 1.  Monitor for areas of redness and/or skin breakdown  2.  Assess vascular access sites hourly  3.  Every 4-6 hours minimum:  Change oxygen saturation probe site  4.  Every 4-6 hours:  If on nasal continuous positive airway pressure, respiratory therapy assess nares and determine need for appliance change or resting period.  11/25/2024 0806 by Yulissa Escobar RN  Outcome: Progressing  11/24/2024 1918 by Alberta Noe RN  Outcome: Progressing     Problem: ABCDS Injury Assessment  Goal: Absence of physical injury  11/25/2024 0806 by Yulissa Escobar RN  Outcome: Progressing  11/24/2024 1918 by Alberta Noe RN  Outcome: Progressing

## 2024-11-25 NOTE — DIALYSIS
TRANSFER IN - DIALYSIS    Received patient in dialysis unit  from [facility] (unit) for ordered procedure.    Consent verified for renal replacement therapy. Procedure explained to patient, opportunity for Q&A provided. Call light given.     Patient alert and vital signs stable.  /94,  P92  , room air.    Hemodialysis initiated using left chest catheter.  Aspirated and flushed both ports without difficulty. Dressing clean, dry and intact.  Machine settings per MD order.    Heparin 0 unit bolus and 0 units/hr.      Will monitor during treatment.

## 2024-11-25 NOTE — PROGRESS NOTES
[facility] OCCUPATIONAL THERAPY DAILY NOTE  OT Individual Minutes  Time In: 1139  Time Out: 1148  Minutes: 9        OT Individual Minutes  Time In: 1220  Time out: 1339  Minutes: 79       Therapist assisted pt from 11:39-11:48 this AM following dialysis from bed > w/c for lunch per pt request. Discussed pt's POC and schedule this date for therapy.    Subjective: Pt agreeable to treatment. \"I would love to wash up.\"  Pain: RN notified . Pt noted to have bleeding in vaginal area with wiping during sponge bath/toileting-notified RN of pt status who notified MD.   Interdisciplinary Communication: Collaborated with PT and RN regarding pt status, pt performance, and handoff communication.   Precautions: Falls and dialysis and dialysis port      FUNCTIONAL MOBILITY:       Bed Mobility Jt Trunk A sup>sit, sit> sup A for LE's   Sit to Stand SBA and CGA    Transfer  SBA  Transfer Type:  toilet, w/c  Equipment: RW Pt performed t/f's with close SBA with FWW to CGA with FWW    Ambulation CGA  Equipment: RW Pt performed functional mobility in room ~6-7 feet from w/c>toilet with touching A with FWW     ACTIVITIES OF DAILY LIVING:       Eating Independent MOD I seated   Oral Hygiene Setup or clean-up assistance Pt unable to stand d/t decreased endurance, pt S/U A while seated in w/c for g/h tasks   Shower/Bathe Partial/moderate assistance Pt required MIN A for sponge bath per pt request, will complete shower at next ADL session; pt seated in w/c and standing with FWW with A for B feet washing and posterior cares. Multiple STS's for anterior cares, discussed with RN about blood in vaginal area.   Upper Body  Dressing Setup or clean-up assistance S/U A while seated in w/c to don/doff overhead gown   Toileting Hygiene Partial/moderate assistance MIN A for thoroughness in posterior cares in standing, pt performed anterior cares seated SPV   Toilet Transfer Supervision or touching assistance Close SBA-touching A with FWW and GB with BS

## 2024-11-25 NOTE — PLAN OF CARE
Problem: Chronic Conditions and Co-morbidities  Goal: Patient's chronic conditions and co-morbidity symptoms are monitored and maintained or improved  11/24/2024 1918 by Alberta Noe RN  Outcome: Progressing  11/24/2024 0952 by Xochitl Torres RN  Outcome: Progressing     Problem: Pain  Goal: Verbalizes/displays adequate comfort level or baseline comfort level  11/24/2024 1918 by Alberta Noe RN  Outcome: Progressing  11/24/2024 0952 by Xochitl Torres RN  Outcome: Progressing     Problem: Safety - Adult  Goal: Free from fall injury  11/24/2024 1918 by Alberta Noe RN  Outcome: Progressing  11/24/2024 0952 by Xochitl Torres RN  Outcome: Progressing     Problem: Skin/Tissue Integrity  Goal: Absence of new skin breakdown  Description: 1.  Monitor for areas of redness and/or skin breakdown  2.  Assess vascular access sites hourly  3.  Every 4-6 hours minimum:  Change oxygen saturation probe site  4.  Every 4-6 hours:  If on nasal continuous positive airway pressure, respiratory therapy assess nares and determine need for appliance change or resting period.  11/24/2024 1918 by Alberta Noe RN  Outcome: Progressing  11/24/2024 0952 by Xochitl Torres RN  Outcome: Progressing     Problem: ABCDS Injury Assessment  Goal: Absence of physical injury  11/24/2024 1918 by Alberta Noe RN  Outcome: Progressing  11/24/2024 0952 by Xochitl Torres RN  Outcome: Progressing

## 2024-11-25 NOTE — PROGRESS NOTES
PHYSICAL THERAPY DAILY NOTE    PT Individual Minutes  Time In: 1035  Time Out: 1104  Minutes: 29                 Total Treatment Time:  29 Minutes    Pt. Seen for: AM and Therapeutic Exercise     Subjective: Pt. Reports her  ordered the ramp & that it should be delivered tomorrow.         Objective:  Restrictions/Precautions: Fall Risk, Skin  Required Braces or Orthoses?: No              Other position/activity restrictions: bariatric air bed for pressure relief         GROSS ASSESSMENT   Pt. Supine in bed, seen in HD this AM         COGNITION Daily Assessment    Overall Orientation Status: Within Normal Limits   Overall Cognitive Status: WNL        BED/MAT MOBILITY Daily Assessment    NT       TRANSFERS Daily Assessment     NT         GAIT Daily Assessment     NT             STEPS/STAIRS Daily Assessment     NT             BALANCE Daily Assessment    Static Sitting: NT  Dynamic Sitting: NT  Static Standing: NT  Dynamic Standing: NT       WHEELCHAIR MOBILITY Daily Assessment   NT             LOWER EXTREMITY EXERCISES   Pt. Performed supine LE exercises to increase strength & ROM.    SUPINE EXERCISES Sets Reps Comments   Ankle Pumps 1 15    Isometric hip adduction 1 15    Glut Sets 1 15    Heel Slides 1 15 AAROM   Hip Abduction 1 15    Short Arc Quad 1 15    Hip IR 1 15    Straight Leg Raise 1 15 AAROM         Pain level: no c/o pain    Vital Signs:  BP (!) 141/78   Pulse 92   Temp 98 °F (36.7 °C) (Oral)   Resp 18   Ht 1.753 m (5' 9\")   Wt (!) 155.8 kg (343 lb 8 oz)   SpO2 95%   BMI 50.73 kg/m²      Education:    Education Given To: Patient  Education Provided: Role of Therapy;Plan of Care  Education Provided Comments: Reviewed form & technique of LE exercises  Education Method: Verbal  Barriers to Learning: None  Education Outcome: Verbalized understanding;Demonstrated understanding     Interdisciplinary Communication: pt. Cleared for PT w/ HD RN    Pt. Left in bed needs in reach         Assessment:

## 2024-11-25 NOTE — PROGRESS NOTES
VANCO DAILY FOLLOW UP RENAL INSUFFICIENCY PATIENT   [facility]   Pharmacy Pharmacokinetic Monitoring Service - Vancomycin    Consulting Provider: Dr. Toribio  Indication: PD catheter complicated peritonitis   Target Concentration: Pre-dialysis concentration 15-20 mg/L  EOT: 12/1/24 per ID  Additional Antimicrobials: None    Pertinent Laboratory Values:   Wt Readings from Last 1 Encounters:   11/25/24 (!) 155.8 kg (343 lb 8 oz)     Temp Readings from Last 1 Encounters:   11/24/24 98 °F (36.7 °C) (Oral)     Recent Labs     11/23/24  0516 11/25/24  0625   BUN 30* 31*   CREATININE 4.97* 5.25*   WBC 6.5 6.3     Lab Results   Component Value Date/Time    VANCORANDOM 18.8 11/25/2024 06:25 AM     Assessment:  Date:  Dose/Freq Admin Times Level/Time:   10/24      10/25 HD 1000 mg x1  [address] @0400 = 21.6   10/26      10/27      10/28 HD  1000 mg x1 [address] @0604 = 21.1   10/29      10/30  mg x1 [address] @0651 = 24.7   10/31      11/1  mg x 1 1256    11/2      11/3      11/4  mg x 1 1407    11/5  mg x 1 15[address] @ 0351 = 24.1   11/6  mg x 1 1631 Rd @ 0519 = 21.3   11/7  mg x 1 1237    11/8  mg x 1 [address] Rd @ 0344 = 20.3   11/9  mg x 1 1355    11/10   Rd @ 0608 = 19.5   11/11 11/12  mg x 1 1305    11/13 11/14  mg x 1 1136    11/15      11/16  mg x 1 [address] Rd @ 0549 = 12.6   11/17 11/18 HD Pt refused dose    11/19  mg x 1  750 mg x 1 1207  [address] @ 0622 = 14.4   11/20 11/21  mg x 1 15[address] Rd @ 0524 = 16.7   11/22  mg x 1 1804    11/23  mg X 1 [address] Rd @ 0516 = 20.3   11/24       11/25  mg x 1  Rd @ 0625 = 18.8   11/26 HD        Plan:  Concentration-guided dosing due to intermittent hemodialysis  Vancomycin 500 mg X 1 post HD today.    Repeat vancomycin concentrations will be ordered as clinically appropriate  Pharmacy will continue to monitor patient and adjust therapy as indicated    Thank you for the

## 2024-11-25 NOTE — PROGRESS NOTES
Pt with minimal bright red bleeding from vagina, VSS, denies dizziness or lightheadedness, MD made aware.

## 2024-11-26 ENCOUNTER — CARE COORDINATION (OUTPATIENT)
Dept: CARE COORDINATION | Facility: CLINIC | Age: 54
End: 2024-11-26

## 2024-11-26 PROCEDURE — 90935 HEMODIALYSIS ONE EVALUATION: CPT

## 2024-11-26 PROCEDURE — 6370000000 HC RX 637 (ALT 250 FOR IP): Performed by: PHYSICAL MEDICINE & REHABILITATION

## 2024-11-26 PROCEDURE — 6370000000 HC RX 637 (ALT 250 FOR IP): Performed by: STUDENT IN AN ORGANIZED HEALTH CARE EDUCATION/TRAINING PROGRAM

## 2024-11-26 PROCEDURE — 1180000000 HC REHAB R&B

## 2024-11-26 PROCEDURE — 2580000003 HC RX 258: Performed by: STUDENT IN AN ORGANIZED HEALTH CARE EDUCATION/TRAINING PROGRAM

## 2024-11-26 PROCEDURE — 97530 THERAPEUTIC ACTIVITIES: CPT

## 2024-11-26 PROCEDURE — 6360000002 HC RX W HCPCS: Performed by: STUDENT IN AN ORGANIZED HEALTH CARE EDUCATION/TRAINING PROGRAM

## 2024-11-26 PROCEDURE — 97116 GAIT TRAINING THERAPY: CPT

## 2024-11-26 PROCEDURE — 97535 SELF CARE MNGMENT TRAINING: CPT

## 2024-11-26 PROCEDURE — 2580000003 HC RX 258: Performed by: PHYSICAL MEDICINE & REHABILITATION

## 2024-11-26 PROCEDURE — 97110 THERAPEUTIC EXERCISES: CPT

## 2024-11-26 PROCEDURE — 99233 SBSQ HOSP IP/OBS HIGH 50: CPT | Performed by: STUDENT IN AN ORGANIZED HEALTH CARE EDUCATION/TRAINING PROGRAM

## 2024-11-26 PROCEDURE — 6370000000 HC RX 637 (ALT 250 FOR IP): Performed by: NURSE PRACTITIONER

## 2024-11-26 RX ADMIN — VANCOMYCIN HYDROCHLORIDE 500 MG: 500 INJECTION, POWDER, LYOPHILIZED, FOR SOLUTION INTRAVENOUS at 17:11

## 2024-11-26 RX ADMIN — BUMETANIDE 2 MG: 1 TABLET ORAL at 09:51

## 2024-11-26 RX ADMIN — SEVELAMER CARBONATE 800 MG: 800 TABLET, FILM COATED ORAL at 17:07

## 2024-11-26 RX ADMIN — TRAZODONE HYDROCHLORIDE 50 MG: 50 TABLET ORAL at 21:26

## 2024-11-26 RX ADMIN — SEVELAMER CARBONATE 800 MG: 800 TABLET, FILM COATED ORAL at 12:14

## 2024-11-26 RX ADMIN — AMLODIPINE BESYLATE 5 MG: 5 TABLET ORAL at 12:14

## 2024-11-26 RX ADMIN — SERTRALINE 100 MG: 100 TABLET, FILM COATED ORAL at 20:50

## 2024-11-26 RX ADMIN — HYDROCODONE BITARTRATE AND ACETAMINOPHEN 1 TABLET: 5; 325 TABLET ORAL at 09:54

## 2024-11-26 RX ADMIN — SERTRALINE 100 MG: 100 TABLET, FILM COATED ORAL at 09:51

## 2024-11-26 RX ADMIN — METOPROLOL TARTRATE 50 MG: 50 TABLET, FILM COATED ORAL at 09:52

## 2024-11-26 RX ADMIN — APIXABAN 5 MG: 5 TABLET, FILM COATED ORAL at 09:51

## 2024-11-26 RX ADMIN — METOPROLOL TARTRATE 50 MG: 50 TABLET, FILM COATED ORAL at 20:48

## 2024-11-26 RX ADMIN — BUMETANIDE 2 MG: 1 TABLET ORAL at 17:07

## 2024-11-26 RX ADMIN — HYDROCODONE BITARTRATE AND ACETAMINOPHEN 1 TABLET: 5; 325 TABLET ORAL at 20:49

## 2024-11-26 RX ADMIN — SODIUM CHLORIDE, PRESERVATIVE FREE 10 ML: 5 INJECTION INTRAVENOUS at 21:27

## 2024-11-26 RX ADMIN — APIXABAN 5 MG: 5 TABLET, FILM COATED ORAL at 20:48

## 2024-11-26 RX ADMIN — SODIUM CHLORIDE, PRESERVATIVE FREE 10 ML: 5 INJECTION INTRAVENOUS at 09:56

## 2024-11-26 RX ADMIN — FAMOTIDINE 20 MG: 20 TABLET, FILM COATED ORAL at 09:52

## 2024-11-26 ASSESSMENT — PAIN DESCRIPTION - ORIENTATION
ORIENTATION: RIGHT;LEFT
ORIENTATION: MID

## 2024-11-26 ASSESSMENT — PAIN SCALES - GENERAL
PAINLEVEL_OUTOF10: 6
PAINLEVEL_OUTOF10: 4
PAINLEVEL_OUTOF10: 6
PAINLEVEL_OUTOF10: 0
PAINLEVEL_OUTOF10: 7

## 2024-11-26 ASSESSMENT — PAIN DESCRIPTION - DESCRIPTORS
DESCRIPTORS: ACHING
DESCRIPTORS: ACHING;THROBBING

## 2024-11-26 ASSESSMENT — PAIN DESCRIPTION - LOCATION
LOCATION: SACRUM
LOCATION: HIP

## 2024-11-26 NOTE — PLAN OF CARE
Problem: Chronic Conditions and Co-morbidities  Goal: Patient's chronic conditions and co-morbidity symptoms are monitored and maintained or improved  Outcome: Progressing  Flowsheets (Taken 11/26/2024 0950)  Care Plan - Patient's Chronic Conditions and Co-Morbidity Symptoms are Monitored and Maintained or Improved: Monitor and assess patient's chronic conditions and comorbid symptoms for stability, deterioration, or improvement     Problem: Pain  Goal: Verbalizes/displays adequate comfort level or baseline comfort level  Outcome: Progressing  Flowsheets (Taken 11/26/2024 0950)  Verbalizes/displays adequate comfort level or baseline comfort level: Encourage patient to monitor pain and request assistance     Problem: Safety - Adult  Goal: Free from fall injury  Outcome: Progressing     Problem: Skin/Tissue Integrity  Goal: Absence of new skin breakdown  Description: 1.  Monitor for areas of redness and/or skin breakdown  2.  Assess vascular access sites hourly  3.  Every 4-6 hours minimum:  Change oxygen saturation probe site  4.  Every 4-6 hours:  If on nasal continuous positive airway pressure, respiratory therapy assess nares and determine need for appliance change or resting period.  Outcome: Progressing     Problem: ABCDS Injury Assessment  Goal: Absence of physical injury  Outcome: Progressing

## 2024-11-26 NOTE — PROGRESS NOTES
[facility] OCCUPATIONAL THERAPY DAILY NOTE  OT Individual Minutes  Time In: 1347  Time Out: 1437  Minutes: 50               Subjective: Pt agreeable to treatment. \"I better\" ( in regards to using bathroom, pt able to void urine little amount).  Pain:  Discomfort at backside where wounds are .  Interdisciplinary Communication: Collaborated with RN regarding pt performance.   Precautions: Falls and dialysis/dialysis port    FUNCTIONAL MOBILITY:       Bed Mobility NT    Sit to Stand SBA    Transfer  SBA  Transfer Type:  FWW/GB  Equipment: RW  Close SBA for t/f's with good safety awareness with FWW toilet with bariatric BSC with elevated surface on toilet   Ambulation SBA and CGA  Equipment: RW  Pt ambulated from w/c ~5-6 feet to toilet with FWW with no LOB for simulated home toilet t/f      - Activity Tolerance - Range of Motion - Self-Care - Strengthening    Pt required to use restroom prior to therapy this date, pt performed toileting with ability to perform anterior cares and gown management in standing with GB use. Pt performed toilet t/f with functional mobility ~5-6 feet with FWW with close SBA with no LOB. Toilet t/f with FWW and raised bariatric BSC.     Pt completed BUE therapeutic exercises while sitting to address AROM, strength, coordination, and activity tolerance to promote safe return to maximal independence with I/ADLs. Pt required rest breaks tin between reps  Exercise   [x] Bicep Curls    [x] Chest Press   [x] Forward Rows   Pt completed 3 sets of 15 repetitions utilizing 3 lb. dowel sebastien. Pt required rest break in between reps.     Pt stood 2 bouts of standing for standing tolerance/endurance/balance and strength for wound care with completion of 2-3 minutes with FWW with required rest break in between with SBA.       Education   Benefits of OT, Energy Conservation, Pacing, and Safety Awareness     Assessment: Pt demonstrated good participation in OT treatment. Pt continues to benefit from skilled OT

## 2024-11-26 NOTE — PROGRESS NOTES
VANCO DAILY FOLLOW UP RENAL INSUFFICIENCY PATIENT   [facility]   Pharmacy Pharmacokinetic Monitoring Service - Vancomycin    Consulting Provider: Dr. Toribio  Indication: PD catheter complicated peritonitis   Target Concentration: Pre-dialysis concentration 15-20 mg/L  EOT: 12/1/24 per ID  Additional Antimicrobials: None    Pertinent Laboratory Values:   Wt Readings from Last 1 Encounters:   11/25/24 (!) 155.8 kg (343 lb 8 oz)     Temp Readings from Last 1 Encounters:   11/25/24 98.1 °F (36.7 °C) (Oral)     Recent Labs     11/25/24  0625   BUN 31*   CREATININE 5.25*   WBC 6.3     Lab Results   Component Value Date/Time    VANCORANDOM 18.8 11/25/2024 06:25 AM     Assessment:  Date:  Dose/Freq Admin Times Level/Time:   10/24      10/25 HD 1000 mg x1  [address] @0400 = 21.6   10/26      10/27      10/28 HD  1000 mg x1 15[address] Rd @0604 = 21.1   10/29      10/30  mg x1 17[address] @0651 = 24.7   10/31      11/1  mg x 1 1256    11/2      11/3      11/4  mg x 1 1407    11/5  mg x 1 1515 Rd @ 0351 = 24.1   11/6  mg x 1 1631 Rd @ 0519 = 21.3   11/7  mg x 1 1237    11/8  mg x 1 1747 Rd @ 0344 = 20.3   11/9  mg x 1 1355    11/10   Rd @ 0608 = 19.5   11/11 11/12  mg x 1 1305    11/13 11/14  mg x 1 1136    11/15      11/16  mg x 1 1629 Rd @ 0549 = 12.6   11/17 11/18 HD Pt refused dose    11/19  mg x 1  750 mg x 1 1207  [address] @ 0622 = 14.4   11/20 11/21  mg x 1 1528 Rd @ 0524 = 16.7   11/22  mg x 1 1804    11/23  mg x 1 1826 Rd @ 0516 = 20.3   11/24 11/25  mg x 1 1623 Rd @ 0625 = 18.8   11/26  mg x 1 (16)    11/27 HD        Plan:  Concentration-guided dosing due to intermittent hemodialysis  Vancomycin 500 mg x 1 post HD today and plan to re-dose after next HD tomorrow  Repeat vancomycin concentrations will be ordered as clinically appropriate  Pharmacy will continue to monitor patient and adjust

## 2024-11-26 NOTE — DIALYSIS
TRANSFER IN - DIALYSIS    Received patient in dialysis unit  from [facility] (unit) for ordered procedure.    Consent verified for renal replacement therapy. Procedure explained to patient, opportunity for Q&A provided. Call light given.     Patient alert and vital signs stable. /76   Pulse 98   Temp 98.1 °F (36.7 °C) (Oral)   Resp 17   Ht 1.753 m (5' 9\")   Wt (!) 155.8 kg (343 lb 8 oz)   SpO2 98%   BMI 50.73 kg/m²     Hemodialysis initiated using right cvc.  Aspirated and flushed both ports without difficulty. Dressing clean, dry and intact.  Machine settings per MD order.    Heparin 0 unit bolus and 0 units/hr.      Will monitor during treatment.

## 2024-11-26 NOTE — CARE COORDINATION
Transition of care outreach postponed for 7 days due to patient's discharge to [facility] inpatient rehab facility. Per noted documentation, anticipated discharge 12/02/2024.  Will continue to follow and document as per [facility] workflow guidelines and recommendations.

## 2024-11-26 NOTE — PROGRESS NOTES
[facility] OCCUPATIONAL THERAPY DAILY NOTE  OT Individual Minutes  Time In: 1117  Time Out: 1215  Minutes: 58               Subjective: Pt agreeable to treatment. \"I'm just more tired than I should be\".  Pain: RN notified .  Interdisciplinary Communication: Collaborated with PT and RN regarding pt status, pt performance, and handoff communication.   Precautions: Falls and dialysis and dialysis port       FUNCTIONAL MOBILITY:       Bed Mobility NT    Sit to Stand SBA and CGA Close SBA for t/f's this date   Transfer  SBA and CGA  Transfer Type: SPT  Equipment: Grab Bars and RW Pt performed SPT from w/c>toileting <> w/c <> shower with FWW / GB   Ambulation NT  Equipment: NA      ACTIVITIES OF DAILY LIVING:       Eating Independent MOD I seated   Oral Hygiene Setup or clean-up assistance S/U A for hygiene products seated in w/c this date, limited by endurance for standing   Shower/Bathe Partial/moderate assistance Pt participated in shower this date with tub t/f bench/HHSH/GB/long handled sponge; pt performed shower seated SPV, stood for sergio cares-required MIN A for buttocks thoroughness, pt able to dry and dry B feet with therapist technique. Pt stood for ~1-2 minutes for shower   Upper Body  Dressing Setup or clean-up assistance Pt S/U A to don/doff overhead shirt seated   Toileting Hygiene Partial/moderate assistance Pt performed toileting on bariatric BSC placed over toilet with required MIN A for thoroughness for posterior cares-discussed AE for home.   Toilet Transfer Supervision or touching assistance Close SBA for SPT from w/c<>bariatric BSC placed over toilet with GB   Education Adaptive ADL Techniques, AE/DME Training, Benefits of OT, Energy Conservation, Pacing, Family Training-ongoing with pt's spouse present with no further questions about d/c or concerns, Functional Transfer Training, and Safety Awareness     Pt then stood for wound care management with FWW 2 bouts of standing with completion of ~2 minutes for

## 2024-11-26 NOTE — DIALYSIS
TRANSFER OUT- DIALYSIS    Hemodialysis treatment completed. PT ran 3 hours, without complications.    Patient alert and VS stable  BP (!) 143/93   Pulse 93   Temp 98.1 °F (36.7 °C) (Oral)   Resp 17   Ht 1.753 m (5' 9\")   Wt (!) 155.8 kg (343 lb 8 oz)   SpO2 98%   BMI 50.73 kg/m²        3 Kg removed.      Flushed both ports with 10 mL of NS.  CVC dressing clean, dry, and intact, tego caps intact, curos caps placed.      Meds given: 0.    RBCs given during dialysis: 0    Patient to 902 after dialysis.

## 2024-11-26 NOTE — PROGRESS NOTES
[facility] Progress Note    Follow-Up on: ESRD    Pt seen and examined on HD. Qb 350 UF 3500mL, on UF only treatment for additional volume removal this AM. Feeling okay, no overall complaints. Reports she should be discharging sometime next week      ROS:  Gen - no fever, no chills, appetite unchanged  CV - no chest pain  Lung - + shortness of breath, no cough  Abd - + tenderness, no nausea/vomiting, no diarrhea  Ext - + edema    Exam:  Vitals:    11/25/24 1511 11/25/24 1948 11/26/24 0724 11/26/24 0730   BP: (!) 162/87 114/76 (!) 148/74 (!) 129/91   Pulse: 88 98 95 94   Resp: 18 17     Temp: 98.6 °F (37 °C) 98.1 °F (36.7 °C)     TempSrc: Oral Oral     SpO2: 100% 98%     Weight:       Height:             Intake/Output Summary (Last 24 hours) at 11/26/2024 0805  Last data filed at 11/25/2024 1242  Gross per 24 hour   Intake 722 ml   Output 4001 ml   Net -3279 ml                 Wt Readings from Last 3 Encounters:   11/25/24 (!) 155.8 kg (343 lb 8 oz)   11/06/24 (!) 156.2 kg (344 lb 6.4 oz)   10/18/24 (!) 150.6 kg (332 lb)       GEN - in no distress, alert and oriented   CV - regular rate, S1, S2, no Rub   Lung - Diminished bilaterally, on RA   Abd - soft   Ext - anasarca  Access: left TCC intact, accessed, good flow     Recent Labs     11/25/24  0625   WBC 6.3   HGB 8.3*   HCT 27.7*               Recent Labs     11/25/24  0625   *   K 5.0   CL 96*   CO2 20   BUN 31*   CREATININE 5.25*   CALCIUM 8.2*   MG 2.3           Assessment / Plan:  Principal Problem:    Sepsis (HCC)  Resolved Problems:    * No resolved hospital problems. *    1) ESRD on HD - was on MWF.  Previously on PD. On TTS for rehab schedule.   -pt has been in the hospital for more than 30 days, will need re-admitted into a dialysis unit. Before prolonged hospitalization, [place] [facility] [place]  -UF only treatment today, plan for HD tomorrow. Will need additional treatments until discharge to re-establish dry

## 2024-11-26 NOTE — PATIENT CARE CONFERENCE
with HEP until OP therapy starts   Services at Discharge: OP PT due to large dogs at home making HH impossible   Equipment at Discharge:  has BSC and obtaining TTB  Barriers to the achievement of predicted outcomes: Decreased endurance, Skin care, and Wound care    ---------------------------------------------------------------------------------------------------        Scribed by: LINDSAY NEVES OT on 11/26/2024 at 9:06 AM    I approve the established interdisciplinary plan of care as documented within the medical record of Giselle Rosado. I was present and led the interdisciplinary care conference.    Dr. Alma Traore D.O.

## 2024-11-26 NOTE — PROGRESS NOTES
PHYSICAL THERAPY DAILY NOTE    PT Individual Minutes  Time In: 1521  Time Out: 1641  Minutes: 80                 Total Treatment Time:  80 Minutes    Pt. Seen for: PM, Gait Training, Patient Education, Therapeutic Exercise, Transfer Training, and Other     Subjective: Patient reporting she is tired this PM but wants to try and do therapy today. Reports she had dialysis again this AM and they took off 3 Kgs of fluid. Reports she had two sessions of OT including taking a shower earlier today.          Objective:  Restrictions/Precautions: Fall Risk, Skin  Required Braces or Orthoses?: No              Other position/activity restrictions: bariatric air bed for pressure relief         GROSS ASSESSMENT   Patient resting in w/c at beginning of treatment        COGNITION Daily Assessment    Overall Orientation Status: Within Normal Limits   Overall Cognitive Status: WNL        BED/MAT MOBILITY Daily Assessment     Bridging: Minimal assistance  Rolling to Left: Modified independent  Rolling to Right: Modified independent  Supine to Sit: Minimal assistance  Sit to Supine: Minimal assistance  Scooting: Modified independent  Bed Mobility Comments: Increased time and effort to complete with cues for body mechanics. Using RW as simulated bed rail, head and shoulder wedge with 4 pillows and mat elevated to bed height at home, 22 inches        TRANSFERS Daily Assessment    Sit to Stand: Stand by assistance (with RW)  Stand to Sit: Stand by assistance (with RW)  Stand Pivot Transfers: Stand by assistance (with RW)  Comment: Increased time and effort to complete with cues for body mechanics.          GAIT Daily Assessment    Surface: Level tile  Device: Rolling Walker (gait belt)  Other Apparatus: Wheelchair follow  Assistance: Contact guard assistance  Quality of Gait: slow cont partial step through gait pattern with improved upright posture  Gait Deviations: Decreased step height;Decreased step length;Slow Shirin;Decreased head

## 2024-11-26 NOTE — WOUND CARE
Follow up on Wounds, last seen 11/22. Primary RN concerned for worsening wounds w/ increased drainage/smell. Pt states is turning frequently when in bed, but is also in wheelchair for periods of time during the day.    L buttocks 8x3x0.5cm, pink/red, granular, subcutaneous, slough, eschar, moderate serosanguinous, moderate odor. Decrease in slough/eschar finally revealing more granulation which in turn can cause increase in drainage. Will add Opticell Ag into current regimen, change daily d/t increase in drainage, and reassess early next week.  (11/22)    L hip remains grossly unchanged, purple/maroon demarcating, slough, large serosanguinous, mild odor. Continue current regimen but change daily d/t increase in drainage and reassess early next week.  (11/22)

## 2024-11-26 NOTE — CARE COORDINATION
RN CM in room for bedside rounds, patient lying in bed with spouse present. Patient aware of wait list for [facility] OP PT and would like to continue to be on the wait list there. States she wants to get home and get settled with dialysis again before starting OP PT.  states he will assist with home therapy. He also states he will assist with any needed wound care and they continue to deny any need for HH. RN CM to follow.

## 2024-11-26 NOTE — PROGRESS NOTES
[place] [address]  Tel: [phone]     Physical Medicine and Rehabilitation Progress Note      Giselle Rosado  Admit Date: 11/8/2024  Admit Diagnosis: Sepsis (HCC) [A41.9]    Subjective     Patient seen at bedside this morning.  Team care conference held and attended.  We discussed at home there is a ramp in place.  We discussed vaginal bleeding, will follow-up with OB/GYN.  History of D&C in the past.  We discussed blood thinner more likely than not contributing to ongoing vaginal bleeding.  All questions and concerns were addressed at this time.     Objective:     Current Facility-Administered Medications   Medication Dose Route Frequency    vancomycin (VANCOCIN) 500 mg in sodium chloride 0.9 % 100 mL IVPB (mini-bag)  500 mg IntraVENous Once    bumetanide (BUMEX) tablet 2 mg  2 mg Oral BID    0.9 % sodium chloride infusion   IntraVENous PRN    amLODIPine (NORVASC) tablet 5 mg  5 mg Oral Lunch    saliva substitute (BIOTENE/MOUTH KOTE) liquid   Oral Q6H PRN    ALPRAZolam (XANAX) tablet 0.5 mg  0.5 mg Oral Daily PRN    cloNIDine (CATAPRES) tablet 0.1 mg  0.1 mg Oral Q6H PRN    midodrine (PROAMATINE) tablet 10 mg  10 mg Oral PRN    acetaminophen (TYLENOL) tablet 1,000 mg  1,000 mg Oral Q6H PRN    Or    acetaminophen (TYLENOL) suppository 650 mg  650 mg Rectal Q6H PRN    0.9 % sodium chloride infusion   IntraVENous PRN    aluminum & magnesium hydroxide-simethicone (MAALOX) 200-200-20 MG/5ML suspension 30 mL  30 mL Oral Q6H PRN    apixaban (ELIQUIS) tablet 5 mg  5 mg Oral BID    bisacodyl (DULCOLAX) suppository 10 mg  10 mg Rectal Daily PRN    dicyclomine (BENTYL) capsule 10 mg  10 mg Oral TID PRN    epoetin oralia-epbx (RETACRIT) injection 10,000 Units  10,000 Units SubCUTAneous Weekly    famotidine (PEPCID) tablet 20 mg  20 mg Oral Daily    fluticasone (FLONASE) 50 MCG/ACT nasal spray 2 spray  2 spray Nasal Nightly PRN    metoprolol tartrate

## 2024-11-27 LAB
HCT VFR BLD AUTO: 27.9 % (ref 35.8–46.3)
HGB BLD-MCNC: 8.6 G/DL (ref 11.7–15.4)

## 2024-11-27 PROCEDURE — 6360000002 HC RX W HCPCS: Performed by: STUDENT IN AN ORGANIZED HEALTH CARE EDUCATION/TRAINING PROGRAM

## 2024-11-27 PROCEDURE — 97116 GAIT TRAINING THERAPY: CPT

## 2024-11-27 PROCEDURE — 2580000003 HC RX 258: Performed by: STUDENT IN AN ORGANIZED HEALTH CARE EDUCATION/TRAINING PROGRAM

## 2024-11-27 PROCEDURE — 6370000000 HC RX 637 (ALT 250 FOR IP): Performed by: PHYSICAL MEDICINE & REHABILITATION

## 2024-11-27 PROCEDURE — 97530 THERAPEUTIC ACTIVITIES: CPT

## 2024-11-27 PROCEDURE — 6370000000 HC RX 637 (ALT 250 FOR IP): Performed by: NURSE PRACTITIONER

## 2024-11-27 PROCEDURE — 85018 HEMOGLOBIN: CPT

## 2024-11-27 PROCEDURE — 2580000003 HC RX 258: Performed by: PHYSICAL MEDICINE & REHABILITATION

## 2024-11-27 PROCEDURE — 1180000000 HC REHAB R&B

## 2024-11-27 PROCEDURE — 90935 HEMODIALYSIS ONE EVALUATION: CPT

## 2024-11-27 PROCEDURE — 97110 THERAPEUTIC EXERCISES: CPT

## 2024-11-27 PROCEDURE — 6370000000 HC RX 637 (ALT 250 FOR IP): Performed by: STUDENT IN AN ORGANIZED HEALTH CARE EDUCATION/TRAINING PROGRAM

## 2024-11-27 PROCEDURE — 36415 COLL VENOUS BLD VENIPUNCTURE: CPT

## 2024-11-27 PROCEDURE — 85014 HEMATOCRIT: CPT

## 2024-11-27 RX ADMIN — BUMETANIDE 2 MG: 1 TABLET ORAL at 08:02

## 2024-11-27 RX ADMIN — HYDROCODONE BITARTRATE AND ACETAMINOPHEN 1 TABLET: 5; 325 TABLET ORAL at 19:56

## 2024-11-27 RX ADMIN — VANCOMYCIN HYDROCHLORIDE 750 MG: 750 INJECTION, POWDER, LYOPHILIZED, FOR SOLUTION INTRAVENOUS at 17:21

## 2024-11-27 RX ADMIN — SODIUM CHLORIDE, PRESERVATIVE FREE 10 ML: 5 INJECTION INTRAVENOUS at 21:04

## 2024-11-27 RX ADMIN — HYDROCODONE BITARTRATE AND ACETAMINOPHEN 1 TABLET: 5; 325 TABLET ORAL at 13:47

## 2024-11-27 RX ADMIN — METOPROLOL TARTRATE 50 MG: 50 TABLET, FILM COATED ORAL at 19:55

## 2024-11-27 RX ADMIN — SERTRALINE 100 MG: 100 TABLET, FILM COATED ORAL at 19:55

## 2024-11-27 RX ADMIN — BUMETANIDE 2 MG: 1 TABLET ORAL at 17:15

## 2024-11-27 RX ADMIN — SEVELAMER CARBONATE 800 MG: 800 TABLET, FILM COATED ORAL at 12:23

## 2024-11-27 RX ADMIN — SERTRALINE 100 MG: 100 TABLET, FILM COATED ORAL at 08:02

## 2024-11-27 RX ADMIN — SEVELAMER CARBONATE 800 MG: 800 TABLET, FILM COATED ORAL at 08:02

## 2024-11-27 RX ADMIN — APIXABAN 5 MG: 5 TABLET, FILM COATED ORAL at 08:02

## 2024-11-27 RX ADMIN — TRAZODONE HYDROCHLORIDE 50 MG: 50 TABLET ORAL at 20:59

## 2024-11-27 RX ADMIN — FAMOTIDINE 20 MG: 20 TABLET, FILM COATED ORAL at 08:02

## 2024-11-27 RX ADMIN — SODIUM CHLORIDE, PRESERVATIVE FREE 10 ML: 5 INJECTION INTRAVENOUS at 17:23

## 2024-11-27 RX ADMIN — AMLODIPINE BESYLATE 5 MG: 5 TABLET ORAL at 12:23

## 2024-11-27 RX ADMIN — APIXABAN 5 MG: 5 TABLET, FILM COATED ORAL at 19:55

## 2024-11-27 RX ADMIN — SEVELAMER CARBONATE 800 MG: 800 TABLET, FILM COATED ORAL at 17:15

## 2024-11-27 ASSESSMENT — PAIN DESCRIPTION - ORIENTATION
ORIENTATION: LOWER
ORIENTATION: RIGHT;POSTERIOR

## 2024-11-27 ASSESSMENT — PAIN - FUNCTIONAL ASSESSMENT: PAIN_FUNCTIONAL_ASSESSMENT: PREVENTS OR INTERFERES SOME ACTIVE ACTIVITIES AND ADLS

## 2024-11-27 ASSESSMENT — PAIN SCALES - GENERAL
PAINLEVEL_OUTOF10: 0
PAINLEVEL_OUTOF10: 8
PAINLEVEL_OUTOF10: 7

## 2024-11-27 ASSESSMENT — PAIN DESCRIPTION - DESCRIPTORS
DESCRIPTORS: DISCOMFORT
DESCRIPTORS: DISCOMFORT

## 2024-11-27 ASSESSMENT — PAIN DESCRIPTION - LOCATION
LOCATION: HIP;SACRUM
LOCATION: BACK

## 2024-11-27 ASSESSMENT — PAIN DESCRIPTION - ONSET: ONSET: GRADUAL

## 2024-11-27 ASSESSMENT — PAIN DESCRIPTION - FREQUENCY: FREQUENCY: INTERMITTENT

## 2024-11-27 ASSESSMENT — PAIN DESCRIPTION - PAIN TYPE: TYPE: ACUTE PAIN;CHRONIC PAIN;OTHER (COMMENT)

## 2024-11-27 NOTE — DIALYSIS
TRANSFER OUT- DIALYSIS    Hemodialysis treatment completed. PT ran 3.5 hours, without complications.    Patient alert and VS stable  BP (!) 142/84   Pulse 92   Temp 97.9 °F (36.6 °C) (Oral)   Resp 18   Ht 1.753 m (5' 9\")   Wt (!) 152.9 kg (337 lb 1.6 oz)   SpO2 98%   BMI 49.78 kg/m²        3.5 Kg removed.      Flushed both ports with 10 mL of NS.  CVC dressing clean, dry, and intact, tego caps intact, curos caps placed.      Meds given: 0.    RBCs given during dialysis: 0    Patient to 902 after dialysis.

## 2024-11-27 NOTE — DIALYSIS
TRANSFER IN - DIALYSIS    Received patient in dialysis unit  from [facility] (unit) for ordered procedure.    Consent verified for renal replacement therapy. Procedure explained to patient, opportunity for Q&A provided. Call light given.     Patient alert and vital signs stable.  /76,  P91  , room air.    Hemodialysis initiated using Left chest catheter.  Aspirated and flushed both ports without difficulty. Dressing clean, dry and intact.  Machine settings per MD order.    Heparin 0 unit bolus and 0 units/hr.      Will monitor during treatment.

## 2024-11-27 NOTE — PROGRESS NOTES
PHYSICAL THERAPY DAILY NOTE    PT Individual Minutes  Time In: 0922  Time Out: 0954  Minutes: 32                 Total Treatment Time:  32 Minutes    Pt. Seen for: AM, Patient Education, and Therapeutic Exercise     Subjective: Patient reporting she felt a little weaker this AM during OT ADL. Reports they are taking 4 kgs today          Objective:  Restrictions/Precautions: Fall Risk, Skin  Required Braces or Orthoses?: No            GROSS ASSESSMENT   Patient resting in bed in dialysis. Patient seen for LE therapeutic exercises while in dialysis        COGNITION Daily Assessment    Overall Orientation Status: Within Normal Limits   Overall Cognitive Status: WNL        BED/MAT MOBILITY Daily Assessment     Bed Mobility Comments: NT        TRANSFERS Daily Assessment    Comment: NT          GAIT Daily Assessment    Comments: NT              STEPS/STAIRS Daily Assessment      NT             BALANCE Daily Assessment    NT       WHEELCHAIR MOBILITY Daily Assessment    Wheelchair Size: NT     NT                LOWER EXTREMITY EXERCISES   SUPINE EXERCISES for both LEs Sets Reps Comments   Ankle Pumps 3 10    Glut Sets 3 10    Heel Slides 3 10    Hip Abduction 3 10    Short Arc Quad 3 10    Straight Leg Raise 3 10    Increased time and effort to complete with multiple and frequent rest breaks. Min assist with Cues for correct form to complete         Pain level: 0 to 3 out of 10  Pain Location:  low back, lateral hips, sacral area  Pain Interventions: Medication per order, rest, positioning,relaxation,ROM    Vital Signs:  BP (!) 142/84   Pulse 92   Temp 97.9 °F (36.6 °C) (Oral)   Resp 18   Ht 1.753 m (5' 9\")   Wt (!) 152.9 kg (337 lb 1.6 oz)   SpO2 98%   BMI 49.78 kg/m²       Education:    Education Given To: Patient  Education Provided: Home Exercise Program;Energy Conservation  Education Method: Demonstration;Verbal  Barriers to Learning: None  Education Outcome: Verbalized understanding;Demonstrated

## 2024-11-27 NOTE — PROGRESS NOTES
[facility] OCCUPATIONAL THERAPY DAILY NOTE  OT Individual Minutes  Time In: 1301  Time Out: 1348  Minutes: 47               Subjective: Pt agreeable to treatment.   Pain: RN notified .  Interdisciplinary Communication: Collaborated with PT and RN regarding pt status, pt performance, and handoff communication.   Precautions: Falls and dialysis and port      FUNCTIONAL MOBILITY:       Bed Mobility Jt    Sit to Stand SBA    Transfer  SBA  Transfer Type: SPT  Equipment: RW    Ambulation NT  Equipment: NA       - Activity Tolerance - Balance - Range of Motion - Strengthening   Pt completed reaching, strengthening, coordination, and activity tolerance task while standing for improved UE function and overall endurance for integration into ADL tasks. Pt utilized UE(s) to move rings up rungs of vertical ladder. Pt completed 2 sets of x 12 repetitions with RUE(s). Pt with improving accuracy as task progressed. Pt required rest breaks throughout. Pt utilized weighted clothespin to  each ring to further challenge coordination and incorporate hand strengthening.  Pt required rest break following and performed 2 bouts of standing with SBA and w/c placed behind pt to ensure pt safety. Pt stood for 1:10 minutes and then 1:21 minutes.    Pt completed the following exercises with 7.5  lb lissy to promote UB strength, activity tolerance, and shoulder stabilization for integration into functional tasks:  Exercise Repetitions Comments   [x] Protraction/Retraction 3 sets of 15 reps  Rest breaks required in between   [x]              Vs 3 sets of 15 reps  Rest breaks required in between     Pt participated in w/c push ups while seated in w/c with TB weight with completion of 3 sets of 5 reps for improved BUE strength and endurance for ADL/IADL tasks and functional t/f's.          Education   Benefits of OT, Energy Conservation, Pacing, Rolling Walker Management, and Safety Awareness     Assessment: Pt demonstrated good participation in  OT treatment. Pt continues to benefit from skilled OT services to address remaining deficits and progress toward baseline level of independence and safety. Patient ended session seated in w/c with PT.   Plan: Continue OT POC.     JAYJAY PARISI OT   11/27/2024

## 2024-11-27 NOTE — PROGRESS NOTES
[facility] OCCUPATIONAL THERAPY DAILY NOTE  OT Individual Minutes  Time In: 0705  Time Out: 0745  Minutes: 40               Subjective: Pt agreeable to treatment. \"Can I please not move right now, I just got comfortable and I'm not hurting.\"  Therapist provided alternatives of EOB ADL's/exercise, pressure relief/off loading, and bed mobility practice, etc. with education and encouragement. Despite this, pt refused and request for therx in bed.  Pain: No pain expressed.  Interdisciplinary Communication: Collaborated with PT and RN regarding pt status and pt performance.   Precautions: Falls and dialysis/port    FUNCTIONAL MOBILITY:       Bed Mobility NT    Sit to Stand NT    Transfer  NT  Transfer Type: NA  Equipment: NA    Ambulation NT  Equipment: NA       - Activity Tolerance - Strengthening    Pt completed BUE therapeutic exercises while supine to address AROM, strength, coordination, and activity tolerance to promote safe return to maximal independence with I/ADLs. Pt required rest breaks throughout.   Exercise   [x] Bicep Curls with 5lb weight 3 sets of 10 reps   [x] Chest Press with 4lb graded up weighted dowel 3 sets of 10 reps   [x] Straight Arm Front Raises with 4lb graded up weighted dowel 3 sets of 10 reps   [x] Forward rows with 4lb graded up weighted dowel x20 reps   [x] Backward row with 4lb graded up weighted dowel x20 reps    Shoulder internal/external rotation with 5lb weight 2 sets of 10 reps   [x] Tricep extension with red resistance thera band 3 sets of 10 reps          Education   Benefits of OT and progression of therapy to maximize IND with ADL/IADL tasks.      Assessment: Patient request despite education/encouragement for progression of therapy to complete exercises in bed d/t just getting comfortable and no pain. Pt declined ADL's this AM. Pt demonstrated good participation in OT treatment. Pt continues to benefit from skilled OT services to address remaining deficits and progress toward baseline

## 2024-11-27 NOTE — PROGRESS NOTES
[facility] Progress Note    Follow-Up on: ESRD  Seen and examined in HD. Qb 350, UF 4300.         ROS:  Gen - no fever, no chills, appetite unchanged  CV - no chest pain  Lung - + shortness of breath, no cough  Abd - + tenderness, no nausea/vomiting, no diarrhea  Ext - + edema    Exam:  Vitals:    11/26/24 1630 11/26/24 1937 11/26/24 2048 11/27/24 0753   BP:  (!) 147/59 (!) 147/59 137/83   Pulse:  91 91 86   Resp:  16 18 18   Temp:  98.7 °F (37.1 °C)  97.9 °F (36.6 °C)   TempSrc:  Oral  Oral   SpO2:  98%  98%   Weight: (!) 152.9 kg (337 lb 1.6 oz)      Height:             Intake/Output Summary (Last 24 hours) at 11/27/2024 0852  Last data filed at 11/26/2024 2048  Gross per 24 hour   Intake 740 ml   Output 3501 ml   Net -2761 ml                 Wt Readings from Last 3 Encounters:   11/26/24 (!) 152.9 kg (337 lb 1.6 oz)   11/06/24 (!) 156.2 kg (344 lb 6.4 oz)   10/18/24 (!) 150.6 kg (332 lb)       GEN - in no distress, alert and oriented   CV - regular rate, S1, S2, no Rub   Lung - Diminished bilaterally, on RA   Abd - soft   Ext - anasarca  Access: left TCC intact, accessed, good flow     Recent Labs     11/25/24  0625 11/27/24  0631   WBC 6.3  --    HGB 8.3* 8.6*   HCT 27.7* 27.9*     --             Recent Labs     11/25/24  0625   *   K 5.0   CL 96*   CO2 20   BUN 31*   CREATININE 5.25*   CALCIUM 8.2*   MG 2.3           Assessment / Plan:  Principal Problem:    Sepsis (HCC)  Resolved Problems:    * No resolved hospital problems. *    1) ESRD on HD - was on MWF.  Previously on PD. On TTS for rehab schedule.   -pt has been in the hospital for more than 30 days, will need re-admitted into a dialysis unit. Before prolonged hospitalization, she was dialyzing at [facility] [place]  -HD today. Consider UF session 11/29 with next HD session 11/30.     2) Peritonitis , culture grew Tsukamurella and CoNS.    -s/p PD catheter removed 10/15/24  -Continue vancomycin x 6 weeks, EOT 12/1/2024 per ID

## 2024-11-27 NOTE — PROGRESS NOTES
PHYSICAL THERAPY DAILY NOTE    PT Individual Minutes  Time In: 1349  Time Out: 1441  Minutes: 52                 Total Treatment Time:  52 Minutes    Pt. Seen for: PM, Gait Training, Patient Education, Therapeutic Exercise, Transfer Training, and Other     Subjective: Patient reporting she is exhausted from AM dialysis and from OT treatment. Reports she will do her best with exercise and walking         Objective:  Restrictions/Precautions: Fall Risk, Skin  Required Braces or Orthoses?: No              Other position/activity restrictions: bariatric air bed for pressure relief         GROSS ASSESSMENT   Patient resting in w/c at beginning of treatment        COGNITION Daily Assessment    Overall Orientation Status: Within Normal Limits   Overall Cognitive Status: WNL        BED/MAT MOBILITY Daily Assessment     Bed Mobility Comments: NT        TRANSFERS Daily Assessment   Sit<>Stand x 5 with RW Sit to Stand: Stand by assistance  Stand to Sit: Stand by assistance  Stand Pivot Transfers: Stand by assistance (with RW)  Comment: Increased time and effort demonstrating correct body mechanics          GAIT Daily Assessment    Surface: Level tile  Device: Rolling Walker (gait belt)  Other Apparatus: Wheelchair follow  Assistance: Contact guard assistance  Quality of Gait: slow cont partial step through gait pattern with improved upright posture  Distance: 40 ft x 1  Comments: NT  More Ambulation?: No              STEPS/STAIRS Daily Assessment    Stairs?: No              BALANCE Daily Assessment   Static standing  with RW x 1 min x 5 throughout treatment due to pressure relief in order to control sacral pain Static Sitting: Normal:  Pt. able to maintain balance w/o UE support  Dynamic Sitting: Good - accepts moderate challenge;  can maintain balance while picking object off the floor  Static Standing: Good:  Pt. able to maintain balance w/o UE support;  exhibits some postural sway with RW  Dynamic Standing: Fair - accepts

## 2024-11-28 PROCEDURE — 97110 THERAPEUTIC EXERCISES: CPT

## 2024-11-28 PROCEDURE — 1180000000 HC REHAB R&B

## 2024-11-28 PROCEDURE — 97535 SELF CARE MNGMENT TRAINING: CPT

## 2024-11-28 PROCEDURE — 97116 GAIT TRAINING THERAPY: CPT

## 2024-11-28 PROCEDURE — 2580000003 HC RX 258: Performed by: PHYSICAL MEDICINE & REHABILITATION

## 2024-11-28 PROCEDURE — 6370000000 HC RX 637 (ALT 250 FOR IP): Performed by: PHYSICAL MEDICINE & REHABILITATION

## 2024-11-28 PROCEDURE — 6370000000 HC RX 637 (ALT 250 FOR IP): Performed by: NURSE PRACTITIONER

## 2024-11-28 PROCEDURE — 97530 THERAPEUTIC ACTIVITIES: CPT

## 2024-11-28 PROCEDURE — 99232 SBSQ HOSP IP/OBS MODERATE 35: CPT | Performed by: STUDENT IN AN ORGANIZED HEALTH CARE EDUCATION/TRAINING PROGRAM

## 2024-11-28 RX ADMIN — BUMETANIDE 2 MG: 1 TABLET ORAL at 17:12

## 2024-11-28 RX ADMIN — SEVELAMER CARBONATE 800 MG: 800 TABLET, FILM COATED ORAL at 11:47

## 2024-11-28 RX ADMIN — TRAZODONE HYDROCHLORIDE 50 MG: 50 TABLET ORAL at 20:15

## 2024-11-28 RX ADMIN — HYDROCODONE BITARTRATE AND ACETAMINOPHEN 1 TABLET: 5; 325 TABLET ORAL at 08:11

## 2024-11-28 RX ADMIN — HYDROCODONE BITARTRATE AND ACETAMINOPHEN 1 TABLET: 5; 325 TABLET ORAL at 14:45

## 2024-11-28 RX ADMIN — SODIUM CHLORIDE, PRESERVATIVE FREE 10 ML: 5 INJECTION INTRAVENOUS at 08:09

## 2024-11-28 RX ADMIN — FAMOTIDINE 20 MG: 20 TABLET, FILM COATED ORAL at 08:06

## 2024-11-28 RX ADMIN — METOPROLOL TARTRATE 50 MG: 50 TABLET, FILM COATED ORAL at 20:10

## 2024-11-28 RX ADMIN — APIXABAN 5 MG: 5 TABLET, FILM COATED ORAL at 20:10

## 2024-11-28 RX ADMIN — SODIUM CHLORIDE, PRESERVATIVE FREE 10 ML: 5 INJECTION INTRAVENOUS at 20:10

## 2024-11-28 RX ADMIN — SERTRALINE 100 MG: 100 TABLET, FILM COATED ORAL at 20:10

## 2024-11-28 RX ADMIN — ACETAMINOPHEN 1000 MG: 500 TABLET, FILM COATED ORAL at 13:11

## 2024-11-28 RX ADMIN — APIXABAN 5 MG: 5 TABLET, FILM COATED ORAL at 08:06

## 2024-11-28 RX ADMIN — SEVELAMER CARBONATE 800 MG: 800 TABLET, FILM COATED ORAL at 08:06

## 2024-11-28 RX ADMIN — SEVELAMER CARBONATE 800 MG: 800 TABLET, FILM COATED ORAL at 17:12

## 2024-11-28 RX ADMIN — HYDROCODONE BITARTRATE AND ACETAMINOPHEN 1 TABLET: 5; 325 TABLET ORAL at 20:15

## 2024-11-28 RX ADMIN — METOPROLOL TARTRATE 50 MG: 50 TABLET, FILM COATED ORAL at 08:06

## 2024-11-28 RX ADMIN — SERTRALINE 100 MG: 100 TABLET, FILM COATED ORAL at 08:06

## 2024-11-28 RX ADMIN — BUMETANIDE 2 MG: 1 TABLET ORAL at 08:06

## 2024-11-28 ASSESSMENT — PAIN DESCRIPTION - ORIENTATION
ORIENTATION: RIGHT;LEFT
ORIENTATION: LEFT
ORIENTATION: LEFT;MID
ORIENTATION: LEFT

## 2024-11-28 ASSESSMENT — PAIN DESCRIPTION - LOCATION
LOCATION: HIP;COCCYX
LOCATION: SACRUM;HIP
LOCATION: HIP;COCCYX
LOCATION: HIP;SACRUM

## 2024-11-28 ASSESSMENT — PAIN SCALES - GENERAL
PAINLEVEL_OUTOF10: 8
PAINLEVEL_OUTOF10: 3
PAINLEVEL_OUTOF10: 8
PAINLEVEL_OUTOF10: 8
PAINLEVEL_OUTOF10: 3
PAINLEVEL_OUTOF10: 6
PAINLEVEL_OUTOF10: 3

## 2024-11-28 ASSESSMENT — PAIN DESCRIPTION - DESCRIPTORS
DESCRIPTORS: THROBBING;ACHING;BURNING
DESCRIPTORS: STABBING;BURNING;ACHING
DESCRIPTORS: ACHING

## 2024-11-28 NOTE — PROGRESS NOTES
[facility] OCCUPATIONAL THERAPY DAILY NOTE  OT Individual Minutes  Time In: 0836  Time Out: 0920  Minutes: 44               Subjective: Pt agreeable to treatment. \"Yes, lets try that tub t/f bench out this AM\"  Pain: RN notified . Notified RN of rash on anterior Lower leg RLE.  Interdisciplinary Communication: Collaborated with RN regarding pt status and pt performance.   Precautions: Falls and dialysis and port      FUNCTIONAL MOBILITY:       Bed Mobility NT    Sit to Stand SBA    Transfer  SBA  Transfer Type:  STS's with FWW  Equipment: RW Pt performed ~4-5 STS's during ADL's   Ambulation CGA  Equipment: RW Pt participated in functional mobility for home bathroom ~5-6 feet with FWW, w/c follow with touching A      ACTIVITIES OF DAILY LIVING:       Eating   MOD I seated   Oral Hygiene Supervision or touching assistance close SBA standing sinkside with FWW for g/h ~1:20 seconds with w/c placed behind to ensure pt safety.   Shower/Bathe Partial/moderate assistance Sponge bath per pt request while seated in w/c, MIN A required for washing B feet/has a long handled sponge, A for thoroughness in posterior cares standing with FWW close SBA   Upper Body  Dressing Setup or clean-up assistance S/U A to don/doff overhead gown seated in w/c   Lower Body Dressing  (pt prefers not to wear pants/underwear d/t abdominal discomfort/swelling) Would require with reacher   Education Adaptive ADL Techniques, AE/DME Training, Benefits of OT, Energy Conservation, Pacing, Family Training, Functional Transfer Training, Rolling Walker Management, and Safety Awareness    Pt participated in simulated home shower t/f with tub t/f bench with MIN A required for BLE management. Pt and pt's spouse with no further questions/concerns for d/c home this date.      Assessment: Ongoing family education/training provided. Pt demonstrated good participation in OT treatment. Pt continues to benefit from skilled OT services to address remaining deficits and

## 2024-11-28 NOTE — PLAN OF CARE
Problem: Chronic Conditions and Co-morbidities  Goal: Patient's chronic conditions and co-morbidity symptoms are monitored and maintained or improved  11/28/2024 0128 by Soha Cruz RN  Outcome: Progressing  11/27/2024 1341 by Sang Escobar RN  Outcome: Progressing     Problem: Pain  Goal: Verbalizes/displays adequate comfort level or baseline comfort level  11/28/2024 0128 by Soha Cruz RN  Outcome: Progressing  11/27/2024 1341 by Sang Escobar RN  Outcome: Progressing     Problem: Safety - Adult  Goal: Free from fall injury  11/28/2024 0128 by Soha Cruz RN  Outcome: Progressing  11/27/2024 1341 by Sang Escobar RN  Outcome: Progressing     Problem: Skin/Tissue Integrity  Goal: Absence of new skin breakdown  Description: 1.  Monitor for areas of redness and/or skin breakdown  2.  Assess vascular access sites hourly  3.  Every 4-6 hours minimum:  Change oxygen saturation probe site  4.  Every 4-6 hours:  If on nasal continuous positive airway pressure, respiratory therapy assess nares and determine need for appliance change or resting period.  11/28/2024 0128 by Soha Cruz RN  Outcome: Progressing  11/27/2024 1341 by Sang Escobar RN  Outcome: Progressing     Problem: ABCDS Injury Assessment  Goal: Absence of physical injury  11/27/2024 1341 by Sang Escobar RN  Outcome: Progressing

## 2024-11-28 NOTE — PROGRESS NOTES
[place] [address]  Tel: [phone]     Physical Medicine and Rehabilitation Progress Note      Giselle Rosado  Admit Date: 11/8/2024  Admit Diagnosis: Sepsis (HCC) [A41.9]    Subjective     Patient seen at bedside this morning.   at bedside.  Discussed with patient hemoglobin improved.  Weight improved as well, currently 337 pounds.  We discussed venous stasis right shin.  Discussed expected course of venous stasis changes if patient does not make lifestyle changes.  All questions and concerns were addressed at this time.     Objective:     Current Facility-Administered Medications   Medication Dose Route Frequency    bumetanide (BUMEX) tablet 2 mg  2 mg Oral BID    amLODIPine (NORVASC) tablet 5 mg  5 mg Oral Lunch    saliva substitute (BIOTENE/MOUTH KOTE) liquid   Oral Q6H PRN    ALPRAZolam (XANAX) tablet 0.5 mg  0.5 mg Oral Daily PRN    cloNIDine (CATAPRES) tablet 0.1 mg  0.1 mg Oral Q6H PRN    midodrine (PROAMATINE) tablet 10 mg  10 mg Oral PRN    acetaminophen (TYLENOL) tablet 1,000 mg  1,000 mg Oral Q6H PRN    Or    acetaminophen (TYLENOL) suppository 650 mg  650 mg Rectal Q6H PRN    0.9 % sodium chloride infusion   IntraVENous PRN    aluminum & magnesium hydroxide-simethicone (MAALOX) 200-200-20 MG/5ML suspension 30 mL  30 mL Oral Q6H PRN    apixaban (ELIQUIS) tablet 5 mg  5 mg Oral BID    bisacodyl (DULCOLAX) suppository 10 mg  10 mg Rectal Daily PRN    dicyclomine (BENTYL) capsule 10 mg  10 mg Oral TID PRN    epoetin oralia-epbx (RETACRIT) injection 10,000 Units  10,000 Units SubCUTAneous Weekly    famotidine (PEPCID) tablet 20 mg  20 mg Oral Daily    fluticasone (FLONASE) 50 MCG/ACT nasal spray 2 spray  2 spray Nasal Nightly PRN    metoprolol tartrate (LOPRESSOR) tablet 50 mg  50 mg Oral BID    ondansetron (ZOFRAN-ODT) disintegrating tablet 4 mg  4 mg Oral Q8H PRN    Or    ondansetron (ZOFRAN) injection 4 mg  4 mg IntraVENous Q6H  don/doff over head gown S/U A   Lower Body Dressing Substantial/maximal assistance  With use of reacher could manage pants over hips and thread with reacher  Partial/moderate assistance (Pt prefers no LB dressing d/t abdominal discomfort with the abdominal swelling feeling tight)  Would require with reacher    Donning/ [place] Footwear Dependent   Pt required MAX A for donning/doffing socks-introduced AE of sock aid/reacher/dressing stick and pt demonstrated on RLE-will continue to follow based on pt preference/goals Substantial/maximal assistance  to don/doff socks seated   Toileting Hygiene Supervision or touching assistance  Pt required SPV seated toileting, standing touching A for gown management with FWW Partial/moderate assistance  MIN A required this date for thoroughness-pt able to complete posterior cares on [facility] , pt typically is SPV seated   Toilet Transfer Supervision or touching assistance  Touching A For toilet t/f pivot w/c<>toilet with use of GB Supervision or touching assistance  Close SBA/touching A for SPT from w/c<>[facility] with GB reliance         Labs/Studies:  Recent Results (from the past 72 hour(s))   Hemoglobin and Hematocrit    Collection Time: 11/27/24  6:31 AM   Result Value Ref Range    Hemoglobin 8.6 (L) 11.7 - 15.4 g/dL    Hematocrit 27.9 (L) 35.8 - 46.3 %                 Assessment:   This is a 55 YO with h/o ESRD now on HD (recent hospitalization 10/10-10/22 due to peritonitis while on PD), atrial fibrillation, HTN admitted on 10/23 septic and in Afib with RVR. CT abdomen pelvis + for active bleeding from the L rectus/inferior epigastric arterial territory, increased in size of hematomas measuring 7.9 x 3.4 cm. IR was consulted, s/p thrombin injection. Repeat CT showed improvement of L abdominal wall hematoma and thrombosed pseudoaneurysm. She continues with mobility and self care impairments..     Rehabilitation Plan:  Continue PT for a minimum of 1.5 hours a day, at least 5

## 2024-11-28 NOTE — PROGRESS NOTES
PHYSICAL WEEKLY PROGRESS NOTE      PT Individual Minutes  Time In: 1029  Time Out: 1118  Minutes: 49                     Total Treatment Time: 49 Minutes    Subjective: Patient reporting she feels better today. Reports she is going to have dialysis next Monday then go home Tuesday.  reporting the temporary ramp has been delivered and it will work. Reports patient already has a w/c at home                Objective:     Precautions:      Restrictions/Precautions: Fall Risk, Skin  Required Braces or Orthoses?: No    Other position/activity restrictions: bariatric air bed for pressure relief    Vital Signs:BP (!) 148/81   Pulse 90   Temp 98.4 °F (36.9 °C) (Oral)   Resp 18   Ht 1.753 m (5' 9\")   Wt (!) 158.3 kg (349 lb)   SpO2 97%   BMI 51.54 kg/m²       Pain level: 0 to 4 out of 10  Pain location: ow back , bilateral lateral hip area, sacral area  Pain interventions: Medication per order, rest, positioning,relaxation, sit to stand with RW pressure releif       Patient education:   Education Given To: Patient  Education Provided: Safety;Transfer Training;Energy Conservation;Fall Prevention Strategies;Mobility Training;Precautions  Education Method: Demonstration;Verbal  Barriers to Learning: None  Education Outcome: Verbalized understanding;Demonstrated understanding;Continued education needed     Interdisciplinary Communication: spoke with OT regarding progress towards goals and D/C plans     Cognition:   Overall Orientation Status: Within Normal Limits  Overall Cognitive Status: WNL    Lower Extremity Function:         LLE RLE   ROM Hip decreased 50%, knee 10%, ankle WFL Hip decreased 50%, knee 10%, ankle WFL   Fine Motor Coordination Impaired:    Impaired:      Tone Normal Normal   Sensation Light Touch Intact and Proprioception Intact Light Touch Intact and Proprioception Intact   Strength Hip 2/5 to -3/5, knee 4/5 to 5/5, ankll DF 5/5, PF +2/5 Hip 2/5 to -3/5, knee 4/5 to 5/5, ankll DF 5/5, PF +2/5

## 2024-11-28 NOTE — PROGRESS NOTES
PHYSICAL THERAPY DAILY NOTE    PT Individual Minutes  Time In: 1347  Time Out: 1433  Minutes: 46                 Total Treatment Time:  46 Minutes    Pt. Seen for: PM, Gait Training, Patient Education, Therapeutic Exercise, Transfer Training, and Other     Subjective: Patient reporting she is tired from AM PT and OT and then follow up PM OT. Reports she is ready to exercise and try walking one more time         Objective:  Restrictions/Precautions: Fall Risk, Skin  Required Braces or Orthoses?: No              Other position/activity restrictions: bariatric air bed for pressure relief         GROSS ASSESSMENT   Patient resting in w/c at beginning of treatment        COGNITION Daily Assessment    Overall Orientation Status: Within Normal Limits   Overall Cognitive Status: WNL        BED/MAT MOBILITY Daily Assessment     Bridging: Minimal assistance  Rolling to Left: Modified independent  Rolling to Right: Modified independent  Supine to Sit: Minimal assistance  Sit to Supine: Minimal assistance  Scooting: Modified independent  Bed Mobility Comments: NT this PM        TRANSFERS Daily Assessment   Sit<>Stand x 5 with RW Sit to Stand: Supervision (with RW)  Stand to Sit: Supervision (with RW)  Stand Pivot Transfers: Supervision (with RW)  Car Transfer: Minimal Assistance (SPT with RW)  Comment: Increased time and effort demonstrating correct body mechanics          GAIT Daily Assessment    Surface: Level tile  Device: Rolling Walker  Other Apparatus: Wheelchair follow  Assistance: Stand by assistance  Quality of Gait: slow cont partial step through gait pattern with improved upright posture  Gait Deviations: Decreased step height;Decreased step length;Slow Shirin;Decreased head and trunk rotation  Distance: 20 ft x 1  More Ambulation?: No              STEPS/STAIRS Daily Assessment    Stairs?: No   Curbs: 6\"  Device:  (II bars)  Assistance: Contact guard assistance  Comment: up/down 6 inch step in II bars with CGA and

## 2024-11-28 NOTE — PROGRESS NOTES
KIT DAILY FOLLOW UP RENAL INSUFFICIENCY PATIENT   [facility]   Pharmacy Pharmacokinetic Monitoring Service - Vancomycin    Consulting Provider: Dr. Toribio  Indication: PD catheter complicated peritonitis   Target Concentration: Pre-dialysis concentration 15-20 mg/L  EOT: 12/1/24 per ID  Additional Antimicrobials: None    Pertinent Laboratory Values:   Wt Readings from Last 1 Encounters:   11/26/24 (!) 152.9 kg (337 lb 1.6 oz)     Temp Readings from Last 1 Encounters:   11/27/24 97.7 °F (36.5 °C) (Oral)     No results for input(s): \"BUN\", \"CREATININE\", \"WBC\", \"PROCAL\", \"LACACIDPL\", \"LACTA\", \"LCAD\", \"LACTSEPSIS\" in the last 72 hours.    Invalid input(s): \"PROCAT\", \"PCT\", \"LAC\", \"LACT\", \"LACPOC\"    Lab Results   Component Value Date/Time    KWAKUNDOM 18.8 11/25/2024 06:25 AM     Assessment:  Date:  Dose/Freq Admin Times Level/Time:   10/24      10/25 HD 1000 mg x1  [address] @0400 = 21.6   10/26      10/27      10/28 HD  1000 mg x1 [address] @0604 = 21.1   10/29      10/30  mg x1 [address] @0651 = 24.7   10/31      11/1  mg x 1 1256    11/2      11/3      11/4  mg x 1 1407    11/5  mg x 1 [address] @ 0351 = 24.1   11/6  mg x 1 [address]31 Rd @ 0519 = 21.3   11/7  mg x 1 1237    11/8  mg x 1 [address] Rd @ 0344 = 20.3   11/9  mg x 1 1355    11/10   Rd @ 0608 = 19.5   11/11 11/12  mg x 1 1305    11/13 11/14  mg x 1 1136    11/15      11/16  mg x 1 [address] Rd @ 0549 = 12.6   11/17 11/18 HD Pt refused dose    11/19  mg x 1  750 mg x 1 1207  [address] @ 0622 = 14.4   11/20 11/21  mg x 1 15[address] Rd @ 0524 = 16.7   11/22  mg x 1 1804    11/23  mg x 1 [address] @ 0516 = 20.3   11/24 11/25  mg x 1 16[address] Rd @ 0625 = 18.8   11/26  mg x 1 1711    11/27  mg x 1 1721    11/28 11/29   Rd @ (04)     Plan:  Concentration-guided dosing due to intermittent hemodialysis  No dose needed today. Possible

## 2024-11-28 NOTE — PROGRESS NOTES
[facility] Progress Note    Follow-Up on: ESRD  Seen and examined    ROS:  Gen - no fever, no chills, appetite unchanged  CV - no chest pain  Lung - + shortness of breath, no cough  Abd - + tenderness, no nausea/vomiting, no diarrhea  Ext - + edema    Exam:  Vitals:    11/27/24 1926 11/27/24 2025 11/28/24 0751 11/28/24 0811   BP: (!) 138/91  (!) 150/92    Pulse: 96  85    Resp: 19 18 18 18   Temp: 97.7 °F (36.5 °C)  97.8 °F (36.6 °C)    TempSrc: Oral  Oral    SpO2: 96%  99%    Weight:       Height:             Intake/Output Summary (Last 24 hours) at 11/28/2024 1105  Last data filed at 11/27/2024 2025  Gross per 24 hour   Intake 883.05 ml   Output 4000 ml   Net -3116.95 ml                 Wt Readings from Last 3 Encounters:   11/27/24 (!) 155.4 kg (342 lb 11.2 oz)   11/06/24 (!) 156.2 kg (344 lb 6.4 oz)   10/18/24 (!) 150.6 kg (332 lb)       GEN - in no distress, alert and oriented   CV - regular rate, S1, S2, no Rub   Lung - Diminished bilaterally, on RA   Abd - soft   Ext - anasarca  Access: left TCC intact, accessed, good flow     Recent Labs     11/27/24  0631   HGB 8.6*   HCT 27.9*            No results for input(s): \"NA\", \"K\", \"CL\", \"CO2\", \"BUN\", \"CREATININE\", \"GLU\", \"CALCIUM\", \"MG\", \"PHOS\", \"ALBUMIN\" in the last 72 hours.    Invalid input(s): \"CA\"          Assessment / Plan:  Principal Problem:    Sepsis (HCC)  Resolved Problems:    * No resolved hospital problems. *    1) ESRD on HD - was on MWF.  Previously on PD. On TTS for rehab schedule.   -pt has been in the hospital for more than 30 days, will need re-admitted into a dialysis unit. Before prolonged hospitalization, she was dialyzing at [facility]  -Very edematous, extra UF session 11/29 and will get back on track with HD on 11/30.     2) Peritonitis , culture grew Tsukamurella and CoNS.    -s/p PD catheter removed 10/15/24  -Continue vancomycin x 6 weeks, EOT 12/1/2024 per ID     3) Abdominal pain - evidence of subcutaneous

## 2024-11-28 NOTE — PROGRESS NOTES
[facility] OCCUPATIONAL THERAPY DAILY NOTE           OT Group Minutes  Time In: 1301  Time Out: 1346  Minutes: 45     Subjective: Pt agreeable to treatment.   Pain: RN notified .  Interdisciplinary Communication: Collaborated with PT and RN regarding pt status, pt performance, and handoff communication.   Precautions:  Falls and dialysis and port     FUNCTIONAL MOBILITY:       Bed Mobility NT    Sit to Stand SBA  3 STS's during session   Transfer  NT  Transfer Type: NA  Equipment: NA    Ambulation NT  Equipment: NA       - Activity Tolerance - Range of Motion - Strengthening   Pt completed BUE therapeutic exercises while sitting to address AROM, strength, coordination, and activity tolerance to promote safe return to maximal independence with I/ADLs. Pt required rest breaks between reps.   Exercise   [x] Bicep Curls 3 sets of 10 reps with 5lb weight   [x] Chest Press 3 sets of 10 reps with 4lb weighted dowel    [x] W/c push ups 3 sets of 5 reps with TB weight   [x] Pt stood for 3 bouts of standing for pressure relief at wound, standing tolerance/endurance/strengthening for ADL/IADL tasks     Pt stood with SBA and w/c placed behind pt to ensure pt safety. Pt stood for 1:36, 1:52, and 1:33 with required rest breaks in between. Increased time for task   Pt completed 10 repetitions utilizing 4 lb. dowel sebastien.          Education   Benefits of OT and Energy Conservation, Pacing     Assessment: Pt demonstrated good participation in OT treatment. Pt continues to benefit from skilled OT services to address remaining deficits and progress toward baseline level of independence and safety. Patient ended session seated in w/c with PT.   Plan: Continue OT POC.     JAYJAY PARISI OT   11/28/2024

## 2024-11-28 NOTE — PLAN OF CARE
Problem: Chronic Conditions and Co-morbidities  Goal: Patient's chronic conditions and co-morbidity symptoms are monitored and maintained or improved  11/28/2024 0956 by Soha Lazo RN  Outcome: Progressing  11/28/2024 0128 by Soha Cruz RN  Outcome: Progressing  Flowsheets (Taken 11/27/2024 2005)  Care Plan - Patient's Chronic Conditions and Co-Morbidity Symptoms are Monitored and Maintained or Improved: Monitor and assess patient's chronic conditions and comorbid symptoms for stability, deterioration, or improvement     Problem: Pain  Goal: Verbalizes/displays adequate comfort level or baseline comfort level  11/28/2024 0956 by Soha Lazo RN  Outcome: Progressing  11/28/2024 0128 by Soha Cruz RN  Outcome: Progressing     Problem: Safety - Adult  Goal: Free from fall injury  11/28/2024 0956 by Soha Lazo RN  Outcome: Progressing  11/28/2024 0128 by Soha Cruz RN  Outcome: Progressing  Flowsheets (Taken 11/27/2024 1955)  Free From Fall Injury: Instruct family/caregiver on patient safety     Problem: Skin/Tissue Integrity  Goal: Absence of new skin breakdown  Description: 1.  Monitor for areas of redness and/or skin breakdown  2.  Assess vascular access sites hourly  3.  Every 4-6 hours minimum:  Change oxygen saturation probe site  4.  Every 4-6 hours:  If on nasal continuous positive airway pressure, respiratory therapy assess nares and determine need for appliance change or resting period.  11/28/2024 0956 by Soha Lazo RN  Outcome: Progressing  11/28/2024 0128 by Soha Cruz RN  Outcome: Progressing     Problem: ABCDS Injury Assessment  Goal: Absence of physical injury  Outcome: Progressing

## 2024-11-29 LAB — VANCOMYCIN SERPL-MCNC: 17.5 UG/ML

## 2024-11-29 PROCEDURE — 6370000000 HC RX 637 (ALT 250 FOR IP): Performed by: STUDENT IN AN ORGANIZED HEALTH CARE EDUCATION/TRAINING PROGRAM

## 2024-11-29 PROCEDURE — 99232 SBSQ HOSP IP/OBS MODERATE 35: CPT | Performed by: STUDENT IN AN ORGANIZED HEALTH CARE EDUCATION/TRAINING PROGRAM

## 2024-11-29 PROCEDURE — 6370000000 HC RX 637 (ALT 250 FOR IP): Performed by: NURSE PRACTITIONER

## 2024-11-29 PROCEDURE — 2580000003 HC RX 258: Performed by: STUDENT IN AN ORGANIZED HEALTH CARE EDUCATION/TRAINING PROGRAM

## 2024-11-29 PROCEDURE — 2580000003 HC RX 258: Performed by: PHYSICAL MEDICINE & REHABILITATION

## 2024-11-29 PROCEDURE — 97110 THERAPEUTIC EXERCISES: CPT

## 2024-11-29 PROCEDURE — 6360000002 HC RX W HCPCS: Performed by: STUDENT IN AN ORGANIZED HEALTH CARE EDUCATION/TRAINING PROGRAM

## 2024-11-29 PROCEDURE — 97535 SELF CARE MNGMENT TRAINING: CPT

## 2024-11-29 PROCEDURE — 36415 COLL VENOUS BLD VENIPUNCTURE: CPT

## 2024-11-29 PROCEDURE — 1180000000 HC REHAB R&B

## 2024-11-29 PROCEDURE — 80202 ASSAY OF VANCOMYCIN: CPT

## 2024-11-29 PROCEDURE — 6370000000 HC RX 637 (ALT 250 FOR IP): Performed by: PHYSICAL MEDICINE & REHABILITATION

## 2024-11-29 PROCEDURE — 97530 THERAPEUTIC ACTIVITIES: CPT

## 2024-11-29 PROCEDURE — 97116 GAIT TRAINING THERAPY: CPT

## 2024-11-29 PROCEDURE — 90935 HEMODIALYSIS ONE EVALUATION: CPT

## 2024-11-29 RX ORDER — FLUCONAZOLE 100 MG/1
200 TABLET ORAL ONCE
Status: COMPLETED | OUTPATIENT
Start: 2024-11-29 | End: 2024-11-29

## 2024-11-29 RX ADMIN — AMLODIPINE BESYLATE 5 MG: 5 TABLET ORAL at 15:06

## 2024-11-29 RX ADMIN — FAMOTIDINE 20 MG: 20 TABLET, FILM COATED ORAL at 08:43

## 2024-11-29 RX ADMIN — FLUCONAZOLE 200 MG: 100 TABLET ORAL at 10:01

## 2024-11-29 RX ADMIN — SERTRALINE 100 MG: 100 TABLET, FILM COATED ORAL at 08:43

## 2024-11-29 RX ADMIN — APIXABAN 5 MG: 5 TABLET, FILM COATED ORAL at 08:43

## 2024-11-29 RX ADMIN — VANCOMYCIN HYDROCHLORIDE 750 MG: 750 INJECTION, POWDER, LYOPHILIZED, FOR SOLUTION INTRAVENOUS at 17:19

## 2024-11-29 RX ADMIN — BUMETANIDE 2 MG: 1 TABLET ORAL at 17:01

## 2024-11-29 RX ADMIN — TRAZODONE HYDROCHLORIDE 50 MG: 50 TABLET ORAL at 20:51

## 2024-11-29 RX ADMIN — SEVELAMER CARBONATE 800 MG: 800 TABLET, FILM COATED ORAL at 17:01

## 2024-11-29 RX ADMIN — SODIUM CHLORIDE, PRESERVATIVE FREE 10 ML: 5 INJECTION INTRAVENOUS at 08:44

## 2024-11-29 RX ADMIN — APIXABAN 5 MG: 5 TABLET, FILM COATED ORAL at 20:51

## 2024-11-29 RX ADMIN — HYDROCODONE BITARTRATE AND ACETAMINOPHEN 1 TABLET: 5; 325 TABLET ORAL at 15:06

## 2024-11-29 RX ADMIN — HYDROCODONE BITARTRATE AND ACETAMINOPHEN 1 TABLET: 5; 325 TABLET ORAL at 20:50

## 2024-11-29 RX ADMIN — SERTRALINE 100 MG: 100 TABLET, FILM COATED ORAL at 20:51

## 2024-11-29 RX ADMIN — SODIUM CHLORIDE, PRESERVATIVE FREE 10 ML: 5 INJECTION INTRAVENOUS at 20:53

## 2024-11-29 RX ADMIN — SEVELAMER CARBONATE 800 MG: 800 TABLET, FILM COATED ORAL at 08:43

## 2024-11-29 RX ADMIN — METOPROLOL TARTRATE 50 MG: 50 TABLET, FILM COATED ORAL at 20:51

## 2024-11-29 RX ADMIN — HYDROCODONE BITARTRATE AND ACETAMINOPHEN 1 TABLET: 5; 325 TABLET ORAL at 08:43

## 2024-11-29 ASSESSMENT — PAIN SCALES - GENERAL
PAINLEVEL_OUTOF10: 5
PAINLEVEL_OUTOF10: 8
PAINLEVEL_OUTOF10: 6
PAINLEVEL_OUTOF10: 2
PAINLEVEL_OUTOF10: 2
PAINLEVEL_OUTOF10: 9

## 2024-11-29 ASSESSMENT — PAIN DESCRIPTION - LOCATION
LOCATION: GENERALIZED
LOCATION: HIP;BUTTOCKS
LOCATION: BUTTOCKS;HIP

## 2024-11-29 ASSESSMENT — PAIN DESCRIPTION - DESCRIPTORS
DESCRIPTORS: ACHING;BURNING
DESCRIPTORS: ACHING
DESCRIPTORS: ACHING

## 2024-11-29 ASSESSMENT — PAIN DESCRIPTION - ORIENTATION
ORIENTATION: LEFT;POSTERIOR
ORIENTATION: POSTERIOR;LEFT

## 2024-11-29 NOTE — PROGRESS NOTES
[facility] Progress Note    Follow-Up on: ESRD  Seen and examined    ROS:  Gen - no fever, no chills, appetite unchanged  CV - no chest pain  Lung - + shortness of breath, no cough  Abd - + tenderness, no nausea/vomiting, no diarrhea  Ext - + edema    Exam:  Vitals:    11/28/24 1609 11/28/24 2000 11/29/24 0705 11/29/24 0842   BP: (!) 148/81 (!) 140/91 (!) 174/99    Pulse: 90 (!) 105 93    Resp: 18 18 17 16   Temp: 98.4 °F (36.9 °C) 98.4 °F (36.9 °C) 97.7 °F (36.5 °C)    TempSrc: Oral Axillary Oral    SpO2: 97% 98% 97%    Weight:       Height:           No intake or output data in the 24 hours ending 11/29/24 0943                Wt Readings from Last 3 Encounters:   11/28/24 (!) 158.3 kg (349 lb)   11/06/24 (!) 156.2 kg (344 lb 6.4 oz)   10/18/24 (!) 150.6 kg (332 lb)       GEN - in no distress, alert and oriented   CV - regular rate, S1, S2, no Rub   Lung - Diminished bilaterally, on RA   Abd - soft   Ext - anasarca  Access: left TCC intact, accessed, good flow     Recent Labs     11/27/24  0631   HGB 8.6*   HCT 27.9*            No results for input(s): \"NA\", \"K\", \"CL\", \"CO2\", \"BUN\", \"CREATININE\", \"GLU\", \"CALCIUM\", \"MG\", \"PHOS\", \"ALBUMIN\" in the last 72 hours.    Invalid input(s): \"CA\"          Assessment / Plan:  Principal Problem:    Sepsis (HCC)  Resolved Problems:    * No resolved hospital problems. *    1) ESRD on HD - was on MWF.  Previously on PD. On TTS for rehab schedule.   -pt has been in the hospital for more than 30 days, will need re-admitted into a dialysis unit. Before prolonged hospitalization, she was dialy[place] [facility]  -Very edematous, extra UF session 11/29 (later today) and will get back on track with HD on 11/30.     2) Peritonitis , culture grew Tsukamurella and CoNS.    -s/p PD catheter removed 10/15/24  -Continue vancomycin x 6 weeks, EOT 12/1/2024 per ID     3) Abdominal pain - evidence of subcutaneous hematoma and cellulitis, possible calciphylaxis     4) S/p

## 2024-11-29 NOTE — PROGRESS NOTES
impairments..     Rehabilitation Plan:  Continue PT for a minimum of 1.5 hours a day, at least 5 out of 7 days per week to address bed mobility, transfers, ambulation, strengthening, balance, and endurance.   Continue OT for a minimum of 1.5 hours a day, at least 5 out of 7 days per week to address ADL (feeding, grooming, bathing, UE and LE dressing, toileting); instrumental ADLs; cognitive function; safety; energy conservations; community reintegration; and adaptive equipment as needed.     Continue 24-hour skilled rehabilitation nursing for bowel and bladder management, skin care for decubitus ulcer prevention, pain management and ongoing medication administration     Plan / Recommendations / Medical Decision Making:   Labs Monday    Below are the active medical comorbidities / hospital conditions which will affect rehab course with plan for mitigation. Continue daily physician / PA medical management:    //Vaginal candidiasis  -Will administer fluconazole 200 mg once  -Repeat 12/2 if symptoms have not resolved  -Monitor for resolution        //Right lower extremity venous stasis  -This is not cellulitis.  No need for antibiotics.  -Secondary to PVD  -Discussed expected pathophysiological course of venous stasis changes if she does not make significant lifestyle modifications.  Can trial compression garments.  Patient and  aware.  All questions addressed      //Vaginal bleeding  -Patient reports history of D&C previously.  She has been menopausal for greater than 3 years.  -Recommend follow-up with OB/GYN within 2 to 4 weeks after discharge from rehab  -Apixaban more likely than not contributing to bleeding.  Possibility of recurrence of fibroids  -Scant bleeding noted this morning  -Repeat hemoglobin tomorrow      Anemia - Hgb - 8.7 > 8.7 on 11/8, low, yet stable; on epoetin SQ weekly  -11/18 CBC today with HD / fluid extraction  -11/19 Hgb 7.4  -Hemoglobin 6.8 on 11/23.  Status post 1 unit

## 2024-11-29 NOTE — PROGRESS NOTES
OT attempted to see patient for AM session however off the floor due to dialysis. Will plan to attempt to see pt when she returns to the floor.    Chloé Adkins, ARNALDO, OTR/L  11/29/24

## 2024-11-29 NOTE — DIALYSIS
TRANSFER IN - DIALYSIS    Received patient in dialysis unit  from [facility] (unit) for ordered procedure.    Consent verified for renal replacement therapy. Procedure explained to patient, opportunity for Q&A provided. Call light given.     Patient baseline and vital signs stable.  /77,  P98  , pt on room air    Hemodialysis initiated using cvc.  Aspirated and flushed both ports without difficulty. Dressing clean, dry and intact.  Machine settings per MD order.    Heparin 0 unit bolus and 0 units/hr.      Will monitor during treatment.

## 2024-11-29 NOTE — PROGRESS NOTES
[facility] Progress Note    Follow-Up on: ESRD  Seen and examined    ROS:  Gen - no fever, no chills, appetite unchanged  CV - no chest pain  Lung - + shortness of breath, no cough  Abd - + tenderness, no nausea/vomiting, no diarrhea  Ext - + edema    Exam:  Vitals:    11/28/24 1609 11/28/24 2000 11/29/24 0705 11/29/24 0842   BP: (!) 148/81 (!) 140/91 (!) 174/99    Pulse: 90 (!) 105 93    Resp: 18 18 17 16   Temp: 98.4 °F (36.9 °C) 98.4 °F (36.9 °C) 97.7 °F (36.5 °C)    TempSrc: Oral Axillary Oral    SpO2: 97% 98% 97%    Weight:       Height:           No intake or output data in the 24 hours ending 11/29/24 0937                Wt Readings from Last 3 Encounters:   11/28/24 (!) 158.3 kg (349 lb)   11/06/24 (!) 156.2 kg (344 lb 6.4 oz)   10/18/24 (!) 150.6 kg (332 lb)       GEN - in no distress, alert and oriented   CV - regular rate, S1, S2, no Rub   Lung - Diminished bilaterally, on RA   Abd - soft   Ext - anasarca  Access: left TCC intact, accessed, good flow     Recent Labs     11/27/24  0631   HGB 8.6*   HCT 27.9*            No results for input(s): \"NA\", \"K\", \"CL\", \"CO2\", \"BUN\", \"CREATININE\", \"GLU\", \"CALCIUM\", \"MG\", \"PHOS\", \"ALBUMIN\" in the last 72 hours.    Invalid input(s): \"CA\"          Assessment / Plan:  Principal Problem:    Sepsis (HCC)  Resolved Problems:    * No resolved hospital problems. *    1) ESRD on HD - was on MWF.  Previously on PD. On TTS for rehab schedule.   -pt has been in the hospital for more than 30 days, will need re-admitted into a dialysis unit. Before prolonged hospitalization, she was dialyzing at [facility]  -HD tomorrow for additional UF    2) Peritonitis , culture grew Tsukamurella and CoNS.    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

## 2024-11-29 NOTE — PROGRESS NOTES
KIT DAILY FOLLOW UP RENAL INSUFFICIENCY PATIENT   [facility]   Pharmacy Pharmacokinetic Monitoring Service - Vancomycin    Consulting Provider: Dr. Toribio  Indication: PD catheter complicated peritonitis   Target Concentration: Pre-dialysis concentration 15-20 mg/L  EOT: 12/1/24 per ID  Additional Antimicrobials: None    Pertinent Laboratory Values:   Wt Readings from Last 1 Encounters:   11/28/24 (!) 158.3 kg (349 lb)     Temp Readings from Last 1 Encounters:   11/29/24 97.7 °F (36.5 °C) (Oral)     No results for input(s): \"BUN\", \"CREATININE\", \"WBC\", \"PROCAL\", \"LACACIDPL\", \"LACTA\", \"LCAD\", \"LACTSEPSIS\" in the last 72 hours.    Invalid input(s): \"PROCAT\", \"PCT\", \"LAC\", \"LACT\", \"LACPOC\"    Lab Results   Component Value Date/Time    KWAKUNDOM 17.5 11/29/2024 08:55 AM     Assessment:  Date:  Dose/Freq Admin Times Level/Time:   10/24      10/25 HD 1000 mg x1  13[address] Rd @0400 = 21.6   10/26      10/27      10/28 HD  1000 mg x1 1546 Rd @0604 = 21.1   10/29      10/30  mg x1 1755 Rd @0651 = 24.7   10/31      11/1  mg x 1 1256    11/2      11/3      11/4  mg x 1 1407    11/5  mg x 1 1515 Rd @ 0351 = 24.1   11/6  mg x 1 1631 Rd @ 0519 = 21.3   11/7  mg x 1 1237    11/8  mg x 1 1747 Rd @ 0344 = 20.3   11/9  mg x 1 1355    11/10   Rd @ 0608 = 19.5   11/11 11/12  mg x 1 1305    11/13 11/14  mg x 1 1136    11/15      11/16  mg x 1 1629 Rd @ 0549 = 12.6   11/17 11/18 HD Pt refused dose    11/19  mg x 1  750 mg x 1 1207  2[address] Rd @ 0622 = 14.4   11/20 11/21  mg x 1 1528 Rd @ 0524 = 16.7   11/22  mg x 1 1804    11/23  mg x 1 1826 Rd @ 0516 = 20.3   11/24 11/25  mg x 1 1623 Rd @ 0625 = 18.8   11/26  mg x 1 1711    11/27  mg x 1 1721    11/28 11/29  mg x 1 (1600) Rd @ 0855 = 17.5     Plan:  Concentration-guided dosing due to intermittent hemodialysis  Will order vancomycin 750

## 2024-11-29 NOTE — DIALYSIS
TRANSFER OUT- DIALYSIS    Hemodialysis treatment completed. PT ran 4 hours, without complications.    Patient baseline and VS stable  /105  P 70       4.2 Kg removed.      Flushed both ports with 10 mL of NS.  CVC dressing clean, dry, and intact, tego caps intact, curos caps placed.      Meds given: 0.    RBCs given during dialysis: 0    Patient to room after dialysis.

## 2024-11-29 NOTE — PROGRESS NOTES
Nutrition Assessment  Assessment Type: Reassess  Reason for visit:  Length of Stay  Malnutrition Screening Tool Score: 0    Nutrition Intervention:   Food and/or Nutrient Delivery:   Meals and Snacks:  Diet: Continue current order  Medical Food Supplements:   Medical food supplement therapy:  Continue Nepro with Carbsteady (renal oral supplement) 420 calories, 19 grams protein per 8 ounce serving     Malnutrition Assessment:  Malnutrition Status: At risk for malnutrition (on HD, variable appetite and intake, prolonged admission)  Nutrition Focused Physical Exam: Unremarkable     Nutrition Assessment:  Food/Nutrition Related History: per RD assessment during inpatient admission 10/19: \"Patient reports that at baseline her appetite is extremely good at baseline. She eats 1 good meal/day as she wakes up later in the day. After she wakes up she'll have a snack of cottage cheese or a sandwich, and then have a good dinner at night. Patient watches her K/Phos intake, and has been working on it with the RD at dialysis. Reports an acute decline in her appetite upon her first admit in the beginning of October. Reports going an entire week without eating. States she didn't even want her favorite foods.\"     Do You Have Any Cultural, Congregation, or Ethnic Food Preferences?: No   Weight History: IM office visit 10/24/23 330#  Nutrition Background:       PMH significant for SBP, ESRD on HD, Afib w/ RVR, GERD, HTN, IBS, DM 2, Gout, Anemia, Anxiety. She presents with chest pain and SOB. Is admitted with sepsis associated hypotension.   10/23: CT A/P -  Active extravasation from the left rectus/inferior epigastric arterial  territory. Embolization could be considered. The subcutaneous hematoma has  slightly increased in size since previous evaluation now measuring 7.9 x 3.4 cm  in size. Subcutaneous air is also present from the site of PD catheter removal.  Infectious etiologies would be within differential  consideration.  Intraperitoneal dialysate fluid versus ascites. Findings discussed with Dr. Becka Alarcon immediately after exam was evaluated.  2. Hepatic contour abnormality may be related to hepatic cirrhosis.  Transfer to [facility] 11/8.  Wounds to left buttock, left hip, right hip - healing per WC note 11/22.  Nutrition Monitoring/Evaluation:  Patient is seen after HD in her room,  present. States that her appetite has come back and she's finally eating well again. Reports assistance from  with meal preferences. Reports eating % of meals on average. If she doesn't happen to like a meal, she'll drink her Nepro shake instead. No complaints for me today. Denies n/v/c/d. Will continue to follow.      Current Nutrition Therapies:  ADULT DIET; Regular; Low Sodium (2 gm); Low Potassium (Less than 3000 mg/day); 1200 ml  ADULT ORAL NUTRITION SUPPLEMENT; Breakfast, Dinner; Renal Oral Supplement    Current Intake:   Average Meal Intake: % Average Supplements Intake: % (when she doesn't like meals)      Anthropometric Measures:  Height: 175.3 cm (5' 9\")  Current Body Wt: 159.5 kg (351 lb 10.1 oz) (11/29), Weight source: Standing scale  BMI: 51.9, Obese Class 3 (BMI 40.0 or greater)  Admission Body Weight: 159.8 kg (352 lb 4.7 oz) (11/9 standing scale)  Ideal Body Weight (Kg) (Calculated): 66 kg (145 lbs), 237.3 %  BMI Category Obese Class 3 (BMI 40.0 or greater)  Comparative Standards:  Energy (kcal/day): 5265-3005 (25-30 kcal/kg) (Kcal/kg Weight used: 66 kg Ideal  Protein (g/day): 79-86 (1.2-1.3 g/kg) Weight Used: (Ideal) 66 kg  Fluid (ml/day): 1200 ml fluid restriction ( )    Nutrition Diagnosis:   Inadequate oral intake related to  (variable appetite) as evidenced by  (variable, but improving oral intakes throughout admit)    Nutrition Goal(s):   Previous Goal Met: Goal(s) Achieved  Active Goal: Maintain adequate nutrition status  Type of Goal: New goal    Discharge Planning:

## 2024-11-29 NOTE — PROGRESS NOTES
PHYSICAL THERAPY DAILY NOTE    PT Individual Minutes  Time In: 1609  Time Out: 1655  Minutes: 46                 Total Treatment Time:  46 Minutes    Pt. Seen for: PM, Gait Training, Patient Education, Therapeutic Exercise, Transfer Training, and Other     Subjective: Patient reporting she is tired from dialysis but wants to try and exercise this PM. Reports she will try to walk but does not think she can go very far         Objective:  Restrictions/Precautions: Fall Risk, Skin  Required Braces or Orthoses?: No              Other position/activity restrictions: bariatric air bed for pressure relief         GROSS ASSESSMENT   Patient resting in bed at beginning of treatment        COGNITION Daily Assessment    Overall Orientation Status: Within Normal Limits   Overall Cognitive Status: WNL        BED/MAT MOBILITY Daily Assessment     Bridging: Minimal assistance  Rolling to Left: Modified independent  Rolling to Right: Modified independent  Supine to Sit: Minimal assistance  Sit to Supine: Minimal assistance  Scooting: Modified independent  Bed Mobility Comments: Increased time and effort to complete using bed rail and HOB elevation at 20 degrees. Transitioning out of air bed.        TRANSFERS Daily Assessment    Sit to Stand: Supervision (with RW)  Stand to Sit: Supervision (with RW)  Stand Pivot Transfers: Supervision (with RW)  Comment: Increased time and effort demonstrating correct body mechanics          GAIT Daily Assessment    Surface: Level tile  Device: Rolling Walker (gait belt)  Other Apparatus: Wheelchair follow  Assistance: Stand by assistance  Quality of Gait: slow cont partial step through gait pattern with improved upright posture  Gait Deviations: Decreased step height;Decreased step length;Slow Shirin;Decreased head and trunk rotation  Distance: 50 ft x 1  40 ft x 1 with 5 min seated rest interval  Comments: Distance limited due to fatigue  More Ambulation?: No              STEPS/STAIRS Daily

## 2024-11-29 NOTE — PROGRESS NOTES
PHYSICAL THERAPY DAILY NOTE    PT Individual Minutes  Time In: 0926  Time Out: 1011  Minutes: 45                 Total Treatment Time:  45 Minutes    Pt. Seen for: AM, Gait Training, Patient Education, Transfer Training, and Other     Subjective: Patient reporting she had to get back in the bed because her tail bone was hurting. Reports she does not think she can sit very long in the wheelchair. Reports sit to stand from w/c helps decrease the pain         Objective:  Restrictions/Precautions: Fall Risk, Skin  Required Braces or Orthoses?: No              Other position/activity restrictions: bariatric air bed for pressure relief         GROSS ASSESSMENT   Patient resting in bed at beginning of treatment        COGNITION Daily Assessment    Overall Orientation Status: Within Normal Limits   Overall Cognitive Status: WNL        BED/MAT MOBILITY Daily Assessment     Bridging: Minimal assistance  Rolling to Left: Modified independent  Rolling to Right: Modified independent  Supine to Sit: Minimal assistance  Sit to Supine: Minimal assistance  Scooting: Modified independent  Bed Mobility Comments: Increased time and effort to complete using bed rail and HOB elevation at 20 degrees. Transitioning out of air bed.        TRANSFERS Daily Assessment   After gait training noted below, sit<>stand x 5 from w/c for pressure relief Sit to Stand: Supervision (with RW)  Stand to Sit: Supervision (with RW)  Stand Pivot Transfers: Supervision (with RW)  Comment: Increased time and effort demonstrating correct body mechanics          GAIT Daily Assessment    Surface: Level tile  Device: Rolling Walker (gait belt)  Other Apparatus: Wheelchair follow  Assistance: Stand by assistance  Quality of Gait: slow cont partial step through gait pattern with improved upright posture  Gait Deviations: Decreased step height;Decreased step length;Slow Shirin;Decreased head and trunk rotation  Distance: 40 ft x 6 with 4 to 5 min rest break between

## 2024-11-29 NOTE — PLAN OF CARE
Problem: Chronic Conditions and Co-morbidities  Goal: Patient's chronic conditions and co-morbidity symptoms are monitored and maintained or improved  11/28/2024 2041 by Alberta Driscoll RN  Outcome: Progressing  11/28/2024 0956 by Soha Lazo RN  Outcome: Progressing     Problem: Pain  Goal: Verbalizes/displays adequate comfort level or baseline comfort level  11/28/2024 2041 by Alberta Driscoll RN  Outcome: Progressing  11/28/2024 0956 by Soha Lazo RN  Outcome: Progressing     Problem: Safety - Adult  Goal: Free from fall injury  11/28/2024 2041 by Alberta Driscoll RN  Outcome: Progressing  11/28/2024 0956 by Soha Lazo RN  Outcome: Progressing     Problem: Skin/Tissue Integrity  Goal: Absence of new skin breakdown  Description: 1.  Monitor for areas of redness and/or skin breakdown  2.  Assess vascular access sites hourly  3.  Every 4-6 hours minimum:  Change oxygen saturation probe site  4.  Every 4-6 hours:  If on nasal continuous positive airway pressure, respiratory therapy assess nares and determine need for appliance change or resting period.  11/28/2024 2041 by Alberta Driscoll RN  Outcome: Progressing  11/28/2024 0956 by Soha Lazo RN  Outcome: Progressing     Problem: ABCDS Injury Assessment  Goal: Absence of physical injury  11/28/2024 2041 by Alberta Driscoll RN  Outcome: Progressing  11/28/2024 0956 by Soha Lazo RN  Outcome: Progressing

## 2024-11-30 PROCEDURE — 97110 THERAPEUTIC EXERCISES: CPT

## 2024-11-30 PROCEDURE — 6370000000 HC RX 637 (ALT 250 FOR IP): Performed by: NURSE PRACTITIONER

## 2024-11-30 PROCEDURE — 2580000003 HC RX 258: Performed by: STUDENT IN AN ORGANIZED HEALTH CARE EDUCATION/TRAINING PROGRAM

## 2024-11-30 PROCEDURE — 6360000002 HC RX W HCPCS: Performed by: STUDENT IN AN ORGANIZED HEALTH CARE EDUCATION/TRAINING PROGRAM

## 2024-11-30 PROCEDURE — 6360000002 HC RX W HCPCS: Performed by: PHYSICAL MEDICINE & REHABILITATION

## 2024-11-30 PROCEDURE — 90935 HEMODIALYSIS ONE EVALUATION: CPT

## 2024-11-30 PROCEDURE — 2580000003 HC RX 258: Performed by: PHYSICAL MEDICINE & REHABILITATION

## 2024-11-30 PROCEDURE — 6370000000 HC RX 637 (ALT 250 FOR IP): Performed by: PHYSICAL MEDICINE & REHABILITATION

## 2024-11-30 PROCEDURE — 1180000000 HC REHAB R&B

## 2024-11-30 PROCEDURE — 6370000000 HC RX 637 (ALT 250 FOR IP): Performed by: STUDENT IN AN ORGANIZED HEALTH CARE EDUCATION/TRAINING PROGRAM

## 2024-11-30 RX ADMIN — HYDROCODONE BITARTRATE AND ACETAMINOPHEN 1 TABLET: 5; 325 TABLET ORAL at 13:35

## 2024-11-30 RX ADMIN — TRAZODONE HYDROCHLORIDE 50 MG: 50 TABLET ORAL at 21:39

## 2024-11-30 RX ADMIN — APIXABAN 5 MG: 5 TABLET, FILM COATED ORAL at 12:43

## 2024-11-30 RX ADMIN — BUMETANIDE 2 MG: 1 TABLET ORAL at 16:43

## 2024-11-30 RX ADMIN — FAMOTIDINE 20 MG: 20 TABLET, FILM COATED ORAL at 12:44

## 2024-11-30 RX ADMIN — APIXABAN 5 MG: 5 TABLET, FILM COATED ORAL at 21:39

## 2024-11-30 RX ADMIN — HYDROCODONE BITARTRATE AND ACETAMINOPHEN 1 TABLET: 5; 325 TABLET ORAL at 19:42

## 2024-11-30 RX ADMIN — ACETAMINOPHEN 1000 MG: 500 TABLET, FILM COATED ORAL at 15:04

## 2024-11-30 RX ADMIN — AMLODIPINE BESYLATE 5 MG: 5 TABLET ORAL at 12:44

## 2024-11-30 RX ADMIN — SODIUM CHLORIDE, PRESERVATIVE FREE 10 ML: 5 INJECTION INTRAVENOUS at 21:41

## 2024-11-30 RX ADMIN — SEVELAMER CARBONATE 800 MG: 800 TABLET, FILM COATED ORAL at 12:43

## 2024-11-30 RX ADMIN — SERTRALINE 100 MG: 100 TABLET, FILM COATED ORAL at 12:44

## 2024-11-30 RX ADMIN — METOPROLOL TARTRATE 50 MG: 50 TABLET, FILM COATED ORAL at 21:39

## 2024-11-30 RX ADMIN — SERTRALINE 100 MG: 100 TABLET, FILM COATED ORAL at 21:39

## 2024-11-30 RX ADMIN — SEVELAMER CARBONATE 800 MG: 800 TABLET, FILM COATED ORAL at 16:43

## 2024-11-30 RX ADMIN — HYDROCODONE BITARTRATE AND ACETAMINOPHEN 1 TABLET: 5; 325 TABLET ORAL at 07:43

## 2024-11-30 RX ADMIN — EPOETIN ALFA-EPBX 10000 UNITS: 10000 INJECTION, SOLUTION INTRAVENOUS; SUBCUTANEOUS at 16:48

## 2024-11-30 RX ADMIN — METOPROLOL TARTRATE 50 MG: 50 TABLET, FILM COATED ORAL at 12:43

## 2024-11-30 RX ADMIN — VANCOMYCIN HYDROCHLORIDE 750 MG: 750 INJECTION, POWDER, LYOPHILIZED, FOR SOLUTION INTRAVENOUS at 16:46

## 2024-11-30 RX ADMIN — SODIUM CHLORIDE, PRESERVATIVE FREE 10 ML: 5 INJECTION INTRAVENOUS at 13:09

## 2024-11-30 RX ADMIN — BUMETANIDE 2 MG: 1 TABLET ORAL at 12:42

## 2024-11-30 ASSESSMENT — PAIN DESCRIPTION - LOCATION
LOCATION: BUTTOCKS
LOCATION: COCCYX
LOCATION: BUTTOCKS

## 2024-11-30 ASSESSMENT — PAIN DESCRIPTION - ONSET: ONSET: ON-GOING

## 2024-11-30 ASSESSMENT — PAIN DESCRIPTION - DESCRIPTORS
DESCRIPTORS: ACHING
DESCRIPTORS: ACHING;BURNING
DESCRIPTORS: ACHING;DULL

## 2024-11-30 ASSESSMENT — PAIN DESCRIPTION - PAIN TYPE: TYPE: ACUTE PAIN

## 2024-11-30 ASSESSMENT — PAIN SCALES - GENERAL
PAINLEVEL_OUTOF10: 7
PAINLEVEL_OUTOF10: 2
PAINLEVEL_OUTOF10: 7

## 2024-11-30 ASSESSMENT — PAIN DESCRIPTION - ORIENTATION
ORIENTATION: POSTERIOR
ORIENTATION: LEFT;RIGHT

## 2024-11-30 ASSESSMENT — PAIN DESCRIPTION - FREQUENCY: FREQUENCY: CONTINUOUS

## 2024-11-30 NOTE — PROGRESS NOTES
Attempted to see patient prior to dialysis, patient decline stating she would rather participate in therapy after dialysis.     Chloé Adkins, OTD, OTR/L  11/30/24

## 2024-11-30 NOTE — PROGRESS NOTES
KIT DAILY FOLLOW UP RENAL INSUFFICIENCY PATIENT   [facility]   Pharmacy Pharmacokinetic Monitoring Service - Vancomycin    Consulting Provider: Dr. Toribio  Indication: PD catheter complicated peritonitis   Target Concentration: Pre-dialysis concentration 15-20 mg/L  EOT: 12/1/24 per ID  Additional Antimicrobials: None    Pertinent Laboratory Values:   Wt Readings from Last 1 Encounters:   11/29/24 (!) 159.5 kg (351 lb 9.6 oz)     Temp Readings from Last 1 Encounters:   11/30/24 97.3 °F (36.3 °C) (Oral)     No results for input(s): \"BUN\", \"CREATININE\", \"WBC\", \"PROCAL\", \"LACACIDPL\", \"LACTA\", \"LCAD\", \"LACTSEPSIS\" in the last 72 hours.    Invalid input(s): \"PROCAT\", \"PCT\", \"LAC\", \"LACT\", \"LACPOC\"    Lab Results   Component Value Date/Time    KWAKUNDOM 17.5 11/29/2024 08:55 AM     Assessment:  Date:  Dose/Freq Admin Times Level/Time:   10/24      10/25 HD 1000 mg x1  1351 Rd @0400 = 21.6   10/26      10/27      10/28 HD  1000 mg x1 1546 Rd @0604 = 21.1   10/29      10/30  mg x1 1755 Rd @0651 = 24.7   10/31      11/1  mg x 1 1256    11/2      11/3      11/4  mg x 1 1407    11/5  mg x 1 1515 Rd @ 0351 = 24.1   11/6  mg x 1 1631 Rd @ 0519 = 21.3   11/7  mg x 1 1237    11/8  mg x 1 1747 Rd @ 0344 = 20.3   11/9  mg x 1 1355    11/10   Rd @ 0608 = 19.5   11/11 11/12  mg x 1 1305    11/13 11/14  mg x 1 1136    11/15      11/16  mg x 1 1629 Rd @ 0549 = 12.6   11/17 11/18 HD Pt refused dose    11/19  mg x 1  750 mg x 1 1207  2104 Rd @ 0622 = 14.4   11/20 11/21  mg x 1 1528 Rd @ 0524 = 16.7   11/22  mg x 1 1804    11/23  mg x 1 1826 Rd @ 0516 = 20.3   11/24 11/25  mg x 1 1623 Rd @ 0625 = 18.8   11/26  mg x 1 1711    11/27  mg x 1 1721    11/28 11/29  mg x 1 1719 Rd @ 0855 = 17.5   11/30  mg x 1 (16)    12/1 EOT        Plan:  Concentration-guided dosing due to

## 2024-11-30 NOTE — DIALYSIS
Patient is ordered and scheduled for 4 hours of dialysis, she routinely request for it to be reduced to 3:30. States it \"makes me too tired for therapy\". Routinely educated patent that at her clinic she will need to run longer times to pull fluid off. Here in the hospital we have been running multiple extra treatments to remove extra fluid.

## 2024-11-30 NOTE — PROGRESS NOTES
[facility] Progress Note    Follow-Up on: ESRD  Seen and examined    ROS:  Gen - no fever, no chills, appetite unchanged  CV - no chest pain  Lung - + shortness of breath, no cough  Abd - + tenderness, no nausea/vomiting, no diarrhea  Ext - + edema    Exam:  Vitals:    11/29/24 1929 11/30/24 0701 11/30/24 0743 11/30/24 0802   BP: (!) 147/87 (!) 145/92  129/75   Pulse: (!) 110 93  73   Resp: 16 16 18    Temp:  97.3 °F (36.3 °C)     TempSrc: Oral Oral     SpO2: 97% 97%     Weight:       Height:             Intake/Output Summary (Last 24 hours) at 11/30/2024 0825  Last data filed at 11/29/2024 1825  Gross per 24 hour   Intake 550.72 ml   Output 4500 ml   Net -3949.28 ml                   Wt Readings from Last 3 Encounters:   11/29/24 (!) 159.5 kg (351 lb 9.6 oz)   11/06/24 (!) 156.2 kg (344 lb 6.4 oz)   10/18/24 (!) 150.6 kg (332 lb)       GEN - in no distress, alert and oriented   CV - regular rate, S1, S2, no Rub   Lung - Diminished bilaterally, on RA   Abd - soft   Ext - anasarca  Access: left TCC intact, accessed, good flow     No results for input(s): \"WBC\", \"HGB\", \"HCT\", \"PLT\", \"INR\" in the last 72 hours.           No results for input(s): \"NA\", \"K\", \"CL\", \"CO2\", \"BUN\", \"CREATININE\", \"GLU\", \"CALCIUM\", \"MG\", \"PHOS\", \"ALBUMIN\" in the last 72 hours.    Invalid input(s): \"CA\"          Assessment / Plan:  Principal Problem:    Sepsis (HCC)  Resolved Problems:    * No resolved hospital problems. *    1) ESRD on HD - was on MWF.  Previously on PD. On TTS for rehab schedule.   -pt has been in the hospital for more than 30 days, will need re-admitted into a dialysis unit. Before prolonged hospitalization, she was dialyzing at [facility]  -HD for clearance and UF.  Had UF only session yesterday.     2) Peritonitis , culture grew Tsukamurella and CoNS.    -s/p PD catheter removed 10/15/24  -Continue vancomycin x 6 weeks, EOT 12/1/2024 per ID     3) Abdominal pain - evidence of subcutaneous hematoma and

## 2024-11-30 NOTE — DIALYSIS
TRANSFER IN - DIALYSIS    Received patient in dialysis unit  from [facility] (unit) for ordered procedure.    Consent verified for renal replacement therapy. Procedure explained to patient, opportunity for Q&A provided. Call light given.     Patient A&O and vital signs stable.  /75,  P73  , 0L O2 via room air.    Hemodialysis initiated using L CVC.  Aspirated and flushed both ports without difficulty. Dressing clean, dry and intact.  Machine settings per MD order.    Heparin 0 unit bolus and 0 units/hr.      Will monitor during treatment.

## 2024-11-30 NOTE — DIALYSIS
TRANSFER OUT- DIALYSIS    Hemodialysis treatment completed. PT ran 3.5 hours, without complications.    Patient A&) and VS stable  /63  P 92       3.5 Kg removed.      Flushed both ports with 10 mL of NS.  CVC dressing clean, dry, and intact, tego caps intact, curos caps placed.      Meds given: none.        Patient to 902 after dialysis.

## 2024-11-30 NOTE — PROGRESS NOTES
[facility] OCCUPATIONAL THERAPY DAILY NOTE  OT Individual Minutes  Time In: 1335  Time Out: 1414  Minutes: 39               Subjective: Pt agreeable to treatment. I'd like to do that lissy box... that wears my arms out\"  Pain:  Pt c/o increased discomfort at sacrum, RN aware. Pt reports RN administered pain medication just prior to entry, on entry RN exiting .  Interdisciplinary Communication: Collaborated with PT and RN regarding pt status, pt performance, and handoff communication.   Precautions: Falls and HD  Lines:N/A  O2 Device: RA    FUNCTIONAL MOBILITY:       Bed Mobility NT    Sit to Stand CGA    Transfer  NT  Transfer Type: NA  Equipment: NA    Ambulation NT  Equipment: NA       - Activity Tolerance - Coordination - Energy Conservation/Pacing  - Range of Motion - Strengthening   Pt completed therapeutic exercise seated to challenge BUE AROM/STR/coordination and activity tolerance in preparation for safe return to max (I) with I/ADLs. Pt exercises graded up. Pt tolerated increase and exercises well with no c/o pain.Increased seated and standing rest break duration and frequency required secondary to fatigue and for pressure relief of sacrum.  Anterior Glide; 10lb; 3x15  Vs; 10lb; 3x15  Large Byron R/L; 10lb; 3x15     Education   Benefits of OT, Energy Conservation, Pacing, Functional Transfer Training, Precautions, and Safety Awareness     Assessment: Patient tolerated session well. Significantly increased rest break utilized throughout secondary to fatigue/HD. Pt agreeable throughout. Pt verbalized appropriate understanding/implementation of pressure relief for sacrum. Pt completed standing pressure-reliefs as needed at w/c and table during session. Pt demonstrated good participation in OT treatment. Pt continues to benefit from skilled OT services to address remaining deficits and progress toward baseline level of independence and safety. Patient ended session seated in w/c with call bell and needs within

## 2024-12-01 VITALS
WEIGHT: 293 LBS | OXYGEN SATURATION: 100 % | TEMPERATURE: 98.6 F | BODY MASS INDEX: 43.4 KG/M2 | DIASTOLIC BLOOD PRESSURE: 81 MMHG | HEART RATE: 107 BPM | RESPIRATION RATE: 18 BRPM | HEIGHT: 69 IN | SYSTOLIC BLOOD PRESSURE: 157 MMHG

## 2024-12-01 PROCEDURE — 97116 GAIT TRAINING THERAPY: CPT

## 2024-12-01 PROCEDURE — 1180000000 HC REHAB R&B

## 2024-12-01 PROCEDURE — 2580000003 HC RX 258: Performed by: PHYSICAL MEDICINE & REHABILITATION

## 2024-12-01 PROCEDURE — 97530 THERAPEUTIC ACTIVITIES: CPT

## 2024-12-01 PROCEDURE — 6370000000 HC RX 637 (ALT 250 FOR IP): Performed by: PHYSICAL MEDICINE & REHABILITATION

## 2024-12-01 PROCEDURE — 6370000000 HC RX 637 (ALT 250 FOR IP): Performed by: STUDENT IN AN ORGANIZED HEALTH CARE EDUCATION/TRAINING PROGRAM

## 2024-12-01 PROCEDURE — 6370000000 HC RX 637 (ALT 250 FOR IP): Performed by: NURSE PRACTITIONER

## 2024-12-01 RX ADMIN — METOPROLOL TARTRATE 50 MG: 50 TABLET, FILM COATED ORAL at 08:18

## 2024-12-01 RX ADMIN — HYDROCODONE BITARTRATE AND ACETAMINOPHEN 1 TABLET: 5; 325 TABLET ORAL at 08:48

## 2024-12-01 RX ADMIN — METOPROLOL TARTRATE 50 MG: 50 TABLET, FILM COATED ORAL at 20:22

## 2024-12-01 RX ADMIN — SEVELAMER CARBONATE 800 MG: 800 TABLET, FILM COATED ORAL at 08:18

## 2024-12-01 RX ADMIN — APIXABAN 5 MG: 5 TABLET, FILM COATED ORAL at 08:17

## 2024-12-01 RX ADMIN — SERTRALINE 100 MG: 100 TABLET, FILM COATED ORAL at 20:28

## 2024-12-01 RX ADMIN — SEVELAMER CARBONATE 800 MG: 800 TABLET, FILM COATED ORAL at 12:49

## 2024-12-01 RX ADMIN — APIXABAN 5 MG: 5 TABLET, FILM COATED ORAL at 20:27

## 2024-12-01 RX ADMIN — SODIUM CHLORIDE, PRESERVATIVE FREE 10 ML: 5 INJECTION INTRAVENOUS at 08:20

## 2024-12-01 RX ADMIN — BUMETANIDE 2 MG: 1 TABLET ORAL at 08:18

## 2024-12-01 RX ADMIN — BUMETANIDE 2 MG: 1 TABLET ORAL at 17:07

## 2024-12-01 RX ADMIN — FAMOTIDINE 20 MG: 20 TABLET, FILM COATED ORAL at 08:18

## 2024-12-01 RX ADMIN — AMLODIPINE BESYLATE 5 MG: 5 TABLET ORAL at 12:49

## 2024-12-01 RX ADMIN — TRAZODONE HYDROCHLORIDE 50 MG: 50 TABLET ORAL at 22:14

## 2024-12-01 RX ADMIN — SERTRALINE 100 MG: 100 TABLET, FILM COATED ORAL at 08:18

## 2024-12-01 RX ADMIN — SEVELAMER CARBONATE 800 MG: 800 TABLET, FILM COATED ORAL at 17:07

## 2024-12-01 RX ADMIN — HYDROCODONE BITARTRATE AND ACETAMINOPHEN 1 TABLET: 5; 325 TABLET ORAL at 20:27

## 2024-12-01 ASSESSMENT — PAIN DESCRIPTION - DESCRIPTORS
DESCRIPTORS: ACHING
DESCRIPTORS: ACHING

## 2024-12-01 ASSESSMENT — PAIN DESCRIPTION - ORIENTATION: ORIENTATION: POSTERIOR;LEFT

## 2024-12-01 ASSESSMENT — PAIN DESCRIPTION - LOCATION: LOCATION: SACRUM;HIP

## 2024-12-01 ASSESSMENT — PAIN SCALES - WONG BAKER: WONGBAKER_NUMERICALRESPONSE: HURTS EVEN MORE

## 2024-12-01 ASSESSMENT — PAIN - FUNCTIONAL ASSESSMENT: PAIN_FUNCTIONAL_ASSESSMENT: ACTIVITIES ARE NOT PREVENTED

## 2024-12-01 ASSESSMENT — PAIN SCALES - GENERAL
PAINLEVEL_OUTOF10: 8
PAINLEVEL_OUTOF10: 7
PAINLEVEL_OUTOF10: 0

## 2024-12-01 NOTE — PROGRESS NOTES
PHYSICAL THERAPY DAILY NOTE    PT Individual Minutes  Time In: 0902  Time Out: 0947  Minutes: 45                 Total Treatment Time:  45 Minutes    Pt. Seen for: AM, Gait Training, Patient Education, Transfer Training, and Other     Subjective: Patient reporting she feels OK. Reports she just got her pain medication. Reports she will try to go up the steps today.          Objective:  Restrictions/Precautions: Fall Risk, Skin  Required Braces or Orthoses?: No      Other position/activity restrictions: bariatric air bed for pressure relief         GROSS ASSESSMENT   Patient resting in wheelchair at beginning of treatment        COGNITION Daily Assessment    Overall Orientation Status: Within Normal Limits   Overall Cognitive Status: WNL        BED/MAT MOBILITY Daily Assessment     Bed Mobility Comments: NT this AM        TRANSFERS Daily Assessment    Sit to Stand: Supervision  Stand to Sit: Supervision  Stand Pivot Transfers: Supervision (with RW)  Comment: Increased time and effort demonstrating correct body mechanics          GAIT Daily Assessment    Surface: Level tile  Device: Rolling Walker (gait belt)  Other Apparatus: Wheelchair follow  Assistance: Stand by assistance  Quality of Gait: slow cont partial step through gait pattern with improved upright posture  Gait Deviations: Decreased step height;Decreased step length;Slow Shirin;Decreased head and trunk rotation  Distance: 50ft x 3 with 4 to 5 min seated rest break between attempts  More Ambulation?: No              STEPS/STAIRS Daily Assessment    Stairs?: Yes   # Steps : 4  Stairs Height: 6\"  Rails: Bilateral  Curbs: 6\" (up 6 inch step forwards then backwards  in II bars x 3 reps)  Device:  (II bars)  Assistance: Contact guard assistance  Comment: Gait training up 4 steps with bilateral hand rails leading up with LLE and min assist x 2. Patient reporting increased hip pain and requested to go down ramp in w/c.          BALANCE Daily Assessment    Static

## 2024-12-01 NOTE — CARE COORDINATION
CM returned a call to  [facility] and spoke with Jasper @ [phone] and informed her that patient discharge is scheduled for 12/3/24.  Jasper made CM aware that patient chair slot is schedule for Wednesday @ 1:30 PM at  [facility] in [place].  CM will continue to follow for any needs, that may arise.

## 2024-12-01 NOTE — PROGRESS NOTES
[facility] Progress Note    Follow-Up on: ESRD  Seen and examined    ROS:  Gen - no fever, no chills, appetite unchanged  CV - no chest pain  Lung - shortness of breath better, no cough  Abd - no tenderness, no nausea/vomiting, no diarrhea  Ext - + edema    Exam:  Vitals:    11/30/24 1243 11/30/24 1503 11/30/24 1938 12/01/24 0717   BP:  121/88 (!) 146/86 (!) 153/93   Pulse: 81 50 98 88   Resp:  18 18 18   Temp:  98.6 °F (37 °C) 98.4 °F (36.9 °C) 97.3 °F (36.3 °C)   TempSrc:  Oral Oral Oral   SpO2:  90% 94% 96%   Weight:       Height:             Intake/Output Summary (Last 24 hours) at 12/1/2024 1028  Last data filed at 11/30/2024 1753  Gross per 24 hour   Intake 1100 ml   Output 4000 ml   Net -2900 ml                   Wt Readings from Last 3 Encounters:   11/29/24 (!) 159.5 kg (351 lb 9.6 oz)   11/06/24 (!) 156.2 kg (344 lb 6.4 oz)   10/18/24 (!) 150.6 kg (332 lb)       GEN - in no distress, alert and oriented   CV - regular rate, S1, S2, no Rub   Lung - Diminished bilaterally, on RA   Abd - soft   Ext - anasarca  Access: left TCC intact, accessed, good flow     No results for input(s): \"WBC\", \"HGB\", \"HCT\", \"PLT\", \"INR\" in the last 72 hours.           No results for input(s): \"NA\", \"K\", \"CL\", \"CO2\", \"BUN\", \"CREATININE\", \"GLU\", \"CALCIUM\", \"MG\", \"PHOS\", \"ALBUMIN\" in the last 72 hours.    Invalid input(s): \"CA\"          Assessment / Plan:  Principal Problem:    Sepsis (HCC)  Resolved Problems:    * No resolved hospital problems. *    1) ESRD on HD - was on MWF.  Previously on PD. On TTS for rehab schedule.   -pt has been in the hospital for more than 30 days, will need re-admitted into a dialysis unit. Before prolonged hospitalization, she was dialyzing at [facility]  -HD tomorrow as she is normally on MWF schedule with anticipated D/C on Tues    2) Peritonitis , culture grew Tsukamurella and CoNS.    -s/p PD catheter removed 10/15/24  -Continue vancomycin x 6 weeks, EOT 12/1/2024 per ID     3)

## 2024-12-02 PROBLEM — Z78.9 DECREASED INDEPENDENCE WITH ACTIVITIES OF DAILY LIVING: Status: ACTIVE | Noted: 2024-10-23

## 2024-12-02 PROBLEM — Z74.09 IMPAIRED FUNCTIONAL MOBILITY, BALANCE, GAIT, AND ENDURANCE: Status: ACTIVE | Noted: 2024-10-23

## 2024-12-02 PROBLEM — R65.21 SEPTIC SHOCK (HCC): Status: RESOLVED | Noted: 2021-12-16 | Resolved: 2024-12-02

## 2024-12-02 PROBLEM — K65.2 SPONTANEOUS BACTERIAL PERITONITIS (HCC): Status: RESOLVED | Noted: 2024-10-17 | Resolved: 2024-12-02

## 2024-12-02 PROBLEM — I48.91 ATRIAL FIBRILLATION WITH RVR (HCC): Status: RESOLVED | Noted: 2021-12-22 | Resolved: 2024-12-02

## 2024-12-02 PROBLEM — A41.9 SEPTIC SHOCK (HCC): Status: RESOLVED | Noted: 2021-12-16 | Resolved: 2024-12-02

## 2024-12-02 PROBLEM — I48.20 CHRONIC ATRIAL FIBRILLATION (HCC): Status: ACTIVE | Noted: 2024-11-08

## 2024-12-02 PROBLEM — A41.9 SEPSIS (HCC): Status: RESOLVED | Noted: 2024-10-10 | Resolved: 2024-12-02

## 2024-12-02 PROBLEM — Z51.89 ENCOUNTER FOR REHABILITATION: Status: ACTIVE | Noted: 2024-11-08

## 2024-12-02 LAB
ANION GAP SERPL CALC-SCNC: 17 MMOL/L (ref 7–16)
BASOPHILS # BLD: 0.1 K/UL (ref 0–0.2)
BASOPHILS NFR BLD: 2 % (ref 0–2)
BUN SERPL-MCNC: 33 MG/DL (ref 6–23)
CALCIUM SERPL-MCNC: 8.4 MG/DL (ref 8.8–10.2)
CHLORIDE SERPL-SCNC: 95 MMOL/L (ref 98–107)
CO2 SERPL-SCNC: 22 MMOL/L (ref 20–29)
CREAT SERPL-MCNC: 5.73 MG/DL (ref 0.6–1.1)
DIFFERENTIAL METHOD BLD: ABNORMAL
EOSINOPHIL # BLD: 0.9 K/UL (ref 0–0.8)
EOSINOPHIL NFR BLD: 14 % (ref 0.5–7.8)
ERYTHROCYTE [DISTWIDTH] IN BLOOD BY AUTOMATED COUNT: 19.8 % (ref 11.9–14.6)
GLUCOSE SERPL-MCNC: 203 MG/DL (ref 70–99)
HCT VFR BLD AUTO: 26.8 % (ref 35.8–46.3)
HGB BLD-MCNC: 8.1 G/DL (ref 11.7–15.4)
IMM GRANULOCYTES # BLD AUTO: 0.2 K/UL (ref 0–0.5)
IMM GRANULOCYTES NFR BLD AUTO: 3 % (ref 0–5)
LYMPHOCYTES # BLD: 0.7 K/UL (ref 0.5–4.6)
LYMPHOCYTES NFR BLD: 10 % (ref 13–44)
MAGNESIUM SERPL-MCNC: 2.3 MG/DL (ref 1.8–2.4)
MCH RBC QN AUTO: 29 PG (ref 26.1–32.9)
MCHC RBC AUTO-ENTMCNC: 30.2 G/DL (ref 31.4–35)
MCV RBC AUTO: 96.1 FL (ref 82–102)
MONOCYTES # BLD: 0.6 K/UL (ref 0.1–1.3)
MONOCYTES NFR BLD: 9 % (ref 4–12)
NEUTS SEG # BLD: 4.4 K/UL (ref 1.7–8.2)
NEUTS SEG NFR BLD: 63 % (ref 43–78)
NRBC # BLD: 0 K/UL (ref 0–0.2)
PLATELET # BLD AUTO: 330 K/UL (ref 150–450)
PMV BLD AUTO: 9.6 FL (ref 9.4–12.3)
POTASSIUM SERPL-SCNC: 4.6 MMOL/L (ref 3.5–5.1)
RBC # BLD AUTO: 2.79 M/UL (ref 4.05–5.2)
SODIUM SERPL-SCNC: 135 MMOL/L (ref 136–145)
WBC # BLD AUTO: 6.9 K/UL (ref 4.3–11.1)

## 2024-12-02 PROCEDURE — 36415 COLL VENOUS BLD VENIPUNCTURE: CPT

## 2024-12-02 PROCEDURE — 6370000000 HC RX 637 (ALT 250 FOR IP): Performed by: NURSE PRACTITIONER

## 2024-12-02 PROCEDURE — 80048 BASIC METABOLIC PNL TOTAL CA: CPT

## 2024-12-02 PROCEDURE — 1180000000 HC REHAB R&B

## 2024-12-02 PROCEDURE — 97110 THERAPEUTIC EXERCISES: CPT

## 2024-12-02 PROCEDURE — 85025 COMPLETE CBC W/AUTO DIFF WBC: CPT

## 2024-12-02 PROCEDURE — 6370000000 HC RX 637 (ALT 250 FOR IP): Performed by: PHYSICAL MEDICINE & REHABILITATION

## 2024-12-02 PROCEDURE — 6370000000 HC RX 637 (ALT 250 FOR IP): Performed by: STUDENT IN AN ORGANIZED HEALTH CARE EDUCATION/TRAINING PROGRAM

## 2024-12-02 PROCEDURE — 90935 HEMODIALYSIS ONE EVALUATION: CPT

## 2024-12-02 PROCEDURE — 99232 SBSQ HOSP IP/OBS MODERATE 35: CPT | Performed by: STUDENT IN AN ORGANIZED HEALTH CARE EDUCATION/TRAINING PROGRAM

## 2024-12-02 PROCEDURE — 83735 ASSAY OF MAGNESIUM: CPT

## 2024-12-02 PROCEDURE — 97530 THERAPEUTIC ACTIVITIES: CPT

## 2024-12-02 PROCEDURE — 97116 GAIT TRAINING THERAPY: CPT

## 2024-12-02 RX ADMIN — BUMETANIDE 2 MG: 1 TABLET ORAL at 16:35

## 2024-12-02 RX ADMIN — SEVELAMER CARBONATE 800 MG: 800 TABLET, FILM COATED ORAL at 16:35

## 2024-12-02 RX ADMIN — SERTRALINE 100 MG: 100 TABLET, FILM COATED ORAL at 20:55

## 2024-12-02 RX ADMIN — AMLODIPINE BESYLATE 5 MG: 5 TABLET ORAL at 14:55

## 2024-12-02 RX ADMIN — APIXABAN 5 MG: 5 TABLET, FILM COATED ORAL at 14:54

## 2024-12-02 RX ADMIN — SEVELAMER CARBONATE 800 MG: 800 TABLET, FILM COATED ORAL at 07:44

## 2024-12-02 RX ADMIN — HYDROCODONE BITARTRATE AND ACETAMINOPHEN 1 TABLET: 5; 325 TABLET ORAL at 20:54

## 2024-12-02 RX ADMIN — METOPROLOL TARTRATE 50 MG: 50 TABLET, FILM COATED ORAL at 20:55

## 2024-12-02 RX ADMIN — HYDROCODONE BITARTRATE AND ACETAMINOPHEN 1 TABLET: 5; 325 TABLET ORAL at 15:05

## 2024-12-02 RX ADMIN — APIXABAN 5 MG: 5 TABLET, FILM COATED ORAL at 20:54

## 2024-12-02 RX ADMIN — TRAZODONE HYDROCHLORIDE 50 MG: 50 TABLET ORAL at 20:54

## 2024-12-02 RX ADMIN — FAMOTIDINE 20 MG: 20 TABLET, FILM COATED ORAL at 14:54

## 2024-12-02 RX ADMIN — METOPROLOL TARTRATE 50 MG: 50 TABLET, FILM COATED ORAL at 15:00

## 2024-12-02 RX ADMIN — SERTRALINE 100 MG: 100 TABLET, FILM COATED ORAL at 14:55

## 2024-12-02 RX ADMIN — HYDROCODONE BITARTRATE AND ACETAMINOPHEN 1 TABLET: 5; 325 TABLET ORAL at 07:44

## 2024-12-02 ASSESSMENT — PAIN DESCRIPTION - LOCATION
LOCATION: BUTTOCKS
LOCATION: HIP;STERNUM
LOCATION: BUTTOCKS

## 2024-12-02 ASSESSMENT — PAIN DESCRIPTION - DESCRIPTORS
DESCRIPTORS: ACHING
DESCRIPTORS: SORE;ACHING
DESCRIPTORS: ACHING;THROBBING

## 2024-12-02 ASSESSMENT — PAIN SCALES - GENERAL
PAINLEVEL_OUTOF10: 6
PAINLEVEL_OUTOF10: 4
PAINLEVEL_OUTOF10: 7
PAINLEVEL_OUTOF10: 3
PAINLEVEL_OUTOF10: 0
PAINLEVEL_OUTOF10: 6

## 2024-12-02 ASSESSMENT — PAIN DESCRIPTION - ORIENTATION: ORIENTATION: LEFT

## 2024-12-02 ASSESSMENT — PAIN DESCRIPTION - PAIN TYPE: TYPE: CHRONIC PAIN;ACUTE PAIN

## 2024-12-02 NOTE — DIALYSIS
TRANSFER IN - DIALYSIS    Received patient in dialysis unit  from [facility] (unit) for ordered procedure.    Consent verified for renal replacement therapy. Procedure explained to patient, opportunity for Q&A provided. Call light given.     Patient baseline and vital signs stable.  /84,  P63  , room air    Hemodialysis initiated using cvc.  Aspirated and flushed both ports without difficulty. Dressing clean, dry and intact.  Machine settings per MD order.    Heparin 0 unit bolus and 0 units/hr.      Will monitor during treatment.

## 2024-12-02 NOTE — PROGRESS NOTES
OT attempt for PM scheduled OT session. Pt off floor for dialysis. OT will complete d/c note on 12/3 in AM.

## 2024-12-02 NOTE — DIALYSIS
TRANSFER OUT- DIALYSIS    Hemodialysis treatment completed. PT ran 3.5 hours, without complications.    Patient A&O and VS   /98  P 88       3.3 Kg removed.      Flushed both ports with 10 mL of NS.  CVC dressing clean, dry, and intact, tego caps intact, curos caps placed.      Meds given: none.        Patient to 902 after dialysis.

## 2024-12-02 NOTE — PROGRESS NOTES
assist     Pt to be discharged 12/03/2024 with assistance from spouse and daughter.Family training completed throughout rehab stay.     Therapy Recommendations upon discharge:   OP PT   Equipment needs at discharge:  Patient issued a RW. Patient owns a wheelchair     Interdisciplinary Eval, Care Plan, and Patient Education for further information regarding physical therapy discharge summary and plan of care.       Joesph Jones, PT  12/2/2024

## 2024-12-02 NOTE — CARE COORDINATION
CM reviewed / screen patient medical chart for continue stay.  Patient needs are continue to be followed by Dr. Alma Moody.  Patient has no discharge date / plan at this time scheduled for 12/3/24.  CM made contact with [facility] and confirmed patient appointment for 01/03/2025 @ 9:15 AM.  Patient has an appointment schedule with [facility] in [place] on Wednesday, 12/4/24 @ 1:30 PM.  Patient has declined HH spouse states he will assist with needed wound care.  CM will continue to follow / monitor for any needs, concerns or questions that may arise.

## 2024-12-02 NOTE — PROGRESS NOTES
[facility] Progress Note    Follow-Up on: ESRD  Seen and examined on HD, left  Qb, UF 4000 she reports ongoung LE edema- improving with xtra dialysis, limiting fluids.     ROS:  Gen - no fever, no chills, appetite unchanged  CV - no chest pain  Lung - shortness of breath better, no cough  Abd - no tenderness, no nausea/vomiting, no diarrhea  Ext - + edema    Exam:  Vitals:    12/01/24 2027 12/01/24 2057 12/01/24 2127 12/02/24 0656   BP:    (!) 143/96   Pulse:    (!) 109   Resp: 20 18 18 18   Temp:    98.4 °F (36.9 °C)   TempSrc:    Oral   SpO2:    97%   Weight:       Height:             Intake/Output Summary (Last 24 hours) at 12/2/2024 1044  Last data filed at 12/1/2024 2127  Gross per 24 hour   Intake 640 ml   Output --   Net 640 ml                   Wt Readings from Last 3 Encounters:   12/01/24 (!) 154.7 kg (341 lb)   11/06/24 (!) 156.2 kg (344 lb 6.4 oz)   10/18/24 (!) 150.6 kg (332 lb)       GEN - in no distress, alert and oriented   CV - regular rate, S1, S2, no Rub   Lung - clearer lung sounds bilaterally, on RA   Abd - soft, + tender  Ext - anasarca  Access: left TCC intact, accessed, good flow     Recent Labs     12/02/24  0502   WBC 6.9   HGB 8.1*   HCT 26.8*                 Recent Labs     12/02/24  0502   *   K 4.6   CL 95*   CO2 22   BUN 33*   CREATININE 5.73*   CALCIUM 8.4*   MG 2.3             Assessment / Plan:  Principal Problem:    Sepsis (HCC)  Resolved Problems:    * No resolved hospital problems. *    1) ESRD on HD - was on MWF.  Previously on PD. On TTS for rehab schedule. Outpt .6 kg  -seen on HD, UF as tolerated   Confirmed TR slot MWF 15:25  - weigh post HD today, needs new EDW      2) Peritonitis , culture grew Tsukamurella and CoNS.    -s/p PD catheter removed 10/15/24  -s/p vancomycin x 6 weeks, EOT 12/1/2024 per ID     3) Abdominal pain - evidence of subcutaneous hematoma and cellulitis, possible calciphylaxis  We discussed returning to PD vs HD,

## 2024-12-02 NOTE — PROGRESS NOTES
[place] [address]  Tel: [phone]     Physical Medicine and Rehabilitation Progress Note      Giselle Rosado  Admit Date: 11/8/2024  Admit Diagnosis: Sepsis (HCC) [A41.9]    Subjective     Patient seen at bedside this morning. Pleased that she ambulated 150' and did stairs yesterday. We discussed up-trending blood sugars and note she does not use insulin or PO meds as outpatient. She will discuss DM management with PCP after discharge. All questions and concerns were addressed at this time.     Objective:     Current Facility-Administered Medications   Medication Dose Route Frequency    bumetanide (BUMEX) tablet 2 mg  2 mg Oral BID    amLODIPine (NORVASC) tablet 5 mg  5 mg Oral Lunch    saliva substitute (BIOTENE/MOUTH KOTE) liquid   Oral Q6H PRN    ALPRAZolam (XANAX) tablet 0.5 mg  0.5 mg Oral Daily PRN    cloNIDine (CATAPRES) tablet 0.1 mg  0.1 mg Oral Q6H PRN    midodrine (PROAMATINE) tablet 10 mg  10 mg Oral PRN    acetaminophen (TYLENOL) tablet 1,000 mg  1,000 mg Oral Q6H PRN    Or    acetaminophen (TYLENOL) suppository 650 mg  650 mg Rectal Q6H PRN    0.9 % sodium chloride infusion   IntraVENous PRN    aluminum & magnesium hydroxide-simethicone (MAALOX) 200-200-20 MG/5ML suspension 30 mL  30 mL Oral Q6H PRN    apixaban (ELIQUIS) tablet 5 mg  5 mg Oral BID    bisacodyl (DULCOLAX) suppository 10 mg  10 mg Rectal Daily PRN    dicyclomine (BENTYL) capsule 10 mg  10 mg Oral TID PRN    epoetin oralia-epbx (RETACRIT) injection 10,000 Units  10,000 Units SubCUTAneous Weekly    famotidine (PEPCID) tablet 20 mg  20 mg Oral Daily    fluticasone (FLONASE) 50 MCG/ACT nasal spray 2 spray  2 spray Nasal Nightly PRN    metoprolol tartrate (LOPRESSOR) tablet 50 mg  50 mg Oral BID    ondansetron (ZOFRAN-ODT) disintegrating tablet 4 mg  4 mg Oral Q8H PRN    Or    ondansetron (ZOFRAN) injection 4 mg  4 mg IntraVENous Q6H PRN    polyethylene glycol (GLYCOLAX)

## 2024-12-02 NOTE — PROGRESS NOTES
level of independence and safety. Patient ended session seated in w/c with call bell and needs within reach, with family member at bedside, and with RN/CNA.   Plan: Continue OT POC.     JAYJAY PARISI OT   12/2/2024

## 2024-12-03 VITALS
RESPIRATION RATE: 18 BRPM | BODY MASS INDEX: 43.4 KG/M2 | DIASTOLIC BLOOD PRESSURE: 89 MMHG | HEIGHT: 69 IN | HEART RATE: 91 BPM | SYSTOLIC BLOOD PRESSURE: 141 MMHG | OXYGEN SATURATION: 98 % | TEMPERATURE: 98.2 F | WEIGHT: 293 LBS

## 2024-12-03 PROBLEM — Z74.09 IMPAIRED FUNCTIONAL MOBILITY, BALANCE, GAIT, AND ENDURANCE: Status: RESOLVED | Noted: 2024-10-23 | Resolved: 2024-12-03

## 2024-12-03 PROBLEM — I95.9 SEPSIS ASSOCIATED HYPOTENSION (HCC): Status: RESOLVED | Noted: 2024-10-24 | Resolved: 2024-12-03

## 2024-12-03 PROBLEM — K65.9 PERITONITIS (HCC): Status: RESOLVED | Noted: 2024-10-10 | Resolved: 2024-12-03

## 2024-12-03 PROBLEM — M72.6 NECROTIZING FASCIITIS: Status: RESOLVED | Noted: 2021-12-15 | Resolved: 2024-12-03

## 2024-12-03 PROBLEM — N17.9 AKI (ACUTE KIDNEY INJURY) (HCC): Status: RESOLVED | Noted: 2021-12-16 | Resolved: 2024-12-03

## 2024-12-03 PROBLEM — Z51.89 ENCOUNTER FOR REHABILITATION: Status: RESOLVED | Noted: 2024-11-08 | Resolved: 2024-12-03

## 2024-12-03 PROBLEM — L03.116 CELLULITIS OF LEFT LOWER EXTREMITY: Status: RESOLVED | Noted: 2023-06-20 | Resolved: 2024-12-03

## 2024-12-03 PROBLEM — J96.00 ACUTE RESPIRATORY FAILURE: Status: RESOLVED | Noted: 2021-12-16 | Resolved: 2024-12-03

## 2024-12-03 PROBLEM — Z78.9 DECREASED INDEPENDENCE WITH ACTIVITIES OF DAILY LIVING: Status: RESOLVED | Noted: 2024-10-23 | Resolved: 2024-12-03

## 2024-12-03 PROBLEM — A41.9 SEPSIS ASSOCIATED HYPOTENSION (HCC): Status: RESOLVED | Noted: 2024-10-24 | Resolved: 2024-12-03

## 2024-12-03 PROCEDURE — 6370000000 HC RX 637 (ALT 250 FOR IP): Performed by: PHYSICAL MEDICINE & REHABILITATION

## 2024-12-03 PROCEDURE — 6370000000 HC RX 637 (ALT 250 FOR IP): Performed by: NURSE PRACTITIONER

## 2024-12-03 PROCEDURE — 99239 HOSP IP/OBS DSCHRG MGMT >30: CPT | Performed by: STUDENT IN AN ORGANIZED HEALTH CARE EDUCATION/TRAINING PROGRAM

## 2024-12-03 PROCEDURE — 6370000000 HC RX 637 (ALT 250 FOR IP): Performed by: STUDENT IN AN ORGANIZED HEALTH CARE EDUCATION/TRAINING PROGRAM

## 2024-12-03 PROCEDURE — 97535 SELF CARE MNGMENT TRAINING: CPT

## 2024-12-03 RX ORDER — AMLODIPINE BESYLATE 5 MG/1
5 TABLET ORAL
Qty: 30 TABLET | Refills: 0 | Status: SHIPPED | OUTPATIENT
Start: 2024-12-03

## 2024-12-03 RX ORDER — BUMETANIDE 2 MG/1
2 TABLET ORAL 2 TIMES DAILY
Qty: 30 TABLET | Refills: 0 | Status: SHIPPED | OUTPATIENT
Start: 2024-12-03

## 2024-12-03 RX ORDER — HYDROCODONE BITARTRATE AND ACETAMINOPHEN 5; 325 MG/1; MG/1
1 TABLET ORAL EVERY 6 HOURS PRN
Qty: 12 TABLET | Refills: 0 | Status: SHIPPED | OUTPATIENT
Start: 2024-12-03 | End: 2024-12-06

## 2024-12-03 RX ADMIN — SERTRALINE 100 MG: 100 TABLET, FILM COATED ORAL at 07:58

## 2024-12-03 RX ADMIN — APIXABAN 5 MG: 5 TABLET, FILM COATED ORAL at 08:00

## 2024-12-03 RX ADMIN — SEVELAMER CARBONATE 800 MG: 800 TABLET, FILM COATED ORAL at 11:33

## 2024-12-03 RX ADMIN — AMLODIPINE BESYLATE 5 MG: 5 TABLET ORAL at 11:33

## 2024-12-03 RX ADMIN — METOPROLOL TARTRATE 50 MG: 50 TABLET, FILM COATED ORAL at 07:59

## 2024-12-03 RX ADMIN — HYDROCODONE BITARTRATE AND ACETAMINOPHEN 1 TABLET: 5; 325 TABLET ORAL at 07:58

## 2024-12-03 RX ADMIN — BUMETANIDE 2 MG: 1 TABLET ORAL at 07:59

## 2024-12-03 RX ADMIN — SEVELAMER CARBONATE 800 MG: 800 TABLET, FILM COATED ORAL at 07:57

## 2024-12-03 RX ADMIN — FAMOTIDINE 20 MG: 20 TABLET, FILM COATED ORAL at 07:58

## 2024-12-03 ASSESSMENT — PAIN SCALES - GENERAL: PAINLEVEL_OUTOF10: 6

## 2024-12-03 ASSESSMENT — PAIN DESCRIPTION - LOCATION: LOCATION: BUTTOCKS

## 2024-12-03 ASSESSMENT — PAIN DESCRIPTION - DESCRIPTORS: DESCRIPTORS: ACHING;THROBBING

## 2024-12-03 NOTE — PROGRESS NOTES
level over the last 48 hours.     Discharge Location: Home with Family Support  Recommended Therapy/Supervision at Discharge:  outpatient PT  Recommended Equipment:  w/c pt has at home, recommend bariatric BSC and bariatric tub t/f bench-handouts provided, bariatric FWW delivered to room  Safety/Functional Level: Pt completes ADLs with Modified Independent and Jt / mod A using Rolling Walker, Long Handled Sponge, BSC, Grab Bars, and Tub Transfer Bench, w/c bariatric DME. Pt requires SBA - min A for mobility related ADLs. Pt will require  assist  with IADLs such as home management, cooking, and cleaning. Pt would be able to perform at seated w/c level to participate in steps. Extensive education provided on off loading pressure sores/pressure relief for home.  Family Training:  Ongoing education and training provided while inpatient, no further questions/concerns for d/c home.  Residual Deficits: Decreased strength, activity tolerance, functional mobility, and IADL performance.    Summary of Progress: Patient demonstrates good progress with strength, activity tolerance, balance, functional mobility, safety awareness, AE/DME use, and ADL performance , see above for details. Patient continues to require skilled OT services to address deficits and maximize pt safety and independence.     Summary of Session:   Focus of session was on morning ADL routine and discharge assessment.   Education: Adaptive ADL Techniques, AE/DME Training, Benefits of OT, Energy Conservation, Pacing, Family Training, Functional Transfer Training, Rolling Walker Management, and Safety Awareness, off loading wounds/pressure relief.     Collaborated with PT and RN regarding pt's progress toward goals.  Plan: Continue OT POC to address remaining deficits and maximize safety and independence.    Patient ended session seated in w/c with call bell and needs within reach and with family member at bedside.     JAYJAY PARISI OT  12/3/2024

## 2024-12-03 NOTE — CARE COORDINATION
Patient with DC orders today. Patients  will be taking her home where they live in a one level home with 3 steps to enter. He has obtained a ramp, bariatric walker was ordered by CM, has WC, BSC and shower chair.  states he will be getting tub transfer bench. Patient and spouse aware of OP PT at [facility], also aware of dialysis slot. States no issues with follow ups or picking up medications at discharge. IMM letter signed, copy left with patient, copy to medical records. No additional needs made known to RN GILMA. Patient and family with no questions or concerns. Patient has met all treatment goals and milestones for discharge. Patient to contact CM if any new needs arise.         12/03/24 6572   Discharge Planning   Type of Residence House   Living Arrangements Spouse/Significant Other   Current Services Prior To Admission Durable Medical Equipment   Current DME Prior to Arrival Shower Chair;Wheelchair;Walker;Bedside Commode   Potential Assistance Needed Outpatient PT/OT   DME Ordered? Walker   Potential Assistance Purchasing Medications No   Type of Home Care Services None   Patient expects to be discharged to: House   History of falls? 1   Services At/After Discharge   Transition of Care Consult (CM Consult) Discharge Planning;Other  (ESRD and OP PT)   Services At/After Discharge Outpatient care transitions;PT   Mode of Transport at Discharge Self   Confirm Follow Up Transport Other (see comment)  ([facility])   Condition of Participation: Discharge Planning   The Plan for Transition of Care is related to the following treatment goals: return to baseline with assistance of OP PT, patient declines HH RN, PT, OT   The Patient and/or Patient Representative was provided with a Choice of Provider? Patient   The Patient and/Or Patient Representative agree with the Discharge Plan? Yes   Freedom of Choice list was provided with basic dialogue that supports the patient's individualized plan of care/goals,

## 2024-12-03 NOTE — PROGRESS NOTES
Discharge instructions reviewed with patient and . Both verbalizes understanding.  will drive patient home. Patient was escorted to patient discharge area via wheel chair and all personal belongings. Patient is stable at discharge.

## 2024-12-03 NOTE — WOUND CARE
Follow up on Wounds. Pt w/ expected DC home today. Primary RN already dressed/packed Opticell Ag into pockets of L buttock wound, took new pics yesterday; does not want to have it redressed at the moment. Pt w/ follow up appt w/ MD Simon 12/10 for wound follow up.  feels comfortable to continue wound care dressings at home.

## 2024-12-03 NOTE — PLAN OF CARE
Problem: Chronic Conditions and Co-morbidities  Goal: Patient's chronic conditions and co-morbidity symptoms are monitored and maintained or improved  Outcome: Progressing     Problem: Pain  Goal: Verbalizes/displays adequate comfort level or baseline comfort level  12/3/2024 0948 by Mariana Chacon RN  Outcome: Progressing  12/2/2024 1959 by Dottie Silva RN  Outcome: Progressing     Problem: Safety - Adult  Goal: Free from fall injury  12/3/2024 0948 by Mariana Chacon RN  Outcome: Progressing  12/2/2024 1959 by Dottie Silva RN  Outcome: Progressing     Problem: Skin/Tissue Integrity  Goal: Absence of new skin breakdown  Description: 1.  Monitor for areas of redness and/or skin breakdown  2.  Assess vascular access sites hourly  3.  Every 4-6 hours minimum:  Change oxygen saturation probe site  4.  Every 4-6 hours:  If on nasal continuous positive airway pressure, respiratory therapy assess nares and determine need for appliance change or resting period.  12/3/2024 0948 by Mariana Chacon RN  Outcome: Progressing  12/2/2024 1959 by Dottie Silva RN  Outcome: Progressing     Problem: ABCDS Injury Assessment  Goal: Absence of physical injury  Outcome: Progressing

## 2024-12-03 NOTE — PROGRESS NOTES
[facility] Progress Note    Follow-Up on: ESRD  Seen and examined in room sitting up in chair, spouse visiting, pt reports ongoing LE edema, we discussed fluid restrictions, extra sequential dialysis outpt     ROS:  Gen - no fever, no chills, appetite unchanged  CV - no chest pain  Lung - shortness of breath better, no cough  Abd - no tenderness, no nausea/vomiting, no diarrhea  Ext - + edema    Exam:  Vitals:    12/02/24 1533 12/02/24 1901 12/03/24 0713 12/03/24 0758   BP:  125/73 (!) 141/89    Pulse:  96 91    Resp:  18 18 18   Temp:  98.6 °F (37 °C) 98.2 °F (36.8 °C)    TempSrc:  Oral Oral    SpO2:  97% 98%    Weight: (!) 153.5 kg (338 lb 8 oz)      Height:             Intake/Output Summary (Last 24 hours) at 12/3/2024 1008  Last data filed at 12/2/2024 1405  Gross per 24 hour   Intake 500 ml   Output 3800 ml   Net -3300 ml                   Wt Readings from Last 3 Encounters:   12/02/24 (!) 153.5 kg (338 lb 8 oz)   11/06/24 (!) 156.2 kg (344 lb 6.4 oz)   10/18/24 (!) 150.6 kg (332 lb)       GEN - in no distress, alert and oriented   CV - regular rate, S1, S2, no Rub   Lung - clearer lung sounds bilaterally, on RA   Abd - soft, + tender  Ext - anasarca  Access: left TCC intact    Recent Labs     12/02/24  0502   WBC 6.9   HGB 8.1*   HCT 26.8*                 Recent Labs     12/02/24  0502   *   K 4.6   CL 95*   CO2 22   BUN 33*   CREATININE 5.73*   CALCIUM 8.4*   MG 2.3             Assessment / Plan:  Principal Problem (Resolved):    Sepsis (HCC)  Active Problems:    Obesity, morbid    Anemia    HTN (hypertension)    DM2 (diabetes mellitus, type 2) (HCC)    Dialysis patient (HCC)    End stage chronic kidney disease (HCC)    Chronic atrial fibrillation (HCC)  Resolved Problems:    Spontaneous bacterial peritonitis (HCC)    Sepsis associated hypotension (HCC)    Decreased independence with activities of daily living    Impaired functional mobility, balance, gait, and endurance    Encounter

## 2024-12-03 NOTE — DISCHARGE SUMMARY
fluid could be dialyzed without blood pressure complications. Will place medication on hold at this time  -11/22 will change amlodipine administration time to lunch with parameters to avoid hypotension        //Hyperglycemia  //DM not requiring insulin for control, 12/2015  -Last HgbA1c over 1y ago: 7.5% (10/2023)  -Does not take any meds for DM as outpatient going back to 2020  -12/2 BS trending up from low 100s last month to >170s but noted with frequent dietary indiscretion (outside foods)  -Patient to follow up with PCP for DM management     ESRD on HD - [facility]      Obesity, morbid (BMI- 50.8 )  -11/15  weight 347 lb on 11/14, 4 lb weight gain recorded in last 24 hours, 2.8 kg removed during HD on 11/14, on 1200ml fluid restriction     Anticoagulation mgmt / DVT prophylaxis- on Eliquis bid     Irritable bowel syndrome - on prn bentyl     Pain mgmt - prn Norco     Buttock decubitus ulcer - WOC RN following, [facility]      Abdominal asymmetric fullness - abdominal binder ordered, renal note appreciated            [facility]  [address] [id number]        [facility]  [address]  [place] [id number] [phone]  Follow up in 3 week(s)      Tanisha Powers APRN - CNP  [address] [address]  [phone]    Go on 12/10/2024      Tanisha Powers APRN - CNP  [address] [facility] [address] [facility] [address]  [phone]          Samuel Simon MD  [address] [facility] [address] [address]  [phone]    Follow up on 12/10/2024  at 9:45 am       Labs/Studies:  Recent Results (from the past 72 hour(s))   Basic Metabolic Panel    Collection Time: 12/02/24  5:02 AM   Result Value Ref Range    Sodium 135 (L) 136 - 145 mmol/L    Potassium 4.6 3.5 - 5.1 mmol/L    Chloride 95 (L) 98 - 107 mmol/L    CO2 22 20 - 29 mmol/L    Anion Gap

## 2024-12-04 ENCOUNTER — CARE COORDINATION (OUTPATIENT)
Dept: CARE COORDINATION | Facility: CLINIC | Age: 54
End: 2024-12-04

## 2024-12-04 ENCOUNTER — TELEPHONE (OUTPATIENT)
Dept: PHARMACY | Age: 54
End: 2024-12-04

## 2024-12-04 NOTE — CARE COORDINATION
Care Transitions Note    Initial Call - Call within 2 business days of discharge: Yes    Attempted to reach patient for transitions of care follow up. Unable to reach patient.    Outreach Attempts:   HIPAA compliant voicemail left for patient.     Patient: Giselle Rosado    Patient : 1970   MRN: [id number]    Reason for Admission: ESRD/ Sepsis  Discharge Date: 12/3/24  RURS: Readmission Risk Score: 26.4    Last Discharge Facility       Date Complaint Diagnosis Description Type Department Provider    24  End stage chronic kidney disease (HCC) ... Admission (Discharged) [id number] Alma Traore, DO            Was this an external facility discharge? Yes. Discharge Date: 2024. Facility Name: [facility]    Follow Up Appointment:   Patient has hospital follow up appointment scheduled within 7 days of discharge.    Future Appointments         Provider Specialty Dept Phone    2024 1:00 PM Tanisha Powers, APRN - CNP Internal Medicine [phone]    2024 11:00 AM Baljeet Harris MD Cardiology [phone]    2024 2:30 PM [facility] Obstetric[facility] Gyne[facility] [phone]    2024 3:15 PM Marcos Arroyo MD Obstetrics and Gynecology [phone]    1/3/2025 9:30 AM Janak Chan, PT Physical Therapy [phone]    2025 9:30 AM Janak Chan PT Physical Therapy [phone]    2025 9:30 AM Shira Gotti, PTA Physical Therapy [phone]    2025 9:30 AM Shira Gotti, PTA Physical Therapy [phone]    1/15/2025 9:30 AM Shira Gotti, PTA Physical Therapy [phone]    2025 9:30 AM Janak Chan, PT Physical Therapy [phone]    2025 9:30 AM Shira Gotti, PTA Physical Therapy [phone]    2025 9:30 AM Janak Chan, PT Physical Therapy [phone]    2025 9:30 AM Shira Gotti, PTA Physical Therapy [phone]            Plan for follow-up call in 2-5 days     Becka Brady LPN

## 2024-12-04 NOTE — TELEPHONE ENCOUNTER
[facility] Pharmacist consult.  Mrs. Rosado was discharged from [facility] after having sepsis.  I called to review her discharge medications with her and got her voice mail.  I left my name and cell phone number.  I asked her to call me with any medication questions or issues.

## 2024-12-05 ENCOUNTER — CARE COORDINATION (OUTPATIENT)
Dept: CARE COORDINATION | Facility: CLINIC | Age: 54
End: 2024-12-05

## 2024-12-05 NOTE — CARE COORDINATION
Care Transitions Note    Initial Call - Call within 2 business days of discharge: Yes    Attempted to reach patient for transitions of care follow up. Unable to reach patient.    Outreach Attempts:   HIPAA compliant voicemail left for patient.     Patient: Giselle Rosado    Patient : 1970   MRN: [id number]    Reason for Admission: ESRD/Sepsis  Discharge Date: 12/3/24  RURS: Readmission Risk Score: 26.4    Last Discharge Facility       Date Complaint Diagnosis Description Type Department Provider    24  End stage chronic kidney disease (HCC) ... Admission (Discharged) [id number] Alma Traore, DO            Was this an external facility discharge? Yes. Discharge Date: 2024. Facility Name: [facility]    Follow Up Appointment:   Patient has hospital follow up appointment scheduled within 7 days of discharge.    Future Appointments         Provider Specialty Dept Phone    2024 1:00 PM Tanisha Powers, APRN - CNP Internal Medicine [phone]    2024 11:00 AM Baljeet Harris MD Cardiology [phone]    2024 2:30 PM [facility]  [facility] Obstetrics [facility] Gynecology [phone]    2024 3:15 PM Marcos Arroyo MD Obstetrics and Gynecology [phone]    1/3/2025 9:30 AM Janak Chan, PT Physical Therapy [phone]    2025 9:30 AM Janak Chan PT Physical Therapy [phone]    2025 9:30 AM Shira Gotti, PTA Physical Therapy [phone]    2025 9:30 AM Shira Gotti, PTA Physical Therapy [phone]    1/15/2025 9:30 AM Shira Gotti, PTA Physical Therapy [phone]    2025 9:30 AM Janak Chan, PT Physical Therapy [phone]    2025 9:30 AM Shira Gotti, PTA Physical Therapy [phone]    2025 9:30 AM Janak Chan, PT Physical Therapy [phone]    2025 9:30 AM Shira Gotti, PTA Physical Therapy [phone]            Plan for follow-up call in 2-5 days     Becka Brady LPN

## 2024-12-06 ENCOUNTER — CARE COORDINATION (OUTPATIENT)
Dept: CARE COORDINATION | Facility: CLINIC | Age: 54
End: 2024-12-06

## 2024-12-06 NOTE — CARE COORDINATION
Therapy [phone]    1/6/2025 9:30 AM Janak Chan, PT Physical Therapy [phone]    1/8/2025 9:30 AM Shira Gotti, [facility] Physical Therapy [phone]    1/13/2025 9:30 AM Shira Gotti, PTA Physical Therapy [phone]    1/15/2025 9:30 AM Shira Gotti, [facility] Physical Therapy [phone]    1/20/2025 9:30 AM Janak Chan, PT Physical Therapy [phone]    1/22/2025 9:30 AM Shira Gotti, [facility] Physical Therapy [phone]    1/27/2025 9:30 AM Janak Chan, PT Physical Therapy [phone]    1/29/2025 9:30 AM Shira Gotti, [facility] Physical Therapy [phone]            LPN Care Coordinator provided contact information.  Plan for follow-up call in 6-10 days based on severity of symptoms and risk factors.  Plan for next call: self management-diet, hydration, exercise, follow up appointments.       Becka Brady LPN

## 2024-12-13 ENCOUNTER — CARE COORDINATION (OUTPATIENT)
Dept: CARE COORDINATION | Facility: CLINIC | Age: 54
End: 2024-12-13

## 2024-12-13 NOTE — CARE COORDINATION
Care Transitions Note    Final Call     Attempted to reach patient for transitions of care follow up.  Unable to reach patient.      Outreach Attempts:   HIPAA compliant voicemail left for patient.     Patient graduated from the Care Transitions program on 12/13/2024.  Patient/family has the ability to self manage at this time..      Handoff:   Patient was not referred to the ACM team due to no additional needs identified.       Care Summary Note: Unable to reach.    Assessments:  Unable to reach.     Upcoming Appointments:    Future Appointments         Provider Specialty Dept Phone    12/19/2024 11:00 AM Baljeet Harris MD Cardiology [phone]    12/19/2024 2:30 PM [facility] Obstetrics [facility] Gynecology [phone]    12/19/2024 3:15 PM Marcos Arroyo MD Obstetrics and Gynecology [phone]    1/3/2025 9:30 AM Janak Chan, PT Physical Therapy [phone]    1/6/2025 9:30 AM Janak Chan, PT Physical Therapy [phone]    1/8/2025 9:30 AM Shira Gotti, PTA Physical Therapy [phone]    1/13/2025 9:30 AM Shira Gotti, PTA Physical Therapy [phone]    1/15/2025 9:30 AM Shira Gotti, PTA Physical Therapy [phone]    1/20/2025 9:30 AM Janak Chan, PT Physical Therapy [phone]    1/22/2025 9:30 AM Shira Gotti, PTA Physical Therapy [phone]    1/27/2025 9:30 AM Janak Chan, PT Physical Therapy [phone]    1/29/2025 9:30 AM Shira Gotti, PTA Physical Therapy [phone]            Becka Brady LPN

## 2024-12-16 ENCOUNTER — CARE COORDINATION (OUTPATIENT)
Dept: CARE COORDINATION | Facility: CLINIC | Age: 54
End: 2024-12-16

## 2024-12-16 DIAGNOSIS — S31.109S OPEN WOUND OF ANTERIOR ABDOMINAL WALL, SEQUELA: Primary | ICD-10-CM

## 2024-12-16 RX ORDER — TRAMADOL HYDROCHLORIDE 50 MG/1
50 TABLET ORAL EVERY 6 HOURS PRN
Qty: 10 TABLET | Refills: 0 | Status: SHIPPED | OUTPATIENT
Start: 2024-12-16 | End: 2024-12-18

## 2024-12-16 NOTE — CARE COORDINATION
Opened chart as I had received voice messages over the weekend on 12/15/2024 requesting assistance with pain medications and wound clinic referral.  Returned call this AM and spoke with spouse Vinicio and informed him that these requests would have to be ordered per MD.  Spouse states that patient has increased skin breakdown, increased pain and swelling, fearful of infection. States that they had spoken to Dr. Simon on 12/15/2024 and he will not able to see patient until 12/23/2024.  Spouse states he will take her to the ED as he feels this needs immediate attention.

## 2024-12-16 NOTE — PROGRESS NOTES
Health Maintenance       Chlamydia and Gonorrhea Screening (if sexually active) (Yearly)  Never done    Influenza Vaccine (1)  Overdue since 9/1/2024    COVID-19 Vaccine (4 - 2024-25 season)  Overdue since 9/1/2024    Cervical Cancer Screening (View Topic Details)  Never done    Annual Physical (ages 3 - 21) (Yearly)  Due soon on 2/8/2025           Following review of the above:  Patient is not proceeding with: Cervical Cancer Screening, Chlamydia and Gonorrhea Screening, COVID-19, and Influenza    Note: Refer to final orders and clinician documentation.            [place] [address]  Tel: [phone]     Physical Medicine and Rehabilitation Progress Note      Giselle Rosado  Admit Date: 11/8/2024  Admit Diagnosis: Sepsis (HCC) [A41.9]    Subjective     Patient seen face-to-face. Denies lightheadedness, pain at rest, SOB or nausea. Participating in therapy.    Objective:     Current Facility-Administered Medications   Medication Dose Route Frequency    acetaminophen (TYLENOL) tablet 1,000 mg  1,000 mg Oral Q6H PRN    Or    acetaminophen (TYLENOL) suppository 650 mg  650 mg Rectal Q6H PRN    0.9 % sodium chloride infusion   IntraVENous PRN    aluminum & magnesium hydroxide-simethicone (MAALOX) 200-200-20 MG/5ML suspension 30 mL  30 mL Oral Q6H PRN    apixaban (ELIQUIS) tablet 5 mg  5 mg Oral BID    bisacodyl (DULCOLAX) suppository 10 mg  10 mg Rectal Daily PRN    dicyclomine (BENTYL) capsule 10 mg  10 mg Oral TID PRN    epoetin oralia-epbx (RETACRIT) injection 10,000 Units  10,000 Units SubCUTAneous Weekly    famotidine (PEPCID) tablet 20 mg  20 mg Oral Daily    fluticasone (FLONASE) 50 MCG/ACT nasal spray 2 spray  2 spray Nasal Nightly PRN    lidocaine 4 % external patch 4 patch  4 patch TransDERmal Daily    midodrine (PROAMATINE) tablet 10 mg  10 mg Oral TID WC    metoprolol tartrate (LOPRESSOR) tablet 50 mg  50 mg Oral BID    ondansetron (ZOFRAN-ODT) disintegrating tablet 4 mg  4 mg Oral Q8H PRN    Or    ondansetron (ZOFRAN) injection 4 mg  4 mg IntraVENous Q6H PRN    polyethylene glycol (GLYCOLAX) packet 17 g  17 g Oral Daily PRN    sertraline (ZOLOFT) tablet 100 mg  100 mg Oral BID    sevelamer (RENVELA) tablet 800 mg  800 mg Oral TID WC    sodium chloride flush 0.9 % injection 5-40 mL  5-40 mL IntraVENous 2 times per day    vancomycin (VANCOCIN) intermittent dosing (placeholder)   Other RX Placeholder    HYDROcodone-acetaminophen (NORCO) 5-325 MG per tablet 1 tablet  1 tablet Oral Q6H PRN    traZODone  Sit: Moderate Assistance;2 Person Assistance (Moderate assist from PT and RN with RW and gait belt)  Stand Pivot Transfers: 2 Person Assistance;Moderate Assistance (with RW, Mod assist from RN and PT)  Squat Pivot Transfers: Unable to assess  Lateral Transfers: Unable to assess  Car Transfer: Unable to assess  Comment: Increased time and effort to complete with height of bed elevated to 26 inch height to complete safely.             GAIT Daily Assessment     Surface: Level tile  Device: Rolling Walker (gait belt)  Other Apparatus: Wheelchair follow  Assistance: 2 Person assistance;Minimal assistance (Min assist from PT and RN)  Quality of Gait: slow start/stop step to gait pattern with flexed posture  Gait Deviations: Decreased step length;Decreased arm swing;Decreased step height;Slow Shirin;Increased ALEE  Distance: 6 ft  More Ambulation?: No       Per OT:   ACTIVITIES OF DAILY LIVING:    Score Comments   Eating Independent I   Oral Hygiene Supervision or touching assistance SUP; sitting EOB   Shower/Bathe Substantial/maximal assistance UB min A; LB max A (assist washing lower legs, feet, and private area while standing); sitting EOB otherwise   Upper Body  Dressing Supervision or touching assistance SUP   Lower Body Dressing Substantial/maximal assistance Max A; donning boxers and pants   Donning/[place] Footwear Dependent Dependent; donning B socks   Toileting Hygiene NT Pt. Declined need for bathroom use   Toilet Transfer NT Pt. Declined need for bathroom use        Labs/Studies:  Recent Results (from the past 72 hour(s))   Vancomycin Level, Random    Collection Time: 11/10/24  6:08 AM   Result Value Ref Range    Vancomycin Rm 19.5 UG/ML     Assessment:   This is a 53 YO with h/o ESRD now on HD (recent hospitalization 10/10-10/22 due to peritonitis while on PD), atrial fibrillation, HTN admitted on 10/23 septic and in Afib with RVR. CT abdomen pelvis + for active bleeding from the L rectus/inferior epigastric

## 2024-12-17 ENCOUNTER — APPOINTMENT (OUTPATIENT)
Dept: CT IMAGING | Age: 54
DRG: 853 | End: 2024-12-17
Payer: MEDICARE

## 2024-12-17 ENCOUNTER — HOSPITAL ENCOUNTER (INPATIENT)
Age: 54
LOS: 3 days | DRG: 853 | End: 2024-12-21
Admitting: HOSPITALIST
Payer: MEDICARE

## 2024-12-17 DIAGNOSIS — E87.20 LACTIC ACIDOSIS: ICD-10-CM

## 2024-12-17 DIAGNOSIS — R06.83 SNORING: ICD-10-CM

## 2024-12-17 DIAGNOSIS — I48.91 ATRIAL FIBRILLATION WITH RAPID VENTRICULAR RESPONSE (HCC): Primary | ICD-10-CM

## 2024-12-17 DIAGNOSIS — L98.9 SKIN LESIONS: ICD-10-CM

## 2024-12-17 DIAGNOSIS — M72.6 NECROTIZING FASCIITIS: ICD-10-CM

## 2024-12-17 LAB
ALBUMIN SERPL-MCNC: 2.4 G/DL (ref 3.5–5)
ALBUMIN/GLOB SERPL: 0.5 (ref 1–1.9)
ALP SERPL-CCNC: 194 U/L (ref 35–104)
ALT SERPL-CCNC: 7 U/L (ref 8–45)
ANION GAP SERPL CALC-SCNC: 26 MMOL/L (ref 7–16)
AST SERPL-CCNC: 13 U/L (ref 15–37)
BASOPHILS # BLD: 0.1 K/UL (ref 0–0.2)
BASOPHILS NFR BLD: 0 % (ref 0–2)
BILIRUB SERPL-MCNC: 0.4 MG/DL (ref 0–1.2)
BUN SERPL-MCNC: 60 MG/DL (ref 6–23)
CALCIUM SERPL-MCNC: 8.6 MG/DL (ref 8.8–10.2)
CHLORIDE SERPL-SCNC: 90 MMOL/L (ref 98–107)
CO2 SERPL-SCNC: 19 MMOL/L (ref 20–29)
CREAT SERPL-MCNC: 8.85 MG/DL (ref 0.6–1.1)
DIFFERENTIAL METHOD BLD: ABNORMAL
EOSINOPHIL # BLD: 0.1 K/UL (ref 0–0.8)
EOSINOPHIL NFR BLD: 1 % (ref 0.5–7.8)
ERYTHROCYTE [DISTWIDTH] IN BLOOD BY AUTOMATED COUNT: 20 % (ref 11.9–14.6)
GLOBULIN SER CALC-MCNC: 4.7 G/DL (ref 2.3–3.5)
GLUCOSE SERPL-MCNC: 134 MG/DL (ref 70–99)
HCT VFR BLD AUTO: 32.8 % (ref 35.8–46.3)
HGB BLD-MCNC: 10.1 G/DL (ref 11.7–15.4)
IMM GRANULOCYTES # BLD AUTO: 0.7 K/UL (ref 0–0.5)
IMM GRANULOCYTES NFR BLD AUTO: 3 % (ref 0–5)
LACTATE SERPL-SCNC: 4.8 MMOL/L (ref 0.5–2)
LACTATE SERPL-SCNC: 4.9 MMOL/L (ref 0.5–2)
LYMPHOCYTES # BLD: 0.9 K/UL (ref 0.5–4.6)
LYMPHOCYTES NFR BLD: 4 % (ref 13–44)
MCH RBC QN AUTO: 27.4 PG (ref 26.1–32.9)
MCHC RBC AUTO-ENTMCNC: 30.8 G/DL (ref 31.4–35)
MCV RBC AUTO: 89.1 FL (ref 82–102)
MONOCYTES # BLD: 1.3 K/UL (ref 0.1–1.3)
MONOCYTES NFR BLD: 5 % (ref 4–12)
NEUTS SEG # BLD: 20.6 K/UL (ref 1.7–8.2)
NEUTS SEG NFR BLD: 87 % (ref 43–78)
NRBC # BLD: 0 K/UL (ref 0–0.2)
PLATELET # BLD AUTO: 444 K/UL (ref 150–450)
PMV BLD AUTO: 9.5 FL (ref 9.4–12.3)
POTASSIUM SERPL-SCNC: 5.1 MMOL/L (ref 3.5–5.1)
PROCALCITONIN SERPL-MCNC: 7.11 NG/ML (ref 0–0.1)
PROT SERPL-MCNC: 7.1 G/DL (ref 6.3–8.2)
RBC # BLD AUTO: 3.68 M/UL (ref 4.05–5.2)
SODIUM SERPL-SCNC: 134 MMOL/L (ref 136–145)
WBC # BLD AUTO: 23.7 K/UL (ref 4.3–11.1)

## 2024-12-17 PROCEDURE — 36415 COLL VENOUS BLD VENIPUNCTURE: CPT

## 2024-12-17 PROCEDURE — 6360000004 HC RX CONTRAST MEDICATION

## 2024-12-17 PROCEDURE — 93005 ELECTROCARDIOGRAM TRACING: CPT

## 2024-12-17 PROCEDURE — 85025 COMPLETE CBC W/AUTO DIFF WBC: CPT

## 2024-12-17 PROCEDURE — 2580000003 HC RX 258

## 2024-12-17 PROCEDURE — 96365 THER/PROPH/DIAG IV INF INIT: CPT

## 2024-12-17 PROCEDURE — 96375 TX/PRO/DX INJ NEW DRUG ADDON: CPT

## 2024-12-17 PROCEDURE — 87040 BLOOD CULTURE FOR BACTERIA: CPT

## 2024-12-17 PROCEDURE — 99285 EMERGENCY DEPT VISIT HI MDM: CPT

## 2024-12-17 PROCEDURE — 96366 THER/PROPH/DIAG IV INF ADDON: CPT

## 2024-12-17 PROCEDURE — 96376 TX/PRO/DX INJ SAME DRUG ADON: CPT

## 2024-12-17 PROCEDURE — 76937 US GUIDE VASCULAR ACCESS: CPT

## 2024-12-17 PROCEDURE — 80053 COMPREHEN METABOLIC PANEL: CPT

## 2024-12-17 PROCEDURE — 6360000002 HC RX W HCPCS

## 2024-12-17 PROCEDURE — 83605 ASSAY OF LACTIC ACID: CPT

## 2024-12-17 PROCEDURE — 74177 CT ABD & PELVIS W/CONTRAST: CPT

## 2024-12-17 PROCEDURE — 84145 PROCALCITONIN (PCT): CPT

## 2024-12-17 RX ORDER — MORPHINE SULFATE 4 MG/ML
4 INJECTION, SOLUTION INTRAMUSCULAR; INTRAVENOUS ONCE
Status: COMPLETED | OUTPATIENT
Start: 2024-12-17 | End: 2024-12-17

## 2024-12-17 RX ORDER — ONDANSETRON 2 MG/ML
4 INJECTION INTRAMUSCULAR; INTRAVENOUS
Status: COMPLETED | OUTPATIENT
Start: 2024-12-17 | End: 2024-12-17

## 2024-12-17 RX ORDER — 0.9 % SODIUM CHLORIDE 0.9 %
500 INTRAVENOUS SOLUTION INTRAVENOUS
Status: COMPLETED | OUTPATIENT
Start: 2024-12-17 | End: 2024-12-17

## 2024-12-17 RX ORDER — CLINDAMYCIN PHOSPHATE 600 MG/50ML
600 INJECTION, SOLUTION INTRAVENOUS ONCE
Status: COMPLETED | OUTPATIENT
Start: 2024-12-17 | End: 2024-12-18

## 2024-12-17 RX ORDER — LINEZOLID 2 MG/ML
600 INJECTION, SOLUTION INTRAVENOUS
Status: COMPLETED | OUTPATIENT
Start: 2024-12-17 | End: 2024-12-17

## 2024-12-17 RX ORDER — IOPAMIDOL 755 MG/ML
100 INJECTION, SOLUTION INTRAVASCULAR
Status: COMPLETED | OUTPATIENT
Start: 2024-12-17 | End: 2024-12-17

## 2024-12-17 RX ADMIN — MORPHINE SULFATE 4 MG: 4 INJECTION INTRAVENOUS at 23:56

## 2024-12-17 RX ADMIN — MORPHINE SULFATE 4 MG: 4 INJECTION INTRAVENOUS at 21:58

## 2024-12-17 RX ADMIN — ONDANSETRON 4 MG: 2 INJECTION, SOLUTION INTRAMUSCULAR; INTRAVENOUS at 22:02

## 2024-12-17 RX ADMIN — LINEZOLID 600 MG: 600 INJECTION, SOLUTION INTRAVENOUS at 22:01

## 2024-12-17 RX ADMIN — SODIUM CHLORIDE 500 ML: 9 INJECTION, SOLUTION INTRAVENOUS at 21:58

## 2024-12-17 RX ADMIN — IOPAMIDOL 100 ML: 755 INJECTION, SOLUTION INTRAVENOUS at 23:10

## 2024-12-17 RX ADMIN — MEROPENEM 1000 MG: 1 INJECTION INTRAVENOUS at 22:00

## 2024-12-17 ASSESSMENT — PAIN DESCRIPTION - DESCRIPTORS: DESCRIPTORS: SORE

## 2024-12-17 ASSESSMENT — PAIN SCALES - GENERAL
PAINLEVEL_OUTOF10: 10
PAINLEVEL_OUTOF10: 9
PAINLEVEL_OUTOF10: 9

## 2024-12-17 ASSESSMENT — PAIN - FUNCTIONAL ASSESSMENT: PAIN_FUNCTIONAL_ASSESSMENT: 0-10

## 2024-12-17 ASSESSMENT — LIFESTYLE VARIABLES
HOW MANY STANDARD DRINKS CONTAINING ALCOHOL DO YOU HAVE ON A TYPICAL DAY: PATIENT DOES NOT DRINK
HOW OFTEN DO YOU HAVE A DRINK CONTAINING ALCOHOL: NEVER

## 2024-12-17 ASSESSMENT — PAIN DESCRIPTION - LOCATION: LOCATION: BACK;BUTTOCKS

## 2024-12-18 ENCOUNTER — ANESTHESIA (OUTPATIENT)
Dept: SURGERY | Age: 54
End: 2024-12-18
Payer: MEDICARE

## 2024-12-18 ENCOUNTER — APPOINTMENT (OUTPATIENT)
Dept: GENERAL RADIOLOGY | Age: 54
DRG: 853 | End: 2024-12-18
Payer: MEDICARE

## 2024-12-18 ENCOUNTER — ANESTHESIA EVENT (OUTPATIENT)
Dept: SURGERY | Age: 54
End: 2024-12-18
Payer: MEDICARE

## 2024-12-18 PROBLEM — M72.6 NECROTIZING FASCIITIS: Status: ACTIVE | Noted: 2024-12-18

## 2024-12-18 PROBLEM — I48.91 ATRIAL FIBRILLATION WITH RAPID VENTRICULAR RESPONSE (HCC): Status: ACTIVE | Noted: 2021-12-22

## 2024-12-18 PROBLEM — M79.89 NECROTIZING SOFT TISSUE INFECTION: Status: ACTIVE | Noted: 2024-12-18

## 2024-12-18 LAB
ABO + RH BLD: NORMAL
APTT PPP: 33.7 SEC (ref 23.3–37.4)
BLOOD GROUP ANTIBODIES SERPL: NORMAL
D DIMER PPP FEU-MCNC: 3.49 UG/ML(FEU)
EKG DIAGNOSIS: NORMAL
EKG Q-T INTERVAL: 340 MS
EKG QRS DURATION: 68 MS
EKG QTC CALCULATION (BAZETT): 478 MS
EKG R AXIS: 11 DEGREES
EKG T AXIS: 143 DEGREES
EKG VENTRICULAR RATE: 119 BPM
FIBRINOGEN PPP-MCNC: 339 MG/DL (ref 190–501)
GLUCOSE BLD STRIP.AUTO-MCNC: 106 MG/DL (ref 65–100)
GLUCOSE BLD STRIP.AUTO-MCNC: 89 MG/DL (ref 65–100)
INR PPP: 1.6
LACTATE SERPL-SCNC: 2.7 MMOL/L (ref 0.5–2)
LACTATE SERPL-SCNC: 4.5 MMOL/L (ref 0.5–2)
PROTHROMBIN TIME: 19.6 SEC (ref 11.3–14.9)
SERVICE CMNT-IMP: ABNORMAL
SERVICE CMNT-IMP: NORMAL
SPECIMEN EXP DATE BLD: NORMAL

## 2024-12-18 PROCEDURE — 96367 TX/PROPH/DG ADDL SEQ IV INF: CPT

## 2024-12-18 PROCEDURE — 86850 RBC ANTIBODY SCREEN: CPT

## 2024-12-18 PROCEDURE — 7100000000 HC PACU RECOVERY - FIRST 15 MIN: Performed by: SURGERY

## 2024-12-18 PROCEDURE — 3700000000 HC ANESTHESIA ATTENDED CARE: Performed by: SURGERY

## 2024-12-18 PROCEDURE — 85384 FIBRINOGEN ACTIVITY: CPT

## 2024-12-18 PROCEDURE — 99232 SBSQ HOSP IP/OBS MODERATE 35: CPT | Performed by: INTERNAL MEDICINE

## 2024-12-18 PROCEDURE — 2580000003 HC RX 258: Performed by: HOSPITALIST

## 2024-12-18 PROCEDURE — 87077 CULTURE AEROBIC IDENTIFY: CPT

## 2024-12-18 PROCEDURE — 85730 THROMBOPLASTIN TIME PARTIAL: CPT

## 2024-12-18 PROCEDURE — 2500000003 HC RX 250 WO HCPCS: Performed by: NURSE ANESTHETIST, CERTIFIED REGISTERED

## 2024-12-18 PROCEDURE — 2500000003 HC RX 250 WO HCPCS: Performed by: NURSE PRACTITIONER

## 2024-12-18 PROCEDURE — 0KBP0ZZ EXCISION OF LEFT HIP MUSCLE, OPEN APPROACH: ICD-10-PCS | Performed by: SURGERY

## 2024-12-18 PROCEDURE — 99223 1ST HOSP IP/OBS HIGH 75: CPT | Performed by: INTERNAL MEDICINE

## 2024-12-18 PROCEDURE — 2580000003 HC RX 258: Performed by: NURSE ANESTHETIST, CERTIFIED REGISTERED

## 2024-12-18 PROCEDURE — 82962 GLUCOSE BLOOD TEST: CPT

## 2024-12-18 PROCEDURE — 87070 CULTURE OTHR SPECIMN AEROBIC: CPT

## 2024-12-18 PROCEDURE — 87205 SMEAR GRAM STAIN: CPT

## 2024-12-18 PROCEDURE — 86900 BLOOD TYPING SEROLOGIC ABO: CPT

## 2024-12-18 PROCEDURE — 6360000002 HC RX W HCPCS

## 2024-12-18 PROCEDURE — 36556 INSERT NON-TUNNEL CV CATH: CPT | Performed by: EMERGENCY MEDICINE

## 2024-12-18 PROCEDURE — 2000000000 HC ICU R&B

## 2024-12-18 PROCEDURE — C1751 CATH, INF, PER/CENT/MIDLINE: HCPCS

## 2024-12-18 PROCEDURE — 6360000002 HC RX W HCPCS: Performed by: SURGERY

## 2024-12-18 PROCEDURE — 86901 BLOOD TYPING SEROLOGIC RH(D): CPT

## 2024-12-18 PROCEDURE — 6360000002 HC RX W HCPCS: Performed by: HOSPITALIST

## 2024-12-18 PROCEDURE — 0QB10ZZ EXCISION OF SACRUM, OPEN APPROACH: ICD-10-PCS | Performed by: SURGERY

## 2024-12-18 PROCEDURE — 3700000001 HC ADD 15 MINUTES (ANESTHESIA): Performed by: SURGERY

## 2024-12-18 PROCEDURE — 02HV33Z INSERTION OF INFUSION DEVICE INTO SUPERIOR VENA CAVA, PERCUTANEOUS APPROACH: ICD-10-PCS | Performed by: EMERGENCY MEDICINE

## 2024-12-18 PROCEDURE — 87076 CULTURE ANAEROBE IDENT EACH: CPT

## 2024-12-18 PROCEDURE — 85610 PROTHROMBIN TIME: CPT

## 2024-12-18 PROCEDURE — 6360000002 HC RX W HCPCS: Performed by: NURSE ANESTHETIST, CERTIFIED REGISTERED

## 2024-12-18 PROCEDURE — 3600000012 HC SURGERY LEVEL 2 ADDTL 15MIN: Performed by: SURGERY

## 2024-12-18 PROCEDURE — 87075 CULTR BACTERIA EXCEPT BLOOD: CPT

## 2024-12-18 PROCEDURE — 2700000000 HC OXYGEN THERAPY PER DAY

## 2024-12-18 PROCEDURE — 3600000002 HC SURGERY LEVEL 2 BASE: Performed by: SURGERY

## 2024-12-18 PROCEDURE — 7100000001 HC PACU RECOVERY - ADDTL 15 MIN: Performed by: SURGERY

## 2024-12-18 PROCEDURE — 36415 COLL VENOUS BLD VENIPUNCTURE: CPT

## 2024-12-18 PROCEDURE — 87186 SC STD MICRODIL/AGAR DIL: CPT

## 2024-12-18 PROCEDURE — 83605 ASSAY OF LACTIC ACID: CPT

## 2024-12-18 PROCEDURE — 2500000003 HC RX 250 WO HCPCS: Performed by: HOSPITALIST

## 2024-12-18 PROCEDURE — 3E043XZ INTRODUCTION OF VASOPRESSOR INTO CENTRAL VEIN, PERCUTANEOUS APPROACH: ICD-10-PCS | Performed by: HOSPITALIST

## 2024-12-18 PROCEDURE — 93010 ELECTROCARDIOGRAM REPORT: CPT | Performed by: INTERNAL MEDICINE

## 2024-12-18 PROCEDURE — 87176 TISSUE HOMOGENIZATION CULTR: CPT

## 2024-12-18 PROCEDURE — 36556 INSERT NON-TUNNEL CV CATH: CPT

## 2024-12-18 PROCEDURE — 71045 X-RAY EXAM CHEST 1 VIEW: CPT

## 2024-12-18 PROCEDURE — 2709999900 HC NON-CHARGEABLE SUPPLY: Performed by: SURGERY

## 2024-12-18 PROCEDURE — 2500000003 HC RX 250 WO HCPCS: Performed by: SURGERY

## 2024-12-18 PROCEDURE — 6370000000 HC RX 637 (ALT 250 FOR IP): Performed by: SURGERY

## 2024-12-18 PROCEDURE — 85379 FIBRIN DEGRADATION QUANT: CPT

## 2024-12-18 RX ORDER — POTASSIUM CHLORIDE 7.45 MG/ML
10 INJECTION INTRAVENOUS PRN
Status: DISCONTINUED | OUTPATIENT
Start: 2024-12-18 | End: 2024-12-20

## 2024-12-18 RX ORDER — HYDROMORPHONE HYDROCHLORIDE 2 MG/ML
0.5 INJECTION, SOLUTION INTRAMUSCULAR; INTRAVENOUS; SUBCUTANEOUS EVERY 5 MIN PRN
Status: DISCONTINUED | OUTPATIENT
Start: 2024-12-18 | End: 2024-12-18 | Stop reason: HOSPADM

## 2024-12-18 RX ORDER — VASOPRESSIN 20 [USP'U]/ML
INJECTION, SOLUTION INTRAVENOUS
Status: DISCONTINUED | OUTPATIENT
Start: 2024-12-18 | End: 2024-12-18 | Stop reason: SDUPTHER

## 2024-12-18 RX ORDER — LIDOCAINE HYDROCHLORIDE 20 MG/ML
INJECTION, SOLUTION EPIDURAL; INFILTRATION; INTRACAUDAL; PERINEURAL
Status: DISCONTINUED | OUTPATIENT
Start: 2024-12-18 | End: 2024-12-18 | Stop reason: SDUPTHER

## 2024-12-18 RX ORDER — ONDANSETRON 2 MG/ML
INJECTION INTRAMUSCULAR; INTRAVENOUS
Status: DISCONTINUED | OUTPATIENT
Start: 2024-12-18 | End: 2024-12-18 | Stop reason: SDUPTHER

## 2024-12-18 RX ORDER — SODIUM CHLORIDE, SODIUM LACTATE, POTASSIUM CHLORIDE, CALCIUM CHLORIDE 600; 310; 30; 20 MG/100ML; MG/100ML; MG/100ML; MG/100ML
INJECTION, SOLUTION INTRAVENOUS
Status: DISCONTINUED | OUTPATIENT
Start: 2024-12-18 | End: 2024-12-18 | Stop reason: SDUPTHER

## 2024-12-18 RX ORDER — DEXTROSE MONOHYDRATE 100 MG/ML
INJECTION, SOLUTION INTRAVENOUS CONTINUOUS PRN
Status: DISCONTINUED | OUTPATIENT
Start: 2024-12-18 | End: 2024-12-20

## 2024-12-18 RX ORDER — PROPOFOL 10 MG/ML
INJECTION, EMULSION INTRAVENOUS
Status: DISCONTINUED | OUTPATIENT
Start: 2024-12-18 | End: 2024-12-18 | Stop reason: SDUPTHER

## 2024-12-18 RX ORDER — NOREPINEPHRINE BITARTRATE 1 MG/ML
INJECTION, SOLUTION INTRAVENOUS
Status: DISCONTINUED | OUTPATIENT
Start: 2024-12-18 | End: 2024-12-18 | Stop reason: SDUPTHER

## 2024-12-18 RX ORDER — POLYETHYLENE GLYCOL 3350 17 G/17G
17 POWDER, FOR SOLUTION ORAL DAILY PRN
Status: DISCONTINUED | OUTPATIENT
Start: 2024-12-18 | End: 2024-12-20

## 2024-12-18 RX ORDER — 0.9 % SODIUM CHLORIDE 0.9 %
1000 INTRAVENOUS SOLUTION INTRAVENOUS
Status: COMPLETED | OUTPATIENT
Start: 2024-12-18 | End: 2024-12-18

## 2024-12-18 RX ORDER — CLINDAMYCIN PHOSPHATE 150 MG/ML
600 INJECTION, SOLUTION INTRAVENOUS EVERY 8 HOURS
Status: DISCONTINUED | OUTPATIENT
Start: 2024-12-18 | End: 2024-12-18

## 2024-12-18 RX ORDER — NOREPINEPHRINE BITARTRATE 0.02 MG/ML
.5-2 INJECTION, SOLUTION INTRAVENOUS CONTINUOUS
Status: DISCONTINUED | OUTPATIENT
Start: 2024-12-18 | End: 2024-12-18 | Stop reason: SDUPTHER

## 2024-12-18 RX ORDER — SODIUM CHLORIDE 0.9 % (FLUSH) 0.9 %
5-40 SYRINGE (ML) INJECTION EVERY 12 HOURS SCHEDULED
Status: DISCONTINUED | OUTPATIENT
Start: 2024-12-18 | End: 2024-12-21 | Stop reason: HOSPADM

## 2024-12-18 RX ORDER — LINEZOLID 2 MG/ML
600 INJECTION, SOLUTION INTRAVENOUS EVERY 12 HOURS
Status: DISCONTINUED | OUTPATIENT
Start: 2024-12-18 | End: 2024-12-20

## 2024-12-18 RX ORDER — POTASSIUM CHLORIDE 1500 MG/1
40 TABLET, EXTENDED RELEASE ORAL PRN
Status: DISCONTINUED | OUTPATIENT
Start: 2024-12-18 | End: 2024-12-20

## 2024-12-18 RX ORDER — SODIUM CHLORIDE 0.9 % (FLUSH) 0.9 %
5-40 SYRINGE (ML) INJECTION EVERY 12 HOURS SCHEDULED
Status: CANCELLED | OUTPATIENT
Start: 2024-12-18

## 2024-12-18 RX ORDER — FENTANYL CITRATE 50 UG/ML
INJECTION, SOLUTION INTRAMUSCULAR; INTRAVENOUS
Status: COMPLETED
Start: 2024-12-18 | End: 2024-12-18

## 2024-12-18 RX ORDER — ETOMIDATE 2 MG/ML
INJECTION INTRAVENOUS
Status: DISCONTINUED | OUTPATIENT
Start: 2024-12-18 | End: 2024-12-18 | Stop reason: SDUPTHER

## 2024-12-18 RX ORDER — FENTANYL CITRATE 50 UG/ML
INJECTION, SOLUTION INTRAMUSCULAR; INTRAVENOUS
Status: DISCONTINUED | OUTPATIENT
Start: 2024-12-18 | End: 2024-12-18 | Stop reason: SDUPTHER

## 2024-12-18 RX ORDER — FENTANYL CITRATE 50 UG/ML
50 INJECTION, SOLUTION INTRAMUSCULAR; INTRAVENOUS ONCE
Status: COMPLETED | OUTPATIENT
Start: 2024-12-18 | End: 2024-12-18

## 2024-12-18 RX ORDER — SODIUM CHLORIDE 9 MG/ML
INJECTION, SOLUTION INTRAVENOUS PRN
Status: DISCONTINUED | OUTPATIENT
Start: 2024-12-18 | End: 2024-12-21 | Stop reason: HOSPADM

## 2024-12-18 RX ORDER — ONDANSETRON 4 MG/1
4 TABLET, ORALLY DISINTEGRATING ORAL EVERY 8 HOURS PRN
Status: DISCONTINUED | OUTPATIENT
Start: 2024-12-18 | End: 2024-12-21 | Stop reason: HOSPADM

## 2024-12-18 RX ORDER — ACETAMINOPHEN 325 MG/1
650 TABLET ORAL EVERY 6 HOURS PRN
Status: DISCONTINUED | OUTPATIENT
Start: 2024-12-18 | End: 2024-12-20 | Stop reason: SDUPTHER

## 2024-12-18 RX ORDER — ROCURONIUM BROMIDE 10 MG/ML
INJECTION, SOLUTION INTRAVENOUS
Status: DISCONTINUED | OUTPATIENT
Start: 2024-12-18 | End: 2024-12-18 | Stop reason: SDUPTHER

## 2024-12-18 RX ORDER — SODIUM CHLORIDE 0.9 % (FLUSH) 0.9 %
5-40 SYRINGE (ML) INJECTION PRN
Status: CANCELLED | OUTPATIENT
Start: 2024-12-18

## 2024-12-18 RX ORDER — PROCHLORPERAZINE EDISYLATE 5 MG/ML
5 INJECTION INTRAMUSCULAR; INTRAVENOUS
Status: DISCONTINUED | OUTPATIENT
Start: 2024-12-18 | End: 2024-12-18 | Stop reason: HOSPADM

## 2024-12-18 RX ORDER — IBUPROFEN 600 MG/1
1 TABLET ORAL PRN
Status: DISCONTINUED | OUTPATIENT
Start: 2024-12-18 | End: 2024-12-20

## 2024-12-18 RX ORDER — SODIUM CHLORIDE 0.9 % (FLUSH) 0.9 %
5-40 SYRINGE (ML) INJECTION PRN
Status: DISCONTINUED | OUTPATIENT
Start: 2024-12-18 | End: 2024-12-20

## 2024-12-18 RX ORDER — NALOXONE HYDROCHLORIDE 0.4 MG/ML
INJECTION, SOLUTION INTRAMUSCULAR; INTRAVENOUS; SUBCUTANEOUS PRN
Status: DISCONTINUED | OUTPATIENT
Start: 2024-12-18 | End: 2024-12-18 | Stop reason: HOSPADM

## 2024-12-18 RX ORDER — SODIUM CHLORIDE 9 MG/ML
INJECTION, SOLUTION INTRAVENOUS PRN
Status: CANCELLED | OUTPATIENT
Start: 2024-12-18

## 2024-12-18 RX ORDER — PHENYLEPHRINE HYDROCHLORIDE 10 MG/ML
INJECTION INTRAVENOUS
Status: DISCONTINUED | OUTPATIENT
Start: 2024-12-18 | End: 2024-12-18 | Stop reason: SDUPTHER

## 2024-12-18 RX ORDER — INSULIN LISPRO 100 [IU]/ML
0-8 INJECTION, SOLUTION INTRAVENOUS; SUBCUTANEOUS
Status: DISCONTINUED | OUTPATIENT
Start: 2024-12-18 | End: 2024-12-20

## 2024-12-18 RX ORDER — MAGNESIUM SULFATE IN WATER 40 MG/ML
2000 INJECTION, SOLUTION INTRAVENOUS PRN
Status: DISCONTINUED | OUTPATIENT
Start: 2024-12-18 | End: 2024-12-20

## 2024-12-18 RX ORDER — OXYCODONE HYDROCHLORIDE 5 MG/1
5 TABLET ORAL
Status: DISCONTINUED | OUTPATIENT
Start: 2024-12-18 | End: 2024-12-18 | Stop reason: HOSPADM

## 2024-12-18 RX ORDER — ONDANSETRON 2 MG/ML
4 INJECTION INTRAMUSCULAR; INTRAVENOUS EVERY 6 HOURS PRN
Status: DISCONTINUED | OUTPATIENT
Start: 2024-12-18 | End: 2024-12-21 | Stop reason: HOSPADM

## 2024-12-18 RX ORDER — HYDROMORPHONE HYDROCHLORIDE 1 MG/ML
0.5 INJECTION, SOLUTION INTRAMUSCULAR; INTRAVENOUS; SUBCUTANEOUS
Status: DISCONTINUED | OUTPATIENT
Start: 2024-12-18 | End: 2024-12-20

## 2024-12-18 RX ORDER — ACETAMINOPHEN 650 MG/1
650 SUPPOSITORY RECTAL EVERY 6 HOURS PRN
Status: DISCONTINUED | OUTPATIENT
Start: 2024-12-18 | End: 2024-12-20 | Stop reason: SDUPTHER

## 2024-12-18 RX ORDER — KETAMINE HCL IN NACL, ISO-OSM 20 MG/2 ML
SYRINGE (ML) INJECTION
Status: DISCONTINUED | OUTPATIENT
Start: 2024-12-18 | End: 2024-12-18 | Stop reason: SDUPTHER

## 2024-12-18 RX ORDER — NOREPINEPHRINE BITARTRATE 0.02 MG/ML
1-100 INJECTION, SOLUTION INTRAVENOUS CONTINUOUS
Status: DISCONTINUED | OUTPATIENT
Start: 2024-12-18 | End: 2024-12-20

## 2024-12-18 RX ADMIN — NOREPINEPHRINE BITARTRATE 16 MCG: 1 SOLUTION INTRAVENOUS at 15:06

## 2024-12-18 RX ADMIN — ROCURONIUM BROMIDE 25 MG: 10 INJECTION, SOLUTION INTRAVENOUS at 14:42

## 2024-12-18 RX ADMIN — SODIUM CHLORIDE, PRESERVATIVE FREE 10 ML: 5 INJECTION INTRAVENOUS at 08:51

## 2024-12-18 RX ADMIN — VASOPRESSIN 2 UNITS: 20 INJECTION INTRAVENOUS at 15:34

## 2024-12-18 RX ADMIN — NOREPINEPHRINE BITARTRATE 16 MCG: 1 SOLUTION INTRAVENOUS at 15:12

## 2024-12-18 RX ADMIN — PHENYLEPHRINE HYDROCHLORIDE 100 MCG: 10 INJECTION INTRAVENOUS at 15:02

## 2024-12-18 RX ADMIN — HYDROMORPHONE HYDROCHLORIDE 0.5 MG: 1 INJECTION, SOLUTION INTRAMUSCULAR; INTRAVENOUS; SUBCUTANEOUS at 17:58

## 2024-12-18 RX ADMIN — FENTANYL CITRATE 50 MCG: 50 INJECTION, SOLUTION INTRAMUSCULAR; INTRAVENOUS at 15:15

## 2024-12-18 RX ADMIN — SODIUM CHLORIDE 1000 ML: 9 INJECTION, SOLUTION INTRAVENOUS at 03:36

## 2024-12-18 RX ADMIN — LINEZOLID 600 MG: 600 INJECTION, SOLUTION INTRAVENOUS at 09:58

## 2024-12-18 RX ADMIN — NOREPINEPHRINE BITARTRATE 16 MCG: 1 SOLUTION INTRAVENOUS at 15:26

## 2024-12-18 RX ADMIN — NOREPINEPHRINE BITARTRATE 16 MCG: 1 SOLUTION INTRAVENOUS at 15:09

## 2024-12-18 RX ADMIN — PROPOFOL 100 MG: 10 INJECTION, EMULSION INTRAVENOUS at 14:42

## 2024-12-18 RX ADMIN — ONDANSETRON 4 MG: 2 INJECTION INTRAMUSCULAR; INTRAVENOUS at 16:13

## 2024-12-18 RX ADMIN — ETOMIDATE 40 MG: 2 INJECTION, SOLUTION INTRAVENOUS at 14:40

## 2024-12-18 RX ADMIN — NOREPINEPHRINE BITARTRATE 16 MCG: 1 SOLUTION INTRAVENOUS at 15:08

## 2024-12-18 RX ADMIN — NOREPINEPHRINE BITARTRATE 10 MCG/MIN: 16 SOLUTION INTRAVENOUS at 18:24

## 2024-12-18 RX ADMIN — FENTANYL CITRATE 50 MCG: 50 INJECTION, SOLUTION INTRAMUSCULAR; INTRAVENOUS at 06:11

## 2024-12-18 RX ADMIN — SODIUM CHLORIDE, PRESERVATIVE FREE 10 ML: 5 INJECTION INTRAVENOUS at 21:26

## 2024-12-18 RX ADMIN — NOREPINEPHRINE BITARTRATE 5 MCG/MIN: 16 SOLUTION INTRAVENOUS at 07:06

## 2024-12-18 RX ADMIN — NOREPINEPHRINE BITARTRATE 8 MCG: 1 SOLUTION INTRAVENOUS at 14:42

## 2024-12-18 RX ADMIN — FENTANYL CITRATE 50 MCG: 0.05 INJECTION, SOLUTION INTRAMUSCULAR; INTRAVENOUS at 06:11

## 2024-12-18 RX ADMIN — Medication 20 MG: at 15:00

## 2024-12-18 RX ADMIN — VASOPRESSIN 2 UNITS: 20 INJECTION INTRAVENOUS at 15:07

## 2024-12-18 RX ADMIN — LINEZOLID 600 MG: 600 INJECTION, SOLUTION INTRAVENOUS at 21:30

## 2024-12-18 RX ADMIN — SUGAMMADEX 200 MG: 100 INJECTION, SOLUTION INTRAVENOUS at 16:12

## 2024-12-18 RX ADMIN — VASOPRESSIN 2 UNITS: 20 INJECTION INTRAVENOUS at 15:30

## 2024-12-18 RX ADMIN — HYDROMORPHONE HYDROCHLORIDE 0.5 MG: 1 INJECTION, SOLUTION INTRAMUSCULAR; INTRAVENOUS; SUBCUTANEOUS at 21:25

## 2024-12-18 RX ADMIN — SODIUM CHLORIDE, SODIUM LACTATE, POTASSIUM CHLORIDE, AND CALCIUM CHLORIDE: 600; 310; 30; 20 INJECTION, SOLUTION INTRAVENOUS at 14:30

## 2024-12-18 RX ADMIN — CLINDAMYCIN PHOSPHATE 600 MG: 600 INJECTION, SOLUTION INTRAVENOUS at 00:37

## 2024-12-18 RX ADMIN — MEROPENEM 500 MG: 500 INJECTION INTRAVENOUS at 21:34

## 2024-12-18 RX ADMIN — LIDOCAINE HYDROCHLORIDE 100 MG: 20 INJECTION, SOLUTION EPIDURAL; INFILTRATION; INTRACAUDAL; PERINEURAL at 14:40

## 2024-12-18 RX ADMIN — NOREPINEPHRINE BITARTRATE 16 MCG: 1 SOLUTION INTRAVENOUS at 14:46

## 2024-12-18 RX ADMIN — NOREPINEPHRINE BITARTRATE 16 MCG: 1 SOLUTION INTRAVENOUS at 14:40

## 2024-12-18 ASSESSMENT — PAIN DESCRIPTION - ORIENTATION
ORIENTATION: LEFT

## 2024-12-18 ASSESSMENT — PAIN SCALES - GENERAL
PAINLEVEL_OUTOF10: 10
PAINLEVEL_OUTOF10: 0
PAINLEVEL_OUTOF10: 10
PAINLEVEL_OUTOF10: 10
PAINLEVEL_OUTOF10: 0

## 2024-12-18 ASSESSMENT — PAIN DESCRIPTION - DESCRIPTORS
DESCRIPTORS: ACHING
DESCRIPTORS: ACHING
DESCRIPTORS: ACHING;DISCOMFORT;BURNING

## 2024-12-18 ASSESSMENT — PAIN DESCRIPTION - LOCATION
LOCATION: BACK;BUTTOCKS
LOCATION: BUTTOCKS;LEG
LOCATION: BUTTOCKS;BACK

## 2024-12-18 ASSESSMENT — PAIN DESCRIPTION - ONSET: ONSET: ON-GOING

## 2024-12-18 ASSESSMENT — PAIN DESCRIPTION - PAIN TYPE: TYPE: ACUTE PAIN;SURGICAL PAIN

## 2024-12-18 ASSESSMENT — PAIN DESCRIPTION - FREQUENCY: FREQUENCY: CONTINUOUS

## 2024-12-18 NOTE — PROGRESS NOTES
IP Consult to Vascular Access Team  Consult performed by: Rupert Mehta, DEMIAN  Consult ordered by: Michele Degroot MD       Notified by pt RN that pt to receive central line for access. No additional PIV required at this time.

## 2024-12-18 NOTE — ED TRIAGE NOTES
Patient arrives via wheelchair to triage. Reports multiple wounds on back and buttocks. States she thinks they are getting worse. Endorses pain, states she is out of her pain medication.

## 2024-12-18 NOTE — CARE COORDINATION
Case Management Assessment  Initial Evaluation    Date/Time of Evaluation: 12/18/2024 3:47 PM  Assessment Completed by: Tia Monroe RN    If patient is discharged prior to next notation, then this note serves as note for discharge by case management.    Patient Name: Giselle Rosado                   YOB: 1970  Diagnosis: Necrotizing fasciitis [M72.6]  Lactic acidosis [E87.20]  Atrial fibrillation with rapid ventricular response (HCC) [I48.91]  Skin lesions [L98.9]                   Date / Time: 12/17/2024  7:34 PM    Patient Admission Status: Inpatient   Readmission Risk (Low < 19, Mod (19-27), High > 27): Readmission Risk Score: 27.1    Current PCP: Tanisha Powers APRN - CNP  PCP verified by CM? Yes    Chart Reviewed: Yes      History Provided by: Spouse  Patient Orientation: Alert and Oriented    Patient Cognition: Alert    Hospitalization in the last 30 days (Readmission):  Yes    If yes, Readmission Assessment in CM Navigator will be completed.    Advance Directives:      Code Status: Full Code   Patient's Primary Decision Maker is: Legal Next of Kin    Primary Decision Maker: Vinicio Rosado - Spouse - [phone]    Discharge Planning:    Patient lives with: (P) Spouse/Significant Other, Children Type of Home: (P) Other (Comment) (pending)  Primary Care Giver: Family  Patient Support Systems include: Spouse/Significant Other, Children, Family Members   Current Financial resources: (P) Medicare  Current community resources: (P) Other (Comment) (was to start oupatient January 2025/Dialysis US Renal TR MWF)  Current services prior to admission: (P) Durable Medical Equipment            Current DME: (P) Shower Chair, Bedside Commode, Walker            Type of Home Care services:  None    ADLS  Prior functional level: (P) Independent in ADLs/IADLs, Assistance with the following:  Current functional level: (P) Assistance with the following:    PT AM-PAC:   /24  OT AM-PAC:   /24    Family  can provide assistance at DC: (P) Yes  Would you like Case Management to discuss the discharge plan with any other family members/significant others, and if so, who? (P) Yes  Plans to Return to Present Housing: (P) Unknown at present  Other Identified Issues/Barriers to RETURNING to current housing: pending  Potential Assistance needed at discharge: (P) Skilled Nursing Facility            Potential DME:  pending  Patient expects to discharge to: House  Plan for transportation at discharge: (P) Family    Financial    Payor: MEDICARE / Plan: MEDICARE PART A AND B / Product Type: *No Product type* /     Does insurance require precert for SNF: No    Potential assistance Purchasing Medications: (P) No  Meds-to-Beds request:        [id number] [place], [place] - [address] [place] [address] RD - P [phone] - F [phone]  [address] [place] [address]  Phone: [phone] Fax: [phone]    [id number] [place], [place] - [address] [place] [address], [address] - P [phone] - F [phone]  [address] [place] [address], [address]  [facility] [address]  Phone: [phone] Fax: [phone]      Notes:    Factors facilitating achievement of predicted outcomes: Family support and Cooperative    Barriers to discharge: pending medical treatment    Additional Case Management Notes: Chart reviewed and spoke with pt's spouse as currently in OR. Pt lives with spouse in one level home. Ramp entrance. One daughter stays with them some. Has 2 daughters 22 and 22 y/o. No current HH or rehab. PCP and insurance confirmed. Was to start outpatient therapy at [facility] in January per spouse. Recent d/c from IR/9th floor. US Renal for dialysis MWF. Jose Martin-walker for ambulation. Spouse aware CM to follow for ALLEN needs/POC. They declined HH as have 2 big dogs in home. Discussed poss need for STR at d/c with spouse. PT/OT pending.     The Plan for Transition of Care is related to the following treatment goals

## 2024-12-18 NOTE — PLAN OF CARE
Problem: Chronic Conditions and Co-morbidities  Goal: Patient's chronic conditions and co-morbidity symptoms are monitored and maintained or improved  12/18/2024 1026 by Bianka Rangel, RN  Outcome: Progressing  Flowsheets (Taken 12/18/2024 0705)  Care Plan - Patient's Chronic Conditions and Co-Morbidity Symptoms are Monitored and Maintained or Improved:   Monitor and assess patient's chronic conditions and comorbid symptoms for stability, deterioration, or improvement   Collaborate with multidisciplinary team to address chronic and comorbid conditions and prevent exacerbation or deterioration   Update acute care plan with appropriate goals if chronic or comorbid symptoms are exacerbated and prevent overall improvement and discharge  12/18/2024 0551 by Melo Chávez RN  Outcome: Not Progressing  Flowsheets (Taken 12/18/2024 0530)  Care Plan - Patient's Chronic Conditions and Co-Morbidity Symptoms are Monitored and Maintained or Improved:   Monitor and assess patient's chronic conditions and comorbid symptoms for stability, deterioration, or improvement   Collaborate with multidisciplinary team to address chronic and comorbid conditions and prevent exacerbation or deterioration   Update acute care plan with appropriate goals if chronic or comorbid symptoms are exacerbated and prevent overall improvement and discharge     Problem: Discharge Planning  Goal: Discharge to home or other facility with appropriate resources  12/18/2024 1026 by Bianka Rangel, RN  Outcome: Progressing  Flowsheets (Taken 12/18/2024 0705)  Discharge to home or other facility with appropriate resources:   Identify barriers to discharge with patient and caregiver   Identify discharge learning needs (meds, wound care, etc)   Arrange for needed discharge resources and transportation as appropriate   Arrange for interpreters to assist at discharge as needed   Refer to discharge planning if patient needs post-hospital services based on

## 2024-12-18 NOTE — PROGRESS NOTES
IP Consult to Vascular Access Team  Consult performed by: Rupert Mehta RN  Consult ordered by: Timothy Bazan DO       US Guided PIV access-    Ultrasound was used to find the vein which was compressible and without any ultrasound features of an artery or nerve bundle. Skin was cleaned and disinfected prior to IV puncture.  Under real-time ultrasound guidance peripheral access was obtained in the left forearm using 22 G 1.75\" Peripheral IV catheter after 1 attempt(s). Blood return was present and IV flushed without difficulty with no clinical signs of infiltration. IV dressing applied and no immediate complications noted. Patient tolerated the procedure well.         US Guided PIV access-    Ultrasound was used to find the vein which was compressible and without any ultrasound features of an artery or nerve bundle. Skin was cleaned and disinfected prior to IV puncture.  Under real-time ultrasound guidance peripheral access was obtained in the right AC using 20 G 1.75\" Peripheral IV catheter after 1 attempt(s). Blood return was present and IV flushed without difficulty with no clinical signs of infiltration. IV dressing applied and no immediate complications noted. Patient tolerated the procedure well.

## 2024-12-18 NOTE — CONSULTS
Monocytes Absolute 1.3 0.1 - 1.3 K/UL    Eosinophils Absolute 0.1 0.0 - 0.8 K/UL    Basophils Absolute 0.1 0.0 - 0.2 K/UL    Immature Granulocytes Absolute 0.7 (H) 0.0 - 0.5 K/UL   Lactic Acid    Collection Time: 12/17/24  7:52 PM   Result Value Ref Range    Lactic Acid 4.8 (HH) 0.5 - 2.0 mmol/L   Procalcitonin    Collection Time: 12/17/24  7:52 PM   Result Value Ref Range    Procalcitonin 7.11 (H) 0.00 - 0.10 ng/mL   EKG 12 Lead    Collection Time: 12/17/24  8:07 PM   Result Value Ref Range    Ventricular Rate 119 BPM    QRS Duration 68 ms    Q-T Interval 340 ms    QTc Calculation (Bazett) 478 ms    R Axis 11 degrees    T Axis 143 degrees    Diagnosis       Atrial fibrillation with rapid ventricular response  Low voltage QRS  Cannot rule out Anteroseptal infarct , age undetermined  When compared with ECG of 23-OCT-2024 19:49,  No significant change was found  Confirmed by Rodney Alves MD ([id number]) on 12/18/2024 6:32:35 AM     Culture, Blood 1    Collection Time: 12/17/24  9:19 PM    Specimen: Blood   Result Value Ref Range    Special Requests LEFT  FOREARM        Culture NO GROWTH AFTER 12 HOURS     Lactate, Sepsis    Collection Time: 12/17/24  9:19 PM   Result Value Ref Range    Lactic Acid, Sepsis 4.9 (HH) 0.5 - 2.0 MMOL/L   Culture, Blood 2    Collection Time: 12/17/24  9:37 PM    Specimen: Blood   Result Value Ref Range    Special Requests RIGHT  Antecubital        Culture NO GROWTH AFTER 12 HOURS     Lactate, Sepsis    Collection Time: 12/18/24  1:13 AM   Result Value Ref Range    Lactic Acid, Sepsis 4.5 (HH) 0.5 - 2.0 MMOL/L   Fibrinogen    Collection Time: 12/18/24  1:13 AM   Result Value Ref Range    Fibrinogen 339 190 - 501 mg/dL   Protime-INR    Collection Time: 12/18/24  1:13 AM   Result Value Ref Range    Protime 19.6 (H) 11.3 - 14.9 sec    INR 1.6     APTT    Collection Time: 12/18/24  1:13 AM   Result Value Ref Range    APTT 33.7 23.3 - 37.4 SEC   D-Dimer, Quantitative    Collection Time:  12/18/24  1:13 AM   Result Value Ref Range    D-Dimer, Quant 3.49 (H) <0.56 ug/ml(FEU)   TYPE AND SCREEN    Collection Time: 12/18/24  1:25 AM   Result Value Ref Range    Crossmatch expiration date 12/21/2024,2359     ABO/Rh A POSITIVE     Antibody Screen NEG    Lactic Acid    Collection Time: 12/18/24  6:23 AM   Result Value Ref Range    Lactic Acid 2.7 (H) 0.5 - 2.0 mmol/L     Lab Results   Component Value Date/Time    CHOL 93 10/24/2024 04:20 AM    HDL 26 10/24/2024 04:20 AM    LDL 21 10/24/2024 04:20 AM    .8 10/24/2023 03:58 PM    VLDL 46 10/24/2024 04:20 AM    VLDL 28 01/27/2022 12:21 PM     EKG-A-fib        ASSESSMENT and PLAN      54-year-old female with necrotizing fasciitis and permanent atrial fibrillation.  Her heart rate is appropriate at this time.  Will titrate meds pending her heart rate.  Start heparin.  Surgical debridement if okay with surgery        Thank you for allowing me to participate in this patient's care.  Please call or contact me if there are any questions or concerns regarding the above.      SHANDA LIAO MD  12/18/24  2:15 PM

## 2024-12-18 NOTE — PROGRESS NOTES
Platelets 444 150 - 450 K/uL    MPV 9.5 9.4 - 12.3 FL    nRBC 0.00 0.0 - 0.2 K/uL    Differential Type AUTOMATED      Neutrophils % 87 (H) 43 - 78 %    Lymphocytes % 4 (L) 13 - 44 %    Monocytes % 5 4.0 - 12.0 %    Eosinophils % 1 0.5 - 7.8 %    Basophils % 0 0.0 - 2.0 %    Immature Granulocytes % 3 0.0 - 5.0 %    Neutrophils Absolute 20.6 (H) 1.7 - 8.2 K/UL    Lymphocytes Absolute 0.9 0.5 - 4.6 K/UL    Monocytes Absolute 1.3 0.1 - 1.3 K/UL    Eosinophils Absolute 0.1 0.0 - 0.8 K/UL    Basophils Absolute 0.1 0.0 - 0.2 K/UL    Immature Granulocytes Absolute 0.7 (H) 0.0 - 0.5 K/UL   Lactic Acid    Collection Time: 12/17/24  7:52 PM   Result Value Ref Range    Lactic Acid 4.8 (HH) 0.5 - 2.0 mmol/L   Procalcitonin    Collection Time: 12/17/24  7:52 PM   Result Value Ref Range    Procalcitonin 7.11 (H) 0.00 - 0.10 ng/mL   EKG 12 Lead    Collection Time: 12/17/24  8:07 PM   Result Value Ref Range    Ventricular Rate 119 BPM    QRS Duration 68 ms    Q-T Interval 340 ms    QTc Calculation (Bazett) 478 ms    R Axis 11 degrees    T Axis 143 degrees    Diagnosis       Atrial fibrillation with rapid ventricular response  Low voltage QRS  Cannot rule out Anteroseptal infarct , age undetermined  When compared with ECG of 23-OCT-2024 19:49,  No significant change was found  Confirmed by Rodney Alves MD ([id number]) on 12/18/2024 6:32:35 AM     Culture, Blood 1    Collection Time: 12/17/24  9:19 PM    Specimen: Blood   Result Value Ref Range    Special Requests LEFT  FOREARM        Culture NO GROWTH AFTER 12 HOURS     Lactate, Sepsis    Collection Time: 12/17/24  9:19 PM   Result Value Ref Range    Lactic Acid, Sepsis 4.9 (HH) 0.5 - 2.0 MMOL/L   Culture, Blood 2    Collection Time: 12/17/24  9:37 PM    Specimen: Blood   Result Value Ref Range    Special Requests RIGHT  Antecubital        Culture NO GROWTH AFTER 12 HOURS     Lactate, Sepsis    Collection Time: 12/18/24  1:13 AM   Result Value Ref Range    Lactic Acid, Sepsis 4.5  100 mL IVPB (mini-bag)  500 mg IntraVENous Q24H    norepinephrine (LEVOPHED) 4 mg in sodium chloride 0.9 % 250 mL infusion  1-100 mcg/min IntraVENous Continuous       Signed:  Rozina Horvath MD    Part of this note may have been written by using a voice dictation software.  The note has been proof read but may still contain some grammatical/other typographical errors.

## 2024-12-18 NOTE — H&P
Hospitalist History and Physical   Admit Date:  2024  7:34 PM   Name:  Giselle Rosado   Age:  54 y.o.  Sex:  female  :  1970   MRN:  [id number]   [facility]    Presenting/Chief Complaint: Wound Check     Reason(s) for Admission: Necrotizing fasciitis [M72.6]     History of Present Illness:     54 years old female with history of morbid obesity BMI around 49, hypertension, paroxysmal atrial fibrillation on anticoagulation, GERD, diabetes type 2,.  Necrotizing fasciitis of left groin, ESRD previously on peritoneal dialysis started on hemodialysis since October presented to the emergency room with chief complaint of left gluteal pain started 5 days ago which continued to get worse.  Patient has sacral wound that continued to discolor, continued to get worse.  Patient denies any fever but overall reports not feeling very well for past 4 days.  Pain is 10/10 most of the time.  Patient reports Dialysis on Monday due to severe pain.  Evaluated in emergency room, lab work shows evidence of elevated white count up to 23,000.  Patient blood pressure is reasonable initially.  CT scan of abdomen was obtained shows evidence of necrotizing fasciitis involving perineal area.  General surgery was contacted who evaluated the patient in ER, planning for surgical intervention in the morning.  Emergency room physician requested admission of this patient for further management.          Assessment & Plan:     Septic shock with necrotizing fasciitis involving perineal area: Surgical management as per general surgery team who evaluated the patient, planning for possible surgical intervention.  Initiated on Zyvox, meropenem, clindamycin.  Will request ID to evaluate as well for antibiotic coverage.    ESRD: On dialysis, will request nephrology to evaluate.    Paroxysmal atrial fibrillation: On anticoagulation, will hold anticoagulation for possible surgical intervention.  Currently rate is around 100, due to  ongoing sepsis her heart rate could get worse.  Closely monitor in ICU.  Continue on metoprolol.    Depression: Continue on Zoloft.      Diabetes type 2: Placed on sliding scale insulin last A1c was 7.5.      Patient is critically ill.  Without intervention, there is a high probability of acute organ impairment or life-threatening deterioration in the patient's condition from: Septic shock  Critical care interventions: Initiated on Levophed  Total critical care time spent: 45 minutes.    Time is indicative of direct patient attendance at bedside and on the patient's floor nearby.  Includes time spent at bedside performing history and exam, performing chart review, discussing findings and treatment plan with patient and/or family, discussing patient with nursing staff, consultants and colleagues, and ordering/reviewing pertinent laboratory and radiographic evaluations.  Time excludes procedures.  CPT:  87924: First 30-74 minutes  54244: Each block of 30 min. beyond 74      Diet: Diet NPO  VTE prophylaxis: SCD's   Code status: Full Code  Code status discussed: Yes        Non-peripheral Lines and Tubes (if present):             Hospital Problems:  Principal Problem:    Necrotizing fasciitis  Active Problems:    HTN (hypertension)    DM2 (diabetes mellitus, type 2) (HCC)    Dialysis patient (HCC)    End stage chronic kidney disease (HCC)    Chronic atrial fibrillation (HCC)  Resolved Problems:    * No resolved hospital problems. *        Objective:   Patient Vitals for the past 24 hrs:   Temp Pulse Resp BP SpO2   12/18/24 0330 -- (!) 101 18 96/61 91 %   12/18/24 0328 -- 100 15 108/69 98 %   12/18/24 0257 -- -- -- 106/67 96 %   12/17/24 1911 98.2 °F (36.8 °C) 95 20 (!) 152/84 96 %       Oxygen Therapy  SpO2: 91 %  Pulse via Oximetry: 102 beats per minute  O2 Device: None (Room air)    Estimated body mass index is 49.99 kg/m² as calculated from the following:    Height as of 11/23/24: 1.753 m (5' 9\").    Weight as of

## 2024-12-18 NOTE — PROGRESS NOTES
TRANSFER - OUT REPORT:    Verbal report given to OR RN and CRNA on Giselle Rosado  being transferred to OR for ordered procedure       Report consisted of patient's Situation, Background, Assessment and   Recommendations(SBAR).     Information from the following report(s) Nurse Handoff Report, Adult Overview, Intake/Output, MAR, Recent Results, Med Rec Status, and Cardiac Rhythm afib  was reviewed with the receiving nurse.           Lines:   CVC  12/18/24 Right Internal jugular (Active)   $Central Line Insertion $ Yes 12/18/24 0705   Central Line Being Utilized Yes 12/18/24 1145   Criteria for Appropriate Use Hemodynamically unstable, requiring monitoring lines, vasopressors, or volume resuscitation 12/18/24 1145   Site Assessment Clean, dry & intact 12/18/24 1145   Phlebitis Assessment No symptoms 12/18/24 1145   Infiltration Assessment 0 12/18/24 1145   Color/Movement/Sensation Capillary refill less than 3 sec 12/18/24 1145   Proximal Lumen Color/Status White;Flushed;Infusing;Brisk blood return 12/18/24 1145   Medial Lumen Status Blue;Brisk blood return;Flushed 12/18/24 1145   Distal Lumen Color/Status Brown;Brisk blood return;Flushed 12/18/24 1145   Line Care Connections checked and tightened 12/18/24 1145   Alcohol Cap Used Yes 12/18/24 1145   Dressing Type Transparent w/CHG gel 12/18/24 1145   Dressing Status Clean, dry & intact;New dressing applied 12/18/24 1145   Dressing Intervention New 12/18/24 1145       Peripheral IV 12/17/24 Right Antecubital (Active)   Site Assessment Clean, dry & intact 12/18/24 1145   Line Status Flushed;Brisk blood return;Infusing 12/18/24 1145   Line Care Connections checked and tightened 12/18/24 1145   Phlebitis Assessment No symptoms 12/18/24 1145   Infiltration Assessment 0 12/18/24 1145   Alcohol Cap Used Yes 12/18/24 1145   Dressing Status Clean, dry & intact 12/18/24 1145   Dressing Type Transparent 12/18/24 1145   Dressing Intervention New 12/17/24 2200       Peripheral

## 2024-12-18 NOTE — PROGRESS NOTES
TRANSFER - IN REPORT:    Verbal report received from Jerry ARCINIEGA on Giselle Rosado  being received from ED for routine progression of patient care      Report consisted of patient's Situation, Background, Assessment and   Recommendations(SBAR).     Information from the following report(s) Nurse Handoff Report, Index, ED Encounter Summary, ED SBAR, Intake/Output, MAR, and Recent Results was reviewed with the receiving nurse.    Opportunity for questions and clarification was provided.      Assessment completed upon patient's arrival to unit and care assumed.

## 2024-12-18 NOTE — PROGRESS NOTES
TRANSFER - IN REPORT:    Verbal report received from DEMIAN Narvaez on Giselle Rosado  being received from pacu for routine post-op      Report consisted of patient's Situation, Background, Assessment and   Recommendations(SBAR).     Information from the following report(s) Nurse Handoff Report, Adult Overview, Surgery Report, Intake/Output, MAR, Recent Results, and Cardiac Rhythm afib  was reviewed with the receiving nurse.    Opportunity for questions and clarification was provided.      Assessment completed upon patient's arrival to unit and care assumed.      Patient arrived to room drowsy but easily awakens and answers all questions appropriately. Pt remains on levo gtt at 12mcg/min, bp 109/59 (79), , afib on monitor. Left hip and buttocks dressings are c/d/I.

## 2024-12-18 NOTE — INTERDISCIPLINARY ROUNDS
Multi-D Rounds/Checklist (leapfrog):  Lines: can any be removed?: None     Tunneled Hemodialysis Catheter Left Neck (Active)     DVT Prophylaxis: Ordered  Vent: N/A  Nutrition Ordered/appropriate: Contraindicated- surgery today   Can antibiotics or other drugs be stopped? Cannot be stopped /End Date set   MRSA swab:   Inpat Anti-Infectives (From admission, onward)       Start     Ordered Stop    12/18/24 2200  meropenem (MERREM) 500 mg in sodium chloride 0.9 % 100 mL IVPB (mini-bag)  500 mg,   IntraVENous,   EVERY 24 HOURS         12/18/24 0334 --    12/18/24 1000  linezolid (ZYVOX) IVPB 600 mg  600 mg,   IntraVENous,   EVERY 12 HOURS         12/18/24 0334 12/25/24 0959                  Consults needed: None  A: Is pain control adequate? (has PRNs? Stop drip?) Yes  B: Sedation break and SBT? N/A  C: Is sedation choice appropriate? N/A  D: Delirium/CAM-ICU? No  E: Mobility goals/appropriateness? Yes  F: Family update and plan? Spouse is surrogate decision maker and is being updated daily by primary attending and nursing staff.    Marianna Polo, APRN - NP

## 2024-12-18 NOTE — WOUND CARE
Noted multiple wound pictures, noted plans for surgery today [facility]. Will plan to follow after surgery.

## 2024-12-18 NOTE — PROCEDURES
PROCEDURE NOTE  Date: 12/18/2024   Name: Giselle Rosado  YOB: 1970    Procedures        Procedure: Central Line Insertion  (CPT - 47448)    Indication:  Sepsis    Chlorohexidine Skin Antiseptic: Yes  Hand Hygiene: Yes  Maximal Barrier Precautions: Yes  Optimal Catheter Site Selection: Yes  Sterile Ultrasound Technique: Yes    Location:  right internal jugular    Time out done and correct patient/location for procedure.    Area prepped and sterilized with chlorhexedine. Sterile drapes applied.  Patient given local lidocane for anesthesia. Using Seldinger technique triple lumen lumen catheter inserted. Guidewire removed. All ports with blood return and lines flushed. Line sutured in place. Patient tolerated procedure well, no complications.   Estimated Blood loss: 10ml    Forest Ramirez MD

## 2024-12-18 NOTE — PROGRESS NOTES
4 Eyes Skin Assessment     NAME:  Giselle Rosado  YOB: 1970  MEDICAL RECORD NUMBER:  [id number]    The patient is being assessed for  Admission    I agree that at least one RN has performed a thorough Head to Toe Skin Assessment on the patient. ALL assessment sites listed below have been assessed.      Areas assessed by both nurses:    Head, Face, Ears, Shoulders, Back, Chest, Arms, Elbows, Hands, Sacrum. Buttock, Coccyx, Ischium, Legs. Feet and Heels, and Under Medical Devices         Does the Patient have a Wound? Yes wound(s) were present on assessment. LDA wound assessment was Initiated and completed by RN, nonblanchable redness to lower back, unstageable sacral and right hip wound with eschar, wounds to abdomen        Lincoln Prevention initiated by RN: Yes  Wound Care Orders initiated by RN: Yes    Pressure Injury (Stage 3,4, Unstageable, DTI, NWPT, and Complex wounds) if present, place Wound referral order by RN under : Yes    New Ostomies, if present place, Ostomy referral order under : No     Nurse 1 eSignature: Electronically signed by Melo Chávez RN on 12/18/24 at 5:00 AM EST    **SHARE this note so that the co-signing nurse can place an eSignature**    Nurse 2 eSignature: Alberta Ortega RN 12/18

## 2024-12-18 NOTE — ED PROVIDER NOTES
Emergency Department Provider Note       PCP: Tanisha Powers, APRN - CNP   Age: 54 y.o.   Sex: female     DISPOSITION      ICD-10-CM    1. Atrial fibrillation with rapid ventricular response (HCC)  I48.91       2. Skin lesions  L98.9           Medical Decision Making     Differential diagnosis includes DIC, cellulitis, necrotizing soft tissue infection, partial list,    Sepsis leukocytosis at 23.7, likely indicating infection from her wounds.  Meets SIRS criteria with respiratory rate of 20, started on broad-spectrum antibiotics.  Was only given a 500 cc fluid bolus for concerns of fluid overload on physical exam.  Does have extensive abdominal wall edema, possibly secondary to her end-stage renal disease.    Chemistry does show elevated anion gap of 26, likely secondary to lactic acidosis and end-stage renal disease, creatinine at 8.85, potassium of 5.1.    With multiple necrotic skin lesions, DIC was consideration, fibrinogen and coagulation panel pending.  CT imaging does show concerning gas formation seen with perineum.  There are other necrotic ulcerations but gas is not seen underneath these.  No free air seen in the belly.    General surgery was immediately consulted after my discussion with radiologist and was able to come down and evaluated at the bedside.  States patient will need urgent debridement but this does not need to happen tonight, will put patient on OR schedule for [facility] in the morning in approximately 4-5 hours.  In the meantime we will continue with broad-spectrum antibiotics, clindamycin was added.    Patient with significantly complicated medical history, will likely need ICU level of care including nephrology consultation for hemodialysis.  Discussed case with hospitalist who will continue with admission.    ED Course as of 12/18/24 0226 Tue Dec 17, 2024   2140 CBC with Auto Differential(!):    WBC 23.7(!)   RBC 3.68(!)   Hemoglobin Quant 10.1(!)   Hematocrit 32.8(!)   MCV 89.1

## 2024-12-18 NOTE — CONSULTS
GENERAL SURGERY CONSULT NOTE    REFERRING PHYSICIAN:      CHIEF COMPLAINT: Left hip and buttock pain    HISTORY OF PRESENT ILLNESS: Giselle Rosado is a 54 y.o. female with history of super morbid obesity (BMI 50), HTN, A-fib on Eliquis (last taken yesterday), GERD, gout, IBS, DM2, prior necrotizing fasciitis of left groin, pannus, and perineum status post excision 2021 with resulting CKD and subsequent development of ESRD previously on peritoneal dialysis status post PD catheter removal October 2024 now on HD Monday Wednesday Friday via left subclavian temporary HD line.     Patient presents to the ED with worsening pain with her chronic left gluteal and left hip wound.  She states that the wound first appeared in approximately October.  They continue to perform local wound care at home.  She reports that the wounds were starting to improve, however they began to worsen at the end of last week on Friday.  Over this last weekend they became significantly worse with some worsening discoloration and some black tissue overlying the left hip wound and left buttock.  She denies any fevers, chills, chest pain, shortness of breath, dizziness, lightheadedness, nausea, vomiting, diarrhea, or other changes in bowel habits.    Of note, patient reports that her last dialysis was on Friday of last week, 12/13/2024.  She states that she skipped her dialysis appointment Monday, 12/16/2024 due to pain in her left hip and residing in her bed for the entirety of the day on Monday.    Prior Abdominal Surgery: PD catheter placement and subsequent removal 2024, excisional debridement of necrotizing fasciitis extending down to the pubic bone and left iliac bone for necrosis over the lateral flank, inguinal region, external oblique muscle, and perineum.  Cardiac History: HTN, A-fib on Eliquis (last taken yesterday)  Pulmonary History: None  Blood Thinners: Eliquis (last taken yesterday)      The following labs/images/reports were

## 2024-12-18 NOTE — ANESTHESIA POSTPROCEDURE EVALUATION
Department of Anesthesiology  Postprocedure Note    Patient: Giselle Rosado  MRN: [id number]  YOB: 1970  Date of evaluation: 12/18/2024    Procedure Summary       Date: 12/18/24 Room / Location: [facility] MAIN OR 02 / [facility] MAIN OR    Anesthesia Start: 1428 Anesthesia Stop: 1640    Procedure: LEG DEBRIDEMENT INCISION AND DRAINAGE    left hip wound   Prone position (Left: Hip) Diagnosis:       Open wound of left hip, subsequent encounter      (Open wound of left hip, subsequent encounter [S71.002D])    Providers: Umair Alexander MD Responsible Provider: Osman Montiel MD    Anesthesia Type: General ASA Status: 4 - Emergent            Anesthesia Type: General    Tika Phase I: Tika Score: 8    Tika Phase II:      Anesthesia Post Evaluation    Patient location during evaluation: PACU  Patient participation: complete - patient participated  Level of consciousness: awake and alert  Airway patency: patent  Nausea & Vomiting: no nausea and no vomiting  Cardiovascular status: hemodynamically stable  Respiratory status: acceptable, nonlabored ventilation and spontaneous ventilation  Hydration status: euvolemic  Comments: BP (!) 109/59   Pulse (!) 131   Temp 97.7 °F (36.5 °C) (Temporal)   Resp 15   Ht 1.753 m (5' 9\")   Wt (!) 147.1 kg (324 lb 4.8 oz)   SpO2 100%   BMI 47.89 kg/m²     Multimodal analgesia pain management approach  Pain management: adequate and satisfactory to patient    No notable events documented.

## 2024-12-18 NOTE — CONSULTS
PULMONARY/CRITICAL CARE CONSULT NOTE           12/18/2024    Giselle Rosado                        Date of Admission:  12/17/2024    The patient's chart is reviewed and the patient is discussed with the staff.    Subjective:     Patient is a 54 y.o.  female seen and evaluated in the setting of septic shock d/t necrotizing fasciitis .  Patient was admitted from ER to ICU on 12/17 with multiple wounds on back and buttocks. She was complaining of wound pain in left gluteal area and left hip. She has a PMH of morbid obesity (BMI 47), HTN, a fib on eliquis, GERD, GOUT, IBS, DM2, prior necrotizing fascitis of left groin, pannus, and perineum in 2021 post excision with resulting CKD progressing into ESRD (previously on PD now on HD via temp L subclavian line). Patient reported wounds began in October and have started to improve but about a week ago they began to get worse. General surgery was consulted when CT abdomen showed evidence of necrotizing fasciitis involving perineal area. Last dialysis was Friday 12/13, skipped HD on Monday 12/16 due to pain. General surgery planning for extensive surgical debridement of left lateral hip wound and left buttock wound. Eliquis placed on hold. Placed on broad spectrum abx. Intensivist was consulted for line placement. Cardiology also on board for a-fib holding OAC for surgery.     She is volume overloaded but in no respiratory distress. Stable on room air. Lactic acid down from 4.9 to 2.7. requiring levophed at 5mcg.     Review of Systems: Comprehensive ROS negative except in HPI    Current Outpatient Medications   Medication Instructions    amLODIPine (NORVASC) 5 mg, Oral, DAILY WITH LUNCH    apixaban (ELIQUIS) 5 mg, Oral, 2 TIMES DAILY    bumetanide (BUMEX) 2 mg, Oral, 2 TIMES DAILY    dicyclomine (BENTYL) 10 mg, Oral, 3 TIMES DAILY PRN    famotidine (PEPCID) 20 mg, Oral, DAILY    fluticasone (FLONASE) 50 MCG/ACT nasal spray 2 sprays, Nasal, DAILY     for input(s): \"COVID19\" in the last 72 hours.    Ventilator Settings Ideal body weight: 66.2 kg (145 lb 15.1 oz)  Adjusted ideal body weight: 98.6 kg (217 lb 4.6 oz)  Mode FIO2 Rate Tidal Volume Pressure                           Peak airway pressure:     Minute ventilation:    ABG:No results for input(s): \"PHAPOC\", \"TBR6OHQA\", \"MZ4MWRX\", \"OMY2TAD\", \"BE\" in the last 72 hours.  Assessment and Plan:  (Medical Decision Making)   Impression: 54 y.o. female admitted with abdominal necrotizing fasciitis involving perineal area. She has a hx of ESRD on HD but has not dialyzed since Friday 12/13. General surgery planning on extensive surgical debridement. Requiring levophed but no respiratory distress.     NEURO:   Analgesia: PRNs for wound pain    CV:   Hemodynamics: MAP low requiring low dose levophed. Overloaded with last dialysis 5 days ago; anuric  A-fib: eliquis held for surgery; current a-fib with rates in 110s; cardiology following and recommended heparin drip when cleared by surgery    PULM:   Currently no issues, on room air. Monitor respiratory status post-op    RENAL:  ESRD: nephrology consulted, last dialysis 5 days ago. Will need HD but surgery is more pressing issue   Electrolytes: stable, monitor and replace per protocol     GI:   Abdominal necrotizing fasciitis: continue broad spectrum antibiotics, needs surgical intervention, follow wound cultures. She also has a history of prior nec fas of groin, pannus, and perineum s/p surgical excision in 2021  nutrition: NPO for surgery    HEME:   Anemia: Hgb 10.1 today, monitor post op, no active bleeding     ID:   Septic shock/Abdominal necrotizing fasciitis: wean levophed as appropriate when infection is better controlled post-op; needs surgical intervention, wound cultures and tailor abx therapy     ENDO:   DM2: monitor bgl while NPO; add SSI and long acting when appropriate   Skin: no decub, turns, preventive care  Prophy: SCDs      Full Code    The patient is

## 2024-12-18 NOTE — ANESTHESIA PRE PROCEDURE
Department of Anesthesiology  Preprocedure Note       Name:  Giselle Rosado   Age:  54 y.o.  :  1970                                          MRN:  [id number]         Date:  2024      Surgeon: Surgeon(s):  Umair Alexander MD    Procedure: Procedure(s):  LEG DEBRIDEMENT INCISION AND DRAINAGE    left hip wound   Prone position    Medications prior to admission:   Prior to Admission medications    Medication Sig Start Date End Date Taking? Authorizing Provider   traMADol (ULTRAM) 50 MG tablet Take 1 tablet by mouth every 6 hours as needed for Pain for up to 10 doses. Intended supply: 10 doses. Take lowest dose possible to manage pain Max Daily Amount: 200 mg 24 Yes Umair Alexander MD   apixaban (ELIQUIS) 5 MG TABS tablet Take 1 tablet by mouth 2 times daily 24 Yes Mark Anthony Ascencio DO   sevelamer (RENVELA) 800 MG tablet Take 1 tablet by mouth 3 times daily (with meals) 24  Yes Mark Anthony Ascencio DO   metoprolol tartrate (LOPRESSOR) 50 MG tablet Take 1 tablet by mouth 2 times daily Hold a.m. dose on dialysis days 10/22/24  Yes Josh Smith MD   sertraline (ZOLOFT) 100 MG tablet Take 1 tablet by mouth 2 times daily 23  Yes Tanisha Powers APRN - CNP   dicyclomine (BENTYL) 10 MG capsule Take 1 capsule by mouth 3 times daily as needed (IBS) 22  Yes Jonel López DO   famotidine (PEPCID) 20 MG tablet Take 1 tablet by mouth daily   Yes Automatic Reconciliation, Ar   amLODIPine (NORVASC) 5 MG tablet Take 1 tablet by mouth Daily with lunch  Patient not taking: Reported on 2024 12/3/24   Alma Traore DO   bumetanide (BUMEX) 2 MG tablet Take 1 tablet by mouth in the morning and 1 tablet in the evening.  Patient not taking: Reported on 2024 12/3/24   Alma Traore DO   fluticasone (FLONASE) 50 MCG/ACT nasal spray 2 sprays by Nasal route daily    Automatic Reconciliation, Ar       Current medications:

## 2024-12-18 NOTE — PERIOP NOTE
TRANSFER - OUT REPORT:    Verbal report given to ICU RN on Giselle Rosado  being transferred to [facility] for routine progression of patient care       Report consisted of patient’s Situation, Background, Assessment and   Recommendations(SBAR).     Information from the following report(s) Nurse Handoff Report, Adult Overview, Surgery Report, and Neuro Assessment was reviewed with the receiving nurse.    Lines:   CVC  12/18/24 Right Internal jugular (Active)   $Central Line Insertion $ Yes 12/18/24 0705   Central Line Being Utilized Yes 12/18/24 1545   Criteria for Appropriate Use Hemodynamically unstable, requiring monitoring lines, vasopressors, or volume resuscitation 12/18/24 1545   Site Assessment Clean, dry & intact 12/18/24 1630   Phlebitis Assessment No symptoms 12/18/24 1504   Infiltration Assessment 0 12/18/24 1504   Color/Movement/Sensation Capillary refill less than 3 sec 12/18/24 1630   Proximal Lumen Color/Status White;Flushed;Infusing;Brisk blood return 12/18/24 1504   Medial Lumen Status Blue;Brisk blood return;Flushed 12/18/24 1504   Distal Lumen Color/Status Brown;Brisk blood return;Flushed 12/18/24 1504   Line Care Connections checked and tightened 12/18/24 1630   Alcohol Cap Used Yes 12/18/24 1630   Dressing Type Transparent w/CHG gel 12/18/24 1630   Dressing Status Clean, dry & intact 12/18/24 1630   Dressing Intervention New 12/18/24 1504       Peripheral IV 12/17/24 Right Antecubital (Active)   Site Assessment Clean, dry & intact 12/18/24 1630   Line Status Flushed;Brisk blood return;Infusing 12/18/24 1504   Line Care Connections checked and tightened 12/18/24 1504   Phlebitis Assessment No symptoms 12/18/24 1504   Infiltration Assessment 0 12/18/24 1504   Alcohol Cap Used Yes 12/18/24 1504   Dressing Status Clean, dry & intact 12/18/24 1504   Dressing Type Transparent 12/18/24 1504   Dressing Intervention New 12/17/24 2200       Peripheral IV 12/17/24 Left;Proximal Forearm (Active)   Site

## 2024-12-18 NOTE — CONSULTS
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 This is partially due to her significant recent exposure to inpatient care and antibiotics opening the door for MDR organisms.   The linezolid blocks toxin production similar to clindamycin, so it is serving multiple purpsoses for now.   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   Obesity with BMI 48 (complicating the above)    Anti-infectives:   Meropenem  -    Subjective:   Date of Consultation:  2024  Referring Physician: Rozina Horvath MD for: assistance in management of the above    Patient is a 54 y.o. female with a complicated recent history including hospitalization followed by rehab at  where she was just dc'd 2 weeks ago. Please see DC note that includes multiple wound care pictures. Her  reports that she has calciphylaxis complicating the wounds. ID saw her in the last hospitalization where her treatment included antibiotics for the above Tsukamurella and CoNS peritonitis. This grew in cultures ~ 10/10/24 and notes indicate that we intended 6 weeks of treatment. It appears we signed off on 24 with plans to receive vanc at HD through 24. It was unclear if a Karius had been sent, but it there is a result from 24 that had

## 2024-12-18 NOTE — CONSULTS
[facility] CONSULT NOTE    Admission Date:  12/17/2024    Admission Diagnosis:  Necrotizing fasciitis [M72.6]  Lactic acidosis [E87.20]  Atrial fibrillation with rapid ventricular response (HCC) [I48.91]  Skin lesions [L98.9]    Reason for consult: ESRD    Subjective:   History of Present Illness:  Giselle Rosado is a 54 y.o. female with a PMH including recent SBP, end-stage renal disease on hemodialysis Monday Wednesday Friday, atrial fibrillation with rapid regular response, GERD, allergic rhinitis, hypertension, depression who is admitted for necrotizing fascitis. We are consulted for ESRD management.  Patient currently in the ICU, on a pressor. No distress while resting in bed.    Review of Systems  No CP or SOB    Past Medical History:   Diagnosis Date    Acute kidney injury (HCC)     ARF with sepsis requiring hemodialysis    Anemia     Anxiety     ARF (acute respiratory failure) 12/2021    required vent at [facility]    Atrial fibrillation with RVR ([facility]) 12/22/2021    while hospitalized for sepsis.    Chronic kidney disease     secondary to sepsis 12/2021--- US Renal in TR on M, W, F    Debility     Diabetes ([facility])     type 2. does not check since on dialysis,no meds; no hypo    Dialysis patient ([facility]) 12/2021    Mon, Wed, Fri    GERD (gastroesophageal reflux disease)     OTC daily med    Gout     Gout     History of recent blood transfusion     1 unit 2/17/22     1 unit 2/19/22 per [facility] records    Hypertension     controlled w/med    IBS (irritable bowel syndrome)     Iron deficiency anemia     Necrotizing fasciitis 12/27/2021    left groin pannus and perineum    Personal history of COVID-19     12/31/21 was hospitalized at [facility] when diagnosed---states \"extremely mild cough\"     Sepsis ([facility]) 12/2021     caused CRF, on dialysisi    Septic shock ([facility]) 12/16/2021      Past Surgical History:   Procedure Laterality Date    DIALYSIS CATHETER REMOVAL N/A 10/18/2024    REMOVAL PD CATHETER performed by Benjamin  Umair English MD at [facility] MAIN OR    DILATION AND CURETTAGE OF UTERUS  07/2018    IR NONTUNNELED VASCULAR CATHETER > 5 YEARS  12/23/2021    IR NONTUNNELED VASCULAR CATHETER > 5 YEARS  12/23/2021    IR NONTUNNELED VASCULAR CATHETER 12/23/2021 [facility] RADIOLOGY SPECIALS    IR TUNNELED CVC PLACE WO SQ PORT/PUMP > 5 YEARS  1/21/2022    IR TUNNELED CVC PLACE WO SQ PORT/PUMP > 5 YEARS  1/21/2022    IR TUNNELED CATHETER PLACEMENT GREATER THAN 5 YEARS 1/21/2022 [facility] RADIOLOGY SPECIALS    IR TUNNELED CVC PLACE WO SQ PORT/PUMP > 5 YEARS  10/18/2024    IR TUNNELED CATHETER PLACEMENT GREATER THAN 5 YEARS 10/18/2024 [facility] RADIOLOGY SPECIALS    OTHER SURGICAL HISTORY  12/2021     X 2 groin wound    TX UNLISTED PROCEDURE ABDOMEN PERITONEUM & OMENTUM  02/2022    debridement      Current Facility-Administered Medications   Medication Dose Route Frequency    sodium chloride flush 0.9 % injection 5-40 mL  5-40 mL IntraVENous 2 times per day    sodium chloride flush 0.9 % injection 5-40 mL  5-40 mL IntraVENous PRN    0.9 % sodium chloride infusion   IntraVENous PRN    potassium chloride (KLOR-CON M) extended release tablet 40 mEq  40 mEq Oral PRN    Or    potassium bicarb-citric acid (EFFER-K) effervescent tablet 40 mEq  40 mEq Oral PRN    Or    potassium chloride 10 mEq/100 mL IVPB (Peripheral Line)  10 mEq IntraVENous PRN    magnesium sulfate 2000 mg in 50 mL IVPB premix  2,000 mg IntraVENous PRN    ondansetron (ZOFRAN-ODT) disintegrating tablet 4 mg  4 mg Oral Q8H PRN    Or    ondansetron (ZOFRAN) injection 4 mg  4 mg IntraVENous Q6H PRN    polyethylene glycol (GLYCOLAX) packet 17 g  17 g Oral Daily PRN    acetaminophen (TYLENOL) tablet 650 mg  650 mg Oral Q6H PRN    Or    acetaminophen (TYLENOL) suppository 650 mg  650 mg Rectal Q6H PRN    linezolid (ZYVOX) IVPB 600 mg  600 mg IntraVENous Q12H    meropenem (MERREM) 500 mg in sodium chloride 0.9 % 100 mL IVPB (mini-bag)  500 mg IntraVENous Q24H    norepinephrine (LEVOPHED) 4 mg in sodium

## 2024-12-18 NOTE — OP NOTE
buttock wound measuring 30 cm x 10 cm x 5 cm in size with necrotic skin, fat necrosis, and perirectal abscess noted and drained.  Tissue culture from each wound were sent and a culture swab of the fluid from the left buttock wound was also sent.  Each wound was packed with Dakin soaked Kerlix wet-to-dry dressings.    Umair Alexander MD  General Surgeon  [facility] [address] [place] [address]  [phone]        Electronically signed by Umair Alexander MD on 12/18/2024 at 4:22 PM

## 2024-12-19 LAB
ALBUMIN SERPL-MCNC: 1.9 G/DL (ref 3.5–5)
ALBUMIN/GLOB SERPL: 0.5 (ref 1–1.9)
ALP SERPL-CCNC: 175 U/L (ref 35–104)
ALT SERPL-CCNC: 7 U/L (ref 8–45)
ANION GAP SERPL CALC-SCNC: 20 MMOL/L (ref 7–16)
AST SERPL-CCNC: 10 U/L (ref 15–37)
BASOPHILS # BLD: 0.3 K/UL (ref 0–0.2)
BASOPHILS NFR BLD: 1 % (ref 0–2)
BILIRUB SERPL-MCNC: 0.3 MG/DL (ref 0–1.2)
BUN SERPL-MCNC: 65 MG/DL (ref 6–23)
CALCIUM SERPL-MCNC: 8.1 MG/DL (ref 8.8–10.2)
CHLORIDE SERPL-SCNC: 94 MMOL/L (ref 98–107)
CO2 SERPL-SCNC: 20 MMOL/L (ref 20–29)
CREAT SERPL-MCNC: 9.64 MG/DL (ref 0.6–1.1)
DIFFERENTIAL METHOD BLD: ABNORMAL
EOSINOPHIL # BLD: 0.3 K/UL (ref 0–0.8)
EOSINOPHIL NFR BLD: 1 % (ref 0.5–7.8)
ERYTHROCYTE [DISTWIDTH] IN BLOOD BY AUTOMATED COUNT: 20.3 % (ref 11.9–14.6)
EST. AVERAGE GLUCOSE BLD GHB EST-MCNC: 159 MG/DL
GLOBULIN SER CALC-MCNC: 3.9 G/DL (ref 2.3–3.5)
GLUCOSE BLD STRIP.AUTO-MCNC: 66 MG/DL (ref 65–100)
GLUCOSE BLD STRIP.AUTO-MCNC: 67 MG/DL (ref 65–100)
GLUCOSE BLD STRIP.AUTO-MCNC: 69 MG/DL (ref 65–100)
GLUCOSE BLD STRIP.AUTO-MCNC: 78 MG/DL (ref 65–100)
GLUCOSE BLD STRIP.AUTO-MCNC: 82 MG/DL (ref 65–100)
GLUCOSE BLD STRIP.AUTO-MCNC: 89 MG/DL (ref 65–100)
GLUCOSE BLD STRIP.AUTO-MCNC: 99 MG/DL (ref 65–100)
GLUCOSE SERPL-MCNC: 94 MG/DL (ref 70–99)
HBA1C MFR BLD: 7.2 % (ref 0–5.6)
HCT VFR BLD AUTO: 31.6 % (ref 35.8–46.3)
HGB BLD-MCNC: 9.4 G/DL (ref 11.7–15.4)
IMM GRANULOCYTES # BLD AUTO: 1.3 K/UL (ref 0–0.5)
IMM GRANULOCYTES NFR BLD AUTO: 5 % (ref 0–5)
LACTATE SERPL-SCNC: 1.8 MMOL/L (ref 0.5–2)
LYMPHOCYTES # BLD: 1 K/UL (ref 0.5–4.6)
LYMPHOCYTES NFR BLD: 4 % (ref 13–44)
MCH RBC QN AUTO: 27.6 PG (ref 26.1–32.9)
MCHC RBC AUTO-ENTMCNC: 29.7 G/DL (ref 31.4–35)
MCV RBC AUTO: 92.7 FL (ref 82–102)
MONOCYTES # BLD: 1.3 K/UL (ref 0.1–1.3)
MONOCYTES NFR BLD: 5 % (ref 4–12)
NEUTS SEG # BLD: 21.9 K/UL (ref 1.7–8.2)
NEUTS SEG NFR BLD: 84 % (ref 43–78)
NRBC # BLD: 0.03 K/UL (ref 0–0.2)
PLATELET # BLD AUTO: 397 K/UL (ref 150–450)
PLATELET COMMENT: ADEQUATE
PMV BLD AUTO: 9.5 FL (ref 9.4–12.3)
POTASSIUM SERPL-SCNC: 5.2 MMOL/L (ref 3.5–5.1)
PROT SERPL-MCNC: 5.8 G/DL (ref 6.3–8.2)
RBC # BLD AUTO: 3.41 M/UL (ref 4.05–5.2)
RBC MORPH BLD: ABNORMAL
SERVICE CMNT-IMP: NORMAL
SODIUM SERPL-SCNC: 134 MMOL/L (ref 136–145)
WBC # BLD AUTO: 26.1 K/UL (ref 4.3–11.1)
WBC MORPH BLD: ABNORMAL

## 2024-12-19 PROCEDURE — 85025 COMPLETE CBC W/AUTO DIFF WBC: CPT

## 2024-12-19 PROCEDURE — 6360000002 HC RX W HCPCS: Performed by: HOSPITALIST

## 2024-12-19 PROCEDURE — 99233 SBSQ HOSP IP/OBS HIGH 50: CPT | Performed by: INTERNAL MEDICINE

## 2024-12-19 PROCEDURE — 99291 CRITICAL CARE FIRST HOUR: CPT | Performed by: INTERNAL MEDICINE

## 2024-12-19 PROCEDURE — 6360000002 HC RX W HCPCS: Performed by: INTERNAL MEDICINE

## 2024-12-19 PROCEDURE — 36592 COLLECT BLOOD FROM PICC: CPT

## 2024-12-19 PROCEDURE — 2500000003 HC RX 250 WO HCPCS: Performed by: INTERNAL MEDICINE

## 2024-12-19 PROCEDURE — 2000000000 HC ICU R&B

## 2024-12-19 PROCEDURE — 2500000003 HC RX 250 WO HCPCS: Performed by: HOSPITALIST

## 2024-12-19 PROCEDURE — 2700000000 HC OXYGEN THERAPY PER DAY

## 2024-12-19 PROCEDURE — 90945 DIALYSIS ONE EVALUATION: CPT

## 2024-12-19 PROCEDURE — 6360000002 HC RX W HCPCS

## 2024-12-19 PROCEDURE — 2580000003 HC RX 258: Performed by: HOSPITALIST

## 2024-12-19 PROCEDURE — 80053 COMPREHEN METABOLIC PANEL: CPT

## 2024-12-19 PROCEDURE — 83036 HEMOGLOBIN GLYCOSYLATED A1C: CPT

## 2024-12-19 PROCEDURE — 5A1D90Z PERFORMANCE OF URINARY FILTRATION, CONTINUOUS, GREATER THAN 18 HOURS PER DAY: ICD-10-PCS | Performed by: INTERNAL MEDICINE

## 2024-12-19 PROCEDURE — 2500000003 HC RX 250 WO HCPCS: Performed by: NURSE PRACTITIONER

## 2024-12-19 PROCEDURE — 2500000003 HC RX 250 WO HCPCS

## 2024-12-19 PROCEDURE — 6370000000 HC RX 637 (ALT 250 FOR IP): Performed by: SURGERY

## 2024-12-19 PROCEDURE — 2580000003 HC RX 258: Performed by: INTERNAL MEDICINE

## 2024-12-19 PROCEDURE — 6370000000 HC RX 637 (ALT 250 FOR IP): Performed by: PHYSICIAN ASSISTANT

## 2024-12-19 PROCEDURE — 83605 ASSAY OF LACTIC ACID: CPT

## 2024-12-19 PROCEDURE — 94761 N-INVAS EAR/PLS OXIMETRY MLT: CPT

## 2024-12-19 PROCEDURE — 82962 GLUCOSE BLOOD TEST: CPT

## 2024-12-19 RX ORDER — FAMOTIDINE 20 MG/1
20 TABLET, FILM COATED ORAL DAILY
Status: DISCONTINUED | OUTPATIENT
Start: 2024-12-19 | End: 2024-12-20

## 2024-12-19 RX ORDER — SODIUM HYPOCHLORITE 1.25 MG/ML
SOLUTION TOPICAL DAILY
Status: DISCONTINUED | OUTPATIENT
Start: 2024-12-19 | End: 2024-12-20

## 2024-12-19 RX ORDER — 0.9 % SODIUM CHLORIDE 0.9 %
1000 INTRAVENOUS SOLUTION INTRAVENOUS ONCE
Status: DISCONTINUED | OUTPATIENT
Start: 2024-12-19 | End: 2024-12-19

## 2024-12-19 RX ORDER — OXYCODONE AND ACETAMINOPHEN 5; 325 MG/1; MG/1
1 TABLET ORAL EVERY 4 HOURS PRN
Status: DISCONTINUED | OUTPATIENT
Start: 2024-12-19 | End: 2024-12-20

## 2024-12-19 RX ADMIN — HYDROMORPHONE HYDROCHLORIDE 0.5 MG: 1 INJECTION, SOLUTION INTRAMUSCULAR; INTRAVENOUS; SUBCUTANEOUS at 07:09

## 2024-12-19 RX ADMIN — HYDROMORPHONE HYDROCHLORIDE 0.5 MG: 1 INJECTION, SOLUTION INTRAMUSCULAR; INTRAVENOUS; SUBCUTANEOUS at 17:24

## 2024-12-19 RX ADMIN — LINEZOLID 600 MG: 600 INJECTION, SOLUTION INTRAVENOUS at 22:33

## 2024-12-19 RX ADMIN — AMIODARONE HYDROCHLORIDE 150 MG: 1.5 INJECTION, SOLUTION INTRAVENOUS at 09:38

## 2024-12-19 RX ADMIN — MEROPENEM 500 MG: 500 INJECTION INTRAVENOUS at 22:57

## 2024-12-19 RX ADMIN — LINEZOLID 600 MG: 600 INJECTION, SOLUTION INTRAVENOUS at 10:49

## 2024-12-19 RX ADMIN — WATER 1 MG: 1 INJECTION INTRAMUSCULAR; INTRAVENOUS; SUBCUTANEOUS at 05:45

## 2024-12-19 RX ADMIN — DEXTROSE MONOHYDRATE 125 ML: 100 INJECTION, SOLUTION INTRAVENOUS at 16:49

## 2024-12-19 RX ADMIN — NOREPINEPHRINE BITARTRATE 8 MCG/MIN: 16 SOLUTION INTRAVENOUS at 10:54

## 2024-12-19 RX ADMIN — DAKIN'S SOLUTION 0.125% (QUARTER STRENGTH): 0.12 SOLUTION at 15:02

## 2024-12-19 RX ADMIN — SODIUM CHLORIDE, PRESERVATIVE FREE 10 ML: 5 INJECTION INTRAVENOUS at 07:17

## 2024-12-19 RX ADMIN — OXYCODONE HYDROCHLORIDE AND ACETAMINOPHEN 1 TABLET: 5; 325 TABLET ORAL at 13:36

## 2024-12-19 RX ADMIN — NOREPINEPHRINE BITARTRATE 9 MCG/MIN: 16 SOLUTION INTRAVENOUS at 18:44

## 2024-12-19 RX ADMIN — AMIODARONE HYDROCHLORIDE 1 MG/MIN: 50 INJECTION, SOLUTION INTRAVENOUS at 09:52

## 2024-12-19 RX ADMIN — SODIUM CHLORIDE, PRESERVATIVE FREE 10 ML: 5 INJECTION INTRAVENOUS at 22:36

## 2024-12-19 RX ADMIN — AMIODARONE HYDROCHLORIDE 1 MG/MIN: 50 INJECTION, SOLUTION INTRAVENOUS at 17:30

## 2024-12-19 RX ADMIN — HYDROMORPHONE HYDROCHLORIDE 0.5 MG: 1 INJECTION, SOLUTION INTRAMUSCULAR; INTRAVENOUS; SUBCUTANEOUS at 14:22

## 2024-12-19 RX ADMIN — NOREPINEPHRINE BITARTRATE 10 MCG/MIN: 16 SOLUTION INTRAVENOUS at 01:06

## 2024-12-19 RX ADMIN — DEXTROSE MONOHYDRATE 125 ML: 100 INJECTION, SOLUTION INTRAVENOUS at 17:23

## 2024-12-19 ASSESSMENT — PAIN SCALES - GENERAL
PAINLEVEL_OUTOF10: 0
PAINLEVEL_OUTOF10: 5
PAINLEVEL_OUTOF10: 0
PAINLEVEL_OUTOF10: 0
PAINLEVEL_OUTOF10: 10
PAINLEVEL_OUTOF10: 0
PAINLEVEL_OUTOF10: 10
PAINLEVEL_OUTOF10: 8

## 2024-12-19 ASSESSMENT — PAIN - FUNCTIONAL ASSESSMENT
PAIN_FUNCTIONAL_ASSESSMENT: PREVENTS OR INTERFERES SOME ACTIVE ACTIVITIES AND ADLS
PAIN_FUNCTIONAL_ASSESSMENT: ACTIVITIES ARE NOT PREVENTED
PAIN_FUNCTIONAL_ASSESSMENT: INTOLERABLE, UNABLE TO DO ANY ACTIVE OR PASSIVE ACTIVITIES
PAIN_FUNCTIONAL_ASSESSMENT: ACTIVITIES ARE NOT PREVENTED

## 2024-12-19 ASSESSMENT — PAIN DESCRIPTION - PAIN TYPE
TYPE: ACUTE PAIN;SURGICAL PAIN

## 2024-12-19 ASSESSMENT — PAIN DESCRIPTION - ORIENTATION
ORIENTATION: LEFT

## 2024-12-19 ASSESSMENT — PAIN DESCRIPTION - FREQUENCY
FREQUENCY: CONTINUOUS

## 2024-12-19 ASSESSMENT — PAIN DESCRIPTION - ONSET
ONSET: GRADUAL
ONSET: ON-GOING
ONSET: ON-GOING
ONSET: GRADUAL

## 2024-12-19 ASSESSMENT — PAIN DESCRIPTION - LOCATION
LOCATION: BUTTOCKS;LEG

## 2024-12-19 ASSESSMENT — PAIN SCALES - WONG BAKER: WONGBAKER_NUMERICALRESPONSE: NO HURT

## 2024-12-19 ASSESSMENT — PAIN DESCRIPTION - DESCRIPTORS
DESCRIPTORS: BURNING

## 2024-12-19 NOTE — PROGRESS NOTES
Hospitalist Progress Note   Admit Date:  2024  7:34 PM   Name:  Giselle Rosado   Age:  54 y.o.  Sex:  female  :  1970   MRN:  [id number]   Room:  [facility]/    Presenting/Chief Complaint: Wound Check     Reason(s) for Admission: Necrotizing fasciitis [M72.6]  Lactic acidosis [E87.20]  Atrial fibrillation with rapid ventricular response (HCC) [I48.91]  Skin lesions [L98.9]     Hospital Course:   Giselle Rosado is a 54 y.o. female with medical history of morbid obesity with BMI of 49, hypertension, paroxysmal atrial fibrillation on anticoagulation, GERD, diabetes mellitus type 2, necrotizing fasciitis of the left groin, ESRD previously on peritoneal dialysis complicated by peritonitis started on hemodialysis since 2024 presented to the ER with a left gluteal pain which started 5 days prior and continued to get worse.  She also has a sacral wound that is worsening.  She was evaluated in the ER.  She was admitted for septic shock secondary to necrotizing fasciitis.  CT of the abdomen and pelvis was done which showed necrotizing fasciitis involving the perineal area.  General surgery was consulted.  Plans for surgery today.  Patient is currently on meropenem and linezolid.  ID consulted.  She also has A-fib with RVR and therefore cardiology was consulted.       Subjective & 24hr Events:   Patient is resting in bed.  Today's postop day 1 status post surgical debridement.  She complains of pain in her perineal region.  No fever.  No chest pain.    Assessment & Plan:     This is a 54-year-old female with    Septic shock secondary to necrotizing fasciitis involving the perineal region postop day 1 status post surgical debridement.  Patient still needing Levophed to maintain MAP greater than 65.  Currently on meropenem, linezolid.  Continue.  ID on board.  Appreciate recommendations.  General surgery on board.  Appreciate recommendations.  Wean Levophed as tolerated.    Atrial fibrillation  sulfate 2000 mg in 50 mL IVPB premix  2,000 mg IntraVENous PRN    ondansetron (ZOFRAN-ODT) disintegrating tablet 4 mg  4 mg Oral Q8H PRN    Or    ondansetron (ZOFRAN) injection 4 mg  4 mg IntraVENous Q6H PRN    polyethylene glycol (GLYCOLAX) packet 17 g  17 g Oral Daily PRN    acetaminophen (TYLENOL) tablet 650 mg  650 mg Oral Q6H PRN    Or    acetaminophen (TYLENOL) suppository 650 mg  650 mg Rectal Q6H PRN    linezolid (ZYVOX) IVPB 600 mg  600 mg IntraVENous Q12H    meropenem (MERREM) 500 mg in sodium chloride 0.9 % 100 mL IVPB (mini-bag)  500 mg IntraVENous Q24H    norepinephrine (LEVOPHED) 4 mg in sodium chloride 0.9 % 250 mL infusion  1-100 mcg/min IntraVENous Continuous    glucose chewable tablet 16 g  4 tablet Oral PRN    dextrose bolus 10% 125 mL  125 mL IntraVENous PRN    Or    dextrose bolus 10% 250 mL  250 mL IntraVENous PRN    Glucagon Emergency KIT 1 mg  1 mg SubCUTAneous PRN    dextrose 10 % infusion   IntraVENous Continuous PRN    insulin lispro (HUMALOG,ADMELOG) injection vial 0-8 Units  0-8 Units SubCUTAneous 4x Daily AC & HS    HYDROmorphone HCl PF (DILAUDID) injection 0.5 mg  0.5 mg IntraVENous Q3H PRN       Signed:  Rozina Horvath MD    Part of this note may have been written by using a voice dictation software.  The note has been proof read but may still contain some grammatical/other typographical errors.

## 2024-12-19 NOTE — PROGRESS NOTES
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  OR findings noted, wound care c/s in place  The linezolid blocks toxin production similar to clindamycin, so it is serving multiple purpsoses for now.   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   Obesity with BMI 48 (complicating the above)    Anti-infectives:   Meropenem  -   Linezolid  -     Subjective and 24 hour events:     Pt is awake and interactive.  is at the bedside, and he could relay the operative findings as d/w Surgery. S    She reports pain, but she does have relief when the prn meds are given. No other acute issues at this moment per the patient.     Allergies   Allergen Reactions    Ace Inhibitors Angioedema     Other reaction(s): Hives/Swelling-Allergy  Swelling of the mouth    Angiotensin Receptor Blockers Angioedema    Cayenne Hives     Swelling of the mouth  Other reaction(s): Hives/Swelling-Allergy  Patient states she is not allergic    Sulfamethoxazole-Trimethoprim Other (See Comments)     Nausea, vomiting, making her feel like she is going to pass out    Cephalexin Nausea And Vomiting     Can't take oral/can do IV        Objective:     Visit Vitals  BP (!) 89/58   Pulse (!) 119   Temp 98.7 °F (37.1 °C) (Oral)   Resp 13   Ht 1.753 m (5' 9\")   Wt (!) 147.1 kg (324 lb 4.8

## 2024-12-19 NOTE — DIALYSIS
HD initiated using Right tunneled CVC.  Consent verified for renal replacement therapy.       Patient alert , /59,  P115  , SPO2 94% via NC    Aspirated and flushed both ports without difficulty. Dressing Clean, Dry, intact, Dated: 12/18 . Machine settings per MD order.    Heparin 0 unit bolus and 0 units/hr.      Will monitor during treatment.

## 2024-12-19 NOTE — PROGRESS NOTES
Notified nephrology of Dr. Sheets's request to take off less fluid in lieu of pt's pressor requirements, order received to drop the UF rate to 100 mL/hr

## 2024-12-19 NOTE — CONSULTS
Nutrition Assessment  Assessment Type: Initial  Reason for visit:  Wounds (Pulmonology)  Malnutrition Screening Tool Score: 0    Nutrition Intervention:   Food and/or Nutrient Delivery:   Meals and Snacks:  Diet: Continue current order  Medical Food Supplements:   Medical food supplement therapy:  Initiate Nepro with Carbsteady (renal oral supplement) 420 calories, 19 grams protein per 8 ounce serving     Malnutrition Assessment:  Malnutrition Status: At risk for malnutrition (prolonged compromised appetite and intake in setting of increased needs, declinign dry wt reported)  Nutrition Focused Physical Exam:  Limited due to active HD, pain, awaiting wound care    Nutrition Assessment:  Food/Nutrition Related History:   Hx per pt and .  Pt with declining oral intake since mid October.   notes in the 2 weeks she has been home since last admission that she may have eaten quantity most would attribute to a serving of lunch and dinner.  Prior RD notes confirm declining oral intake, pt has successfully used oral supplements during prior admissions.          Weight History:    notes dry wt has declined.  Intpt weights have been variable.  Per Nephrology Outpt .6 kg   Nutrition Background:       PMH remarkable for MO, HTN, paroxysmal afib, GERD, DM, necrotizing fascitis, ESRD +HD.    Admitted with septic shock with necrotizing fascitis perianal area, ESRD (+HD), afib.    12/18: excisional debridement of necrotizing soft tissue infection of left hip and left buttock.     WC following for multiple wounds.    Nutrition Monitoring/Evaluation:  Visit with pt and  at bedside, her intake during current admission has been   insignificant.   +HD.  Pt relates pain, poor appetite, generalized weakness and fatigue as barriers to intake.  Intake is <15% needs at this time.      Current Nutrition Therapies:  ADULT DIET; Regular; 4 carb choices (60 gm/meal)    Current Intake:   Average Meal Intake: 1-25%

## 2024-12-19 NOTE — PROGRESS NOTES
[facility] Critical Care Note:: 12/19/2024  Giselle Rosado  Admission Date: 12/17/2024     Length of Stay: 1 days    Background: 54 y.o. y/o female with septic shock secondary to necrotizing fascitis of back and buttocks. Pt has history of morbid obesity, HTN, afib on eliquis, GERD, gout, IBS, DM2, ESRD on HD, and  prior necrotizing fascitis of left groin, pannus, and perineum in 2021 post excision. CT abdomen showed evidence of necrotizing fasciitis involving perineal area and general surgery was consulted. Pt went to OR 12/18 for excisional debridement of necrotizing soft tissue of L hip and L buttock by Dr. Alexander. Pt has remained hypotensive requiring pressors. Pt has remained in afib RVR and cardiology was consulted.     Notable PMH:  has a past medical history of Acute kidney injury ([facility]), Anemia, Anxiety, ARF (acute respiratory failure), Atrial fibrillation with RVR ([facility]), Chronic kidney disease, Debility, Diabetes ([facility]), Dialysis patient ([facility]), GERD (gastroesophageal reflux disease), Gout, Gout, History of recent blood transfusion, Hypertension, IBS (irritable bowel syndrome), Iron deficiency anemia, Necrotizing fasciitis, Personal history of COVID-19, Sepsis ([facility]), and Septic shock ([facility]).    24 Hour events: pt is lying in bed on 2L O2. RN at bedside reports that pt's O2 sats drop when she falls asleep.   Pt does fall asleep easily but she does awake easily. Pt denies shortness of breath but admits to cough.   She is complaining of pain at debridement site. She does snore but has never had a sleep study. She is not on O2 or PAP therapy at home.   Pt remains on levophed 8mcg/hr. Amio gtt just started per cardiology.       Review of Systems: Comprehensive ROS negative except in HPI     Lines: (insertion date)     CVC  12/18/24 Right Internal jugular (Active)       Tunneled Hemodialysis Catheter Left Neck (Active)     Drips: current dose (range)  Dose (mcg/kg/min) Propofol : *100 mg  Dose  suboptimal. Contrast used: Definity. Technically difficult study due to patient's body habitus and procedure performed with the patient in a supine position.    Signed by: Sven Bui MD on 10/24/2024  1:57 PM    Microbiology:   Recent Labs     12/17/24 2119 12/17/24 2137 12/18/24  1517   CULTURE NO GROWTH 2 DAYS NO GROWTH 2 DAYS No growth after short period of incubation. Further results to follow after overnight incubation.  No growth after short period of incubation. Further results to follow after overnight incubation.  No growth after short period of incubation. Further results to follow after overnight incubation.     No results for input(s): \"COVID19\" in the last 72 hours.  Ventilator Settings Ideal body weight: 66.2 kg (145 lb 15.1 oz)  Adjusted ideal body weight: 98.6 kg (217 lb 4.6 oz)  Mode FIO2 Rate Tidal Volume Pressure                           Peak airway pressure:     Minute ventilation:    ABG:No results for input(s): \"PHAPOC\", \"JRR3DBGY\", \"XF4AXPG\", \"BOT7OYE\", \"BE\" in the last 72 hours.  Assessment and Plan:  (Medical Decision Making)   Impression: 54 y.o. female admitted with abdominal necrotizing fasciitis involving perineal area. She has a hx of ESRD on HD but has not dialyzed since Friday 12/13. Requiring levophed but no respiratory distress.   Pt had surgical debridement 12/18 by Dr. Alexander.     NEURO:   Sedation: N/A  Analgesia: dilaudid, percocet ordered to help with pain without oversedating     CV:   Hemodynamics: remains hypotensive, on levophed 8mcg/hr.   Afib RVR: cardiology following, amio bolus, gtt started. Anticoagulation on hold until certain that no more surgeries are required.     PULM:   Acute respiratory failure with hypoxemia: wean O2 as tolerated, not on O2 at home. Likely has underlying MAR based on body habitus. Would be beneficial to check HST as an outpt.     RENAL:  Electrolytes: replete as needed  ESRD: per nephrology, has not had dialysis for 6 days.     GI:

## 2024-12-19 NOTE — PROGRESS NOTES
Notified cardiology of pt's increased pressor requirements and HR increasing as well, order received for an amio bolus & gtt

## 2024-12-19 NOTE — WOUND CARE
Sacral      Left pannus 8x12cm     Left lateral thigh [id number]      Left hip adipose, pink viable, [id number] wound vac may help after few more days to week of dakins.    Upper back multiple lines from wrinkles in bed.      Left buttock adipose exposed and pink viable tissue, with some purple edges and dark slough.      Gray slough near lateral edge, purple areas Left buttock.      Left buttock Purple edges and slough.      Left buttock      Left buttock tunnel 6cm      Right hip/ pannus [id number] blisters.      Left mid abdomen tunnel to lower wound.  2x2.5x2cm lateral [id number], undermines circumferentially 1cm.      Left mid abdomen tunnel to upper wound [id number].  Adipose exposed some thick drainage, recommend dakin's moist to dry here as well.

## 2024-12-19 NOTE — PROGRESS NOTES
am  12/19/2024 6:47 AM    Admit Date: 12/17/2024    Admit Diagnosis: Necrotizing fasciitis [M72.6]  Lactic acidosis [E87.20]  Atrial fibrillation with rapid ventricular response (HCC) [I48.91]  Skin lesions [L98.9]      Subjective:    Patient : Patient is hemodynamically stable but is currently on pressors.  She is in A-fib with RVR.    Critical care time evaluating patient examining patient reviewing chart and crafting management plan 6 AM to 6:30 AM    Objective:    BP (!) 112/56   Pulse (!) 126   Temp 98.1 °F (36.7 °C) (Axillary)   Resp 10   Ht 1.753 m (5' 9\")   Wt (!) 147.1 kg (324 lb 4.8 oz)   SpO2 96%   BMI 47.89 kg/m²     ROS:  General ROS: negative for - chills  Hematological and Lymphatic ROS: negative for - blood clots or jaundice  Respiratory ROS: no cough, shortness of breath, or wheezing  Cardiovascular ROS: no chest pain or dyspnea on exertion  Gastrointestinal ROS: no abdominal pain, change in bowel habits, or black or bloody stools  Neurological ROS: no TIA or stroke symptoms    Physical Exam:      Physical Examination: General appearance - Appearance: alert, well appearing, and in no distress.   Neck/lymph - supple, no significant adenopathy  Chest/CV - clear to auscultation, no wheezes, rales or rhonchi, symmetric air entry  Heart - normal rate, regular rhythm, normal S1, S2, no murmurs, rubs, clicks or gallops  Abdomen/GI - soft, nontender, nondistended, no masses or organomegaly   Musculoskeletal - no joint tenderness, deformity or swelling  Extremities - peripheral pulses normal, no pedal edema, no clubbing or cyanosis  Skin - normal coloration and turgor, no rashes, no suspicious skin lesions noted    Current Facility-Administered Medications   Medication Dose Route Frequency    sodium chloride flush 0.9 % injection 5-40 mL  5-40 mL IntraVENous 2 times per day    sodium chloride flush 0.9 % injection 5-40 mL  5-40 mL IntraVENous PRN    0.9 % sodium chloride infusion   IntraVENous PRN         PLAN  A-fib with RVR  Currently not on any rate slowing medications secondary to being on pressors.  Her overall rate appears to be between 101 120 for a ventricular response.  She is currently not anticoagulated.  When she is weaned off of pressors and when any procedures are done anticoagulation and or rate control can be addressed    Hypotension  Continue pressors and wean as tolerated    Diabetes  Follow blood sugars closely and address any hypo or hyperglycemia as appropriate    End-stage renal disease  Patient is reported as a dialysis patient continue dialysis as tolerated and as appropriate        DIA ROCHE MD

## 2024-12-19 NOTE — INTERDISCIPLINARY ROUNDS
Multi-D Rounds/Checklist (leapfrog):  Lines: can any be removed?: None     CVC  12/18/24 Right Internal jugular (Active)       Tunneled Hemodialysis Catheter Left Neck (Active)     DVT Prophylaxis: Ordered  Vent: N/A  Nutrition Ordered/appropriate: Ordered  Can antibiotics or other drugs be stopped? Yes/End Date set Yes/No  MRSA swab:   Inpat Anti-Infectives (From admission, onward)       Start     Ordered Stop    12/18/24 2200  meropenem (MERREM) 500 mg in sodium chloride 0.9 % 100 mL IVPB (mini-bag)  500 mg,   IntraVENous,   EVERY 24 HOURS         12/18/24 0334 --    12/18/24 1000  linezolid (ZYVOX) IVPB 600 mg  600 mg,   IntraVENous,   EVERY 12 HOURS         12/18/24 0334 12/25/24 0959                  Consults needed: None  A: Is pain control adequate? (has PRNs? Stop drip?) orders adjusted  B: Sedation break and SBT? N/A  C: Is sedation choice appropriate? N/A  D: Delirium/CAM-ICU? No  E: Mobility goals/appropriateness? Yes  F: Family update and plan?  is surrogate decision maker and is being updated daily by primary attending and nursing staff.    VICKY Carroll

## 2024-12-19 NOTE — PROGRESS NOTES
Notified cardiology of pt's HR still being elevated to the upper 120s despite continuous amio, order received to leave the gtt going at 1mg/min instead of decreasing it to 0.5mg/min.

## 2024-12-19 NOTE — PROGRESS NOTES
[facility] PROGRESS NOTE    Follow up for:    Subjective:   Patient seen and examined.  Still hypotensive in the ICU and requiring pressors.    ROS:  Gen - no fever, no chills, appetite okay  CV - no chest pain, no orthopnea  Lung - no shortness of breath, no cough  Abd - no tenderness, no nausea, no vomiting  Ext - no edema    Objective:   Exam:  Vitals:    12/19/24 0730 12/19/24 0745 12/19/24 0800 12/19/24 0805   BP: (!) 94/53 (!) 74/41  (!) 82/53   Pulse: (!) 119 (!) 119 (!) 121    Resp: (!) 8 17 (!) 31    Temp:       TempSrc:       SpO2: 95% 95% 95%    Weight:       Height:             Intake/Output Summary (Last 24 hours) at 12/19/2024 0815  Last data filed at 12/19/2024 0806  Gross per 24 hour   Intake 2084.59 ml   Output 25 ml   Net 2059.59 ml       Current Facility-Administered Medications   Medication Dose Route Frequency    oxyCODONE-acetaminophen (PERCOCET) 5-325 MG per tablet 1 tablet  1 tablet Oral Q4H PRN    sodium chloride flush 0.9 % injection 5-40 mL  5-40 mL IntraVENous 2 times per day    sodium chloride flush 0.9 % injection 5-40 mL  5-40 mL IntraVENous PRN    0.9 % sodium chloride infusion   IntraVENous PRN    potassium chloride (KLOR-CON M) extended release tablet 40 mEq  40 mEq Oral PRN    Or    potassium bicarb-citric acid (EFFER-K) effervescent tablet 40 mEq  40 mEq Oral PRN    Or    potassium chloride 10 mEq/100 mL IVPB (Peripheral Line)  10 mEq IntraVENous PRN    magnesium sulfate 2000 mg in 50 mL IVPB premix  2,000 mg IntraVENous PRN    ondansetron (ZOFRAN-ODT) disintegrating tablet 4 mg  4 mg Oral Q8H PRN    Or    ondansetron (ZOFRAN) injection 4 mg  4 mg IntraVENous Q6H PRN    polyethylene glycol (GLYCOLAX) packet 17 g  17 g Oral Daily PRN    acetaminophen (TYLENOL) tablet 650 mg  650 mg Oral Q6H PRN    Or    acetaminophen (TYLENOL) suppository 650 mg  650 mg Rectal Q6H PRN    linezolid (ZYVOX) IVPB 600 mg  600 mg IntraVENous Q12H    meropenem (MERREM) 500 mg in sodium chloride

## 2024-12-19 NOTE — PROGRESS NOTES
Admit Date: 2024    POD 1 Day Post-Op    Procedure:  Procedure(s) with comments:  LEG DEBRIDEMENT INCISION AND DRAINAGE    left hip wound   Prone position - debridement left hip and sacral wound    Subjective:     Patient in ICU POD1. Resting in bed. Awakens easily. No current complaints. HD in process.  at bedside.     Objective:       Vitals:    24 1145 24 1200 24 1215 24 1230   BP: (!) 89/58 97/70 (!) 103/59 (!) 100/57   Pulse: (!) 119 (!) 131 (!) 125 (!) 126   Resp: 13 20  10   Temp:       TempSrc:       SpO2: 95% 95% 97% 96%   Weight:       Height:           Temp (24hrs), Av.1 °F (36.7 °C), Min:97.7 °F (36.5 °C), Max:98.7 °F (37.1 °C)  .  I&O reviewed as documented.    [unfilled]     Physical Exam:   Constitutional: Alert, oriented, cooperative patient in no acute distress; appears stated age   BP (!) 100/57   Pulse (!) 126   Temp 98.7 °F (37.1 °C) (Oral)   Resp 10   Ht 1.753 m (5' 9\")   Wt (!) 147.1 kg (324 lb 4.8 oz)   SpO2 96%   BMI 47.89 kg/m²   Eyes: Sclera are clear. EOMs intact  ENMT: no external lesions' gross hearing normal; no obvious neck masses, no ear or lip lesions, nares normal  CV: RRR. Normal perfusion  Resp: No JVD.  Breathing is  non-labored; no audible wheezing.    GI: Obese, soft and non-distended. Dressing in place     Integument: dressing to Left hip and buttocks c/d/I.   Musculoskeletal: unremarkable with normal function. No embolic signs or cyanosis.   Neuro:  Oriented; moves all 4; no focal deficits  Psychiatric: normal affect and mood, no memory impairment     Labs:   Recent Results (from the past 24 hour(s))   POCT Glucose    Collection Time: 24  2:09 PM   Result Value Ref Range    POC Glucose 106 (H) 65 - 100 mg/dL    Performed by: Jose    Culture, Tissue (with Gram Stain)    Collection Time: 24  3:17 PM    Specimen: Buttock    LEFT   Result Value Ref Range    Special Requests NO SPECIAL REQUESTS      Gram

## 2024-12-20 LAB
ANION GAP SERPL CALC-SCNC: 15 MMOL/L (ref 7–16)
ARTERIAL PATENCY WRIST A: ABNORMAL
BACTERIA SPEC CULT: NORMAL
BASE EXCESS BLDV CALC-SCNC: 3.1 MMOL/L
BASOPHILS # BLD: 0.1 K/UL (ref 0–0.2)
BASOPHILS NFR BLD: 0 % (ref 0–2)
BUN SERPL-MCNC: 19 MG/DL (ref 6–23)
CALCIUM SERPL-MCNC: 8.2 MG/DL (ref 8.8–10.2)
CHLORIDE SERPL-SCNC: 97 MMOL/L (ref 98–107)
CO2 SERPL-SCNC: 24 MMOL/L (ref 20–29)
CREAT SERPL-MCNC: 3.94 MG/DL (ref 0.6–1.1)
DIFFERENTIAL METHOD BLD: ABNORMAL
EOSINOPHIL # BLD: 0 K/UL (ref 0–0.8)
EOSINOPHIL NFR BLD: 0 % (ref 0.5–7.8)
ERYTHROCYTE [DISTWIDTH] IN BLOOD BY AUTOMATED COUNT: 20.4 % (ref 11.9–14.6)
GAS FLOW.O2 O2 DELIVERY SYS: ABNORMAL
GLUCOSE BLD STRIP.AUTO-MCNC: 114 MG/DL (ref 65–100)
GLUCOSE BLD STRIP.AUTO-MCNC: 145 MG/DL (ref 65–100)
GLUCOSE SERPL-MCNC: 111 MG/DL (ref 70–99)
HCO3 BLDV-SCNC: 28.1 MMOL/L (ref 23–28)
HCT VFR BLD AUTO: 30.1 % (ref 35.8–46.3)
HGB BLD-MCNC: 9 G/DL (ref 11.7–15.4)
IMM GRANULOCYTES # BLD AUTO: 0.9 K/UL (ref 0–0.5)
IMM GRANULOCYTES NFR BLD AUTO: 4 % (ref 0–5)
LYMPHOCYTES # BLD: 1 K/UL (ref 0.5–4.6)
LYMPHOCYTES NFR BLD: 4 % (ref 13–44)
MAGNESIUM SERPL-MCNC: 2 MG/DL (ref 1.8–2.4)
MCH RBC QN AUTO: 27.4 PG (ref 26.1–32.9)
MCHC RBC AUTO-ENTMCNC: 29.9 G/DL (ref 31.4–35)
MCV RBC AUTO: 91.8 FL (ref 82–102)
MONOCYTES # BLD: 0.9 K/UL (ref 0.1–1.3)
MONOCYTES NFR BLD: 3 % (ref 4–12)
NEUTS SEG # BLD: 22.8 K/UL (ref 1.7–8.2)
NEUTS SEG NFR BLD: 89 % (ref 43–78)
NRBC # BLD: 0.03 K/UL (ref 0–0.2)
PCO2 BLDV: 45.1 MMHG (ref 41–51)
PH BLDV: 7.4 (ref 7.32–7.42)
PHOSPHATE SERPL-MCNC: 5.2 MG/DL (ref 2.5–4.5)
PLATELET # BLD AUTO: 342 K/UL (ref 150–450)
PMV BLD AUTO: 9.2 FL (ref 9.4–12.3)
PO2 BLDV: 35 MMHG
POC FIO2: 2.5
POTASSIUM SERPL-SCNC: 4.4 MMOL/L (ref 3.5–5.1)
RBC # BLD AUTO: 3.28 M/UL (ref 4.05–5.2)
SAO2 % BLDV: 66.3 % (ref 65–88)
SERVICE CMNT-IMP: ABNORMAL
SERVICE CMNT-IMP: NORMAL
SODIUM SERPL-SCNC: 136 MMOL/L (ref 136–145)
SPECIMEN TYPE: ABNORMAL
WBC # BLD AUTO: 25.7 K/UL (ref 4.3–11.1)

## 2024-12-20 PROCEDURE — 6370000000 HC RX 637 (ALT 250 FOR IP): Performed by: HOSPITALIST

## 2024-12-20 PROCEDURE — 99291 CRITICAL CARE FIRST HOUR: CPT | Performed by: INTERNAL MEDICINE

## 2024-12-20 PROCEDURE — 85025 COMPLETE CBC W/AUTO DIFF WBC: CPT

## 2024-12-20 PROCEDURE — 82803 BLOOD GASES ANY COMBINATION: CPT

## 2024-12-20 PROCEDURE — 2000000000 HC ICU R&B

## 2024-12-20 PROCEDURE — 2500000003 HC RX 250 WO HCPCS: Performed by: HOSPITALIST

## 2024-12-20 PROCEDURE — 83735 ASSAY OF MAGNESIUM: CPT

## 2024-12-20 PROCEDURE — 2500000003 HC RX 250 WO HCPCS: Performed by: INTERNAL MEDICINE

## 2024-12-20 PROCEDURE — 36592 COLLECT BLOOD FROM PICC: CPT

## 2024-12-20 PROCEDURE — 6360000002 HC RX W HCPCS: Performed by: HOSPITALIST

## 2024-12-20 PROCEDURE — 2500000003 HC RX 250 WO HCPCS

## 2024-12-20 PROCEDURE — 2580000003 HC RX 258: Performed by: HOSPITALIST

## 2024-12-20 PROCEDURE — 6370000000 HC RX 637 (ALT 250 FOR IP): Performed by: INTERNAL MEDICINE

## 2024-12-20 PROCEDURE — 2580000003 HC RX 258: Performed by: INTERNAL MEDICINE

## 2024-12-20 PROCEDURE — 84100 ASSAY OF PHOSPHORUS: CPT

## 2024-12-20 PROCEDURE — 2580000003 HC RX 258

## 2024-12-20 PROCEDURE — 6360000002 HC RX W HCPCS

## 2024-12-20 PROCEDURE — 2709999900 HC NON-CHARGEABLE SUPPLY

## 2024-12-20 PROCEDURE — 80048 BASIC METABOLIC PNL TOTAL CA: CPT

## 2024-12-20 PROCEDURE — 6360000002 HC RX W HCPCS: Performed by: INTERNAL MEDICINE

## 2024-12-20 PROCEDURE — 82962 GLUCOSE BLOOD TEST: CPT

## 2024-12-20 PROCEDURE — 2500000003 HC RX 250 WO HCPCS: Performed by: NURSE PRACTITIONER

## 2024-12-20 PROCEDURE — 2700000000 HC OXYGEN THERAPY PER DAY

## 2024-12-20 RX ORDER — SEVELAMER CARBONATE 800 MG/1
800 TABLET, FILM COATED ORAL
Status: DISCONTINUED | OUTPATIENT
Start: 2024-12-20 | End: 2024-12-20

## 2024-12-20 RX ORDER — HYDROMORPHONE HYDROCHLORIDE 1 MG/ML
1 INJECTION, SOLUTION INTRAMUSCULAR; INTRAVENOUS; SUBCUTANEOUS
Status: DISCONTINUED | OUTPATIENT
Start: 2024-12-20 | End: 2024-12-20

## 2024-12-20 RX ORDER — OXYCODONE AND ACETAMINOPHEN 5; 325 MG/1; MG/1
1 TABLET ORAL EVERY 4 HOURS PRN
Status: DISCONTINUED | OUTPATIENT
Start: 2024-12-20 | End: 2024-12-20

## 2024-12-20 RX ORDER — ACETAMINOPHEN 160 MG/5ML
650 SUSPENSION ORAL EVERY 6 HOURS PRN
Status: DISCONTINUED | OUTPATIENT
Start: 2024-12-20 | End: 2024-12-20

## 2024-12-20 RX ORDER — HYDROMORPHONE HYDROCHLORIDE 1 MG/ML
1 INJECTION, SOLUTION INTRAMUSCULAR; INTRAVENOUS; SUBCUTANEOUS
Status: DISCONTINUED | OUTPATIENT
Start: 2024-12-20 | End: 2024-12-21 | Stop reason: HOSPADM

## 2024-12-20 RX ORDER — SCOLOPAMINE TRANSDERMAL SYSTEM 1 MG/1
1 PATCH, EXTENDED RELEASE TRANSDERMAL
Status: DISCONTINUED | OUTPATIENT
Start: 2024-12-20 | End: 2024-12-21 | Stop reason: HOSPADM

## 2024-12-20 RX ADMIN — OXYCODONE HYDROCHLORIDE AND ACETAMINOPHEN 1 TABLET: 5; 325 TABLET ORAL at 12:29

## 2024-12-20 RX ADMIN — AMIODARONE HYDROCHLORIDE 1 MG/MIN: 50 INJECTION, SOLUTION INTRAVENOUS at 09:51

## 2024-12-20 RX ADMIN — NOREPINEPHRINE BITARTRATE 9 MCG/MIN: 16 SOLUTION INTRAVENOUS at 08:29

## 2024-12-20 RX ADMIN — SODIUM CHLORIDE, PRESERVATIVE FREE 10 ML: 5 INJECTION INTRAVENOUS at 19:19

## 2024-12-20 RX ADMIN — ACETAMINOPHEN 650 MG: 325 SUSPENSION ORAL at 15:06

## 2024-12-20 RX ADMIN — DAKIN'S SOLUTION 0.125% (QUARTER STRENGTH): 0.12 SOLUTION at 09:39

## 2024-12-20 RX ADMIN — HYDROMORPHONE HYDROCHLORIDE 1 MG: 1 INJECTION, SOLUTION INTRAMUSCULAR; INTRAVENOUS; SUBCUTANEOUS at 16:35

## 2024-12-20 RX ADMIN — HYDROMORPHONE HYDROCHLORIDE 1 MG: 1 INJECTION, SOLUTION INTRAMUSCULAR; INTRAVENOUS; SUBCUTANEOUS at 23:00

## 2024-12-20 RX ADMIN — HYDROMORPHONE HYDROCHLORIDE 1 MG: 1 INJECTION, SOLUTION INTRAMUSCULAR; INTRAVENOUS; SUBCUTANEOUS at 21:41

## 2024-12-20 RX ADMIN — NOREPINEPHRINE BITARTRATE 10 MCG/MIN: 16 SOLUTION INTRAVENOUS at 00:27

## 2024-12-20 RX ADMIN — SODIUM CHLORIDE 2 MG: 9 INJECTION INTRAMUSCULAR; INTRAVENOUS; SUBCUTANEOUS at 15:49

## 2024-12-20 RX ADMIN — NOREPINEPHRINE BITARTRATE 13 MCG/MIN: 16 SOLUTION INTRAVENOUS at 15:16

## 2024-12-20 RX ADMIN — SODIUM CHLORIDE, PRESERVATIVE FREE 10 ML: 5 INJECTION INTRAVENOUS at 07:02

## 2024-12-20 RX ADMIN — HYDROMORPHONE HYDROCHLORIDE 0.5 MG: 1 INJECTION, SOLUTION INTRAMUSCULAR; INTRAVENOUS; SUBCUTANEOUS at 13:13

## 2024-12-20 RX ADMIN — AMIODARONE HYDROCHLORIDE 1 MG/MIN: 50 INJECTION, SOLUTION INTRAVENOUS at 02:14

## 2024-12-20 RX ADMIN — LINEZOLID 600 MG: 600 INJECTION, SOLUTION INTRAVENOUS at 09:30

## 2024-12-20 RX ADMIN — SODIUM CHLORIDE 2 MG: 9 INJECTION INTRAMUSCULAR; INTRAVENOUS; SUBCUTANEOUS at 23:00

## 2024-12-20 RX ADMIN — HYDROMORPHONE HYDROCHLORIDE 1 MG: 1 INJECTION, SOLUTION INTRAMUSCULAR; INTRAVENOUS; SUBCUTANEOUS at 19:04

## 2024-12-20 RX ADMIN — SODIUM CHLORIDE 2 MG: 9 INJECTION INTRAMUSCULAR; INTRAVENOUS; SUBCUTANEOUS at 21:42

## 2024-12-20 RX ADMIN — FAMOTIDINE 20 MG: 10 INJECTION, SOLUTION INTRAVENOUS at 09:35

## 2024-12-20 RX ADMIN — SODIUM CHLORIDE 2 MG: 9 INJECTION INTRAMUSCULAR; INTRAVENOUS; SUBCUTANEOUS at 19:16

## 2024-12-20 ASSESSMENT — PAIN DESCRIPTION - DESCRIPTORS
DESCRIPTORS: BURNING
DESCRIPTORS: BURNING
DESCRIPTORS: PATIENT UNABLE TO DESCRIBE
DESCRIPTORS: BURNING

## 2024-12-20 ASSESSMENT — PAIN SCALES - GENERAL
PAINLEVEL_OUTOF10: 9
PAINLEVEL_OUTOF10: 0
PAINLEVEL_OUTOF10: 0
PAINLEVEL_OUTOF10: 6
PAINLEVEL_OUTOF10: 10
PAINLEVEL_OUTOF10: 7
PAINLEVEL_OUTOF10: 0
PAINLEVEL_OUTOF10: 10
PAINLEVEL_OUTOF10: 0

## 2024-12-20 ASSESSMENT — PAIN DESCRIPTION - ORIENTATION
ORIENTATION: LEFT
ORIENTATION: OTHER (COMMENT)

## 2024-12-20 ASSESSMENT — PAIN DESCRIPTION - LOCATION
LOCATION: BUTTOCKS;LEG
LOCATION: OTHER (COMMENT)
LOCATION: GENERALIZED
LOCATION: BUTTOCKS;LEG
LOCATION: GENERALIZED
LOCATION: BUTTOCKS;LEG

## 2024-12-20 ASSESSMENT — PAIN DESCRIPTION - ONSET
ONSET: GRADUAL
ONSET: UNABLE TO COMMUNICATE
ONSET: GRADUAL
ONSET: GRADUAL

## 2024-12-20 ASSESSMENT — PAIN DESCRIPTION - FREQUENCY
FREQUENCY: CONTINUOUS

## 2024-12-20 ASSESSMENT — PAIN DESCRIPTION - PAIN TYPE
TYPE: ACUTE PAIN;SURGICAL PAIN
TYPE: OTHER (COMMENT)
TYPE: ACUTE PAIN;SURGICAL PAIN
TYPE: ACUTE PAIN;SURGICAL PAIN

## 2024-12-20 ASSESSMENT — PAIN - FUNCTIONAL ASSESSMENT
PAIN_FUNCTIONAL_ASSESSMENT: PREVENTS OR INTERFERES SOME ACTIVE ACTIVITIES AND ADLS

## 2024-12-20 NOTE — CARE COORDINATION
Pt discussed with Dr. Michaels after receiving hospice consult. Pt now comfort care, not wanting any further tx. Comfort medication per MAR. Pressors off. Dr. Michaels would like to wait 24 hours to see how pt stabilizes prior to hospice consult as may pass here. May need hospice house for pain management if lingers. CM to follow and discuss with spouse is needed.

## 2024-12-20 NOTE — PROGRESS NOTES
Supported  of pt with active listening, reviewing life legacy, and prayer. Please let the spiritual health dept know if we can be of further support.

## 2024-12-20 NOTE — PROGRESS NOTES
[facility] Critical Care Note:: 12/20/2024  Giselle Rosado  Admission Date: 12/17/2024     Length of Stay: 2 days    Background: 54 y.o. y/o female with septic shock secondary to necrotizing fascitis of back and buttocks. Pt has history of morbid obesity, HTN, afib on eliquis, GERD, gout, IBS, DM2, ESRD on HD, and  prior necrotizing fascitis of left groin, pannus, and perineum in 2021 post excision. CT abdomen showed evidence of necrotizing fasciitis involving perineal area and general surgery was consulted. Pt went to OR 12/18 for excisional debridement of necrotizing soft tissue of L hip and L buttock by Dr. Alexander. Pt has remained hypotensive requiring pressors. Pt has remained in afib RVR and cardiology was consulted.     Notable PMH:  has a past medical history of Acute kidney injury ([facility]), Anemia, Anxiety, ARF (acute respiratory failure), Atrial fibrillation with RVR ([facility]), Chronic kidney disease, Debility, Diabetes ([facility]), Dialysis patient ([facility]), GERD (gastroesophageal reflux disease), Gout, Gout, History of recent blood transfusion, Hypertension, IBS (irritable bowel syndrome), Iron deficiency anemia, Necrotizing fasciitis, Personal history of COVID-19, Sepsis ([facility]), and Septic shock ([facility]).    24 Hour events: on 4L NC, on 9 levophed. WBC remains 25. Afebrile. Remains tachycardic on amio drip 1mcg/min    Review of Systems: Comprehensive ROS negative except in HPI     Lines: (insertion date)     CVC  12/18/24 Right Internal jugular (Active)       Tunneled Hemodialysis Catheter Left Neck (Active)     Drips: current dose (range)  Dose (mcg/kg/min) Propofol : *100 mg  Dose (mcg/min) Norepinephrine: 9 mcg/min  Dose (mg/min) Amiodarone: 1 mg/min     Pertinent Exam:         Blood pressure (!) 97/50, pulse (!) 138, temperature 98.4 °F (36.9 °C), temperature source Oral, resp. rate 18, height 1.753 m (5' 9.02\"), weight (!) 147.1 kg (324 lb 4.8 oz), SpO2 99%.   Intake/Output Summary (Last 24 hours) at  position.    Signed by: Sven Bui MD on 10/24/2024  1:57 PM    Microbiology:   Recent Labs     12/17/24 2119 12/17/24  2137 12/18/24  1517   CULTURE NO GROWTH 2 DAYS NO GROWTH 2 DAYS MODERATE Gram negative rods SUBCULTURE IN PROGRESS*  No growth after short period of incubation. Further results to follow after overnight incubation.  No growth after short period of incubation. Further results to follow after overnight incubation.     No results for input(s): \"COVID19\" in the last 72 hours.  Ventilator Settings Ideal body weight: 66.2 kg (146 lb 0.4 oz)  Adjusted ideal body weight: 98.6 kg (217 lb 5.3 oz)  Mode FIO2 Rate Tidal Volume Pressure                           Peak airway pressure:     Minute ventilation:    ABG:No results for input(s): \"PHAPOC\", \"RBT8ATDG\", \"TP5UPOV\", \"ZTQ0UPV\", \"BE\" in the last 72 hours.  Assessment and Plan:  (Medical Decision Making)   Impression: 54 y.o. female admitted with abdominal necrotizing fasciitis involving perineal area. She has a hx of ESRD on HD but has not dialyzed since Friday 12/13. Requiring levophed but no respiratory distress. Pt had surgical debridement 12/18 by Dr. Alexander.     NEURO:   Sedation: none  Analgesia: dilaudid, percocet ordered to help with pain without oversedating     CV:   Hemodynamics: remains hypotensive, on levophed 9mcg/hr.   Afib RVR: cardiology following, amio bolus, gtt started. Anticoagulation on hold until certain that no more surgeries are required.     PULM:   Acute respiratory failure with hypoxemia: wean O2 as tolerated, not on O2 at home. Likely has underlying MAR based on body habitus. Would be beneficial to check HST as an outpt.     RENAL:  Electrolytes: replete as needed  ESRD: per nephrology, has not had dialysis for 6 days.     GI:   Nutrition: diet ordered per primary but pt did not want to eat this morning.     HEME:   leukocytosis: likely related to necrotizing fascitis infection. On linezolid and merrem.   Anemia: monitor,

## 2024-12-20 NOTE — PROGRESS NOTES
Spiritual Health History and Assessment/Progress Note  [facility]    Grief, Loss, and Adjustments,  , End of Life, Grief and loss, Anticipatory Grief,      Name: Giselle Rosado MRN: [id number]    Age: 54 y.o.     Sex: female   Language: English   Mormon: Christianity   Necrotizing fasciitis     Date: 12/20/2024            Total Time Calculated: 15 min              Spiritual Assessment began in [facility] 3 INTENSIVE CARE        Referral/Consult From: Rounding   Encounter Overview/Reason: Grief, Loss, and Adjustments  Service Provided For: Family    Samina, Belief, Meaning:   Patient is connected with a samina tradition or spiritual practice and has beliefs or practices that help with coping during difficult times  Family/Friends identify as spiritual, are connected with a samina tradition or spiritual practice, and have beliefs or practices that help with coping during difficult times      Importance and Influence:  Patient has spiritual/personal beliefs that influence decisions regarding their health  Family/Friends have spiritual/personal beliefs that influence decisions regarding the patient's health    Community:  Patient is connected with a spiritual community and feels well-supported. Support system includes: Spouse/Partner and Samina Community  Family/Friends are connected with a spiritual community: and feel well-supported. Support system includes: Spouse/Partner and Samina Community    Assessment and Plan of Care:     Patient Interventions include: Engaged in theological reflection, Affirmed coping skills/support systems, and Facilitated life review and/ or legacy  Family/Friends Interventions include: Facilitated expression of thoughts and feelings, Engaged in theological reflection, and Facilitated life review and/or legacy    Patient Plan of Care: Spiritual Care available upon further referral  Family/Friends Plan of Care: No spiritual needs identified for follow-up and No future visits per patient/family

## 2024-12-20 NOTE — PROGRESS NOTES
All of the lab is stable, we will discuss your lab in detail at the upcoming appointment. This copy is provided for your records. am  12/20/2024 6:29 AM    Admit Date: 12/17/2024    Admit Diagnosis: Necrotizing fasciitis [M72.6]  Lactic acidosis [E87.20]  Atrial fibrillation with rapid ventricular response (HCC) [I48.91]  Skin lesions [L98.9]      Subjective:   Patient : Patient is hemodynamically stable but is currently on pressors.  She is in A-fib with RVR.    12/20/24  Patient remains essentially unchanged her A-fib is still loosely controlled at best she is now on an amiodarone drip with heart rate around 120    Critical care time evaluating patient examining patient reviewing chart and crafting management plan 6 AM to 6:30 AM    Objective:    BP (!) 105/59   Pulse (!) 133   Temp 98.4 °F (36.9 °C) (Axillary)   Resp 12   Ht 1.753 m (5' 9.02\")   Wt (!) 147.1 kg (324 lb 4.8 oz)   SpO2 96%   BMI 47.87 kg/m²     ROS:  General ROS: negative for - chills  Hematological and Lymphatic ROS: negative for - blood clots or jaundice  Respiratory ROS: no cough, shortness of breath, or wheezing  Cardiovascular ROS: no chest pain or dyspnea on exertion  Gastrointestinal ROS: no abdominal pain, change in bowel habits, or black or bloody stools  Neurological ROS: no TIA or stroke symptoms    Physical Exam:      Physical Examination: General appearance - Appearance: alert, well appearing, and in no distress.   Neck/lymph - supple, no significant adenopathy  Chest/CV - clear to auscultation, no wheezes, rales or rhonchi, symmetric air entry  Heart -irregular rate and rhythm S1-S2 present tachycardic  Abdomen/GI - soft, nontender, nondistended, no masses or organomegaly patient has large open wounds from necrotizing fasciitis  Musculoskeletal - no joint tenderness, deformity or swelling        Current Facility-Administered Medications   Medication Dose Route Frequency    oxyCODONE-acetaminophen (PERCOCET) 5-325 MG per tablet 1 tablet  1 tablet Oral Q4H PRN    amiodarone (CORDARONE) 450 mg in dextrose 5 % 250 mL infusion ([id number])  1 mg/min IntraVENous  Continuous    famotidine (PEPCID) tablet 20 mg  20 mg Oral Daily    sodium hypochlorite (DAKINS) 0.125 % external solution   Irrigation Daily    sodium chloride flush 0.9 % injection 5-40 mL  5-40 mL IntraVENous 2 times per day    sodium chloride flush 0.9 % injection 5-40 mL  5-40 mL IntraVENous PRN    0.9 % sodium chloride infusion   IntraVENous PRN    potassium chloride (KLOR-CON M) extended release tablet 40 mEq  40 mEq Oral PRN    Or    potassium bicarb-citric acid (EFFER-K) effervescent tablet 40 mEq  40 mEq Oral PRN    Or    potassium chloride 10 mEq/100 mL IVPB (Peripheral Line)  10 mEq IntraVENous PRN    magnesium sulfate 2000 mg in 50 mL IVPB premix  2,000 mg IntraVENous PRN    ondansetron (ZOFRAN-ODT) disintegrating tablet 4 mg  4 mg Oral Q8H PRN    Or    ondansetron (ZOFRAN) injection 4 mg  4 mg IntraVENous Q6H PRN    polyethylene glycol (GLYCOLAX) packet 17 g  17 g Oral Daily PRN    acetaminophen (TYLENOL) tablet 650 mg  650 mg Oral Q6H PRN    Or    acetaminophen (TYLENOL) suppository 650 mg  650 mg Rectal Q6H PRN    linezolid (ZYVOX) IVPB 600 mg  600 mg IntraVENous Q12H    meropenem (MERREM) 500 mg in sodium chloride 0.9 % 100 mL IVPB (mini-bag)  500 mg IntraVENous Q24H    norepinephrine (LEVOPHED) 4 mg in sodium chloride 0.9 % 250 mL infusion  1-100 mcg/min IntraVENous Continuous    glucose chewable tablet 16 g  4 tablet Oral PRN    dextrose bolus 10% 125 mL  125 mL IntraVENous PRN    Or    dextrose bolus 10% 250 mL  250 mL IntraVENous PRN    Glucagon Emergency KIT 1 mg  1 mg SubCUTAneous PRN    dextrose 10 % infusion   IntraVENous Continuous PRN    insulin lispro (HUMALOG,ADMELOG) injection vial 0-8 Units  0-8 Units SubCUTAneous 4x Daily AC & HS    HYDROmorphone HCl PF (DILAUDID) injection 0.5 mg  0.5 mg IntraVENous Q3H PRN       Data Review: data included in this note has been independently reviewed by the author     TELEMETRY: A-fib with RVR    Assessment/Plan:     Patient Active Problem List

## 2024-12-20 NOTE — PROGRESS NOTES
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  Continue meropenem.  May consider dc of linezolid on Monday?  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   Obesity with BMI 48 (complicating the above)    Anti-infectives:   Meropenem  -   Linezolid  -     Subjective and 24 hour events:     Pt d/w nursing at the bedside. HR remains an issue. Pt is somnolent. She is refusing the CPAP    Allergies   Allergen Reactions    Ace Inhibitors Angioedema     Other reaction(s): Hives/Swelling-Allergy  Swelling of the mouth    Angiotensin Receptor Blockers Angioedema    Cayenne Hives     Swelling of the mouth  Other reaction(s): Hives/Swelling-Allergy  Patient states she is not allergic    Sulfamethoxazole-Trimethoprim Other (See Comments)     Nausea, vomiting, making her feel like she is going to pass out    Cephalexin Nausea And Vomiting     Can't take oral/can do IV        Objective:     Visit Vitals  BP (!) 92/51   Pulse (!) 143   Temp 99.1 °F (37.3 °C) (Oral)   Resp 16   Ht 1.753 m (5' 9.02\")   Wt (!) 147.1 kg (324 lb 4.8 oz)   SpO2 96%   BMI 47.87 kg/m²     Temp (24hrs), Av.6 °F (37 °C), Min:98.4 °F (36.9 °C), Max:99.1 °F (37.3 °C)       Intake/Output Summary (Last 24 hours) at 2024 1151  Last data filed at 2024

## 2024-12-20 NOTE — WOUND CARE
In to attempt dressing changes. Patient and family considering palliation.  Patient refused dressing change, witnessed by Khushi Francis RN. Patient stated \" I can't, I just can't\".  Will plan to assess Monday if not on hospice at that time. Notified providers of refusal.

## 2024-12-20 NOTE — PROGRESS NOTES
[facility] PROGRESS NOTE    Follow up for:    Subjective:   Patient seen and examined.  Very sleepy.  She needs to go on BiPAP.  Hemodynamically stable.    ROS:  Gen - no fever, no chills, appetite okay  CV - no chest pain, no orthopnea  Lung - no shortness of breath, no cough  Abd - no tenderness, no nausea, no vomiting  Ext - no edema    Objective:   Exam:  Vitals:    12/20/24 0715 12/20/24 0730 12/20/24 0745 12/20/24 0800   BP: (!) 104/50 101/60 (!) 86/61 (!) 97/50   Pulse: (!) 131 (!) 128 (!) 133 (!) 138   Resp: 21 26 22 18   Temp: 98.4 °F (36.9 °C)      TempSrc: Oral      SpO2: 98% 97% 100% 99%   Weight:       Height:             Intake/Output Summary (Last 24 hours) at 12/20/2024 0820  Last data filed at 12/20/2024 0740  Gross per 24 hour   Intake 2469.86 ml   Output 1331 ml   Net 1138.86 ml       Current Facility-Administered Medications   Medication Dose Route Frequency    oxyCODONE-acetaminophen (PERCOCET) 5-325 MG per tablet 1 tablet  1 tablet Oral Q4H PRN    amiodarone (CORDARONE) 450 mg in dextrose 5 % 250 mL infusion ([id number])  1 mg/min IntraVENous Continuous    famotidine (PEPCID) tablet 20 mg  20 mg Oral Daily    sodium hypochlorite (DAKINS) 0.125 % external solution   Irrigation Daily    sodium chloride flush 0.9 % injection 5-40 mL  5-40 mL IntraVENous 2 times per day    sodium chloride flush 0.9 % injection 5-40 mL  5-40 mL IntraVENous PRN    0.9 % sodium chloride infusion   IntraVENous PRN    potassium chloride (KLOR-CON M) extended release tablet 40 mEq  40 mEq Oral PRN    Or    potassium bicarb-citric acid (EFFER-K) effervescent tablet 40 mEq  40 mEq Oral PRN    Or    potassium chloride 10 mEq/100 mL IVPB (Peripheral Line)  10 mEq IntraVENous PRN    magnesium sulfate 2000 mg in 50 mL IVPB premix  2,000 mg IntraVENous PRN    ondansetron (ZOFRAN-ODT) disintegrating tablet 4 mg  4 mg Oral Q8H PRN    Or    ondansetron (ZOFRAN) injection 4 mg  4 mg IntraVENous Q6H PRN    polyethylene glycol

## 2024-12-20 NOTE — INTERDISCIPLINARY ROUNDS
Multi-D Rounds/Checklist (leapfrog):  Lines: can any be removed?: None     CVC  12/18/24 Right Internal jugular (Active)       Tunneled Hemodialysis Catheter Left Neck (Active)     DVT Prophylaxis: Ordered  Vent: N/A  Nutrition Ordered/appropriate: Ordered  Can antibiotics or other drugs be stopped? Cannot be stopped /End Date set   MRSA swab:   Inpat Anti-Infectives (From admission, onward)       Start     Ordered Stop    12/18/24 2200  meropenem (MERREM) 500 mg in sodium chloride 0.9 % 100 mL IVPB (mini-bag)  500 mg,   IntraVENous,   EVERY 24 HOURS         12/18/24 0334 --    12/18/24 1000  linezolid (ZYVOX) IVPB 600 mg  600 mg,   IntraVENous,   EVERY 12 HOURS         12/18/24 0334 12/25/24 0959                 Consults needed: None  A: Is pain control adequate? (has PRNs? Stop drip?) Yes  B: Sedation break and SBT? N/A  C: Is sedation choice appropriate? N/A  D: Delirium/CAM-ICU? No  E: Mobility goals/appropriateness? Yes  F: Family update and plan? Spouse is surrogate decision maker and is being updated daily by primary attending and nursing staff.    Marianna Polo, APRN - NP

## 2024-12-20 NOTE — PROGRESS NOTES
Admit Date: 2024    POD 2 Days Post-Op    Procedure:  Procedure(s) with comments:  LEG DEBRIDEMENT INCISION AND DRAINAGE    left hip wound   Prone position - debridement left hip and sacral wound    Subjective:     Patient in ICU POD2. Resting in bed. Awakens easily. A new blister has formed to the right flank. See image below. Wound care to change dressing today.  wondering if we can do an ostomy this weekend. No new concerns otherwise.    Objective:       Vitals:    24 0830 24 0845 24 0900 24 0915   BP: (!) 95/50 (!) 100/53 (!) 91/47    Pulse: (!) 140 (!) 137 (!) 133 (!) 125   Resp:    Temp:       TempSrc:       SpO2: 98% 97% 97% 96%   Weight:       Height:           Temp (24hrs), Av.6 °F (37 °C), Min:98.4 °F (36.9 °C), Max:98.7 °F (37.1 °C)  .  I&O reviewed as documented.    [unfilled]     Physical Exam:   Constitutional: Alert, oriented, cooperative patient in no acute distress; appears stated age   BP (!) 91/47 Comment: MD aware of HR fluctuations  Pulse (!) 125 Comment: MD aware of HR fluctuations  Temp 98.4 °F (36.9 °C) (Oral)   Resp 17 Comment: MD aware of HR fluctuations  Ht 1.753 m (5' 9.02\")   Wt (!) 147.1 kg (324 lb 4.8 oz)   SpO2 96% Comment: MD aware of HR fluctuations  BMI 47.87 kg/m²   Eyes: Sclera are clear. EOMs intact  ENMT: no external lesions' gross hearing normal; no obvious neck masses, no ear or lip lesions, nares normal  CV: RRR. Normal perfusion  Resp: No JVD.  Breathing is  non-labored; no audible wheezing.    GI: Obese, soft and non-distended. Dressing in place     Integument: dressing to Left hip and buttocks c/d/I.   Musculoskeletal: unremarkable with normal function. No embolic signs or cyanosis.   Neuro:  Oriented; moves all 4; no focal deficits  Psychiatric: normal affect and mood, no memory impairment  SKIN:            Labs:   Recent Results (from the past 24 hour(s))   POCT Glucose    Collection Time: 24 11:04

## 2024-12-20 NOTE — ACP (ADVANCE CARE PLANNING)
Advance Care Planning     Advance Care Planning (ACP) Physician/NP/PA Conversation    Date of Conversation: 12/17/2024  Conducted with: Patient with Decision Making Capacity and Healthcare Decision Maker  Other persons present: Spouse Vinicio Rosado    Healthcare Decision Maker:   Primary Decision Maker: Vinicio Rosado - Spouse - [phone]         Care Preferences:  Ventilation:  \"If you were unable to breath on your own and your chance of recovery was unlikely, what would be your preference about the use of a ventilator (breathing machine) if it was available to you?\"  The patient would NOT desire the use of a ventilator.    Resuscitation:  \"In the event your heart stopped as a result of an underlying serious health condition, would you want attempts made to restart your heart, or would you prefer a natural death?\"  No, do NOT attempt to resuscitate.    Will update code status to DNR per patient's wishes.     Length of Voluntary ACP Conversation in minutes:  <16 minutes (Non-Billable)    Elda Garsia, APRN - CNP

## 2024-12-20 NOTE — CARE COORDINATION
Chart reviewed and pt discussed in am IDR as pt continues ICU s/p admission necrotizing fascitis and post surgical debridement. Currently on Amio and levo gtt. NC 4L O2. Followed by ID, Cardiology, Nephrology, Intensivist, Wound care. Most likely will need STR vs LTACH. CM following for [facility]EN needs when medically stable for d/c. LOS 2 days.

## 2024-12-20 NOTE — PROGRESS NOTES
neurological deficits.  Psych:  Normal mood and affect.      I have personally reviewed labs and tests:  Recent Labs:  Recent Results (from the past 48 hour(s))   POCT Glucose    Collection Time: 12/18/24  2:09 PM   Result Value Ref Range    POC Glucose 106 (H) 65 - 100 mg/dL    Performed by: Jose    Culture, Tissue (with Gram Stain)    Collection Time: 12/18/24  3:17 PM    Specimen: Buttock    LEFT   Result Value Ref Range    Special Requests NO SPECIAL REQUESTS      Gram Stain FEW WBCS SEEN      Gram Stain MANY GRAM POSITIVE RODS      Gram Stain MODERATE Gram negative rods      Gram Stain FEW Gram positive cocci      Culture MODERATE Gram negative rods SUBCULTURE IN PROGRESS (A)     Culture, Anaerobic    Collection Time: 12/18/24  3:17 PM    Specimen: Buttock    LEFT   Result Value Ref Range    Special Requests NO SPECIAL REQUESTS      Culture        SUBCULTURE IS NECESSARY TO DETERMINE PRESENCE OR ABSENCE OF ANAEROBIC BACTERIA IN THIS CULTURE.  FURTHER REPORT TO FOLLOW AFTER INCUBATION OF SUBCULTURE.   Culture, Tissue (with Gram Stain)    Collection Time: 12/18/24  3:17 PM    Specimen: Hip    LEFT   Result Value Ref Range    Special Requests NO SPECIAL REQUESTS      Gram Stain FEW WBCS SEEN      Gram Stain NO DEFINITE ORGANISM SEEN      Culture MODERATE Gram negative rods SUBCULTURE IN PROGRESS (A)     Culture, Anaerobic    Collection Time: 12/18/24  3:17 PM    Specimen: Hip    LEFT   Result Value Ref Range    Special Requests NO SPECIAL REQUESTS      Culture        SUBCULTURE IS NECESSARY TO DETERMINE PRESENCE OR ABSENCE OF ANAEROBIC BACTERIA IN THIS CULTURE.  FURTHER REPORT TO FOLLOW AFTER INCUBATION OF SUBCULTURE.   Culture, Wound (with Gram Stain)    Collection Time: 12/18/24  3:17 PM    Specimen: Buttock    LEFT   Result Value Ref Range    Special Requests NO SPECIAL REQUESTS      Gram Stain MODERATE WBCS SEEN /LPF      Gram Stain MODERATE GRAM POSITIVE RODS      Gram Stain FEW Gram negative rods    chloride 0.9 % 250 mL infusion  1-100 mcg/min IntraVENous Continuous    glucose chewable tablet 16 g  4 tablet Oral PRN    dextrose bolus 10% 125 mL  125 mL IntraVENous PRN    Or    dextrose bolus 10% 250 mL  250 mL IntraVENous PRN    Glucagon Emergency KIT 1 mg  1 mg SubCUTAneous PRN    dextrose 10 % infusion   IntraVENous Continuous PRN    insulin lispro (HUMALOG,ADMELOG) injection vial 0-8 Units  0-8 Units SubCUTAneous 4x Daily AC & HS    HYDROmorphone HCl PF (DILAUDID) injection 0.5 mg  0.5 mg IntraVENous Q3H PRN       Signed:  Rozina Horvath MD    Part of this note may have been written by using a voice dictation software.  The note has been proof read but may still contain some grammatical/other typographical errors.

## 2024-12-20 NOTE — PROGRESS NOTES
Notified MD of patient stating that she does not want to continue with treatment, refusing dialysis & wound care. At bedside.

## 2024-12-20 NOTE — PROGRESS NOTES
Discussed with family and patient. She is declining wound dressing and now HD. She is having fevers and going up on levophed. Chances of recovery are low and surgery agrees. She and her  agree it is time for comfort care. They would like to transition now and stop any painful treatments. We will be available if needed, but will sign off for now.     Nam Michaels MD

## 2024-12-21 VITALS
DIASTOLIC BLOOD PRESSURE: 22 MMHG | BODY MASS INDEX: 43.4 KG/M2 | OXYGEN SATURATION: 84 % | WEIGHT: 293 LBS | SYSTOLIC BLOOD PRESSURE: 48 MMHG | TEMPERATURE: 103.1 F | HEIGHT: 69 IN

## 2024-12-21 PROBLEM — Z51.5 HOSPICE CARE PATIENT: Status: ACTIVE | Noted: 2024-12-21

## 2024-12-21 LAB
BACTERIA SPEC CULT: NORMAL
GRAM STN SPEC: NORMAL
SERVICE CMNT-IMP: NORMAL

## 2024-12-21 PROCEDURE — 99232 SBSQ HOSP IP/OBS MODERATE 35: CPT | Performed by: INTERNAL MEDICINE

## 2024-12-21 PROCEDURE — 2580000003 HC RX 258

## 2024-12-21 PROCEDURE — 6360000002 HC RX W HCPCS

## 2024-12-21 PROCEDURE — 2500000003 HC RX 250 WO HCPCS

## 2024-12-21 RX ORDER — ACETAMINOPHEN 650 MG/1
650 SUPPOSITORY RECTAL EVERY 6 HOURS PRN
Status: DISCONTINUED | OUTPATIENT
Start: 2024-12-21 | End: 2024-12-21 | Stop reason: HOSPADM

## 2024-12-21 RX ADMIN — HYDROMORPHONE HYDROCHLORIDE 1 MG: 1 INJECTION, SOLUTION INTRAMUSCULAR; INTRAVENOUS; SUBCUTANEOUS at 04:49

## 2024-12-21 RX ADMIN — HYDROMORPHONE HYDROCHLORIDE 1 MG: 1 INJECTION, SOLUTION INTRAMUSCULAR; INTRAVENOUS; SUBCUTANEOUS at 01:24

## 2024-12-21 RX ADMIN — HYDROMORPHONE HYDROCHLORIDE 1 MG: 1 INJECTION, SOLUTION INTRAMUSCULAR; INTRAVENOUS; SUBCUTANEOUS at 05:57

## 2024-12-21 RX ADMIN — SODIUM CHLORIDE 2 MG: 9 INJECTION INTRAMUSCULAR; INTRAVENOUS; SUBCUTANEOUS at 04:49

## 2024-12-21 RX ADMIN — HYDROMORPHONE HYDROCHLORIDE 1 MG: 1 INJECTION, SOLUTION INTRAMUSCULAR; INTRAVENOUS; SUBCUTANEOUS at 08:44

## 2024-12-21 RX ADMIN — SODIUM CHLORIDE 2 MG: 9 INJECTION INTRAMUSCULAR; INTRAVENOUS; SUBCUTANEOUS at 02:33

## 2024-12-21 RX ADMIN — HYDROMORPHONE HYDROCHLORIDE 1 MG: 1 INJECTION, SOLUTION INTRAMUSCULAR; INTRAVENOUS; SUBCUTANEOUS at 03:44

## 2024-12-21 RX ADMIN — HYDROMORPHONE HYDROCHLORIDE 1 MG: 1 INJECTION, SOLUTION INTRAMUSCULAR; INTRAVENOUS; SUBCUTANEOUS at 13:05

## 2024-12-21 RX ADMIN — SODIUM CHLORIDE 2 MG: 9 INJECTION INTRAMUSCULAR; INTRAVENOUS; SUBCUTANEOUS at 01:24

## 2024-12-21 RX ADMIN — SODIUM CHLORIDE 2 MG: 9 INJECTION INTRAMUSCULAR; INTRAVENOUS; SUBCUTANEOUS at 05:57

## 2024-12-21 RX ADMIN — HYDROMORPHONE HYDROCHLORIDE 1 MG: 1 INJECTION, SOLUTION INTRAMUSCULAR; INTRAVENOUS; SUBCUTANEOUS at 02:33

## 2024-12-21 RX ADMIN — SODIUM CHLORIDE 2 MG: 9 INJECTION INTRAMUSCULAR; INTRAVENOUS; SUBCUTANEOUS at 03:44

## 2024-12-21 RX ADMIN — SODIUM CHLORIDE 2 MG: 9 INJECTION INTRAMUSCULAR; INTRAVENOUS; SUBCUTANEOUS at 00:14

## 2024-12-21 RX ADMIN — HYDROMORPHONE HYDROCHLORIDE 1 MG: 1 INJECTION, SOLUTION INTRAMUSCULAR; INTRAVENOUS; SUBCUTANEOUS at 00:14

## 2024-12-21 ASSESSMENT — PAIN DESCRIPTION - LOCATION
LOCATION: GENERALIZED
LOCATION: OTHER (COMMENT)
LOCATION: GENERALIZED

## 2024-12-21 ASSESSMENT — PAIN SCALES - GENERAL
PAINLEVEL_OUTOF10: 7
PAINLEVEL_OUTOF10: 7
PAINLEVEL_OUTOF10: 5
PAINLEVEL_OUTOF10: 7
PAINLEVEL_OUTOF10: 7
PAINLEVEL_OUTOF10: 5
PAINLEVEL_OUTOF10: 7
PAINLEVEL_OUTOF10: 5
PAINLEVEL_OUTOF10: 10
PAINLEVEL_OUTOF10: 0

## 2024-12-21 ASSESSMENT — PAIN - FUNCTIONAL ASSESSMENT: PAIN_FUNCTIONAL_ASSESSMENT: ACTIVITIES ARE NOT PREVENTED

## 2024-12-21 ASSESSMENT — PAIN DESCRIPTION - DESCRIPTORS
DESCRIPTORS: PATIENT UNABLE TO DESCRIBE
DESCRIPTORS: PATIENT UNABLE TO DESCRIBE

## 2024-12-21 NOTE — PROGRESS NOTES
Pt now comfort care and does not wish to continue with wound treatment and care. General Surgery will sign off at this time. Available if needed. Please call for questions or concerns.     Kimberly Frommel, NP

## 2024-12-21 NOTE — PROGRESS NOTES
Following.    Pastoral conversation with spouse and daughter, Roopa and Leonora at bedside.  Family is maintaining a nagy in Becka's room after transition to comfort care earlier today.  \"We are waiting.\"     provided active listening and grief support, exploring the significance of carolyn in the waiting process with the family, utilizing passages of scripture that are significant to them in this moment.  All are tearful and grieving appropriately.      Visit ended with prayer.  Continue to follow for family support as needed.    Rev. Maranda Ross M.Div., BCC

## 2024-12-21 NOTE — DISCHARGE SUMMARY
meropenem, linezolid.  Even with appropriate management, patient's condition deteriorated.  She was needing Levophed max dose to maintain MAP greater than 65.  Goals of care were discussed with the patient and  at bedside.  Patient and family decided for hospice/comfort care.  All active interventions were discontinued and patient was transitioned to comfort care on 2024.  Patient  today 2024 at 15:28 PM.    Time of Death:   15:28    Cause of Death:   Septic shock  Necrotizing fasciitis    Other conditions  Hypertension  Diabetes mellitus type 2  ESRD  Atrial fibrillation with rapid ventricular rate    Time spent in patient discharge pronouncement, documentation, and death certification 36 minutes.      Procedures done this admission:  Procedure(s) with comments:  LEG DEBRIDEMENT INCISION AND DRAINAGE    left hip wound   Prone position - debridement left hip and sacral wound    Consults this admission:  IP CONSULT TO VASCULAR ACCESS TEAM  IP CONSULT TO NEPHROLOGY  IP CONSULT TO INFECTIOUS DISEASES  IP CONSULT TO INTENSIVIST  IP CONSULT TO VASCULAR ACCESS TEAM  IP CONSULT TO CARDIOLOGY  IP CONSULT TO DIETITIAN  IP CONSULT TO HOSPICE    Echocardiogram results:  10/23/24    ECHO (TTE) COMPLETE (PRN CONTRAST/BUBBLE/STRAIN/3D) 10/24/2024  1:57 PM (Final)    Interpretation Summary    Left Ventricle: Normal left ventricular systolic function with a visually estimated EF of 60 - 65%. Left ventricle size is normal. Mildly increased wall thickness. Normal wall motion.    Aortic Valve: Borderline mild stenosis of the aortic valve based on indices; not adequately visualized [DI 0.48; AV area by continuity VTI is 1.8 cm2].    Image quality is suboptimal. Contrast used: Definity. Technically difficult study due to patient's body habitus and procedure performed with the patient in a supine position.    Signed by: Sven Bui MD on 10/24/2024  1:57 PM      Diagnostic Imaging/Tests:   XR CHEST    Output --   Net 47.95 ml         Discharge Exam:  General: Lifeless  Eyes: Pupils fixed/nonreactive  Lungs: No breath sounds  Heart:  No heart sounds  Neurologic: unresponsive    Signed:  Rozina Horvath MD    Part of this note may have been written by using a voice dictation software.  The note has been proof read but may still contain some grammatical/other typographical errors.

## 2024-12-21 NOTE — PROGRESS NOTES
[facility] PROGRESS NOTE    Follow up for:    Subjective:   Patient seen and examined.  Very sleepy.  She needs to go on BiPAP.  Hemodynamically stable.    ROS:  Gen - no fever, no chills, appetite okay  CV - no chest pain, no orthopnea  Lung - no shortness of breath, no cough  Abd - no tenderness, no nausea, no vomiting  Ext - no edema    Objective:   Exam:  Vitals:    12/21/24 0815 12/21/24 0830 12/21/24 0844 12/21/24 0845   BP:       Pulse: (!) 113 (!) 109  (!) 112   Resp: 12 14 16 (!) 9   Temp:       TempSrc:       SpO2:       Weight:       Height:             Intake/Output Summary (Last 24 hours) at 12/21/2024 0910  Last data filed at 12/20/2024 1707  Gross per 24 hour   Intake 852.93 ml   Output --   Net 852.93 ml       Current Facility-Administered Medications   Medication Dose Route Frequency    acetaminophen (TYLENOL) suppository 650 mg  650 mg Rectal Q6H PRN    scopolamine (TRANSDERM-SCOP) transdermal patch 1 patch  1 patch TransDERmal Q72H    HYDROmorphone HCl PF (DILAUDID) injection 1 mg  1 mg IntraVENous Q1H PRN    LORazepam (ATIVAN) 2 mg in sodium chloride (PF) 0.9 % 10 mL injection  2 mg IntraVENous Q1H PRN    sodium chloride flush 0.9 % injection 5-40 mL  5-40 mL IntraVENous 2 times per day    0.9 % sodium chloride infusion   IntraVENous PRN    ondansetron (ZOFRAN-ODT) disintegrating tablet 4 mg  4 mg Oral Q8H PRN    Or    ondansetron (ZOFRAN) injection 4 mg  4 mg IntraVENous Q6H PRN       EXAM  GEN - Alert, oriented, in no distress  CV - S1, S2, RRR, no rub, murmur, or gallop  Lung - clear to auscultation bilaterally  Abd - soft, nontender, BS present  Ext - 2+ edema to the hip    Recent Labs     12/19/24  0431 12/20/24  0635   WBC 26.1* 25.7*   HGB 9.4* 9.0*   HCT 31.6* 30.1*    342        Recent Labs     12/19/24  0431 12/20/24  0635   * 136   K 5.2* 4.4   CL 94* 97*   CO2 20 24   BUN 65* 19   CREATININE 9.64* 3.94*   MG  --  2.0   PHOS  --  5.2*       Assessment and Plan:

## 2024-12-21 NOTE — PROGRESS NOTES
Hospitalist Progress Note   Admit Date:  2024  7:34 PM   Name:  Giselle Rosado   Age:  54 y.o.  Sex:  female  :  1970   MRN:  [id number]   Room:  [facility]/    Presenting/Chief Complaint: Wound Check     Reason(s) for Admission: Necrotizing fasciitis [M72.6]  Lactic acidosis [E87.20]  Atrial fibrillation with rapid ventricular response (HCC) [I48.91]  Skin lesions [L98.9]     Hospital Course:   Giselle Rosado is a 54 y.o. female with medical history of morbid obesity with BMI of 49, hypertension, paroxysmal atrial fibrillation on anticoagulation, GERD, diabetes mellitus type 2, necrotizing fasciitis of the left groin, ESRD previously on peritoneal dialysis complicated by peritonitis started on hemodialysis since 2024 presented to the ER with a left gluteal pain which started 5 days prior and continued to get worse.  She also has a sacral wound that is worsening.  She was evaluated in the ER.  She was admitted for septic shock secondary to necrotizing fasciitis.  CT of the abdomen and pelvis was done which showed necrotizing fasciitis involving the perineal area.  General surgery was consulted.  Plans for surgery today.  Patient is currently on meropenem and linezolid.  ID consulted.  She also has A-fib with RVR and therefore cardiology was consulted.       Subjective & 24hr Events:   Pt on comfort measures. Resting comfortably.  at bedside.     Assessment & Plan:     This is a 54-year-old female with:    Hospice/Comfort care:  Pt transitioned to comfort care after goals of care discussion. All active interventions discontinued.     Pt is at risk of imminent death.    The following problems were taken care of prior to making pt comfort care:    Septic shock secondary to necrotizing fasciitis involving the perineal region postop day 3 status post surgical debridement.    Atrial fibrillation with rapid ventricular rate/paroxysmal atrial fibrillation  Secondary hypercoagulable

## 2024-12-21 NOTE — PROGRESS NOTES
Patient has temp 103.1, order obtained for Tylenol suppository, however,  declined and does not wish patient to be turned d/t extensive painful wounds.

## 2024-12-21 NOTE — PROGRESS NOTES
[facility] CARDIOLOGY PROGRESS NOTE           12/21/2024 9:58 AM    Admit Date: 12/17/2024      Subjective:     Noted a.m. febrile episodes of 103.1F; rates in atrial fibrillation around 110-120.  Discussed with family at bedside with plans for comfort care.  Patient with decreased responsiveness since last night.  Telemetry reviewed which appears atrial fibrillation with RVR with aberrancy    ROS:  Cardiovascular:  As noted above    Objective:      Vitals:    12/21/24 0845 12/21/24 0900 12/21/24 0915 12/21/24 0930   BP:  (!) 45/24     Pulse: (!) 112 (!) 112 (!) 114 (!) 117   Resp: (!) 9 12 11 12   Temp:       TempSrc:       SpO2:       Weight:       Height:           Physical Exam:  General-decreased responsiveness  Neck- supple, no JVD  CV- irregular rate and rhythm no MRG  Lung- clear bilaterally  Abd- soft, nontender, nondistended  Ext- 2+ edema bilaterally.  Skin- warm and dry    Data Review:   Recent Labs     12/19/24  0431 12/20/24  0635   * 136   K 5.2* 4.4   MG  --  2.0   BUN 65* 19   WBC 26.1* 25.7*   HGB 9.4* 9.0*   HCT 31.6* 30.1*    342       Assessment/Plan:     Principal Problem:    Necrotizing fasciitis  -Management per general surgery/ID; status post debridement in OR      Atrial fibrillation/flutter  -Permanent noted since 2021.  Elevated rates in the setting of sepsis/septic shock; telemetry reviewed with likely aberrancy; has not had dialysis and apparently has refused dialysis since 12/19/24; poor anticoagulation candidate  -Last noted echo from 10/2024 with preserved EF and borderline mild aortic stenosis.    Active Problems:    HTN (hypertension)      DM2 (diabetes mellitus, type 2) (HCC)      Septic shock (HCC)      Dialysis patient (HCC)    End stage chronic kidney disease (HCC)  -On hemodialysis per nephrology    Discussed with family.  Appears currently plans for comfort care.  Several questions answered.  Not much further to add from a cardiac standpoint.  Will

## 2024-12-21 NOTE — PROGRESS NOTES
Pt asystole and apneic on monitor, absent heart and lung sounds on auscultation. Dr. Horvath and house supervisor aware.  and two daughters at bedside.

## 2024-12-21 NOTE — PROGRESS NOTES
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 ID will sign off.   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   Obesity with BMI 48 (complicating the above)    Anti-infectives:   Meropenem  -   Linezolid  -     Subjective and 24 hour events:       Allergies   Allergen Reactions    Ace Inhibitors Angioedema     Other reaction(s): Hives/Swelling-Allergy  Swelling of the mouth    Angiotensin Receptor Blockers Angioedema    Cayenne Hives     Swelling of the mouth  Other reaction(s): Hives/Swelling-Allergy  Patient states she is not allergic    Sulfamethoxazole-Trimethoprim Other (See Comments)     Nausea, vomiting, making her feel like she is going to pass out    Cephalexin Nausea And Vomiting     Can't take oral/can do IV        Objective:     Visit Vitals  BP (!) 54/24   Pulse (!) 112   Temp (!) 103.1 °F (39.5 °C) (Axillary)   Resp (!) 9   Ht 1.753 m (5' 9.02\")   Wt (!) 147.1 kg (324 lb 4.8 oz)   SpO2 (!) 84%   BMI 47.87 kg/m²     Temp (24hrs), Av.7 °F (38.7 °C), Min:99.1 °F (37.3 °C), Max:103.1 °F (39.5 °C)       Intake/Output Summary (Last 24 hours) at 2024 0920  Last data filed at 2024 1707  Gross per 24 hour   Intake 852.93 ml   Output --   Net 852.93 ml       Patient examined on 2024 and, apart from changes noted below, exam was unchanged from previously documented.    Physical

## 2024-12-22 LAB
BACTERIA SPEC CULT: ABNORMAL
BACTERIA SPEC CULT: NORMAL
BACTERIA SPEC CULT: NORMAL
GRAM STN SPEC: ABNORMAL
SERVICE CMNT-IMP: ABNORMAL
SERVICE CMNT-IMP: ABNORMAL
SERVICE CMNT-IMP: NORMAL
SERVICE CMNT-IMP: NORMAL

## 2024-12-23 LAB
BACTERIA SPEC CULT: ABNORMAL
BACTERIA SPEC CULT: NORMAL
BACTERIA SPEC CULT: NORMAL
GRAM STN SPEC: ABNORMAL
ORGANISM ID: NORMAL
SERVICE CMNT-IMP: ABNORMAL
SERVICE CMNT-IMP: ABNORMAL
SERVICE CMNT-IMP: NORMAL
SERVICE CMNT-IMP: NORMAL

## 2024-12-31 LAB
BACTERIA SPEC CULT: ABNORMAL
BACTERIA SPEC CULT: ABNORMAL
ORGANISM ID: NORMAL
SERVICE CMNT-IMP: ABNORMAL

## 2025-02-20 NOTE — PROGRESS NOTES
[address]  [address] [facility], [address] [facility], [address]      PLAN: Reyes Dubon to transfer to floor  Consult Dr. Anabel Napoles plastic surgery  Advance diet  Wound care nurse for dressing changes  Medical management per hospitalist  Continue meropenem, vancomycin and clindamycin  Out of bed to chair if dressings will remain in place. ASSESSMENT:  Admit Date: 12/15/2021   1 Day Post-Op  Procedure(s):  INCISION AND DRAINAGE PERINEUM AND TRUNK    Principal Problem:    Necrotizing fasciitis ([address] (12/15/2021)    Active Problems:    DM2 (diabetes mellitus, type 2) ([address] (12/25/2015)      HTN (hypertension) (12/25/2015)      Obesity, morbid ([address] (7/12/2018)      Anemia (7/13/2018)      DEVENDRA (acute kidney injury) ([address] (12/16/2021)      Acute respiratory failure ([address] (12/16/2021)      Sepsis with acute renal failure and tubular necrosis without septic shock ([address] (12/16/2021)         SUBJECTIVE: Alert awake off of ventilator. Pressors off. Vital signs stable afebrile. Denies pain. Appreciative of surgery. Will advance diet. Consult plastic surgery for wound management and closure. Wound care nurse to see today. OBJECTIVE:  Constitutional: Alert oriented cooperative patient in no acute distress; appears stated age   Visit Vitals  BP (!) 120/59 (BP 1 Location: Right upper arm, BP Patient Position: Semi fowlers)   Pulse 71   Temp 98.6 °F (37 °C)   Resp 22   Ht 5' 7.5\" (1.715 m)   Wt 336 lb 13.8 oz (152.8 kg)   SpO2 99%   BMI 51.98 kg/m²     Eyes:Sclera are clear. ENMT: no external lesions gross hearing normal; no obvious neck masses, no ear or lip lesions  CV: RRR. Normal perfusion  Resp: No JVD. Breathing is  non-labored; no audible wheezing. GI: Soft no tenderness to palpation. No additional skin changes to suggest ongoing fasciitis. Musculoskeletal: unremarkable with normal function. No embolic signs or cyanosis.    Neuro:  Oriented; moves all 4; no focal deficits  Psychiatric: normal affect and mood, no memory impairment      Patient Vitals for the past 24 hrs:   BP Temp Pulse Resp SpO2 Weight   12/17/21 0847 (!) 120/59 98.6 °F (37 °C) 71 22 99 % --   12/17/21 0832 128/60 -- 75 18 100 % --   12/17/21 0818 (!) 119/57 -- 71 19 95 % --   12/17/21 0802 (!) 109/55 -- 70 16 98 % --   12/17/21 0746 (!) 111/56 -- 72 19 97 % --   12/17/21 0732 (!) 115/54 -- 69 17 96 % --   12/17/21 0717 (!) 114/55 -- 70 17 95 % --   12/17/21 0702 (!) 114/54 98.7 °F (37.1 °C) 73 19 99 % --   12/17/21 0632 (!) 111/56 -- 72 29 100 % --   12/17/21 0617 (!) 109/52 -- 71 14 100 % --   12/17/21 0602 (!) 111/53 -- 70 20 98 % --   12/17/21 0547 (!) 104/53 -- 71 16 98 % --   12/17/21 0300 (!) 99/55 97.8 °F (36.6 °C) 72 15 98 % 336 lb 13.8 oz (152.8 kg)   12/16/21 2347 (!) 110/53 -- 76 20 99 % --   12/16/21 2332 (!) 111/59 -- 77 16 99 % --   12/16/21 2317 (!) 111/59 -- 81 17 99 % --   12/16/21 2302 (!) 103/55 97.9 °F (36.6 °C) 77 19 99 % --   12/16/21 2247 (!) 100/56 -- 78 19 99 % --   12/16/21 2232 (!) 104/56 -- 80 28 100 % --   12/16/21 2217 (!) 102/57 -- 77 25 98 % --   12/16/21 2202 (!) 100/54 -- 79 18 98 % --   12/16/21 2147 (!) 102/55 -- 79 15 98 % --   12/16/21 2132 (!) 107/52 -- 79 26 98 % --   12/16/21 2117 (!) 112/55 -- 80 13 98 % --   12/16/21 2102 (!) 103/57 -- (!) 122 29 98 % --   12/16/21 2047 (!) 94/50 -- (!) 121 22 98 % --   12/16/21 2032 (!) 94/53 -- (!) 113 21 98 % --   12/16/21 2017 (!) 73/49 -- (!) 123 21 97 % --   12/16/21 2002 (!) 91/54 -- (!) 119 18 97 % --   12/16/21 1947 (!) 86/52 -- (!) 129 27 96 % --   12/16/21 1935 -- -- (!) 111 20 96 % --   12/16/21 1931 (!) 87/52 -- (!) 128 21 96 % --   12/16/21 1917 (!) 92/55 -- (!) 128 13 98 % --   12/16/21 1905 (!) 89/53 98.8 °F (37.1 °C) (!) 121 29 99 % --   12/16/21 1847 (!) 94/54 -- (!) 119 14 98 % --   12/16/21 1830 (!) 88/51 -- (!) 125 27 98 % --   12/16/21 1815 (!) 104/52 -- (!) 125 18 98 % --   12/16/21 1800 (!) 104/50 -- (!) 127 29 100 % --   12/16/21 1745 (!) 98/54 -- (!) 132 (!) 40 99 % --   12/16/21 1730 (!) 110/52 -- (!) 126 15 98 % --   12/16/21 1715 -- -- (!) 124 21 96 % --   12/16/21 1700 (!) 106/59 -- (!) 126 23 100 % --   12/16/21 1645 (!) 124/59 -- (!) 122 18 100 % --   12/16/21 1630 -- -- (!) 114 22 100 % --   12/16/21 1615 (!) 117/58 -- (!) 123 16 100 % --   12/16/21 1600 (!) 130/56 -- (!) 125 24 100 % --   12/16/21 1545 (!) 140/56 -- (!) 118 24 100 % --   12/16/21 1540 -- -- (!) 110 20 100 % --   12/16/21 1530 -- -- 72 13 100 % --   12/16/21 1515 (!) 128/59 -- 75 15 99 % --   12/16/21 1513 -- -- 75 20 100 % --   12/16/21 1500 126/79 -- 74 27 100 % --   12/16/21 1445 (!) 122/56 -- 71 23 100 % --   12/16/21 1430 -- -- 66 21 100 % --   12/16/21 1415 (!) 119/54 -- 67 20 100 % --   12/16/21 1400 (!) 148/76 -- 72 23 100 % --   12/16/21 1345 (!) 151/67 -- 78 (!) 31 100 % --   12/16/21 1330 -- -- 66 20 100 % --   12/16/21 1315 134/64 -- 66 21 100 % --   12/16/21 1300 (!) 122/59 -- 67 21 100 % --   12/16/21 1245 (!) 123/59 -- 69 18 100 % --   12/16/21 1230 -- -- 66 23 100 % --   12/16/21 1226 -- -- 67 25 100 % --   12/16/21 1215 (!) 116/53 -- 64 23 100 % --   12/16/21 1200 134/64 -- 66 23 100 % --   12/16/21 1145 136/64 -- 65 18 100 % --   12/16/21 1130 -- -- 73 29 100 % --   12/16/21 1115 133/62 -- 68 20 100 % --   12/16/21 1100 135/63 97.8 °F (36.6 °C) 67 27 100 % --   12/16/21 1045 (!) 118/58 -- 64 19 100 % --   12/16/21 1030 -- -- 63 18 100 % --   12/16/21 1015 (!) 102/52 -- 66 22 100 % --   12/16/21 1000 (!) 100/52 -- 69 24 100 % --   12/16/21 0945 90/60 -- 68 22 100 % --   12/16/21 0930 -- -- 68 23 100 % --   12/16/21 0915 (!) 118/50 97.6 °F (36.4 °C) 66 21 100 % --     Labs:    Recent Labs     12/17/21  0345   WBC 15.2*   HGB 5.8*      *   K 3.4*   CL 91*   CO2 27   *   CREA 9.03*   *   TBILI 0.3   ALT 7*   AP [address] [place], [place] There are no Wet Read(s) to document.

## (undated) DEVICE — DRAPE,UNDERBUTTOCKS,PCH,STERILE: Brand: MEDLINE

## (undated) DEVICE — SWAB SPEC COLLCTN 6 IN SINGLE RAYON TIP STRL STARSWAB II

## (undated) DEVICE — SOLUTION IRRIG 3000ML 0.9% SOD CHL FLX CONT 0797208] ICU MEDICAL INC]

## (undated) DEVICE — SOLUTION IV 1000ML 0.9% SOD CHL

## (undated) DEVICE — SHEET, DRAPE, SPLIT, STERILE: Brand: MEDLINE

## (undated) DEVICE — AMD ANTIMICROBIAL BANDAGE ROLL,6 PLY: Brand: KERLIX

## (undated) DEVICE — BANDAGE,GAUZE,BULKEE II,4.5"X4.1YD,STRL: Brand: MEDLINE

## (undated) DEVICE — LEGGINGS, PAIR, 31X48, STERILE: Brand: MEDLINE

## (undated) DEVICE — LITHOTOMY: Brand: MEDLINE INDUSTRIES, INC.

## (undated) DEVICE — STRIP,CLOSURE,WOUND,MEDI-STRIP,1/2X4: Brand: MEDLINE

## (undated) DEVICE — SUTURE VCRL SZ 3-0 L18IN ABSRB UD PS-2 L19MM 1/2 CIR J497G

## (undated) DEVICE — SOLUTION IRRIG 1000ML 0.9% SOD CHL USP POUR PLAS BTL

## (undated) DEVICE — SPONGE LAP 18X18IN STRL -- 5/PK

## (undated) DEVICE — PAD,ABDOMINAL,5"X9",ST,LF,25/BX: Brand: MEDLINE INDUSTRIES, INC.

## (undated) DEVICE — DRAPE,TOP,102X53,STERILE: Brand: MEDLINE

## (undated) DEVICE — TUBING, SUCTION, 1/4" X 10', STRAIGHT: Brand: MEDLINE

## (undated) DEVICE — TRAY PREP DRY W/ PREM GLV 2 APPL 6 SPNG 2 UNDPD 1 OVERWRAP

## (undated) DEVICE — LEGGINGS, PAIR, 33X51 XL, STERILE: Brand: MEDLINE

## (undated) DEVICE — SUTURE PERMAHAND SZ 2-0 L18IN NONABSORBABLE BLK L26MM SH C012D

## (undated) DEVICE — HANDPIECE SET WITH COAXIAL HIGH FLOW TIP AND SUCTION TUBE: Brand: INTERPULSE

## (undated) DEVICE — GARMENT,MEDLINE,DVT,INT,CALF,LG, GEN2: Brand: MEDLINE

## (undated) DEVICE — MASTISOL ADHESIVE LIQ 2/3ML

## (undated) DEVICE — SURGICAL PROCEDURE PACK BASIC ST FRANCIS

## (undated) DEVICE — GOWN,REINFORCED,POLY,AURORA,XXLARGE,STR: Brand: MEDLINE

## (undated) DEVICE — SYR 20ML LL STRL LF --

## (undated) DEVICE — DRAPE TWL SURG 16X26IN BLU ORB04] ALLCARE INC]

## (undated) DEVICE — SUTURE MONOCRYL SZ 4-0 L27IN ABSRB UD L19MM PS-2 1/2 CIR PRIM Y426H

## (undated) DEVICE — NEPTUNE E-SEP SMOKE EVACUATION PENCIL, COATED, 70MM BLADE, PUSH BUTTON SWITCH: Brand: NEPTUNE E-SEP

## (undated) DEVICE — GOWN,REINFORCED,POLY,SIRUS,XLNG/XXLG: Brand: MEDLINE

## (undated) DEVICE — ELECTRODE PT RET AD L9FT HI MOIST COND ADH HYDRGEL CORDED

## (undated) DEVICE — SOL IRR SOD CL 0.9% 1000ML BTL --

## (undated) DEVICE — GOWN,REINF,POLY,ECL,PP SLV,XL: Brand: MEDLINE

## (undated) DEVICE — 3M™ IOBAN™ 2 ANTIMICROBIAL INCISE DRAPE 6651EZ: Brand: IOBAN™ 2

## (undated) DEVICE — BLADE ASSEMB CLP HAIR FINE --

## (undated) DEVICE — SUTURE MCRYL SZ 2-0 L27IN ABSRB UD SH L26MM TAPERPOINT NDL Y417H

## (undated) DEVICE — TRAXI PANNICULUS RETRACTOR WITH RETENTUS TECHNOLOGY (BMI 30-50): Brand: TRAXI® PANNICULUS RETRACTOR

## (undated) DEVICE — YANKAUER,BULB TIP,W/O VENT,RIGID,STERILE: Brand: MEDLINE

## (undated) DEVICE — INTENDED FOR TISSUE SEPARATION, AND OTHER PROCEDURES THAT REQUIRE A SHARP SURGICAL BLADE TO PUNCTURE OR CUT.: Brand: BARD-PARKER SAFETY BLADES SIZE 10, STERILE

## (undated) DEVICE — COVER,MAYO STAND,STERILE: Brand: MEDLINE

## (undated) DEVICE — NDL SPNE QNCKE 18GX3.5IN LF --

## (undated) DEVICE — PREP SKN CHLRAPRP APL 26ML STR --

## (undated) DEVICE — MARKER UTIL W/RULER AND LBL -- STRL

## (undated) DEVICE — SYRINGE IRRIG 60ML SFT PLIABLE BLB EZ TO GRP 1 HND USE W/

## (undated) DEVICE — CRADLE POS 3X5X24IN RASPBERRY ARM PRONE FOAM DISP

## (undated) DEVICE — 2000CC GUARDIAN II: Brand: GUARDIAN

## (undated) DEVICE — AMD ANTIMICROBIAL NON-ADHERENT PAD,0.2% POLYHEXAMETHYLENE BIGUANIDE HCI (PHMB): Brand: TELFA

## (undated) DEVICE — PVC URETHRAL CATHETER: Brand: DOVER

## (undated) DEVICE — ELECTRODE BLDE L6.5IN CAUT EXT DISP

## (undated) DEVICE — INTENDED FOR TISSUE SEPARATION, AND OTHER PROCEDURES THAT REQUIRE A SHARP SURGICAL BLADE TO PUNCTURE OR CUT.: Brand: BARD-PARKER ®  SAFETY SCALPED

## (undated) DEVICE — Device

## (undated) DEVICE — GLOVE SURG SZ 75 L12IN FNGR THK79MIL GRN LTX FREE

## (undated) DEVICE — TOWEL SURG W16XL26IN BLU NONFENESTRATED NONSTERILE 400 PER CA

## (undated) DEVICE — KENDALL SCD EXPRESS SLEEVES, KNEE LENGTH, MEDIUM: Brand: KENDALL SCD

## (undated) DEVICE — SUTURE MCRYL SZ 3-0 L27IN ABSRB UD L19MM PS-2 3/8 CIR PRIM Y427H

## (undated) DEVICE — SUTURE STRATAFIX SPRL SZ 2-0 L9IN ABSRB VLT MH L36MM 1/2 SXPD2B408

## (undated) DEVICE — INTENDED FOR TISSUE SEPARATION, AND OTHER PROCEDURES THAT REQUIRE A SHARP SURGICAL BLADE TO PUNCTURE OR CUT.: Brand: BARD-PARKER ® STAINLESS STEEL BLADES

## (undated) DEVICE — GLOVE SURG SZ 7 CRM LTX FREE POLYISOPRENE POLYMER BEAD ANTI

## (undated) DEVICE — UNIVERSAL DRAPES: Brand: MEDLINE INDUSTRIES, INC.

## (undated) DEVICE — LIQUIBAND RAPID ADHESIVE 36/CS 0.8ML: Brand: MEDLINE

## (undated) DEVICE — SUTURE VCRL SZ 2-0 L36IN ABSRB UD L36MM CT-1 1/2 CIR J945H

## (undated) DEVICE — STAPLER SKIN SQ 30 ABSRB STPL DISP INSORB

## (undated) DEVICE — DRAIN SURG 3/4 W10MMXL20CM 100% SIL PERF FLAT HUBLESS

## (undated) DEVICE — CARDINAL HEALTH FLEXIBLE LIGHT HANDLE COVER: Brand: CARDINAL HEALTH

## (undated) DEVICE — 4-PORT MANIFOLD: Brand: NEPTUNE 2

## (undated) DEVICE — GLOVE ORANGE PI 7   MSG9070

## (undated) DEVICE — MINOR SPLIT GENERAL: Brand: MEDLINE INDUSTRIES, INC.

## (undated) DEVICE — NEEDLE HYPO 18GA L1.5IN PNK POLYPR HUB S STL SHT BVL STR

## (undated) DEVICE — CYSTO: Brand: MEDLINE INDUSTRIES, INC.

## (undated) DEVICE — GAUZE,SPONGE,2"X2",8PLY,STERILE,LF,2'S: Brand: MEDLINE

## (undated) DEVICE — Device: Brand: MEDCO MANUFACTURING INC

## (undated) DEVICE — SOLUTION IRRIG 1000ML 09% SOD CHL USP PIC PLAS CONTAINER

## (undated) DEVICE — GOWN,PREVENTION PLUS,XLN/XL,ST,24/CS: Brand: MEDLINE

## (undated) DEVICE — REM POLYHESIVE ADULT PATIENT RETURN ELECTRODE: Brand: VALLEYLAB

## (undated) DEVICE — 3M™ TEGADERM™ TRANSPARENT FILM DRESSING FRAME STYLE, 1626W, 4 IN X 4-3/4 IN (10 CM X 12 CM), 50/CT 4CT/CASE: Brand: 3M™ TEGADERM™

## (undated) DEVICE — SUTURE MCRYL SZ 2-0 L36IN ABSRB UD L36MM CT-1 1/2 CIR Y945H

## (undated) DEVICE — SUTURE ABSRB X-1 REV CUT 1/2 CIR 22MM UD BRAID 27IN SZ 3-0 J458H

## (undated) DEVICE — PERI-PAD,MODERATE: Brand: CURITY

## (undated) DEVICE — NEEDLE HYPO 26GA L0.625IN TAN POLYPR HUB S STL REG BVL STR

## (undated) DEVICE — CONTAINER SPEC FRMLN 120ML --

## (undated) DEVICE — CLEANER,CAUTERY TIP,2X2",STERILE: Brand: MEDLINE

## (undated) DEVICE — SUT ETHBND 0 18IN MO6 MP GRN --

## (undated) DEVICE — RESERVOIR,SUCTION,100CC,SILICONE: Brand: MEDLINE

## (undated) DEVICE — SUT SLK 2-0SH 30IN BLK --

## (undated) DEVICE — DRESSING,GAUZE,XEROFORM,CURAD,5"X9",ST: Brand: CURAD

## (undated) DEVICE — SPONGE GZ W4XL4IN COT 12 PLY TYP VII WVN C FLD DSGN

## (undated) DEVICE — BASIC SINGLE BASIN-LF: Brand: MEDLINE INDUSTRIES, INC.

## (undated) DEVICE — 3M™ IOBAN™ 2 ANTIMICROBIAL INCISE DRAPE 6648EZ: Brand: IOBAN™ 2

## (undated) DEVICE — BINDER ABD H12IN COT FOR 45-62IN WAIST UNIV PREM 4 PNL DSGN

## (undated) DEVICE — SUTURE VICRYL + SZ 3-0 L27IN ABSRB UD L26MM SH 1/2 CIR VCP416H

## (undated) DEVICE — SYR 10ML LUER LOK 1/5ML GRAD --

## (undated) DEVICE — MICRODISSECTION NEEDLE STRAIGHT SLEEVE: Brand: COLORADO